# Patient Record
Sex: MALE | Race: WHITE | NOT HISPANIC OR LATINO | Employment: OTHER | URBAN - METROPOLITAN AREA
[De-identification: names, ages, dates, MRNs, and addresses within clinical notes are randomized per-mention and may not be internally consistent; named-entity substitution may affect disease eponyms.]

---

## 2017-01-12 ENCOUNTER — ANTICOAGULATION THERAPY VISIT (OUTPATIENT)
Dept: ANTICOAGULATION | Facility: CLINIC | Age: 82
End: 2017-01-12
Payer: COMMERCIAL

## 2017-01-12 DIAGNOSIS — Z79.01 LONG-TERM (CURRENT) USE OF ANTICOAGULANTS: Primary | ICD-10-CM

## 2017-01-12 DIAGNOSIS — I48.91 ATRIAL FIBRILLATION (H): ICD-10-CM

## 2017-01-12 LAB — INR POINT OF CARE: 4 (ref 0.86–1.14)

## 2017-01-12 PROCEDURE — 36416 COLLJ CAPILLARY BLOOD SPEC: CPT

## 2017-01-12 PROCEDURE — 85610 PROTHROMBIN TIME: CPT | Mod: QW

## 2017-01-12 PROCEDURE — 99207 ZZC NO CHARGE NURSE ONLY: CPT

## 2017-01-12 NOTE — PROGRESS NOTES
ANTICOAGULATION FOLLOW-UP CLINIC VISIT    Patient Name:  Tucker Slater  Date:  1/12/2017  Contact Type:  Face to Face    SUBJECTIVE:     Patient Findings     Positives Unexplained INR or factor level change           OBJECTIVE    INR PROTIME   Date Value Ref Range Status   01/12/2017 4.0* 0.86 - 1.14 Final       ASSESSMENT / PLAN  INR assessment SUPRA    Recheck INR In: 10 DAYS    INR Location Clinic      Anticoagulation Summary as of 1/12/2017     INR goal 2.0-3.0   Selected INR 4.0! (1/12/2017)   Maintenance plan 2.5 mg (2.5 mg x 1) every day   Full instructions 1/12: Hold; 1/13: 1.25 mg; Otherwise 2.5 mg every day   Weekly total 17.5 mg   Plan last modified Gial Valdovinos RN (4/7/2016)   Next INR check 1/23/2017   Priority INR   Target end date     Indications   Long-term (current) use of anticoagulants [Z79.01] [Z79.01]  Atrial fibrillation (H) [I48.91]         Anticoagulation Episode Summary     INR check location     Preferred lab     Send INR reminders to [facility]    Comments       Anticoagulation Care Providers     Provider Role Specialty Phone number    Kwadwo Winslow MD Responsible Internal Medicine [phone]            See the Encounter Report to view Anticoagulation Flowsheet and Dosing Calendar (Go to Encounters tab in chart review, and find the Anticoagulation Therapy Visit)    Dosage adjustment made based on physician directed care plan.    Ivory Machado RN

## 2017-01-17 DIAGNOSIS — I48.91 ATRIAL FIBRILLATION (H): Primary | ICD-10-CM

## 2017-01-17 NOTE — TELEPHONE ENCOUNTER
Jantoven 2.5mg    Last Written Prescription Date: 10/21/16  Last Fill Qty: 90, # refills: 0  Last Office Visit with G, P or [facility] prescribing provider: 11/30/16       Date and Result of Last PT/INR:   INR      4.0   1/12/2017  INR      2.3   12/13/2016  INR     2.98   8/30/2015  INR     3.54   8/27/2015         Thank you -  Ela Baxter, Pharmacy Technician  [facility]  On Behalf Of [facility]

## 2017-01-18 RX ORDER — WARFARIN SODIUM 2.5 MG/1
TABLET ORAL
Qty: 90 TABLET | Refills: 0 | Status: SHIPPED | OUTPATIENT
Start: 2017-01-18 | End: 2017-04-03

## 2017-01-18 NOTE — TELEPHONE ENCOUNTER
Prescription approved per [facility] Refill Protocol.  Has INR appt scheduled for 1/23/17.     Adeola Valdovinos, Pharm.D.  on behalf of [facility] Pharmacist   [email or web address]

## 2017-01-23 ENCOUNTER — ANTICOAGULATION THERAPY VISIT (OUTPATIENT)
Dept: ANTICOAGULATION | Facility: CLINIC | Age: 82
End: 2017-01-23
Payer: COMMERCIAL

## 2017-01-23 DIAGNOSIS — Z79.01 LONG-TERM (CURRENT) USE OF ANTICOAGULANTS: Primary | ICD-10-CM

## 2017-01-23 LAB — INR POINT OF CARE: 2.9 (ref 0.86–1.14)

## 2017-01-23 PROCEDURE — 36416 COLLJ CAPILLARY BLOOD SPEC: CPT

## 2017-01-23 PROCEDURE — 85610 PROTHROMBIN TIME: CPT | Mod: QW

## 2017-01-23 PROCEDURE — 99207 ZZC NO CHARGE NURSE ONLY: CPT

## 2017-01-23 NOTE — MR AVS SNAPSHOT
Tucker Slater   1/23/2017 2:45 PM   Anticoagulation Therapy Visit    Description:  85 year old male   Provider[facility]  [facility]   Department:  [facility]           INR as of 1/23/2017     Selected INR 2.9 (1/23/2017)      Anticoagulation Summary as of 1/23/2017     INR goal 2.0-3.0   Selected INR 2.9 (1/23/2017)   Full instructions 2.5 mg every day   Next INR check 2/6/2017    Indications   Long-term (current) use of anticoagulants [Z79.01] [Z79.01]  Atrial fibrillation (H) [I48.91]         Your next Anticoagulation Clinic appointment(s)     Feb 06, 2017  1:45 PM   [facility]agulation Visit with [facility]   [facility]    [address] [facility] [address]  [facility] [address] [phone]              Contact Numbers     [facility] Phone Numbers:  Anticoagulation Clinic Appointments : [phone]  Anticoagulation Nurse: [phone]         January 2017 Details    Sun Mon Tue Wed Thu Fri Sat     1               2               3               4               5               6               7                 8               9               10               11               12               13               14                 15               16               17               18               19               20               21                 22               23      2.5 mg   See details      24      2.5 mg         25      2.5 mg         26      2.5 mg         27      2.5 mg         28      2.5 mg           29      2.5 mg         30      2.5 mg         31      2.5 mg              Date Details   01/23 This INR check               How to take your warfarin dose     To take:  2.5 mg Take 1 of the 2.5 mg tablets.           February 2017 Details    Sun Mon Tue Wed Thu Fri Sat        1      2.5 mg         2      2.5 mg         3      2.5 mg         4      2.5 mg           5      2.5 mg         6            7               8                9               10               11                 12               13               14               15               16               17               18                 19               20               21               22               23               24               25                 26               27               28                    Date Details   No additional details    Date of next INR:  2/6/2017         How to take your warfarin dose     To take:  2.5 mg Take 1 of the 2.5 mg tablets.

## 2017-01-23 NOTE — PROGRESS NOTES
ANTICOAGULATION FOLLOW-UP CLINIC VISIT    Patient Name:  Tucker Slater  Date:  1/23/2017  Contact Type:  Face to Face    SUBJECTIVE:     Patient Findings     Positives No Problem Findings           OBJECTIVE    INR PROTIME   Date Value Ref Range Status   01/23/2017 2.9* 0.86 - 1.14 Final       ASSESSMENT / PLAN  INR assessment THER    Recheck INR In: 2 WEEKS    INR Location Clinic      Anticoagulation Summary as of 1/23/2017     INR goal 2.0-3.0   Selected INR 2.9 (1/23/2017)   Maintenance plan 2.5 mg (2.5 mg x 1) every day   Full instructions 2.5 mg every day   Weekly total 17.5 mg   No change documented Gail Valdovinos RN   Plan last modified Gail Valdovinos RN (4/7/2016)   Next INR check 2/6/2017   Priority INR   Target end date     Indications   Long-term (current) use of anticoagulants [Z79.01] [Z79.01]  Atrial fibrillation (H) [I48.91]         Anticoagulation Episode Summary     INR check location     Preferred lab     Send INR reminders to [facility]    Comments       Anticoagulation Care Providers     Provider Role Specialty Phone number    Kwadwo Winslow MD [facility] Internal Medicine [phone]            See the Encounter Report to view Anticoagulation Flowsheet and Dosing Calendar (Go to Encounters tab in chart review, and find the Anticoagulation Therapy Visit)    Dosage adjustment made based on physician directed care plan.    Gail Valdovinos RN

## 2017-01-30 ENCOUNTER — DOCUMENTATION ONLY (OUTPATIENT)
Dept: CARDIOLOGY | Facility: CLINIC | Age: 82
End: 2017-01-30

## 2017-01-30 DIAGNOSIS — Z95.0 CARDIAC PACEMAKER IN SITU: Primary | ICD-10-CM

## 2017-01-30 NOTE — PROGRESS NOTES
[employer] PPM alert received.  Pt has been lost to follow up since 3-2016.  Device histogram data shows persistent AF since July 2016.  He remains on Warfarin. Call to him re: symptoms and to inform him that he should call to schedule a follow up with Dr Perez and device in [place]. Orders placed. [email or web address]

## 2017-01-31 DIAGNOSIS — I10 ESSENTIAL HYPERTENSION, BENIGN: Primary | ICD-10-CM

## 2017-01-31 NOTE — TELEPHONE ENCOUNTER
Maxzide 75-50mg      Last Written Prescription Date: 11/11/16  Last Fill Quantity: 45, # refills: 0  Last Office Visit with G, P or [facility] prescribing provider: 11/30/16       POTASSIUM   Date Value Ref Range Status   11/07/2016 4.3 3.4 - 5.3 mmol/L Final     CREATININE   Date Value Ref Range Status   11/07/2016 1.57* 0.66 - 1.25 mg/dL Final     BP Readings from Last 3 Encounters:   11/30/16 138/76   11/08/16 120/86   11/07/16 130/78     Jaren Peck CPhT  [facility]    On behalf of [facility]

## 2017-02-01 RX ORDER — TRIAMTERENE AND HYDROCHLOROTHIAZIDE 75; 50 MG/1; MG/1
0.5 TABLET ORAL DAILY
Qty: 45 TABLET | Refills: 3 | Status: SHIPPED | OUTPATIENT
Start: 2017-02-01 | End: 2017-04-07

## 2017-02-01 NOTE — TELEPHONE ENCOUNTER
Prescription approved per [facility] Refill Protocol.    Laurie Hobson, PharmD  [facility]  On behalf of [facility]

## 2017-02-02 ENCOUNTER — ALLIED HEALTH/NURSE VISIT (OUTPATIENT)
Dept: CARDIOLOGY | Facility: CLINIC | Age: 82
End: 2017-02-02
Attending: INTERNAL MEDICINE
Payer: COMMERCIAL

## 2017-02-02 VITALS
HEART RATE: 72 BPM | SYSTOLIC BLOOD PRESSURE: 136 MMHG | DIASTOLIC BLOOD PRESSURE: 82 MMHG | BODY MASS INDEX: 27.06 KG/M2 | HEIGHT: 70 IN | WEIGHT: 189 LBS

## 2017-02-02 DIAGNOSIS — I10 ESSENTIAL HYPERTENSION, BENIGN: ICD-10-CM

## 2017-02-02 DIAGNOSIS — Z95.0 CARDIAC PACEMAKER IN SITU: ICD-10-CM

## 2017-02-02 DIAGNOSIS — I48.19 PERSISTENT ATRIAL FIBRILLATION (H): Primary | ICD-10-CM

## 2017-02-02 PROCEDURE — 93280 PM DEVICE PROGR EVAL DUAL: CPT | Performed by: INTERNAL MEDICINE

## 2017-02-02 PROCEDURE — 99213 OFFICE O/P EST LOW 20 MIN: CPT | Performed by: INTERNAL MEDICINE

## 2017-02-02 NOTE — PROGRESS NOTES
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 Perez today at 3:15 PM. 3 month remote scheduled. Naveen

## 2017-02-02 NOTE — Clinical Note
2017      Kwadwo Winslow MD    [facility]    [address], [address]    [place]  [address]       RE: Tucker Slater   MRN: [id number]   : 1931      Dear Dr. Winslow:      It was my pleasure seeing your patient, Mr. Tucker Slater, in follow-up of persistent atrial fibrillation and bradycardia.  He is a delightful 85-year-old male who underwent pacemaker implantation in .  He has had atrial fibrillation and/or atypical atrial flutter for years.  Recently, interrogation of his pacemaker tells us that he has been persistently in atrial arrhythmia since the summer of .  He is appropriately anticoagulated with warfarin.  There have been no recent attempts to restore sinus rhythm.      The patient was last seen in our clinic in 2016 by Janeth Mcdaniels NP.  His blood pressure was a little high that day, and subsequently his dose of carvedilol was increased to 25 mg b.i.d.  Mr. Slater keeps an eye on his blood pressure and tells me that his BP is typically 140 or less and only rarely over 140 mmHg.      He has some issues with his balance and neuropathy but overall has done reasonably well over the past year without any hospitalization.  Another health concern for Mr. Slater is the development of chronic kidney disease, stage III.  Most recent creatinine was around 1.6.      PHYSICAL EXAMINATION:   VITAL SIGNS:  Blood pressure 142/84.  It was rechecked and was 136/82, heart rate 72 and regular, weight 85.7 kg, height 178 cm.   NECK:  Supple.   LUNGS:  Clear.   CARDIOVASCULAR:  Regular rhythm, no apparent gallop or murmur.  His pacemaker incision has healed nicely.   EXTREMITIES:  No edema.      DIAGNOSTIC STUDIES:  Pacemaker interrogation earlier today showed 100% atrial arrhythmia since 2016, estimated battery longevity is 9-10 years.      IMPRESSION:   1.  Persistent atrial fibrillation.  Continue anticoagulation with warfarin.  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    2.  Permanent pacemaker.  Functioning well.  Continue routine Device Clinic followup.   3.  Hypertension.  It was high normal today.  No changes are necessary.  He should keep an eye on it.      RECOMMENDATIONS:  I have requested a follow-up with Janeth Mcdaniels in our [facility] in 05/2018.      Sincerely,           HARVINDER MONROY MD, FACC

## 2017-02-03 NOTE — PROGRESS NOTES
2017      Kwadwo Winslow MD    [facility]    [address], [address]    [place]  [address]       RE: Tucker Slater   MRN: [id number]   : 1931      Dear Dr. Winslow:      It was my pleasure seeing your patient, Mr. Tucker Slater, in follow-up of persistent atrial fibrillation and bradycardia.  He is a delightful 85-year-old male who underwent pacemaker implantation in .  He has had atrial fibrillation and/or atypical atrial flutter for years.  Recently, interrogation of his pacemaker tells us that he has been persistently in atrial arrhythmia since the summer of .  He is appropriately anticoagulated with warfarin.  There have been no recent attempts to restore sinus rhythm.      The patient was last seen in our clinic in 2016 by Janeth Mcdaniels NP.  His blood pressure was a little high that day, and subsequently his dose of carvedilol was increased to 25 mg b.i.d.  Mr. Slater keeps an eye on his blood pressure and tells me that his BP is typically 140 or less and only rarely over 140 mmHg.      He has some issues with his balance and neuropathy but overall has done reasonably well over the past year without any hospitalization.  Another health concern for Mr. Slater is the development of chronic kidney disease, stage III.  Most recent creatinine was around 1.6.      PHYSICAL EXAMINATION:   VITAL SIGNS:  Blood pressure 142/84.  It was rechecked and was 136/82, heart rate 72 and regular, weight 85.7 kg, height 178 cm.   NECK:  Supple.   LUNGS:  Clear.   CARDIOVASCULAR:  Regular rhythm, no apparent gallop or murmur.  His pacemaker incision has healed nicely.   EXTREMITIES:  No edema.      DIAGNOSTIC STUDIES:  Pacemaker interrogation earlier today showed 100% atrial arrhythmia since 2016, estimated battery longevity is 9-10 years.      IMPRESSION:   1.  Persistent atrial fibrillation.  Continue anticoagulation with warfarin.  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    2.  Permanent pacemaker.  Functioning well.  Continue routine Device Clinic followup.   3.  Hypertension.  It was high normal today.  No changes are necessary.  He should keep an eye on it.      RECOMMENDATIONS:  I have requested a follow-up with Janeth Mcdaniels in our [facility] in 2018.      Sincerely,           HARVINDER MONROY MD, [facility]             D: 2017 15:43   T: 2017 05:22   MT: COLBY      Name:     ROSS LORENZANA   MRN:      [id number]        Account:      [id number]   :      1931           Service Date: 2017      Document: [id number]

## 2017-02-06 ENCOUNTER — ANTICOAGULATION THERAPY VISIT (OUTPATIENT)
Dept: ANTICOAGULATION | Facility: CLINIC | Age: 82
End: 2017-02-06
Payer: COMMERCIAL

## 2017-02-06 DIAGNOSIS — Z79.01 LONG-TERM (CURRENT) USE OF ANTICOAGULANTS: Primary | ICD-10-CM

## 2017-02-06 LAB — INR POINT OF CARE: 2.9 (ref 0.86–1.14)

## 2017-02-06 PROCEDURE — 85610 PROTHROMBIN TIME: CPT | Mod: QW

## 2017-02-06 PROCEDURE — 36416 COLLJ CAPILLARY BLOOD SPEC: CPT

## 2017-02-06 PROCEDURE — 99207 ZZC NO CHARGE NURSE ONLY: CPT

## 2017-02-06 NOTE — MR AVS SNAPSHOT
Tucker ZHAO Slater   2/6/2017 1:45 PM   Anticoagulation Therapy Visit    Description:  85 year old male   Provider:  [facility]   Department:  [facility] Anti Coagulation           INR as of 2/6/2017     Selected INR 2.9 (2/6/2017)      Anticoagulation Summary as of 2/6/2017     INR goal 2.0-3.0   Selected INR 2.9 (2/6/2017)   Full instructions 2.5 mg every day   Next INR check 3/6/2017    Indications   Long-term (current) use of anticoagulants [Z79.01] [Z79.01]  Atrial fibrillation (H) [I48.91]         Your next Anticoagulation Clinic appointment(s)     Mar 06, 2017  1:45 PM   [facility]ticoagulation [facility] with [facility]   [facility]    [address] [facility] [address]  [facility] [address] [phone]              Contact Numbers     [facility] Phone Numbers:  Anticoagulation Clinic Appointments : [phone]  Anticoagulation Nurse: [phone]         February 2017 Details    Sun Mon Tue Wed Thu Fri Sat        1               2               3               4                 5               6      2.5 mg   See details      7      2.5 mg         8      2.5 mg         9      2.5 mg         10      2.5 mg         11      2.5 mg           12      2.5 mg         13      2.5 mg         14      2.5 mg         15      2.5 mg         16      2.5 mg         17      2.5 mg         18      2.5 mg           19      2.5 mg         20      2.5 mg         21      2.5 mg         22      2.5 mg         23      2.5 mg         24      2.5 mg         25      2.5 mg           26      2.5 mg         27      2.5 mg         28      2.5 mg              Date Details   02/06 This INR check               How to take your warfarin dose     To take:  2.5 mg Take 1 of the 2.5 mg tablets.           March 2017 Details    Sun Mon Tue Wed Thu Fri Sat        1      2.5 mg         2      2.5 mg         3      2.5 mg         4      2.5 mg           5      2.5 mg         6             7               8               9               10               11                 12               13               14               15               16               17               18                 19               20               21               22               23               24               25                 26               27               28               29               30               31                 Date Details   No additional details    Date of next INR:  3/6/2017         How to take your warfarin dose     To take:  2.5 mg Take 1 of the 2.5 mg tablets.

## 2017-02-06 NOTE — PROGRESS NOTES
ANTICOAGULATION FOLLOW-UP CLINIC VISIT    Patient Name:  Tucker Slater  Date:  2/6/2017  Contact Type:  Face to Face    SUBJECTIVE:     Patient Findings     Positives No Problem Findings           OBJECTIVE    INR PROTIME   Date Value Ref Range Status   02/06/2017 2.9* 0.86 - 1.14 Final       ASSESSMENT / PLAN  INR assessment THER    Recheck INR In: 4 WEEKS    INR Location Clinic      Anticoagulation Summary as of 2/6/2017     INR goal 2.0-3.0   Selected INR 2.9 (2/6/2017)   Maintenance plan 2.5 mg (2.5 mg x 1) every day   Full instructions 2.5 mg every day   Weekly total 17.5 mg   No change documented Gail Valdovinos RN   Plan last modified Gail Valdovinos RN (4/7/2016)   Next INR check 3/6/2017   Priority INR   Target end date     Indications   Long-term (current) use of anticoagulants [Z79.01] [Z79.01]  Atrial fibrillation (H) [I48.91]         Anticoagulation Episode Summary     INR check location     Preferred lab     Send INR reminders to [facility]    Comments       Anticoagulation Care Providers     Provider Role Specialty Phone number    Kwadwo Winslow MD [facility] Internal Medicine [phone]            See the Encounter Report to view Anticoagulation Flowsheet and Dosing Calendar (Go to Encounters tab in chart review, and find the Anticoagulation Therapy Visit)        Gail Valdovinos, RN

## 2017-02-09 DIAGNOSIS — N18.30 CKD (CHRONIC KIDNEY DISEASE) STAGE 3, GFR 30-59 ML/MIN (H): ICD-10-CM

## 2017-02-09 DIAGNOSIS — I10 ESSENTIAL HYPERTENSION, BENIGN: Primary | ICD-10-CM

## 2017-02-09 LAB
ANION GAP SERPL CALCULATED.3IONS-SCNC: 8 MMOL/L (ref 3–14)
BUN SERPL-MCNC: 41 MG/DL (ref 7–30)
CALCIUM SERPL-MCNC: 9.3 MG/DL (ref 8.5–10.1)
CHLORIDE SERPL-SCNC: 110 MMOL/L (ref 94–109)
CO2 SERPL-SCNC: 26 MMOL/L (ref 20–32)
CREAT SERPL-MCNC: 1.41 MG/DL (ref 0.66–1.25)
GFR SERPL CREATININE-BSD FRML MDRD: 48 ML/MIN/1.7M2
GLUCOSE SERPL-MCNC: 103 MG/DL (ref 70–99)
POTASSIUM SERPL-SCNC: 4.2 MMOL/L (ref 3.4–5.3)
SODIUM SERPL-SCNC: 144 MMOL/L (ref 133–144)

## 2017-02-09 PROCEDURE — 36415 COLL VENOUS BLD VENIPUNCTURE: CPT | Performed by: INTERNAL MEDICINE

## 2017-02-09 PROCEDURE — 80048 BASIC METABOLIC PNL TOTAL CA: CPT | Performed by: INTERNAL MEDICINE

## 2017-02-09 NOTE — TELEPHONE ENCOUNTER
Losartan 100mg      Last Written Prescription Date: 11/11/16  Last Fill Quantity: 90, # refills: 0  Last Office Visit with [facility], [facility] or [facility] prescribing provider: 11/30/16       POTASSIUM   Date Value Ref Range Status   11/07/2016 4.3 3.4 - 5.3 mmol/L Final     CREATININE   Date Value Ref Range Status   11/07/2016 1.57* 0.66 - 1.25 mg/dL Final     BP Readings from Last 3 Encounters:   02/02/17 136/82   11/30/16 138/76   11/08/16 120/86

## 2017-02-10 DIAGNOSIS — N18.30 CKD (CHRONIC KIDNEY DISEASE) STAGE 3, GFR 30-59 ML/MIN (H): Primary | ICD-10-CM

## 2017-02-10 RX ORDER — LOSARTAN POTASSIUM 100 MG/1
100 TABLET ORAL DAILY
Qty: 90 TABLET | Refills: 0 | Status: ON HOLD | OUTPATIENT
Start: 2017-02-10 | End: 2017-04-17

## 2017-02-10 NOTE — TELEPHONE ENCOUNTER
Prescription approved per [facility] Refill Protocol.    Laurie Hobson, PharmD  [facility]  On behalf of [facility]

## 2017-02-13 ENCOUNTER — ANTICOAGULATION THERAPY VISIT (OUTPATIENT)
Dept: ANTICOAGULATION | Facility: CLINIC | Age: 82
End: 2017-02-13
Payer: COMMERCIAL

## 2017-02-13 DIAGNOSIS — Z79.01 LONG-TERM (CURRENT) USE OF ANTICOAGULANTS: ICD-10-CM

## 2017-02-13 LAB — INR POINT OF CARE: 3.5 (ref 0.86–1.14)

## 2017-02-13 PROCEDURE — 99207 ZZC NO CHARGE NURSE ONLY: CPT

## 2017-02-13 PROCEDURE — 36416 COLLJ CAPILLARY BLOOD SPEC: CPT

## 2017-02-13 PROCEDURE — 85610 PROTHROMBIN TIME: CPT | Mod: QW

## 2017-02-13 NOTE — MR AVS SNAPSHOT
Tucker Slater   2/13/2017 2:45 PM   Anticoagulation Therapy Visit    Description:  85 year old male   Provider[facility]  [facility]   Department:  [facility]           INR as of 2/13/2017     Today's INR 3.5!      Anticoagulation Summary as of 2/13/2017     INR goal 2.0-3.0   Today's INR 3.5!   Full instructions 2/13: 1.25 mg; Otherwise 2.5 mg every day   Next INR check 3/6/2017    Indications   Long-term (current) use of anticoagulants [Z79.01] [Z79.01]  Atrial fibrillation (H) [I48.91]         Your next Anticoagulation Clinic appointment(s)     Mar 06, 2017  1:45 PM CST   [facility]ticoagulation Visit with [facility]   [facility]    [address] [facility] [address]  [facility] [address] [phone]              Contact Numbers     [facility] Phone Numbers:  Anticoagulation Clinic Appointments : [phone]  Anticoagulation Nurse: [phone]         February 2017 Details    Sun Mon Tue Wed Thu Fri Sat        1               2               3               4                 5               6               7               8               9               10               11                 12               13      1.25 mg   See details      14      2.5 mg         15      2.5 mg         16      2.5 mg         17      2.5 mg         18      2.5 mg           19      2.5 mg         20      2.5 mg         21      2.5 mg         22      2.5 mg         23      2.5 mg         24      2.5 mg         25      2.5 mg           26      2.5 mg         27      2.5 mg         28      2.5 mg              Date Details   02/13 This INR check               How to take your warfarin dose     To take:  1.25 mg Take 0.5 of a 2.5 mg tablet.    To take:  2.5 mg Take 1 of the 2.5 mg tablets.           March 2017 Details    Sun Mon Tue Wed Thu Fri Sat        1      2.5 mg         2      2.5 mg         3      2.5 mg         4      2.5 mg           5      2.5  mg         6            7               8               9               10               11                 12               13               14               15               16               17               18                 19               20               21               22               23               24               25                 26               27               28               29               30               31                 Date Details   No additional details    Date of next INR:  3/6/2017         How to take your warfarin dose     To take:  2.5 mg Take 1 of the 2.5 mg tablets.

## 2017-02-16 ENCOUNTER — TRANSFERRED RECORDS (OUTPATIENT)
Dept: HEALTH INFORMATION MANAGEMENT | Facility: CLINIC | Age: 82
End: 2017-02-16

## 2017-02-21 ENCOUNTER — ANTICOAGULATION THERAPY VISIT (OUTPATIENT)
Dept: ANTICOAGULATION | Facility: CLINIC | Age: 82
End: 2017-02-21
Payer: COMMERCIAL

## 2017-02-21 DIAGNOSIS — Z79.01 LONG-TERM (CURRENT) USE OF ANTICOAGULANTS: ICD-10-CM

## 2017-02-21 LAB — INR POINT OF CARE: 2.6 (ref 0.86–1.14)

## 2017-02-21 PROCEDURE — 36416 COLLJ CAPILLARY BLOOD SPEC: CPT

## 2017-02-21 PROCEDURE — 99207 ZZC NO CHARGE NURSE ONLY: CPT

## 2017-02-21 PROCEDURE — 85610 PROTHROMBIN TIME: CPT | Mod: QW

## 2017-02-21 NOTE — MR AVS SNAPSHOT
Tucker L Bryon   2/21/2017 10:45 AM   Anticoagulation Therapy Visit    Description:  85 year old male   Provider[facility]  [facility]   Department:  [facility] Anti Coagulation           INR as of 2/21/2017     Today's INR       Anticoagulation Summary as of 2/21/2017     INR goal 2.0-3.0   Today's INR    Full instructions 2/27: 1.25 mg; Otherwise 2.5 mg every day   Next INR check 3/6/2017    Indications   Long-term (current) use of anticoagulants [Z79.01] [Z79.01]  Atrial fibrillation (H) [I48.91]         Your next Anticoagulation Clinic appointment(s)     Mar 06, 2017  1:45 PM CST   Anticoagulation [facility] with [facility]    [address]  [facility] [address] [phone]              Contact Numbers     [facility] Phone Numbers:  Anticoagulation Clinic Appointments : [phone]  Anticoagulation Nurse: [phone]         February 2017 Details    Sun Mon Tue Wed Thu Fri Sat        1               2               3               4                 5               6               7               8               9               10               11                 12               13               14               15               16               17               18                 19               20               21      2.5 mg   See details      22      2.5 mg         23      2.5 mg         24      2.5 mg         25      2.5 mg           26      2.5 mg         27      1.25 mg         28      2.5 mg              Date Details   02/21 This INR check               How to take your warfarin dose     To take:  1.25 mg Take 0.5 of a 2.5 mg tablet.    To take:  2.5 mg Take 1 of the 2.5 mg tablets.           March 2017 Details    Sun Mon Tue Wed Thu Fri Sat        1      2.5 mg         2      2.5 mg         3      2.5 mg         4      2.5 mg           5      2.5 mg         6            7               8                9               10               11                 12               13               14               15               16               17               18                 19               20               21               22               23               24               25                 26               27               28               29               30               31                 Date Details   No additional details    Date of next INR:  3/6/2017         How to take your warfarin dose     To take:  2.5 mg Take 1 of the 2.5 mg tablets.

## 2017-02-21 NOTE — PROGRESS NOTES
ANTICOAGULATION FOLLOW-UP CLINIC VISIT    Patient Name:  Tucker Slater  Date:  2/21/2017  Contact Type:  Face to Face    SUBJECTIVE:     Patient Findings     Positives No Problem Findings    Comments Pt had a tooth extraction, had some extended bleeding, so wanted to have INR checked.           OBJECTIVE    INR Protime   Date Value Ref Range Status   02/21/2017 2.6 (A) 0.86 - 1.14 Final       ASSESSMENT / PLAN  INR assessment THER    Recheck INR In: 2 WEEKS    INR Location Clinic      Anticoagulation Summary as of 2/21/2017     INR goal 2.0-3.0   Today's INR 2.6   Maintenance plan 2.5 mg (2.5 mg x 1) every day   Full instructions 2/27: 1.25 mg; Otherwise 2.5 mg every day   Weekly total 17.5 mg   Plan last modified Gail Valdovinos RN (4/7/2016)   Next INR check 3/6/2017   Priority INR   Target end date     Indications   Long-term (current) use of anticoagulants [Z79.01] [Z79.01]  Atrial fibrillation (H) [I48.91]         Anticoagulation Episode Summary     INR check location     Preferred lab     Send INR reminders to [facility]    Comments       Anticoagulation Care Providers     Provider Role Specialty Phone number    Kawdwo Winslow MD Responsible Internal Medicine [phone]            See the Encounter Report to view Anticoagulation Flowsheet and Dosing Calendar (Go to Encounters tab in chart review, and find the Anticoagulation Therapy Visit)    Dosage adjustment made based on physician directed care plan.    Gail Valdovinos, RN

## 2017-02-27 ENCOUNTER — HOSPITAL ENCOUNTER (OUTPATIENT)
Dept: LAB | Facility: CLINIC | Age: 82
End: 2017-02-27
Attending: ORTHOPAEDIC SURGERY
Payer: MEDICARE

## 2017-02-27 ENCOUNTER — HOSPITAL LABORATORY (OUTPATIENT)
Dept: OTHER | Facility: CLINIC | Age: 82
End: 2017-02-27

## 2017-02-27 LAB — HGB BLD-MCNC: 14.6 G/DL (ref 13.3–17.7)

## 2017-02-28 ENCOUNTER — TRANSFERRED RECORDS (OUTPATIENT)
Dept: HEALTH INFORMATION MANAGEMENT | Facility: CLINIC | Age: 82
End: 2017-02-28

## 2017-03-02 ENCOUNTER — TELEPHONE (OUTPATIENT)
Dept: INTERNAL MEDICINE | Facility: CLINIC | Age: 82
End: 2017-03-02

## 2017-03-02 DIAGNOSIS — M25.559 ARTHRALGIA OF HIP, UNSPECIFIED LATERALITY: Primary | ICD-10-CM

## 2017-03-02 RX ORDER — HYDROCODONE BITARTRATE AND ACETAMINOPHEN 5; 325 MG/1; MG/1
1 TABLET ORAL EVERY 8 HOURS PRN
Qty: 60 TABLET | Refills: 0 | Status: SHIPPED | OUTPATIENT
Start: 2017-03-02 | End: 2017-03-08

## 2017-03-02 NOTE — TELEPHONE ENCOUNTER
Pt's wife calls to check on status of below. Consent to communicate in epic for wife. Relay rx done and will be brought downstairs shortly.    Rx to [facility].

## 2017-03-02 NOTE — TELEPHONE ENCOUNTER
Pt states he is having hip replacement surgery on 3/27.  He states he is having a lot of pain. He thought he could wait until his surgery, but the pain is getting too bad. He states he is bone on bone.  He is taking tylenol but not helping anymore.   He is on Warfarin so cannot take NSAIDs.     He is asking for stronger pain medication.   He reports taking Vicodin many years ago with knee replacement and tolerating this well. Otherwise, doesn't recall any other pain med management.   He is agreeable to trying any pain med that is stronger than tylenol.     Please advise. Has pre op scheduled on 3/20 with Dr Winslow

## 2017-03-06 ENCOUNTER — ANTICOAGULATION THERAPY VISIT (OUTPATIENT)
Dept: ANTICOAGULATION | Facility: CLINIC | Age: 82
End: 2017-03-06
Payer: COMMERCIAL

## 2017-03-06 DIAGNOSIS — Z79.01 LONG-TERM (CURRENT) USE OF ANTICOAGULANTS: ICD-10-CM

## 2017-03-06 LAB — INR POINT OF CARE: 4.1 (ref 0.86–1.14)

## 2017-03-06 PROCEDURE — 85610 PROTHROMBIN TIME: CPT | Mod: QW

## 2017-03-06 PROCEDURE — 36416 COLLJ CAPILLARY BLOOD SPEC: CPT

## 2017-03-06 PROCEDURE — 99207 ZZC NO CHARGE NURSE ONLY: CPT

## 2017-03-06 RX ORDER — OXYCODONE HYDROCHLORIDE 5 MG/1
1 TABLET ORAL EVERY 4 HOURS PRN
COMMUNITY
End: 2017-03-20

## 2017-03-06 NOTE — PROGRESS NOTES
ANTICOAGULATION FOLLOW-UP CLINIC VISIT    Patient Name:  Tucker Slater  Date:  3/6/2017  Contact Type:  Face to Face    SUBJECTIVE:     Patient Findings     Positives Change in medications (pt reports Norco changed to Oxycodone by surgeon, Norco was not helping the pain), Inflammation (Pt reports hip pain increased rating pain back down between 7-8 on a 0-10 pain scale. )    Comments Pt will have hip replacement 3/27/17           OBJECTIVE    INR Protime   Date Value Ref Range Status   03/06/2017 4.1 (A) 0.86 - 1.14 Final       ASSESSMENT / PLAN  INR assessment SUPRA    Recheck INR In: 8 DAYS    INR Location Clinic      Anticoagulation Summary as of 3/6/2017     INR goal 2.0-3.0   Today's INR 4.1!   Maintenance plan 2.5 mg (2.5 mg x 1) every day   Full instructions 3/6: Hold; 3/7: 1.25 mg; Otherwise 2.5 mg every day   Weekly total 17.5 mg   Plan last modified Gail Valdovinos RN (4/7/2016)   Next INR check 3/14/2017   Priority INR   Target end date     Indications   Long-term (current) use of anticoagulants [Z79.01] [Z79.01]  Atrial fibrillation (H) [I48.91]         Anticoagulation Episode Summary     INR check location     Preferred lab     Send INR reminders to [facility]    Comments       Anticoagulation Care Providers     Provider Role Specialty Phone number    Kwadwo Winslow MD [facility] Internal Medicine [phone]            See the Encounter Report to view Anticoagulation Flowsheet and Dosing Calendar (Go to Encounters tab in chart review, and find the Anticoagulation Therapy Visit)    Dosage adjustment made based on physician directed care plan.    Gail Valdovinos RN

## 2017-03-06 NOTE — MR AVS SNAPSHOT
Tucker Slater   3/6/2017 1:45 PM   Anticoagulation Therapy Visit    Description:  85 year old male   Provider[facility]  [facility]   Department:  [facility]           INR as of 3/6/2017     Today's INR 4.1!      Anticoagulation Summary as of 3/6/2017     INR goal 2.0-3.0   Today's INR 4.1!   Full instructions 3/6: Hold; 3/7: 1.25 mg; Otherwise 2.5 mg every day   Next INR check 3/14/2017    Indications   Long-term (current) use of anticoagulants [Z79.01] [Z79.01]  Atrial fibrillation (H) [I48.91]         Your next Anticoagulation Clinic appointment(s)     Mar 14, 2017  1:45 PM CDT   [facility]tico[facility] Visit with [facility]   [facility] ([facility])    [address]  [facility] [id number] [phone]              Contact Numbers     [facility] Phone Numbers:  Anticoagulation Clinic Appointments : [phone]  Anticoagulation Nurse: [phone]         March 2017 Details    Sun Mon Tue Wed Thu Fri Sat        1               2               3               4                 5               6      Hold   See details      7      1.25 mg         8      2.5 mg         9      2.5 mg         10      2.5 mg         11      2.5 mg           12      2.5 mg         13      2.5 mg         14            15               16               17               18                 19               20               21               22               23               24               25                 26               27               28               29               30               31                 Date Details   03/06 This INR check       Date of next INR:  3/14/2017         How to take your warfarin dose     To take:  1.25 mg Take 0.5 of a 2.5 mg tablet.    To take:  2.5 mg Take 1 of the 2.5 mg tablets.    Hold Do not take your warfarin dose. See the Details table to the right for additional instructions.

## 2017-03-09 ENCOUNTER — HOSPITAL ENCOUNTER (OUTPATIENT)
Dept: LAB | Facility: CLINIC | Age: 82
Discharge: HOME | End: 2017-03-09
Attending: ORTHOPAEDIC SURGERY | Admitting: ORTHOPAEDIC SURGERY
Payer: MEDICARE

## 2017-03-09 ENCOUNTER — TELEPHONE (OUTPATIENT)
Dept: INTERNAL MEDICINE | Facility: CLINIC | Age: 82
End: 2017-03-09

## 2017-03-09 DIAGNOSIS — Z01.812 PRE-OPERATIVE LABORATORY EXAMINATION: ICD-10-CM

## 2017-03-09 LAB
MRSA DNA SPEC QL NAA+PROBE: NORMAL
SPECIMEN SOURCE: NORMAL

## 2017-03-09 PROCEDURE — 40000829 ZZHCL STATISTIC STAPH AUREUS SUSCEPT SCREEN PCR: Performed by: ORTHOPAEDIC SURGERY

## 2017-03-09 PROCEDURE — 87641 MR-STAPH DNA AMP PROBE: CPT | Performed by: ORTHOPAEDIC SURGERY

## 2017-03-09 PROCEDURE — 87640 STAPH A DNA AMP PROBE: CPT | Performed by: ORTHOPAEDIC SURGERY

## 2017-03-09 PROCEDURE — 40000830 ZZHCL STATISTIC STAPH AUREUS METH RESIST SCREEN PCR: Performed by: ORTHOPAEDIC SURGERY

## 2017-03-09 NOTE — TELEPHONE ENCOUNTER
Pt calls to say that he has had to take Oxycodone 5 mg about every 4 hours for hip pain that has caused constipation.  Dr Wynn will be doing surgery for him in a couple of weeks.    He says he's tried stool softeners and laxative and he has these at home.  He was advised to start taking 2 stool softeners in the morning and evening.  He was also advised to take one of the laxatives this morning.  He advised to take another one tomorrow morning if he hasn't had a BM yet.  If he doesn't have a BM by noon tomorrow, he was advised to call nurse back.  This time was set because tomorrow is Friday, and office won't be open on Sat.      If pt hasn't had a BM by tomorrow will have him get a fleets enema and use with lubricant.    He was advised to continue stool softeners until he's weaned off the Oxycodone and he has BM's without them.

## 2017-03-14 ENCOUNTER — ANTICOAGULATION THERAPY VISIT (OUTPATIENT)
Dept: ANTICOAGULATION | Facility: CLINIC | Age: 82
End: 2017-03-14
Payer: COMMERCIAL

## 2017-03-14 DIAGNOSIS — Z79.01 LONG-TERM (CURRENT) USE OF ANTICOAGULANTS: ICD-10-CM

## 2017-03-14 LAB — INR POINT OF CARE: 3.2 (ref 0.86–1.14)

## 2017-03-14 PROCEDURE — 36416 COLLJ CAPILLARY BLOOD SPEC: CPT

## 2017-03-14 PROCEDURE — 99207 ZZC NO CHARGE NURSE ONLY: CPT

## 2017-03-14 PROCEDURE — 85610 PROTHROMBIN TIME: CPT | Mod: QW

## 2017-03-14 NOTE — MR AVS SNAPSHOT
Tucker L Bryon   3/14/2017 1:45 PM   Anticoagulation Therapy Visit    Description:  86 year old male   Provider[facility]   Department:  [facility]           INR as of 3/14/2017     Today's INR 3.2!      Anticoagulation Summary as of 3/14/2017     INR goal 2.0-3.0   Today's INR 3.2!   Full instructions 3/14: 1.25 mg; Otherwise 2.5 mg every day   Next INR check 3/20/2017    Indications   Long-term (current) use of anticoagulants [Z79.01] [Z79.01]  Atrial fibrillation (H) [I48.91]         Your next Anticoagulation Clinic appointment(s)     Mar 20, 2017  4:15 PM CDT   [facility] with [facility]   [facility]    [address] [facility] [address]  [facility] [address] [phone]              Contact Numbers     [facility] Phone Numbers:  Anticoagulation Clinic Appointments : [phone]  Anticoagulation Nurse: [phone]         March 2017 Details    Sun Mon Tue Wed Thu Fri Sat        1               2               3               4                 5               6               7               8               9               10               11                 12               13               14      1.25 mg   See details      15      2.5 mg         16      2.5 mg         17      2.5 mg         18      2.5 mg           19      2.5 mg         20            21               22               23               24               25                 26               27               28               29               30               31                 Date Details   03/14 This INR check       Date of next INR:  3/20/2017         How to take your warfarin dose     To take:  1.25 mg Take 0.5 of a 2.5 mg tablet.    To take:  2.5 mg Take 1 of the 2.5 mg tablets.

## 2017-03-14 NOTE — PROGRESS NOTES
ANTICOAGULATION FOLLOW-UP CLINIC VISIT    Patient Name:  Tucker Slater  Date:  3/14/2017  Contact Type:  Face to Face    SUBJECTIVE:     Patient Findings     Positives Inflammation (Pt reports increased hip pain, has surgery 3/27/17)           OBJECTIVE    INR Protime   Date Value Ref Range Status   03/14/2017 3.2 (A) 0.86 - 1.14 Final       ASSESSMENT / PLAN  INR assessment SUPRA    Recheck INR In: 6 DAYS    INR Location Clinic      Anticoagulation Summary as of 3/14/2017     INR goal 2.0-3.0   Today's INR 3.2!   Maintenance plan 2.5 mg (2.5 mg x 1) every day   Full instructions 3/14: 1.25 mg; Otherwise 2.5 mg every day   Weekly total 17.5 mg   Plan last modified Gail Valdovinos RN (4/7/2016)   Next INR check 3/20/2017   Priority INR   Target end date     Indications   Long-term (current) use of anticoagulants [Z79.01] [Z79.01]  Atrial fibrillation (H) [I48.91]         Anticoagulation Episode Summary     INR check location     Preferred lab     Send INR reminders to [facility]    Comments       Anticoagulation Care Providers     Provider Role Specialty Phone number    Kwadwo Winslow MD Responsible Internal Medicine [phone]            See the Encounter Report to view Anticoagulation Flowsheet and Dosing Calendar (Go to Encounters tab in chart review, and find the Anticoagulation Therapy Visit)    Dosage adjustment made based on physician directed care plan.    Gail Valdovinos, RN

## 2017-03-16 NOTE — PROGRESS NOTES
BEATRICE    D: Pt was identified through per-surgery process for SW contact to address discharge needs, pt is scheduled for R FRANK on 3/27.     I: Contact was made with pt, SW spoke with both pt and wife who express that they wish for consideration for pt's transfer to rehab facility on hospital discharge. Pt notes that he has had both R and L TKA, and upon hospital discharge at that time he transferred to [place] for further therapy before his return home again. Pt notes that with his pre-surgery contacts with his ortho PA that there had been discussion about consideration of pt's return home rather than rehab facility, however this is depending on how he does in therapy. SW discussed with them that pt will have PT/OT during his hospitalization and based on his progress there will be recommendations made regarding home versus rehab facility which will be noted by MD as the discharge plan is developed at which time SW would be available to help in that planning. Pt and wife both note of pt's increased difficulty and more limited with ambulation due to the hip pain, and also with some  ADLs... SW encouraged them to be mention to therapists during the therapy sessions how things have been going at home, and what their anticipated needs may be if there was a consideration of pt's return home.      Pt has no current home care services, while wife is at home to assist with IADLs, she notes that she has had shoulder surgery so she would be limited in regard to the amount of physical support she would be able to offer to pt.       Pt was provided with SW contact number should additional questions arise prior to surgery.     A/P: SW available during hospitalization for discharge planning as needed and determined by MD.

## 2017-03-18 NOTE — PHARMACY-ADMISSION MEDICATION HISTORY
Admission medication history interview status for this patient is complete. See [facility] admission navigator for allergy information, prior to admission medications and immunization status.     PTA med list completed by pre-admitting nurse,   Tierra Parson RN Wed Mar 8, 2017 11:09 AM       Prior to Admission medications    Medication Sig Last Dose Taking? Auth Provider   oxyCODONE (ROXICODONE) 5 MG IR tablet Take 1 tablet by mouth every 4 hours as needed  Yes Reported, Patient   losartan (COZAAR) 100 MG tablet Take 1 tablet (100 mg) by mouth daily  Yes Kwadwo Winslow MD   triamterene-hydrochlorothiazide (MAXZIDE) 75-50 MG per tablet Take 0.5 tablets by mouth daily  Yes Kwadwo Winslow MD   warfarin (COUMADIN) 2.5 MG tablet Take 1 tab (2.5 mg) daily or as instructed based on INR results.  Yes Kwadwo Winslow MD   carvedilol (COREG) 25 MG tablet Take 1 tablet (25 mg) by mouth 2 times daily (with meals)  Yes Kwadwo Winslow MD   Multiple Vitamins-Minerals (OCUVITE PO) Take 1 tablet by mouth daily   Yes Reported, Patient   Ascorbic Acid (VITAMIN C PO) Take 500 mg by mouth daily   Yes Reported, Patient   calcium carbonate (OS-KARLA 500 MG [place]. [place]) 500 MG tablet Take 500 mg by mouth daily   Yes Reported, Patient   VITAMIN D, CHOLECALCIFEROL, PO Take 2,000 Units by mouth daily.  Yes Unknown, Entered By History

## 2017-03-20 ENCOUNTER — ANTICOAGULATION THERAPY VISIT (OUTPATIENT)
Dept: ANTICOAGULATION | Facility: CLINIC | Age: 82
End: 2017-03-20
Payer: COMMERCIAL

## 2017-03-20 ENCOUNTER — OFFICE VISIT (OUTPATIENT)
Dept: INTERNAL MEDICINE | Facility: CLINIC | Age: 82
End: 2017-03-20
Payer: COMMERCIAL

## 2017-03-20 VITALS
TEMPERATURE: 97.9 F | SYSTOLIC BLOOD PRESSURE: 122 MMHG | BODY MASS INDEX: 26.84 KG/M2 | DIASTOLIC BLOOD PRESSURE: 66 MMHG | HEART RATE: 70 BPM | HEIGHT: 70 IN | OXYGEN SATURATION: 95 % | WEIGHT: 187.5 LBS

## 2017-03-20 DIAGNOSIS — M16.11 PRIMARY OSTEOARTHRITIS OF RIGHT HIP: ICD-10-CM

## 2017-03-20 DIAGNOSIS — I48.20 CHRONIC ATRIAL FIBRILLATION (H): ICD-10-CM

## 2017-03-20 DIAGNOSIS — Z01.818 PREOP GENERAL PHYSICAL EXAM: Primary | ICD-10-CM

## 2017-03-20 DIAGNOSIS — I10 ESSENTIAL HYPERTENSION, BENIGN: ICD-10-CM

## 2017-03-20 DIAGNOSIS — N18.30 CKD (CHRONIC KIDNEY DISEASE) STAGE 3, GFR 30-59 ML/MIN (H): ICD-10-CM

## 2017-03-20 DIAGNOSIS — Z79.01 LONG-TERM (CURRENT) USE OF ANTICOAGULANTS: ICD-10-CM

## 2017-03-20 LAB
ALBUMIN SERPL-MCNC: 3.1 G/DL (ref 3.4–5)
ALP SERPL-CCNC: 78 U/L (ref 40–150)
ALT SERPL W P-5'-P-CCNC: 20 U/L (ref 0–70)
ANION GAP SERPL CALCULATED.3IONS-SCNC: 13 MMOL/L (ref 3–14)
AST SERPL W P-5'-P-CCNC: 17 U/L (ref 0–45)
BILIRUB SERPL-MCNC: 0.9 MG/DL (ref 0.2–1.3)
BUN SERPL-MCNC: 31 MG/DL (ref 7–30)
CALCIUM SERPL-MCNC: 9.3 MG/DL (ref 8.5–10.1)
CHLORIDE SERPL-SCNC: 102 MMOL/L (ref 94–109)
CO2 SERPL-SCNC: 23 MMOL/L (ref 20–32)
CREAT SERPL-MCNC: 1.44 MG/DL (ref 0.66–1.25)
GFR SERPL CREATININE-BSD FRML MDRD: 47 ML/MIN/1.7M2
GLUCOSE SERPL-MCNC: 85 MG/DL (ref 70–99)
INR POINT OF CARE: 2.3 (ref 0.86–1.14)
POTASSIUM SERPL-SCNC: 4.2 MMOL/L (ref 3.4–5.3)
PROT SERPL-MCNC: 7.3 G/DL (ref 6.8–8.8)
SODIUM SERPL-SCNC: 138 MMOL/L (ref 133–144)

## 2017-03-20 PROCEDURE — 85610 PROTHROMBIN TIME: CPT | Mod: QW

## 2017-03-20 PROCEDURE — 36416 COLLJ CAPILLARY BLOOD SPEC: CPT

## 2017-03-20 PROCEDURE — 80053 COMPREHEN METABOLIC PANEL: CPT | Performed by: INTERNAL MEDICINE

## 2017-03-20 PROCEDURE — 93000 ELECTROCARDIOGRAM COMPLETE: CPT | Performed by: INTERNAL MEDICINE

## 2017-03-20 PROCEDURE — 99207 ZZC NO CHARGE NURSE ONLY: CPT

## 2017-03-20 PROCEDURE — 99215 OFFICE O/P EST HI 40 MIN: CPT | Performed by: INTERNAL MEDICINE

## 2017-03-20 RX ORDER — OXYCODONE HYDROCHLORIDE 5 MG/1
5 TABLET ORAL EVERY 4 HOURS PRN
Qty: 30 TABLET | Refills: 0 | Status: ON HOLD | OUTPATIENT
Start: 2017-03-20 | End: 2017-03-29

## 2017-03-20 NOTE — MR AVS SNAPSHOT
Tucker Slater   3/20/2017 4:15 PM   Anticoagulation Therapy Visit    Description:  86 year old male   Provider[facility]   Department:  [facility]           INR as of 3/20/2017     Today's INR 2.3      Anticoagulation Summary as of 3/20/2017     INR goal 2.0-3.0   Today's INR 2.3   Full instructions 3/22: Hold; 3/23: Hold; 3/24: Hold; 3/25: Hold; 3/26: Hold; Otherwise 2.5 mg every day   Next INR check 4/6/2017    Indications   Long-term (current) use of anticoagulants [Z79.01] [Z79.01]  Atrial fibrillation (H) [I48.91]         Your next Anticoagulation Clinic appointment(s)     Mar 20, 2017  4:15 PM CDT   [facility] with [facility]   [facility]    [address] [facility] [address]  [facility] [id number] [phone]            Apr 06, 2017  1:15 PM CDT   [facility]agulation Visit with [facility]   [facility]    [address] [address]  [facility] [address] [phone]              Contact Numbers     [facility] Phone Numbers:  Anticoagulation Clinic Appointments : [phone]  Anticoagulation Nurse: [phone]         March 2017 Details    Sun Mon Tue Wed Thu Fri Sat        1               2               3               4                 5               6               7               8               9               10               11                 12               13               14               15               16               17               18                 19               20      2.5 mg   See details      21      2.5 mg         22      Hold         23      Hold         24      Hold         25      Hold           26      Hold         27      2.5 mg         28      2.5 mg         29      2.5 mg         30      2.5 mg         31      2.5 mg           Date Details   03/20 This INR check               How to take your  warfarin dose     To take:  2.5 mg Take 1 of the 2.5 mg tablets.    Hold Do not take your warfarin dose. See the Details table to the right for additional instructions.                April 2017 Details    Sun Mon Tue Wed Thu Fri Sat           1      2.5 mg           2      2.5 mg         3      2.5 mg         4      2.5 mg         5      2.5 mg         6            7               8                 9               10               11               12               13               14               15                 16               17               18               19               20               21               22                 23               24               25               26               27               28               29                 30                      Date Details   No additional details    Date of next INR:  4/6/2017         How to take your warfarin dose     To take:  2.5 mg Take 1 of the 2.5 mg tablets.

## 2017-03-20 NOTE — NURSING NOTE
"Chief Complaint   Patient presents with     Pre-Op Exam       Initial /66 (BP Location: Left arm, Patient Position: Chair, Cuff Size: Adult Regular)  Pulse 70  Temp 97.9  F (36.6  C) (Oral)  Ht 5' 10\" (1.778 m)  Wt 187 lb 8 oz (85 kg)  SpO2 95%  BMI 26.9 kg/m2 Estimated body mass index is 26.9 kg/(m^2) as calculated from the following:    Height as of this encounter: 5' 10\" (1.778 m).    Weight as of this encounter: 187 lb 8 oz (85 kg).  Medication Reconciliation: complete   Edna Olmos MA      "

## 2017-03-20 NOTE — LETTER
[facility]  [address], [address]  [place], [place]  [address]                                            TEL:[phone]  FAX:[phone]        Tucker Slater  [address] [facility] [address]      March 22, 2017    Dear Tucker,    Enclosed is your recent labwork  Labwork shows Slightly decreased kidney function,no change. Recheck in 6 months.        Sincerely,      Kwadwo Winslow M.D.

## 2017-03-20 NOTE — MR AVS SNAPSHOT
After Visit Summary   3/20/2017    Tucker Slater    MRN: [id number]           Patient Information     Date Of Birth          3/13/1931        Visit Information        Provider Department      3/20/2017 1:20 PM Kwadwo Winslow MD [facility]        Today's Diagnoses     Preop general physical exam    -  1    Essential hypertension, benign        CKD (chronic kidney disease) stage 3, GFR 30-59 ml/min        Chronic atrial fibrillation (H)        Primary osteoarthritis of right hip          Care Instructions      Before Your Surgery      Call your surgeon if there is any change in your health. This includes signs of a cold or flu (such as a sore throat, runny nose, cough, rash or fever).    Do not smoke, drink alcohol or take over the counter medicine (unless your surgeon or primary care doctor tells you to) for the 24 hours before and after surgery.    If you take prescribed drugs: Follow your doctor s orders about which medicines to take and which to stop until after surgery.    Eating and drinking prior to surgery: follow the instructions from your surgeon    Take a shower or bath the night before surgery. Use the soap your surgeon gave you to gently clean your skin. If you do not have soap from your surgeon, use your regular soap. Do not shave or scrub the surgery site.  Wear clean pajamas and have clean sheets on your bed.     Hold Coumadin 5 days prior to surgery  Maxzide don't take on the day of surgery.         Follow-ups after your visit        Your next 10 appointments already scheduled     Mar 20, 2017  4:15 PM CDT   Anticoagulation Visit with [facility]   [facility]    [address]  [facility] [address]   [phone]            Mar 27, 2017   Procedure with Jigar Wynn MD   [facility] (--)    [address]  [facility] [address]   [phone]             May 08, 2017  8:45 AM CDT   Remote PPM Check with KIDD TECH1   [facility] [place] ([facility])    [address]  [place] [id number] [phone]           This appointment is for a remote check of your pacemaker.  This is not an appointment at the office.            May 10, 2017 11:00 AM CDT   LAB with RI LAB   [facility] ([facility])    [address]  [facility] [address] [phone]           Patient must bring picture ID.  Patient should be prepared to give a urine specimen  Please do not eat 10-12 hours before your appointment if you are coming in fasting for labs on lipids, cholesterol, or glucose (sugar).  Pregnant women should follow their Care Team instructions. Water with medications is okay. Do not drink coffee or other fluids.   If you have concerns about taking  your medications, please ask at office or if scheduling via [employer], send a message by clicking on Secure Messaging, Message Your Care Team.              Who to contact     If you have questions or need follow up information about today's clinic visit or your schedule please contact [facility] directly at [phone].  Normal or non-critical lab and imaging results will be communicated to you by sellpointshart, letter or phone within 4 business days after the clinic has received the results. If you do not hear from us within 7 days, please contact the clinic through sellpointshart or phone. If you have a critical or abnormal lab result, we will notify you by phone as soon as possible.  Submit refill requests through [employer] or call your pharmacy and they will forward the refill request to us. Please allow 3 business days for your refill to be completed.          Additional Information About Your Visit        [employer] Information     [employer] lets you send messages to your doctor, view your test results, renew your prescriptions,  "schedule appointments and more. To sign up, go to [email or web address] . Click on \"Log in\" on the left side of the screen, which will take you to the Welcome page. Then click on \"Sign up Now\" on the right side of the page.     You will be asked to enter the access code listed below, as well as some personal information. Please follow the directions to create your username and password.     Your access code is: [email or web address]  Expires: 2017  2:11 PM     Your access code will  in 90 days. If you need help or a new code, please call your [place] clinic or [phone].        Care EveryWhere ID     This is your Care EveryWhere ID. This could be used by other organizations to access your [place] medical records  [id number]        Your Vitals Were     Pulse Temperature Height Pulse Oximetry BMI (Body Mass Index)       70 97.9  F (36.6  C) (Oral) 5' 10\" (1.778 m) 95% 26.9 kg/m2        Blood Pressure from Last 3 Encounters:   17 122/66   17 136/82   16 138/76    Weight from Last 3 Encounters:   17 187 lb 8 oz (85 kg)   17 189 lb (85.7 kg)   16 192 lb (87.1 kg)              We Performed the Following     Comprehensive metabolic panel     EKG 12-lead complete w/read - Clinics          Where to get your medicines      Some of these will need a paper prescription and others can be bought over the counter.  Ask your nurse if you have questions.     Bring a paper prescription for each of these medications     oxyCODONE 5 MG IR tablet          Primary Care Provider Office Phone # Fax #    Kwadwo Winslow [phone]       [facility] [address] [facility] [facility] [address]        Thank you!     Thank you for choosing [facility]  for your care. Our goal is always to provide you with excellent care. Hearing back from our patients is one way we can continue to improve our services. Please take a few minutes to complete the written " survey that you may receive in the mail after your visit with us. Thank you!             Your Updated Medication List - Protect others around you: Learn how to safely use, store and throw away your medicines at www.disposemymeds.org.          This list is accurate as of: 3/20/17  2:11 PM.  Always use your most recent med list.                   Brand Name Dispense Instructions for use    calcium carbonate 500 MG tablet    OS-KARLA 500 mg [place]. [place]     Take 500 mg by mouth daily       carvedilol 25 MG tablet    COREG    180 tablet    Take 1 tablet (25 mg) by mouth 2 times daily (with meals)       losartan 100 MG tablet    COZAAR    90 tablet    Take 1 tablet (100 mg) by mouth daily       OCUVITE PO      Take 1 tablet by mouth daily       oxyCODONE 5 MG IR tablet    ROXICODONE    30 tablet    Take 1 tablet (5 mg) by mouth every 4 hours as needed       triamterene-hydrochlorothiazide 75-50 MG per tablet    MAXZIDE    45 tablet    Take 0.5 tablets by mouth daily       VITAMIN C PO      Take 500 mg by mouth daily       VITAMIN D (CHOLECALCIFEROL) PO      Take 2,000 Units by mouth daily.       warfarin 2.5 MG tablet    COUMADIN    90 tablet    Take 1 tab (2.5 mg) daily or as instructed based on INR results.

## 2017-03-20 NOTE — PROGRESS NOTES
ANTICOAGULATION FOLLOW-UP CLINIC VISIT    Patient Name:  Tucker Slater  Date:  3/20/2017  Contact Type:  Face to Face    SUBJECTIVE:     Patient Findings     Positives Intentional hold of therapy (Pt will begin holding warfarin 3/22/17 for hip surgery on 3/27/17)           OBJECTIVE    INR Protime   Date Value Ref Range Status   03/20/2017 2.3 (A) 0.86 - 1.14 Final       ASSESSMENT / PLAN  INR assessment THER    Recheck INR In: 2 WEEKS    INR Location Clinic      Anticoagulation Summary as of 3/20/2017     INR goal 2.0-3.0   Today's INR 2.3   Maintenance plan 2.5 mg (2.5 mg x 1) every day   Full instructions 3/22: Hold; 3/23: Hold; 3/24: Hold; 3/25: Hold; 3/26: Hold; Otherwise 2.5 mg every day   Weekly total 17.5 mg   Plan last modified Gail Valdovinos RN (4/7/2016)   Next INR check 4/6/2017   Priority INR   Target end date     Indications   Long-term (current) use of anticoagulants [Z79.01] [Z79.01]  Atrial fibrillation (H) [I48.91]         Anticoagulation Episode Summary     INR check location     Preferred lab     Send INR reminders to [facility]    Comments       Anticoagulation Care Providers     Provider Role Specialty Phone number    Kwadwo Winslow MD [facility] Internal [facility] [phone]            See the Encounter Report to view Anticoagulation Flowsheet and Dosing Calendar (Go to Encounters tab in chart review, and find the Anticoagulation Therapy Visit)    Dosage adjustment made based on physician directed care plan.    Gail Valdovinos, RN

## 2017-03-20 NOTE — PATIENT INSTRUCTIONS
Before Your Surgery      Call your surgeon if there is any change in your health. This includes signs of a cold or flu (such as a sore throat, runny nose, cough, rash or fever).    Do not smoke, drink alcohol or take over the counter medicine (unless your surgeon or primary care doctor tells you to) for the 24 hours before and after surgery.    If you take prescribed drugs: Follow your doctor s orders about which medicines to take and which to stop until after surgery.    Eating and drinking prior to surgery: follow the instructions from your surgeon    Take a shower or bath the night before surgery. Use the soap your surgeon gave you to gently clean your skin. If you do not have soap from your surgeon, use your regular soap. Do not shave or scrub the surgery site.  Wear clean pajamas and have clean sheets on your bed.     Hold Coumadin 5 days prior to surgery  Maxzide don't take on the day of surgery.

## 2017-03-20 NOTE — PROGRESS NOTES
[place] [address]  [facility] [address]  [phone]  Dept: [phone]    PRE-OP EVALUATION:  Today's date: 3/20/2017    Tucker Slater (: 3/13/1931) presents for pre-operative evaluation assessment as requested by Dr. Wynn.  He requires evaluation and anesthesia risk assessment prior to undergoing surgery/procedure for treatment of right hip OA .  Proposed procedure: Right hip full replacement.    Date of Surgery/ Procedure: 3/27/17  Time of Surgery/ Procedure: 12:00 [facility] Facility: [facility]  [phone] Fax   Primary Physician: Kwadwo Winslow  Type of Anesthesia Anticipated: to be determined    Patient has a Health Care Directive or Living Will:  NO    1. NO - Do you have a history of heart attack, stroke, stent, bypass or surgery on an artery in the head, neck, heart or legs?  2. NO - Do you ever have any pain or discomfort in your chest?  3. NO - Do you have a history of  Heart Failure?  4. NO - Are you troubled by shortness of breath when: walking on the level, up a slight hill or at night?  5. NO - Do you currently have a cold, bronchitis or other respiratory infection?  6. NO - Do you have a cough, shortness of breath or wheezing?  7. NO - Do you sometimes get pains in the calves of your legs when you walk?  9. NO - Do you or does anyone in your family have a serious bleeding problem such as prolonged bleeding following surgeries or cuts?  10. NO - Have you ever had problems with anemia or been told to take iron pills?  11. NO - Have you had any abnormal blood loss such as black, tarry or bloody stools, or abnormal vaginal bleeding?  12. NO - Have you ever had a blood transfusion?  15. NO - Do you have any prosthetic heart valves?  17. NO - Is there any chance that you may be pregnant?  8. YES- Do you or anyone in your family have previous history of blood clots?  13.YES - Have you or any of your relatives ever had problems with  anesthesia?  14. YES - Do you have sleep apnea, excessive snoring or daytime drowsiness?  16. YES- Do you have prosthetic joints?      HPI:                                                      Brief HPI related to upcoming procedure: scheduled for right FRANK for hip OA.   Has pain with standing, walking, longer immobility.   No acute complaints, no medication change or new medical conditions.  Has history of atrial fibrillation. On anticoagulation with Coumadin and rate control medications. Asymptonatic - no chest pains , palpitations,  no side effects from medications.  Has h/o HTN. on medical treatment. BP has been controlled. No side effects from medications. No CP, HA, dizziness. good compliance with medications and low salt diet.  Has h/o CRF and anemia of chronic disease, mild. Monitoring BP, BG, medications, avoiding OTC NSAIDs. Needs periodic recheck of kidney function.        See problem list for active medical problems.  Problems all longstanding and stable, except as noted/documented.  See ROS for pertinent symptoms related to these conditions.                                                                                                  .    MEDICAL HISTORY:                                                      Patient Active Problem List    Diagnosis Date Noted     Long-term (current) use of anticoagulants [Z79.01] 04/07/2016     Priority: Medium     Cardiac pacemaker in situ 07/22/2015     Placed 3/16/15 with cardioversion       CKD (chronic kidney disease) stage 3, GFR 30-59 ml/min 05/11/2015     Bradycardia      Atrial fibrillation (H) 02/03/2015     Anemia due to blood loss, acute 03/23/2012     GI bleed 03/18/2012     Anemia 03/18/2012     Near syncope 03/18/2012     Peripheral neuropathy (H) 01/04/2012     Advanced directives, counseling/discussion 01/03/2012     Parent voices understanding and acceptance of this advice and will call back if any further questions or concerns.       CARDIOVASCULAR  SCREENING; LDL GOAL LESS THAN 130 10/31/2010     Essential hypertension, benign      B/P goal <140/90. B/P 12/5/12 - 164/75       Carcinoma in situ of bladder      bladder cancer ;; follow w Urology w periodic cysto        Hypertrophy of prostate with urinary obstruction      elevated PSA;; nl BX's in past   Problem list name updated by automated process. Provider to review       Generalized osteoarthrosis, unspecified site      hugh R knee       Hereditary and idiopathic peripheral neuropathy      toes both feet   Problem list name updated by automated process. Provider to review       Pain in joint, shoulder region      L shoulder rotater tear; no surgery         Past Medical History   Diagnosis Date     Arrhythmia      A Fib     Balance problem      related to neuropathy in feet     Bradycardia      Carcinoma in situ of bladder 2000     bladder cancer ;; follow w Urology w periodic cysto      Essential hypertension, benign      Generalized osteoarthrosis, unspecified site knees     s/p R total knee 8/09 ; will do L 6/10      Numbness and tingling      toes and fingers     Pacemaker      [employer]      Pain in joint, shoulder region      L shoulder rotater tear; no surgery      Persistent atrial fibrillation (H) 1/30/15     Prostate CA (H) 2008-9     radiation     Prostate cancer (H) 11/08     completed RT 3/09     Shoulder impingement      R shoulder impingement etc see MRI 4/09      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      toes both feet      Past Surgical History   Procedure Laterality Date     C nonspecific procedure       bilat inguinal hernia repairs ( 3total procedures)      C nonspecific procedure       pilonidal cyst     C nonspecific procedure       T + A     C nonspecific procedure  8/09      R+L total knee     Colonoscopy       Implant pacemaker  3/26/15     Cardioversion  3/26/15     Current Outpatient Prescriptions   Medication Sig Dispense Refill     oxyCODONE  (ROXICODONE) 5 MG IR tablet Take 1 tablet by mouth every 4 hours as needed       losartan (COZAAR) 100 MG tablet Take 1 tablet (100 mg) by mouth daily 90 tablet 0     triamterene-hydrochlorothiazide (MAXZIDE) 75-50 MG per tablet Take 0.5 tablets by mouth daily 45 tablet 3     warfarin (COUMADIN) 2.5 MG tablet Take 1 tab (2.5 mg) daily or as instructed based on INR results. 90 tablet 0     carvedilol (COREG) 25 MG tablet Take 1 tablet (25 mg) by mouth 2 times daily (with meals) 180 tablet 2     Multiple Vitamins-Minerals (OCUVITE PO) Take 1 tablet by mouth daily        Ascorbic Acid (VITAMIN C PO) Take 500 mg by mouth daily        calcium carbonate (OS-KARLA 500 MG [place]. [place]) 500 MG tablet Take 500 mg by mouth daily        VITAMIN D, CHOLECALCIFEROL, PO Take 2,000 Units by mouth daily.       OTC products: None, except as noted above    Allergies   Allergen Reactions     Morphine Nausea and Vomiting     Amlodipine      Edema      Latex Allergy: NO    Social History   Substance Use Topics     Smoking status: Former Smoker     Quit date: 9/12/1977     Smokeless tobacco: Never Used     Alcohol use 0.0 oz/week     0 Standard drinks or equivalent per week      Comment: almost daily     History   Drug Use No       REVIEW OF SYSTEMS:                                                    C: NEGATIVE for fever, chills, change in weight  I: NEGATIVE for worrisome rashes, moles or lesions  E: NEGATIVE for vision changes or irritation  E/M: NEGATIVE for ear, mouth and throat problems  R: NEGATIVE for significant cough or SOB  B: NEGATIVE for masses, tenderness or discharge  CV: NEGATIVE for chest pain, palpitations or peripheral edema  GI: NEGATIVE for nausea, abdominal pain, heartburn, or change in bowel habits  : NEGATIVE for frequency, dysuria, or hematuria  M: NEGATIVE for significant arthralgias or myalgia  N: NEGATIVE for weakness, dizziness or paresthesias  E: NEGATIVE for temperature intolerance, skin/hair changes  H:  NEGATIVE for bleeding problems  P: NEGATIVE for changes in mood or affect    EXAM:                                                    There were no vitals taken for this visit.    GENERAL APPEARANCE: healthy, alert and no distress     EYES: EOMI,  PERRL     HENT: ear canals and TM's normal and nose and mouth without ulcers or lesions     NECK: no adenopathy, no asymmetry, masses, or scars and thyroid normal to palpation     RESP: lungs clear to auscultation - no rales, rhonchi or wheezes     CV: regular rates and rhythm, normal S1 S2, no S3 or S4 and no murmur, click or rub     ABDOMEN:  soft, nontender, no HSM or masses and bowel sounds normal     MS: extremities normal- no gross deformities noted, no evidence of inflammation in joints, FROM in all extremities.     SKIN: no suspicious lesions or rashes     NEURO: Normal strength and tone, sensory exam grossly normal, mentation intact and speech normal     PSYCH: mentation appears normal. and affect normal/bright     LYMPHATICS: No axillary, cervical, or supraclavicular nodes    DIAGNOSTICS:                                                      EKG: atrial sensed , ventricular paced PM rhythm   Labs Drawn and in Process:   Unresulted Labs Ordered in the Past 30 Days of this Admission     Date and Time Order Name Status Description    3/20/2017 1409 COMPREHENSIVE METABOLIC PANEL In process           Recent Labs   Lab Test 03/14/17 03/06/17 02/27/17   1203   02/09/17   1058   11/07/16   1557   05/17/16   1738   11/23/15   1423   HGB   --    --   14.6   --    --    --    --    --   15.5   --   15.5   PLT   --    --    --    --    --    --    --    --   177   --   194   INR  3.2*  4.1*   --    < >   --    < >   --    < >   --    < >   --    NA   --    --    --    --   144   --   141   --   142   --   141   POTASSIUM   --    --    --    --   4.2   --   4.3   --   4.2   --   4.4   CR   --    --    --    --   1.41*   --   1.57*   --   1.30*   --   1.42*    < > = values in  this interval not displayed.        IMPRESSION:                                                    Reason for surgery/procedure: right hip OA, FRANK   Diagnosis/reason for consult: preoperative evaluation/ clearance      The proposed surgical procedure is considered INTERMEDIATE risk.    REVISED CARDIAC RISK INDEX  The patient has the following serious cardiovascular risks for perioperative complications such as (MI, PE, VFib and 3  AV Block):  No serious cardiac risks  INTERPRETATION: 0 risks: Class I (very low risk - 0.4% complication rate)    The patient has the following additional risks for perioperative complications:  A fib on coumadin treatment     No diagnosis found.    RECOMMENDATIONS:                                                          --Patient is to take all scheduled medications on the day of surgery EXCEPT for modifications listed below.  Hold Coumadin for 5 days prior to surgery   Hold diuretic on the day of surgery     APPROVAL GIVEN to proceed with proposed procedure, without further diagnostic evaluation       Signed Electronically by: Kwadwo Winslow MD    Copy of this evaluation report is provided to requesting physician.    [facility] Preop Guidelines

## 2017-03-22 NOTE — H&P (VIEW-ONLY)
[place] [address]  [facility] [address]  [phone]  Dept: [phone]    PRE-OP EVALUATION:  Today's date: 3/20/2017    Tucker Slater (: 3/13/1931) presents for pre-operative evaluation assessment as requested by Dr. Wynn.  He requires evaluation and anesthesia risk assessment prior to undergoing surgery/procedure for treatment of right hip OA .  Proposed procedure: Right hip full replacement.    Date of Surgery/ Procedure: 3/27/17  Time of Surgery/ Procedure: 12:00 [facility]urgical Facility: [facility]  [phone] Fax   Primary Physician: Kwadwo Winslow  Type of Anesthesia Anticipated: to be determined    Patient has a Health Care Directive or Living Will:  NO    1. NO - Do you have a history of heart attack, stroke, stent, bypass or surgery on an artery in the head, neck, heart or legs?  2. NO - Do you ever have any pain or discomfort in your chest?  3. NO - Do you have a history of  Heart Failure?  4. NO - Are you troubled by shortness of breath when: walking on the level, up a slight hill or at night?  5. NO - Do you currently have a cold, bronchitis or other respiratory infection?  6. NO - Do you have a cough, shortness of breath or wheezing?  7. NO - Do you sometimes get pains in the calves of your legs when you walk?  9. NO - Do you or does anyone in your family have a serious bleeding problem such as prolonged bleeding following surgeries or cuts?  10. NO - Have you ever had problems with anemia or been told to take iron pills?  11. NO - Have you had any abnormal blood loss such as black, tarry or bloody stools, or abnormal vaginal bleeding?  12. NO - Have you ever had a blood transfusion?  15. NO - Do you have any prosthetic heart valves?  17. NO - Is there any chance that you may be pregnant?  8. YES- Do you or anyone in your family have previous history of blood clots?  13.YES - Have you or any of your relatives ever had problems with  anesthesia?  14. YES - Do you have sleep apnea, excessive snoring or daytime drowsiness?  16. YES- Do you have prosthetic joints?      HPI:                                                      Brief HPI related to upcoming procedure: scheduled for right FRANK for hip OA.   Has pain with standing, walking, longer immobility.   No acute complaints, no medication change or new medical conditions.  Has history of atrial fibrillation. On anticoagulation with Coumadin and rate control medications. Asymptonatic - no chest pains , palpitations,  no side effects from medications.  Has h/o HTN. on medical treatment. BP has been controlled. No side effects from medications. No CP, HA, dizziness. good compliance with medications and low salt diet.  Has h/o CRF and anemia of chronic disease, mild. Monitoring BP, BG, medications, avoiding OTC NSAIDs. Needs periodic recheck of kidney function.        See problem list for active medical problems.  Problems all longstanding and stable, except as noted/documented.  See ROS for pertinent symptoms related to these conditions.                                                                                                  .    MEDICAL HISTORY:                                                      Patient Active Problem List    Diagnosis Date Noted     Long-term (current) use of anticoagulants [Z79.01] 04/07/2016     Priority: Medium     Cardiac pacemaker in situ 07/22/2015     Placed 3/16/15 with cardioversion       CKD (chronic kidney disease) stage 3, GFR 30-59 ml/min 05/11/2015     Bradycardia      Atrial fibrillation (H) 02/03/2015     Anemia due to blood loss, acute 03/23/2012     GI bleed 03/18/2012     Anemia 03/18/2012     Near syncope 03/18/2012     Peripheral neuropathy (H) 01/04/2012     Advanced directives, counseling/discussion 01/03/2012     Parent voices understanding and acceptance of this advice and will call back if any further questions or concerns.       CARDIOVASCULAR  SCREENING; LDL GOAL LESS THAN 130 10/31/2010     Essential hypertension, benign      B/P goal <140/90. B/P 12/5/12 - 164/75       Carcinoma in situ of bladder      bladder cancer ;; follow w Urology w periodic cysto        Hypertrophy of prostate with urinary obstruction      elevated PSA;; nl BX's in past   Problem list name updated by automated process. Provider to review       Generalized osteoarthrosis, unspecified site      hugh R knee       Hereditary and idiopathic peripheral neuropathy      toes both feet   Problem list name updated by automated process. Provider to review       Pain in joint, shoulder region      L shoulder rotater tear; no surgery         Past Medical History   Diagnosis Date     Arrhythmia      A Fib     Balance problem      related to neuropathy in feet     Bradycardia      Carcinoma in situ of bladder 2000     bladder cancer ;; follow w Urology w periodic cysto      Essential hypertension, benign      Generalized osteoarthrosis, unspecified site knees     s/p R total knee 8/09 ; will do L 6/10      Numbness and tingling      toes and fingers     Pacemaker      [employer]      Pain in joint, shoulder region      L shoulder rotater tear; no surgery      Persistent atrial fibrillation (H) 1/30/15     Prostate CA (H) 2008-9     radiation     Prostate cancer (H) 11/08     completed RT 3/09     Shoulder impingement      R shoulder impingement etc see MRI 4/09      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      toes both feet      Past Surgical History   Procedure Laterality Date     C nonspecific procedure       bilat inguinal hernia repairs ( 3total procedures)      C nonspecific procedure       pilonidal cyst     C nonspecific procedure       T + A     C nonspecific procedure  8/09      R+L total knee     Colonoscopy       Implant pacemaker  3/26/15     Cardioversion  3/26/15     Current Outpatient Prescriptions   Medication Sig Dispense Refill     oxyCODONE  (ROXICODONE) 5 MG IR tablet Take 1 tablet by mouth every 4 hours as needed       losartan (COZAAR) 100 MG tablet Take 1 tablet (100 mg) by mouth daily 90 tablet 0     triamterene-hydrochlorothiazide (MAXZIDE) 75-50 MG per tablet Take 0.5 tablets by mouth daily 45 tablet 3     warfarin (COUMADIN) 2.5 MG tablet Take 1 tab (2.5 mg) daily or as instructed based on INR results. 90 tablet 0     carvedilol (COREG) 25 MG tablet Take 1 tablet (25 mg) by mouth 2 times daily (with meals) 180 tablet 2     Multiple Vitamins-Minerals (OCUVITE PO) Take 1 tablet by mouth daily        Ascorbic Acid (VITAMIN C PO) Take 500 mg by mouth daily        calcium carbonate (OS-KARLA 500 MG [place]. [place]) 500 MG tablet Take 500 mg by mouth daily        VITAMIN D, CHOLECALCIFEROL, PO Take 2,000 Units by mouth daily.       OTC products: None, except as noted above    Allergies   Allergen Reactions     Morphine Nausea and Vomiting     Amlodipine      Edema      Latex Allergy: NO    Social History   Substance Use Topics     Smoking status: Former Smoker     Quit date: 9/12/1977     Smokeless tobacco: Never Used     Alcohol use 0.0 oz/week     0 Standard drinks or equivalent per week      Comment: almost daily     History   Drug Use No       REVIEW OF SYSTEMS:                                                    C: NEGATIVE for fever, chills, change in weight  I: NEGATIVE for worrisome rashes, moles or lesions  E: NEGATIVE for vision changes or irritation  E/M: NEGATIVE for ear, mouth and throat problems  R: NEGATIVE for significant cough or SOB  B: NEGATIVE for masses, tenderness or discharge  CV: NEGATIVE for chest pain, palpitations or peripheral edema  GI: NEGATIVE for nausea, abdominal pain, heartburn, or change in bowel habits  : NEGATIVE for frequency, dysuria, or hematuria  M: NEGATIVE for significant arthralgias or myalgia  N: NEGATIVE for weakness, dizziness or paresthesias  E: NEGATIVE for temperature intolerance, skin/hair changes  H:  NEGATIVE for bleeding problems  P: NEGATIVE for changes in mood or affect    EXAM:                                                    There were no vitals taken for this visit.    GENERAL APPEARANCE: healthy, alert and no distress     EYES: EOMI,  PERRL     HENT: ear canals and TM's normal and nose and mouth without ulcers or lesions     NECK: no adenopathy, no asymmetry, masses, or scars and thyroid normal to palpation     RESP: lungs clear to auscultation - no rales, rhonchi or wheezes     CV: regular rates and rhythm, normal S1 S2, no S3 or S4 and no murmur, click or rub     ABDOMEN:  soft, nontender, no HSM or masses and bowel sounds normal     MS: extremities normal- no gross deformities noted, no evidence of inflammation in joints, FROM in all extremities.     SKIN: no suspicious lesions or rashes     NEURO: Normal strength and tone, sensory exam grossly normal, mentation intact and speech normal     PSYCH: mentation appears normal. and affect normal/bright     LYMPHATICS: No axillary, cervical, or supraclavicular nodes    DIAGNOSTICS:                                                      EKG: atrial sensed , ventricular paced PM rhythm   Labs Drawn and in Process:   Unresulted Labs Ordered in the Past 30 Days of this Admission     Date and Time Order Name Status Description    3/20/2017 1409 COMPREHENSIVE METABOLIC PANEL In process           Recent Labs   Lab Test 03/14/17 03/06/17 02/27/17   1203   02/09/17   1058   11/07/16   1557   05/17/16   1738   11/23/15   1423   HGB   --    --   14.6   --    --    --    --    --   15.5   --   15.5   PLT   --    --    --    --    --    --    --    --   177   --   194   INR  3.2*  4.1*   --    < >   --    < >   --    < >   --    < >   --    NA   --    --    --    --   144   --   141   --   142   --   141   POTASSIUM   --    --    --    --   4.2   --   4.3   --   4.2   --   4.4   CR   --    --    --    --   1.41*   --   1.57*   --   1.30*   --   1.42*    < > = values in  this interval not displayed.        IMPRESSION:                                                    Reason for surgery/procedure: right hip OA, FRANK   Diagnosis/reason for consult: preoperative evaluation/ clearance      The proposed surgical procedure is considered INTERMEDIATE risk.    REVISED CARDIAC RISK INDEX  The patient has the following serious cardiovascular risks for perioperative complications such as (MI, PE, VFib and 3  AV Block):  No serious cardiac risks  INTERPRETATION: 0 risks: Class I (very low risk - 0.4% complication rate)    The patient has the following additional risks for perioperative complications:  A fib on coumadin treatment     No diagnosis found.    RECOMMENDATIONS:                                                          --Patient is to take all scheduled medications on the day of surgery EXCEPT for modifications listed below.  Hold Coumadin for 5 days prior to surgery   Hold diuretic on the day of surgery     APPROVAL GIVEN to proceed with proposed procedure, without further diagnostic evaluation       Signed Electronically by: Kwadwo Winslow MD    Copy of this evaluation report is provided to requesting physician.    [facility] Preop Guidelines

## 2017-03-27 ENCOUNTER — HOSPITAL ENCOUNTER (INPATIENT)
Facility: CLINIC | Age: 82
LOS: 4 days | Discharge: NURSING, REHAB OR LONG-TERM CARE | DRG: 470 | End: 2017-03-31
Attending: ORTHOPAEDIC SURGERY | Admitting: ORTHOPAEDIC SURGERY
Payer: MEDICARE

## 2017-03-27 ENCOUNTER — ANESTHESIA EVENT (OUTPATIENT)
Dept: SURGERY | Facility: CLINIC | Age: 82
DRG: 470 | End: 2017-03-27
Payer: MEDICARE

## 2017-03-27 ENCOUNTER — ANESTHESIA (OUTPATIENT)
Dept: SURGERY | Facility: CLINIC | Age: 82
DRG: 470 | End: 2017-03-27
Payer: MEDICARE

## 2017-03-27 ENCOUNTER — APPOINTMENT (OUTPATIENT)
Dept: GENERAL RADIOLOGY | Facility: CLINIC | Age: 82
DRG: 470 | End: 2017-03-27
Attending: ORTHOPAEDIC SURGERY
Payer: MEDICARE

## 2017-03-27 DIAGNOSIS — N40.1 HYPERTROPHY OF PROSTATE WITH URINARY OBSTRUCTION: ICD-10-CM

## 2017-03-27 DIAGNOSIS — N13.8 HYPERTROPHY OF PROSTATE WITH URINARY OBSTRUCTION: ICD-10-CM

## 2017-03-27 DIAGNOSIS — Z96.649 S/P HIP REPLACEMENT, UNSPECIFIED LATERALITY: Primary | ICD-10-CM

## 2017-03-27 PROBLEM — M16.9 DEGENERATIVE ARTHRITIS OF HIP: Status: ACTIVE | Noted: 2017-03-27

## 2017-03-27 LAB
ABO + RH BLD: NORMAL
ABO + RH BLD: NORMAL
BLD GP AB SCN SERPL QL: NORMAL
BLOOD BANK CMNT PATIENT-IMP: NORMAL
HGB BLD-MCNC: 13.4 G/DL (ref 13.3–17.7)
INR PPP: 1.47 (ref 0.86–1.14)
SPECIMEN EXP DATE BLD: NORMAL

## 2017-03-27 PROCEDURE — 71000012 ZZH RECOVERY PHASE 1 LEVEL 1 FIRST HR: Performed by: ORTHOPAEDIC SURGERY

## 2017-03-27 PROCEDURE — 86900 BLOOD TYPING SEROLOGIC ABO: CPT | Performed by: ORTHOPAEDIC SURGERY

## 2017-03-27 PROCEDURE — 25000125 ZZHC RX 250: Performed by: NURSE ANESTHETIST, CERTIFIED REGISTERED

## 2017-03-27 PROCEDURE — 37000009 ZZH ANESTHESIA TECHNICAL FEE, EACH ADDTL 15 MIN: Performed by: ORTHOPAEDIC SURGERY

## 2017-03-27 PROCEDURE — 85018 HEMOGLOBIN: CPT | Performed by: ORTHOPAEDIC SURGERY

## 2017-03-27 PROCEDURE — 25000128 H RX IP 250 OP 636: Performed by: NURSE ANESTHETIST, CERTIFIED REGISTERED

## 2017-03-27 PROCEDURE — 86850 RBC ANTIBODY SCREEN: CPT | Performed by: ORTHOPAEDIC SURGERY

## 2017-03-27 PROCEDURE — 25000125 ZZHC RX 250: Performed by: ANESTHESIOLOGY

## 2017-03-27 PROCEDURE — 25000132 ZZH RX MED GY IP 250 OP 250 PS 637: Mod: GY | Performed by: ORTHOPAEDIC SURGERY

## 2017-03-27 PROCEDURE — 36415 COLL VENOUS BLD VENIPUNCTURE: CPT | Performed by: ANESTHESIOLOGY

## 2017-03-27 PROCEDURE — 25000128 H RX IP 250 OP 636: Performed by: ORTHOPAEDIC SURGERY

## 2017-03-27 PROCEDURE — 25000125 ZZHC RX 250: Performed by: ORTHOPAEDIC SURGERY

## 2017-03-27 PROCEDURE — 27210794 ZZH OR GENERAL SUPPLY STERILE: Performed by: ORTHOPAEDIC SURGERY

## 2017-03-27 PROCEDURE — 36000093 ZZH SURGERY LEVEL 4 1ST 30 MIN: Performed by: ORTHOPAEDIC SURGERY

## 2017-03-27 PROCEDURE — 85610 PROTHROMBIN TIME: CPT | Performed by: ANESTHESIOLOGY

## 2017-03-27 PROCEDURE — 40000940 XR PELVIS AD HIP PORTABLE RIGHT 1 VIEW

## 2017-03-27 PROCEDURE — 36415 COLL VENOUS BLD VENIPUNCTURE: CPT | Performed by: ORTHOPAEDIC SURGERY

## 2017-03-27 PROCEDURE — 25000566 ZZH SEVOFLURANE, EA 15 MIN: Performed by: ORTHOPAEDIC SURGERY

## 2017-03-27 PROCEDURE — C1776 JOINT DEVICE (IMPLANTABLE): HCPCS | Performed by: ORTHOPAEDIC SURGERY

## 2017-03-27 PROCEDURE — 40000306 ZZH STATISTIC PRE PROC ASSESS II: Performed by: ORTHOPAEDIC SURGERY

## 2017-03-27 PROCEDURE — 25800025 ZZH RX 258: Performed by: ANESTHESIOLOGY

## 2017-03-27 PROCEDURE — 0SR902A REPLACEMENT OF RIGHT HIP JOINT WITH METAL ON POLYETHYLENE SYNTHETIC SUBSTITUTE, UNCEMENTED, OPEN APPROACH: ICD-10-PCS | Performed by: ORTHOPAEDIC SURGERY

## 2017-03-27 PROCEDURE — 71000013 ZZH RECOVERY PHASE 1 LEVEL 1 EA ADDTL HR: Performed by: ORTHOPAEDIC SURGERY

## 2017-03-27 PROCEDURE — 37000008 ZZH ANESTHESIA TECHNICAL FEE, 1ST 30 MIN: Performed by: ORTHOPAEDIC SURGERY

## 2017-03-27 PROCEDURE — A9270 NON-COVERED ITEM OR SERVICE: HCPCS | Mod: GY | Performed by: ORTHOPAEDIC SURGERY

## 2017-03-27 PROCEDURE — 12000007 ZZH R&B INTERMEDIATE

## 2017-03-27 PROCEDURE — 36000063 ZZH SURGERY LEVEL 4 EA 15 ADDTL MIN: Performed by: ORTHOPAEDIC SURGERY

## 2017-03-27 PROCEDURE — 25800025 ZZH RX 258: Performed by: ORTHOPAEDIC SURGERY

## 2017-03-27 PROCEDURE — 86901 BLOOD TYPING SEROLOGIC RH(D): CPT | Performed by: ORTHOPAEDIC SURGERY

## 2017-03-27 PROCEDURE — 25000132 ZZH RX MED GY IP 250 OP 250 PS 637: Mod: GY | Performed by: INTERNAL MEDICINE

## 2017-03-27 PROCEDURE — 25000128 H RX IP 250 OP 636: Performed by: ANESTHESIOLOGY

## 2017-03-27 PROCEDURE — A9270 NON-COVERED ITEM OR SERVICE: HCPCS | Mod: GY | Performed by: INTERNAL MEDICINE

## 2017-03-27 DEVICE — IMP SCR BONE STRK TORX 6.5X30MM CAN 2030-6530-1: Type: IMPLANTABLE DEVICE | Site: HIP | Status: FUNCTIONAL

## 2017-03-27 DEVICE — IMP LINER STRK TRIDENT X3 POLY 36MM 0DEG SZ G 623-00-36G: Type: IMPLANTABLE DEVICE | Site: HIP | Status: FUNCTIONAL

## 2017-03-27 DEVICE — IMPLANTABLE DEVICE: Type: IMPLANTABLE DEVICE | Site: HIP | Status: FUNCTIONAL

## 2017-03-27 DEVICE — IMP SCR BONE STRK TORX 6.5X20MM CAN 2030-6520-1: Type: IMPLANTABLE DEVICE | Site: HIP | Status: FUNCTIONAL

## 2017-03-27 DEVICE — IMP SCR BONE STRK TORX 6.5X25MM CAN 2030-6525-1: Type: IMPLANTABLE DEVICE | Site: HIP | Status: FUNCTIONAL

## 2017-03-27 DEVICE — IMP HEAD FEMORAL STRK BIOLOX DELTA CERAMIC V40 36MM: Type: IMPLANTABLE DEVICE | Site: HIP | Status: FUNCTIONAL

## 2017-03-27 DEVICE — IMP STEM FEMORAL HIP STRK ACCOLADE II 132DEG SZ 6 6720-0635: Type: IMPLANTABLE DEVICE | Site: HIP | Status: FUNCTIONAL

## 2017-03-27 RX ORDER — ONDANSETRON 2 MG/ML
INJECTION INTRAMUSCULAR; INTRAVENOUS PRN
Status: DISCONTINUED | OUTPATIENT
Start: 2017-03-27 | End: 2017-03-27

## 2017-03-27 RX ORDER — PROCHLORPERAZINE MALEATE 5 MG
5 TABLET ORAL EVERY 6 HOURS PRN
Status: DISCONTINUED | OUTPATIENT
Start: 2017-03-27 | End: 2017-03-31 | Stop reason: HOSPADM

## 2017-03-27 RX ORDER — ACETAMINOPHEN 10 MG/ML
1000 INJECTION, SOLUTION INTRAVENOUS ONCE
Status: COMPLETED | OUTPATIENT
Start: 2017-03-27 | End: 2017-03-27

## 2017-03-27 RX ORDER — WARFARIN SODIUM 2.5 MG/1
2.5 TABLET ORAL
Status: COMPLETED | OUTPATIENT
Start: 2017-03-27 | End: 2017-03-27

## 2017-03-27 RX ORDER — CEFAZOLIN SODIUM 1 G/3ML
1 INJECTION, POWDER, FOR SOLUTION INTRAMUSCULAR; INTRAVENOUS SEE ADMIN INSTRUCTIONS
Status: DISCONTINUED | OUTPATIENT
Start: 2017-03-27 | End: 2017-03-27 | Stop reason: HOSPADM

## 2017-03-27 RX ORDER — FENTANYL CITRATE 50 UG/ML
25-50 INJECTION, SOLUTION INTRAMUSCULAR; INTRAVENOUS
Status: DISCONTINUED | OUTPATIENT
Start: 2017-03-27 | End: 2017-03-27 | Stop reason: HOSPADM

## 2017-03-27 RX ORDER — BUPIVACAINE HYDROCHLORIDE AND EPINEPHRINE 5; 5 MG/ML; UG/ML
INJECTION, SOLUTION PERINEURAL PRN
Status: DISCONTINUED | OUTPATIENT
Start: 2017-03-27 | End: 2017-03-27 | Stop reason: HOSPADM

## 2017-03-27 RX ORDER — DIMENHYDRINATE 50 MG/ML
25 INJECTION, SOLUTION INTRAMUSCULAR; INTRAVENOUS
Status: DISCONTINUED | OUTPATIENT
Start: 2017-03-27 | End: 2017-03-27 | Stop reason: HOSPADM

## 2017-03-27 RX ORDER — DEXTROSE MONOHYDRATE, SODIUM CHLORIDE, AND POTASSIUM CHLORIDE 50; 1.49; 4.5 G/1000ML; G/1000ML; G/1000ML
INJECTION, SOLUTION INTRAVENOUS CONTINUOUS
Status: DISCONTINUED | OUTPATIENT
Start: 2017-03-27 | End: 2017-03-28

## 2017-03-27 RX ORDER — CARVEDILOL 25 MG/1
25 TABLET ORAL 2 TIMES DAILY WITH MEALS
Status: DISCONTINUED | OUTPATIENT
Start: 2017-03-27 | End: 2017-03-28

## 2017-03-27 RX ORDER — HYDROMORPHONE HCL/0.9% NACL/PF 0.2MG/0.2
0.2 SYRINGE (ML) INTRAVENOUS
Status: DISCONTINUED | OUTPATIENT
Start: 2017-03-27 | End: 2017-03-31 | Stop reason: HOSPADM

## 2017-03-27 RX ORDER — HYDROXYZINE HYDROCHLORIDE 10 MG/1
10 TABLET, FILM COATED ORAL EVERY 6 HOURS PRN
Status: DISCONTINUED | OUTPATIENT
Start: 2017-03-27 | End: 2017-03-28

## 2017-03-27 RX ORDER — OXYCODONE HYDROCHLORIDE 5 MG/1
5 TABLET ORAL
Status: DISCONTINUED | OUTPATIENT
Start: 2017-03-27 | End: 2017-03-31 | Stop reason: HOSPADM

## 2017-03-27 RX ORDER — LIDOCAINE 40 MG/G
CREAM TOPICAL
Status: DISCONTINUED | OUTPATIENT
Start: 2017-03-27 | End: 2017-03-31 | Stop reason: HOSPADM

## 2017-03-27 RX ORDER — CEFAZOLIN SODIUM 2 G/100ML
2 INJECTION, SOLUTION INTRAVENOUS EVERY 8 HOURS
Status: COMPLETED | OUTPATIENT
Start: 2017-03-27 | End: 2017-03-28

## 2017-03-27 RX ORDER — DEXAMETHASONE SODIUM PHOSPHATE 4 MG/ML
INJECTION, SOLUTION INTRA-ARTICULAR; INTRALESIONAL; INTRAMUSCULAR; INTRAVENOUS; SOFT TISSUE PRN
Status: DISCONTINUED | OUTPATIENT
Start: 2017-03-27 | End: 2017-03-27

## 2017-03-27 RX ORDER — CEFAZOLIN SODIUM 2 G/100ML
2 INJECTION, SOLUTION INTRAVENOUS
Status: COMPLETED | OUTPATIENT
Start: 2017-03-27 | End: 2017-03-27

## 2017-03-27 RX ORDER — ONDANSETRON 4 MG/1
4 TABLET, ORALLY DISINTEGRATING ORAL EVERY 6 HOURS PRN
Status: DISCONTINUED | OUTPATIENT
Start: 2017-03-27 | End: 2017-03-31 | Stop reason: HOSPADM

## 2017-03-27 RX ORDER — SODIUM CHLORIDE, SODIUM LACTATE, POTASSIUM CHLORIDE, CALCIUM CHLORIDE 600; 310; 30; 20 MG/100ML; MG/100ML; MG/100ML; MG/100ML
INJECTION, SOLUTION INTRAVENOUS CONTINUOUS
Status: DISCONTINUED | OUTPATIENT
Start: 2017-03-27 | End: 2017-03-27 | Stop reason: HOSPADM

## 2017-03-27 RX ORDER — LIDOCAINE 40 MG/G
CREAM TOPICAL
Status: DISCONTINUED | OUTPATIENT
Start: 2017-03-27 | End: 2017-03-27 | Stop reason: HOSPADM

## 2017-03-27 RX ORDER — ONDANSETRON 2 MG/ML
4 INJECTION INTRAMUSCULAR; INTRAVENOUS EVERY 30 MIN PRN
Status: DISCONTINUED | OUTPATIENT
Start: 2017-03-27 | End: 2017-03-27 | Stop reason: HOSPADM

## 2017-03-27 RX ORDER — ONDANSETRON 4 MG/1
4 TABLET, ORALLY DISINTEGRATING ORAL EVERY 30 MIN PRN
Status: DISCONTINUED | OUTPATIENT
Start: 2017-03-27 | End: 2017-03-27 | Stop reason: HOSPADM

## 2017-03-27 RX ORDER — ACETAMINOPHEN 325 MG/1
650 TABLET ORAL EVERY 4 HOURS PRN
Status: DISCONTINUED | OUTPATIENT
Start: 2017-03-30 | End: 2017-03-28

## 2017-03-27 RX ORDER — NEOSTIGMINE METHYLSULFATE 1 MG/ML
VIAL (ML) INJECTION PRN
Status: DISCONTINUED | OUTPATIENT
Start: 2017-03-27 | End: 2017-03-27

## 2017-03-27 RX ORDER — NALOXONE HYDROCHLORIDE 0.4 MG/ML
.1-.4 INJECTION, SOLUTION INTRAMUSCULAR; INTRAVENOUS; SUBCUTANEOUS
Status: DISCONTINUED | OUTPATIENT
Start: 2017-03-27 | End: 2017-03-31 | Stop reason: HOSPADM

## 2017-03-27 RX ORDER — DEXAMETHASONE SODIUM PHOSPHATE 10 MG/ML
INJECTION INTRAMUSCULAR; INTRAVENOUS PRN
Status: DISCONTINUED | OUTPATIENT
Start: 2017-03-27 | End: 2017-03-27 | Stop reason: HOSPADM

## 2017-03-27 RX ORDER — HYDRALAZINE HYDROCHLORIDE 20 MG/ML
2.5-5 INJECTION INTRAMUSCULAR; INTRAVENOUS EVERY 10 MIN PRN
Status: DISCONTINUED | OUTPATIENT
Start: 2017-03-27 | End: 2017-03-27 | Stop reason: HOSPADM

## 2017-03-27 RX ORDER — ACETAMINOPHEN 325 MG/1
975 TABLET ORAL EVERY 8 HOURS
Status: COMPLETED | OUTPATIENT
Start: 2017-03-27 | End: 2017-03-30

## 2017-03-27 RX ORDER — GLYCOPYRROLATE 0.2 MG/ML
INJECTION, SOLUTION INTRAMUSCULAR; INTRAVENOUS PRN
Status: DISCONTINUED | OUTPATIENT
Start: 2017-03-27 | End: 2017-03-27

## 2017-03-27 RX ORDER — HYDROMORPHONE HYDROCHLORIDE 1 MG/ML
.3-.5 INJECTION, SOLUTION INTRAMUSCULAR; INTRAVENOUS; SUBCUTANEOUS EVERY 5 MIN PRN
Status: DISCONTINUED | OUTPATIENT
Start: 2017-03-27 | End: 2017-03-27 | Stop reason: HOSPADM

## 2017-03-27 RX ORDER — LABETALOL HYDROCHLORIDE 5 MG/ML
10 INJECTION, SOLUTION INTRAVENOUS
Status: DISCONTINUED | OUTPATIENT
Start: 2017-03-27 | End: 2017-03-27 | Stop reason: HOSPADM

## 2017-03-27 RX ORDER — LIDOCAINE HYDROCHLORIDE 10 MG/ML
INJECTION, SOLUTION INFILTRATION; PERINEURAL PRN
Status: DISCONTINUED | OUTPATIENT
Start: 2017-03-27 | End: 2017-03-27

## 2017-03-27 RX ORDER — ONDANSETRON 2 MG/ML
4 INJECTION INTRAMUSCULAR; INTRAVENOUS EVERY 6 HOURS PRN
Status: DISCONTINUED | OUTPATIENT
Start: 2017-03-27 | End: 2017-03-31 | Stop reason: HOSPADM

## 2017-03-27 RX ORDER — PROPOFOL 10 MG/ML
INJECTION, EMULSION INTRAVENOUS PRN
Status: DISCONTINUED | OUTPATIENT
Start: 2017-03-27 | End: 2017-03-27

## 2017-03-27 RX ORDER — FENTANYL CITRATE 50 UG/ML
INJECTION, SOLUTION INTRAMUSCULAR; INTRAVENOUS PRN
Status: DISCONTINUED | OUTPATIENT
Start: 2017-03-27 | End: 2017-03-27

## 2017-03-27 RX ADMIN — FENTANYL CITRATE 50 MCG: 50 INJECTION, SOLUTION INTRAMUSCULAR; INTRAVENOUS at 13:31

## 2017-03-27 RX ADMIN — ACETAMINOPHEN 1000 MG: 10 INJECTION, SOLUTION INTRAVENOUS at 14:47

## 2017-03-27 RX ADMIN — PHENYLEPHRINE HYDROCHLORIDE 100 MCG: 10 INJECTION, SOLUTION INTRAMUSCULAR; INTRAVENOUS; SUBCUTANEOUS at 12:59

## 2017-03-27 RX ADMIN — ROCURONIUM BROMIDE 25 MG: 10 INJECTION INTRAVENOUS at 12:03

## 2017-03-27 RX ADMIN — PROPOFOL 120 MG: 10 INJECTION, EMULSION INTRAVENOUS at 12:03

## 2017-03-27 RX ADMIN — DEXAMETHASONE SODIUM PHOSPHATE 4 MG: 4 INJECTION, SOLUTION INTRAMUSCULAR; INTRAVENOUS at 12:03

## 2017-03-27 RX ADMIN — ONDANSETRON 4 MG: 2 INJECTION INTRAMUSCULAR; INTRAVENOUS at 12:03

## 2017-03-27 RX ADMIN — FENTANYL CITRATE 50 MCG: 50 INJECTION, SOLUTION INTRAMUSCULAR; INTRAVENOUS at 12:36

## 2017-03-27 RX ADMIN — ACETAMINOPHEN 975 MG: 325 TABLET, FILM COATED ORAL at 21:36

## 2017-03-27 RX ADMIN — HYDROMORPHONE HYDROCHLORIDE 0.5 MG: 1 INJECTION, SOLUTION INTRAMUSCULAR; INTRAVENOUS; SUBCUTANEOUS at 14:03

## 2017-03-27 RX ADMIN — GLYCOPYRROLATE 0.3 MG: 0.2 INJECTION, SOLUTION INTRAMUSCULAR; INTRAVENOUS at 13:20

## 2017-03-27 RX ADMIN — GLYCOPYRROLATE 0.1 MG: 0.2 INJECTION, SOLUTION INTRAMUSCULAR; INTRAVENOUS at 12:03

## 2017-03-27 RX ADMIN — LIDOCAINE HYDROCHLORIDE 50 MG: 10 INJECTION, SOLUTION INFILTRATION; PERINEURAL at 12:03

## 2017-03-27 RX ADMIN — HYDROMORPHONE HYDROCHLORIDE 1 MG: 1 INJECTION, SOLUTION INTRAMUSCULAR; INTRAVENOUS; SUBCUTANEOUS at 12:14

## 2017-03-27 RX ADMIN — POTASSIUM CHLORIDE, DEXTROSE MONOHYDRATE AND SODIUM CHLORIDE: 150; 5; 450 INJECTION, SOLUTION INTRAVENOUS at 16:15

## 2017-03-27 RX ADMIN — WARFARIN SODIUM 2.5 MG: 2.5 TABLET ORAL at 18:35

## 2017-03-27 RX ADMIN — ROCURONIUM BROMIDE 15 MG: 10 INJECTION INTRAVENOUS at 12:22

## 2017-03-27 RX ADMIN — METOPROLOL TARTRATE 2.5 MG: 5 INJECTION INTRAVENOUS at 12:36

## 2017-03-27 RX ADMIN — FENTANYL CITRATE 100 MCG: 50 INJECTION, SOLUTION INTRAMUSCULAR; INTRAVENOUS at 12:01

## 2017-03-27 RX ADMIN — PHENYLEPHRINE HYDROCHLORIDE 100 MCG: 10 INJECTION, SOLUTION INTRAMUSCULAR; INTRAVENOUS; SUBCUTANEOUS at 12:20

## 2017-03-27 RX ADMIN — CEFAZOLIN SODIUM 2 G: 2 INJECTION, SOLUTION INTRAVENOUS at 18:35

## 2017-03-27 RX ADMIN — PHENYLEPHRINE HYDROCHLORIDE 100 MCG: 10 INJECTION, SOLUTION INTRAMUSCULAR; INTRAVENOUS; SUBCUTANEOUS at 12:47

## 2017-03-27 RX ADMIN — OXYCODONE HYDROCHLORIDE 5 MG: 5 TABLET ORAL at 22:26

## 2017-03-27 RX ADMIN — FENTANYL CITRATE 50 MCG: 50 INJECTION INTRAMUSCULAR; INTRAVENOUS at 13:57

## 2017-03-27 RX ADMIN — HYDROMORPHONE HYDROCHLORIDE 0.3 MG: 1 INJECTION, SOLUTION INTRAMUSCULAR; INTRAVENOUS; SUBCUTANEOUS at 13:41

## 2017-03-27 RX ADMIN — CEFAZOLIN SODIUM 2 G: 2 INJECTION, SOLUTION INTRAVENOUS at 11:58

## 2017-03-27 RX ADMIN — FENTANYL CITRATE 50 MCG: 50 INJECTION INTRAMUSCULAR; INTRAVENOUS at 14:40

## 2017-03-27 RX ADMIN — SODIUM CHLORIDE, POTASSIUM CHLORIDE, SODIUM LACTATE AND CALCIUM CHLORIDE: 600; 310; 30; 20 INJECTION, SOLUTION INTRAVENOUS at 12:18

## 2017-03-27 RX ADMIN — FENTANYL CITRATE 50 MCG: 50 INJECTION INTRAMUSCULAR; INTRAVENOUS at 14:15

## 2017-03-27 RX ADMIN — CARVEDILOL 25 MG: 25 TABLET, FILM COATED ORAL at 21:36

## 2017-03-27 RX ADMIN — METOPROLOL TARTRATE 2.5 MG: 5 INJECTION INTRAVENOUS at 12:26

## 2017-03-27 RX ADMIN — Medication 3 MG: at 13:20

## 2017-03-27 RX ADMIN — SODIUM CHLORIDE, POTASSIUM CHLORIDE, SODIUM LACTATE AND CALCIUM CHLORIDE: 600; 310; 30; 20 INJECTION, SOLUTION INTRAVENOUS at 11:22

## 2017-03-27 RX ADMIN — FENTANYL CITRATE 50 MCG: 50 INJECTION INTRAMUSCULAR; INTRAVENOUS at 13:46

## 2017-03-27 RX ADMIN — OXYCODONE HYDROCHLORIDE 5 MG: 5 TABLET ORAL at 17:18

## 2017-03-27 ASSESSMENT — PAIN DESCRIPTION - DESCRIPTORS
DESCRIPTORS: ACHING;CONSTANT
DESCRIPTORS: ACHING
DESCRIPTORS: CONSTANT;DISCOMFORT

## 2017-03-27 ASSESSMENT — ENCOUNTER SYMPTOMS
DYSRHYTHMIAS: 1
STRIDOR: 0
SEIZURES: 0

## 2017-03-27 ASSESSMENT — LIFESTYLE VARIABLES: TOBACCO_USE: 1

## 2017-03-27 ASSESSMENT — COPD QUESTIONNAIRES: COPD: 0

## 2017-03-27 NOTE — BRIEF OP NOTE
[facility] Brief Operative Note    Pre-operative diagnosis: DJD   Post-operative diagnosis * No post-op diagnosis entered *  same   Procedure: Procedure(s):  Right Total hip Arthroplasty  - Wound Class: I-Clean   Surgeon(s): Surgeon(s) and Role:     * Jigar Wynn MD - Primary     * Tino Chandler PA-C   Estimated blood loss: 100 mL    Specimens: * No specimens in log *   Findings: As above

## 2017-03-27 NOTE — PHARMACY-ANTICOAGULATION SERVICE
Clinical Pharmacy - Warfarin Dosing Consult     Pharmacy has been consulted to manage this patient s warfarin therapy.  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  Pharmacy will monitor Tucker Slater daily and order warfarin doses to achieve specified goal.      Please contact pharmacy as soon as possible if the warfarin needs to be held for a procedure or if the warfarin goals change.

## 2017-03-27 NOTE — IP AVS SNAPSHOT
` [facility]: [phone]            Medication Administration Report for Tucker Slater as of 03/31/17 1134   Legend:    Given Hold Not Given Due Canceled Entry Other Actions    Time Time (Time) Time  Time-Action       Inactive    Active    Linked        Medications 03/25/17 03/26/17 03/27/17 03/28/17 03/29/17 03/30/17 03/31/17    acetaminophen (TYLENOL) tablet 650 mg  Dose: 650 mg Freq: EVERY 4 HOURS PRN Route: PO  PRN Reason: other  PRN Comment: surgical pain  Start: 03/31/17 1118   Admin Instructions: May give first dose 4 hours after last scheduled dose of acetaminophen.  Maximum acetaminophen dose from all sources = 75 mg/kg/day not to exceed 4 grams/day.               benzocaine-menthol (CHLORASEPTIC) 6-10 MG lozenge 1-2 lozenge  Dose: 1-2 lozenge Freq: EVERY 1 HOUR PRN Route: BU  PRN Reason: sore throat  PRN Comment: sore throat without fever  Start: 03/27/17 1608              bisacodyl (DULCOLAX) Suppository 10 mg  Dose: 10 mg Freq: DAILY PRN Route: RE  PRN Reason: constipation  Start: 03/28/17 2145         0725 (10 mg)-Given            calcium carbonate (TUMS) chewable tablet 500-1,000 mg  Dose: 500-1,000 mg Freq: EVERY 2 HOURS PRN Route: PO  PRN Reason: heartburn  Start: 03/28/17 2109   Admin Instructions: Do not give if calcium level greater than 10 mg/dL.        2203 (1,000 mg)-Given [C]              carvedilol (COREG) tablet 25 mg  Dose: 25 mg Freq: 2 TIMES DAILY WITH MEALS Route: PO  Start: 03/28/17 0815   Admin Instructions: HOLD IF SBP < 100 OR PULSE < 60        (0815)-Not Given       1806 (25 mg)-Given        0908 (25 mg)-Given       1731 (25 mg)-Given        0835 (25 mg)-Given       (1823)-Not Given        0805 (25 mg)-Given       [ ] 1800           HYDROmorphone (DILAUDID) injection 0.2 mg  Dose: 0.2 mg Freq: EVERY 2 HOURS PRN Route: IV  PRN Reason: severe pain  PRN Comment: or if patient unable to take PO  Start: 03/27/17 9407   Admin Instructions: Hold while  "on PCA.        1806 (0.2 mg)-Given       2010 (0.2 mg)-Given              hydrOXYzine (ATARAX) tablet 25 mg  Dose: 25 mg Freq: EVERY 6 HOURS PRN Route: PO  PRN Reason: itching  Start: 03/28/17 2144   Admin Instructions: Caution to be used when administering multiple CNS depressing meds within a short time frame.         0208 (25 mg)-Given       2043 (25 mg)-Given             lidocaine (LMX4) kit  Freq: EVERY 1 HOUR PRN Route: Top  PRN Reason: pain  PRN Comment: with VAD insertion or accessing implanted port.  Start: 03/27/17 1608   Admin Instructions: Do NOT give if patient has a history of allergy to any local anesthetic or any \"davin\" product.   Apply 30 minutes prior to VAD insertion or port access.  MAX Dose:  2.5 g (  of 5 g tube)               lidocaine 1 % 1 mL  Dose: 1 mL Freq: EVERY 1 HOUR PRN Route: OTHER  PRN Comment: mild pain with VAD insertion or accessing implanted port  Start: 03/27/17 1608   Admin Instructions: Do NOT give if patient has a history of allergy to any local anesthetic or any \"davin\" product. MAX dose 1 mL subcutaneous OR intradermal in divided doses.               losartan (COZAAR) tablet 100 mg  Dose: 100 mg Freq: DAILY Route: PO  Start: 03/28/17 0815   Admin Instructions: Hold for SBP < 140        (0815)-Not Given        0908 (100 mg)-Given        (0828)-Not Given        0805 (100 mg)-Given           naloxone (NARCAN) injection 0.1-0.4 mg  Dose: 0.1-0.4 mg Freq: EVERY 2 MIN PRN Route: IV  PRN Reason: opioid reversal  Start: 03/27/17 1608   Admin Instructions: For respiratory rate LESS than or EQUAL to 8.  Partial reversal dose:  0.1 mg titrated q 2 minutes for Analgesia Side Effects Monitoring Sedation Level of 3 (frequently drowsy, arousable, drifts to sleep during conversation).Full reversal dose:  0.4 mg bolus for Analgesia Side Effects Monitoring Sedation Level of 4 (somnolent, minimal or no response to stimulation).               ondansetron (ZOFRAN-ODT) ODT tab 4 mg  Dose: 4 mg " Freq: EVERY 6 HOURS PRN Route: PO  PRN Reason: nausea  Start: 03/27/17 1608   Admin Instructions: This is Step 1 of nausea and vomiting management.  If nausea not resolved in 15 minutes, go to Step 2 prochlorperazine (COMPAZINE). Do not push through foil backing. Peel back foil and gently remove. Place on tongue immediately. Administration with liquid unnecessary              Or  ondansetron (ZOFRAN) injection 4 mg  Dose: 4 mg Freq: EVERY 6 HOURS PRN Route: IV  PRN Reasons: nausea,vomiting  Start: 03/27/17 1608   Admin Instructions: This is Step 1 of nausea and vomiting management.  If nausea not resolved in 15 minutes, go to Step 2 prochlorperazine (COMPAZINE).  Irritant.               oxyCODONE (ROXICODONE) IR tablet 5 mg  Dose: 5 mg Freq: EVERY 3 HOURS PRN Route: PO  PRN Reason: moderate to severe pain  Start: 03/27/17 1608   Admin Instructions: IF CrCl is UNKNOWN start at lowest end of dosing range.  Hold while on PCA or with regular IV opioid dosing.       1718 (5 mg)-Given       2226 (5 mg)-Given        0152 (5 mg)-Given       0450 (5 mg)-Given       0938 (5 mg)-Given       1350 (5 mg)-Given       1606 (5 mg)-Given       1855 (5 mg)-Given       2251 (5 mg)-Given        0207 (5 mg)-Given       0505 (5 mg)-Given       0908 (5 mg)-Given       1304 (5 mg)-Given       1654 (5 mg)-Given        0442 (5 mg)-Given       1043 (5 mg)-Given       1824 (5 mg)-Given            polyethylene glycol (MIRALAX/GLYCOLAX) Packet 17 g  Dose: 17 g Freq: 2 TIMES DAILY PRN Route: PO  PRN Comment: constipation  Start: 03/28/17 2145   Admin Instructions: 1 Packet = 17 grams. Mixed prescribed dose in 8 ounces of water. Follow with 8 oz. of water.        2251 (17 g)-Given              prochlorperazine (COMPAZINE) injection 5 mg  Dose: 5 mg Freq: EVERY 6 HOURS PRN Route: IV  PRN Reasons: nausea,vomiting  Start: 03/27/17 1608   Admin Instructions: This is Step 2 of nausea and vomiting management.   If nausea not resolved in 15 minutes, give  metoclopramide (REGLAN) if ordered (step 3 of nausea and vomiting management)              Or  prochlorperazine (COMPAZINE) tablet 5 mg  Dose: 5 mg Freq: EVERY 6 HOURS PRN Route: PO  PRN Reasons: nausea,vomiting  Start: 03/27/17 1608   Admin Instructions: This is Step 2 of nausea and vomiting management.   If nausea not resolved in 15 minutes, give metoclopramide (REGLAN) if ordered (step 3 of nausea and vomiting management)               sodium chloride (PF) 0.9% PF flush 3 mL  Dose: 3 mL Freq: EVERY 8 HOURS Route: IK  Start: 03/27/17 1615   Admin Instructions: And Q1H PRN, to lock peripheral IV dormant line.       (1618)-Not Given        (0153)-Not Given       0631 (3 mL)-Given       0816 (3 mL)-Given       1610 (3 mL)-Given        0029 (3 mL)-Given       0910 (3 mL)-Given       1653 (3 mL)-Given        0208 (3 mL)-Given       0835 (3 mL)-Given       1824 (3 mL)-Given        0004 (3 mL)-Given       0807 (3 mL)-Given       [ ] 1615           sodium chloride (PF) 0.9% PF flush 3 mL  Dose: 3 mL Freq: EVERY 1 HOUR PRN Route: IK  PRN Reason: line flush  PRN Comment: for peripheral IV flush post IV meds  Start: 03/27/17 1608        1459 (3 mL)-Given             tamsulosin (FLOMAX) capsule 0.4 mg  Dose: 0.4 mg Freq: DAILY Route: PO  Start: 03/30/17 1415   Admin Instructions: Administer 30 minutes after the same meal each day.  Capsules should be swallowed whole; do not crush chew or open.          1410 (0.4 mg)-Given        0805 (0.4 mg)-Given           Warfarin Therapy Reminder (Check START DATE - warfarin may be starting in the FUTURE)  Freq: CONTINUOUS PRN Route: XX  Start: 03/27/17 1608   Admin Instructions: Reorder warfarin (COUMADIN) daily  Patient is on Warfarin Therapy - check for daily order               zolpidem (AMBIEN) half-tab 2.5 mg  Dose: 2.5 mg Freq: AT BEDTIME PRN Route: PO  PRN Reason: sleep  Start: 03/28/17 2000   Admin Instructions: POD 1.  Do not give unless at least 6 hours of uninterrupted sleep  is expected.              Future Medications  Medications 03/25/17 03/26/17 03/27/17 03/28/17 03/29/17 03/30/17 03/31/17       warfarin (COUMADIN) tablet 2.5 mg  Dose: 2.5 mg Freq: ONCE AT 6PM Route: PO  Start: 03/31/17 1800          [ ] 1800          Completed Medications  Medications 03/25/17 03/26/17 03/27/17 03/28/17 03/29/17 03/30/17 03/31/17         Dose: 1,000 mL Freq: ONCE Route: IV  Last Dose: 1,000 mL (03/29/17 1244)  Start: 03/29/17 1245   End: 03/29/17 1444        1244 (1,000 mL)-New Bag               Dose: 975 mg Freq: EVERY 8 HOURS Route: PO  Start: 03/27/17 2200   End: 03/30/17 1410   Admin Instructions: Do not use if patient has an active opioid/acetaminophen analgesic order for pain  Maximum acetaminophen dose from all sources = 75 mg/kg/day not to exceed 4 grams/day.       2136 (975 mg)-Given        0450 (975 mg)-Given       1242 (975 mg)-Given       2251 (975 mg)-Given        0505 (975 mg)-Given       1304 (975 mg)-Given       2039 (975 mg)-Given        0442 (975 mg)-Given       1410 (975 mg)-Given              Dose: 2.5 mg Freq: ONCE AT 6PM Route: PO  Start: 03/30/17 1800   End: 03/30/17 1824         1824 (2.5 mg)-Given              Dose: 2.5 mg Freq: ONCE AT 6PM Route: PO  Start: 03/29/17 1800   End: 03/29/17 1731        1731 (2.5 mg)-Given               Dose: 2.5 mg Freq: ONCE AT 6PM Route: PO  Start: 03/28/17 1800   End: 03/28/17 1806       1806 (2.5 mg)-Given             Discontinued Medications  Medications 03/25/17 03/26/17 03/27/17 03/28/17 03/29/17 03/30/17 03/31/17         Dose: 650 mg Freq: EVERY 4 HOURS PRN Route: PO  PRN Reason: other  PRN Comment: surgical pain  Start: 03/30/17 0000   End: 03/28/17 2144   Admin Instructions: May give first dose 4 hours after last scheduled dose of acetaminophen.  Maximum acetaminophen dose from all sources = 75 mg/kg/day not to exceed 4 grams/day.        2144-Med Discontinued            Dose: 10 mg Freq: 3 TIMES DAILY Route: PO  Start: 03/29/17 2000    End: 03/30/17 1416   Admin Instructions: Take one hour before or two hours after meals.         2039 (10 mg)-Given        0835 (10 mg)-Given       1410 (10 mg)-Given       1416-Med Discontinued          Dose: 10 mg Freq: EVERY 6 HOURS PRN Route: PO  PRN Reason: itching  Start: 03/27/17 1608   End: 03/28/17 2144   Admin Instructions: Caution to be used when administering multiple CNS depressing meds within a short time frame.        0152 (10 mg)-Given       1855 (10 mg)-Given       2144-Med Discontinued

## 2017-03-27 NOTE — IP AVS SNAPSHOT
MRN:[id number]                      After Visit Summary   3/27/2017    Tucker Slater    MRN: [id number]           Thank you!     Thank you for choosing [facility] for your care. Our goal is always to provide you with excellent care. Hearing back from our patients is one way we can continue to improve our services. Please take a few minutes to complete the written survey that you may receive in the mail after you visit. If you would like to speak to someone directly about your visit please contact Patient Relations at [phone]. Thank you!          Patient Information     Date Of Birth          3/13/1931        About your hospital stay     You were admitted on:  March 27, 2017 You last received care in the:  [facility]    You were discharged on:  March 31, 2017        Reason for your hospital stay       raulito                  Who to Call     For medical emergencies, please call 911.  For non-urgent questions about your medical care, please call your primary care provider or clinic, [phone]  For questions related to your surgery, please call your surgery clinic        Attending Provider     Provider Specialty    Jigar Wynn MD Orthopaedic Surgery       Primary Care Provider Office Phone # Fax #    Kwadwo Winslow [phone]       [facility] [address] [place] [address]        After Care Instructions     Activity - Up ad rizwana           Advance Diet as Tolerated       Follow this diet upon discharge: Regular            General info for SNF       Length of Stay Estimate: Short Term Care: Estimated # of Days <30  Condition at Discharge: Improving  Level of care:skilled   Rehabilitation Potential: Excellent  Admission H&P remains valid and up-to-date: Yes  Recent Chemotherapy: N/A  Use Nursing Home Standing Orders: N/A            Mantoux instructions       Give two-step Mantoux (PPD) Per Facility Policy Yes             Weight bearing status       As tolerated.            Wound care       Leave ahmet  place.                  Follow-up Appointments     Follow Up and recommended labs and tests       Follow up with Dr. silver in 12-14 days.  No follow up labs or test are needed.                  Your next 10 appointments already scheduled     Apr 06, 2017  1:15 PM CDT   Anticoagulation Visit with [facility]AG[facility] CLINIC   [facility]    [address]  [facility] [address] [phone]            May 08, 2017  8:45 AM CDT   Remote PPM Check with KIDD TECH1   [facility] [place] ([facility])    [address]  [place] [id number] [phone]           This appointment is for a remote check of your pacemaker.  This is not an appointment at the office.            May 10, 2017 11:00 AM CDT   LAB with [facility] LAB   [facility] ([facility]    [address] [place]  [facility] [address] [phone]           Patient must bring picture ID.  Patient should be prepared to give a urine specimen  Please do not eat 10-12 hours before your appointment if you are coming in fasting for labs on lipids, cholesterol, or glucose (sugar).  Pregnant women should follow their Care Team instructions. Water with medications is okay. Do not drink coffee or other fluids.   If you have concerns about taking  your medications, please ask at office or if scheduling via AddSearcht, send a message by clicking on Secure Messaging, Message Your Care Team.              Additional Services     Occupational Therapy Adult Consult       Evaluate and treat as clinically indicated.    Reason:  S/p raulito            Physical Therapy Adult Consult       Evaluate and treat as clinically indicated.    Reason:  S/p raulito                  Pending Results     No orders found from 3/25/2017 to 3/28/2017.           "  Statement of Approval     Ordered          17 1233  I have reviewed and agree with all the recommendations and orders detailed in this document.  EFFECTIVE NOW     Approved and electronically signed by:  Tino Chandler PA-C           17 1235  I have reviewed and agree with all the recommendations and orders detailed in this document.  EFFECTIVE NOW     Approved and electronically signed by:  Tino Chandler PA-C           17 1719  I have reviewed and agree with all the recommendations and orders detailed in this document.  EFFECTIVE NOW     Approved and electronically signed by:  Tino Chandler PA-C             Admission Information     Date & Time Department Dept. Phone    3/27/2017 [facility] Ortho [facility]ine [phone]      Your Vitals Were     Blood Pressure Pulse Temperature Respirations Height Weight    139/59 (BP Location: Left arm) 72 98.7  F (37.1  C) (Oral) 16 1.778 m (5' 10\") 82.5 kg (181 lb 14.1 oz)    Pulse Oximetry BMI (Body Mass Index)                94% 26.1 kg/m2          [employer] Information     [employer] lets you send messages to your doctor, view your test results, renew your prescriptions, schedule appointments and more. To sign up, go to [email or web address] . Click on \"Log in\" on the left side of the screen, which will take you to the Welcome page. Then click on \"Sign up Now\" on the right side of the page.     You will be asked to enter the access code listed below, as well as some personal information. Please follow the directions to create your username and password.     Your access code is: [email or web address]  Expires: 2017  2:11 PM     Your access code will  in 90 days. If you need help or a new code, please call your [facility] or [phone].        Care EveryWhere ID     This is your Care EveryWhere ID. This could be used by other organizations to access your [facility] medical records  [id number]           Review of your medicines    "   START taking        Dose / Directions    COMPRESSION STOCKINGS   Used for:  S/P hip replacement, unspecified laterality        Dose:  1 each   1 each continuous   Quantity:  1 each   Refills:  0       hydrOXYzine 25 MG tablet   Commonly known as:  ATARAX   Used for:  S/P hip replacement, unspecified laterality        Dose:  25 mg   Take 1 tablet (25 mg) by mouth every 6 hours as needed for itching   Quantity:  60 tablet   Refills:  1       oxyCODONE 5 MG capsule   Commonly known as:  OXY-IR   Used for:  S/P hip replacement, unspecified laterality   Replaces:  oxyCODONE 5 MG IR tablet        2 tabs every 4 hours as needed for pain.   Quantity:  60 capsule   Refills:  0       tamsulosin 0.4 MG capsule   Commonly known as:  FLOMAX   Used for:  Hypertrophy of prostate with urinary obstruction        Dose:  0.4 mg   Take 1 capsule (0.4 mg) by mouth daily   Quantity:  60 capsule   Refills:  0         CONTINUE these medicines which have NOT CHANGED        Dose / Directions    calcium carbonate 500 MG tablet   Commonly known as:  OS-KARLA 500 mg [place]. Ca        Dose:  500 mg   Take 500 mg by mouth daily   Refills:  0       carvedilol 25 MG tablet   Commonly known as:  COREG   Used for:  Essential hypertension with goal blood pressure less than 140/90        Dose:  25 mg   Take 1 tablet (25 mg) by mouth 2 times daily (with meals)   Quantity:  180 tablet   Refills:  2       losartan 100 MG tablet   Commonly known as:  COZAAR   Used for:  Essential hypertension, benign        Dose:  100 mg   Take 1 tablet (100 mg) by mouth daily   Quantity:  90 tablet   Refills:  0       OCUVITE PO        Dose:  1 tablet   Take 1 tablet by mouth daily   Refills:  0       triamterene-hydrochlorothiazide 75-50 MG per tablet   Commonly known as:  MAXZIDE   Used for:  Essential hypertension, benign        Dose:  0.5 tablet   Take 0.5 tablets by mouth daily   Quantity:  45 tablet   Refills:  3       VITAMIN C PO        Dose:  500 mg   Take 500 mg  by mouth daily   Refills:  0       VITAMIN D (CHOLECALCIFEROL) PO        Dose:  2000 Units   Take 2,000 Units by mouth daily.   Refills:  0       warfarin 2.5 MG tablet   Commonly known as:  COUMADIN   Used for:  Atrial fibrillation (H)        Take 1 tab (2.5 mg) daily or as instructed based on INR results.   Quantity:  90 tablet   Refills:  0         STOP taking     oxyCODONE 5 MG IR tablet   Commonly known as:  ROXICODONE   Replaced by:  oxyCODONE 5 MG capsule                Where to get your medicines      These medications were sent to [facility] [place], [place] - [address] [address]  [address] [facility] [address]     Phone:  [phone]     hydrOXYzine 25 MG tablet         Some of these will need a paper prescription and others can be bought over the counter. Ask your nurse if you have questions.     Bring a paper prescription for each of these medications     oxyCODONE 5 MG capsule       You don't need a prescription for these medications     COMPRESSION STOCKINGS    tamsulosin 0.4 MG capsule                Protect others around you: Learn how to safely use, store and throw away your medicines at www.disposemymeds.org.             Medication List: This is a list of all your medications and when to take them. Check marks below indicate your daily home schedule. Keep this list as a reference.      Medications           Morning Afternoon Evening Bedtime As Needed    calcium carbonate 500 MG tablet   Commonly known as:  OS-KARLA 500 mg [place]. Ca   Take 500 mg by mouth daily                                carvedilol 25 MG tablet   Commonly known as:  COREG   Take 1 tablet (25 mg) by mouth 2 times daily (with meals)   Last time this was given:  25 mg on 3/31/2017  8:05 AM                                COMPRESSION STOCKINGS   1 each continuous                                hydrOXYzine 25 MG tablet   Commonly known as:  ATARAX   Take 1 tablet (25 mg) by mouth every 6 hours as needed  for itching   Last time this was given:  25 mg on 3/29/2017  8:43 PM                                losartan 100 MG tablet   Commonly known as:  COZAAR   Take 1 tablet (100 mg) by mouth daily   Last time this was given:  100 mg on 3/31/2017  8:05 AM                                OCUVITE PO   Take 1 tablet by mouth daily                                oxyCODONE 5 MG capsule   Commonly known as:  OXY-IR   2 tabs every 4 hours as needed for pain.                                tamsulosin 0.4 MG capsule   Commonly known as:  FLOMAX   Take 1 capsule (0.4 mg) by mouth daily   Last time this was given:  0.4 mg on 3/31/2017  8:05 AM                                triamterene-hydrochlorothiazide 75-50 MG per tablet   Commonly known as:  MAXZIDE   Take 0.5 tablets by mouth daily                                VITAMIN C PO   Take 500 mg by mouth daily                                VITAMIN D (CHOLECALCIFEROL) PO   Take 2,000 Units by mouth daily.                                warfarin 2.5 MG tablet   Commonly known as:  COUMADIN   Take 1 tab (2.5 mg) daily or as instructed based on INR results.   Last time this was given:  2.5 mg on 3/30/2017  6:24 PM

## 2017-03-27 NOTE — OP NOTE
[facility]  Daily Post-Op Note    Posterior Total Hip Arthroplasty - Male    Tucker Slater MRN# [id number]   YOB: 1931  Procedure Date:  3/27/2017  Age: 86 year old       PREOPERATIVE DIAGNOSIS:  Degenerative osteoarthritis, right hip.    POSTOPERATIVE DIAGNOSIS:  Degenerative osteoarthritis,right hip.    PROCEDURE PERFORMED:  right total hip arthroplasty.    SURGEON:  Jigar Wynn M.D.    FIRST ASSISTANT:  Dutch Chandler PA-C.    ANESTHESIA:  General    ANESTHESIOLOGIST:     INDICATIONS:  Mr. Tucker Slater  is a86 year old-year-old man with severe disabling degenerative osteoarthritis of his right hip.  He was brought to surgery for right total hip arthroplasty.    PROCEDURE:  After obtaining informed surgical consent, the patient was brought to the operating room.  2 grams of Ancef was administered intravenously one hour prior to surgery and will be discontinued within 24 hours.  Coumadin was administered preoperatively. He was given a general anesthetic.  He was placed in the lateral decubitus position.  The right hip was prepped and draped in the usual sterile fashion.  A small posterolateral incision was made.  Dissection was carried through the IT band and tensor fascia.  The external rotators and capsule were identified, tagged, divided, and preserved for later repair.  The hip was dislocated.  The femoral head and neck were resected.  The acetabulum was exposed.  A labrectomy was performed.  The pulvinar was excised.  The pulvinar was excised.  The acetabulum was sequentially reamed from 44 to 62 mm.  A 62 mm  Tritanium cup was implanted and anatomic anteversion reduced pelvic abduction.  .  A # 36 mm inner diameter, flat liner was snapped into position.  The femoral canal was then opened and sequentially broached to accept a size # 6 Accolade 2 stem.  The stem was implanted in an anatomic anteversion.  After multiple trial reductions, leg lengths were equal and the  hip was stable throughout a full range of motion using a -2.5 x 36 mm Biolox head.  This was implanted.  The hip was relocated.  The wound was irrigated with antibiotic irrigating solution.  The rotators and capsule were reattached to the trochanter through drill holes.  The wound was then irrigated thoroughly and closed in layers over drained with #1 Vicryl, 2-0 Vicryl, and staples.  A sterile dressing was applied.  There were no intraoperative complications.  Blood loss was 100 cc.  Sponge and needle counts were correct and the patient was returned to the recovery room in stable condition.      Jigar Wynn M.D.

## 2017-03-27 NOTE — IP AVS SNAPSHOT
` ` Patient Information     Patient Name Sex     Tucker Lorenzana ([id number]) Male 3/13/1931       Room Bed    0626 0626-01      Patient Demographics     Address Phone    [address] [facility] [address] [phone] (Home)  none (Work)  [phone] (Mobile)      Patient Ethnicity & Race     Ethnic Group Patient Race    American White      Emergency Contact(s)     Name Relation Home Work Mobile    DANNY LORENZANA Spouse [phone] none none    NO SECONDARY CONTACT Other NONE        Documents on File        Status Date Received Description       Documents for the Patient    Insurance Card  () 07     Face Sheet Received () 10/29/08     Insurance Card  () 09     External Medication Information Consent Accepted () 09     Face Sheet Received () 09     Patient ID Received 03/15/12 [place] EXP. 2014    Consent for Services - Hospital/Clinic Received () 10/27/10     External Medication Information Consent Accepted () 10/28/10     Consent for Services - Hospital/Clinic Received () 11     Privacy Notice - [place] Received 11     Insurance Card  ()      Insurance Card Received () 03/15/12     External Medication Information Consent Accepted () 12     Consent for Services - Hospital/Clinic Received () 12     Privacy Notice - [place] Received 12     Consent to Communicate Received 12 auth to discuss    Physical Therapy Certification Received 12     Consent for EHR Access  13 Copied from existing Consent for services - C/HOD collected on 2012    Business/Insurance/Care Coordination/Health Form - Patient.1  13 ADULT REHABILITATION ATTENDANCE POLICY    [facility] Specified Other       External Medication Information Consent Accepted 13     Consent for Services - Hospital/Clinic Received () 13     Physical Therapy Certification  Received 13-13    Insurance Card Received 13 [id number] FREEDOM BALANCE WITH RX    HIM KEN Authorization - File Only   [facility]  KEN 14    Consent for Services - Hospital/Clinic Received () 14     Physical Therapy Certification Received 01/15/15 1/12/15    Insurance Card Received 16     Patient ID Received 16     Business/Insurance/Care Coordination/Health Form - Patient  03/31/15 ADULT REHABILITATION ATTENDANCE POLICY    Consent for Services - Hospital/Clinic Received () 11/23/15     Consent for Services/Privacy Notice - Hospital/Clinic Received () 16     Insurance Card Received 17    Consent for Services/Privacy Notice - Hospital/Clinic Received 17     Privacy Notice - [place] Received (Deleted) 07     Consent to Communicate Received (Deleted) 01/12/15        Documents for the Encounter    H/P - History and Physical  17 H AND P - PT READINESS INSTIT - PRE-SURG. ASSESSMENT AND REC REPORT    CMS IM for Patient Signature Received 17       Admission Information     Attending Provider Admitting Provider Admission Type Admission Date/Time    Jigar Wynn MD Hartman, Robert, MD Elective 17  1026    Discharge Date Hospital Service Auth/Cert Status Service Area     Surgery Incomplete [facility] SERVICES    Unit Room/Bed Admission Status        ORTHO SPINE  Admission (Confirmed)       Admission     Complaint    DJD, Degenerative arthritis of hip      Hospital Account     Name Acct ID Class Status Primary Coverage    Tucker Slater [id number] Inpatient Open MEDICARE - MEDICARE FOR HB SUPPLEMENT            Guarantor Account (for Hospital Account #[id number])     Name Relation to Pt Service Area Active? Acct Type    Tucker Slater  FCS Yes Personal/Family    Address Phone          [address] [place] [address] [phone](H)  None(O)               Coverage Information (for Hospital Account #[id number])     1. MEDICARE/MEDICARE FOR HB SUPPLEMENT     F/O Payor/Plan Precert #    MEDICARE/MEDICARE FOR HB SUPPLEMENT     Subscriber Subscriber #    Tucker Slater [id number]    Address Phone    ATTN CLAIMS  [address]  [place] [address] [phone]          2. HEALTH PARTNERS/HEALTH[employer]ERS FREEDOM PLAN     F/O Payor/Plan Precert #    HEALTH PARTNERS/HEALTHPARTNERS FREEDOM PLAN     Subscriber Subscriber #    Tucker Slater [id number]    Address Phone    [address]  [place] [address] [phone]

## 2017-03-27 NOTE — ANESTHESIA PREPROCEDURE EVALUATION
Anesthesia Evaluation     . Pt has had prior anesthetic. Type: General    No history of anesthetic complications          ROS/MED HX    ENT/Pulmonary:  - neg pulmonary ROS   (+)DAWSON risk factors hypertension, tobacco use, Past use , . .   (-) asthma, COPD and recent URI   Neurologic:     (+)neuropathy    (-) seizures and CVA   Cardiovascular:     (+) Dyslipidemia, hypertension----. Taking blood thinners : Instructions Given to patient: coumadin d/c'd 3/22. . . pacemaker :. dysrhythmias a-fib, . Previous cardiac testing date:results:date: results:ECG reviewed date:3/17 results:100% paced date: results:         (-) CAD and valvular problems/murmurs   METS/Exercise Tolerance:     Hematologic: Comments: Lab Test        03/27/17 03/20/17 03/14/17 03/06/17 02/27/17      --          05/17/16      --          11/23/15      --          08/27/15                       1045                                                                1203           --           1738           --           1423           --           0619          WBC           --           --           --           --           --           --          6.7           --          7.0           --          5.2           HGB          13.4          --           --           --          14.6          --          15.5          --          15.5          --          15.4          MCV           --           --           --           --           --           --          95            --          97            --          95            PLT           --           --           --           --           --           --          177           --          194           --          173           INR           --          2.3*         3.2*         4.1*          --            < >         --            < >         --            < >        3.54*          < > = values in this interval not displayed.                  Lab Test        03/20/17 02/09/17      11/07/16                       1434          1058          1557          NA           138          144          141           POTASSIUM    4.2          4.2          4.3           CHLORIDE     102          110*         109           CO2          23           26           25            BUN          31*          41*          33*           CR           1.44*        1.41*        1.57*         ANIONGAP     13           8            7             KARLA          9.3          9.3          8.8           GLC          85           103*         86           - neg hematologic  ROS       Musculoskeletal:   (+) arthritis, , , -       GI/Hepatic:  - neg GI/hepatic ROS      (-) GERD, hepatitis and liver disease   Renal/Genitourinary:     (+) chronic renal disease, type: CRI, Pt does not require dialysis, Pt has no history of transplant,       Endo:  - neg endo ROS    (-) Type I DM, Type II DM, thyroid disease, chronic steroid usage and obesity   Psychiatric:  - neg psychiatric ROS       Infectious Disease:  - neg infectious disease ROS       Malignancy:   (+) Malignancy History of Other  Other CA bladder status post         Other:    - neg other ROS                 Physical Exam  Normal systems: cardiovascular, pulmonary and dental    Airway   Mallampati: II  TM distance: >3 FB  Neck ROM: full    Dental     Cardiovascular   Rhythm and rate: regular and normal  (-) no friction rub, no systolic click and no murmur    Pulmonary    breath sounds clear to auscultation(-) no rhonchi, no decreased breath sounds, no wheezes, no rales and no stridor                    Anesthesia Plan      History & Physical Review  History and physical reviewed and following examination; no interval change.    ASA Status:  3 .    NPO Status:  > 8 hours    Plan for General and ETT with Intravenous induction. Maintenance will be Balanced.    PONV prophylaxis:  Ondansetron (or other 5HT-3) and Dexamethasone or Solumedrol       Postoperative Care  Postoperative  pain management:  IV analgesics.      Consents  Anesthetic plan, risks, benefits and alternatives discussed with:  Patient or representative, Patient and Spouse..                          .

## 2017-03-27 NOTE — IP AVS SNAPSHOT
"    [facility]: [phone]                                              INTERAGENCY TRANSFER FORM - PHYSICIAN ORDERS   3/27/2017                    Hospital Admission Date: 3/27/2017  ROSS LORENZANA   : 3/13/1931  Sex: Male        Attending Provider: Jigar Wynn MD     Allergies:  Morphine, Amlodipine    Infection:  None   Service:  SURGERY    Ht:  1.778 m (5' 10\")   Wt:  82.5 kg (181 lb 14.1 oz)   Admission Wt:  82.5 kg (181 lb 14.1 oz)    BMI:  26.1 kg/m 2   BSA:  2.02 m 2            Patient PCP Information     Provider PCP Type    Kwadwo Winslow MD General      ED Clinical Impression     Diagnosis Description Comment Added By Time Added    S/P hip replacement, unspecified laterality [Z96.649] S/P hip replacement, unspecified laterality [Z96.649]  Tino Chandler, PA-C 3/29/2017  5:18 PM    Hypertrophy of prostate with urinary obstruction [N40.1, N13.8] Hypertrophy of prostate with urinary obstruction [N40.1, N13.8]  Win Stapleton MD 3/31/2017  7:56 AM      Hospital Problems as of 3/31/2017              Priority Class Noted POA    Degenerative arthritis of hip Medium  3/27/2017 Yes      Non-Hospital Problems as of 3/31/2017              Priority Class Noted    Essential hypertension, benign   Unknown    Carcinoma in situ of bladder   Unknown    Hypertrophy of prostate with urinary obstruction   Unknown    Generalized osteoarthrosis, unspecified site   Unknown    Hereditary and idiopathic peripheral neuropathy   Unknown    Pain in joint, shoulder region   Unknown    CARDIOVASCULAR SCREENING; LDL GOAL LESS THAN 130   10/31/2010    Advanced directives, counseling/discussion   1/3/2012    Peripheral neuropathy (H)   2012    GI bleed   3/18/2012    Anemia   3/18/2012    Near syncope   3/18/2012    Anemia due to blood loss, acute   3/23/2012    Atrial fibrillation (H)   2/3/2015    Bradycardia   Unknown    CKD (chronic kidney disease) stage 3, GFR 30-59 ml/min   " 5/11/2015    Cardiac pacemaker in situ   7/22/2015    Long-term (current) use of anticoagulants [Z79.01] Medium  4/7/2016      Code Status History     Date Active Date Inactive Code Status Order ID Comments User Context    This patient has a current code status but no historical code status.         Medication Review      START taking        Dose / Directions Comments    COMPRESSION STOCKINGS   Used for:  S/P hip replacement, unspecified laterality        Dose:  1 each   1 each continuous   Quantity:  1 each   Refills:  0        hydrOXYzine 25 MG tablet   Commonly known as:  ATARAX   Used for:  S/P hip replacement, unspecified laterality        Dose:  25 mg   Take 1 tablet (25 mg) by mouth every 6 hours as needed for itching   Quantity:  60 tablet   Refills:  1        oxyCODONE 5 MG capsule   Commonly known as:  OXY-IR   Used for:  S/P hip replacement, unspecified laterality   Replaces:  oxyCODONE 5 MG IR tablet        2 tabs every 4 hours as needed for pain.   Quantity:  60 capsule   Refills:  0        tamsulosin 0.4 MG capsule   Commonly known as:  FLOMAX   Used for:  Hypertrophy of prostate with urinary obstruction        Dose:  0.4 mg   Take 1 capsule (0.4 mg) by mouth daily   Quantity:  60 capsule   Refills:  0          CONTINUE these medications which have NOT CHANGED        Dose / Directions Comments    calcium carbonate 500 MG tablet   Commonly known as:  OS-KARLA 500 mg [place]. Ca        Dose:  500 mg   Take 500 mg by mouth daily   Refills:  0        carvedilol 25 MG tablet   Commonly known as:  COREG   Used for:  Essential hypertension with goal blood pressure less than 140/90        Dose:  25 mg   Take 1 tablet (25 mg) by mouth 2 times daily (with meals)   Quantity:  180 tablet   Refills:  2        losartan 100 MG tablet   Commonly known as:  COZAAR   Used for:  Essential hypertension, benign        Dose:  100 mg   Take 1 tablet (100 mg) by mouth daily   Quantity:  90 tablet   Refills:  0        OCUVITE PO         Dose:  1 tablet   Take 1 tablet by mouth daily   Refills:  0        triamterene-hydrochlorothiazide 75-50 MG per tablet   Commonly known as:  MAXZIDE   Used for:  Essential hypertension, benign        Dose:  0.5 tablet   Take 0.5 tablets by mouth daily   Quantity:  45 tablet   Refills:  3        VITAMIN C PO        Dose:  500 mg   Take 500 mg by mouth daily   Refills:  0        VITAMIN D (CHOLECALCIFEROL) PO        Dose:  2000 Units   Take 2,000 Units by mouth daily.   Refills:  0        warfarin 2.5 MG tablet   Commonly known as:  COUMADIN   Used for:  Atrial fibrillation (H)        Take 1 tab (2.5 mg) daily or as instructed based on INR results.   Quantity:  90 tablet   Refills:  0    Keep INR appointment on Jan 23.         STOP taking     oxyCODONE 5 MG IR tablet   Commonly known as:  ROXICODONE   Replaced by:  oxyCODONE 5 MG capsule                   Summary of Visit     Reason for your hospital stay       raulito             After Care     Activity - Up ad rizwana           Advance Diet as Tolerated       Follow this diet upon discharge: Regular       General info for SNF       Length of Stay Estimate: Short Term Care: Estimated # of Days <30  Condition at Discharge: Improving  Level of care:skilled   Rehabilitation Potential: Excellent  Admission H&P remains valid and up-to-date: Yes  Recent Chemotherapy: N/A  Use Nursing Home Standing Orders: N/A       Mantoux instructions       Give two-step Mantoux (PPD) Per Facility Policy Yes       Weight bearing status       As tolerated.       Wound care       Leave [facility] ni place.             Referrals     Occupational Therapy Adult Consult       Evaluate and treat as clinically indicated.    Reason:  S/p raulito       Physical Therapy Adult Consult       Evaluate and treat as clinically indicated.    Reason:  S/p raulito             Your next 10 appointments already scheduled     Apr 06, 2017  1:15 PM CDT   Anticoagulation Visit with [facility]   [place]  [facility] ([facility])    [address]  [facility] [id number] [phone]            May 08, 2017  8:45 AM CDT   Remote PPM Check with KIDD TECH1   [facility] [place] ([facility])    [address]  [place] [address] [phone]           This appointment is for a remote check of your pacemaker.  This is not an appointment at the office.            May 10, 2017 11:00 AM CDT   LAB with [facility]   [facility] ([facility])    [address]  [facility] [id number] [phone]           Patient must bring picture ID.  Patient should be prepared to give a urine specimen  Please do not eat 10-12 hours before your appointment if you are coming in fasting for labs on lipids, cholesterol, or glucose (sugar).  Pregnant women should follow their Care Team instructions. Water with medications is okay. Do not drink coffee or other fluids.   If you have concerns about taking  your medications, please ask at office or if scheduling via [employer], send a message by clicking on Secure Messaging, Message Your Care Team.              Follow-Up Appointment Instructions     Future Labs/Procedures    Follow Up and recommended labs and tests     Comments:    Follow up with Dr. silver in 12-14 days.  No follow up labs or test are needed.      Follow-Up Appointment Instructions     Follow Up and recommended labs and tests       Follow up with Dr. silver in 12-14 days.  No follow up labs or test are needed.             Statement of Approval     Ordered          03/30/17 1233  I have reviewed and agree with all the recommendations and orders detailed in this document.  EFFECTIVE NOW     Approved and electronically signed by:  Tino Chandler PA-C           03/30/17 1230  I have reviewed and agree with all the recommendations and orders detailed in this document.  EFFECTIVE NOW      Approved and electronically signed by:  Tino Chandler PA-C           03/29/17 1719  I have reviewed and agree with all the recommendations and orders detailed in this document.  EFFECTIVE NOW     Approved and electronically signed by:  Tino Chandler PA-C

## 2017-03-27 NOTE — ANESTHESIA CARE TRANSFER NOTE
Patient: Tucker Slater    Procedure(s):  Right Total hip Arthroplasty  - Wound Class: I-Clean    Diagnosis: DJD  Diagnosis Additional Information: No value filed.    Anesthesia Type:   General, ETT     Note:  Airway :Face Mask  Patient transferred to:PACU  Comments: vss      Vitals: (Last set prior to Anesthesia Care Transfer)    CRNA VITALS  3/27/2017 1256 - 3/27/2017 1333      3/27/2017             Pulse: 72    SpO2: 98 %    Resp Rate (observed): 11                Electronically Signed By: SELMA Alcantara CRNA  March 27, 2017  1:33 PM

## 2017-03-27 NOTE — IP AVS SNAPSHOT
Tucker Slater #[id number] (CSN: [id number])  (86 year old M)  (Adm: 17)     [id number]               [facility] ORTHO SPINE: [phone]            Patient Demographics     Patient Name Sex          Age SSN Address Phone    SlaterTucker nolan CECE Male 3/13/1931 (85 year old) [id number] [facility] [address] [phone] (Home)  none (Work)  [phone] (Mobile)      Emergency Contact(s)     Name Relation Home Work Mobile    DANNY SLATER Spouse [phone] none none    NO SECONDARY CONTACT Other NONE        Admission Information     Attending Provider Admitting Provider Admission Type Admission Date/Time     Jigar Wynn MD Elective 17  1026    Discharge Date Hospital Service Auth/Cert Status Service Area    17 Surgery Incomplete [facility]    Unit Room/Bed Admission Status        ORTHO SPINE  Discharged (Confirmed)       Admission     Complaint    DJD, Degenerative arthritis of hip      Hospital Account     Name Acct ID Class Status Primary Coverage    Tucker Slater [id number] Inpatient Open MEDICARE - MEDICARE FOR HB SUPPLEMENT            Guarantor Account (for Hospital Account #[id number])     Name Relation to Pt Service Area Active? Acct Type    Tucker Slater  FCS Yes Personal/Family    Address Phone          [address] [place] [address] [phone](H)  None(O)              Coverage Information (for Hospital Account #[id number])     1. MEDICARE/MEDICARE FOR HB SUPPLEMENT     F/O Payor/Plan Precert #    MEDICARE/MEDICARE FOR HB SUPPLEMENT     Subscriber Subscriber #    Tucker Slater [id number]    Address Phone    ATTN CLAIMS  [address]  [facility] [place] [address] [phone]          2. HEALTH PARTNERS/HEALTH[employer]NERS FREEDOM PLAN     F/O Payor/Plan Precert #    HEALTH PARTNERS/HEALTH[employer]NERS FREEDOM PLAN     Subscriber Subscriber #    Tucker Slater [id number]     "Address Phone    [address]  [place] [address] [phone]                                                      INTERAGENCY TRANSFER FORM - PHYSICIAN ORDERS   3/27/2017                       [facility] ORTHO SPINE: [phone]            Attending Provider: (none)    Allergies:  Morphine, Amlodipine    Infection:  None   Service:  SURGERY    Ht:  1.778 m (5' 10\")   Wt:  82.5 kg (181 lb 14.1 oz)   Admission Wt:  82.5 kg (181 lb 14.1 oz)    BMI:  26.1 kg/m 2   BSA:  2.02 m 2            ED Clinical Impression     Diagnosis Description Comment Added By Time Added    S/P hip replacement, unspecified laterality [Z96.649] S/P hip replacement, unspecified laterality [Z96.649]  Tino Chandler PA-C 3/29/2017  5:18 PM    Hypertrophy of prostate with urinary obstruction [N40.1, N13.8] Hypertrophy of prostate with urinary obstruction [N40.1, N13.8]  Win Stapleton MD 3/31/2017  7:56 AM      Hospital Problems as of 3/31/2017              Priority Class Noted POA    Degenerative arthritis of hip Medium  3/27/2017 Yes      Non-Hospital Problems as of 3/31/2017              Priority Class Noted    Essential hypertension, benign   Unknown    Carcinoma in situ of bladder   Unknown    Hypertrophy of prostate with urinary obstruction   Unknown    Generalized osteoarthrosis, unspecified site   Unknown    Hereditary and idiopathic peripheral neuropathy   Unknown    Pain in joint, shoulder region   Unknown    CARDIOVASCULAR SCREENING; LDL GOAL LESS THAN 130   10/31/2010    Advanced directives, counseling/discussion   1/3/2012    Peripheral neuropathy (H)   1/4/2012    GI bleed   3/18/2012    Anemia   3/18/2012    Near syncope   3/18/2012    Anemia due to blood loss, acute   3/23/2012    Atrial fibrillation (H)   2/3/2015    Bradycardia   Unknown    CKD (chronic kidney disease) stage 3, GFR 30-59 ml/min   5/11/2015    Cardiac pacemaker in situ   7/22/2015    Long-term (current) use of anticoagulants [Z79.01] " Medium  4/7/2016      Code Status History     Date Active Date Inactive Code Status Order ID Comments User Context    3/27/2017  4:08 PM 3/31/2017  2:58 PM Full Code [id number]  Jigar Wynn MD Inpatient      Current Code Status     Date Active Code Status Order ID Comments User Context       Prior      Summary of Visit     Reason for your hospital stay       raulito                Medication Review      START taking        Dose / Directions Comments    COMPRESSION STOCKINGS   Used for:  S/P hip replacement, unspecified laterality        Dose:  1 each   1 each continuous   Quantity:  1 each   Refills:  0        hydrOXYzine 25 MG tablet   Commonly known as:  ATARAX   Used for:  S/P hip replacement, unspecified laterality        Dose:  25 mg   Take 1 tablet (25 mg) by mouth every 6 hours as needed for itching   Quantity:  60 tablet   Refills:  1        oxyCODONE 5 MG capsule   Commonly known as:  OXY-IR   Used for:  S/P hip replacement, unspecified laterality   Replaces:  oxyCODONE 5 MG IR tablet        2 tabs every 4 hours as needed for pain.   Quantity:  60 capsule   Refills:  0        tamsulosin 0.4 MG capsule   Commonly known as:  FLOMAX   Used for:  Hypertrophy of prostate with urinary obstruction        Dose:  0.4 mg   Take 1 capsule (0.4 mg) by mouth daily   Quantity:  60 capsule   Refills:  0          CONTINUE these medications which have NOT CHANGED        Dose / Directions Comments    calcium carbonate 500 MG tablet   Commonly known as:  OS-KARLA 500 mg [place]. Ca        Dose:  500 mg   Take 500 mg by mouth daily   Refills:  0        carvedilol 25 MG tablet   Commonly known as:  COREG   Used for:  Essential hypertension with goal blood pressure less than 140/90        Dose:  25 mg   Take 1 tablet (25 mg) by mouth 2 times daily (with meals)   Quantity:  180 tablet   Refills:  2        losartan 100 MG tablet   Commonly known as:  COZAAR   Used for:  Essential hypertension, benign        Dose:  100 mg   Take 1  tablet (100 mg) by mouth daily   Quantity:  90 tablet   Refills:  0        OCUVITE PO        Dose:  1 tablet   Take 1 tablet by mouth daily   Refills:  0        triamterene-hydrochlorothiazide 75-50 MG per tablet   Commonly known as:  MAXZIDE   Used for:  Essential hypertension, benign        Dose:  0.5 tablet   Take 0.5 tablets by mouth daily   Quantity:  45 tablet   Refills:  3        VITAMIN C PO        Dose:  500 mg   Take 500 mg by mouth daily   Refills:  0        VITAMIN D (CHOLECALCIFEROL) PO        Dose:  2000 Units   Take 2,000 Units by mouth daily.   Refills:  0        warfarin 2.5 MG tablet   Commonly known as:  COUMADIN   Used for:  Atrial fibrillation (H)        Take 1 tab (2.5 mg) daily or as instructed based on INR results.   Quantity:  90 tablet   Refills:  0    Keep INR appointment on Jan 23.         STOP taking     oxyCODONE 5 MG IR tablet   Commonly known as:  ROXICODONE   Replaced by:  oxyCODONE 5 MG capsule                   After Care     Activity - Up ad rizwana           Advance Diet as Tolerated       Follow this diet upon discharge: Regular       General info for SNF       Length of Stay Estimate: Short Term Care: Estimated # of Days <30  Condition at Discharge: Improving  Level of care:skilled   Rehabilitation Potential: Excellent  Admission H&P remains valid and up-to-date: Yes  Recent Chemotherapy: N/A  Use Nursing Home Standing Orders: N/A       Mantoux instructions       Give two-step Mantoux (PPD) Per Facility Policy Yes       Weight bearing status       As tolerated.       Wound care       Leave aquacell ni place.             Referrals     Occupational Therapy Adult Consult       Evaluate and treat as clinically indicated.    Reason:  S/p raulito       Physical Therapy Adult Consult       Evaluate and treat as clinically indicated.    Reason:  S/p raulito             Follow-Up Appointment Instructions     Follow Up and recommended labs and tests       Follow up with Dr. silver in 12-14 days.   No follow up labs or test are needed.             Your next 10 appointments already scheduled     Apr 06, 2017  1:15 PM CDT   Anticoagulation Visit with [facility] CL[facility]   [facility]    [address]  [facility] [address] [phone]            May 08, 2017  8:45 AM CDT   Remote PPM Check with KIDD TECH1   [facility] [place] ([facility])    [address] [facility] [address]  [place] [address] [phone]           This appointment is for a remote check of your pacemaker.  This is not an appointment at the office.            May 10, 2017 11:00 AM CDT   LAB with [facility] LAB   [facility] ([facility]    [address] [place]  [facility] [address] [phone]           Patient must bring picture ID.  Patient should be prepared to give a urine specimen  Please do not eat 10-12 hours before your appointment if you are coming in fasting for labs on lipids, cholesterol, or glucose (sugar).  Pregnant women should follow their Care Team instructions. Water with medications is okay. Do not drink coffee or other fluids.   If you have concerns about taking  your medications, please ask at office or if scheduling via [facility], send a message by clicking on Secure Messaging, Message Your Care Team.              Statement of Approval     Ordered          03/30/17 1233  I have reviewed and agree with all the recommendations and orders detailed in this document.  EFFECTIVE NOW     Approved and electronically signed by:  Tino Chandler PA-C           03/30/17 1235  I have reviewed and agree with all the recommendations and orders detailed in this document.  EFFECTIVE NOW     Approved and electronically signed by:  Tino Chandler PA-C           03/29/17 2872  I have reviewed and agree with all the recommendations and orders detailed in this document.  EFFECTIVE NOW    "  Approved and electronically signed by:  Tino Chandler PA-C                                                 INTERAGENCY TRANSFER FORM - NURSING   3/27/2017                       [facility] SPINE: [phone]            Attending Provider: (none)    Allergies:  Morphine, Amlodipine    Infection:  None   Service:  SURGERY    Ht:  1.778 m (5' 10\")   Wt:  82.5 kg (181 lb 14.1 oz)   Admission Wt:  82.5 kg (181 lb 14.1 oz)    BMI:  26.1 kg/m 2   BSA:  2.02 m 2            Advance Directives        Does patient have a scanned Advance Directive/ACP document in EPIC?           No        Immunizations     Name Date      Influenza (High Dose) 3 valent vaccine 10/24/16     Influenza (High Dose) 3 valent vaccine 10/26/15     Influenza (High Dose) 3 valent vaccine 10/02/13     Influenza (High Dose) 3 valent vaccine 11/05/12     Influenza (IIV3) 11/01/14     Influenza (IIV3) 10/06/10     Pneumococcal (PCV 13) 01/30/15     Pneumococcal 23 valent 07/02/13     TD (ADULT, 7+) 10/27/10     TDAP Vaccine (Adacel) 04/24/13       ASSESSMENT     Discharge Profile Flowsheet     EXPECTED DISCHARGE     COMMUNICATION ASSESSMENT      Expected Discharge Date  03/30/17 (DRG LOS 3/M-H/TCU vs HOme ) 03/28/17 1027   Patient's communication style  spoken language (English or Bilingual) 03/08/17 1123    DISCHARGE NEEDS ASSESSMENT     FINAL RESOURCES      Patient/family verbalizes understanding of discharge plan recommendations?  Yes 03/28/17 1330   Resources List  Skilled Nursing Facility 03/28/17 1330    Medical Team notified of plan?  yes 03/28/17 1330   Skilled Nursing Facility  [facility] [phone], Fax: [phone] 03/28/17 1330    Readmission Within The Last 30 Days  no previous admission in last 30 days 03/28/17 1330   PAS Number  [id number] 03/30/17 0940    Equipment Currently Used at Home  cane, straight (thinks he has a walker from previous TKA's) 03/28/17 0947   Senior Linkage Line Referral Placed  03/30/17 " "03/30/17 0940    Transportation Available  van, wheelchair accessible ([facility], 3/31 @ 1300) 03/30/17 1357   SKIN      # of Referrals Placed by CTS  Post Acute Facilities 03/28/17 1330   Inspection  Procedural focused assessment (identify areas inspected) 03/31/17 1113    ASSESSMENT OF FUNCTIONAL STATUS     Procedural focused assessment (identify areas inspected)   Hip, right 03/31/17 1113    Prior to admission patient needed assistance with:  -- (none identified by pt) 03/28/17 1330   Skin WDL  ex 03/31/17 1113    Assesssment of Functional Status  Not at baseline with ADL Functioning;Not at baseline with mobility;Needs placement in a SNF/TCF for rehabilitation 03/28/17 1330   Skin Temperature  -- 03/30/17 1808    GASTROINTESTINAL (ADULT,PEDIATRIC,OB)     Skin Moisture  dry 03/30/17 1757    GI WDL  WDL 03/31/17 1113   Skin Integrity  incision(s) 03/31/17 1113    All Quadrants Bowel Sounds  audible and active in all quadrants 03/30/17 1808   SAFETY      Last Bowel Movement  03/31/17 03/31/17 1113   Safety WDL  WDL 03/31/17 1113    Passing flatus  yes 03/31/17 1113                      Assessment WDL (Within Defined Limits) Definitions           Safety WDL     Effective: 09/28/15    Row Information: <b>WDL Definition:</b> Bed in low position, wheels locked; call light in reach; upper side rails up x 2; ID band on<br> <font color=\"gray\"><i>Item=AS safety wdl>>List=AS safety wdl>>Version=F14</i></font>      Skin WDL     Effective: 09/28/15    Row Information: <b>WDL Definition:</b> Warm; dry; intact; elastic; without discoloration; pressure points without redness<br> <font color=\"gray\"><i>Item=AS skin wdl>>List=AS skin wdl>>Version=F14</i></font>      Vitals     Vital Signs Flowsheet     VITAL SIGNS     Pain Intervention(s)  Declines;Rest 03/30/17 2104    Temp  98.7  F (37.1  C) 03/31/17 0801   Response to Interventions  Absence of nonverbal indicators of pain 03/30/17 0800    Temp src  Oral 03/31/17 0801   ANALGESIA " "SIDE EFFECTS MONITORING      Resp  16 03/31/17 0801   Side Effects Monitoring: Respiratory Quality  R 03/30/17 2242    Pulse  72 03/28/17 0814   Side Effects Monitoring: Respiratory Depth  N 03/30/17 2242    Heart Rate  69 03/31/17 0801   Side Effects Monitoring: Sedation Level  1 03/30/17 2103    Pulse/Heart Rate Source  Monitor 03/30/17 1829   HEIGHT AND WEIGHT      BP  139/59 03/31/17 0801   Height  1.778 m (5' 10\") 03/27/17 1043    BP Location  Left arm 03/31/17 0801   Weight  82.5 kg (181 lb 14.1 oz) 03/27/17 1043    Patient Position  Sitting 03/27/17 1056   BSA (Calculated - sq m)  2.02 03/27/17 1043    OXYGEN THERAPY     BMI (Calculated)  26.15 03/27/17 1043    SpO2  94 % 03/31/17 0801   POSITIONING      O2 Device  None (Room air) 03/31/17 0801   Body Position  lower extremity elevated, right 03/31/17 1113    FiO2 (%)  100 % 03/27/17 1334   Head of Bed (HOB)  HOB at 20 degrees 03/31/17 1113    Oxygen Delivery  2 LPM 03/28/17 0814   Positioning/Transfer Devices  pillows 03/31/17 0003    PAIN/COMFORT     Chair  Upright in chair 03/31/17 1113    Patient Currently in Pain  denies 03/31/17 0003   ECG      Preferred Pain Scale  number (Numeric Rating Pain Scale) 03/31/17 0003   ECG Rhythm  Paced rhythm 03/27/17 1551    Patient's Stated Pain Goal  4 03/30/17 1646   Ectopy  None 03/27/17 1551    0-10 Pain Scale  1 03/30/17 2103   Lead Monitored  Lead II 03/27/17 1551    Pain Location  Leg 03/30/17 2103   DAILY CARE      Pain Orientation  Right 03/30/17 2103   Activity Type  activity encouraged;ambulated in riojas;ambulated in room;ambulated to bathroom;up in chair 03/31/17 1113    Pain Descriptors  Spasm (comes and goes) 03/30/17 2103   Activity Level of Assistance  assistance, stand-by 03/31/17 1113    Pain Management Interventions  pain plan reviewed with patient/caregiver;pillow support provided;aromatherapy utilized;breathing exercises;cold applied;relaxation techniques promoted 03/30/17 1812   Activity Assistive " Device  walker 03/31/17 1113            Patient Lines/Drains/Airways Status    Active LINES/DRAINS/AIRWAYS     Name: Placement date: Placement time: Site: Days: Last dressing change:    Airway - Adult/Peds 03/26/15   1041      736     Incision/Surgical Site 03/27/17 Right Hip 03/27/17   1306    4             Patient Lines/Drains/Airways Status    Active PICC/CVC     None            Intake/Output Detail Report     Date Intake     Output     Net    Shift P.O. I.V. IV Piggyback Total Urine Drains Blood Total       Day 03/30/17 0700 - 03/30/17 1459 770 -- -- 770 430 -- -- 430 340    Brittany 03/30/17 1500 - 03/30/17 2259 360 -- -- 360 575 -- -- 575 -215    Noc 03/30/17 2300 - 03/31/17 0659 -- -- -- -- 550 -- -- 550 -550    Day 03/31/17 0700 - 03/31/17 1459 -- -- -- -- -- -- -- -- 0    Brittany 03/31/17 1500 - 03/31/17 2259 -- -- -- -- -- -- -- -- 0      Last Void/BM       Most Recent Value    Urine Occurrence 1 at 03/31/2017 0443    Stool Occurrence 1 at 03/31/2017 0934      Case Management/Discharge Planning     Case Management/Discharge Planning Flowsheet     REFERRAL INFORMATION     COPING/STRESS      Did the Initial Social Work Assessment result in a Social Work Case?  Yes 03/28/17 1330   Major Change/Loss/Stressor  none 03/08/17 1123    Admission Type  inpatient 03/28/17 1330   Patient Personal Strengths  expressive of needs;strong support system;self-reliant 03/28/17 1330    Arrived From  home or self-care 03/28/17 1330   Sources Of Support  spouse 03/28/17 1330    Referral Source  physician 03/28/17 1330   Reaction To Health Status  adjusting;accepting 03/28/17 1330    # of Referrals Placed by CTS  Post Acute Facilities 03/28/17 1330   EXPECTED DISCHARGE      Reason For Consult  discharge planning 03/28/17 1330   Expected Discharge Date  03/30/17 (DRG LOS 3/M-H/TCU vs HOme ) 03/28/17 1027    Record Reviewed  clinical discipline documentation;plan of care;patient profile 03/28/17 9113   DISCHARGE PLANNING        Assigned to Case  Darline Valdovinos 03/28/17 1330   Patient/family verbalizes understanding of discharge plan recommendations?  Yes 03/28/17 1330    Primary Care Clinic Name  [facility] 03/28/17 1330   Medical Team notified of plan?  yes 03/28/17 1330    Primary Care MD Name  [facility] 03/28/17 1330   Readmission Within The Last 30 Days  no previous admission in last 30 days 03/28/17 1330    LIVING ENVIRONMENT     Transportation Available  van, wheelchair accessible ([facility], 3/31 @ 1300) 03/30/17 1357    Lives With  spouse 03/28/17 1439   PATIENT PLACEMENT INFORMATION      Living Arrangements  house (split entry home) 03/28/17 1450   Did the patient choose [facility]?  Yes 03/28/17 1330    Provides Primary Care For  no one 03/28/17 1330   Placement Choice Reason  location 03/28/17 1330    Quality Of Family Relationships  supportive;involved 03/28/17 1330   Did [facility] accept the patient?  Yes 03/28/17 1330    Able to Return to Prior Living Arrangements  other (see comments) (after rehab stay) 03/28/17 1330   Facility 1 Name  [facility] 03/28/17 1330    ASSESSMENT OF FAMILY/SOCIAL SUPPORT     FINAL RESOURCES      Marital Status   03/28/17 1330   Equipment Currently Used at Home  cane, straight (thinks he has a walker from previous TKA's) 03/28/17 0947    Who is your support system?  Wife 03/28/17 1330   Resources List  Skilled Nursing Facility 03/28/17 1330    Spouse's Name  Alba 03/28/17 1330   Skilled Nursing Facility  [facility] [phone], Fax: [phone] 03/28/17 1330    Description of Support System  Involved;Supportive 03/28/17 1330   PAS Number  [id number] 03/30/17 0940    Support Assessment  Adequate family and caregiver support 03/28/17 1330   Senior Linkage Line Referral Placed  03/30/17 03/30/17 0940    Quality of Family Relationships  supportive;involved 03/28/17 1330   ABUSE RISK SCREEN      ASSESSMENT OF FUNCTIONAL STATUS     QUESTION TO PATIENT:  Has a member of your family or a  partner(now or in the past) intimidated, hurt, manipulated, or controlled you in any way?  no 03/27/17 1035    Prior to admission patient needed assistance with:  -- (none identified by pt) 03/28/17 1330   QUESTION TO PATIENT: Do you feel safe going back to the place where you are living?  yes 03/27/17 1035    Assesssment of Functional Status  Not at baseline with ADL Functioning;Not at baseline with mobility;Needs placement in a SNF/TCF for rehabilitation 03/28/17 1330   OBSERVATION: Is there reason to believe there has been maltreatment of a vulnerable adult (ie. Physical/Sexual/Emotional abuse, self neglect, lack of adequate food, shelter, medical care, or financial exploitation)?  no 03/27/17 1035    EMPLOYMENT     (R) MENTAL HEALTH SUICIDE RISK      Do you work full or part-time?  no 03/28/17 1330   Are you depressed or being treated for depression?  No 03/27/17 1747                  [facility] SPINE: [phone]            Medication Administration Report for Tucker Slater as of 03/31/17 1639   Legend:    Given Hold Not Given Due Canceled Entry Other Actions    Time Time (Time) Time  Time-Action       Inactive    Active    Linked        Medications 03/25/17 03/26/17 03/27/17 03/28/17 03/29/17 03/30/17 03/31/17   Completed Medications      Dose: 1,000 mL Freq: ONCE Route: IV  Last Dose: 1,000 mL (03/29/17 1244)  Start: 03/29/17 1245   End: 03/29/17 1444        1244 (1,000 mL)-New Bag               Dose: 975 mg Freq: EVERY 8 HOURS Route: PO  Start: 03/27/17 2200   End: 03/30/17 1410   Admin Instructions: Do not use if patient has an active opioid/acetaminophen analgesic order for pain  Maximum acetaminophen dose from all sources = 75 mg/kg/day not to exceed 4 grams/day.       2136 (975 mg)-Given        0450 (975 mg)-Given       1242 (975 mg)-Given       2251 (975 mg)-Given        0505 (975 mg)-Given       1304 (975 mg)-Given       2039 (975 mg)-Given        0442 (975 mg)-Given       1410 (488  mg)-Given              Dose: 2.5 mg Freq: ONCE AT 6PM Route: PO  Start: 03/30/17 1800   End: 03/30/17 1824         1824 (2.5 mg)-Given              Dose: 2.5 mg Freq: ONCE AT 6PM Route: PO  Start: 03/29/17 1800   End: 03/29/17 1731        1731 (2.5 mg)-Given               Dose: 2.5 mg Freq: ONCE AT 6PM Route: PO  Start: 03/28/17 1800   End: 03/28/17 1806       1806 (2.5 mg)-Given             Discontinued Medications  Medications 03/25/17 03/26/17 03/27/17 03/28/17 03/29/17 03/30/17 03/31/17         Dose: 650 mg Freq: EVERY 4 HOURS PRN Route: PO  PRN Reason: other  PRN Comment: surgical pain  Start: 03/31/17 1118   End: 03/31/17 1457   Admin Instructions: May give first dose 4 hours after last scheduled dose of acetaminophen.  Maximum acetaminophen dose from all sources = 75 mg/kg/day not to exceed 4 grams/day.           1457-Med Discontinued         Dose: 650 mg Freq: EVERY 4 HOURS PRN Route: PO  PRN Reason: other  PRN Comment: surgical pain  Start: 03/30/17 0000   End: 03/28/17 2144   Admin Instructions: May give first dose 4 hours after last scheduled dose of acetaminophen.  Maximum acetaminophen dose from all sources = 75 mg/kg/day not to exceed 4 grams/day.        2144-Med Discontinued            Dose: 1-2 lozenge Freq: EVERY 1 HOUR PRN Route: BU  PRN Reason: sore throat  PRN Comment: sore throat without fever  Start: 03/27/17 1608   End: 03/31/17 1457          1457-Med Discontinued         Dose: 10 mg Freq: 3 TIMES DAILY Route: PO  Start: 03/29/17 2000   End: 03/30/17 1416   Admin Instructions: Take one hour before or two hours after meals.         2039 (10 mg)-Given        0835 (10 mg)-Given       1410 (10 mg)-Given       1416-Med Discontinued          Dose: 10 mg Freq: DAILY PRN Route: RE  PRN Reason: constipation  Start: 03/28/17 2145   End: 03/31/17 1457         0725 (10 mg)-Given        1457-Med Discontinued         Dose: 500-1,000 mg Freq: EVERY 2 HOURS PRN Route: PO  PRN Reason: heartburn  Start:  "03/28/17 2109   End: 03/31/17 1457   Admin Instructions: Do not give if calcium level greater than 10 mg/dL.        2203 (1,000 mg)-Given [C]          1457-Med Discontinued         Dose: 25 mg Freq: 2 TIMES DAILY WITH MEALS Route: PO  Start: 03/28/17 0815   End: 03/31/17 1457   Admin Instructions: HOLD IF SBP < 100 OR PULSE < 60        (0815)-Not Given       1806 (25 mg)-Given        0908 (25 mg)-Given       1731 (25 mg)-Given        0835 (25 mg)-Given       (1823)-Not Given        0805 (25 mg)-Given       1457-Med Discontinued         Dose: 0.2 mg Freq: EVERY 2 HOURS PRN Route: IV  PRN Reason: severe pain  PRN Comment: or if patient unable to take PO  Start: 03/27/17 1608   End: 03/31/17 1457   Admin Instructions: Hold while on PCA.        1806 (0.2 mg)-Given       2010 (0.2 mg)-Given          1457-Med Discontinued         Dose: 10 mg Freq: EVERY 6 HOURS PRN Route: PO  PRN Reason: itching  Start: 03/27/17 1608   End: 03/28/17 2144   Admin Instructions: Caution to be used when administering multiple CNS depressing meds within a short time frame.        0152 (10 mg)-Given       1855 (10 mg)-Given       2144-Med Discontinued            Dose: 25 mg Freq: EVERY 6 HOURS PRN Route: PO  PRN Reason: itching  Start: 03/28/17 2144   End: 03/31/17 1457   Admin Instructions: Caution to be used when administering multiple CNS depressing meds within a short time frame.         0208 (25 mg)-Given       2043 (25 mg)-Given         1457-Med Discontinued         Freq: EVERY 1 HOUR PRN Route: Top  PRN Reason: pain  PRN Comment: with VAD insertion or accessing implanted port.  Start: 03/27/17 1608   End: 03/31/17 1457   Admin Instructions: Do NOT give if patient has a history of allergy to any local anesthetic or any \"davin\" product.   Apply 30 minutes prior to VAD insertion or port access.  MAX Dose:  2.5 g (  of 5 g tube)           1457-Med Discontinued         Dose: 1 mL Freq: EVERY 1 HOUR PRN Route: OTHER  PRN Comment: mild pain " "with VAD insertion or accessing implanted port  Start: 03/27/17 1608   End: 03/31/17 1457   Admin Instructions: Do NOT give if patient has a history of allergy to any local anesthetic or any \"davin\" product. MAX dose 1 mL subcutaneous OR intradermal in divided doses.           1457-Med Discontinued         Dose: 100 mg Freq: DAILY Route: PO  Start: 03/28/17 0815   End: 03/31/17 1457   Admin Instructions: Hold for SBP < 140        (0815)-Not Given        0908 (100 mg)-Given        (0828)-Not Given        0805 (100 mg)-Given       1457-Med Discontinued         Dose: 0.1-0.4 mg Freq: EVERY 2 MIN PRN Route: IV  PRN Reason: opioid reversal  Start: 03/27/17 1608   End: 03/31/17 1457   Admin Instructions: For respiratory rate LESS than or EQUAL to 8.  Partial reversal dose:  0.1 mg titrated q 2 minutes for Analgesia Side Effects Monitoring Sedation Level of 3 (frequently drowsy, arousable, drifts to sleep during conversation).Full reversal dose:  0.4 mg bolus for Analgesia Side Effects Monitoring Sedation Level of 4 (somnolent, minimal or no response to stimulation).           1457-Med Discontinued         Dose: 4 mg Freq: EVERY 6 HOURS PRN Route: PO  PRN Reason: nausea  Start: 03/27/17 1608   End: 03/31/17 1457   Admin Instructions: This is Step 1 of nausea and vomiting management.  If nausea not resolved in 15 minutes, go to Step 2 prochlorperazine (COMPAZINE). Do not push through foil backing. Peel back foil and gently remove. Place on tongue immediately. Administration with liquid unnecessary           1457-Med Discontinued      Or    Dose: 4 mg Freq: EVERY 6 HOURS PRN Route: IV  PRN Reasons: nausea,vomiting  Start: 03/27/17 1608   End: 03/31/17 1457   Admin Instructions: This is Step 1 of nausea and vomiting management.  If nausea not resolved in 15 minutes, go to Step 2 prochlorperazine (COMPAZINE).  Irritant.           1457-Med Discontinued         Dose: 5 mg Freq: EVERY 3 HOURS PRN Route: PO  PRN Reason: moderate " to severe pain  Start: 03/27/17 1608   End: 03/31/17 1457   Admin Instructions: IF CrCl is UNKNOWN start at lowest end of dosing range.  Hold while on PCA or with regular IV opioid dosing.       1718 (5 mg)-Given       2226 (5 mg)-Given        0152 (5 mg)-Given       0450 (5 mg)-Given       0938 (5 mg)-Given       1350 (5 mg)-Given       1606 (5 mg)-Given       1855 (5 mg)-Given       2251 (5 mg)-Given        0207 (5 mg)-Given       0505 (5 mg)-Given       0908 (5 mg)-Given       1304 (5 mg)-Given       1654 (5 mg)-Given        0442 (5 mg)-Given       1043 (5 mg)-Given       1824 (5 mg)-Given        1457-Med Discontinued         Dose: 17 g Freq: 2 TIMES DAILY PRN Route: PO  PRN Comment: constipation  Start: 03/28/17 2145   End: 03/31/17 1457   Admin Instructions: 1 Packet = 17 grams. Mixed prescribed dose in 8 ounces of water. Follow with 8 oz. of water.        2251 (17 g)-Given          1457-Med Discontinued         Dose: 5 mg Freq: EVERY 6 HOURS PRN Route: IV  PRN Reasons: nausea,vomiting  Start: 03/27/17 1608   End: 03/31/17 1457   Admin Instructions: This is Step 2 of nausea and vomiting management.   If nausea not resolved in 15 minutes, give metoclopramide (REGLAN) if ordered (step 3 of nausea and vomiting management)           1457-Med Discontinued      Or    Dose: 5 mg Freq: EVERY 6 HOURS PRN Route: PO  PRN Reasons: nausea,vomiting  Start: 03/27/17 1608   End: 03/31/17 1457   Admin Instructions: This is Step 2 of nausea and vomiting management.   If nausea not resolved in 15 minutes, give metoclopramide (REGLAN) if ordered (step 3 of nausea and vomiting management)           1457-Med Discontinued         Dose: 3 mL Freq: EVERY 8 HOURS Route: IK  Start: 03/27/17 1615   End: 03/31/17 1457   Admin Instructions: And Q1H PRN, to lock peripheral IV dormant line.       (1618)-Not Given        (0153)-Not Given       0631 (3 mL)-Given       0816 (3 mL)-Given       1610 (3 mL)-Given        0029 (3 mL)-Given       0910  (3 mL)-Given       1653 (3 mL)-Given        0208 (3 mL)-Given       0835 (3 mL)-Given       1824 (3 mL)-Given        0004 (3 mL)-Given       0807 (3 mL)-Given       1457-Med Discontinued         Dose: 3 mL Freq: EVERY 1 HOUR PRN Route: IK  PRN Reason: line flush  PRN Comment: for peripheral IV flush post IV meds  Start: 03/27/17 1608   End: 03/31/17 1457        1459 (3 mL)-Given         1457-Med Discontinued         Dose: 0.4 mg Freq: DAILY Route: PO  Start: 03/30/17 1415   End: 03/31/17 1457   Admin Instructions: Administer 30 minutes after the same meal each day.  Capsules should be swallowed whole; do not crush chew or open.          1410 (0.4 mg)-Given        0805 (0.4 mg)-Given       1457-Med Discontinued         Dose: 2.5 mg Freq: ONCE AT 6PM Route: PO  Start: 03/31/17 1800   End: 03/31/17 1457          1457-Med Discontinued         Freq: CONTINUOUS PRN Route: XX  Start: 03/27/17 1608   End: 03/31/17 1457   Admin Instructions: Reorder warfarin (COUMADIN) daily  Patient is on Warfarin Therapy - check for daily order           1457-Med Discontinued         Dose: 2.5 mg Freq: AT BEDTIME PRN Route: PO  PRN Reason: sleep  Start: 03/28/17 2000   End: 03/31/17 1457   Admin Instructions: POD 1.  Do not give unless at least 6 hours of uninterrupted sleep is expected.           1457-Med Discontinued    Medications 03/25/17 03/26/17 03/27/17 03/28/17 03/29/17 03/30/17 03/31/17               INTERAGENCY TRANSFER FORM - NOTES (H&P, Discharge Summary, Consults, Procedures, Therapies)   3/27/2017                       [facility] SPINE: [phone]               History & Physicals      Interval H&P Note by Tita Hansen at 3/22/2017  4:48 PM     Author:  Tita Hansen Service:  (none) Author Type:  (none)    Filed:  3/22/2017  4:48 PM Date of Service:  3/22/2017  4:48 PM Note Created:  3/22/2017  4:48 PM    Related:  Original note: H&P (View-Only) by Kwadwo Winslow MD filed at 3/20/2017  3:21  PM Status:  Signed :  Tita Hansen         This note is for the purpose of making the H&P performed in clinic within the last 30 days available in the hospital surgical encounter.[VH1.1]      Revision History        User Key Date/Time User Provider Type Action    > VH1.1 3/22/2017  4:48 PM Tita Hansen (none) Sign            H&P (View-Only) by Kwadwo Winslow MD at 3/20/2017  1:22 PM     Author:  Kwadwo Winslow MD Service:  (none) Author Type:  Physician    Filed:  3/20/2017  3:21 PM Date of Service:  3/20/2017  1:22 PM Note Created:  3/22/2017  4:48 PM    Status:  Signed :  Kwadwo Winslow MD (Physician)           [facility] [address] [place] [address]  [phone]  Dept: [phone]    PRE-OP EVALUATION:  Today's date: 3/20/2017    Tucker Slater (: 3/13/1931) presents for pre-operative evaluation assessment as requested by [DW1.1] Kingsley[DW1.2].  He requires evaluation and anesthesia risk assessment prior to undergoing surgery/procedure for treatment of[DW1.1] right hip OA[NN1.1] .  Proposed procedure:[DW1.1] Right hip full replacement.[DW1.2]    Date of Surgery/ Procedure:[DW1.1] 3/27/17[DW1.2]  Time of Surgery/ Procedure:[DW1.1] 12:00 Noon[DW1.2]  Hospital/Surgical Facility:[DW1.1] [facility]  [phone] Fax[DW1.2]   Primary Physician: Kwadwo Winslow  Type of Anesthesia Anticipated:[DW1.1] to be determined[DW1.2]    Patient has a Health Care Directive or Living Will:[DW1.1]  NO[DW1.2]    1. NO - Do you have a history of heart attack, stroke, stent, bypass or surgery on an artery in the head, neck, heart or legs?  2. NO - Do you ever have any pain or discomfort in your chest?  3. NO - Do you have a history of  Heart Failure?  4. NO - Are you troubled by shortness of breath when: walking on the level, up a slight hill or at night?  5. NO - Do you currently have a cold, bronchitis or other respiratory infection?  6. NO - Do  you have a cough, shortness of breath or wheezing?  7. NO - Do you sometimes get pains in the calves of your legs when you walk?  9. NO - Do you or does anyone in your family have a serious bleeding problem such as prolonged bleeding following surgeries or cuts?  10. NO - Have you ever had problems with anemia or been told to take iron pills?  11. NO - Have you had any abnormal blood loss such as black, tarry or bloody stools, or abnormal vaginal bleeding?  12. NO - Have you ever had a blood transfusion?  15. NO - Do you have any prosthetic heart valves?  17. NO - Is there any chance that you may be pregnant?  8. YES- Do you or anyone in your family have previous history of blood clots?  13.YES - Have you or any of your relatives ever had problems with anesthesia?  14. YES - Do you have sleep apnea, excessive snoring or daytime drowsiness?  16. YES- Do you have prosthetic joints?      HPI:[NN1.1]                                                      Brief HPI related to upcoming procedure:[DW1.1] scheduled for right FRANK for hip OA.   Has pain with standing, walking, longer immobility.   No acute complaints, no medication change or new medical conditions.  Has history of atrial fibrillation. On anticoagulation with Coumadin and rate control medications. Asymptonatic - no chest pains , palpitations,  no side effects from medications.  Has h/o HTN. on medical treatment. BP has been controlled. No side effects from medications. No CP, HA, dizziness. good compliance with medications and low salt diet.  Has h/o CRF and anemia of chronic disease, mild. Monitoring BP, BG, medications, avoiding OTC NSAIDs. Needs periodic recheck of kidney function.        See problem list for active medical problems.  Problems all longstanding and stable, except as noted/documented.  See ROS for pertinent symptoms related to these conditions.                                                                                                   .[NN1.2]    MEDICAL HISTORY:                                                      Patient Active Problem List    Diagnosis Date Noted     Long-term (current) use of anticoagulants [Z79.01] 04/07/2016     Priority: Medium     Cardiac pacemaker in situ 07/22/2015     Placed 3/16/15 with cardioversion       CKD (chronic kidney disease) stage 3, GFR 30-59 ml/min 05/11/2015     Bradycardia      Atrial fibrillation (H) 02/03/2015     Anemia due to blood loss, acute 03/23/2012     GI bleed 03/18/2012     Anemia 03/18/2012     Near syncope 03/18/2012     Peripheral neuropathy (H) 01/04/2012     Advanced directives, counseling/discussion 01/03/2012     Parent voices understanding and acceptance of this advice and will call back if any further questions or concerns.       CARDIOVASCULAR SCREENING; LDL GOAL LESS THAN 130 10/31/2010     Essential hypertension, benign      B/P goal <140/90. B/P 12/5/12 - 164/75       Carcinoma in situ of bladder      bladder cancer ;; follow w Urology w periodic cysto        Hypertrophy of prostate with urinary obstruction      elevated PSA;; nl BX's in past   Problem list name updated by automated process. Provider to review       Generalized osteoarthrosis, unspecified site      hugh R knee       Hereditary and idiopathic peripheral neuropathy      toes both feet   Problem list name updated by automated process. Provider to review       Pain in joint, shoulder region      L shoulder rotater tear; no surgery         Past Medical History   Diagnosis Date     Arrhythmia      A Fib     Balance problem      related to neuropathy in feet     Bradycardia      Carcinoma in situ of bladder 2000     bladder cancer ;; follow w Urology w periodic cysto      Essential hypertension, benign      Generalized osteoarthrosis, unspecified site knees     s/p R total knee 8/09 ; will do L 6/10      Numbness and tingling      toes and fingers     Pacemaker      [employer]      Pain in joint, shoulder region      L  shoulder rotater tear; no surgery      Persistent atrial fibrillation (H) 1/30/15     Prostate CA (H) 2008-9     radiation     Prostate cancer (H) 11/08     completed RT 3/09     Shoulder impingement      R shoulder impingement etc see MRI 4/09      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      toes both feet      Past Surgical History   Procedure Laterality Date     C nonspecific procedure       bilat inguinal hernia repairs ( 3total procedures)      C nonspecific procedure       pilonidal cyst     C nonspecific procedure       T + A     C nonspecific procedure  8/09      R+L total knee     Colonoscopy       Implant pacemaker  3/26/15     Cardioversion  3/26/15     Current Outpatient Prescriptions   Medication Sig Dispense Refill     oxyCODONE (ROXICODONE) 5 MG IR tablet Take 1 tablet by mouth every 4 hours as needed       losartan (COZAAR) 100 MG tablet Take 1 tablet (100 mg) by mouth daily 90 tablet 0     triamterene-hydrochlorothiazide (MAXZIDE) 75-50 MG per tablet Take 0.5 tablets by mouth daily 45 tablet 3     warfarin (COUMADIN) 2.5 MG tablet Take 1 tab (2.5 mg) daily or as instructed based on INR results. 90 tablet 0     carvedilol (COREG) 25 MG tablet Take 1 tablet (25 mg) by mouth 2 times daily (with meals) 180 tablet 2     Multiple Vitamins-Minerals (OCUVITE PO) Take 1 tablet by mouth daily        Ascorbic Acid (VITAMIN C PO) Take 500 mg by mouth daily        calcium carbonate (OS-KARLA 500 MG [place]. CA) 500 MG tablet Take 500 mg by mouth daily        VITAMIN D, CHOLECALCIFEROL, PO Take 2,000 Units by mouth daily.       OTC products:[DW1.1] None, except as noted above[NN1.2]    Allergies   Allergen Reactions     Morphine Nausea and Vomiting     Amlodipine      Edema      Latex Allergy:[DW1.1] NO[NN1.2]    Social History   Substance Use Topics     Smoking status: Former Smoker     Quit date: 9/12/1977     Smokeless tobacco: Never Used     Alcohol use 0.0 oz/week     0 Standard  drinks or equivalent per week      Comment: almost daily     History   Drug Use No       REVIEW OF SYSTEMS:[DW1.1]                                                    C: NEGATIVE for fever, chills, change in weight  I: NEGATIVE for worrisome rashes, moles or lesions  E: NEGATIVE for vision changes or irritation  E/M: NEGATIVE for ear, mouth and throat problems  R: NEGATIVE for significant cough or SOB  B: NEGATIVE for masses, tenderness or discharge  CV: NEGATIVE for chest pain, palpitations or peripheral edema  GI: NEGATIVE for nausea, abdominal pain, heartburn, or change in bowel habits  : NEGATIVE for frequency, dysuria, or hematuria  M: NEGATIVE for significant arthralgias or myalgia  N: NEGATIVE for weakness, dizziness or paresthesias  E: NEGATIVE for temperature intolerance, skin/hair changes  H: NEGATIVE for bleeding problems  P: NEGATIVE for changes in mood or affect    EXAM:[NN1.2]                                                    There were no vitals taken for this visit.[DW1.1]    GENERAL APPEARANCE: healthy, alert and no distress     EYES: EOMI,  PERRL     HENT: ear canals and TM's normal and nose and mouth without ulcers or lesions     NECK: no adenopathy, no asymmetry, masses, or scars and thyroid normal to palpation     RESP: lungs clear to auscultation - no rales, rhonchi or wheezes     CV: regular rates and rhythm, normal S1 S2, no S3 or S4 and no murmur, click or rub     ABDOMEN:  soft, nontender, no HSM or masses and bowel sounds normal     MS: extremities normal- no gross deformities noted, no evidence of inflammation in joints, FROM in all extremities.     SKIN: no suspicious lesions or rashes     NEURO: Normal strength and tone, sensory exam grossly normal, mentation intact and speech normal     PSYCH: mentation appears normal. and affect normal/bright     LYMPHATICS: No axillary, cervical, or supraclavicular nodes    DIAGNOSTI[NN1.2]CS:[DW1.1]                                                       EKG: atrial sensed , ventricular paced PM rhythm   Labs Drawn and in Process:[NN1.2]   Unresulted Labs Ordered in the Past 30 Days of this Admission     Date and Time Order Name Status Description    3/20/2017 1409 COMPREHENSIVE METABOLIC PANEL In process[NN1.3]           Recent Labs   Lab Test 03/14/17 03/06/17 02/27/17   1203   02/09/17   1058   11/07/16   1557   05/17/16   1738   11/23/15   1423   HGB   --    --   14.6   --    --    --    --    --   15.5   --   15.5   PLT   --    --    --    --    --    --    --    --   177   --   194   INR  3.2*  4.1*   --    < >   --    < >   --    < >   --    < >   --    NA   --    --    --    --   144   --   141   --   142   --   141   POTASSIUM   --    --    --    --   4.2   --   4.3   --   4.2   --   4.4   CR   --    --    --    --   1.41*   --   1.57*   --   1.30*   --   1.42*    < > = values in this interval not displayed.        IMPRESSION:[DW1.1]                                                    Reason for surgery/procedure: right hip OA, FRANK   Diagnosis/reason for consult: preoperative evaluation/ clearance[NN1.2]      The proposed surgical procedure is considered[DW1.1] INTERMEDIATE[NN1.2] risk.    REVISED CARDIAC RISK INDEX  The patient has the following serious cardiovascular risks for perioperative complications such as (MI, PE, VFib and 3  AV Block):[DW1.1]  No serious cardiac risks[NN1.2]  INTERPRETATION:[DW1.1] 0 risks: Class I (very low risk - 0.4% complication rate)[NN1.2]    The patient has the following additional risks for perioperative complications:[DW1.1]  A fib on coumadin treatment[NN1.2]     No diagnosis found.    RECOMMENDATIONS:[DW1.1]                                                          --Patient is to take all scheduled medications on the day of surgery EXCEPT for modifications listed below.  Hold Coumadin for 5 days prior to surgery   Hold diuretic on the day of surgery     APPROVAL GIVEN to proceed with proposed procedure, without  further diagnostic evaluation[NN1.2]       Signed Electronically by: Kwadwo Winslow MD    Copy of this evaluation report is provided to requesting physician.    [facility] Preop Guidelines[DW1.1]     Revision History        User Key Date/Time User Provider Type Action    > NN1.3 3/22/2017  4:48 PM Kwadwo Winslow MD Physician Sign     NN1.2 3/20/2017  3:16 PM Kwadwo Winslow MD Physician      NN1.1 3/20/2017  2:57 PM Kwadwo Winslow MD Physician      DW1.2 3/20/2017  1:28 PM Edna Olmos MA Medical Assistant      DW1.1 3/20/2017  1:22 PM Edna Olmos MA Medical Assistant             H&P signed by Marcelino Oquendo at 3/3/2017 12:37 PM      Author:  Scan, Provider Service:  (none) Author Type:  Physician    Filed:  3/3/2017 12:37 PM Date of Service:  3/3/2017 11:01 AM Note Created:  3/3/2017 12:37 PM    Status:  Signed :  Azra, Provider (Physician)     Scan on 3/3/2017 12:37 PM by Scan, Provider : H AND P - PT READINESS INSTIT - PRE-SURG. ASSESSMENT AND REC REPORT          Revision History        User Key Date/Time User Provider Type Action    > [N/A] 3/3/2017 12:37 PM Scan, Provider Physician Sign                  Discharge Summaries     No notes of this type exist for this encounter.         Consult Notes      Consults by Ashleigh Eagle PA-C at 3/28/2017  8:13 AM     Author:  Ashleigh Eagle PA-C Service:  Hospitalist Author Type:  Physician Assistant - C    Filed:  3/28/2017 12:54 PM Date of Service:  3/28/2017  8:13 AM Note Created:  3/28/2017  8:13 AM    Status:  Attested :  Ashleigh Eagle PA-C (Physician Assistant - C)    Cosigner:  Win Stapleton MD at 3/28/2017  5:12 PM         Consult Orders:    1. Hospitalist IP Consult: Patient to be seen: Routine - within 24 hours; Hospitalist Consult; Consultant may enter orders: Yes [[id number]] ordered by Jigar Wynn MD at 03/27/17 1211           Attestation signed by Win Stapleton MD at 3/28/2017  5:12  PM        Physician Attestation   I, Win Stapleton, saw and evaluated Tucker Slater as part of a shared visit.  I have reviewed and discussed with the advanced practice provider their history, physical and plan.    I personally reviewed the medications.    My key history or physical exam findings: irreg HR on exam    Key management decisions made by me: changed parameters on when to hold Coreg    Win Stapleton  Date of Service (when I saw the patient): 03/28/17                               Hospitalist Consultation      Tucker Slater MRN# [id number]   YOB: 1931 Age: 86 year old   Date of Admission: 3/27/2017     Requesting Physician:  Jigar Wynn  Reason for consult: Medical management           Assessment and Plan:   This patient is a 86 year old male with a PMH significant for atrial fibrillation (on coumadin) who is POD 1 s/p right total hip arthroplasty.    1. S/p R FRANK:[EM1.1] D[EM1.2]oing well, cont PT/OT and pain control as per primary. DVT Prophylaxis: on warfarin and PCDs as per primary. D/C planning: [facility]EM1.[facility][EM1.2].  2. Persistent atrial fibrillation: No chest pain, palpitations, sob, URBINA. Interrogation of pacemaker recently shows persistent atrial arrhythmia since summer of 2016.  Appropriate to continue warfarin[EM1.1] 2.5 mg daily[EM1.2].  Continue carvedilol 25 mg BID[EM1.1] w/ parameters.[EM1.2]  3. 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  Per cardiology note last month, recent pacemaker interrogation demonstrates that device is functioning well and he should continue w/ routine Device Clinic follow-up.   3. HTN: Blood pressure controlled, SBP rarely over 140 when measured at home. No chest pain, headache, or dizziness. Good compliance with medications and low salt diet. Continue losartan 100 mg daily w/ parameters and carvedilol 25 mg BID w/  parameters.  Hold maxzide 75-50 mg[EM1.1] today, resume tomorrow.  4.[EM1.2] CKD stage III related mild anemia: Baseline Cr 1.3-1.5. Avoid nephrotoxins like NSAIDs. Continue to monitor BP and BG. Hgb today 10.8, no signs or symptoms of bleeding.  Check BMP and CBC tomorrow.  5. Elevated glucose: Glucose 199 this AM.  Patient had IVF w/ glucose running all night, will discontinue this.  No history of diabetes.   6. History of prostate cancer: Completed radiation therapy in 2009[EM1.1], no recurrence w/ stable PSAs in 2-range[EM1.2].  7. History of bladder cancer: Follow[EM1.1]ed[EM1.2] with urology for periodic cystoscopies[EM1.1], currently in remission[EM1.2].          History of Present Illness:   This patient is a 86 year old male who is POD 1 s/p right RFANK.  Intra-op report reviewed and showed no intra-op complications.   I/o's reviewed, Currently net +2034 with good UOP since OR. Hgb stable this am at 10.8.  Overnight did pretty well, no complaints, VSS.   Currently, today mariana diet, pain controlled, no CP, SOB, no n/v.   O/w other medical problems have been stable, with no recent c/o illness.               Past Medical History:     Past Medical History:   Diagnosis Date     Arrhythmia     A Fib     Balance problem     related to neuropathy in feet     Bradycardia      Carcinoma in situ of bladder 2000    bladder cancer ;; follow w Urology w periodic cysto      Essential hypertension, benign      Generalized osteoarthrosis, unspecified site knees    s/p R total knee 8/09 ; will do L 6/10      Numbness and tingling     toes and fingers     Pacemaker     [employer]      Pain in joint, shoulder region     L shoulder rotater tear; no surgery      Persistent atrial fibrillation (H) 1/30/15     Prostate CA (H) 2008-9    radiation     Prostate cancer (H) 11/08    completed RT 3/09     Shoulder impingement     R shoulder impingement etc see MRI 4/09      Unspecified hereditary and idiopathic peripheral neuropathy  neuropathy     toes both feet                Past Surgical History:     Past Surgical History:   Procedure Laterality Date     C NONSPECIFIC PROCEDURE      bilat inguinal hernia repairs ( 3total procedures)      C NONSPECIFIC PROCEDURE      pilonidal cyst     C NONSPECIFIC PROCEDURE      T + A     C NONSPECIFIC PROCEDURE  8/09     R+L total knee     CARDIOVERSION  3/26/15     COLONOSCOPY       IMPLANT PACEMAKER  3/26/15                 Social History:     Social History   Substance Use Topics     Smoking status: Former Smoker     Quit date: 9/12/1977     Smokeless tobacco: Never Used     Alcohol use 0.0 oz/week     0 Standard drinks or equivalent per week      Comment: almost daily                 Family History:     family history includes Coronary Artery Disease in his father and mother; Family History Negative in his brother; HEART DISEASE in his father and mother.  Family Hx fully reviewed and is non contributory to this admission.             Allergies:     Allergies   Allergen Reactions     Morphine Nausea and Vomiting     Amlodipine      Edema             Medications:     Prior to Admission medications    Medication Sig Last Dose Taking? Auth Provider   oxyCODONE (ROXICODONE) 5 MG IR tablet Take 1 tablet (5 mg) by mouth every 4 hours as needed 3/27/2017 at 0730 Yes Kwadwo Winslow MD   losartan (COZAAR) 100 MG tablet Take 1 tablet (100 mg) by mouth daily  at 0830 Yes Kwadwo Winslow MD   triamterene-hydrochlorothiazide (MAXZIDE) 75-50 MG per tablet Take 0.5 tablets by mouth daily 3/26/2017 at Unknown time Yes Kwadwo Winslow MD   warfarin (COUMADIN) 2.5 MG tablet Take 1 tab (2.5 mg) daily or as instructed based on INR results.  Yes Kwadwo Winslow MD   carvedilol (COREG) 25 MG tablet Take 1 tablet (25 mg) by mouth 2 times daily (with meals)  at 0830 Yes Kwadwo Winslow MD   Multiple Vitamins-Minerals (OCUVITE PO) Take 1 tablet by mouth daily  3/26/2017 at Unknown time Yes Reported, Patient  "  Ascorbic Acid (VITAMIN C PO) Take 500 mg by mouth daily  3/26/2017 at Unknown time Yes Reported, Patient   calcium carbonate (OS-KARLA 500 MG [place]. [place]) 500 MG tablet Take 500 mg by mouth daily  3/26/2017 at Unknown time Yes Reported, Patient   VITAMIN D, CHOLECALCIFEROL, PO Take 2,000 Units by mouth daily. 3/26/2017 at Unknown time Yes Unknown, Entered By History               Review of Systems:   A comprehensive greater than 10 system review of systems was carried out.  Pertinent positives and negatives are noted above.  Otherwise negative for contributory info.            Physical Exam:   Vitals were reviewed  Blood pressure 120/64, pulse 72, temperature 97.8  F (36.6  C), temperature source Oral, resp. rate 16, height 1.778 m (5' 10\"), weight 82.5 kg (181 lb 14.1 oz), SpO2 98 %.  Exam:    GENERAL:  Comfortable.  PSYCH: pleasant, oriented, No acute distress.  HEENT:  PERRLA. Normal conjunctiva, normal hearing, nasal mucosa and Oropharynx are normal.  NECK:  Supple, no neck vein distention, adenopathy or bruits, normal thyroid.  HEART:  Normal S1, S2 with no murmur, no pericardial rub, S3 or S4.  LUNGS:  Clear to auscultation, normal Respiratory effort.  ABDOMEN:  Soft, no hepatosplenomegaly, normal bowel sounds.  EXTREMITIES:  No pedal edema, +2 pulses bilateral and equal. Right hip dressing c/d/i  SKIN:  Dry to touch, No rash, wound or ulcerations.  NEUROLOGIC:  Nonfocal with normal cranial nerve and motor power and sensation.            Data:   Past 24 hours labs, studies, and imaging were reviewed.  Results for orders placed or performed during the hospital encounter of 03/27/17   XR Pelvis w Hip Port Right 1 View    Narrative    XR PELVIS AD HIP PORTABLE RIGHT 1 VIEW 3/27/2017 1:58 PM    COMPARISON: 3/18/2012    HISTORY: Arthroplasty      Impression    IMPRESSION: Postoperative changes of right total hip arthroplasty.  Hardware appears intact. No fractures are seen. Surgical drain in  place. 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    VASILIY CRUZ   Hemoglobin   Result Value Ref Range    Hemoglobin 13.4 13.3 - 17.7 g/dL   INR   Result Value Ref Range    INR 1.47 (H) 0.86 - 1.14   Hemoglobin   Result Value Ref Range    Hemoglobin 10.8 (L) 13.3 - 17.7 g/dL   INR   Result Value Ref Range    INR 1.62 (H) 0.86 - 1.14   Glucose   Result Value Ref Range    Glucose 199 (H) 70 - 99 mg/dL   ABO/Rh type and screen   Result Value Ref Range    ABO A     RH(D)  Pos     Antibody Screen Neg     Test Valid Only At [facility]     Specimen Expires 03/30/2017      Ashleigh Eagle PA-C    Pt discussed with [EM1.1] Pieter[EM1.2] who agrees with the care as discussed above.[EM1.1]        Revision History        User Key Date/Time User Provider Type Action    > [N/A] 3/28/2017 12:54 PM Ashleigh Eagle PA-C Physician Assistant - C Sign     EM1.2 3/28/2017 12:28 PM Ashleigh Eagle PA-C Physician Assistant - C Sign     EM1.1 3/28/2017  8:13 AM Ashleigh Eagle PA-C Physician Assistant - C             Consults by Darline Valdovinos LSW at 3/28/2017  1:38 PM     Author:  Darline Valdovinos LSW Service:  (none) Author Type:      Filed:  3/28/2017  1:38 PM Date of Service:  3/28/2017  1:38 PM Note Created:  3/28/2017  1:32 PM    Status:  Signed :  Darline Valdovinos LSW ()         SWS     D: Per MD request to assist with discharge planning..per discussion with ortho MD today anticipate pt's transfer to rehab facility on discharge Thursday. Chart reviewed noting pt's admit yesterday after surgery for R FRANK, noted PT assessment and progress. See also pre-surgery SW note for additional information.      I: Met with pt and wife who affirms planning for pt's transfer to [facility], pt has his name on list there. Referral was made, assessment completed, [facility] able to accept pt on Thursday, semi-private room. Follow-up with pt at which time he was notified of assessment results. Pt will want to have  continued planning be done with wife involved, SW will meet with them tomorrow for continued planning, discuss transportation and address questions they may have.      A/P: As noted above for transfer to rehab facility on discharge.[GJ1.1]      Revision History        User Key Date/Time User Provider Type Action    > GJ1.1 3/28/2017  1:38 PM Darline Valdovinos LSW  Sign                     Progress Notes - Physician (Notes for yesterday and today)      Progress Notes by Win Stapleton MD at 3/30/2017  2:09 PM     Author:  Win Stapleton MD Service:  Hospitalist Author Type:  Physician    Filed:  3/30/2017  2:15 PM Date of Service:  3/30/2017  2:09 PM Note Created:  3/30/2017  2:09 PM    Status:  Signed :  Win Stapleton MD (Physician)           [facility]  Hospitalist Progress Note  Win Stapleton MD 03/30/2017    Reason for Stay (Diagnosis):[SL1.1] s/p FRANK[SL1.2]         Assessment and Plan:      Summary of Stay: Tucker Slater is a 86 year old male with a PMH significant for atrial fibrillation (on coumadin) who is POD[SL1.1] 3[SL1.2] s/p right total hip arthroplasty.      1. S/p R FRANK: Doing well, cont PT/OT and pain control as per primary. DVT Prophylaxis: on warfarin and PCDs as per primary. D/C planning: To [facility] rehab facility[SL1.1]; anticipate tomorrow[SL1.2].  2. Persistent atrial fibrillation:  Interrogation of pacemaker recently shows persistent atrial arrhythmia since summer of 2016. On warfarin. Continue carvedilol 25 mg BID w/ parameters.  3. 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   3. HTN:  Continue losartan 100 mg daily w/ parameters and carvedilol 25 mg BID w/ parameters.   4. CKD stage III related mild anemia: Baseline Cr 1.3-1.5. Avoid nephrotoxins like NSAIDs. Continue to monitor BP and BG. Hgb stable near 10, no signs or symptoms of bleeding.  "  6. History of prostate cancer: Completed radiation therapy in 2009, no recurrence w/ stable PSAs in 2-range.  7. History of bladder cancer: Followed with urology for periodic cystoscopies, currently in remission.[SL1.1]   8.  Urine retention - suspect narcotic related.  Was started on bethanechol but given issues with low BP yesterday and again today will d/c this medication and instead use tamsulosin.  Anticipate that as he uses less oxycodone this will improve/resolve[SL1.2]     DVT ppx: Coumadin  Dispo: Home expected  D/c date: Per primary service; expect POD 3        Interval History (Subjective):[SL1.1]      Borderline low BP this AM[SL1.2]                  Physical Exam:      Last Vital Signs:  BP 98/45 (BP Location: Left arm)  Pulse 72  Temp 98  F (36.7  C) (Oral)  Resp 16  Ht 1.778 m (5' 10\")  Wt 82.5 kg (181 lb 14.1 oz)  SpO2 94%  BMI 26.1 kg/m2[SL1.1]      Intake/Output Summary (Last 24 hours) at 03/30/17 1414  Last data filed at 03/30/17 1300   Gross per 24 hour   Intake             1250 ml   Output             1915 ml   Net             -665 ml[SL1.3]       Constitutional: Awake, alert, cooperative, no apparent distress   Respiratory: Clear to auscultation bilaterally, no crackles or wheezing   Cardiovascular: Regular rate and rhythm, normal S1 and S2, and no murmur noted   Abdomen: Normal bowel sounds, soft, non-distended, non-tender   Skin: No rashes, no cyanosis, dry to touch   Neuro: Alert and oriented x3, no weakness, numbness, memory loss[SL1.1].  Normal strength of , biceps, dorsi and plantarflex, and normal finger to nose testing.  Sensation intact in BUE and BLE[SL1.2]   Extremities: No edema, normal range of motion   Other(s):        All other systems: Negative          Medications:      All current medications were reviewed with changes reflected in problem list.         Data:      All new lab and imaging data was reviewed.   Labs:[SL1.1]    Recent Labs  Lab 03/29/17  1245 " 03/29/17  0710 03/28/17  0618   HGB 9.9* 10.6* 10.8*[SL1.4]      Imaging:[SL1.1]   No results found for this or any previous visit (from the past 24 hour(s)).[SL1.4]      Revision History        User Key Date/Time User Provider Type Action    > SL1.4 3/30/2017  2:15 PM Win Stapleton MD Physician Sign     SL1.3 3/30/2017  2:14 PM Win Stapleton MD Physician      SL1.2 3/30/2017  2:11 PM Win Stapleton MD Physician      SL1.1 3/30/2017  2:09 PM Win Stapleton MD Physician                   Procedure Notes     No notes of this type exist for this encounter.         Progress Notes - Therapies (Notes from 03/28/17 through 03/31/17)      Progress Notes by Nitza Ugalde OT at 3/28/2017  3:48 PM     Author:  Nitza Ugalde OT Service:  Acute IP Rehab Author Type:  Occupational Therapist    Filed:  3/28/2017  3:48 PM Date of Service:  3/28/2017  3:48 PM Note Created:  3/28/2017  3:48 PM    Status:  Signed :  Nitza Ugalde OT (Occupational Therapist)          03/28/17 0959   Quick Adds   Type of Visit Initial Occupational Therapy Evaluation   Living Environment   Lives With spouse   Living Arrangements house  (split entry home)   Home Accessibility tub/shower is not walk in   Number of Stairs to Enter Home 1   Number of Stairs Within Home 13   Transportation Available family or friend will provide   Living Environment Comment pt lives with supportive wife who manages IADLs but is unable to physically assist pt   Self-Care   Usual Activity Tolerance moderate   Current Activity Tolerance fair   Regular Exercise no   Activity/Exercise/Self-Care Comment pt has a cane, was not consistently using prior to surgery   Functional Level Prior   Ambulation 0-->independent   Transferring 0-->independent   Toileting 0-->independent  (standard height toilet, wall L side, tub R side)   Bathing 0-->independent  (tub shower, curtains; wife assisted w/balance w/transfer )   Dressing  2-->assistive person  (wife was assisting)   Fall history within last six months no   Prior Functional Level Comment limited by pain prior to surgery, was receiving assist from wife at times   General Information   Onset of Illness/Injury or Date of Surgery - Date 03/27/17   Referring Physician Kingsley MOYER   Patient/Family Goals Statement pt hopeful to return home   Additional Occupational Profile Info/Pertinent History of Current Problem currently POD#1 R FRANK with hip precautions; PMHx A-Fib, HTN, prostate and bladder CA   Precautions/Limitations fall precautions;right hip precautions   Weight-Bearing Status - RLE weight-bearing as tolerated   Cognitive Status Examination   Orientation orientation to person, place and time   Level of Consciousness alert   Cognitive Comment appropriate at eval, asking questions to improve understanding   Visual Perception   Visual Perception Wears glasses  (reading glasses)   Sensory Examination   Sensory Comments neuropathy in hands and feet   Pain Assessment   Patient Currently in Pain Yes, see Vital Sign flowsheet   Range of Motion (ROM)   ROM Comment B UE AROM intact   Strength   Strength Comments B UE grossly, 4+/5   Mobility   Bed Mobility Comments Min A   Transfer Skills   Transfer Comments FWW, CGA   Transfer Skill: Sit to Stand   Level of [place]: Sit/Stand contact guard   Physical Assist/Nonphysical Assist: Sit/Stand verbal cues   Transfer Skill: Sit to Stand weight-bearing as tolerated   Assistive Device for Transfer: Sit/Stand rolling walker   Balance   Balance Comments impaired post-op   Upper Body Dressing   Level of [place]: Dress Upper Body independent   Lower Body Dressing   Level of [place]: Dress Lower Body maximum assist (25% patients effort)   Instrumental Activities of Daily Living (IADL)   Previous Responsibilities medication management   IADL Comments wife manages IADLs and they have a housecleaning service   Activities of Daily Living Analysis  "  Impairments Contributing to Impaired Activities of Daily Living balance impaired;pain;post surgical precautions   General Therapy Interventions   Planned Therapy Interventions ADL retraining;transfer training   Clinical Impression   Criteria for Skilled Therapeutic Interventions Met yes, treatment indicated   OT Diagnosis impaired ability to manage ADL/IADLs   Influenced by the following impairments post-op pain, fatigue, and impaired strength/balance   Assessment of Occupational Performance 1-3 Performance Deficits   Identified Performance Deficits impaired ability to manage dressing tasks, bathing tasks, toilet transfer/cares   Clinical Decision Making (Complexity) Low complexity   Therapy Frequency daily   Predicted Duration of Therapy Intervention (days/wks) 3 days   Anticipated Discharge Disposition Home with Assist;Transitional Care Facility   Risks and Benefits of Treatment have been explained. Yes   Patient, Family & other staff in agreement with plan of care Yes   Clinical Impression Comments pt demonstrates ability to benefit from skilled OT services to improve independence   [facility] TM \"6 Clicks\"   2016, Trustees of [facility], under license to [employer].  All rights reserved.   6 Clicks Short Forms Daily Activity Inpatient Short Form   [facility]  \"6 Clicks\" Daily Activity Inpatient Short Form   1. Putting on and taking off regular lower body clothing? 2 - A Lot   2. Bathing (including washing, rinsing, drying)? 2 - A Lot   3. Toileting, which includes using toilet, bedpan or urinal? 3 - A Little   4. Putting on and taking off regular upper body clothing? 4 - None   5. Taking care of personal grooming such as brushing teeth? 3 - A Little   6. Eating meals? 4 - None   Daily Activity Raw Score (Score out of 24.Lower scores equate to lower levels of function) 18   Total Evaluation Time   Total Evaluation Time (Minutes) 11[SC1.1]        Revision History        User " Key Date/Time User Provider Type Action    > SC1.1 3/28/2017  3:48 PM Nitza Ugalde OT Occupational Therapist Sign            Progress Notes by Sushma Zavala PT at 3/28/2017 10:26 AM     Author:  Sushma Zavala PT Service:  (none) Author Type:  Physical Therapist    Filed:  3/28/2017 10:26 AM Date of Service:  3/28/2017 10:26 AM Note Created:  3/28/2017 10:26 AM    Status:  Signed :  Sushma Zavala, PT (Physical Therapist)            03/28/17 0940   Quick Adds   Type of Visit Initial PT Evaluation   Living Environment   Lives With spouse   Living Arrangements house  (split entry)   Home Accessibility tub/shower is not walk in   Number of Stairs to Enter Home 1  (front door, 5 via garage)   Number of Stairs Within Home (7 up and 6 down inside)   Stair Railings at Home inside, present at both sides   Living Environment Comment wife will be home all the time, cannot physically help much but will try to do whatever she can to assist   Self-Care   Usual Activity Tolerance moderate  (limited walking, uses electric cart at grocery store)   Equipment Currently Used at Home cane, straight  (thinks he has a walker from previous TKA's)   Activity/Exercise/Self-Care Comment uses standard height toilets, has been limping around without the cane   Functional Level Prior   Ambulation 0-->independent   Transferring 0-->independent   Toileting 0-->independent   Dressing (needs help tying shoes, can get dressed with extra time)   Communication 0-->understands/communicates without difficulty   Swallowing 0-->swallows foods/liquids without difficulty   General Information   Onset of Illness/Injury or Date of Surgery - Date 03/27/17   Referring Physician Dr. Jigar Wynn   Patient/Family Goals Statement to return home if able, otherwise [facility] (went to TCU after previous TKA's)   Pertinent History of Current Problem (include personal factors and/or comorbidities that impact the POC) R hip DJD, s/p R FRANK posterior  approach, Hx of B TKA's in '09 and '10   Precautions/Limitations right hip precautions   Cognitive Status Examination   Orientation orientation to person, place and time   Level of Consciousness alert   Follows Commands and Answers Questions 100% of the time;able to follow single-step instructions   Personal Safety and Judgment intact   Memory intact   Cognitive Comment needs repeating of questions at times   Pain Assessment   Patient Currently in Pain (0/10 at rest, 2-3/10 with movement)   Range of Motion (ROM)   ROM Comment DF to neutral, R hip flexion to 90 due to precautions   Strength   Strength Comments good quad set, indep SAQ   Bed Mobility   Bed Mobility Comments mod I with rail supine to sit   Transfer Skills   Transfer Comments SBA sit<>stand with FWW, WBAT   Gait   Gait Comments CGA to SBA with FWW, 3 point gait progressed to swing through gait, used FWW WBAT, decreased raffi and increased UE WB   Balance   Balance Comments indep sitting balance EOB   Sensory Examination   Sensory Perception Comments baseline neuropathy, numbness in all toes, R hand thumb and first 2 fingers numb   General Therapy Interventions   Planned Therapy Interventions bed mobility training;gait training;ROM;strengthening;transfer training;progressive activity/exercise   Clinical Impression   Criteria for Skilled Therapeutic Intervention yes, treatment indicated   PT Diagnosis difficulty walking   Influenced by the following impairments pain   Functional limitations due to impairments impaired gait   Clinical Presentation Stable/Uncomplicated   Clinical Presentation Rationale LE involvement, musculoskeletal   Clinical Decision Making (Complexity) Low complexity   Therapy Frequency` 2 times/day   Predicted Duration of Therapy Intervention (days/wks) 3 days   Anticipated Discharge Disposition Home with Assist;Transitional Care Facility   Risk & Benefits of therapy have been explained Yes   Patient, Family & other staff in  "agreement with plan of care Yes   [facility] TM \"6 Clicks\"   2016, Trustees of [facility], under license to [employer].  All rights reserved.   6 Clicks Short Forms Basic Mobility Inpatient Short Form   [facility]  \"6 Clicks\" V.2 Basic Mobility Inpatient Short Form   1. Turning from your back to your side while in a flat bed without using bedrails? 3 - A Little   2. Moving from lying on your back to sitting on the side of a flat bed without using bedrails? 3 - A Little   3. Moving to and from a bed to a chair (including a wheelchair)? 3 - A Little   4. Standing up from a chair using your arms (e.g., wheelchair, or bedside chair)? 3 - A Little   5. To walk in hospital room? 3 - A Little   6. Climbing 3-5 steps with a railing? 3 - A Little   Basic Mobility Raw Score (Score out of 24.Lower scores equate to lower levels of function) 18   Total Evaluation Time   Total Evaluation Time (Minutes) 10[SC1.1]        Revision History        User Key Date/Time User Provider Type Action    > SC1.1 3/28/2017 10:26 AM Sushma Zavala, PT Physical Therapist Sign                                                      INTERAGENCY TRANSFER FORM - LAB / IMAGING / EKG / EMG RESULTS   3/27/2017                       [facility]: [phone]            Unresulted Labs     None         Lab Results - 3 Days      INR [[id number]] (Abnormal)  Resulted: 03/31/17 0717, Result status: Final result    Ordering provider: Jigar Wynn MD  03/31/17 0000 Resulting lab: [facility]    Specimen Information    Type Source Collected On   Blood  03/31/17 0548          Components       Value Reference Range Flag Lab   INR 2.21 0.86 - 1.14 H [facility]            INR [[id number]] (Abnormal)  Resulted: 03/30/17 0713, Result status: Final result    Ordering provider: Jigar Wynn MD  03/30/17 0000 Resulting lab: [facility]    Specimen Information    Type Source Collected On   Blood  " 03/30/17 0620          Components       Value Reference Range Flag Lab   INR 2.24 0.86 - 1.14 H [facility]            Hemoglobin [[id number]] (Abnormal)  Resulted: 03/29/17 1257, Result status: Final result    Ordering provider: Win Stapleton MD  03/29/17 1228 Resulting lab: [facility]    Specimen Information    Type Source Collected On   Blood  03/29/17 1245          Components       Value Reference Range Flag Lab   Hemoglobin 9.9 13.3 - 17.7 g/dL L [facility]            INR [[id number]] (Abnormal)  Resulted: 03/29/17 0755, Result status: Final result    Ordering provider: Jigar Wynn MD  03/29/17 0000 Resulting lab: [facility]    Specimen Information    Type Source Collected On   Blood  03/29/17 0710          Components       Value Reference Range Flag Lab   INR 2.04 0.86 - 1.14 H [facility]            Glucose [[id number]] (Abnormal)  Resulted: 03/29/17 0755, Result status: Final result    Ordering provider: Jigar Wynn MD  03/29/17 0000 Resulting lab: [facility]    Specimen Information    Type Source Collected On   Blood  03/29/17 0710          Components       Value Reference Range Flag Lab   Glucose 103 70 - 99 mg/dL H [facility]            Hemoglobin [[id number]] (Abnormal)  Resulted: 03/29/17 0739, Result status: Final result    Ordering provider: Jigar Wynn MD  03/29/17 0000 Resulting lab: [facility]    Specimen Information    Type Source Collected On   Blood  03/29/17 0710          Components       Value Reference Range Flag Lab   Hemoglobin 10.6 13.3 - 17.7 g/dL L [facility]            Glucose [[id number]] (Abnormal)  Resulted: 03/28/17 1430, Result status: Final result    Ordering provider: Ashleigh Eagle PA-C  03/28/17 0822 Resulting lab: [facility]    Specimen Information    Type Source Collected On   Blood  03/28/17 1346          Components       Value Reference Range Flag Lab   Glucose 131 70 - 99 mg/dL H FrRdHs             Hemoglobin A1c [[id number]]  Resulted: 03/28/17 0942, Result status: Final result    Ordering provider: Jigar Wynn MD  03/28/17 0618 Resulting lab: [facility]    Specimen Information    Type Source Collected On     03/28/17 0618          Components       Value Reference Range Flag Lab   Hemoglobin A1C 5.7 4.3 - 6.0 %  FrRd            Glucose [[id number]] (Abnormal)  Resulted: 03/28/17 0655, Result status: Final result    Ordering provider: Jigar Wynn MD  03/28/17 0001 Resulting lab: [facility]    Specimen Information    Type Source Collected On   Blood  03/28/17 0618          Components       Value Reference Range Flag Lab   Glucose 199 70 - 99 mg/dL H FrRd            INR [[id number]] (Abnormal)  Resulted: 03/28/17 0647, Result status: Final result    Ordering provider: Jigar Wynn MD  03/28/17 0001 Resulting lab: [facility]    Specimen Information    Type Source Collected On   Blood  03/28/17 0618          Components       Value Reference Range Flag Lab   INR 1.62 0.86 - 1.14 H FrRd            Hemoglobin [[id number]] (Abnormal)  Resulted: 03/28/17 0638, Result status: Final result    Ordering provider: Jigar Wynn MD  03/28/17 0001 Resulting lab: [facility]    Specimen Information    Type Source Collected On   Blood  03/28/17 0618          Components       Value Reference Range Flag Lab   Hemoglobin 10.8 13.3 - 17.7 g/dL L FrRd            Testing Performed By     Lab - Abbreviation Name Director Address Valid Date Range    12 - [facility] Unknown [address] [facility] [address] 05/08/15 1057 - Present            Encounter-Level Documents:     There are no encounter-level documents.      Order-Level Documents:     There are no order-level documents.

## 2017-03-28 ENCOUNTER — APPOINTMENT (OUTPATIENT)
Dept: OCCUPATIONAL THERAPY | Facility: CLINIC | Age: 82
DRG: 470 | End: 2017-03-28
Attending: ORTHOPAEDIC SURGERY
Payer: MEDICARE

## 2017-03-28 ENCOUNTER — APPOINTMENT (OUTPATIENT)
Dept: PHYSICAL THERAPY | Facility: CLINIC | Age: 82
DRG: 470 | End: 2017-03-28
Attending: ORTHOPAEDIC SURGERY
Payer: MEDICARE

## 2017-03-28 LAB
GLUCOSE SERPL-MCNC: 131 MG/DL (ref 70–99)
GLUCOSE SERPL-MCNC: 199 MG/DL (ref 70–99)
HBA1C MFR BLD: 5.7 % (ref 4.3–6)
HGB BLD-MCNC: 10.8 G/DL (ref 13.3–17.7)
INR PPP: 1.62 (ref 0.86–1.14)

## 2017-03-28 PROCEDURE — 85018 HEMOGLOBIN: CPT | Performed by: ORTHOPAEDIC SURGERY

## 2017-03-28 PROCEDURE — 83036 HEMOGLOBIN GLYCOSYLATED A1C: CPT | Performed by: ORTHOPAEDIC SURGERY

## 2017-03-28 PROCEDURE — A9270 NON-COVERED ITEM OR SERVICE: HCPCS | Mod: GY | Performed by: INTERNAL MEDICINE

## 2017-03-28 PROCEDURE — A9270 NON-COVERED ITEM OR SERVICE: HCPCS | Mod: GY | Performed by: ORTHOPAEDIC SURGERY

## 2017-03-28 PROCEDURE — 97165 OT EVAL LOW COMPLEX 30 MIN: CPT | Mod: GO | Performed by: STUDENT IN AN ORGANIZED HEALTH CARE EDUCATION/TRAINING PROGRAM

## 2017-03-28 PROCEDURE — 85610 PROTHROMBIN TIME: CPT | Performed by: ORTHOPAEDIC SURGERY

## 2017-03-28 PROCEDURE — 36415 COLL VENOUS BLD VENIPUNCTURE: CPT | Performed by: ORTHOPAEDIC SURGERY

## 2017-03-28 PROCEDURE — 36415 COLL VENOUS BLD VENIPUNCTURE: CPT | Performed by: PHYSICIAN ASSISTANT

## 2017-03-28 PROCEDURE — 82947 ASSAY GLUCOSE BLOOD QUANT: CPT | Performed by: PHYSICIAN ASSISTANT

## 2017-03-28 PROCEDURE — 25000128 H RX IP 250 OP 636: Performed by: ORTHOPAEDIC SURGERY

## 2017-03-28 PROCEDURE — 25000132 ZZH RX MED GY IP 250 OP 250 PS 637: Mod: GY | Performed by: INTERNAL MEDICINE

## 2017-03-28 PROCEDURE — 97110 THERAPEUTIC EXERCISES: CPT | Mod: GP | Performed by: PHYSICAL THERAPIST

## 2017-03-28 PROCEDURE — 97530 THERAPEUTIC ACTIVITIES: CPT | Mod: GO | Performed by: STUDENT IN AN ORGANIZED HEALTH CARE EDUCATION/TRAINING PROGRAM

## 2017-03-28 PROCEDURE — 40000193 ZZH STATISTIC PT WARD VISIT: Performed by: PHYSICAL THERAPIST

## 2017-03-28 PROCEDURE — 97530 THERAPEUTIC ACTIVITIES: CPT | Mod: GP | Performed by: PHYSICAL THERAPIST

## 2017-03-28 PROCEDURE — 82947 ASSAY GLUCOSE BLOOD QUANT: CPT | Performed by: ORTHOPAEDIC SURGERY

## 2017-03-28 PROCEDURE — 97535 SELF CARE MNGMENT TRAINING: CPT | Mod: GO | Performed by: STUDENT IN AN ORGANIZED HEALTH CARE EDUCATION/TRAINING PROGRAM

## 2017-03-28 PROCEDURE — 97161 PT EVAL LOW COMPLEX 20 MIN: CPT | Mod: GP | Performed by: PHYSICAL THERAPIST

## 2017-03-28 PROCEDURE — 25000132 ZZH RX MED GY IP 250 OP 250 PS 637: Mod: GY | Performed by: ORTHOPAEDIC SURGERY

## 2017-03-28 PROCEDURE — 97116 GAIT TRAINING THERAPY: CPT | Mod: GP | Performed by: PHYSICAL THERAPIST

## 2017-03-28 PROCEDURE — 12000000 ZZH R&B MED SURG/OB

## 2017-03-28 PROCEDURE — 40000133 ZZH STATISTIC OT WARD VISIT: Performed by: STUDENT IN AN ORGANIZED HEALTH CARE EDUCATION/TRAINING PROGRAM

## 2017-03-28 PROCEDURE — 99222 1ST HOSP IP/OBS MODERATE 55: CPT | Performed by: PHYSICIAN ASSISTANT

## 2017-03-28 RX ORDER — BISACODYL 10 MG
10 SUPPOSITORY, RECTAL RECTAL DAILY PRN
Status: DISCONTINUED | OUTPATIENT
Start: 2017-03-28 | End: 2017-03-31 | Stop reason: HOSPADM

## 2017-03-28 RX ORDER — CARVEDILOL 25 MG/1
25 TABLET ORAL 2 TIMES DAILY WITH MEALS
Status: DISCONTINUED | OUTPATIENT
Start: 2017-03-28 | End: 2017-03-31 | Stop reason: HOSPADM

## 2017-03-28 RX ORDER — POLYETHYLENE GLYCOL 3350 17 G/17G
17 POWDER, FOR SOLUTION ORAL 2 TIMES DAILY PRN
Status: DISCONTINUED | OUTPATIENT
Start: 2017-03-28 | End: 2017-03-31 | Stop reason: HOSPADM

## 2017-03-28 RX ORDER — CALCIUM CARBONATE 500 MG/1
500-1000 TABLET, CHEWABLE ORAL
Status: DISCONTINUED | OUTPATIENT
Start: 2017-03-28 | End: 2017-03-31 | Stop reason: HOSPADM

## 2017-03-28 RX ORDER — WARFARIN SODIUM 2.5 MG/1
2.5 TABLET ORAL
Status: COMPLETED | OUTPATIENT
Start: 2017-03-28 | End: 2017-03-28

## 2017-03-28 RX ORDER — HYDROXYZINE HYDROCHLORIDE 25 MG/1
25 TABLET, FILM COATED ORAL EVERY 6 HOURS PRN
Status: DISCONTINUED | OUTPATIENT
Start: 2017-03-28 | End: 2017-03-31 | Stop reason: HOSPADM

## 2017-03-28 RX ORDER — LOSARTAN POTASSIUM 100 MG/1
100 TABLET ORAL DAILY
Status: DISCONTINUED | OUTPATIENT
Start: 2017-03-28 | End: 2017-03-31 | Stop reason: HOSPADM

## 2017-03-28 RX ADMIN — OXYCODONE HYDROCHLORIDE 5 MG: 5 TABLET ORAL at 01:52

## 2017-03-28 RX ADMIN — ACETAMINOPHEN 975 MG: 325 TABLET, FILM COATED ORAL at 22:51

## 2017-03-28 RX ADMIN — ACETAMINOPHEN 975 MG: 325 TABLET, FILM COATED ORAL at 12:42

## 2017-03-28 RX ADMIN — OXYCODONE HYDROCHLORIDE 5 MG: 5 TABLET ORAL at 18:55

## 2017-03-28 RX ADMIN — OXYCODONE HYDROCHLORIDE 5 MG: 5 TABLET ORAL at 09:38

## 2017-03-28 RX ADMIN — OXYCODONE HYDROCHLORIDE 5 MG: 5 TABLET ORAL at 22:51

## 2017-03-28 RX ADMIN — POLYETHYLENE GLYCOL 3350 17 G: 17 POWDER, FOR SOLUTION ORAL at 22:51

## 2017-03-28 RX ADMIN — WARFARIN SODIUM 2.5 MG: 2.5 TABLET ORAL at 18:06

## 2017-03-28 RX ADMIN — ACETAMINOPHEN 975 MG: 325 TABLET, FILM COATED ORAL at 04:50

## 2017-03-28 RX ADMIN — CARVEDILOL 25 MG: 25 TABLET, FILM COATED ORAL at 18:06

## 2017-03-28 RX ADMIN — OXYCODONE HYDROCHLORIDE 5 MG: 5 TABLET ORAL at 13:50

## 2017-03-28 RX ADMIN — HYDROMORPHONE HYDROCHLORIDE 0.2 MG: 10 INJECTION, SOLUTION INTRAMUSCULAR; INTRAVENOUS; SUBCUTANEOUS at 18:06

## 2017-03-28 RX ADMIN — HYDROXYZINE HYDROCHLORIDE 10 MG: 10 TABLET ORAL at 01:52

## 2017-03-28 RX ADMIN — HYDROMORPHONE HYDROCHLORIDE 0.2 MG: 10 INJECTION, SOLUTION INTRAMUSCULAR; INTRAVENOUS; SUBCUTANEOUS at 20:10

## 2017-03-28 RX ADMIN — OXYCODONE HYDROCHLORIDE 5 MG: 5 TABLET ORAL at 16:06

## 2017-03-28 RX ADMIN — HYDROXYZINE HYDROCHLORIDE 10 MG: 10 TABLET ORAL at 18:55

## 2017-03-28 RX ADMIN — CALCIUM CARBONATE (ANTACID) CHEW TAB 500 MG 1000 MG: 500 CHEW TAB at 22:03

## 2017-03-28 RX ADMIN — OXYCODONE HYDROCHLORIDE 5 MG: 5 TABLET ORAL at 04:50

## 2017-03-28 RX ADMIN — CEFAZOLIN SODIUM 2 G: 2 INJECTION, SOLUTION INTRAVENOUS at 01:55

## 2017-03-28 ASSESSMENT — ACTIVITIES OF DAILY LIVING (ADL): PREVIOUS_RESPONSIBILITIES: MEDICATION MANAGEMENT

## 2017-03-28 NOTE — PLAN OF CARE
Problem: Goal Outcome Summary  Goal: Goal Outcome Summary  Outcome: Improving  Pain managed with oxycodone 5 mg PRN, given before therapy. VSS. CMS intact. Pt mariana diet well. A1 with walker and gait belt. Will continue to monitor.

## 2017-03-28 NOTE — CONSULTS
Hospitalist Consultation      Tucker Slater MRN# [id number]   YOB: 1931 Age: 86 year old   Date of Admission: 3/27/2017     Requesting Physician:  Jigar Wynn  Reason for consult: Medical management           Assessment and Plan:   This patient is a 86 year old male with a PMH significant for atrial fibrillation (on coumadin) who is POD 1 s/p right total hip arthroplasty.    1. S/p R FRANK: Doing well, cont PT/OT and pain control as per primary. DVT Prophylaxis: on warfarin and PCDs as per primary. [facility]C planning: [facility].  2. Persistent atrial fibrillation: No chest pain, palpitations, sob, URBINA. Interrogation of pacemaker recently shows persistent atrial arrhythmia since summer of 2016.  Appropriate to continue warfarin 2.5 mg daily.  Continue carvedilol 25 mg BID w/ parameters.  3. 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  Per cardiology note last month, recent pacemaker interrogation demonstrates that device is functioning well and he should continue w/ routine Device Clinic follow-up.   3. HTN: Blood pressure controlled, SBP rarely over 140 when measured at home. No chest pain, headache, or dizziness. Good compliance with medications and low salt diet. Continue losartan 100 mg daily w/ parameters and carvedilol 25 mg BID w/ parameters.  Hold maxzide 75-50 mg today, resume tomorrow.  4. CKD stage III related mild anemia: Baseline Cr 1.3-1.5. Avoid nephrotoxins like NSAIDs. Continue to monitor BP and BG. Hgb today 10.8, no signs or symptoms of bleeding.  Check BMP and CBC tomorrow.  5. Elevated glucose: Glucose 199 this AM.  Patient had IVF w/ glucose running all night, will discontinue this.  No history of diabetes.   6. History of prostate cancer: Completed radiation therapy in 2009, no recurrence w/ stable PSAs in 2-range.  7. History of bladder cancer: Followed with urology for periodic  cystoscopies, currently in remission.          History of Present Illness:   This patient is a 86 year old male who is POD 1 s/p right FRANK.  Intra-op report reviewed and showed no intra-op complications.   I/o's reviewed, Currently net +2034 with good UOP since OR. Hgb stable this am at 10.8.  Overnight did pretty well, no complaints, VSS.   Currently, today mariana diet, pain controlled, no CP, SOB, no n/v.   O/w other medical problems have been stable, with no recent c/o illness.               Past Medical History:     Past Medical History:   Diagnosis Date     Arrhythmia     A Fib     Balance problem     related to neuropathy in feet     Bradycardia      Carcinoma in situ of bladder 2000    bladder cancer ;; follow w Urology w periodic cysto      Essential hypertension, benign      Generalized osteoarthrosis, unspecified site knees    s/p R total knee 8/09 ; will do L 6/10      Numbness and tingling     toes and fingers     Pacemaker     [employer]      Pain in joint, shoulder region     L shoulder rotater tear; no surgery      Persistent atrial fibrillation (H) 1/30/15     Prostate CA (H) 2008-9    radiation     Prostate cancer (H) 11/08    completed RT 3/09     Shoulder impingement     R shoulder impingement etc see MRI 4/09      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     toes both feet                Past Surgical History:     Past Surgical History:   Procedure Laterality Date     C NONSPECIFIC PROCEDURE      bilat inguinal hernia repairs ( 3total procedures)      C NONSPECIFIC PROCEDURE      pilonidal cyst     C NONSPECIFIC PROCEDURE      T + A     C NONSPECIFIC PROCEDURE  8/09     R+L total knee     CARDIOVERSION  3/26/15     COLONOSCOPY       IMPLANT PACEMAKER  3/26/15                 Social History:     Social History   Substance Use Topics     Smoking status: Former Smoker     Quit date: 9/12/1977     Smokeless tobacco: Never Used     Alcohol use 0.0 oz/week     0 Standard drinks  or equivalent per week      Comment: almost daily                 Family History:     family history includes Coronary Artery Disease in his father and mother; Family History Negative in his brother; HEART DISEASE in his father and mother.  Family Hx fully reviewed and is non contributory to this admission.             Allergies:     Allergies   Allergen Reactions     Morphine Nausea and Vomiting     Amlodipine      Edema             Medications:     Prior to Admission medications    Medication Sig Last Dose Taking? Auth Provider   oxyCODONE (ROXICODONE) 5 MG IR tablet Take 1 tablet (5 mg) by mouth every 4 hours as needed 3/27/2017 at 0730 Yes Kwadwo Winslow MD   losartan (COZAAR) 100 MG tablet Take 1 tablet (100 mg) by mouth daily  at 0830 Yes Kwadwo Winslow MD   triamterene-hydrochlorothiazide (MAXZIDE) 75-50 MG per tablet Take 0.5 tablets by mouth daily 3/26/2017 at Unknown time Yes Kwadwo Winslow MD   warfarin (COUMADIN) 2.5 MG tablet Take 1 tab (2.5 mg) daily or as instructed based on INR results.  Yes Kwadwo Winslow MD   carvedilol (COREG) 25 MG tablet Take 1 tablet (25 mg) by mouth 2 times daily (with meals)  at 0830 Yes Kwadwo Winslow MD   Multiple Vitamins-Minerals (OCUVITE PO) Take 1 tablet by mouth daily  3/26/2017 at Unknown time Yes Reported, Patient   Ascorbic Acid (VITAMIN C PO) Take 500 mg by mouth daily  3/26/2017 at Unknown time Yes Reported, Patient   calcium carbonate (OS-KARLA 500 MG [place]. [place]) 500 MG tablet Take 500 mg by mouth daily  3/26/2017 at Unknown time Yes Reported, Patient   VITAMIN D, CHOLECALCIFEROL, PO Take 2,000 Units by mouth daily. 3/26/2017 at Unknown time Yes Unknown, Entered By History               Review of Systems:   A comprehensive greater than 10 system review of systems was carried out.  Pertinent positives and negatives are noted above.  Otherwise negative for contributory info.            Physical Exam:   Vitals were reviewed  Blood pressure  "120/64, pulse 72, temperature 97.8  F (36.6  C), temperature source Oral, resp. rate 16, height 1.778 m (5' 10\"), weight 82.5 kg (181 lb 14.1 oz), SpO2 98 %.  Exam:    GENERAL:  Comfortable.  PSYCH: pleasant, oriented, No acute distress.  HEENT:  PERRLA. Normal conjunctiva, normal hearing, nasal mucosa and Oropharynx are normal.  NECK:  Supple, no neck vein distention, adenopathy or bruits, normal thyroid.  HEART:  Normal S1, S2 with no murmur, no pericardial rub, S3 or S4.  LUNGS:  Clear to auscultation, normal Respiratory effort.  ABDOMEN:  Soft, no hepatosplenomegaly, normal bowel sounds.  EXTREMITIES:  No pedal edema, +2 pulses bilateral and equal. Right hip dressing c/d/i  SKIN:  Dry to touch, No rash, wound or ulcerations.  NEUROLOGIC:  Nonfocal with normal cranial nerve and motor power and sensation.            Data:   Past 24 hours labs, studies, and imaging were reviewed.  Results for orders placed or performed during the hospital encounter of 03/27/17   XR Pelvis w Hip Port Right 1 View    Narrative    XR PELVIS AD HIP PORTABLE RIGHT 1 VIEW 3/27/2017 1:58 PM    COMPARISON: 3/18/2012    HISTORY: Arthroplasty      Impression    IMPRESSION: Postoperative changes of right total hip arthroplasty.  Hardware appears intact. No fractures are seen. Surgical drain in  place. Mild to moderate left hip osteoarthritis.    VASILIY CRUZ   Hemoglobin   Result Value Ref Range    Hemoglobin 13.4 13.3 - 17.7 g/dL   INR   Result Value Ref Range    INR 1.47 (H) 0.86 - 1.14   Hemoglobin   Result Value Ref Range    Hemoglobin 10.8 (L) 13.3 - 17.7 g/dL   INR   Result Value Ref Range    INR 1.62 (H) 0.86 - 1.14   Glucose   Result Value Ref Range    Glucose 199 (H) 70 - 99 mg/dL   ABO/Rh type and screen   Result Value Ref Range    ABO A     RH(D)  Pos     Antibody Screen Neg     Test Valid Only At [facility]     Specimen Expires 03/30/2017      Ashleigh Eagle PA-C    Pt discussed with Dr. Stapleton who agrees with the " care as discussed above.

## 2017-03-28 NOTE — PLAN OF CARE
Problem: Goal Outcome Summary  Goal: Goal Outcome Summary  Surgeon Discharge Plan: not documented     Current Functional Status: mod I with railing supine to sit, stood with walker, cues for hip precautions with sit<>stand and for no pivoting when turning around, pt with questions about no flexion past 90 degrees so gave handout with examples, ambulated 100 ft CGA with walker, WBAT     Barriers to Plan/Home: split entry home which pt likely will be able to navigate with railings, per pt spouse with limited ability to physically assist, pt concerned about hip precautions and getting dressed etc (defer to OT)

## 2017-03-28 NOTE — PROGRESS NOTES
"[facility] Orthopedic Post-Op Note    Tukcer Slater MRN# [id number]   YOB: 1931 Age: 86 year old              Subjective:  No sig hip pain          Physical Exam:  Blood pressure 112/58, pulse 72, temperature 96.8  F (36  C), temperature source Oral, resp. rate 16, height 1.778 m (5' 10\"), weight 82.5 kg (181 lb 14.1 oz), SpO2 99 %.  Dressing dry and intact.  Right upper leg: Wound clean and dry with minimal or no drainage.  Surrounding skin with minimal erythema.    CMS is intact.    Calves non-tender bilaterally.          Data:  CBC:    Recent Labs  Lab 03/28/17  0618 03/27/17  1045   HGB 10.8* 13.4     INR:  Recent Labs  Lab 03/28/17  0618 03/27/17  1110   INR 1.62* 1.47*             Assessment and Plan:   Doing well  Discharge to [facility]        Jigar Wynn M.D.    "

## 2017-03-28 NOTE — PROGRESS NOTES
03/28/17 0940   Quick Adds   Type of Visit Initial PT Evaluation   Living Environment   Lives With spouse   Living Arrangements house  (split entry)   Home Accessibility tub/shower is not walk in   Number of Stairs to Enter Home 1  (front door, 5 via garage)   Number of Stairs Within Home (7 up and 6 down inside)   Stair Railings at Home inside, present at both sides   Living Environment Comment wife will be home all the time, cannot physically help much but will try to do whatever she can to assist   Self-Care   Usual Activity Tolerance moderate  (limited walking, uses electric cart at grocery store)   Equipment Currently Used at Home cane, straight  (thinks he has a walker from previous TKA's)   Activity/Exercise/Self-Care Comment uses standard height toilets, has been limping around without the cane   Functional Level Prior   Ambulation 0-->independent   Transferring 0-->independent   Toileting 0-->independent   Dressing (needs help tying shoes, can get dressed with extra time)   Communication 0-->understands/communicates without difficulty   Swallowing 0-->swallows foods/liquids without difficulty   General Information   Onset of Illness/Injury or Date of Surgery - Date 03/27/17   Referring Physician Dr. Jigar Wynn   Patient/Family Goals Statement to return home if able, otherwise [facility] (went to TCU after previous TKA's)   Pertinent History of Current Problem (include personal factors and/or comorbidities that impact the POC) R hip DJD, s/p R FRANK posterior approach, Hx of B TKA's in '09 and '10   Precautions/Limitations right hip precautions   Cognitive Status Examination   Orientation orientation to person, place and time   Level of Consciousness alert   Follows Commands and Answers Questions 100% of the time;able to follow single-step instructions   Personal Safety and Judgment intact   Memory intact   Cognitive Comment needs repeating of questions at times   Pain Assessment   Patient Currently in  "Pain (0/10 at rest, 2-3/10 with movement)   Range of Motion (ROM)   ROM Comment DF to neutral, R hip flexion to 90 due to precautions   Strength   Strength Comments good quad set, indep SAQ   Bed Mobility   Bed Mobility Comments mod I with rail supine to sit   Transfer Skills   Transfer Comments SBA sit<>stand with FWW, WBAT   Gait   Gait Comments CGA to SBA with FWW, 3 point gait progressed to swing through gait, used FWW WBAT, decreased raffi and increased UE WB   Balance   Balance Comments indep sitting balance EOB   Sensory Examination   Sensory Perception Comments baseline neuropathy, numbness in all toes, R hand thumb and first 2 fingers numb   General Therapy Interventions   Planned Therapy Interventions bed mobility training;gait training;ROM;strengthening;transfer training;progressive activity/exercise   Clinical Impression   Criteria for Skilled Therapeutic Intervention yes, treatment indicated   PT Diagnosis difficulty walking   Influenced by the following impairments pain   Functional limitations due to impairments impaired gait   Clinical Presentation Stable/Uncomplicated   Clinical Presentation Rationale LE involvement, musculoskeletal   Clinical Decision Making (Complexity) Low complexity   Therapy Frequency` 2 times/day   Predicted Duration of Therapy Intervention (days/wks) 3 days   Anticipated Discharge Disposition Home with Assist;Transitional Care Facility   Risk & Benefits of therapy have been explained Yes   Patient, Family & other staff in agreement with plan of care Yes   [facility] [employer] TM \"6 Clicks\"   2016, Trustees of [facility], under license to [employer].  All rights reserved.   6 Clicks Short Forms Basic Mobility Inpatient Short Form   [facility] [employer]  \"6 Clicks\" V.2 Basic Mobility Inpatient Short Form   1. Turning from your back to your side while in a flat bed without using bedrails? 3 - A Little   2. Moving from lying on your back to sitting on the side " of a flat bed without using bedrails? 3 - A Little   3. Moving to and from a bed to a chair (including a wheelchair)? 3 - A Little   4. Standing up from a chair using your arms (e.g., wheelchair, or bedside chair)? 3 - A Little   5. To walk in hospital room? 3 - A Little   6. Climbing 3-5 steps with a railing? 3 - A Little   Basic Mobility Raw Score (Score out of 24.Lower scores equate to lower levels of function) 18   Total Evaluation Time   Total Evaluation Time (Minutes) 10

## 2017-03-28 NOTE — ANESTHESIA POSTPROCEDURE EVALUATION
Patient: Tucker Slater    Procedure(s):  Right Total hip Arthroplasty  - Wound Class: I-Clean    Diagnosis:DJD  Diagnosis Additional Information: No value filed.    Anesthesia Type:  General, ETT    Note:  Anesthesia Post Evaluation    Patient location during evaluation: PACU  Patient participation: Able to fully participate in evaluation  Level of consciousness: awake  Pain management: adequate  Airway patency: patent  Cardiovascular status: acceptable  Respiratory status: acceptable  Hydration status: euvolemic  PONV: controlled     Anesthetic complications: None          Last vitals:  Vitals:    03/27/17 2310 03/28/17 0014 03/28/17 0423   BP:  117/60 120/64   Pulse: 74 71 72   Resp: 20 16 16   Temp:  96.4  F (35.8  C) 97.8  F (36.6  C)   SpO2: 96% 97% 98%         Electronically Signed By: Isreal Herring MD  March 28, 2017  6:47 AM

## 2017-03-28 NOTE — PROGRESS NOTES
03/28/17 1439   Quick Adds   Type of Visit Initial Occupational Therapy Evaluation   Living Environment   Lives With spouse   Living Arrangements house  (split entry home)   Home Accessibility tub/shower is not walk in   Number of Stairs to Enter Home 1   Number of Stairs Within Home 13   Transportation Available family or friend will provide   Living Environment Comment pt lives with supportive wife who manages IADLs but is unable to physically assist pt   Self-Care   Usual Activity Tolerance moderate   Current Activity Tolerance fair   Regular Exercise no   Activity/Exercise/Self-Care Comment pt has a cane, was not consistently using prior to surgery   Functional Level Prior   Ambulation 0-->independent   Transferring 0-->independent   Toileting 0-->independent  (standard height toilet, wall L side, tub R side)   Bathing 0-->independent  (tub shower, curtains; wife assisted w/balance w/transfer )   Dressing 2-->assistive person  (wife was assisting)   Fall history within last six months no   Prior Functional Level Comment limited by pain prior to surgery, was receiving assist from wife at times   General Information   Onset of Illness/Injury or Date of Surgery - Date 03/27/17   Referring Physician Kingsley MOYER   Patient/Family Goals Statement pt hopeful to return home   Additional Occupational Profile Info/Pertinent History of Current Problem currently POD#1 R FRANK with hip precautions; PMHx A-Fib, HTN, prostate and bladder CA   Precautions/Limitations fall precautions;right hip precautions   Weight-Bearing Status - RLE weight-bearing as tolerated   Cognitive Status Examination   Orientation orientation to person, place and time   Level of Consciousness alert   Cognitive Comment appropriate at eval, asking questions to improve understanding   Visual Perception   Visual Perception Wears glasses  (reading glasses)   Sensory Examination   Sensory Comments neuropathy in hands and feet   Pain Assessment   Patient  Currently in Pain Yes, see Vital Sign flowsheet   Range of Motion (ROM)   ROM Comment B UE AROM intact   Strength   Strength Comments B UE grossly, 4+/5   Mobility   Bed Mobility Comments Min A   Transfer Skills   Transfer Comments FWW, CGA   Transfer Skill: Sit to Stand   Level of Thermal: Sit/Stand contact guard   Physical Assist/Nonphysical Assist: Sit/Stand verbal cues   Transfer Skill: Sit to Stand weight-bearing as tolerated   Assistive Device for Transfer: Sit/Stand rolling walker   Balance   Balance Comments impaired post-op   Upper Body Dressing   Level of Thermal: Dress Upper Body independent   Lower Body Dressing   Level of Thermal: Dress Lower Body maximum assist (25% patients effort)   Instrumental Activities of Daily Living (IADL)   Previous Responsibilities medication management   IADL Comments wife manages IADLs and they have a housecleaning service   Activities of Daily Living Analysis   Impairments Contributing to Impaired Activities of Daily Living balance impaired;pain;post surgical precautions   General Therapy Interventions   Planned Therapy Interventions ADL retraining;transfer training   Clinical Impression   Criteria for Skilled Therapeutic Interventions Met yes, treatment indicated   OT Diagnosis impaired ability to manage ADL/IADLs   Influenced by the following impairments post-op pain, fatigue, and impaired strength/balance   Assessment of Occupational Performance 1-3 Performance Deficits   Identified Performance Deficits impaired ability to manage dressing tasks, bathing tasks, toilet transfer/cares   Clinical Decision Making (Complexity) Low complexity   Therapy Frequency daily   Predicted Duration of Therapy Intervention (days/wks) 3 days   Anticipated Discharge Disposition Home with Assist;Transitional Care Facility   Risks and Benefits of Treatment have been explained. Yes   Patient, Family & other staff in agreement with plan of care Yes   Clinical Impression Comments  "pt demonstrates ability to benefit from skilled OT services to improve independence   [facility] [employer] TM \"6 Clicks\"   2016, Trustees of [facility], under license to [employer].  All rights reserved.   6 Clicks Short Forms Daily Activity Inpatient Short Form   [facility] [employer]  \"6 Clicks\" Daily Activity Inpatient Short Form   1. Putting on and taking off regular lower body clothing? 2 - A Lot   2. Bathing (including washing, rinsing, drying)? 2 - A Lot   3. Toileting, which includes using toilet, bedpan or urinal? 3 - A Little   4. Putting on and taking off regular upper body clothing? 4 - None   5. Taking care of personal grooming such as brushing teeth? 3 - A Little   6. Eating meals? 4 - None   Daily Activity Raw Score (Score out of 24.Lower scores equate to lower levels of function) 18   Total Evaluation Time   Total Evaluation Time (Minutes) 11     "

## 2017-03-28 NOTE — PLAN OF CARE
"Problem: Goal Outcome Summary  Goal: Goal Outcome Summary  Outcome: Improving  From PACU at 1610, A/O.  Pain managed with PRN oxycodone 5mg + cold.  No nausea.  LS clear bilaterally, RA, encouraged hourly CDB/IS x10.  Baseline neuropathy toes & fingers.  Drsg CDI.  Up with A2 belt + walker, ambulated in room.  Hemovac.  Bladder scanned 730ml, voided 100ml only bladder distended \"uncomfortable\", straight cath for 550ml.        "

## 2017-03-28 NOTE — PLAN OF CARE
Problem: Goal Outcome Summary  Goal: Goal Outcome Summary  Outcome: Improving  Night RN  Vss, CMS+ dressing is CDI. Small amount from drain. Oxycodone and vistaril for pain. Up with A1 and walker to the bathroom moving well. voiding well.  Slept most of night

## 2017-03-28 NOTE — PLAN OF CARE
Problem: Goal Outcome Summary  Goal: Goal Outcome Summary  OT: Evaluation completed POD#1 R FRANK with hip precautions     Surgeon Discharge Plan: TCU     Current Functional Status: Pt receptive to education, able to demonstrate understanding of some precautions however required further education to maintain and understand all precautions; pt provided with demonstration of use of AE for LE dressing tasks, able to use to complete tasks with SBA, verbal instructions for technique; pt utilized toilet in standing position with FWW, CGA for stability and safety     Barriers to Plan/Home: none anticipate

## 2017-03-28 NOTE — PLAN OF CARE
Problem: Goal Outcome Summary  Goal: Goal Outcome Summary  Surgeon Discharge Plan: TCU     Current Functional Status: more pain this PM 6/10, ambulated 80 ft with walker with more difficulty than this AM, limping, WB through arms more, reminders for hip precautions with transfers     Barriers to Plan/Home: none to TCU

## 2017-03-28 NOTE — CONSULTS
SWS     D: Per MD request to assist with discharge planning..per discussion with ortho MD today anticipate pt's transfer to rehab facility on discharge Thursday. Chart reviewed noting pt's admit yesterday after surgery for R FRANK, noted PT assessment and progress. See also pre-surgery SW note for additional information.      I: Met with pt and wife who affirms planning for pt's transfer to [facility], pt has his name on list there. Referral was made, assessment completed, [facility] able to accept pt on Thursday, semi-private room. Follow-up with pt at which time he was notified of assessment results. Pt will want to have continued planning be done with wife involved, SW will meet with them tomorrow for continued planning, discuss transportation and address questions they may have.      A/P: As noted above for transfer to rehab facility on discharge.

## 2017-03-29 ENCOUNTER — APPOINTMENT (OUTPATIENT)
Dept: PHYSICAL THERAPY | Facility: CLINIC | Age: 82
DRG: 470 | End: 2017-03-29
Attending: ORTHOPAEDIC SURGERY
Payer: MEDICARE

## 2017-03-29 LAB
GLUCOSE SERPL-MCNC: 103 MG/DL (ref 70–99)
HGB BLD-MCNC: 10.6 G/DL (ref 13.3–17.7)
HGB BLD-MCNC: 9.9 G/DL (ref 13.3–17.7)
INR PPP: 2.04 (ref 0.86–1.14)

## 2017-03-29 PROCEDURE — 25000132 ZZH RX MED GY IP 250 OP 250 PS 637: Mod: GY | Performed by: ORTHOPAEDIC SURGERY

## 2017-03-29 PROCEDURE — 97530 THERAPEUTIC ACTIVITIES: CPT | Mod: GP | Performed by: PHYSICAL THERAPIST

## 2017-03-29 PROCEDURE — 85018 HEMOGLOBIN: CPT | Performed by: ORTHOPAEDIC SURGERY

## 2017-03-29 PROCEDURE — 25000132 ZZH RX MED GY IP 250 OP 250 PS 637: Mod: GY | Performed by: PHYSICIAN ASSISTANT

## 2017-03-29 PROCEDURE — 12000000 ZZH R&B MED SURG/OB

## 2017-03-29 PROCEDURE — 36415 COLL VENOUS BLD VENIPUNCTURE: CPT | Performed by: ORTHOPAEDIC SURGERY

## 2017-03-29 PROCEDURE — 97116 GAIT TRAINING THERAPY: CPT | Mod: GP | Performed by: PHYSICAL THERAPY ASSISTANT

## 2017-03-29 PROCEDURE — 97116 GAIT TRAINING THERAPY: CPT | Mod: GP | Performed by: PHYSICAL THERAPIST

## 2017-03-29 PROCEDURE — 25000128 H RX IP 250 OP 636: Performed by: INTERNAL MEDICINE

## 2017-03-29 PROCEDURE — A9270 NON-COVERED ITEM OR SERVICE: HCPCS | Mod: GY | Performed by: INTERNAL MEDICINE

## 2017-03-29 PROCEDURE — 36415 COLL VENOUS BLD VENIPUNCTURE: CPT | Performed by: INTERNAL MEDICINE

## 2017-03-29 PROCEDURE — 97110 THERAPEUTIC EXERCISES: CPT | Mod: GP | Performed by: PHYSICAL THERAPIST

## 2017-03-29 PROCEDURE — 25000132 ZZH RX MED GY IP 250 OP 250 PS 637: Mod: GY | Performed by: INTERNAL MEDICINE

## 2017-03-29 PROCEDURE — 85610 PROTHROMBIN TIME: CPT | Performed by: ORTHOPAEDIC SURGERY

## 2017-03-29 PROCEDURE — 85018 HEMOGLOBIN: CPT | Performed by: INTERNAL MEDICINE

## 2017-03-29 PROCEDURE — 40000193 ZZH STATISTIC PT WARD VISIT: Performed by: PHYSICAL THERAPIST

## 2017-03-29 PROCEDURE — 40000193 ZZH STATISTIC PT WARD VISIT: Performed by: PHYSICAL THERAPY ASSISTANT

## 2017-03-29 PROCEDURE — A9270 NON-COVERED ITEM OR SERVICE: HCPCS | Mod: GY | Performed by: PHYSICIAN ASSISTANT

## 2017-03-29 PROCEDURE — 97110 THERAPEUTIC EXERCISES: CPT | Mod: GP | Performed by: PHYSICAL THERAPY ASSISTANT

## 2017-03-29 PROCEDURE — 82947 ASSAY GLUCOSE BLOOD QUANT: CPT | Performed by: ORTHOPAEDIC SURGERY

## 2017-03-29 PROCEDURE — 99233 SBSQ HOSP IP/OBS HIGH 50: CPT | Performed by: INTERNAL MEDICINE

## 2017-03-29 PROCEDURE — A9270 NON-COVERED ITEM OR SERVICE: HCPCS | Mod: GY | Performed by: ORTHOPAEDIC SURGERY

## 2017-03-29 RX ORDER — BETHANECHOL CHLORIDE 10 MG/1
10 TABLET ORAL 3 TIMES DAILY
Status: DISCONTINUED | OUTPATIENT
Start: 2017-03-29 | End: 2017-03-30

## 2017-03-29 RX ORDER — OXYCODONE HYDROCHLORIDE 5 MG/1
CAPSULE ORAL
Qty: 60 CAPSULE | Refills: 0 | Status: SHIPPED | OUTPATIENT
Start: 2017-03-29 | End: 2017-04-04

## 2017-03-29 RX ORDER — WARFARIN SODIUM 2.5 MG/1
2.5 TABLET ORAL
Status: COMPLETED | OUTPATIENT
Start: 2017-03-29 | End: 2017-03-29

## 2017-03-29 RX ORDER — HYDROXYZINE HYDROCHLORIDE 25 MG/1
25 TABLET, FILM COATED ORAL EVERY 6 HOURS PRN
Qty: 60 TABLET | Refills: 1 | Status: SHIPPED | OUTPATIENT
Start: 2017-03-29 | End: 2017-04-11

## 2017-03-29 RX ADMIN — ACETAMINOPHEN 975 MG: 325 TABLET, FILM COATED ORAL at 05:05

## 2017-03-29 RX ADMIN — OXYCODONE HYDROCHLORIDE 5 MG: 5 TABLET ORAL at 02:07

## 2017-03-29 RX ADMIN — ACETAMINOPHEN 975 MG: 325 TABLET, FILM COATED ORAL at 13:04

## 2017-03-29 RX ADMIN — WARFARIN SODIUM 2.5 MG: 2.5 TABLET ORAL at 17:31

## 2017-03-29 RX ADMIN — OXYCODONE HYDROCHLORIDE 5 MG: 5 TABLET ORAL at 16:54

## 2017-03-29 RX ADMIN — BETHANECHOL CHLORIDE 10 MG: 10 TABLET ORAL at 20:39

## 2017-03-29 RX ADMIN — OXYCODONE HYDROCHLORIDE 5 MG: 5 TABLET ORAL at 13:04

## 2017-03-29 RX ADMIN — LOSARTAN POTASSIUM 100 MG: 100 TABLET, FILM COATED ORAL at 09:08

## 2017-03-29 RX ADMIN — HYDROXYZINE HYDROCHLORIDE 25 MG: 25 TABLET ORAL at 20:43

## 2017-03-29 RX ADMIN — SODIUM CHLORIDE 1000 ML: 9 INJECTION, SOLUTION INTRAVENOUS at 12:44

## 2017-03-29 RX ADMIN — HYDROXYZINE HYDROCHLORIDE 25 MG: 25 TABLET ORAL at 02:08

## 2017-03-29 RX ADMIN — ACETAMINOPHEN 975 MG: 325 TABLET, FILM COATED ORAL at 20:39

## 2017-03-29 RX ADMIN — CARVEDILOL 25 MG: 25 TABLET, FILM COATED ORAL at 09:08

## 2017-03-29 RX ADMIN — CARVEDILOL 25 MG: 25 TABLET, FILM COATED ORAL at 17:31

## 2017-03-29 RX ADMIN — OXYCODONE HYDROCHLORIDE 5 MG: 5 TABLET ORAL at 05:05

## 2017-03-29 RX ADMIN — OXYCODONE HYDROCHLORIDE 5 MG: 5 TABLET ORAL at 09:08

## 2017-03-29 NOTE — PLAN OF CARE
Problem: Goal Outcome Summary  Goal: Goal Outcome Summary  Outcome: Improving  More pain tonight, managed with both PRN IV/PO pain meds + cold, Dr. Zhang increased PRN atarax from 10mg to 25mg for better pain controlled.  No nausea, PRN tums for heartburns.  LS clear bilaterally, RA, encouraged hourly CDB/IS x10.  Baseline neuropathy bilateral toes & fingers R hand.  Drsg scant shadowing drainage unchanged from yesterday.  Up with A1 belt + walker, ambulated in room, declined hallway due to more pain tonight.  Hemovac.  Voiding via urinal.  Plan is TCU upon DC.

## 2017-03-29 NOTE — PLAN OF CARE
Problem: Goal Outcome Summary  Goal: Goal Outcome Summary  Outcome: Improving  Pt having urinary retention, bladder scanned and straight cath 500 out. Pt became hypotensive during shift 500 ml/hr bolus given for total of 1000ml. Pt blood pressure increased. CMS intact except for baseline numbness to bottom of feet. A1 to BR. Pt mariana ambulation well. Will continue to monitor.

## 2017-03-29 NOTE — PLAN OF CARE
Problem: Goal Outcome Summary  Goal: Goal Outcome Summary  Outcome: No Change  A&O. Dressing is intact. +BS. Voiding frequently, bladder scanned for over 900 ml. Straight catheterized for 800 ml, tolerated well.. Up with assist of 1 and a walker. Hemovac in place, patent, drained 60ml. Pain is controlled with oral meds.

## 2017-03-29 NOTE — PLAN OF CARE
Problem: Goal Outcome Summary  Goal: Goal Outcome Summary  OT: Per chart review pt plan for TCU, at this time pt dizzy with low BP, assisted pt with nursing from chair>bed, FWW; will check in tomorrow, if continued plan for TCU will defer further treatment to TCU setting

## 2017-03-29 NOTE — PLAN OF CARE
Problem: Goal Outcome Summary  Goal: Goal Outcome Summary  Surgeon Discharge Plan: TCU     Current Functional Status: ambulated 150 ft SBA with the walker, SBA to get out of bed, denies feeling light headed, BP sitting /61. Pt recalled 1/4 hip precautions.     Barriers to Plan/Home: none for TCU

## 2017-03-29 NOTE — PROGRESS NOTES
"  [facility]  Hospitalist Progress Note  Win Stapleton MD 03/29/2017    Reason for Stay (Diagnosis): s/p R FRANK         Assessment and Plan:      Summary of Stay: Tucker Slater is a 86 year old male with a PMH significant for atrial fibrillation (on coumadin) who is POD 2 s/p right total hip arthroplasty.     1. S/p R FRANK: Doing well, cont PT/OT and pain control as per primary. DVT Prophylaxis: on warfarin and PCDs as per primary. D/C planning: To [facility] rehab facility.  2. Persistent atrial fibrillation:  Interrogation of pacemaker recently shows persistent atrial arrhythmia since summer of 2016.  On warfarin. Continue carvedilol 25 mg BID w/ parameters.  3. 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    3. HTN:  Continue losartan 100 mg daily w/ parameters and carvedilol 25 mg BID w/ parameters.   -- was hypotensive this aftn - stat hgb stable and responded well to IVF bolus.  Occurred while in chair after therapy - ? vasovagal  4. CKD stage III related mild anemia: Baseline Cr 1.3-1.5. Avoid nephrotoxins like NSAIDs. Continue to monitor BP and BG. Hgb stable near 10, no signs or symptoms of bleeding.   6. History of prostate cancer: Completed radiation therapy in 2009, no recurrence w/ stable PSAs in 2-range.  7. History of bladder cancer: Followed with urology for periodic cystoscopies, currently in remission.     DVT ppx:  Coumadin  Dispo:  Home expected  D/c date:   Per primary service; expect POD 3        Interval History (Subjective):      Hypotensive earlier today while sitting in chair after rehab therapy.  Dizzy but o/w no sx.  No CP/SOB.  Stable output from drain in knee; no sign of excessive blood loss                  Physical Exam:      Last Vital Signs:  /62 (BP Location: Left arm)  Pulse 72  Temp 97.3  F (36.3  C) (Oral)  Resp 16  Ht 1.778 m (5' 10\")  Wt 82.5 kg (181 lb 14.1 oz) "  SpO2 98%  BMI 26.1 kg/m2      Intake/Output Summary (Last 24 hours) at 03/29/17 1541  Last data filed at 03/29/17 1518   Gross per 24 hour   Intake             1120 ml   Output             3280 ml   Net            -2160 ml       Constitutional: Awake, alert, cooperative, no apparent distress   Respiratory: Clear to auscultation bilaterally, no crackles or wheezing   Cardiovascular: Regular rate and rhythm, normal S1 and S2, and no murmur noted   Abdomen: Normal bowel sounds, soft, non-distended, non-tender   Skin: No rashes, no cyanosis, dry to touch   Neuro: Alert and oriented x3, no weakness, numbness, memory loss   Extremities: No edema, normal range of motion   Other(s): Small amt of bloody fluid in drain from knee       All other systems: Negative          Medications:      All current medications were reviewed with changes reflected in problem list.         Data:      All new lab and imaging data was reviewed.   Labs:    Recent Labs  Lab 03/29/17  1245 03/29/17  0710 03/28/17  0618   HGB 9.9* 10.6* 10.8*      Imaging:   No results found for this or any previous visit (from the past 24 hour(s)).

## 2017-03-29 NOTE — PLAN OF CARE
Problem: Goal Outcome Summary  Goal: Goal Outcome Summary  Surgeon Discharge Plan: TCU     Current Functional Status: Pt rated pain at 7/10 prior to treatment. Pt was CGA-Deepika for sit<>stand transfers with cues for safe hand placement and to maintain hip precautions. Min A for R LE in and out of bed. Pt amb 125' using fww and CGA, towards end of amb pt had slight buckling of R knee due to fatigue, cued for equal step lengths.      Barriers to Plan/Home: None to TCU     POD #2 Recommendation: TCU

## 2017-03-29 NOTE — PROGRESS NOTES
SWS    D: Discharge planning continuing.. [facility] has assessed and will be able to accept pt tomorrow, semi-private room available.     I: Met with pt and spoke by phone with wife, above noted. They have asked that planning continue for pt's transfer to [facility], wife is requesting that medical transport be arranged. Discussed with her that Medicare does not cover w/c transport, discussed costs ( $57/base, $4.50/mi) which she has accepted. Additional questions of wife were addressed, she had questions regarding DME at home, discussed availability of SW at rehab facility to look at DME needs for when pt is discharging back to home again.    A/P: Anticipate no problem with arrangements made for transfer tomorrow, SW available until discharge should adjustments be needed in plan as noted.

## 2017-03-29 NOTE — PROGRESS NOTES
"[facility] Orthopedic Post-Op Note    Tucker Slater MRN# [id number]   YOB: 1931 Age: 86 year old              Subjective:  Feeling well with the hip concerned he cant pee.           Physical Exam:  Blood pressure 121/54, pulse 72, temperature 97.7  F (36.5  C), temperature source Axillary, resp. rate 16, height 1.778 m (5' 10\"), weight 82.5 kg (181 lb 14.1 oz), SpO2 97 %.  Wound clean and dry with minimal or no drainage.  Surrounding skin with minimal erythema.  Dressing dry and intact.  Limb lengths equal.  CMS is intact.    Calves non-tender bilaterally.          Data:  CBC:    Recent Labs  Lab 03/29/17  1245 03/29/17  0710   HGB 9.9* 10.6*     INR:  Recent Labs  Lab 03/29/17  0710 03/28/17  0618   INR 2.04* 1.62*             Assessment and Plan:   Stable, s/p raulito  Difficulty voiding, will add urecholine.       Tino Chandler PA-C    "

## 2017-03-30 ENCOUNTER — APPOINTMENT (OUTPATIENT)
Dept: PHYSICAL THERAPY | Facility: CLINIC | Age: 82
DRG: 470 | End: 2017-03-30
Attending: ORTHOPAEDIC SURGERY
Payer: MEDICARE

## 2017-03-30 LAB — INR PPP: 2.24 (ref 0.86–1.14)

## 2017-03-30 PROCEDURE — 25000132 ZZH RX MED GY IP 250 OP 250 PS 637: Mod: GY | Performed by: PHYSICIAN ASSISTANT

## 2017-03-30 PROCEDURE — 25000132 ZZH RX MED GY IP 250 OP 250 PS 637: Mod: GY | Performed by: INTERNAL MEDICINE

## 2017-03-30 PROCEDURE — 12000000 ZZH R&B MED SURG/OB

## 2017-03-30 PROCEDURE — 36415 COLL VENOUS BLD VENIPUNCTURE: CPT | Performed by: ORTHOPAEDIC SURGERY

## 2017-03-30 PROCEDURE — A9270 NON-COVERED ITEM OR SERVICE: HCPCS | Mod: GY | Performed by: INTERNAL MEDICINE

## 2017-03-30 PROCEDURE — A9270 NON-COVERED ITEM OR SERVICE: HCPCS | Mod: GY | Performed by: ORTHOPAEDIC SURGERY

## 2017-03-30 PROCEDURE — 25000132 ZZH RX MED GY IP 250 OP 250 PS 637: Mod: GY | Performed by: ORTHOPAEDIC SURGERY

## 2017-03-30 PROCEDURE — A9270 NON-COVERED ITEM OR SERVICE: HCPCS | Mod: GY | Performed by: PHYSICIAN ASSISTANT

## 2017-03-30 PROCEDURE — 99233 SBSQ HOSP IP/OBS HIGH 50: CPT | Performed by: INTERNAL MEDICINE

## 2017-03-30 PROCEDURE — 40000193 ZZH STATISTIC PT WARD VISIT: Performed by: PHYSICAL THERAPY ASSISTANT

## 2017-03-30 PROCEDURE — 97110 THERAPEUTIC EXERCISES: CPT | Mod: GP | Performed by: PHYSICAL THERAPY ASSISTANT

## 2017-03-30 PROCEDURE — 85610 PROTHROMBIN TIME: CPT | Performed by: ORTHOPAEDIC SURGERY

## 2017-03-30 RX ORDER — WARFARIN SODIUM 2.5 MG/1
2.5 TABLET ORAL
Status: COMPLETED | OUTPATIENT
Start: 2017-03-30 | End: 2017-03-30

## 2017-03-30 RX ORDER — TAMSULOSIN HYDROCHLORIDE 0.4 MG/1
0.4 CAPSULE ORAL DAILY
Status: DISCONTINUED | OUTPATIENT
Start: 2017-03-30 | End: 2017-03-31 | Stop reason: HOSPADM

## 2017-03-30 RX ADMIN — OXYCODONE HYDROCHLORIDE 5 MG: 5 TABLET ORAL at 10:43

## 2017-03-30 RX ADMIN — OXYCODONE HYDROCHLORIDE 5 MG: 5 TABLET ORAL at 18:24

## 2017-03-30 RX ADMIN — ACETAMINOPHEN 975 MG: 325 TABLET, FILM COATED ORAL at 04:42

## 2017-03-30 RX ADMIN — BETHANECHOL CHLORIDE 10 MG: 10 TABLET ORAL at 14:10

## 2017-03-30 RX ADMIN — OXYCODONE HYDROCHLORIDE 5 MG: 5 TABLET ORAL at 04:42

## 2017-03-30 RX ADMIN — BETHANECHOL CHLORIDE 10 MG: 10 TABLET ORAL at 08:35

## 2017-03-30 RX ADMIN — ACETAMINOPHEN 975 MG: 325 TABLET, FILM COATED ORAL at 14:10

## 2017-03-30 RX ADMIN — BISACODYL 10 MG: 10 SUPPOSITORY RECTAL at 07:25

## 2017-03-30 RX ADMIN — WARFARIN SODIUM 2.5 MG: 2.5 TABLET ORAL at 18:24

## 2017-03-30 RX ADMIN — TAMSULOSIN HYDROCHLORIDE 0.4 MG: 0.4 CAPSULE ORAL at 14:10

## 2017-03-30 RX ADMIN — CARVEDILOL 25 MG: 25 TABLET, FILM COATED ORAL at 08:35

## 2017-03-30 NOTE — PLAN OF CARE
Problem: Goal Outcome Summary  Goal: Goal Outcome Summary  Surgeon Discharge Plan: TCU     Current Functional Status: Pt wasn't feeling well this morning, nsg request BP be monitored during PT, during supine exs pt's BP was 92/43. Decided to avoid OOB therapy this AM.      Barriers to Plan/Home: None to TCU

## 2017-03-30 NOTE — PROGRESS NOTES
.PAS-RR    D: Per DHS regulation, TOÑO completed and submitted PAS-RR to [place] [employer] via the [employer].  PAS-RR confirmation # is : [id number]

## 2017-03-30 NOTE — PLAN OF CARE
Problem: Goal Outcome Summary  Goal: Goal Outcome Summary  Outcome: Improving  Pt had medium BM after suppository. Voided 200 in hat, will bladder scan before d/c to see if crawford is needed. Pain managed with oxycodone 5mg prn. CMS intact besides baseline numbness. Pt was slightly hypotensive during shift, MD notified. No new orders. Pt A1 c walker/gait belt. Will continue to monitor.     Pt plan to d/c to [facility] tcu tomorrow.

## 2017-03-30 NOTE — PROGRESS NOTES
"  [facility]  Hospitalist Progress Note  Win Stapleton MD 03/30/2017    Reason for Stay (Diagnosis): s/p FRANK         Assessment and Plan:      Summary of Stay: Tucker Slater is a 86 year old male with a PMH significant for atrial fibrillation (on coumadin) who is POD 3 s/p right total hip arthroplasty.      1. S/p R FRANK: Doing well, cont PT/OT and pain control as per primary. DVT Prophylaxis: on warfarin and PCDs as per primary. D/C planning: To [facility]; anticipate tomorrow.  2. Persistent atrial fibrillation:  Interrogation of pacemaker recently shows persistent atrial arrhythmia since summer of 2016. On warfarin. Continue carvedilol 25 mg BID w/ parameters.  3. 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   3. HTN:  Continue losartan 100 mg daily w/ parameters and carvedilol 25 mg BID w/ parameters.   4. CKD stage III related mild anemia: Baseline Cr 1.3-1.5. Avoid nephrotoxins like NSAIDs. Continue to monitor BP and BG. Hgb stable near 10, no signs or symptoms of bleeding.   6. History of prostate cancer: Completed radiation therapy in 2009, no recurrence w/ stable PSAs in 2-range.  7. History of bladder cancer: Followed with urology for periodic cystoscopies, currently in remission.   8.  Urine retention - suspect narcotic related.  Was started on bethanechol but given issues with low BP yesterday and again today will d/c this medication and instead use tamsulosin.  Anticipate that as he uses less oxycodone this will improve/resolve     DVT ppx: Coumadin  Dispo: Home expected  D/c date: Per primary service; expect POD 3        Interval History (Subjective):      Borderline low BP this AM                  Physical Exam:      Last Vital Signs:  BP 98/45 (BP Location: Left arm)  Pulse 72  Temp 98  F (36.7  C) (Oral)  Resp 16  Ht 1.778 m (5' 10\")  Wt 82.5 kg (181 lb 14.1 oz)  SpO2 94%  " BMI 26.1 kg/m2      Intake/Output Summary (Last 24 hours) at 03/30/17 1414  Last data filed at 03/30/17 1300   Gross per 24 hour   Intake             1250 ml   Output             1915 ml   Net             -665 ml       Constitutional: Awake, alert, cooperative, no apparent distress   Respiratory: Clear to auscultation bilaterally, no crackles or wheezing   Cardiovascular: Regular rate and rhythm, normal S1 and S2, and no murmur noted   Abdomen: Normal bowel sounds, soft, non-distended, non-tender   Skin: No rashes, no cyanosis, dry to touch   Neuro: Alert and oriented x3, no weakness, numbness, memory loss.  Normal strength of , biceps, dorsi and plantarflex, and normal finger to nose testing.  Sensation intact in BUE and BLE   Extremities: No edema, normal range of motion   Other(s):        All other systems: Negative          Medications:      All current medications were reviewed with changes reflected in problem list.         Data:      All new lab and imaging data was reviewed.   Labs:    Recent Labs  Lab 03/29/17  1245 03/29/17  0710 03/28/17  0618   HGB 9.9* 10.6* 10.8*      Imaging:   No results found for this or any previous visit (from the past 24 hour(s)).

## 2017-03-30 NOTE — PLAN OF CARE
Problem: Goal Outcome Summary  Goal: Goal Outcome Summary  Outcome: No Change  Simón HRs 45-64, pacemaker, asymptomatic.  Pain managed with PRN oral pain meds + cold.  No nausea.  LS clear bilaterally, RA, encouraged hourly CDB/IS x10.  Baseline neuropathy bilateral toes & 3 fingers R hand.  Drsg scant dried drainage.  Hemovac DC. Up with SBA belt + walker, sat up in chair, ambulated with PT start shift.      Unable to void since start shift, bladder scanned twice max 350ml pt reported feeling uncomfortable pressure in bladder, straight cath end of night.

## 2017-03-30 NOTE — PROGRESS NOTES
SWS    D: Discharge planning continuing.. Per ortho PA pt will not be ready for discharge today, anticipate tomorrow. [facility] has been notified, they will be able to accept pt tomorrow. [facility] @ 1300.     I: Met with pt and discussed plan as noted above, pt in agreement with arrangements.    A/P: Anticipate no problem with arrangements made for transfer tomorrow, SW available until discharge should adjustments be needed.

## 2017-03-30 NOTE — PLAN OF CARE
Problem: Goal Outcome Summary  Goal: Goal Outcome Summary  Occupational Therapy Discharge Summary     Reason for therapy discharge:    Discharged to transitional care facility.     Progress towards therapy goal(s). See goals on Care Plan in [facility] electronic health record for goal details.  Goals partially met.  Barriers to achieving goals:   discharge from facility.     Therapy recommendation(s):    Continued therapy is recommended.  Rationale/Recommendations:  Pt would benefit from continued OT to maximize safety and independence in ADLs.

## 2017-03-30 NOTE — PLAN OF CARE
Problem: Goal Outcome Summary  Goal: Goal Outcome Summary  Outcome: No Change  A&O. VSS. Dressing is intact. SBA/Assist of 1 with a walker. Voiding small amounts. Straight cathed x1. Pain is controlled with po meds. +BS, C/O feeling constipated and has requested a suppository this morning. Will continue to monitor.

## 2017-03-31 VITALS
TEMPERATURE: 98.7 F | RESPIRATION RATE: 16 BRPM | DIASTOLIC BLOOD PRESSURE: 59 MMHG | HEART RATE: 72 BPM | SYSTOLIC BLOOD PRESSURE: 139 MMHG | BODY MASS INDEX: 26.04 KG/M2 | HEIGHT: 70 IN | OXYGEN SATURATION: 94 % | WEIGHT: 181.88 LBS

## 2017-03-31 LAB — INR PPP: 2.21 (ref 0.86–1.14)

## 2017-03-31 PROCEDURE — 36415 COLL VENOUS BLD VENIPUNCTURE: CPT | Performed by: ORTHOPAEDIC SURGERY

## 2017-03-31 PROCEDURE — 25000132 ZZH RX MED GY IP 250 OP 250 PS 637: Mod: GY | Performed by: INTERNAL MEDICINE

## 2017-03-31 PROCEDURE — A9270 NON-COVERED ITEM OR SERVICE: HCPCS | Mod: GY | Performed by: PHYSICIAN ASSISTANT

## 2017-03-31 PROCEDURE — 25000132 ZZH RX MED GY IP 250 OP 250 PS 637: Mod: GY | Performed by: PHYSICIAN ASSISTANT

## 2017-03-31 PROCEDURE — A9270 NON-COVERED ITEM OR SERVICE: HCPCS | Mod: GY | Performed by: INTERNAL MEDICINE

## 2017-03-31 PROCEDURE — 85610 PROTHROMBIN TIME: CPT | Performed by: ORTHOPAEDIC SURGERY

## 2017-03-31 RX ORDER — TAMSULOSIN HYDROCHLORIDE 0.4 MG/1
0.4 CAPSULE ORAL DAILY
Qty: 60 CAPSULE | Status: ON HOLD | DISCHARGE
Start: 2017-03-31 | End: 2017-04-19

## 2017-03-31 RX ORDER — WARFARIN SODIUM 2.5 MG/1
2.5 TABLET ORAL
Status: DISCONTINUED | OUTPATIENT
Start: 2017-03-31 | End: 2017-03-31 | Stop reason: HOSPADM

## 2017-03-31 RX ORDER — ACETAMINOPHEN 325 MG/1
650 TABLET ORAL EVERY 4 HOURS PRN
Status: DISCONTINUED | OUTPATIENT
Start: 2017-03-31 | End: 2017-03-31 | Stop reason: HOSPADM

## 2017-03-31 RX ADMIN — CARVEDILOL 25 MG: 25 TABLET, FILM COATED ORAL at 08:05

## 2017-03-31 RX ADMIN — TAMSULOSIN HYDROCHLORIDE 0.4 MG: 0.4 CAPSULE ORAL at 08:05

## 2017-03-31 RX ADMIN — LOSARTAN POTASSIUM 100 MG: 100 TABLET, FILM COATED ORAL at 08:05

## 2017-03-31 NOTE — PLAN OF CARE
Problem: Goal Outcome Summary  Goal: Goal Outcome Summary  Physical Therapy Discharge Summary     Reason for therapy discharge:    Discharged to transitional care facility.     Progress towards therapy goal(s). See goals on Care Plan in [facility] electronic health record for goal details.  Goals partially met.  Barriers to achieving goals:   limited tolerance for therapy. Did not attempt stairs.     Therapy recommendation(s):    Continued therapy is recommended.  Rationale/Recommendations:  .. Pt is below baseline for functional mobility and strength. Pt would benefit from continued skilled therapy to address these deficits.

## 2017-03-31 NOTE — PLAN OF CARE
Problem: Goal Outcome Summary  Goal: Goal Outcome Summary  Outcome: No Change  Voiding 100-150ml each but still retentions scanned 315ml, declined straight cath, monitor.  Mild pain managed with PRN oxycodone + cold.  No nausea.  LS clear bilaterally, RA, encouraged hourly CDB/IS x10.  Baseline neuropathy bilateral toes & 3 fingers R hand.  Drsg scant dried drainage.  Up with SBA belt + walker, sat up in chair, ambulated hallway.

## 2017-04-03 ENCOUNTER — NURSING HOME VISIT (OUTPATIENT)
Dept: GERIATRICS | Facility: CLINIC | Age: 82
End: 2017-04-03
Payer: COMMERCIAL

## 2017-04-03 VITALS
TEMPERATURE: 99 F | SYSTOLIC BLOOD PRESSURE: 132 MMHG | HEART RATE: 73 BPM | RESPIRATION RATE: 16 BRPM | WEIGHT: 184.9 LBS | BODY MASS INDEX: 26.53 KG/M2 | DIASTOLIC BLOOD PRESSURE: 74 MMHG | OXYGEN SATURATION: 97 %

## 2017-04-03 DIAGNOSIS — G60.3 IDIOPATHIC PROGRESSIVE NEUROPATHY: ICD-10-CM

## 2017-04-03 DIAGNOSIS — N13.8 HYPERTROPHY OF PROSTATE WITH URINARY OBSTRUCTION: ICD-10-CM

## 2017-04-03 DIAGNOSIS — I48.20 CHRONIC ATRIAL FIBRILLATION (H): ICD-10-CM

## 2017-04-03 DIAGNOSIS — Z47.1 AFTERCARE FOLLOWING RIGHT HIP JOINT REPLACEMENT SURGERY: ICD-10-CM

## 2017-04-03 DIAGNOSIS — N18.30 CKD (CHRONIC KIDNEY DISEASE) STAGE 3, GFR 30-59 ML/MIN (H): ICD-10-CM

## 2017-04-03 DIAGNOSIS — M16.11 PRIMARY OSTEOARTHRITIS OF RIGHT HIP: Primary | ICD-10-CM

## 2017-04-03 DIAGNOSIS — Z95.0 CARDIAC PACEMAKER IN SITU: ICD-10-CM

## 2017-04-03 DIAGNOSIS — R53.81 PHYSICAL DECONDITIONING: ICD-10-CM

## 2017-04-03 DIAGNOSIS — D62 ANEMIA DUE TO BLOOD LOSS, ACUTE: ICD-10-CM

## 2017-04-03 DIAGNOSIS — Z96.641 AFTERCARE FOLLOWING RIGHT HIP JOINT REPLACEMENT SURGERY: ICD-10-CM

## 2017-04-03 DIAGNOSIS — I10 ESSENTIAL HYPERTENSION, BENIGN: ICD-10-CM

## 2017-04-03 DIAGNOSIS — N40.1 HYPERTROPHY OF PROSTATE WITH URINARY OBSTRUCTION: ICD-10-CM

## 2017-04-03 PROCEDURE — 99310 SBSQ NF CARE HIGH MDM 45: CPT | Performed by: NURSE PRACTITIONER

## 2017-04-03 PROCEDURE — 99207 ZZC CDG-CORRECTLY CODED, REVIEWED AND AGREE: CPT | Performed by: NURSE PRACTITIONER

## 2017-04-03 NOTE — PROGRESS NOTES
[place] [facility]  PRIMARY CARE PROVIDER AND CLINIC:  Kwadwo Winslow [facility][note 3] [facility] [address] [facility]   Chief Complaint   Patient presents with     Hospital F/U       HPI:    Tucker Slater is a 86 year old  (3/13/1931),admitted to the [facility] from [facility].  Hospital stay 03/27/2017 through 03/31/2017.  Admitted to this facility for  rehab, medical management and nursing care. Current issues are:      Primary osteoarthritis of right hip  Aftercare following right hip joint replacement surgery  Mr. Slater has had multiple joint surgeries.  He is having very little pain in his hip post op.  The pain is primarily incisional.  Incision looks great.  He had quite a bit of serous drainage when he was first admitted, but that has stopped.  He is doing well with his walker and soon will get total independence with the walker.  He did have some difficulty with constipation right after surgery, but states he is doing fine now.    Anemia due to blood loss, acute  Hgb dropped to 9.9 after surgery. He is on vitamin C  He doesn't feel weak.    CKD (chronic kidney disease) stage 3, GFR 30-59 ml/min  He avoids NSAIDS and is mindful of keeping his bladder emptied    Chronic atrial fibrillation (H)  Cardiac pacemaker in situ  Chronic anticoagulation with Warfarin.  Rate controlled and has a pacemaker      Essential hypertension, benign  Controlled with Coreg, Maxzide, Cozaar.    Hypertrophy of prostate with urinary obstruction  History of prostate cancer which has resolved.  However he does have urinary retention and is very familiar with [facility].     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    Physical deconditioning  Here for rehab      CODE  STATUS/ADVANCE DIRECTIVES DISCUSSION:   CPR/Full code   Patient's living condition: lives with spouse    ALLERGIES:Morphine and Amlodipine  PAST MEDICAL HISTORY:  has a past medical history of Arrhythmia; Balance problem; Bradycardia; Carcinoma in situ of bladder (2000); Essential hypertension, benign; Generalized osteoarthrosis, unspecified site (knees); Numbness and tingling; Pacemaker; Pain in joint, shoulder region; Persistent atrial fibrillation (H) (1/30/15); Prostate CA (H) (2008-9); Prostate cancer (H) (11/08); Shoulder impingement; and Unspecified hereditary and idiopathic peripheral neuropathy (neuropathy ). He also has no past medical history of Chronic infection; History of blood transfusion; Malignant hyperthermia; or PONV (postoperative nausea and vomiting).  PAST SURGICAL HISTORY:  has a past surgical history that includes NONSPECIFIC PROCEDURE; NONSPECIFIC PROCEDURE; NONSPECIFIC PROCEDURE; NONSPECIFIC PROCEDURE (8/09 ); colonoscopy; Implant pacemaker (3/26/15); Cardioversion (3/26/15); and Arthroplasty hip (Right, 3/27/2017).  FAMILY HISTORY: family history includes Coronary Artery Disease in his father and mother; Family History Negative in his brother; HEART DISEASE in his father and mother.  SOCIAL HISTORY:  reports that he quit smoking about 39 years ago. He has never used smokeless tobacco. He reports that he drinks alcohol. He reports that he does not use illicit drugs.    Post Discharge Medication Reconciliation Status: discharge medications reconciled, continue medications without change.  Current Outpatient Prescriptions   Medication Sig Dispense Refill     Warfarin Sodium (COUMADIN PO) Take 2.5 mg by mouth daily Until 4/3/17       tamsulosin (FLOMAX) 0.4 MG capsule Take 1 capsule (0.4 mg) by mouth daily 60 capsule      COMPRESSION STOCKINGS 1 each continuous 1 each 0     hydrOXYzine (ATARAX) 25 MG tablet Take 1 tablet (25 mg) by mouth every 6 hours as needed for itching 60 tablet 1      losartan (COZAAR) 100 MG tablet Take 1 tablet (100 mg) by mouth daily 90 tablet 0     triamterene-hydrochlorothiazide (MAXZIDE) 75-50 MG per tablet Take 0.5 tablets by mouth daily 45 tablet 3     carvedilol (COREG) 25 MG tablet Take 1 tablet (25 mg) by mouth 2 times daily (with meals) 180 tablet 2     Multiple Vitamins-Minerals (OCUVITE PO) Take 1 tablet by mouth daily        Ascorbic Acid (VITAMIN C PO) Take 500 mg by mouth daily        calcium carbonate (OS-KARLA 500 MG [place]. CA) 500 MG tablet Take 500 mg by mouth daily        VITAMIN D, CHOLECALCIFEROL, PO Take 2,000 Units by mouth daily.       senna-docusate (SENOKOT-S;PERICOLACE) 8.6-50 MG per tablet Take 2 tablets by mouth daily And Give 2 tablets by mouth as needed for constipation.       oxyCODONE (OXY-IR) 5 MG capsule 1 tab (5 mg) po for pain rated at 1-5 and 2 tabs (10 mg) for pain rated at 6-10 every 4 hours as needed 20 capsule 0       ROS:  10 point ROS of systems including Constitutional, Eyes, Respiratory, Cardiovascular, Gastroenterology, Genitourinary, Integumentary, Muscularskeletal, Psychiatric were all negative except for pertinent positives noted in my HPI.    Exam:  /74  Pulse 73  Temp 99  F (37.2  C)  Resp 16  Wt 184 lb 14.4 oz (83.9 kg)  SpO2 97%  BMI 26.53 kg/m2  GENERAL APPEARANCE:  Alert, in no distress, appears healthy  ENT:  Mouth and posterior oropharynx normal, moist mucous membranes, normal hearing acuity  EYES:  EOM, conjunctivae, lids, pupils and irises normal  NECK:  No adenopathy,masses or thyromegaly  RESP:  respiratory effort and palpation of chest normal, lungs clear to auscultation , no respiratory distress  CV:  Palpation and auscultation of heart done , regular rate and rhythm, no murmur, rub, or gallop  ABDOMEN:  normal bowel sounds, soft, nontender, no hepatosplenomegaly or other masses, no guarding or rebound  M/S:   Gait and station abnormal limited ROM right hip, using walker for stability  Digits and nails  normal  SKIN:  Inspection of skin and subcutaneous tissue baseline, Palpation of skin and subcutaneous tissue baseline, incision CDI with staples  NEURO:   Cranial nerves 2-12 are normal tested and grossly at patient's baseline  PSYCH:  oriented X 3, normal insight, judgement and memory, affect and mood normal    Lab/Diagnostic data:  CBC RESULTS:   Recent Labs   Lab Test  03/29/17   1245  03/29/17   0710   05/17/16   1738  11/23/15   1423   WBC   --    --    --   6.7  7.0   RBC   --    --    --   4.80  4.79   HGB  9.9*  10.6*   < >  15.5  15.5   HCT   --    --    --   45.7  46.2   MCV   --    --    --   95  97   MCH   --    --    --   32.3  32.4   MCHC   --    --    --   33.9  33.5   RDW   --    --    --   13.3  13.5   PLT   --    --    --   177  194    < > = values in this interval not displayed.       Last Basic Metabolic Panel:  Recent Labs   Lab Test  03/29/17   0710  03/28/17   1346   03/20/17   1434  02/09/17   1058   NA   --    --    --   138  144   POTASSIUM   --    --    --   4.2  4.2   CHLORIDE   --    --    --   102  110*   KARLA   --    --    --   9.3  9.3   CO2   --    --    --   23  26   BUN   --    --    --   31*  41*   CR   --    --    --   1.44*  1.41*   GLC  103*  131*   < >  85  103*    < > = values in this interval not displayed.       Liver Function Studies -   Recent Labs   Lab Test  03/20/17   1434  11/07/16   1557   PROTTOTAL  7.3  7.2   ALBUMIN  3.1*  3.5   BILITOTAL  0.9  0.7   ALKPHOS  78  63   AST  17  19   ALT  20  23       TSH   Date Value Ref Range Status   05/17/2016 2.12 0.40 - 4.00 mU/L Final   11/23/2015 1.91 0.40 - 4.00 mU/L Final     Lab Results   Component Value Date    A1C 5.7 03/28/2017     ASSESSMENT/PLAN:  (M16.11) Primary osteoarthritis of right hip  (primary encounter diagnosis)  (Z47.1,  Z96.641) Aftercare following right hip joint replacement surgery  Comment: improving  Plan: mobilize, pain management with ICE and Tylenol, he prefers to avoid narcotics, use walker at all  times.    (D62) Anemia due to blood loss, acute  Comment: stable  Plan: HGB this week    (N18.3) CKD (chronic kidney disease) stage 3, GFR 30-59 ml/min  Comment: stable  Plan: follow BMP, avoid nephrotoxic medications    (I48.2) Chronic atrial fibrillation (H)  (Z95.0) Cardiac pacemaker in situ  Comment: chronic  Plan: continue above medications, INR managed through [facility] nurse, Goal INR 2-3    (I10) Essential hypertension, benign  Comment: chronic, stable  Plan: continue above medications. VS per facility protocol    (N40.1,  N13.8) Hypertrophy of prostate with urinary obstruction  Comment: chronic  Plan: [facility], monitor residuals, bladder scan PRN. F/U with urologist PRN    (G60.3) Idiopathic progressive neuropathy  Comment: chronic  Plan: might consider Gabapentin if the pain continues to interfere with his sleep.  F/u with PCP    (R53.81) Physical deconditioning  Comment: improving  Plan: PT/OT, fall precautions.   Care conference with patient and family for the progress of rehab and disposition issues will be discussed as planned.   Rehab evaluation and other evaluations including CPT are at rehab logs, to be reviewed separately.   Information reviewed:  Medications, vital signs, orders, nursing notes, problem list, hospital information. Total time spent with patient visit was 35 min including patient visit and review of past records. Greater than 50% of total time spent with counseling and coordinating care.    Electronically signed by:  SELMA Tidwell CNP

## 2017-04-04 ENCOUNTER — TRANSFERRED RECORDS (OUTPATIENT)
Dept: HEALTH INFORMATION MANAGEMENT | Facility: CLINIC | Age: 82
End: 2017-04-04

## 2017-04-04 ENCOUNTER — NURSING HOME VISIT (OUTPATIENT)
Dept: GERIATRICS | Facility: CLINIC | Age: 82
End: 2017-04-04
Payer: COMMERCIAL

## 2017-04-04 VITALS
RESPIRATION RATE: 16 BRPM | WEIGHT: 184.9 LBS | BODY MASS INDEX: 26.53 KG/M2 | HEART RATE: 73 BPM | OXYGEN SATURATION: 97 % | DIASTOLIC BLOOD PRESSURE: 74 MMHG | SYSTOLIC BLOOD PRESSURE: 132 MMHG | TEMPERATURE: 99 F

## 2017-04-04 DIAGNOSIS — Z96.649 S/P HIP REPLACEMENT, UNSPECIFIED LATERALITY: ICD-10-CM

## 2017-04-04 DIAGNOSIS — Z96.641 HISTORY OF TOTAL RIGHT HIP ARTHROPLASTY: Primary | ICD-10-CM

## 2017-04-04 DIAGNOSIS — D64.9 POSTOPERATIVE ANEMIA: ICD-10-CM

## 2017-04-04 DIAGNOSIS — I10 ESSENTIAL HYPERTENSION: ICD-10-CM

## 2017-04-04 DIAGNOSIS — N18.30 CKD (CHRONIC KIDNEY DISEASE) STAGE 3, GFR 30-59 ML/MIN (H): ICD-10-CM

## 2017-04-04 LAB
BUN SERPL-MCNC: 29 MG/DL (ref 9–26)
CALCIUM SERPL-MCNC: 8.1 MG/DL (ref 8.4–10.2)
CHLORIDE SERPLBLD-SCNC: 107 MMOL/L (ref 98–109)
CO2 SERPL-SCNC: 24 MMOL/L (ref 22–31)
CREAT SERPL-MCNC: 1.5 MG/DL (ref 0.73–1.18)
GFR SERPL CREATININE-BSD FRML MDRD: 42 ML/MIN/1.73M2
GLUCOSE SERPL-MCNC: 95 MG/DL (ref 70–100)
HEMOGLOBIN: 9.8 G/DL (ref 13.4–17.5)
POTASSIUM SERPL-SCNC: 4 MMOL/L (ref 3.5–5.2)
SODIUM SERPL-SCNC: 138 MMOL/L (ref 136–145)

## 2017-04-04 PROCEDURE — 99309 SBSQ NF CARE MODERATE MDM 30: CPT | Performed by: NURSE PRACTITIONER

## 2017-04-04 PROCEDURE — 99207 ZZC CDG-CORRECTLY CODED, REVIEWED AND AGREE: CPT | Performed by: NURSE PRACTITIONER

## 2017-04-04 RX ORDER — OXYCODONE HYDROCHLORIDE 5 MG/1
CAPSULE ORAL
Qty: 20 CAPSULE | Refills: 0 | COMMUNITY
Start: 2017-04-04 | End: 2017-04-11 | Stop reason: SINTOL

## 2017-04-04 RX ORDER — AMOXICILLIN 250 MG
2 CAPSULE ORAL DAILY
COMMUNITY
End: 2017-10-16

## 2017-04-04 NOTE — PROGRESS NOTES
[place] [facility]    Chief Complaint   Patient presents with     RECHECK       HPI:    Tucker Slater is a 86 year old  (3/13/1931), who is being seen today for an episodic care visit at [facility]. Today's concern is:  History of total right hip arthroplasty  S/P hip replacement   Post operative course continue in TCU. Today during visit he denies pain and is hoping to participate in PT and OT with minimal pain medication use.    CKD (chronic kidney disease) stage 3, GFR 30-59 ml/min  Baseline creatinine reported at 1.3-1.5. Creatinine on 4/4 is 1.50.     Essential hypertension  BP Range: 113-132/56-73 mmHg. Wt Range since admission to current: 185.4lbs - 184.9lbs. Today denies chest pain, or dizziness. Medications include Coreg 25 mg BID, Cozaar 100 mg daily and Maxzide 0.5 mg daily.    Postoperative anemia  Hgb on 3/29/17 was 9.9. Today it is 9.8. Denies feeling lightheaded or short of breath.     ALLERGIES: Morphine and Amlodipine  Past Medical, Surgical, Family and Social History reviewed and updated in [facility].    Current Outpatient Prescriptions   Medication Sig Dispense Refill     senna-docusate (SENOKOT-S;PERICOLACE) 8.6-50 MG per tablet Take 2 tablets by mouth daily And Give 2 tablets by mouth as needed for constipation.       oxyCODONE (OXY-IR) 5 MG capsule 1 tab (5 mg) po for pain rated at 1-5 and 2 tabs (10 mg) for pain rated at 6-10 every 4 hours as needed 20 capsule 0     Warfarin Sodium (COUMADIN PO) Take 2.5 mg by mouth daily Until 4/3/17       tamsulosin (FLOMAX) 0.4 MG capsule Take 1 capsule (0.4 mg) by mouth daily 60 capsule      COMPRESSION STOCKINGS 1 each continuous 1 each 0     hydrOXYzine (ATARAX) 25 MG tablet Take 1 tablet (25 mg) by mouth every 6 hours as needed for itching 60 tablet 1     losartan (COZAAR) 100 MG tablet Take 1 tablet (100 mg) by mouth daily 90 tablet 0     triamterene-hydrochlorothiazide (MAXZIDE) 75-50 MG per tablet Take 0.5 tablets by mouth daily 45  tablet 3     carvedilol (COREG) 25 MG tablet Take 1 tablet (25 mg) by mouth 2 times daily (with meals) 180 tablet 2     Multiple Vitamins-Minerals (OCUVITE PO) Take 1 tablet by mouth daily        Ascorbic Acid (VITAMIN C PO) Take 500 mg by mouth daily        calcium carbonate (OS-KARLA 500 MG [place]. CA) 500 MG tablet Take 500 mg by mouth daily        VITAMIN D, CHOLECALCIFEROL, PO Take 2,000 Units by mouth daily.       Medications reviewed:  Medications reconciled to facility chart and changes were made to reflect current medications as identified as above med list. Below are the changes that were made:   Medications stopped since last EPIC medication reconciliation:   There are no discontinued medications.    Medications started since last [facility] medication reconciliation:  Orders Placed This Encounter   Medications     senna-docusate (SENOKOT-S;PERICOLACE) 8.6-50 MG per tablet     Sig: Take 2 tablets by mouth daily And Give 2 tablets by mouth as needed for constipation.     REVIEW OF SYSTEMS:  4 point ROS including Respiratory, CV, GI and , other than that noted in the HPI,  is negative    Physical Exam:  /74  Pulse 73  Temp 99  F (37.2  C)  Resp 16  Wt 184 lb 14.4 oz (83.9 kg)  SpO2 97%  BMI 26.53 kg/m2  GENERAL APPEARANCE:  Alert, in no distress  ENT:  Mouth and posterior oropharynx normal, moist mucous membranes, normal hearing acuity  EYES:  EOM, conjunctivae, lids, pupils and irises normal, EOM normal, conjunctiva and lids normal  RESP:  respiratory effort and palpation of chest normal, lungs clear to auscultation , no respiratory distress  CV:  Palpation and auscultation of heart done, no murmur, rub, or gallop, irregular rhythm paced at baseline  M/S:   Gait and station normal s/p right FRANK   NEURO:   Cranial nerves 2-12 are normal tested and grossly at patient's baseline  PSYCH:  affect and mood normal    Recent Labs:    CBC RESULTS:   Recent Labs   Lab Test  03/29/17   1245  03/29/17   0710    05/17/16   1738  11/23/15   1423   WBC   --    --    --   6.7  7.0   RBC   --    --    --   4.80  4.79   HGB  9.9*  10.6*   < >  15.5  15.5   HCT   --    --    --   45.7  46.2   MCV   --    --    --   95  97   MCH   --    --    --   32.3  32.4   MCHC   --    --    --   33.9  33.5   RDW   --    --    --   13.3  13.5   PLT   --    --    --   177  194    < > = values in this interval not displayed.       Last Basic Metabolic Panel:  Recent Labs   Lab Test  03/29/17   0710  03/28/17   1346   03/20/17   1434  02/09/17   1058   NA   --    --    --   138  144   POTASSIUM   --    --    --   4.2  4.2   CHLORIDE   --    --    --   102  110*   KARLA   --    --    --   9.3  9.3   CO2   --    --    --   23  26   BUN   --    --    --   31*  41*   CR   --    --    --   1.44*  1.41*   GLC  103*  131*   < >  85  103*    < > = values in this interval not displayed.       Liver Function Studies -   Recent Labs   Lab Test  03/20/17   1434  11/07/16   1557   PROTTOTAL  7.3  7.2   ALBUMIN  3.1*  3.5   BILITOTAL  0.9  0.7   ALKPHOS  78  63   AST  17  19   ALT  20  23     Assessment/Plan:  (Z96.641) History of total right hip arthroplasty  (primary encounter diagnosis)  (Z96.649) S/P hip replacement   Comment: Improving  Plan: Adjust narcotic orders per patient request to include 1 tab oxycodone q4H prn. Continue with post operative plan of care. Continue PT and OT. DVT prophylaxis include warfarin and javid hose.    (N18.3) CKD (chronic kidney disease) stage 3, GFR 30-59 ml/min  Comment: Ongoing  Plan: BMP on 4/10/17. Continue to avoid nephrotoxin drugs.     (I10) Essential hypertension  Comment: Ongoing  Plan: Continue with current medications and monitor BP.    (D64.9) Postoperative anemia  Comment: Acute  Plan: hgb recheck on 4/10/17      Electronically signed by  SELMA Torrez CNP

## 2017-04-07 ENCOUNTER — NURSING HOME VISIT (OUTPATIENT)
Dept: GERIATRICS | Facility: CLINIC | Age: 82
End: 2017-04-07
Payer: COMMERCIAL

## 2017-04-07 ENCOUNTER — TELEPHONE (OUTPATIENT)
Dept: INTERNAL MEDICINE | Facility: CLINIC | Age: 82
End: 2017-04-07

## 2017-04-07 VITALS
WEIGHT: 185 LBS | RESPIRATION RATE: 17 BRPM | SYSTOLIC BLOOD PRESSURE: 109 MMHG | HEART RATE: 72 BPM | OXYGEN SATURATION: 97 % | BODY MASS INDEX: 26.54 KG/M2 | TEMPERATURE: 98.7 F | DIASTOLIC BLOOD PRESSURE: 51 MMHG

## 2017-04-07 DIAGNOSIS — Z96.641 HISTORY OF TOTAL RIGHT HIP ARTHROPLASTY: Primary | ICD-10-CM

## 2017-04-07 DIAGNOSIS — I10 ESSENTIAL HYPERTENSION, BENIGN: ICD-10-CM

## 2017-04-07 DIAGNOSIS — K30 GASTROINTESTINAL DISTRESS: ICD-10-CM

## 2017-04-07 DIAGNOSIS — N18.30 CKD (CHRONIC KIDNEY DISEASE) STAGE 3, GFR 30-59 ML/MIN (H): ICD-10-CM

## 2017-04-07 DIAGNOSIS — I10 ESSENTIAL HYPERTENSION: ICD-10-CM

## 2017-04-07 DIAGNOSIS — D64.9 POSTOPERATIVE ANEMIA: ICD-10-CM

## 2017-04-07 DIAGNOSIS — Z96.649 S/P HIP REPLACEMENT, UNSPECIFIED LATERALITY: ICD-10-CM

## 2017-04-07 PROCEDURE — 99207 ZZC CDG-DOWN CODE MED NECESSITY: CPT | Performed by: NURSE PRACTITIONER

## 2017-04-07 PROCEDURE — 99309 SBSQ NF CARE MODERATE MDM 30: CPT | Performed by: NURSE PRACTITIONER

## 2017-04-07 RX ORDER — TRIAMTERENE AND HYDROCHLOROTHIAZIDE 75; 50 MG/1; MG/1
0.5 TABLET ORAL DAILY
Qty: 45 TABLET | Refills: 3 | Status: SHIPPED | OUTPATIENT
Start: 2017-04-07 | End: 2018-05-03

## 2017-04-07 NOTE — TELEPHONE ENCOUNTER
Spouse states patient is being released from [facility] on Monday at 230pm. She wants to see if Dr. Winslow can work him in for an appointment on Tuesday. She also wants to address an issue with patient's hands at the appointment. She said that his hands look like hands in a coffin. There's not enough blood flow in his hands for a finger prick (when nurse tries to draw blood for INR check). He has to run his hands through hot water before they can prick his finger. She doesn't think this is normal and feels that this is urgent. She says that he doesn't have any feelings in his hands either. Please call to discuss. Try home first at [phone] and then cell at [phone].    Luma OMALLEY  Central Scheduler

## 2017-04-07 NOTE — PROGRESS NOTES
[place] [facility]    Chief Complaint   Patient presents with     RECHECK       HPI:    Tucker Slater is a 86 year old  (3/13/1931), who is being seen today for an episodic care visit at [facility]. Today's concern is:  History of total right hip arthroplasty  S/P hip replacement   Post operative course continue in TCU. Today during visit he denies pain and plans to participate in PT and OT with minimal pain medication use.    CKD (chronic kidney disease) stage 3, GFR 30-59 ml/min  Baseline creatinine reported at 1.3-1.5. Creatinine on 4/4 is 1.50.     Essential hypertension  BP Range: 106-114/51-84 mmHg. Wt Range since admission is stable. Today denies chest pain, or dizziness. Medications include Coreg 25 mg BID, Cozaar 100 mg daily and Maxzide 0.5 mg daily.    Postoperative anemia  Hgb: 9.9--> 9.8. Denies feeling lightheaded and dizziness.      Gastrointestinal distress  Nsg reports that patient had one large emesis yesterday and that he also reported 6 loose stools and had a slight temp of 99.2  Today he denies any loose stools or episodes of vomiting. He denies abdominal pain, fever, chills or shortness of breath.    ALLERGIES: Morphine and Amlodipine  Past Medical, Surgical, Family and Social History reviewed and [facility].    Current Outpatient Prescriptions   Medication Sig Dispense Refill     ACETAMINOPHEN PO Take 650 mg by mouth every 4 hours as needed for pain       senna-docusate (SENOKOT-S;PERICOLACE) 8.6-50 MG per tablet Take 2 tablets by mouth daily And Give 2 tablets by mouth as needed for constipation.       oxyCODONE (OXY-IR) 5 MG capsule 1 tab (5 mg) po for pain rated at 1-5 and 2 tabs (10 mg) for pain rated at 6-10 every 4 hours as needed 20 capsule 0     Warfarin Sodium (COUMADIN PO) Take 2.5 mg by mouth daily Until 4/3/17       tamsulosin (FLOMAX) 0.4 MG capsule Take 1 capsule (0.4 mg) by mouth daily 60 capsule      COMPRESSION STOCKINGS 1 each continuous 1 each 0      hydrOXYzine (ATARAX) 25 MG tablet Take 1 tablet (25 mg) by mouth every 6 hours as needed for itching 60 tablet 1     losartan (COZAAR) 100 MG tablet Take 1 tablet (100 mg) by mouth daily 90 tablet 0     triamterene-hydrochlorothiazide (MAXZIDE) 75-50 MG per tablet Take 0.5 tablets by mouth daily 45 tablet 3     carvedilol (COREG) 25 MG tablet Take 1 tablet (25 mg) by mouth 2 times daily (with meals) 180 tablet 2     Multiple Vitamins-Minerals (OCUVITE PO) Take 1 tablet by mouth daily        Ascorbic Acid (VITAMIN C PO) Take 500 mg by mouth daily        calcium carbonate (OS-KARLA 500 MG [place]. CA) 500 MG tablet Take 500 mg by mouth daily        VITAMIN D, CHOLECALCIFEROL, PO Take 2,000 Units by mouth daily.       Medications reviewed:  Medications reconciled to facility chart and changes were made to reflect current medications as identified as above med list. Below are the changes that were made:   Medications stopped since last EPIC medication reconciliation:   There are no discontinued medications.    Medications started since last [facility] medication reconciliation:  No orders of the defined types were placed in this encounter.    REVIEW OF SYSTEMS:  4 point ROS including Respiratory, CV, GI and , other than that noted in the HPI,  is negative    Physical Exam:  /51  Pulse 72  Temp 98.7  F (37.1  C)  Resp 17  Wt 185 lb (83.9 kg)  SpO2 97%  BMI 26.54 kg/m2  GENERAL APPEARANCE:  Alert, in no distress, sitting up in wheelchair post PT  ENT:  Mouth and posterior oropharynx normal, moist mucous membranes, normal hearing acuity  EYES:  EOM, conjunctivae, lids, pupils and irises normal, EOM normal, conjunctiva and lids normal  RESP:  respiratory effort and palpation of chest normal, lungs clear to auscultation , no respiratory distress  CV:  Palpation and auscultation of heart done , irregular rate and rhythm, paced is baseline  ABDOMEN:  normal bowel sounds, soft, nontender, no hepatosplenomegaly or other  masses  M/S:   Gait and station normal with walker post ortho surgery  SKIN:  Inspection of skin and subcutaneous tissue baseline, Palpation of skin and subcutaneous tissue baseline  NEURO:   Cranial nerves 2-12 are normal tested and grossly at patient's baseline    Recent Labs:    CBC RESULTS:   Recent Labs   Lab Test 04/04/17 03/29/17   1245   05/17/16   1738  11/23/15   1423   WBC   --    --    --   6.7  7.0   RBC   --    --    --   4.80  4.79   HGB  9.8*  9.9*   < >  15.5  15.5   HCT   --    --    --   45.7  46.2   MCV   --    --    --   95  97   MCH   --    --    --   32.3  32.4   MCHC   --    --    --   33.9  33.5   RDW   --    --    --   13.3  13.5   PLT   --    --    --   177  194    < > = values in this interval not displayed.       Last Basic Metabolic Panel:  Recent Labs   Lab Test 04/04/17 03/29/17   0710   03/20/17   1434   NA  138   --    --   138   POTASSIUM  4.0   --    --   4.2   CHLORIDE  107   --    --   102   KARLA  8.1*   --    --   9.3   CO2  24   --    --   23   BUN  29*   --    --   31*   CR  1.50*   --    --   1.44*   GLC  95  103*   < >  85    < > = values in this interval not displayed.       Liver Function Studies -   Recent Labs   Lab Test  03/20/17   1434  11/07/16   1557   PROTTOTAL  7.3  7.2   ALBUMIN  3.1*  3.5   BILITOTAL  0.9  0.7   ALKPHOS  78  63   AST  17  19   ALT  20  23       TSH   Date Value Ref Range Status   05/17/2016 2.12 0.40 - 4.00 mU/L Final   11/23/2015 1.91 0.40 - 4.00 mU/L Final     Lab Results   Component Value Date    A1C 5.7 03/28/2017     Assessment/Plan:  (Z96.641) History of total right hip arthroplasty  (primary encounter diagnosis)  (Z96.649) S/P hip replacement, unspecified laterality  Comment: Improving   Plan: Continue PT, OT. Oxycodone as needed prn. DVT prophylaxis include warfarin and javid hose.    (N18.3) CKD (chronic kidney disease) stage 3, GFR 30-59 ml/min  Comment: Chronic  Plan: BMP next week on 4/10/17. Continue to avoid nephrotoxin drugs.  Encourage fluid intake between meals.    (I10) Essential hypertension  Comment: Controlled- running on the lower side of therapeutic   Plan: Add hold parameter for Maxzide, SBP < 100.  Continue to monitor and update NP with BP values next week.    (D64.9) Postoperative anemia  Comment: Acute  Plan: Hgb recheck on 4/10/17.    (K30) Gastrointestinal distress  Comment: Acute and resolving  Plan: Continue to monitor for loose stools, intake. Monitor vital signs. Monitor for abdominal pain or emesis.    Orders:  1. Hold Maxzide for SBP <100     Electronically signed by  SELMA Torrez CNP

## 2017-04-10 ENCOUNTER — TRANSFERRED RECORDS (OUTPATIENT)
Dept: HEALTH INFORMATION MANAGEMENT | Facility: CLINIC | Age: 82
End: 2017-04-10

## 2017-04-10 ENCOUNTER — DISCHARGE SUMMARY NURSING HOME (OUTPATIENT)
Dept: GERIATRICS | Facility: CLINIC | Age: 82
End: 2017-04-10
Payer: COMMERCIAL

## 2017-04-10 VITALS
TEMPERATURE: 97.8 F | WEIGHT: 185 LBS | RESPIRATION RATE: 16 BRPM | BODY MASS INDEX: 26.54 KG/M2 | HEART RATE: 70 BPM | DIASTOLIC BLOOD PRESSURE: 60 MMHG | OXYGEN SATURATION: 97 % | SYSTOLIC BLOOD PRESSURE: 106 MMHG

## 2017-04-10 DIAGNOSIS — M16.11 PRIMARY OSTEOARTHRITIS OF RIGHT HIP: Primary | ICD-10-CM

## 2017-04-10 DIAGNOSIS — I10 ESSENTIAL HYPERTENSION, BENIGN: ICD-10-CM

## 2017-04-10 DIAGNOSIS — Z47.1 AFTERCARE FOLLOWING RIGHT HIP JOINT REPLACEMENT SURGERY: ICD-10-CM

## 2017-04-10 DIAGNOSIS — I48.20 CHRONIC ATRIAL FIBRILLATION (H): ICD-10-CM

## 2017-04-10 DIAGNOSIS — Z96.641 AFTERCARE FOLLOWING RIGHT HIP JOINT REPLACEMENT SURGERY: ICD-10-CM

## 2017-04-10 DIAGNOSIS — N18.30 CKD (CHRONIC KIDNEY DISEASE) STAGE 3, GFR 30-59 ML/MIN (H): ICD-10-CM

## 2017-04-10 DIAGNOSIS — D62 ANEMIA DUE TO BLOOD LOSS, ACUTE: ICD-10-CM

## 2017-04-10 LAB
BUN SERPL-MCNC: 46 MG/DL (ref 9–26)
CALCIUM SERPL-MCNC: 8.3 MG/DL (ref 8.4–10.2)
CHLORIDE SERPLBLD-SCNC: 110 MMOL/L (ref 98–109)
CO2 SERPL-SCNC: 17 MMOL/L (ref 22–31)
CREAT SERPL-MCNC: 2.11 MG/DL (ref 0.73–1.18)
GFR SERPL CREATININE-BSD FRML MDRD: 28 ML/MIN/1.73M2
GLUCOSE SERPL-MCNC: 76 MG/DL (ref 70–100)
POTASSIUM SERPL-SCNC: 3.7 MMOL/L (ref 3.5–5.2)
SODIUM SERPL-SCNC: 137 MMOL/L (ref 136–145)

## 2017-04-10 PROCEDURE — 99207 ZZC CDG-CORRECTLY CODED, REVIEWED AND AGREE: CPT | Performed by: NURSE PRACTITIONER

## 2017-04-10 PROCEDURE — 99316 NF DSCHRG MGMT 30 MIN+: CPT | Performed by: NURSE PRACTITIONER

## 2017-04-10 NOTE — PROGRESS NOTES
[facility] DISCHARGE SUMMARY    PATIENT'S NAME: Tucker Slater  YOB: 1931  MEDICAL RECORD NUMBER:  [id number]    PRIMARY CARE PROVIDER AND CLINIC RESPONSIBLE AFTER TRANSFER: Kawdwo Winslow [facility] [address] / [place]     CODE STATUS/ADVANCE DIRECTIVES DISCUSSION:   CPR/Full code        Allergies   Allergen Reactions     Morphine Nausea and Vomiting     Amlodipine      Edema       TRANSFERRING PROVIDERS: Tonya Lynn Haase, APRN CNP, Seb Black MD  DATE OF SNF ADMISSION:  March / 31 / 2017  DATE OF SNF (anticipated) DISCHARGE: April / 10 / 2017  DISCHARGE DISPOSITION: FMG Provider   Nursing Facility: [facility] stay 03/27/2017 to 03/31/2017.     Condition on Discharge:  Stable.  Function:  Walking indep using a RW up to 300 feet, indep with ADL's  Cognitive Scores: BIMS 13/15    Equipment: walker    DISCHARGE DIAGNOSIS:   1. Primary osteoarthritis of right hip    2. Aftercare following right hip joint replacement surgery    3. Anemia due to blood loss, acute    4. Essential hypertension, benign    5. Chronic atrial fibrillation (H)    6. CKD (chronic kidney disease) stage 3, GFR 30-59 ml/min          PAST MEDICAL HISTORY:  has a past medical history of Arrhythmia; Balance problem; Bradycardia; Carcinoma in situ of bladder (2000); Essential hypertension, benign; Generalized osteoarthrosis, unspecified site (knees); Numbness and tingling; Pacemaker; Pain in joint, shoulder region; Persistent atrial fibrillation (H) (1/30/15); Prostate CA (H) (2008-9); Prostate cancer (H) (11/08); Shoulder impingement; and Unspecified hereditary and idiopathic peripheral neuropathy (neuropathy ). He also has no past medical history of Chronic infection; History of blood transfusion; Malignant hyperthermia; or PONV (postoperative nausea and vomiting).    DISCHARGE MEDICATIONS:  Current Outpatient Prescriptions   Medication  Sig Dispense Refill     ACETAMINOPHEN PO Take 650 mg by mouth every 4 hours as needed for pain       triamterene-hydrochlorothiazide (MAXZIDE) 75-50 MG per tablet Take 0.5 tablets by mouth daily Hold for SBP <100. 45 tablet 3     senna-docusate (SENOKOT-S;PERICOLACE) 8.6-50 MG per tablet Take 2 tablets by mouth daily And Give 2 tablets by mouth as needed for constipation.       oxyCODONE (OXY-IR) 5 MG capsule 1 tab (5 mg) po for pain rated at 1-5 and 2 tabs (10 mg) for pain rated at 6-10 every 4 hours as needed 20 capsule 0     tamsulosin (FLOMAX) 0.4 MG capsule Take 1 capsule (0.4 mg) by mouth daily 60 capsule      COMPRESSION STOCKINGS 1 each continuous 1 each 0     hydrOXYzine (ATARAX) 25 MG tablet Take 1 tablet (25 mg) by mouth every 6 hours as needed for itching 60 tablet 1     losartan (COZAAR) 100 MG tablet Take 1 tablet (100 mg) by mouth daily 90 tablet 0     carvedilol (COREG) 25 MG tablet Take 1 tablet (25 mg) by mouth 2 times daily (with meals) 180 tablet 2     Multiple Vitamins-Minerals (OCUVITE PO) Take 1 tablet by mouth daily        Ascorbic Acid (VITAMIN C PO) Take 500 mg by mouth daily        calcium carbonate (OS-KARLA 500 MG [place]. CA) 500 MG tablet Take 500 mg by mouth daily        VITAMIN D, CHOLECALCIFEROL, PO Take 2,000 Units by mouth daily.       Warfarin Sodium (COUMADIN PO) Take 2.5 mg by mouth daily Until 4/3/17         MEDICATION CHANGES/RATIONALE:   DC oxycodone patient states he is not taking it, tylenol is effective for pain control  Controlled medications sent with patient: not applicable/none     ROS:    10 point ROS of systems including Constitutional, Eyes, Respiratory, Cardiovascular, Gastroenterology, Genitourinary, Integumentary, Muscularskeletal, Psychiatric were all negative except for pertinent positives noted in my HPI.    Physical Exam:   Vitals: /60  Pulse 70  Temp 97.8  F (36.6  C)  Resp 16  Wt 185 lb (83.9 kg)  SpO2 97%  BMI 26.54 kg/m2  BMI= Body mass index is  26.54 kg/(m^2).    GENERAL APPEARANCE:  Alert, in no distress  ENT:  Mouth and posterior oropharynx normal, moist mucous membranes, [place]  EYES:  EOM, conjunctivae, lids, pupils and irises normal, PERRL  RESP:  respiratory effort and palpation of chest normal, lungs clear to auscultation , no respiratory distress  CV:  Palpation and auscultation of heart done , regular rate and rhythm, no murmur, rub, or gallop, peripheral edema trace+ in LE bilaterally  ABDOMEN:  normal bowel sounds, soft, nontender, no hepatosplenomegaly or other masses  M/S:   Examination of:   right upper extremity, left upper extremity and left lower extremity  Inspection, ROM, stability and muscle strength normal and decreased ROM and strength RLE  SKIN:  Inspection of skin and subcutaneous tissue baseline  NEURO:   Cranial nerves 2-12 are normal tested and grossly at patient's baseline, speech WNL    HPI Nursing Facility Course:  Patient progressed in therapy to walking indep using a RW up to 300 feet, indep with ADL's will DC to home to live with wife with OP PT.     Primary osteoarthritis of right hip  Aftercare following right hip joint replacement surgery  Acute: s/p Right FRANK Dr. Wynn, OP PT, continue tylenol 650mg q 4 hours prn for pain, coumadin as directed INR goal of 2-3    Anemia due to blood loss, acute  Hgb stable 9.8 during TCU stay, f/u with PCP    Essential hypertension, benign  Chronic: continue coreg 25mg BID, losartan 100mg QD    Chronic atrial fibrillation (H)  Continue coreg 25mg BID and coumadin as directed INR goal of 2-3    CKD (chronic kidney disease) stage 3, GFR 30-59 ml/min  Chronic: creat 1.5 during TCU stay, f/u with PCP      Last 3 BP's: 106/60, 101/53, 106/64  Admission wt: 185.4lbs  Current Wt. 185lbs      DISCHARGE PLAN:  Physical Therapy  Patient instructed to follow-up with:  PCP in 7 days      Current [place] scheduled appointments:  Future Appointments  Date Time Provider Department Center   4/10/2017  11:30 AM Haase, Tonya Lynn, APRN CNP [facility]   4/11/2017 3:20 PM Kwadwo Winslow MD [facility]   5/8/2017 8:45 AM KIDD TECH1 [facility]   5/10/2017 11:00 AM RI LAB [facility]       MTM referral needed and placed by this provider: No    Pending labs: none  SNF labs  CBC RESULTS:   Recent Labs   Lab Test 04/04/17 03/29/17   1245   05/17/16   1738  11/23/15   1423   WBC   --    --    --   6.7  7.0   RBC   --    --    --   4.80  4.79   HGB  9.8*  9.9*   < >  15.5  15.5   HCT   --    --    --   45.7  46.2   MCV   --    --    --   95  97   MCH   --    --    --   32.3  32.4   MCHC   --    --    --   33.9  33.5   RDW   --    --    --   13.3  13.5   PLT   --    --    --   177  194    < > = values in this interval not displayed.       Last Basic Metabolic Panel:  Recent Labs   Lab Test 04/04/17 03/29/17   0710   03/20/17   1434   NA  138   --    --   138   POTASSIUM  4.0   --    --   4.2   CHLORIDE  107   --    --   102   KARLA  8.1*   --    --   9.3   CO2  24   --    --   23   BUN  29*   --    --   31*   CR  1.50*   --    --   1.44*   GLC  95  103*   < >  85    < > = values in this interval not displayed.       Liver Function Studies -   Recent Labs   Lab Test  03/20/17   1434  11/07/16   1557   PROTTOTAL  7.3  7.2   ALBUMIN  3.1*  3.5   BILITOTAL  0.9  0.7   ALKPHOS  78  63   AST  17  19   ALT  20  23       TSH   Date Value Ref Range Status   05/17/2016 2.12 0.40 - 4.00 mU/L Final   11/23/2015 1.91 0.40 - 4.00 mU/L Final     Lab Results   Component Value Date    A1C 5.7 03/28/2017     Discharge Treatments:none    TOTAL DISCHARGE TIME:   Greater than 30 minutes  Electronically signed by:  Tonya Lynn Haase, APRN CNP

## 2017-04-10 NOTE — TELEPHONE ENCOUNTER
Called wife Alba, (Consent to Communicate in [facility]), scheduled for TCU discharge f/u 4/11/17 at 3:20pm with Goldy.

## 2017-04-11 ENCOUNTER — OFFICE VISIT (OUTPATIENT)
Dept: INTERNAL MEDICINE | Facility: CLINIC | Age: 82
End: 2017-04-11
Payer: COMMERCIAL

## 2017-04-11 ENCOUNTER — TELEPHONE (OUTPATIENT)
Dept: INTERNAL MEDICINE | Facility: CLINIC | Age: 82
End: 2017-04-11

## 2017-04-11 VITALS
WEIGHT: 179 LBS | HEIGHT: 70 IN | HEART RATE: 75 BPM | DIASTOLIC BLOOD PRESSURE: 58 MMHG | OXYGEN SATURATION: 98 % | BODY MASS INDEX: 25.62 KG/M2 | TEMPERATURE: 98.3 F | SYSTOLIC BLOOD PRESSURE: 118 MMHG

## 2017-04-11 DIAGNOSIS — Z96.641 HISTORY OF TOTAL HIP ARTHROPLASTY, RIGHT: ICD-10-CM

## 2017-04-11 DIAGNOSIS — N17.9 ACUTE RENAL FAILURE, UNSPECIFIED ACUTE RENAL FAILURE TYPE (H): ICD-10-CM

## 2017-04-11 DIAGNOSIS — D64.9 ANEMIA, UNSPECIFIED TYPE: ICD-10-CM

## 2017-04-11 DIAGNOSIS — G56.03 BILATERAL CARPAL TUNNEL SYNDROME: Primary | ICD-10-CM

## 2017-04-11 DIAGNOSIS — N18.30 CKD (CHRONIC KIDNEY DISEASE) STAGE 3, GFR 30-59 ML/MIN (H): ICD-10-CM

## 2017-04-11 DIAGNOSIS — I48.20 CHRONIC ATRIAL FIBRILLATION (H): ICD-10-CM

## 2017-04-11 DIAGNOSIS — Z09 HOSPITAL DISCHARGE FOLLOW-UP: ICD-10-CM

## 2017-04-11 DIAGNOSIS — I10 ESSENTIAL HYPERTENSION, BENIGN: ICD-10-CM

## 2017-04-11 PROCEDURE — 99496 TRANSJ CARE MGMT HIGH F2F 7D: CPT | Performed by: INTERNAL MEDICINE

## 2017-04-11 NOTE — PROGRESS NOTES
SUBJECTIVE:                                                    Tucker Slater is a 86 year old male who presents to clinic today for the following health issues:          Hospital Follow-up Visit:    Hospital/Nursing Home/ Rehab Facility: [facility]  Date of Admission: 03/27/17 and 03/31/17  Date of Discharge: 03/31/17 and 04/10/17  Reason(s) for Admission: hip osteoarthritis, FRANK             Problems taking medications regularly:  None       Medication changes since discharge: None       Problems adhering to non-medication therapy:  None    Summary of hospitalization:  [facility] discharge summary reviewed  Diagnostic Tests/Treatments reviewed.  Follow up needed: lab  Other Healthcare Providers Involved in Patient s Care:         None  Update since discharge: improved.     Post Discharge Medication Reconciliation: discharge medications reconciled, continue medications without change.  Plan of care communicated with patient     Coding guidelines for this visit:  Type of Medical   Decision Making Face-to-Face Visit       within 7 Days of discharge Face-to-Face Visit        within 14 days of discharge   Moderate Complexity [id number]   High Complexity [id number]          Patient is seen for a follow up visit.  Recently hospitalized for right knee OA, had FRANK surgery. Recovering well. Had post surgical anemia. Did not need transfusion.   Has symptoms of weakness, feeling light headed. Had urine retention , required [place]. Now improved urination.   Had narcotic related constipation. Gradually improved. No abdominal pain. Hip pain is controlled.   Has h/o CKD, worsened renal function in the hospital and in his stay in the TCU post hospitalization. Poor appetite noted.   Has h/o HTN. on medical treatment. BP has been controlled. No side effects from medications. No CP, HA, dizziness. good compliance with medications and low salt diet.  Has history of atrial fibrillation.  On anticoagulation with Coumadin and rate control medications. Asymptonatic - no chest pains , palpitations,  no side effects from medications.  Coumadin was held during surgery, resumed and needs follow up .     PROBLEMS TO ADD ON...  Has h/o peripheral neuropathy, numbness and paresthesias in the feet. Seen neurology. Unclear etiology.   Has developed right hand first 3 fingers numbness and weakness of the hand , also on and off numbness in the same distribution on the left hand.     Problem list and histories reviewed & adjusted, as indicated.  Additional history: as documented    Patient Active Problem List   Diagnosis     Essential hypertension, benign     Carcinoma in situ of bladder     Hypertrophy of prostate with urinary obstruction     Generalized osteoarthrosis, unspecified site     Hereditary and idiopathic peripheral neuropathy     Pain in joint, shoulder region     CARDIOVASCULAR SCREENING; LDL GOAL LESS THAN 130     Advanced directives, counseling/discussion     Peripheral neuropathy (H)     GI bleed     Anemia     Near syncope     Anemia due to blood loss, acute     Atrial fibrillation (H)     Bradycardia     CKD (chronic kidney disease) stage 3, GFR 30-59 ml/min     Cardiac pacemaker in situ     Long-term (current) use of anticoagulants [Z79.01]     Degenerative arthritis of hip     Past Surgical History:   Procedure Laterality Date     ARTHROPLASTY HIP Right 3/27/2017    Procedure: ARTHROPLASTY HIP;  Surgeon: Jigar Wynn MD;  Location: [facility] OR     C NONSPECIFIC PROCEDURE      bilat inguinal hernia repairs ( 3total procedures)      C NONSPECIFIC PROCEDURE      pilonidal cyst     C NONSPECIFIC PROCEDURE      T + A     C NONSPECIFIC PROCEDURE  8/09     R+L total knee     CARDIOVERSION  3/26/15     COLONOSCOPY       IMPLANT PACEMAKER  3/26/15       Social History   Substance Use Topics     Smoking status: Former Smoker     Quit date: 9/12/1977     Smokeless tobacco: Never Used     Alcohol use No  "    Family History   Problem Relation Age of Onset     HEART DISEASE Father       89yo     Coronary Artery Disease Father      HEART DISEASE Mother       76yo     Coronary Artery Disease Mother      Family History Negative Brother          Current Outpatient Prescriptions   Medication Sig Dispense Refill     ACETAMINOPHEN PO Take 650 mg by mouth every 4 hours as needed for pain       triamterene-hydrochlorothiazide (MAXZIDE) 75-50 MG per tablet Take 0.5 tablets by mouth daily Hold for SBP <100. 45 tablet 3     Warfarin Sodium (COUMADIN PO) Take 2.5 mg by mouth daily Until 4/3/17       losartan (COZAAR) 100 MG tablet Take 1 tablet (100 mg) by mouth daily 90 tablet 0     carvedilol (COREG) 25 MG tablet Take 1 tablet (25 mg) by mouth 2 times daily (with meals) 180 tablet 2     Multiple Vitamins-Minerals (OCUVITE PO) Take 1 tablet by mouth daily        Ascorbic Acid (VITAMIN C PO) Take 500 mg by mouth daily        calcium carbonate (OS-KARLA 500 MG [place]. CA) 500 MG tablet Take 500 mg by mouth daily        VITAMIN D, CHOLECALCIFEROL, PO Take 2,000 Units by mouth daily.       senna-docusate (SENOKOT-S;PERICOLACE) 8.6-50 MG per tablet Take 2 tablets by mouth daily Reported on 2017       tamsulosin (FLOMAX) 0.4 MG capsule Take 1 capsule (0.4 mg) by mouth daily (Patient not taking: Reported on 2017) 60 capsule      COMPRESSION STOCKINGS 1 each continuous 1 each 0       Reviewed and updated as needed this visit by clinical staff       Reviewed and updated as needed this visit by Provider         ROS:  Constitutional, HEENT, cardiovascular, pulmonary, GI, , musculoskeletal, neuro, skin, endocrine and psych systems are negative, except as otherwise noted.    OBJECTIVE:                                                    /58  Pulse 75  Temp 98.3  F (36.8  C) (Oral)  Ht 5' 10\" (1.778 m)  Wt 179 lb (81.2 kg)  SpO2 98%  BMI 25.68 kg/m2  Body mass index is 25.68 kg/(m^2).  GENERAL: weak, alert " and no distress  EYES: Eyes grossly normal to inspection, PERRL and conjunctivae and sclerae normal  NECK: no adenopathy, no asymmetry, masses, or scars and thyroid normal to palpation  RESP: lungs clear to auscultation - no rales, rhonchi or wheezes  CV: irregular rate and rhythm, normal S1 S2, no S3 or S4, no murmur, click or rub, no peripheral edema and peripheral pulses strong  ABDOMEN: soft, nontender, no hepatosplenomegaly, no masses and bowel sounds normal  MS: no gross musculoskeletal defects noted, no edema, healing right lateral hip incision post FRANK , mild skin edema   SKIN: no suspicious lesions or rashes  NEURO: Normal strength and tone, mentation intact and speech normal,   Numbness of the right hand 1,2,3d fingers, weakness of the      Diagnostic Test Results:  No results found for this or any previous visit (from the past 24 hour(s)).     ASSESSMENT/PLAN:                                                      Problem List Items Addressed This Visit     Essential hypertension, benign    Anemia    Relevant Orders    CBC with platelets    Atrial fibrillation (H)    CKD (chronic kidney disease) stage 3, GFR 30-59 ml/min    Relevant Orders    Comprehensive metabolic panel      Other Visit Diagnoses     Bilateral carpal tunnel syndrome    -  Primary    Relevant Orders    ORTHO  REFERRAL (Completed)    Hospital discharge follow-up        Acute renal failure, unspecified acute renal failure type (H)        History of total hip arthroplasty, right               Had lab work yesterday, stable Hgb, will recheck in a week, along with INR  Cont PT  Hold Cozaar, recheck BMP in one week  Improving slowly  Increase PO fluids     Follow-Up:in 1 week     Kwadwo Winslow MD  [facility]

## 2017-04-11 NOTE — MR AVS SNAPSHOT
After Visit Summary   4/11/2017    Tucker Slater    MRN: [id number]           Patient Information     Date Of Birth          3/13/1931        Visit Information        Provider Department      4/11/2017 3:20 PM Kwadwo Winslow MD [facility]        Today's Diagnoses     Bilateral carpal tunnel syndrome    -  1    Anemia, unspecified type        CKD (chronic kidney disease) stage 3, GFR 30-59 ml/min          Care Instructions      Stop Losartan for now until labs next week.   Check lab in one week, also need INR.         Follow-ups after your visit        Additional Services     ORTHO  REFERRAL       [facility] is referring you to the Orthopedic  Services at [facility].       The  Representative will assist you in the coordination of your Orthopedic and Musculoskeletal Care as prescribed by your physician.    The  Representative will call you within 1 business day to help schedule your appointment, or you may contact the  Representative at:    All areas ~ [phone]     Type of Referral : hand surgeon        Timeframe requested: 3 - 5 days    Coverage of these services is subject to the terms and limitations of your health insurance plan.  Please call member services at your health plan with any benefit or coverage questions.      If X-rays, CT or MRI's have been performed, please contact the facility where they were done to arrange for , prior to your scheduled appointment.  Please bring this referral request to your appointment and present it to your specialist.                  Follow-up notes from your care team     Return in about 7 days (around 4/18/2017) for Lab Work.      Your next 10 appointments already scheduled     May 08, 2017  8:45 AM CDT   Remote PPM Check with KIDD TECH1   [facility] PHYSICIANS [facility] [place] ([facility])    [address] [place] [address]  [address]  [place] [address]   [phone]           This appointment is for a remote check of your pacemaker.  This is not an appointment at the office.            May 10, 2017 11:00 AM CDT   LAB with [facility]   [facility]    [address]  [facility] [address]   [phone]           Patient must bring picture ID.  Patient should be prepared to give a urine specimen  Please do not eat 10-12 hours before your appointment if you are coming in fasting for labs on lipids, cholesterol, or glucose (sugar).  Pregnant women should follow their Care Team instructions. Water with medications is okay. Do not drink coffee or other fluids.   If you have concerns about taking  your medications, please ask at office or if scheduling via [employer], send a message by clicking on Secure Messaging, Message Your Care Team.              Future tests that were ordered for you today     Open Future Orders        Priority Expected Expires Ordered    CBC with platelets Routine  5/11/2017 4/11/2017    Comprehensive metabolic panel Routine  5/11/2017 4/11/2017            Who to contact     If you have questions or need follow up information about today's clinic visit or your schedule please contact [facility] directly at [phone].  Normal or non-critical lab and imaging results will be communicated to you by [employer], letter or phone within 4 business days after the clinic has received the results. If you do not hear from us within 7 days, please contact the clinic through [employer] or phone. If you have a critical or abnormal lab result, we will notify you by phone as soon as possible.  Submit refill requests through [employer] or call your pharmacy and they will forward the refill request to us. Please allow 3 business days for your refill to be completed.          Additional Information About Your Visit        Audyssey Information     [employer]sey lets you send  "messages to your doctor, view your test results, renew your prescriptions, schedule appointments and more. To sign up, go to [email or web address] . Click on \"Log in\" on the left side of the screen, which will take you to the Welcome page. Then click on \"Sign up Now\" on the right side of the page.     You will be asked to enter the access code listed below, as well as some personal information. Please follow the directions to create your username and password.     Your access code is: [email or web address]  Expires: 2017  2:11 PM     Your access code will  in 90 days. If you need help or a new code, please call your [facility] clinic or [phone].        Care EveryWhere ID     This is your [facility] EveryWhere ID. This could be used by other organizations to access your [place] medical records  [id number]        Your Vitals Were     Pulse Temperature Height Pulse Oximetry BMI (Body Mass Index)       75 98.3  F (36.8  C) (Oral) 5' 10\" (1.778 m) 98% 25.68 kg/m2        Blood Pressure from Last 3 Encounters:   17 118/58   04/10/17 106/60   17 109/51    Weight from Last 3 Encounters:   17 179 lb (81.2 kg)   04/10/17 185 lb (83.9 kg)   17 185 lb (83.9 kg)              We Performed the Following     ORTHO  REFERRAL          Today's Medication Changes          These changes are accurate as of: 17  4:07 PM.  If you have any questions, ask your nurse or doctor.               Stop taking these medicines if you haven't already. Please contact your care team if you have questions.     oxyCODONE 5 MG capsule   Commonly known as:  OXY-IR   Stopped by:  Kwadwo Winslow MD                    Primary Care Provider Office Phone # Fax #    Kwadwo Winslow [phone]       [facility] [address] [facility] [address]        Thank you!     Thank you for choosing [facility]  for your care. Our goal is always to provide you with excellent " care. Hearing back from our patients is one way we can continue to improve our services. Please take a few minutes to complete the written survey that you may receive in the mail after your visit with us. Thank you!             Your Updated Medication List - Protect others around you: Learn how to safely use, store and throw away your medicines at www.disposemymeds.org.          This list is accurate as of: 4/11/17  4:07 PM.  Always use your most recent med list.                   Brand Name Dispense Instructions for use    ACETAMINOPHEN PO      Take 650 mg by mouth every 4 hours as needed for pain       calcium carbonate 500 MG tablet    OS-KARLA 500 mg [place]. [place]     Take 500 mg by mouth daily       carvedilol 25 MG tablet    COREG    180 tablet    Take 1 tablet (25 mg) by mouth 2 times daily (with meals)       COMPRESSION STOCKINGS     1 each    1 each continuous       COUMADIN PO      Take 2.5 mg by mouth daily Until 4/3/17       losartan 100 MG tablet    COZAAR    90 tablet    Take 1 tablet (100 mg) by mouth daily       OCUVITE PO      Take 1 tablet by mouth daily       senna-docusate 8.6-50 MG per tablet    SENOKOT-S;PERICOLACE     Take 2 tablets by mouth daily Reported on 4/11/2017       tamsulosin 0.4 MG capsule    FLOMAX    60 capsule    Take 1 capsule (0.4 mg) by mouth daily       triamterene-hydrochlorothiazide 75-50 MG per tablet    MAXZIDE    45 tablet    Take 0.5 tablets by mouth daily Hold for SBP <100.       VITAMIN C PO      Take 500 mg by mouth daily       VITAMIN D (CHOLECALCIFEROL) PO      Take 2,000 Units by mouth daily.

## 2017-04-11 NOTE — TELEPHONE ENCOUNTER
IP F/U    Date: 04/10/17  Diagnosis: Osteoarthritis of right hip  Is patient active in care coordination? No  Was patient in TCU? Yes - Route to Care Coordination (P [id number])    Next 5 appointments (look out 90 days)     Apr 11, 2017  3:20 PM CDT   SHORT with Kwadwo Wisnlow MD   [facility] ([facility]    [address] [facility] [id number] [phone]

## 2017-04-11 NOTE — NURSING NOTE
"Chief Complaint   Patient presents with     Hospital F/U     Hospital , TCU discharge       Initial /58  Pulse 75  Temp 98.3  F (36.8  C) (Oral)  Ht 5' 10\" (1.778 m)  Wt 179 lb (81.2 kg)  SpO2 98%  BMI 25.68 kg/m2 Estimated body mass index is 25.68 kg/(m^2) as calculated from the following:    Height as of this encounter: 5' 10\" (1.778 m).    Weight as of this encounter: 179 lb (81.2 kg).  Medication Reconciliation: complete   Sabine Barrientos MA      "

## 2017-04-12 ENCOUNTER — CARE COORDINATION (OUTPATIENT)
Dept: CARE COORDINATION | Facility: CLINIC | Age: 82
End: 2017-04-12

## 2017-04-12 NOTE — PROGRESS NOTES
Clinic Care Coordination Contact  [facility]/Voicemail    Referral Source: IP/TCU Report  Clinical Data: Care Coordinator Outreach  Outreach attempted x 1.  Left message on voicemail with call back information and requested return call.  Plan:. Care Coordinator will try to reach patient again in 1-2 business days.    Pt was seen by PCP 4/11.  Pt is also scheduled for outpt PT per chart review.  Will follow up as scheduled.    Bruce Gamboa RN/CC  Care Coordinator [facility]  [phone]

## 2017-04-13 ENCOUNTER — TELEPHONE (OUTPATIENT)
Dept: ORTHOPEDICS | Facility: CLINIC | Age: 82
End: 2017-04-13

## 2017-04-13 ENCOUNTER — OFFICE VISIT (OUTPATIENT)
Dept: ORTHOPEDICS | Facility: CLINIC | Age: 82
End: 2017-04-13
Payer: COMMERCIAL

## 2017-04-13 VITALS
WEIGHT: 179 LBS | BODY MASS INDEX: 25.62 KG/M2 | HEIGHT: 70 IN | DIASTOLIC BLOOD PRESSURE: 55 MMHG | SYSTOLIC BLOOD PRESSURE: 115 MMHG

## 2017-04-13 DIAGNOSIS — G56.03 BILATERAL CARPAL TUNNEL SYNDROME: Primary | ICD-10-CM

## 2017-04-13 PROCEDURE — 99203 OFFICE O/P NEW LOW 30 MIN: CPT | Performed by: ORTHOPAEDIC SURGERY

## 2017-04-13 NOTE — PROGRESS NOTES
HISTORY OF PRESENT ILLNESS:    Tucker Slater is a 86 year old male who is seen in consultation at the request of Dr. Winslow for bilateral hand/wrist pain that started about 1 year ago.    Present symptoms: Mostly right numbness  And pain in the Thumb, index and middle fingers  Treatments tried to this point: None  Orthopedic PMH: FRANK of the right hip     Past Medical History:   Diagnosis Date     Arrhythmia     A Fib     Balance problem     related to neuropathy in feet     Bradycardia      Carcinoma in situ of bladder     bladder cancer ;; follow w Urology w periodic cysto      Essential hypertension, benign      Generalized osteoarthrosis, unspecified site knees    s/p R total knee  ; will do L 6/10      Numbness and tingling     toes and fingers     Pacemaker     [employer]      Pain in joint, shoulder region     L shoulder rotater tear; no surgery      Persistent atrial fibrillation (H) 1/30/15     Prostate CA (H) 2008    radiation     Prostate cancer (H)     completed RT 3/09     Shoulder impingement     R shoulder impingement etc see MRI       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     toes both feet        Past Surgical History:   Procedure Laterality Date     ARTHROPLASTY HIP Right 3/27/2017    Procedure: ARTHROPLASTY HIP;  Surgeon: Jigar Wynn MD;  Location: [facility] OR     C NONSPECIFIC PROCEDURE      bilat inguinal hernia repairs ( 3total procedures)      C NONSPECIFIC PROCEDURE      pilonidal cyst     C NONSPECIFIC PROCEDURE      T + A     C NONSPECIFIC PROCEDURE       R+L total knee     CARDIOVERSION  3/26/15     COLONOSCOPY       IMPLANT PACEMAKER  3/26/15       Family History   Problem Relation Age of Onset     HEART DISEASE Father       87yo     Coronary Artery Disease Father      HEART DISEASE Mother       76yo     Coronary Artery Disease Mother      Family History Negative Brother        Social History     Social History      Marital status:      Spouse name: Alba     Number of children: 3     Years of education: N/A     Occupational History      Retired      w [employer]     Social History Main Topics     Smoking status: Former Smoker     Quit date: 9/12/1977     Smokeless tobacco: Never Used     Alcohol use No     Drug use: No     Sexual activity: No     Other Topics Concern     Caffeine Concern No     4-5 cups per week      Sleep Concern No     Stress Concern No     Weight Concern No     Special Diet No     Exercise No     yard work and moves around a lot     Social History Narrative       Current Outpatient Prescriptions   Medication Sig Dispense Refill     ACETAMINOPHEN PO Take 650 mg by mouth every 4 hours as needed for pain       triamterene-hydrochlorothiazide (MAXZIDE) 75-50 MG per tablet Take 0.5 tablets by mouth daily Hold for SBP <100. 45 tablet 3     senna-docusate (SENOKOT-S;PERICOLACE) 8.6-50 MG per tablet Take 2 tablets by mouth daily Reported on 4/11/2017       Warfarin Sodium (COUMADIN PO) Take 2.5 mg by mouth daily Until 4/3/17       COMPRESSION STOCKINGS 1 each continuous 1 each 0     losartan (COZAAR) 100 MG tablet Take 1 tablet (100 mg) by mouth daily 90 tablet 0     carvedilol (COREG) 25 MG tablet Take 1 tablet (25 mg) by mouth 2 times daily (with meals) 180 tablet 2     Multiple Vitamins-Minerals (OCUVITE PO) Take 1 tablet by mouth daily        Ascorbic Acid (VITAMIN C PO) Take 500 mg by mouth daily        calcium carbonate (OS-KARLA 500 MG [place]. [place]) 500 MG tablet Take 500 mg by mouth daily        VITAMIN D, CHOLECALCIFEROL, PO Take 2,000 Units by mouth daily.       tamsulosin (FLOMAX) 0.4 MG capsule Take 1 capsule (0.4 mg) by mouth daily (Patient not taking: Reported on 4/11/2017) 60 capsule        Allergies   Allergen Reactions     Morphine Nausea and Vomiting     Amlodipine      Edema       REVIEW OF SYSTEMS:  CONSTITUTIONAL:  NEGATIVE for fever, chills, change in weight  INTEGUMENTARY/SKIN:  NEGATIVE  "for worrisome rashes, moles or lesions  EYES:  NEGATIVE for vision changes or irritation  ENT/MOUTH:  NEGATIVE for ear, mouth and throat problems  RESP:  NEGATIVE for significant cough or SOB  BREAST:  NEGATIVE for masses, tenderness or discharge  CV:  NEGATIVE for chest pain, palpitations or peripheral edema  GI:  NEGATIVE for nausea, abdominal pain, heartburn, or change in bowel habits  :  Negative   MUSCULOSKELETAL:  See HPI above  NEURO:  NEGATIVE for weakness, dizziness or paresthesias  ENDOCRINE:  NEGATIVE for temperature intolerance, skin/hair changes  HEME/ALLERGY/IMMUNE:  NEGATIVE for bleeding problems  PSYCHIATRIC:  NEGATIVE for changes in mood or affect      PHYSICAL EXAM:  /55  Ht 5' 10\" (1.778 m)  Wt 179 lb (81.2 kg)  BMI 25.68 kg/m2  Body mass index is 25.68 kg/(m^2).   GENERAL APPEARANCE: healthy, alert and no distress   SKIN: no suspicious lesions or rashes  NEURO: Normal strength and tone, mentation intact and speech normal  VASCULAR: Good pulses, and capillary refill   LYMPH: no lymphadenopathy   PSYCH:  mentation appears normal and affect normal/bright    MSK:  Examination of his hands reveals no asymmetry or atrophy to the muscle masses including the thenar eminences. There is no erythema, ecchymosis, nor edema. He has full range of motion of the fingers wrist and elbow. CMS is intact. Carpal compression testing reproduces her symptoms.     ASSESSMENT / PLAN: Bilateral carpal tunnel syndrome. I offered him transverse carpal ligament releases, describing the potential risks as well as benefits of this. He'll take this into advisement.      Imaging Interpretation:   None today      Arden Barboza MD  Department of Orthopedic Surgery        "

## 2017-04-13 NOTE — MR AVS SNAPSHOT
After Visit Summary   4/13/2017    Tucker Slater    MRN: [id number]           Patient Information     Date Of Birth          3/13/1931        Visit Information        Provider Department      4/13/2017 3:20 PM Alonso Barboza MD [facility] ORTHOPEDIC SURGERY        Today's Diagnoses     Bilateral carpal tunnel syndrome    -  1       Follow-ups after your visit        Your next 10 appointments already scheduled     Apr 27, 2017  2:30 PM CDT   Anticoagulation [facility] with [facility]    [address]  [facility] [address] [phone]            Apr 28, 2017 10:40 AM CDT   Return Visit with Geovanny Anaya PA-C   [facility] ORTHOPEDIC SURGERY ([facility])    [address]  [address]  [facility] [address]   [phone]            May 01, 2017 11:20 AM CDT   SHORT with [facility]low MD   [facility]    [address]  [facility] [address] [phone]            May 08, 2017  8:45 AM CDT   Remote PPM Check with KIDD TECH1   [facility])    [address]  [facility] [id number] [phone]           This appointment is for a remote check of your pacemaker.  This is not an appointment at the office.            May 10, 2017 11:00 AM CDT   [facility] with [facility]   [facility]    [address]  [facility] [id number] [phone]           Patient must bring picture ID.  Patient should be prepared to give a urine specimen  Please do not eat 10-12 hours before your appointment if you are coming in fasting for labs on lipids, cholesterol, or glucose (sugar).  Pregnant women should follow their Care Team instructions. Water with medications is okay. Do not drink coffee or  "other fluids.   If you have concerns about taking  your medications, please ask at office or if scheduling via [email or web address], send a message by clicking on Secure Messaging, Message Your Care Team.              Who to contact     If you have questions or need follow up information about today's clinic visit or your schedule please contact [facility] ORTHOPEDIC SURGERY directly at [phone].  Normal or non-critical lab and imaging results will be communicated to you by Tinteohart, letter or phone within 4 business days after the clinic has received the results. If you do not hear from us within 7 days, please contact the clinic through Tinteohart or phone. If you have a critical or abnormal lab result, we will notify you by phone as soon as possible.  Submit refill requests through [email or web address] or call your pharmacy and they will forward the refill request to us. Please allow 3 business days for your refill to be completed.          Additional Information About Your Visit        [email or web address] Information     [email or web address] lets you send messages to your doctor, view your test results, renew your prescriptions, schedule appointments and more. To sign up, go to [email or web address] . Click on \"Log in\" on the left side of the screen, which will take you to the Welcome page. Then click on \"Sign up Now\" on the right side of the page.     You will be asked to enter the access code listed below, as well as some personal information. Please follow the directions to create your username and password.     Your access code is: [email or web address]  Expires: 2017  2:11 PM     Your access code will  in 90 days. If you need help or a new code, please call your [place] clinic or [phone].        Care EveryWhere ID     This is your Care EveryWhere ID. This could be used by other organizations to access your [place] medical records  [id number]        Your Vitals Were     Height BMI (Body Mass Index)                5' 10\" (1.778 m) 25.68 " kg/m2           Blood Pressure from Last 3 Encounters:   04/19/17 139/68   04/17/17 152/79   04/13/17 115/55    Weight from Last 3 Encounters:   04/19/17 178 lb (80.7 kg)   04/17/17 179 lb (81.2 kg)   04/13/17 179 lb (81.2 kg)              Today, you had the following     No orders found for display       Primary Care Provider Office Phone # Fax #    Kwadwo Winslow [phone]       [facility] [address] [facility] [address]        Thank you!     Thank you for choosing [facility] ORTHOPEDIC SURGERY  for your care. Our goal is always to provide you with excellent care. Hearing back from our patients is one way we can continue to improve our services. Please take a few minutes to complete the written survey that you may receive in the mail after your visit with us. Thank you!             Your Updated Medication List - Protect others around you: Learn how to safely use, store and throw away your medicines at www.disposemymeds.org.          This list is accurate as of: 4/13/17 11:59 PM.  Always use your most recent med list.                   Brand Name Dispense Instructions for use    ACETAMINOPHEN PO      Take 650 mg by mouth every 4 hours as needed for pain       calcium carbonate 500 MG tablet    OS-KARLA 500 mg [place]. [place]     Take 500 mg by mouth daily       carvedilol 25 MG tablet    COREG    180 tablet    Take 1 tablet (25 mg) by mouth 2 times daily (with meals)       COMPRESSION STOCKINGS     1 each    1 each continuous       COUMADIN PO      Take 2.5 mg by mouth daily Until 4/3/17       OCUVITE PO      Take 1 tablet by mouth daily       senna-docusate 8.6-50 MG per tablet    SENOKOT-S;PERICOLACE     Take 2 tablets by mouth daily Reported on 4/11/2017       triamterene-hydrochlorothiazide 75-50 MG per tablet    MAXZIDE    45 tablet    Take 0.5 tablets by mouth daily Hold for SBP <100.       VITAMIN C PO      Take 500 mg by mouth daily       VITAMIN D (CHOLECALCIFEROL)  PO      Take 2,000 Units by mouth daily.

## 2017-04-13 NOTE — LETTER
4/13/2017       RE: Tucker Slater  [address] [facility] [address]           Dear Colleague,    Thank you for referring your patient, Tucker Slater, to the [facility] ORTHOPEDIC SURGERY. Please see a copy of my visit note below.    HISTORY OF PRESENT ILLNESS:    Tucker Slater is a 86 year old male who is seen in consultation at the request of Dr. Winslow for bilateral hand/wrist pain that started about 1 year ago.    Present symptoms: Mostly right numbness  And pain in the Thumb, index and middle fingers  Treatments tried to this point: None  Orthopedic PMH: FRANK of the right hip     Past Medical History:   Diagnosis Date     Arrhythmia     A Fib     Balance problem     related to neuropathy in feet     Bradycardia      Carcinoma in situ of bladder 2000    bladder cancer ;; follow w Urology w periodic cysto      Essential hypertension, benign      Generalized osteoarthrosis, unspecified site knees    s/p R total knee 8/09 ; will do L 6/10      Numbness and tingling     toes and fingers     Pacemaker     [employer]      Pain in joint, shoulder region     L shoulder rotater tear; no surgery      Persistent atrial fibrillation (H) 1/30/15     Prostate CA (H) 2008-9    radiation     Prostate cancer (H) 11/08    completed RT 3/09     Shoulder impingement     R shoulder impingement etc see MRI 4/09      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     toes both feet        Past Surgical History:   Procedure Laterality Date     ARTHROPLASTY HIP Right 3/27/2017    Procedure: ARTHROPLASTY HIP;  Surgeon: Jgiar Wynn MD;  Location: [facility] OR     C NONSPECIFIC PROCEDURE      bilat inguinal hernia repairs ( 3total procedures)      C NONSPECIFIC PROCEDURE      pilonidal cyst     C NONSPECIFIC PROCEDURE      T + A     C NONSPECIFIC PROCEDURE  8/09     R+L total knee     CARDIOVERSION  3/26/15     COLONOSCOPY       IMPLANT PACEMAKER  3/26/15       Family History   Problem  Relation Age of Onset     HEART DISEASE Father       89yo     Coronary Artery Disease Father      HEART DISEASE Mother       74yo     Coronary Artery Disease Mother      Family History Negative Brother        Social History     Social History     Marital status:      Spouse name: Alba     Number of children: 3     Years of education: N/A     Occupational History      Retired      w [employer]     Social History Main Topics     Smoking status: Former Smoker     Quit date: 1977     Smokeless tobacco: Never Used     Alcohol use No     Drug use: No     Sexual activity: No     Other Topics Concern     Caffeine Concern No     4-5 cups per week      Sleep Concern No     Stress Concern No     Weight Concern No     Special Diet No     Exercise No     yard work and moves around a lot     Social History Narrative       Current Outpatient Prescriptions   Medication Sig Dispense Refill     ACETAMINOPHEN PO Take 650 mg by mouth every 4 hours as needed for pain       triamterene-hydrochlorothiazide (MAXZIDE) 75-50 MG per tablet Take 0.5 tablets by mouth daily Hold for SBP <100. 45 tablet 3     senna-docusate (SENOKOT-S;PERICOLACE) 8.6-50 MG per tablet Take 2 tablets by mouth daily Reported on 2017       Warfarin Sodium (COUMADIN PO) Take 2.5 mg by mouth daily Until 4/3/17       COMPRESSION STOCKINGS 1 each continuous 1 each 0     losartan (COZAAR) 100 MG tablet Take 1 tablet (100 mg) by mouth daily 90 tablet 0     carvedilol (COREG) 25 MG tablet Take 1 tablet (25 mg) by mouth 2 times daily (with meals) 180 tablet 2     Multiple Vitamins-Minerals (OCUVITE PO) Take 1 tablet by mouth daily        Ascorbic Acid (VITAMIN C PO) Take 500 mg by mouth daily        calcium carbonate (OS-KARLA 500 MG [place]. [place]) 500 MG tablet Take 500 mg by mouth daily        VITAMIN D, CHOLECALCIFEROL, PO Take 2,000 Units by mouth daily.       tamsulosin (FLOMAX) 0.4 MG capsule Take 1 capsule (0.4 mg) by mouth daily (Patient  "not taking: Reported on 4/11/2017) 60 capsule        Allergies   Allergen Reactions     Morphine Nausea and Vomiting     Amlodipine      Edema       REVIEW OF SYSTEMS:  CONSTITUTIONAL:  NEGATIVE for fever, chills, change in weight  INTEGUMENTARY/SKIN:  NEGATIVE for worrisome rashes, moles or lesions  EYES:  NEGATIVE for vision changes or irritation  ENT/MOUTH:  NEGATIVE for ear, mouth and throat problems  RESP:  NEGATIVE for significant cough or SOB  BREAST:  NEGATIVE for masses, tenderness or discharge  CV:  NEGATIVE for chest pain, palpitations or peripheral edema  GI:  NEGATIVE for nausea, abdominal pain, heartburn, or change in bowel habits  :  Negative   MUSCULOSKELETAL:  See HPI above  NEURO:  NEGATIVE for weakness, dizziness or paresthesias  ENDOCRINE:  NEGATIVE for temperature intolerance, skin/hair changes  HEME/ALLERGY/IMMUNE:  NEGATIVE for bleeding problems  PSYCHIATRIC:  NEGATIVE for changes in mood or affect      PHYSICAL EXAM:  /55  Ht 5' 10\" (1.778 m)  Wt 179 lb (81.2 kg)  BMI 25.68 kg/m2  Body mass index is 25.68 kg/(m^2).   GENERAL APPEARANCE: healthy, alert and no distress   SKIN: no suspicious lesions or rashes  NEURO: Normal strength and tone, mentation intact and speech normal  VASCULAR: Good pulses, and capillary refill   LYMPH: no lymphadenopathy   PSYCH:  mentation appears normal and affect normal/bright    MSK:  Examination of his hands reveals no asymmetry or atrophy to the muscle masses including the thenar eminences. There is no erythema, ecchymosis, nor edema. He has full range of motion of the fingers wrist and elbow. CMS is intact. Carpal compression testing reproduces her symptoms.     ASSESSMENT / PLAN: Bilateral carpal tunnel syndrome. I offered him transverse carpal ligament releases, describing the potential risks as well as benefits of this. He'll take this into advisement.      Imaging Interpretation:   None today      Arden Barboza MD  Department of Orthopedic " Surgery          Again, thank you for allowing me to participate in the care of your patient.        Sincerely,              Alonso Barboza MD

## 2017-04-13 NOTE — TELEPHONE ENCOUNTER
Scheduled a right carpal tunnel release 4/17/17 with Dr Barboza at [facility].

## 2017-04-14 ENCOUNTER — CARE COORDINATION (OUTPATIENT)
Dept: CARE COORDINATION | Facility: CLINIC | Age: 82
End: 2017-04-14

## 2017-04-14 ENCOUNTER — TRANSFERRED RECORDS (OUTPATIENT)
Dept: HEALTH INFORMATION MANAGEMENT | Facility: CLINIC | Age: 82
End: 2017-04-14

## 2017-04-14 NOTE — PROGRESS NOTES
Clinic Care Coordination Contact  Care Team Conversations    Received a call back from pt stating he was not interested in care coordination at this time.  He will undergo surgery on 4/17 for carpal tunnel.  Will remain available if needs present.  Pt is not active in care coordination at this time.    Bruce Gamboa RN/CC  Care Coordinator [facility]  [phone]

## 2017-04-14 NOTE — H&P (VIEW-ONLY)
[place] [address]  [facility] [address]  [phone]  Dept: [phone]    PRE-OP EVALUATION:  Today's date: 3/20/2017    Tucker Slater (: 3/13/1931) presents for pre-operative evaluation assessment as requested by Dr. Wynn.  He requires evaluation and anesthesia risk assessment prior to undergoing surgery/procedure for treatment of right hip OA .  Proposed procedure: Right hip full replacement.    Date of Surgery/ Procedure: 3/27/17  Time of Surgery/ Procedure: 12:00 [facility]/Surgical Facility: [facility] [place]  [phone] Fax   Primary Physician: Kwadwo Winslow  Type of Anesthesia Anticipated: to be determined    Patient has a Health Care Directive or Living Will:  NO    1. NO - Do you have a history of heart attack, stroke, stent, bypass or surgery on an artery in the head, neck, heart or legs?  2. NO - Do you ever have any pain or discomfort in your chest?  3. NO - Do you have a history of  Heart Failure?  4. NO - Are you troubled by shortness of breath when: walking on the level, up a slight hill or at night?  5. NO - Do you currently have a cold, bronchitis or other respiratory infection?  6. NO - Do you have a cough, shortness of breath or wheezing?  7. NO - Do you sometimes get pains in the calves of your legs when you walk?  9. NO - Do you or does anyone in your family have a serious bleeding problem such as prolonged bleeding following surgeries or cuts?  10. NO - Have you ever had problems with anemia or been told to take iron pills?  11. NO - Have you had any abnormal blood loss such as black, tarry or bloody stools, or abnormal vaginal bleeding?  12. NO - Have you ever had a blood transfusion?  15. NO - Do you have any prosthetic heart valves?  17. NO - Is there any chance that you may be pregnant?  8. YES- Do you or anyone in your family have previous history of blood clots?  13.YES - Have you or any of your relatives ever had problems with  anesthesia?  14. YES - Do you have sleep apnea, excessive snoring or daytime drowsiness?  16. YES- Do you have prosthetic joints?      HPI:                                                      Brief HPI related to upcoming procedure: scheduled for right FRANK for hip OA.   Has pain with standing, walking, longer immobility.   No acute complaints, no medication change or new medical conditions.  Has history of atrial fibrillation. On anticoagulation with Coumadin and rate control medications. Asymptonatic - no chest pains , palpitations,  no side effects from medications.  Has h/o HTN. on medical treatment. BP has been controlled. No side effects from medications. No CP, HA, dizziness. good compliance with medications and low salt diet.  Has h/o CRF and anemia of chronic disease, mild. Monitoring BP, BG, medications, avoiding OTC NSAIDs. Needs periodic recheck of kidney function.        See problem list for active medical problems.  Problems all longstanding and stable, except as noted/documented.  See ROS for pertinent symptoms related to these conditions.                                                                                                  .    MEDICAL HISTORY:                                                      Patient Active Problem List    Diagnosis Date Noted     Long-term (current) use of anticoagulants [Z79.01] 04/07/2016     Priority: Medium     Cardiac pacemaker in situ 07/22/2015     Placed 3/16/15 with cardioversion       CKD (chronic kidney disease) stage 3, GFR 30-59 ml/min 05/11/2015     Bradycardia      Atrial fibrillation (H) 02/03/2015     Anemia due to blood loss, acute 03/23/2012     GI bleed 03/18/2012     Anemia 03/18/2012     Near syncope 03/18/2012     Peripheral neuropathy (H) 01/04/2012     Advanced directives, counseling/discussion 01/03/2012     Parent voices understanding and acceptance of this advice and will call back if any further questions or concerns.       CARDIOVASCULAR  SCREENING; LDL GOAL LESS THAN 130 10/31/2010     Essential hypertension, benign      B/P goal <140/90. B/P 12/5/12 - 164/75       Carcinoma in situ of bladder      bladder cancer ;; follow w Urology w periodic cysto        Hypertrophy of prostate with urinary obstruction      elevated PSA;; nl BX's in past   Problem list name updated by automated process. Provider to review       Generalized osteoarthrosis, unspecified site      hugh R knee       Hereditary and idiopathic peripheral neuropathy      toes both feet   Problem list name updated by automated process. Provider to review       Pain in joint, shoulder region      L shoulder rotater tear; no surgery         Past Medical History   Diagnosis Date     Arrhythmia      A Fib     Balance problem      related to neuropathy in feet     Bradycardia      Carcinoma in situ of bladder 2000     bladder cancer ;; follow w Urology w periodic cysto      Essential hypertension, benign      Generalized osteoarthrosis, unspecified site knees     s/p R total knee 8/09 ; will do L 6/10      Numbness and tingling      toes and fingers     Pacemaker      [employer]      Pain in joint, shoulder region      L shoulder rotater tear; no surgery      Persistent atrial fibrillation (H) 1/30/15     Prostate CA (H) 2008-9     radiation     Prostate cancer (H) 11/08     completed RT 3/09     Shoulder impingement      R shoulder impingement etc see MRI 4/09      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      toes both feet      Past Surgical History   Procedure Laterality Date     C nonspecific procedure       bilat inguinal hernia repairs ( 3total procedures)      C nonspecific procedure       pilonidal cyst     C nonspecific procedure       T + A     C nonspecific procedure  8/09      R+L total knee     Colonoscopy       Implant pacemaker  3/26/15     Cardioversion  3/26/15     Current Outpatient Prescriptions   Medication Sig Dispense Refill     oxyCODONE  (ROXICODONE) 5 MG IR tablet Take 1 tablet by mouth every 4 hours as needed       losartan (COZAAR) 100 MG tablet Take 1 tablet (100 mg) by mouth daily 90 tablet 0     triamterene-hydrochlorothiazide (MAXZIDE) 75-50 MG per tablet Take 0.5 tablets by mouth daily 45 tablet 3     warfarin (COUMADIN) 2.5 MG tablet Take 1 tab (2.5 mg) daily or as instructed based on INR results. 90 tablet 0     carvedilol (COREG) 25 MG tablet Take 1 tablet (25 mg) by mouth 2 times daily (with meals) 180 tablet 2     Multiple Vitamins-Minerals (OCUVITE PO) Take 1 tablet by mouth daily        Ascorbic Acid (VITAMIN C PO) Take 500 mg by mouth daily        calcium carbonate (OS-KARLA 500 MG [place]. [place]) 500 MG tablet Take 500 mg by mouth daily        VITAMIN D, CHOLECALCIFEROL, PO Take 2,000 Units by mouth daily.       OTC products: None, except as noted above    Allergies   Allergen Reactions     Morphine Nausea and Vomiting     Amlodipine      Edema      Latex Allergy: NO    Social History   Substance Use Topics     Smoking status: Former Smoker     Quit date: 9/12/1977     Smokeless tobacco: Never Used     Alcohol use 0.0 oz/week     0 Standard drinks or equivalent per week      Comment: almost daily     History   Drug Use No       REVIEW OF SYSTEMS:                                                    C: NEGATIVE for fever, chills, change in weight  I: NEGATIVE for worrisome rashes, moles or lesions  E: NEGATIVE for vision changes or irritation  E/M: NEGATIVE for ear, mouth and throat problems  R: NEGATIVE for significant cough or SOB  B: NEGATIVE for masses, tenderness or discharge  CV: NEGATIVE for chest pain, palpitations or peripheral edema  GI: NEGATIVE for nausea, abdominal pain, heartburn, or change in bowel habits  : NEGATIVE for frequency, dysuria, or hematuria  M: NEGATIVE for significant arthralgias or myalgia  N: NEGATIVE for weakness, dizziness or paresthesias  E: NEGATIVE for temperature intolerance, skin/hair changes  H:  NEGATIVE for bleeding problems  P: NEGATIVE for changes in mood or affect    EXAM:                                                    There were no vitals taken for this visit.    GENERAL APPEARANCE: healthy, alert and no distress     EYES: EOMI,  PERRL     HENT: ear canals and TM's normal and nose and mouth without ulcers or lesions     NECK: no adenopathy, no asymmetry, masses, or scars and thyroid normal to palpation     RESP: lungs clear to auscultation - no rales, rhonchi or wheezes     CV: regular rates and rhythm, normal S1 S2, no S3 or S4 and no murmur, click or rub     ABDOMEN:  soft, nontender, no HSM or masses and bowel sounds normal     MS: extremities normal- no gross deformities noted, no evidence of inflammation in joints, FROM in all extremities.     SKIN: no suspicious lesions or rashes     NEURO: Normal strength and tone, sensory exam grossly normal, mentation intact and speech normal     PSYCH: mentation appears normal. and affect normal/bright     LYMPHATICS: No axillary, cervical, or supraclavicular nodes    DIAGNOSTICS:                                                      EKG: atrial sensed , ventricular paced PM rhythm   Labs Drawn and in Process:   Unresulted Labs Ordered in the Past 30 Days of this Admission     Date and Time Order Name Status Description    3/20/2017 1409 COMPREHENSIVE METABOLIC PANEL In process           Recent Labs   Lab Test 03/14/17 03/06/17 02/27/17   1203   02/09/17   1058   11/07/16   1557   05/17/16   1738   11/23/15   1423   HGB   --    --   14.6   --    --    --    --    --   15.5   --   15.5   PLT   --    --    --    --    --    --    --    --   177   --   194   INR  3.2*  4.1*   --    < >   --    < >   --    < >   --    < >   --    NA   --    --    --    --   144   --   141   --   142   --   141   POTASSIUM   --    --    --    --   4.2   --   4.3   --   4.2   --   4.4   CR   --    --    --    --   1.41*   --   1.57*   --   1.30*   --   1.42*    < > = values in  this interval not displayed.        IMPRESSION:                                                    Reason for surgery/procedure: right hip OA, FRANK   Diagnosis/reason for consult: preoperative evaluation/ clearance      The proposed surgical procedure is considered INTERMEDIATE risk.    REVISED CARDIAC RISK INDEX  The patient has the following serious cardiovascular risks for perioperative complications such as (MI, PE, VFib and 3  AV Block):  No serious cardiac risks  INTERPRETATION: 0 risks: Class I (very low risk - 0.4% complication rate)    The patient has the following additional risks for perioperative complications:  A fib on coumadin treatment     No diagnosis found.    RECOMMENDATIONS:                                                          --Patient is to take all scheduled medications on the day of surgery EXCEPT for modifications listed below.  Hold Coumadin for 5 days prior to surgery   Hold diuretic on the day of surgery     APPROVAL GIVEN to proceed with proposed procedure, without further diagnostic evaluation       Signed Electronically by: Kwadwo Winslow MD    Copy of this evaluation report is provided to requesting physician.    [facility] Preop Guidelines

## 2017-04-17 ENCOUNTER — SURGERY (OUTPATIENT)
Age: 82
End: 2017-04-17

## 2017-04-17 ENCOUNTER — HOSPITAL ENCOUNTER (OUTPATIENT)
Facility: CLINIC | Age: 82
Discharge: HOME | End: 2017-04-17
Attending: ORTHOPAEDIC SURGERY | Admitting: ORTHOPAEDIC SURGERY
Payer: MEDICARE

## 2017-04-17 VITALS
SYSTOLIC BLOOD PRESSURE: 152 MMHG | HEART RATE: 73 BPM | OXYGEN SATURATION: 98 % | DIASTOLIC BLOOD PRESSURE: 79 MMHG | WEIGHT: 179 LBS | BODY MASS INDEX: 25.62 KG/M2 | HEIGHT: 70 IN | TEMPERATURE: 97.7 F

## 2017-04-17 LAB — INR PPP: 3.91 (ref 0.86–1.14)

## 2017-04-17 PROCEDURE — 40000883 ZZH CANCELLED SURGERY UP TO 61-90 MINS: Performed by: ORTHOPAEDIC SURGERY

## 2017-04-17 PROCEDURE — 85610 PROTHROMBIN TIME: CPT | Performed by: ANESTHESIOLOGY

## 2017-04-17 RX ORDER — LABETALOL HYDROCHLORIDE 5 MG/ML
10 INJECTION, SOLUTION INTRAVENOUS
Status: CANCELLED | OUTPATIENT
Start: 2017-04-17

## 2017-04-17 RX ORDER — DIMENHYDRINATE 50 MG/ML
25 INJECTION, SOLUTION INTRAMUSCULAR; INTRAVENOUS
Status: CANCELLED | OUTPATIENT
Start: 2017-04-17

## 2017-04-17 RX ORDER — FENTANYL CITRATE 50 UG/ML
25-50 INJECTION, SOLUTION INTRAMUSCULAR; INTRAVENOUS
Status: CANCELLED | OUTPATIENT
Start: 2017-04-17

## 2017-04-17 RX ORDER — ALBUTEROL SULFATE 0.83 MG/ML
2.5 SOLUTION RESPIRATORY (INHALATION) EVERY 4 HOURS PRN
Status: CANCELLED | OUTPATIENT
Start: 2017-04-17

## 2017-04-17 RX ORDER — HYDROMORPHONE HYDROCHLORIDE 1 MG/ML
.3-.5 INJECTION, SOLUTION INTRAMUSCULAR; INTRAVENOUS; SUBCUTANEOUS EVERY 10 MIN PRN
Status: CANCELLED | OUTPATIENT
Start: 2017-04-17

## 2017-04-17 RX ORDER — ONDANSETRON 2 MG/ML
4 INJECTION INTRAMUSCULAR; INTRAVENOUS EVERY 30 MIN PRN
Status: CANCELLED | OUTPATIENT
Start: 2017-04-17

## 2017-04-17 RX ORDER — DROPERIDOL 2.5 MG/ML
0.62 INJECTION, SOLUTION INTRAMUSCULAR; INTRAVENOUS
Status: CANCELLED | OUTPATIENT
Start: 2017-04-17

## 2017-04-17 RX ORDER — NALOXONE HYDROCHLORIDE 0.4 MG/ML
.1-.4 INJECTION, SOLUTION INTRAMUSCULAR; INTRAVENOUS; SUBCUTANEOUS
Status: CANCELLED | OUTPATIENT
Start: 2017-04-17 | End: 2017-04-18

## 2017-04-17 RX ORDER — MEPERIDINE HYDROCHLORIDE 25 MG/ML
12.5 INJECTION INTRAMUSCULAR; INTRAVENOUS; SUBCUTANEOUS
Status: CANCELLED | OUTPATIENT
Start: 2017-04-17

## 2017-04-17 RX ORDER — ACETAMINOPHEN 10 MG/ML
1000 INJECTION, SOLUTION INTRAVENOUS ONCE
Status: CANCELLED | OUTPATIENT
Start: 2017-04-17 | End: 2017-04-17

## 2017-04-17 RX ORDER — SODIUM CHLORIDE, SODIUM LACTATE, POTASSIUM CHLORIDE, CALCIUM CHLORIDE 600; 310; 30; 20 MG/100ML; MG/100ML; MG/100ML; MG/100ML
INJECTION, SOLUTION INTRAVENOUS CONTINUOUS
Status: DISCONTINUED | OUTPATIENT
Start: 2017-04-17 | End: 2017-04-17 | Stop reason: HOSPADM

## 2017-04-17 RX ORDER — SODIUM CHLORIDE, SODIUM LACTATE, POTASSIUM CHLORIDE, CALCIUM CHLORIDE 600; 310; 30; 20 MG/100ML; MG/100ML; MG/100ML; MG/100ML
INJECTION, SOLUTION INTRAVENOUS CONTINUOUS
Status: CANCELLED | OUTPATIENT
Start: 2017-04-17

## 2017-04-17 RX ORDER — ONDANSETRON 4 MG/1
4 TABLET, ORALLY DISINTEGRATING ORAL EVERY 30 MIN PRN
Status: CANCELLED | OUTPATIENT
Start: 2017-04-17

## 2017-04-17 NOTE — OR NURSING
Procedure cxd per dr Barboza fOr elevated INR will be re sched for This coming wed. Pt instructed to stay off coumadin.

## 2017-04-17 NOTE — OR NURSING
Received phone call from lab reporting his preliminary INR result of 3.91.  Dr. Barboza notified and will talk to patient.

## 2017-04-17 NOTE — TELEPHONE ENCOUNTER
RESCHEDULED surgery for right carpal tunnel release on 4/19/2017 with Dr. Barboza @ [facility] @ 11:40.  Surgery education packet provided to patient.

## 2017-04-18 DIAGNOSIS — N18.30 CKD (CHRONIC KIDNEY DISEASE) STAGE 3, GFR 30-59 ML/MIN (H): ICD-10-CM

## 2017-04-18 DIAGNOSIS — D64.9 ANEMIA, UNSPECIFIED TYPE: ICD-10-CM

## 2017-04-18 LAB
ALBUMIN SERPL-MCNC: 2.9 G/DL (ref 3.4–5)
ALP SERPL-CCNC: 102 U/L (ref 40–150)
ALT SERPL W P-5'-P-CCNC: 23 U/L (ref 0–70)
ANION GAP SERPL CALCULATED.3IONS-SCNC: 10 MMOL/L (ref 3–14)
AST SERPL W P-5'-P-CCNC: 16 U/L (ref 0–45)
BILIRUB SERPL-MCNC: 0.6 MG/DL (ref 0.2–1.3)
BUN SERPL-MCNC: 30 MG/DL (ref 7–30)
CALCIUM SERPL-MCNC: 9 MG/DL (ref 8.5–10.1)
CHLORIDE SERPL-SCNC: 111 MMOL/L (ref 94–109)
CO2 SERPL-SCNC: 23 MMOL/L (ref 20–32)
CREAT SERPL-MCNC: 1.55 MG/DL (ref 0.66–1.25)
ERYTHROCYTE [DISTWIDTH] IN BLOOD BY AUTOMATED COUNT: 12.9 % (ref 10–15)
GFR SERPL CREATININE-BSD FRML MDRD: 43 ML/MIN/1.7M2
GLUCOSE SERPL-MCNC: 94 MG/DL (ref 70–99)
HCT VFR BLD AUTO: 33 % (ref 40–53)
HGB BLD-MCNC: 10.5 G/DL (ref 13.3–17.7)
MCH RBC QN AUTO: 30.6 PG (ref 26.5–33)
MCHC RBC AUTO-ENTMCNC: 31.8 G/DL (ref 31.5–36.5)
MCV RBC AUTO: 96 FL (ref 78–100)
PLATELET # BLD AUTO: 263 10E9/L (ref 150–450)
POTASSIUM SERPL-SCNC: 4.5 MMOL/L (ref 3.4–5.3)
PROT SERPL-MCNC: 6.8 G/DL (ref 6.8–8.8)
RBC # BLD AUTO: 3.43 10E12/L (ref 4.4–5.9)
SODIUM SERPL-SCNC: 144 MMOL/L (ref 133–144)
WBC # BLD AUTO: 6 10E9/L (ref 4–11)

## 2017-04-18 PROCEDURE — 85027 COMPLETE CBC AUTOMATED: CPT | Performed by: INTERNAL MEDICINE

## 2017-04-18 PROCEDURE — 80053 COMPREHEN METABOLIC PANEL: CPT | Performed by: INTERNAL MEDICINE

## 2017-04-18 PROCEDURE — 36415 COLL VENOUS BLD VENIPUNCTURE: CPT | Performed by: INTERNAL MEDICINE

## 2017-04-18 NOTE — H&P (VIEW-ONLY)
SUBJECTIVE:                                                    Tucker Slater is a 86 year old male who presents to clinic today for the following health issues:          Hospital Follow-up Visit:    Hospital/Nursing Home/ Rehab Facility: [facility]  Date of Admission: 03/27/17 and 03/31/17  Date of Discharge: 03/31/17 and 04/10/17  Reason(s) for Admission: hip osteoarthritis, FRANK             Problems taking medications regularly:  None       Medication changes since discharge: None       Problems adhering to non-medication therapy:  None    Summary of hospitalization:  [facility] discharge summary reviewed  Diagnostic Tests/Treatments reviewed.  Follow up needed: lab  Other Healthcare Providers Involved in Patient s Care:         None  Update since discharge: improved.     Post Discharge Medication Reconciliation: discharge medications reconciled, continue medications without change.  Plan of care communicated with patient     Coding guidelines for this visit:  Type of Medical   Decision Making Face-to-Face Visit       within 7 Days of discharge Face-to-Face Visit        within 14 days of discharge   Moderate Complexity [id number]   High Complexity [id number]          Patient is seen for a follow up visit.  Recently hospitalized for right knee OA, had FRANK surgery. Recovering well. Had post surgical anemia. Did not need transfusion.   Has symptoms of weakness, feeling light headed. Had urine retention , required [place]. Now improved urination.   Had narcotic related constipation. Gradually improved. No abdominal pain. Hip pain is controlled.   Has h/o CKD, worsened renal function in the hospital and in his stay in the TCU post hospitalization. Poor appetite noted.   Has h/o HTN. on medical treatment. BP has been controlled. No side effects from medications. No CP, HA, dizziness. good compliance with medications and low salt diet.  Has history of atrial fibrillation.  On anticoagulation with Coumadin and rate control medications. Asymptonatic - no chest pains , palpitations,  no side effects from medications.  Coumadin was held during surgery, resumed and needs follow up .     PROBLEMS TO ADD ON...  Has h/o peripheral neuropathy, numbness and paresthesias in the feet. Seen neurology. Unclear etiology.   Has developed right hand first 3 fingers numbness and weakness of the hand , also on and off numbness in the same distribution on the left hand.     Problem list and histories reviewed & adjusted, as indicated.  Additional history: as documented    Patient Active Problem List   Diagnosis     Essential hypertension, benign     Carcinoma in situ of bladder     Hypertrophy of prostate with urinary obstruction     Generalized osteoarthrosis, unspecified site     Hereditary and idiopathic peripheral neuropathy     Pain in joint, shoulder region     CARDIOVASCULAR SCREENING; LDL GOAL LESS THAN 130     Advanced directives, counseling/discussion     Peripheral neuropathy (H)     GI bleed     Anemia     Near syncope     Anemia due to blood loss, acute     Atrial fibrillation (H)     Bradycardia     CKD (chronic kidney disease) stage 3, GFR 30-59 ml/min     Cardiac pacemaker in situ     Long-term (current) use of anticoagulants [Z79.01]     Degenerative arthritis of hip     Past Surgical History:   Procedure Laterality Date     ARTHROPLASTY HIP Right 3/27/2017    Procedure: ARTHROPLASTY HIP;  Surgeon: Jigar Wynn MD;  Location: [facility] OR     C NONSPECIFIC PROCEDURE      bilat inguinal hernia repairs ( 3total procedures)      C NONSPECIFIC PROCEDURE      pilonidal cyst     C NONSPECIFIC PROCEDURE      T + A     C NONSPECIFIC PROCEDURE  8/09     R+L total knee     CARDIOVERSION  3/26/15     COLONOSCOPY       IMPLANT PACEMAKER  3/26/15       Social History   Substance Use Topics     Smoking status: Former Smoker     Quit date: 9/12/1977     Smokeless tobacco: Never Used     Alcohol use No  "    Family History   Problem Relation Age of Onset     HEART DISEASE Father       89yo     Coronary Artery Disease Father      HEART DISEASE Mother       76yo     Coronary Artery Disease Mother      Family History Negative Brother          Current Outpatient Prescriptions   Medication Sig Dispense Refill     ACETAMINOPHEN PO Take 650 mg by mouth every 4 hours as needed for pain       triamterene-hydrochlorothiazide (MAXZIDE) 75-50 MG per tablet Take 0.5 tablets by mouth daily Hold for SBP <100. 45 tablet 3     Warfarin Sodium (COUMADIN PO) Take 2.5 mg by mouth daily Until 4/3/17       losartan (COZAAR) 100 MG tablet Take 1 tablet (100 mg) by mouth daily 90 tablet 0     carvedilol (COREG) 25 MG tablet Take 1 tablet (25 mg) by mouth 2 times daily (with meals) 180 tablet 2     Multiple Vitamins-Minerals (OCUVITE PO) Take 1 tablet by mouth daily        Ascorbic Acid (VITAMIN C PO) Take 500 mg by mouth daily        calcium carbonate (OS-KARLA 500 MG [place]. [place]) 500 MG tablet Take 500 mg by mouth daily        VITAMIN D, CHOLECALCIFEROL, PO Take 2,000 Units by mouth daily.       senna-docusate (SENOKOT-S;PERICOLACE) 8.6-50 MG per tablet Take 2 tablets by mouth daily Reported on 2017       tamsulosin (FLOMAX) 0.4 MG capsule Take 1 capsule (0.4 mg) by mouth daily (Patient not taking: Reported on 2017) 60 capsule      COMPRESSION STOCKINGS 1 each continuous 1 each 0       Reviewed and updated as needed this visit by clinical staff       Reviewed and updated as needed this visit by Provider         ROS:  Constitutional, HEENT, cardiovascular, pulmonary, GI, , musculoskeletal, neuro, skin, endocrine and psych systems are negative, except as otherwise noted.    OBJECTIVE:                                                    /58  Pulse 75  Temp 98.3  F (36.8  C) (Oral)  Ht 5' 10\" (1.778 m)  Wt 179 lb (81.2 kg)  SpO2 98%  BMI 25.68 kg/m2  Body mass index is 25.68 kg/(m^2).  GENERAL: weak, alert " and no distress  EYES: Eyes grossly normal to inspection, PERRL and conjunctivae and sclerae normal  NECK: no adenopathy, no asymmetry, masses, or scars and thyroid normal to palpation  RESP: lungs clear to auscultation - no rales, rhonchi or wheezes  CV: irregular rate and rhythm, normal S1 S2, no S3 or S4, no murmur, click or rub, no peripheral edema and peripheral pulses strong  ABDOMEN: soft, nontender, no hepatosplenomegaly, no masses and bowel sounds normal  MS: no gross musculoskeletal defects noted, no edema, healing right lateral hip incision post FRANK , mild skin edema   SKIN: no suspicious lesions or rashes  NEURO: Normal strength and tone, mentation intact and speech normal,   Numbness of the right hand 1,2,3d fingers, weakness of the      Diagnostic Test Results:  No results found for this or any previous visit (from the past 24 hour(s)).     ASSESSMENT/PLAN:                                                      Problem List Items Addressed This Visit     Essential hypertension, benign    Anemia    Relevant Orders    CBC with platelets    Atrial fibrillation (H)    CKD (chronic kidney disease) stage 3, GFR 30-59 ml/min    Relevant Orders    Comprehensive metabolic panel      Other Visit Diagnoses     Bilateral carpal tunnel syndrome    -  Primary    Relevant Orders    ORTHO  REFERRAL (Completed)    Hospital discharge follow-up        Acute renal failure, unspecified acute renal failure type (H)        History of total hip arthroplasty, right               Had lab work yesterday, stable Hgb, will recheck in a week, along with INR  Cont PT  Hold Cozaar, recheck BMP in one week  Improving slowly  Increase PO fluids     Follow-Up:in 1 week     Kwadwo Winslow MD  [facility]

## 2017-04-19 ENCOUNTER — ANESTHESIA (OUTPATIENT)
Dept: SURGERY | Facility: CLINIC | Age: 82
End: 2017-04-19
Payer: MEDICARE

## 2017-04-19 ENCOUNTER — ANESTHESIA EVENT (OUTPATIENT)
Dept: SURGERY | Facility: CLINIC | Age: 82
End: 2017-04-19
Payer: MEDICARE

## 2017-04-19 ENCOUNTER — HOSPITAL ENCOUNTER (OUTPATIENT)
Facility: CLINIC | Age: 82
Discharge: HOME | End: 2017-04-19
Attending: ORTHOPAEDIC SURGERY | Admitting: ORTHOPAEDIC SURGERY
Payer: MEDICARE

## 2017-04-19 VITALS
BODY MASS INDEX: 25.48 KG/M2 | WEIGHT: 178 LBS | OXYGEN SATURATION: 97 % | RESPIRATION RATE: 16 BRPM | HEIGHT: 70 IN | SYSTOLIC BLOOD PRESSURE: 139 MMHG | HEART RATE: 71 BPM | DIASTOLIC BLOOD PRESSURE: 68 MMHG | TEMPERATURE: 98.7 F

## 2017-04-19 DIAGNOSIS — G56.01 CARPAL TUNNEL SYNDROME OF RIGHT WRIST: Primary | ICD-10-CM

## 2017-04-19 LAB — INR PPP: 2.62 (ref 0.86–1.14)

## 2017-04-19 PROCEDURE — 36000050 ZZH SURGERY LEVEL 2 1ST 30 MIN: Performed by: ORTHOPAEDIC SURGERY

## 2017-04-19 PROCEDURE — 37000009 ZZH ANESTHESIA TECHNICAL FEE, EACH ADDTL 15 MIN: Performed by: ORTHOPAEDIC SURGERY

## 2017-04-19 PROCEDURE — 27210995 ZZH RX 272: Performed by: NURSE ANESTHETIST, CERTIFIED REGISTERED

## 2017-04-19 PROCEDURE — 25000125 ZZHC RX 250: Performed by: NURSE ANESTHETIST, CERTIFIED REGISTERED

## 2017-04-19 PROCEDURE — 25000128 H RX IP 250 OP 636: Performed by: NURSE ANESTHETIST, CERTIFIED REGISTERED

## 2017-04-19 PROCEDURE — 64721 CARPAL TUNNEL SURGERY: CPT | Mod: RT | Performed by: ORTHOPAEDIC SURGERY

## 2017-04-19 PROCEDURE — 85610 PROTHROMBIN TIME: CPT | Performed by: ANESTHESIOLOGY

## 2017-04-19 PROCEDURE — 71000027 ZZH RECOVERY PHASE 2 EACH 15 MINS: Performed by: ORTHOPAEDIC SURGERY

## 2017-04-19 PROCEDURE — 25800025 ZZH RX 258: Performed by: ANESTHESIOLOGY

## 2017-04-19 PROCEDURE — 27210794 ZZH OR GENERAL SUPPLY STERILE: Performed by: ORTHOPAEDIC SURGERY

## 2017-04-19 PROCEDURE — 36415 COLL VENOUS BLD VENIPUNCTURE: CPT | Performed by: ANESTHESIOLOGY

## 2017-04-19 PROCEDURE — 25000128 H RX IP 250 OP 636: Performed by: ORTHOPAEDIC SURGERY

## 2017-04-19 PROCEDURE — 40000306 ZZH STATISTIC PRE PROC ASSESS II: Performed by: ORTHOPAEDIC SURGERY

## 2017-04-19 PROCEDURE — 37000008 ZZH ANESTHESIA TECHNICAL FEE, 1ST 30 MIN: Performed by: ORTHOPAEDIC SURGERY

## 2017-04-19 RX ORDER — ACETAMINOPHEN 10 MG/ML
1000 INJECTION, SOLUTION INTRAVENOUS ONCE
Status: DISCONTINUED | OUTPATIENT
Start: 2017-04-19 | End: 2017-04-19 | Stop reason: HOSPADM

## 2017-04-19 RX ORDER — LIDOCAINE 40 MG/G
CREAM TOPICAL
Status: DISCONTINUED | OUTPATIENT
Start: 2017-04-19 | End: 2017-04-19 | Stop reason: HOSPADM

## 2017-04-19 RX ORDER — ONDANSETRON 2 MG/ML
4 INJECTION INTRAMUSCULAR; INTRAVENOUS EVERY 30 MIN PRN
Status: DISCONTINUED | OUTPATIENT
Start: 2017-04-19 | End: 2017-04-19 | Stop reason: HOSPADM

## 2017-04-19 RX ORDER — FENTANYL CITRATE 50 UG/ML
INJECTION, SOLUTION INTRAMUSCULAR; INTRAVENOUS PRN
Status: DISCONTINUED | OUTPATIENT
Start: 2017-04-19 | End: 2017-04-19

## 2017-04-19 RX ORDER — DIMENHYDRINATE 50 MG/ML
25 INJECTION, SOLUTION INTRAMUSCULAR; INTRAVENOUS
Status: DISCONTINUED | OUTPATIENT
Start: 2017-04-19 | End: 2017-04-19 | Stop reason: HOSPADM

## 2017-04-19 RX ORDER — FENTANYL CITRATE 50 UG/ML
25-50 INJECTION, SOLUTION INTRAMUSCULAR; INTRAVENOUS
Status: DISCONTINUED | OUTPATIENT
Start: 2017-04-19 | End: 2017-04-19 | Stop reason: HOSPADM

## 2017-04-19 RX ORDER — LABETALOL HYDROCHLORIDE 5 MG/ML
10 INJECTION, SOLUTION INTRAVENOUS
Status: DISCONTINUED | OUTPATIENT
Start: 2017-04-19 | End: 2017-04-19 | Stop reason: HOSPADM

## 2017-04-19 RX ORDER — HYDRALAZINE HYDROCHLORIDE 20 MG/ML
2.5-5 INJECTION INTRAMUSCULAR; INTRAVENOUS EVERY 10 MIN PRN
Status: DISCONTINUED | OUTPATIENT
Start: 2017-04-19 | End: 2017-04-19 | Stop reason: HOSPADM

## 2017-04-19 RX ORDER — LIDOCAINE HYDROCHLORIDE 10 MG/ML
INJECTION, SOLUTION INFILTRATION; PERINEURAL PRN
Status: DISCONTINUED | OUTPATIENT
Start: 2017-04-19 | End: 2017-04-19

## 2017-04-19 RX ORDER — TRAMADOL HYDROCHLORIDE 50 MG/1
50-100 TABLET ORAL EVERY 6 HOURS PRN
Qty: 20 TABLET | Refills: 0 | Status: SHIPPED | OUTPATIENT
Start: 2017-04-19 | End: 2017-09-12

## 2017-04-19 RX ORDER — SODIUM CHLORIDE, SODIUM LACTATE, POTASSIUM CHLORIDE, CALCIUM CHLORIDE 600; 310; 30; 20 MG/100ML; MG/100ML; MG/100ML; MG/100ML
INJECTION, SOLUTION INTRAVENOUS CONTINUOUS
Status: DISCONTINUED | OUTPATIENT
Start: 2017-04-19 | End: 2017-04-19 | Stop reason: HOSPADM

## 2017-04-19 RX ORDER — MEPERIDINE HYDROCHLORIDE 25 MG/ML
12.5 INJECTION INTRAMUSCULAR; INTRAVENOUS; SUBCUTANEOUS
Status: DISCONTINUED | OUTPATIENT
Start: 2017-04-19 | End: 2017-04-19 | Stop reason: HOSPADM

## 2017-04-19 RX ORDER — LIDOCAINE HYDROCHLORIDE 5 MG/ML
INJECTION, SOLUTION INFILTRATION; PERINEURAL PRN
Status: DISCONTINUED | OUTPATIENT
Start: 2017-04-19 | End: 2017-04-19

## 2017-04-19 RX ORDER — ALBUTEROL SULFATE 0.83 MG/ML
2.5 SOLUTION RESPIRATORY (INHALATION) EVERY 4 HOURS PRN
Status: DISCONTINUED | OUTPATIENT
Start: 2017-04-19 | End: 2017-04-19 | Stop reason: HOSPADM

## 2017-04-19 RX ORDER — DROPERIDOL 2.5 MG/ML
0.62 INJECTION, SOLUTION INTRAMUSCULAR; INTRAVENOUS
Status: DISCONTINUED | OUTPATIENT
Start: 2017-04-19 | End: 2017-04-19 | Stop reason: RX

## 2017-04-19 RX ORDER — ONDANSETRON 4 MG/1
4 TABLET, ORALLY DISINTEGRATING ORAL EVERY 30 MIN PRN
Status: DISCONTINUED | OUTPATIENT
Start: 2017-04-19 | End: 2017-04-19 | Stop reason: HOSPADM

## 2017-04-19 RX ORDER — PROPOFOL 10 MG/ML
INJECTION, EMULSION INTRAVENOUS PRN
Status: DISCONTINUED | OUTPATIENT
Start: 2017-04-19 | End: 2017-04-19

## 2017-04-19 RX ORDER — NALOXONE HYDROCHLORIDE 0.4 MG/ML
.1-.4 INJECTION, SOLUTION INTRAMUSCULAR; INTRAVENOUS; SUBCUTANEOUS
Status: DISCONTINUED | OUTPATIENT
Start: 2017-04-19 | End: 2017-04-19 | Stop reason: HOSPADM

## 2017-04-19 RX ORDER — ONDANSETRON 2 MG/ML
INJECTION INTRAMUSCULAR; INTRAVENOUS PRN
Status: DISCONTINUED | OUTPATIENT
Start: 2017-04-19 | End: 2017-04-19

## 2017-04-19 RX ORDER — CEFAZOLIN SODIUM 2 G/100ML
2 INJECTION, SOLUTION INTRAVENOUS
Status: COMPLETED | OUTPATIENT
Start: 2017-04-19 | End: 2017-04-19

## 2017-04-19 RX ORDER — CEFAZOLIN SODIUM 1 G/3ML
1 INJECTION, POWDER, FOR SOLUTION INTRAMUSCULAR; INTRAVENOUS SEE ADMIN INSTRUCTIONS
Status: DISCONTINUED | OUTPATIENT
Start: 2017-04-19 | End: 2017-04-19 | Stop reason: HOSPADM

## 2017-04-19 RX ADMIN — FENTANYL CITRATE 25 MCG: 50 INJECTION, SOLUTION INTRAMUSCULAR; INTRAVENOUS at 11:45

## 2017-04-19 RX ADMIN — ONDANSETRON 4 MG: 2 INJECTION INTRAMUSCULAR; INTRAVENOUS at 11:43

## 2017-04-19 RX ADMIN — LIDOCAINE HYDROCHLORIDE 50 ML: 5 INJECTION, SOLUTION INFILTRATION; PERINEURAL at 11:37

## 2017-04-19 RX ADMIN — PROPOFOL 20 MG: 10 INJECTION, EMULSION INTRAVENOUS at 11:35

## 2017-04-19 RX ADMIN — FENTANYL CITRATE 75 MCG: 50 INJECTION, SOLUTION INTRAMUSCULAR; INTRAVENOUS at 11:27

## 2017-04-19 RX ADMIN — SODIUM CHLORIDE, POTASSIUM CHLORIDE, SODIUM LACTATE AND CALCIUM CHLORIDE: 600; 310; 30; 20 INJECTION, SOLUTION INTRAVENOUS at 11:24

## 2017-04-19 RX ADMIN — CEFAZOLIN SODIUM 2 G: 2 INJECTION, SOLUTION INTRAVENOUS at 11:26

## 2017-04-19 RX ADMIN — LIDOCAINE HYDROCHLORIDE 20 MG: 10 INJECTION, SOLUTION INFILTRATION; PERINEURAL at 11:34

## 2017-04-19 ASSESSMENT — COPD QUESTIONNAIRES
COPD: 1
CAT_SEVERITY: MILD

## 2017-04-19 ASSESSMENT — LIFESTYLE VARIABLES: TOBACCO_USE: 1

## 2017-04-19 ASSESSMENT — ENCOUNTER SYMPTOMS
STRIDOR: 0
DYSRHYTHMIAS: 1
SEIZURES: 0

## 2017-04-19 NOTE — BRIEF OP NOTE
[facility]  Orthopedics Brief Operative Note    Pre-operative diagnosis: Carpal Tunnel Right  Wrist   Post-operative diagnosis Same   Procedure:Right Procedure(s):  RELEASE CARPAL TUNNEL   - Wound Class: I-Clean   Surgeon: Alonso Barboza MD   Assistants(s): Surgeon(s) and Role:     * Alonso Barboza MD - Primary   Anesthesia: Monitor Anesthesia Care    Estimated blood loss: * No values recorded between 4/19/2017 11:39 AM and 4/19/2017 11:43 AM *    Total IV fluids: See anesthesia record   Blood transfusion: None       Drains: None   Specimens: * No specimens in log *   Implants: None   Findings: Dictated   Complications: None   Condition: Stable   Comments: See Dictated Operative report for full details

## 2017-04-19 NOTE — ANESTHESIA POSTPROCEDURE EVALUATION
Patient: Tucker Slater    Procedure(s):  RELEASE CARPAL TUNNEL   - Wound Class: I-Clean    Diagnosis:Carpal Tunnel   Diagnosis Additional Information: Carpal Tunnel Right  Wrist    Anesthesia Type:  IV Regional Anesthesia    Note:  Anesthesia Post Evaluation    Patient location during evaluation: PACU  Patient participation: Able to fully participate in evaluation  Level of consciousness: awake  Pain management: adequate  Airway patency: patent  Cardiovascular status: acceptable  Respiratory status: acceptable  Hydration status: acceptable  PONV: controlled     Anesthetic complications: None          Last vitals:  Vitals:    04/19/17 1022 04/19/17 1157 04/19/17 1203   BP: 165/84 128/79 126/76   Pulse: 71     Resp: 18 14 16   Temp: 98.1  F (36.7  C) 98.4  F (36.9  C)    SpO2: 96% 98%          Electronically Signed By: Jose Alberto Martines MD  April 19, 2017  12:11 PM

## 2017-04-19 NOTE — ANESTHESIA PREPROCEDURE EVALUATION
Anesthesia Evaluation     . Pt has had prior anesthetic. Type: General    No history of anesthetic complications          ROS/MED HX    ENT/Pulmonary:     (+)DAWSON risk factors hypertension, tobacco use, Past use mild COPD, , . .   (-) asthma and recent URI   Neurologic:     (+)neuropathy    (-) seizures and CVA   Cardiovascular:     (+) Dyslipidemia, hypertension----. : . . . pacemaker :- Patient is dependent on pacemaker . dysrhythmias a-fib, . Previous cardiac testing date:results:date: results:ECG reviewed date:3/17 results:100% paced date: results:         (-) CAD and valvular problems/murmurs   METS/Exercise Tolerance:     Hematologic: Comments: Lab Test        04/18/17 04/17/17 04/04/17 03/31/17 03/30/17 03/29/17      --          05/17/16      --          11/23/15                       1117          1055                            0548          0620          1245           --           1738           --           1423          WBC          6.0           --           --           --           --           --           --          6.7           --          7.0           HGB          10.5*         --          9.8*          --           --          9.9*           < >        15.5          --          15.5          MCV          96            --           --           --           --           --           --          95            --          97            PLT          263           --           --           --           --           --           --          177           --          194           INR           --          3.91*         --          2.21*        2.24*         --            < >         --            < >         --            < > = values in this interval not displayed.                  Lab Test        04/18/17     04/10/17     04/04/17      --          03/20/17 02/09/17                       1117                                               --           6799          105           NA           144          137          138           --          138          144           POTASSIUM    4.5          3.7          4.0           --          4.2          4.2           CHLORIDE     111*         110*         107           --          102          110*          CO2          23           17*          24            --          23           26            BUN          30           46*          29*           --          31*          41*           CR           1.55*        2.11*        1.50*         --          1.44*        1.41*         ANIONGAP     10            --           --           --          13           8             KARLA          9.0          8.3*         8.1*          --          9.3          9.3           GLC          94           76           95             < >        85           103*           < > = values in this interval not displayed.                   (+) Anemia, -      Musculoskeletal:   (+) arthritis, , , -       GI/Hepatic:  - neg GI/hepatic ROS      (-) GERD, hepatitis and liver disease   Renal/Genitourinary:     (+) chronic renal disease, type: CRI, Pt does not require dialysis, Pt has no history of transplant,       Endo:  - neg endo ROS    (-) Type I DM, Type II DM, thyroid disease, chronic steroid usage and obesity   Psychiatric:  - neg psychiatric ROS       Infectious Disease:  - neg infectious disease ROS       Malignancy:      - no malignancy   Other:    - neg other ROS                 Physical Exam  Normal systems: cardiovascular, pulmonary and dental    Airway   Mallampati: II  TM distance: >3 FB  Neck ROM: full    Dental     Cardiovascular   Rhythm and rate: regular and normal  (-) no friction rub, no systolic click and no murmur    Pulmonary    breath sounds clear to auscultation(-) no rhonchi, no decreased breath sounds, no wheezes, no rales and no stridor                    Anesthesia Plan      History & Physical Review  History and physical reviewed and following  examination; no interval change.    ASA Status:  3 .    NPO Status:  > 8 hours    Plan for IV Regional Anesthesia with Intravenous induction.          Postoperative Care  Postoperative pain management:  IV analgesics.      Consents  Anesthetic plan, risks, benefits and alternatives discussed with:  Patient and Spouse..                          .

## 2017-04-19 NOTE — IP AVS SNAPSHOT
MRN:[id number]                      After Visit Summary   4/19/2017    Tucker Slater    MRN: [id number]           Thank you!     Thank you for choosing [facility] for your care. Our goal is always to provide you with excellent care. Hearing back from our patients is one way we can continue to improve our services. Please take a few minutes to complete the written survey that you may receive in the mail after you visit. If you would like to speak to someone directly about your visit please contact Patient Relations at [phone]. Thank you!          Patient Information     Date Of Birth          3/13/1931        About your hospital stay     You were admitted on:  April 19, 2017 You last received care in the:  [facility] PreOP/PostOP    You were discharged on:  April 19, 2017        Reason for your hospital stay       Right carpal tunnel release, uneventful                  Who to Call     For medical emergencies, please call 911.  For non-urgent questions about your medical care, please call your primary care provider or clinic, [phone]  For questions related to your surgery, please call your surgery clinic        Attending Provider     Provider Specialty    Alonso Barboza MD Orthopedics       Primary Care Provider Office Phone # Fax #    Kwadwo Winslow [phone]       [facility] [address] [place] [address]        After Care Instructions     Diet       Follow this diet upon discharge: regular            Wound care and dressings       Instructions to care for your wound at home: daily dressing changes.                  Follow-up Appointments     Follow-up and recommended labs and tests        10-14 days                  Your next 10 appointments already scheduled     Apr 27, 2017 11:00 AM CDT   Return Visit with Geovanny Anaya PA-C   [facility] ([facility])     [address]  [address]  [facility] [address]   [phone]            May 08, 2017  8:45 AM CDT   Remote PPM Check with KIDD TECH1   [facility] [place] ([facility]    [address]  [place] [address] [phone]           This appointment is for a remote check of your pacemaker.  This is not an appointment at the office.            May 10, 2017 11:00 AM CDT   [facility] with [facility]   [facility]    [address]  [facility] [id number] [phone]           Patient must bring picture ID.  Patient should be prepared to give a urine specimen  Please do not eat 10-12 hours before your appointment if you are coming in fasting for labs on lipids, cholesterol, or glucose (sugar).  Pregnant women should follow their Care Team instructions. Water with medications is okay. Do not drink coffee or other fluids.   If you have concerns about taking  your medications, please ask at office or if scheduling via [employer], send a message by clicking on Secure Messaging, Message Your Care Team.              Further instructions from your care team       HAND SURGERY DISCHARGE INSTRUCTIONS  KRISHNA JULES AND KAUSHAL WILLARD  [phone]    DRESSING  You may remove the dressing in 5 days for a shower (not a bath).  Apply an appropriate size band-aid(s) to the incision site.  Otherwise, it is okay to maintain the original post surgical dressing until your follow-up visit.    USE OF THE HAND  You may use the hand for light activities with lifting limitation of one pound for a week.  After that, as tolerated.  Finger movement of full bending and straightening should be done frequently (as an example, 10 times every 30 minutes).  If bandage is on the finger itself, just move the other fingers.  Elevation of the hand above the heart level is recommended as much as possible (especially for the first 3  "days).    MEDICATION  Ibuprofen/Naproxen (Advil/Aleve) or Acetaminophen (Tylenol) will be sufficient to control your pain in most cases.  If pain is not controlled with non-narcotic over-the-counter medication(s), use the prescribed pain medication along with Ibuprofen/Naproxen/Acetaminophen.  But, the prescription pain medication should be weaned off as soon as possible.    DRIVING  No particular restrictions are necessary if you are on non-narcotic over-the-counter medication(s).  If you need to take prescription pain medication, do not drive.    FOLLOW-UP  Your post-operative visit should have been set up already.  If you are not sure about the date, please call my office at [phone].  It is usually 7 to 14 days from the day of surgery.       Pending Results     No orders found from 2017 to 2017.            Admission Information     Date & Time Provider Department Dept. Phone    2017 Alonso Barboza MD [facility] PreOP/PostOP [phone]      Your Vitals Were     Blood Pressure Pulse Temperature Respirations Height Weight    126/76 71 98.4  F (36.9  C) (Temporal) 16 1.778 m (5' 10\") 80.7 kg (178 lb)    Pulse Oximetry BMI (Body Mass Index)                98% 25.54 kg/m2          MyChart Information     [employer] lets you send messages to your doctor, view your test results, renew your prescriptions, schedule appointments and more. To sign up, go to [email or web address] . Click on \"Log in\" on the left side of the screen, which will take you to the Welcome page. Then click on \"Sign up Now\" on the right side of the page.     You will be asked to enter the access code listed below, as well as some personal information. Please follow the directions to create your username and password.     Your access code is: [email or web address]  Expires: 2017  2:11 PM     Your access code will  in 90 days. If you need help or a new code, please call your [facility] or [phone].      "   Care EveryWhere ID     This is your Care EveryWhere ID. This could be used by other organizations to access your [place] medical records  [id number]           Review of your medicines      START taking        Dose / Directions    traMADol 50 MG tablet   Commonly known as:  ULTRAM   Used for:  Carpal tunnel syndrome of right wrist        Dose:   mg   Take 1-2 tablets ( mg) by mouth every 6 hours as needed for pain maximum 8 tablet(s) per day   Quantity:  20 tablet   Refills:  0         CONTINUE these medicines which have NOT CHANGED        Dose / Directions    ACETAMINOPHEN PO        Dose:  650 mg   Take 650 mg by mouth every 4 hours as needed for pain   Refills:  0       calcium carbonate 500 MG tablet   Commonly known as:  OS-KARLA 500 mg [place]. Ca        Dose:  500 mg   Take 500 mg by mouth daily   Refills:  0       carvedilol 25 MG tablet   Commonly known as:  COREG   Used for:  Essential hypertension with goal blood pressure less than 140/90        Dose:  25 mg   Take 1 tablet (25 mg) by mouth 2 times daily (with meals)   Quantity:  180 tablet   Refills:  2       COMPRESSION STOCKINGS   Used for:  S/P hip replacement, unspecified laterality        Dose:  1 each   1 each continuous   Quantity:  1 each   Refills:  0       COUMADIN PO        Dose:  2.5 mg   Take 2.5 mg by mouth daily Until 4/3/17   Refills:  0       OCUVITE PO        Dose:  1 tablet   Take 1 tablet by mouth daily   Refills:  0       senna-docusate 8.6-50 MG per tablet   Commonly known as:  SENOKOT-S;PERICOLACE        Dose:  2 tablet   Take 2 tablets by mouth daily Reported on 4/11/2017   Refills:  0       triamterene-hydrochlorothiazide 75-50 MG per tablet   Commonly known as:  MAXZIDE   Used for:  Essential hypertension, benign        Dose:  0.5 tablet   Take 0.5 tablets by mouth daily Hold for SBP <100.   Quantity:  45 tablet   Refills:  3       VITAMIN C PO        Dose:  500 mg   Take 500 mg by mouth daily   Refills:  0        VITAMIN D (CHOLECALCIFEROL) PO        Dose:  2000 Units   Take 2,000 Units by mouth daily.   Refills:  0            Where to get your medicines      Some of these will need a paper prescription and others can be bought over the counter. Ask your nurse if you have questions.     Bring a paper prescription for each of these medications     traMADol 50 MG tablet                Protect others around you: Learn how to safely use, store and throw away your medicines at www.disposemymeds.org.             Medication List: This is a list of all your medications and when to take them. Check marks below indicate your daily home schedule. Keep this list as a reference.      Medications           Morning Afternoon Evening Bedtime As Needed    ACETAMINOPHEN PO   Take 650 mg by mouth every 4 hours as needed for pain                                calcium carbonate 500 MG tablet   Commonly known as:  OS-KARLA 500 mg [place]. Ca   Take 500 mg by mouth daily                                carvedilol 25 MG tablet   Commonly known as:  COREG   Take 1 tablet (25 mg) by mouth 2 times daily (with meals)                                COMPRESSION STOCKINGS   1 each continuous                                COUMADIN PO   Take 2.5 mg by mouth daily Until 4/3/17                                OCUVITE PO   Take 1 tablet by mouth daily                                senna-docusate 8.6-50 MG per tablet   Commonly known as:  SENOKOT-S;PERICOLACE   Take 2 tablets by mouth daily Reported on 4/11/2017                                traMADol 50 MG tablet   Commonly known as:  ULTRAM   Take 1-2 tablets ( mg) by mouth every 6 hours as needed for pain maximum 8 tablet(s) per day                                triamterene-hydrochlorothiazide 75-50 MG per tablet   Commonly known as:  MAXZIDE   Take 0.5 tablets by mouth daily Hold for SBP <100.                                VITAMIN C PO   Take 500 mg by mouth daily                                VITAMIN  D (CHOLECALCIFEROL) PO   Take 2,000 Units by mouth daily.

## 2017-04-19 NOTE — IP AVS SNAPSHOT
[facility] PreOP/PostOP    [address]    [facility] [address]    Phone:  [phone]    Fax:  [phone]                                       After Visit Summary   4/19/2017    Tucker Slater    MRN: [id number]           After Visit Summary Signature Page     I have received my discharge instructions, and my questions have been answered. I have discussed any challenges I see with this plan with the nurse or doctor.    ..........................................................................................................................................  Patient/Patient Representative Signature      ..........................................................................................................................................  Patient Representative Print Name and Relationship to Patient    ..................................................               ................................................  Date                                            Time    ..........................................................................................................................................  Reviewed by Signature/Title    ...................................................              ..............................................  Date                                                            Time

## 2017-04-19 NOTE — ANESTHESIA CARE TRANSFER NOTE
Patient: Tucker Slater    Procedure(s):  RELEASE CARPAL TUNNEL   - Wound Class: I-Clean    Diagnosis: Carpal Tunnel   Diagnosis Additional Information: No value filed.    Anesthesia Type:   IV Regional Anesthesia     Note:  Airway :Room Air  Patient transferred to:Phase II  Comments: vss      Vitals: (Last set prior to Anesthesia Care Transfer)    CRNA VITALS  4/19/2017 1123 - 4/19/2017 1157      4/19/2017             Pulse: 72    SpO2: 100 %                Electronically Signed By: SELMA Alcantara CRNA  April 19, 2017  11:57 AM

## 2017-04-19 NOTE — DISCHARGE INSTRUCTIONS
HAND SURGERY DISCHARGE INSTRUCTIONS  KR[facility] JULES AND KAUSHAL WILLARD  [phone]    DRESSING  You may remove the dressing in 5 days for a shower (not a bath).  Apply an appropriate size band-aid(s) to the incision site.  Otherwise, it is okay to maintain the original post surgical dressing until your follow-up visit.    USE OF THE HAND  You may use the hand for light activities with lifting limitation of one pound for a week.  After that, as tolerated.  Finger movement of full bending and straightening should be done frequently (as an example, 10 times every 30 minutes).  If bandage is on the finger itself, just move the other fingers.  Elevation of the hand above the heart level is recommended as much as possible (especially for the first 3 days).    MEDICATION  Ibuprofen/Naproxen (Advil/Aleve) or Acetaminophen (Tylenol) will be sufficient to control your pain in most cases.  If pain is not controlled with non-narcotic over-the-counter medication(s), use the prescribed pain medication along with Ibuprofen/Naproxen/Acetaminophen.  But, the prescription pain medication should be weaned off as soon as possible.    DRIVING  No particular restrictions are necessary if you are on non-narcotic over-the-counter medication(s).  If you need to take prescription pain medication, do not drive.    FOLLOW-UP  Your post-operative visit should have been set up already.  If you are not sure about the date, please call my office at [phone].  It is usually 7 to 14 days from the day of surgery.     [facility]

## 2017-04-20 ENCOUNTER — TELEPHONE (OUTPATIENT)
Dept: ORTHOPEDICS | Facility: CLINIC | Age: 82
End: 2017-04-20

## 2017-04-20 NOTE — TELEPHONE ENCOUNTER
Pt wife left a message on my personal cell phone.    Dressing questions:  CTR 4/419/2017, srinivasan.    1.  Discharge instructions said daily dressing changes.  2. We usually say 48 hours then change to band aids as needed, however Pt has high INR, so if draining recommend they re wrap with ace bandage to apply pressure.    Geovanny Anaya PA-C  [facility] - Surgery

## 2017-04-20 NOTE — TELEPHONE ENCOUNTER
Phone call to wife, Alba. She states they waited at the hospital yesterday for post op orders but were finally told to go home. They recommended patient call Jovany for instructions.   She states she did get Jovany' message today. Clarified for her that patient should leave dressing on for 48 hrs from time of leaving hospital. Informed patient may remove tomorrow after 2 pm, then change daily. However, if there is oozing or bleeding, recommended they replace with the ACE bandage due to patient's history of elevated INR. Otherwise offered that they could leave on until follow up visit. She states patient feels it is bulky and would like to change it in the 48 hrs. She denies any drainage on ACE bandage at this time.   Informed that if dressing feels too tight, patient may loosen the ACE bandage if needed. Clinic phone number given with instructions of phone tree triage extension for any further questions or concerns.   She would like to change follow up appointment, and appointment was rescheduled to 4/28/17.   She verbalized understanding.     SHAINA Greenfield RN

## 2017-04-20 NOTE — OP NOTE
DATE OF PROCEDURE:  2017      SURGEON:  Alonso Willard MD      ASSISTANT:  Jovany Anaya PA-C      ANESTHESIA:  Regional by Tena block.      PREOPERATIVE DIAGNOSIS:  Right carpal tunnel syndrome.      POSTOPERATIVE DIAGNOSIS:  Right carpal tunnel syndrome.      PROCEDURE:  Right transverse carpal ligament release.      INDICATIONS FOR PROCEDURE:  Ross Lorenzana is an 86-year-old gentleman with symptomatic bilateral carpal tunnel syndrome who elected to undergo the above-stated procedure today, fully understanding the potential risks as well as benefits of this procedure.      OPERATIVE NOTE:  Patient was brought to the operating room, placed in a supine position on the operating table, where a regional Prentice block was administered to his right upper extremity.  His right hand and forearm were scrubbed and prepped in the usual sterile fashion and draped sterilely.      Using a chevron shaped volar wrist incision based at the proximal palmar crease, the skin was incised sharply.  Subcutaneous tissue was dissected bluntly down to the transverse carpal ligament, which was released with a curved Lowe scissors without difficulty.  The wound was irrigated and was closed with nylon suture and dressed sterilely.  Patient tolerated the procedure well and left the operating room in satisfactory and stable condition.      ESTIMATED BLOOD LOSS:  None.      SPONGE AND NEEDLE COUNT:  Correct x2.         ALONSO WILLARD MD             D: 2017 13:07   T: 2017 22:06   MT: WILL#[id number]      Name:     ROSS LORENZANA   MRN:      [id number]        Account:        [id number]   :      1931           Procedure Date: 2017      Document: [id number]

## 2017-04-24 ENCOUNTER — TELEPHONE (OUTPATIENT)
Dept: ORTHOPEDICS | Facility: CLINIC | Age: 82
End: 2017-04-24

## 2017-04-24 NOTE — TELEPHONE ENCOUNTER
Wife, Alba, calls for patient. Consent to communicate on file. She states on 4/22/17 they noticed a very small amount of clear drainage on bandage.   Patient had R CTR on 4/19 by Dr. Barboza.   Patient also had recent FRANK on 3/27/17 by another provider. He is using a walker.   Today they changed the bandage and there was a larger amount of clear drainage but still less than dime sized. With recent surgery, patient has to use his hands to push off from a chair to get up. They feel he is irritating the incision when he does this.   They deny redness but state that the upper half of the incision is swollen. They are able to see only parts of the sutures on one side of the incision on the upper portion, but not the other side due to swelling.   Recommended that they change dressing daily.   Patient has physical therapy appointment this pm and they will discuss with physical therapy of how to avoid putting pressure on his wrist if able.   Will discuss swelling with Jovany Anaya PA-C and get back with them this pm. Try home number first before 2:30 pm, otherwise can try cell. Ok to leave message.     Please advise if patient should be seen sooner, or any other recommendations to help avoid pressure on wrist.     SHAINA Greenfield RN

## 2017-04-24 NOTE — TELEPHONE ENCOUNTER
Swelling and light drainage are common.  If not infected, I would just pad the area, such as wrap with ace and gauze with minimal tension.    Geovanny Anaya PA-C  [facility] - Surgery

## 2017-04-27 ENCOUNTER — TELEPHONE (OUTPATIENT)
Dept: ANTICOAGULATION | Facility: CLINIC | Age: 82
End: 2017-04-27

## 2017-04-27 ENCOUNTER — ANTICOAGULATION THERAPY VISIT (OUTPATIENT)
Dept: ANTICOAGULATION | Facility: CLINIC | Age: 82
End: 2017-04-27
Payer: COMMERCIAL

## 2017-04-27 DIAGNOSIS — I48.20 CHRONIC ATRIAL FIBRILLATION (H): Primary | ICD-10-CM

## 2017-04-27 DIAGNOSIS — Z79.01 LONG-TERM (CURRENT) USE OF ANTICOAGULANTS: ICD-10-CM

## 2017-04-27 LAB — INR POINT OF CARE: 3.1 (ref 0.86–1.14)

## 2017-04-27 PROCEDURE — 85610 PROTHROMBIN TIME: CPT | Mod: QW

## 2017-04-27 PROCEDURE — 36416 COLLJ CAPILLARY BLOOD SPEC: CPT

## 2017-04-27 NOTE — PROGRESS NOTES
ANTICOAGULATION FOLLOW-UP CLINIC VISIT    Patient Name:  Tucker Slater  Date:  4/27/2017  Contact Type:  Face to Face    SUBJECTIVE:     Patient Findings     Comments 4 weeks post-op hip replacement, 8 days post op right carpal tunnel           OBJECTIVE    INR Protime   Date Value Ref Range Status   04/27/2017 3.1 (A) 0.86 - 1.14 Final       ASSESSMENT / PLAN  INR assessment THER    Recheck INR In: 2 WEEKS    INR Location Clinic      Anticoagulation Summary as of 4/27/2017     INR goal 2.0-3.0   Today's INR 3.1!   Maintenance plan 2.5 mg (2.5 mg x 1) every day   Full instructions 2.5 mg every day   Weekly total 17.5 mg   Plan last modified Gail Valdovinos RN (4/7/2016)   Next INR check 5/11/2017   Priority INR   Target end date     Indications   Long-term (current) use of anticoagulants [Z79.01] [Z79.01]  Atrial fibrillation (H) [I48.91]         Anticoagulation Episode Summary     INR check location     Preferred lab     Send INR reminders to [facility]    Comments       Anticoagulation Care Providers     Provider Role Specialty Phone number    Kwadwo Winslow MD [facility] Internal Medicine [phone]            See the Encounter Report to view Anticoagulation Flowsheet and Dosing Calendar (Go to Encounters tab in chart review, and find the Anticoagulation Therapy Visit)    Dosage adjustment made based on physician directed care plan.    Gail Valdovinos, RN

## 2017-04-27 NOTE — MR AVS SNAPSHOT
Tucker CECE Slater   4/27/2017 2:30 PM   Anticoagulation Therapy Visit    Description:  86 year old male   Provider:  [facility]   Department:  [facility]           INR as of 4/27/2017     Today's INR 3.1!      Anticoagulation Summary as of 4/27/2017     INR goal 2.0-3.0   Today's INR 3.1!   Full instructions 2.5 mg every day   Next INR check 5/11/2017    Indications   Long-term (current) use of anticoagulants [Z79.01] [Z79.01]  Atrial fibrillation (H) [I48.91]         Your next Anticoagulation Clinic appointment(s)     May 11, 2017  2:30 PM CDT   Anticoagulation Visit with [facility]   [facility] ([facility])    [address] [facility] [address]  [facility] [address] [phone]              Contact Numbers     [facility] Phone Numbers:  Anticoagulation Clinic Appointments : [phone]  Anticoagulation Nurse: [phone]         April 2017 Details    Sun Mon Tue Wed Thu Fri Sat           1                 2               3               4               5               6               7               8                 9               10               11               12               13               14               15                 16               17               18               19               20               21               22                 23               24               25               26               27      2.5 mg   See details      28      2.5 mg         29      2.5 mg           30      2.5 mg                Date Details   04/27 This INR check               How to take your warfarin dose     To take:  2.5 mg Take 1 of the 2.5 mg tablets.           May 2017 Details    Sun Mon Tue Wed Thu Fri Sat      1      2.5 mg         2      2.5 mg         3      2.5 mg         4      2.5 mg         5      2.5 mg         6      2.5 mg           7      2.5 mg         8      2.5 mg         9      2.5 mg         10      2.5 mg          11            12               13                 14               15               16               17               18               19               20                 21               22               23               24               25               26               27                 28               29               30               31                   Date Details   No additional details    Date of next INR:  5/11/2017         How to take your warfarin dose     To take:  2.5 mg Take 1 of the 2.5 mg tablets.

## 2017-04-28 ENCOUNTER — OFFICE VISIT (OUTPATIENT)
Dept: ORTHOPEDICS | Facility: CLINIC | Age: 82
End: 2017-04-28
Payer: COMMERCIAL

## 2017-04-28 DIAGNOSIS — Z47.89 ORTHOPEDIC AFTERCARE: Primary | ICD-10-CM

## 2017-04-28 PROCEDURE — 99024 POSTOP FOLLOW-UP VISIT: CPT | Performed by: PHYSICIAN ASSISTANT

## 2017-04-28 NOTE — PROGRESS NOTES
HISTORY OF PRESENT ILLNESS:    Tucker Slater is a 86 year old male who is seen in follow up for R CTR, DOS 4/19/2017, Dr. Barboza.  Present symptoms: No change in sensation but feels he is dropping things less.  Keeping covered with bandaids and using for light duty.  Main use is pressure with walker. No complaints,.   Denies Chest pain, Calve pain, Fever, Chills.    Current Treatment: post op.    PHYSICAL EXAM:  There were no vitals taken for this visit.  There is no height or weight on file to calculate BMI.   GENERAL APPEARANCE: healthy, alert and no distress   PSYCH:  mentation appears normal and affect normal/bright    MSK:  Right: Volar wrist   Incision clean and dry, one area mid icision with suture abscess and mild dehiscence, sutures present, healing.  Appropriate incisional erythema.   No Ecchymosis.  Edema min at wrist.  CMS: mila incisional numbness, otherwise grossly intact.  AROM WNL.       ASSESSMENT:  Tucker Slater is a 86 year old male S/P R CTR, DOS 4/19/2017, Dr. Barboza.  Discussed sensation may improve for months.    PLAN:  - Surgery discussed, images reviewed if applicable, and all questions were answered at this time.  - sutures removed with sterile technique, steri-strips applied in usual fashion, care instructions given and verbally acknowledged.  - Medications: as current.   - Physical Therapy: As directed at discharge.  - AAT    Return to clinic AMRYBEL Anaya PA-C    Dept. Orthopedic Surgery  [facility]   4/28/2017

## 2017-04-28 NOTE — PATIENT INSTRUCTIONS
Incision care instructions:  Keep dry 24 hours.  Showering is ok after that time, however no soaking or scrubbing of incision for 1 weeks.  Steri-strips will most likely fall off on their own, however they may be removed after 1 weeks with rubbing alcohol if they have not.    Redness should improve, you may have a small pus pocket that pops.  That is ok.    Sensation/feeling may improve for up to a year after surgery.    You may pad the incision for comfort.  (ie ace wrap)    You may also use the hand as tolerated.        Follow up in clinic as needed.

## 2017-04-28 NOTE — MR AVS SNAPSHOT
After Visit Summary   4/28/2017    Tucker Slater    MRN: [id number]           Patient Information     Date Of Birth          3/13/1931        Visit Information        Provider Department      4/28/2017 10:40 AM Geovanny Anaya PA-C [facility] ORTHOPEDIC SURG[facility]        Today's Diagnoses     Orthopedic aftercare    -  1      Care Instructions    Incision care instructions:  Keep dry 24 hours.  Showering is ok after that time, however no soaking or scrubbing of incision for 1 weeks.  Steri-strips will most likely fall off on their own, however they may be removed after 1 weeks with rubbing alcohol if they have not.    Redness should improve, you may have a small pus pocket that pops.  That is ok.    Sensation/feeling may improve for up to a year after surgery.    You may pad the incision for comfort.  (ie ace wrap)    You may also use the hand as tolerated.        Follow up in clinic as needed.        Follow-ups after your visit        Your next 10 appointments already scheduled     May 01, 2017 11:20 AM CDT   SHORT with Kwadwo Winslow MD   [facility] ([facility])    [address] [facility] [address]   [phone]            May 08, 2017  8:45 AM CDT   Remote PPM Check with KIDD TECH1   [facility] [place] ([facility])    [address] [place] [id number] [phone]           This appointment is for a remote check of your pacemaker.  This is not an appointment at the office.            May 10, 2017 11:00 AM CDT   LAB with [facility] LAB   [facility] ([facility])    [address] [facility] [address]   [phone]           Patient must bring picture ID.  Patient should be prepared to give a urine specimen  Please do not eat 10-12 hours before your appointment if you are coming in fasting for labs on lipids, cholesterol, or glucose  "(sugar).  Pregnant women should follow their Care Team instructions. Water with medications is okay. Do not drink coffee or other fluids.   If you have concerns about taking  your medications, please ask at office or if scheduling via Triptease, send a message by clicking on Secure Messaging, Message Your Care Team.            May 11, 2017  2:30 PM CDT   Anticoagulation Visit with [facility]   [facility]    [address]  [facility] [address] [phone]              Who to contact     If you have questions or need follow up information about today's clinic visit or your schedule please contact [facility]THOPED[facility] SURG[facility] directly at [phone].  Normal or non-critical lab and imaging results will be communicated to you by Modafirmahart, letter or phone within 4 business days after the clinic has received the results. If you do not hear from us within 7 days, please contact the clinic through Modafirmahart or phone. If you have a critical or abnormal lab result, we will notify you by phone as soon as possible.  Submit refill requests through Triptease or call your pharmacy and they will forward the refill request to us. Please allow 3 business days for your refill to be completed.          Additional Information About Your Visit        Triptease Information     Triptease lets you send messages to your doctor, view your test results, renew your prescriptions, schedule appointments and more. To sign up, go to [email or web address] . Click on \"Log in\" on the left side of the screen, which will take you to the Welcome page. Then click on \"Sign up Now\" on the right side of the page.     You will be asked to enter the access code listed below, as well as some personal information. Please follow the directions to create your username and password.     Your access code is: [email or web address]  Expires: 2017  2:11 PM     Your access code will  in " 90 days. If you need help or a new code, please call your [facility] or [phone].        Care EveryWhere ID     This is your Care EveryWhere ID. This could be used by other organizations to access your [facility] medical records  [id number]         Blood Pressure from Last 3 Encounters:   04/19/17 139/68   04/17/17 152/79   04/13/17 115/55    Weight from Last 3 Encounters:   04/19/17 178 lb (80.7 kg)   04/17/17 179 lb (81.2 kg)   04/13/17 179 lb (81.2 kg)              Today, you had the following     No orders found for display       Primary Care Provider Office Phone # Fax #    Kwadwo Winslow [phone]       [facility] [address] [facility] [address]        Thank you!     Thank you for choosing [facility] ORTHOPEDIC SURGERY  for your care. Our goal is always to provide you with excellent care. Hearing back from our patients is one way we can continue to improve our services. Please take a few minutes to complete the written survey that you may receive in the mail after your visit with us. Thank you!             Your Updated Medication List - Protect others around you: Learn how to safely use, store and throw away your medicines at www.disposemymeds.org.          This list is accurate as of: 4/28/17 11:16 AM.  Always use your most recent med list.                   Brand Name Dispense Instructions for use    ACETAMINOPHEN PO      Take 650 mg by mouth every 4 hours as needed for pain       calcium carbonate 500 MG tablet    OS-KARLA 500 mg [place]. Ca     Take 500 mg by mouth daily       carvedilol 25 MG tablet    COREG    180 tablet    Take 1 tablet (25 mg) by mouth 2 times daily (with meals)       COMPRESSION STOCKINGS     1 each    1 each continuous       COUMADIN PO      Take 2.5 mg by mouth daily Until 4/3/17       OCUVITE PO      Take 1 tablet by mouth daily       senna-docusate 8.6-50 MG per tablet    SENOKOT-S;PERICOLACE     Take 2 tablets by mouth daily  Reported on 4/11/2017       traMADol 50 MG tablet    ULTRAM    20 tablet    Take 1-2 tablets ( mg) by mouth every 6 hours as needed for pain maximum 8 tablet(s) per day       triamterene-hydrochlorothiazide 75-50 MG per tablet    MAXZIDE    45 tablet    Take 0.5 tablets by mouth daily Hold for SBP <100.       VITAMIN C PO      Take 500 mg by mouth daily       VITAMIN D (CHOLECALCIFEROL) PO      Take 2,000 Units by mouth daily.

## 2017-05-01 ENCOUNTER — OFFICE VISIT (OUTPATIENT)
Dept: INTERNAL MEDICINE | Facility: CLINIC | Age: 82
End: 2017-05-01
Payer: COMMERCIAL

## 2017-05-01 VITALS
DIASTOLIC BLOOD PRESSURE: 72 MMHG | TEMPERATURE: 97.9 F | HEART RATE: 84 BPM | OXYGEN SATURATION: 97 % | WEIGHT: 183 LBS | HEIGHT: 70 IN | SYSTOLIC BLOOD PRESSURE: 124 MMHG | BODY MASS INDEX: 26.2 KG/M2

## 2017-05-01 DIAGNOSIS — I10 ESSENTIAL HYPERTENSION, BENIGN: Primary | ICD-10-CM

## 2017-05-01 DIAGNOSIS — M16.11 PRIMARY OSTEOARTHRITIS OF RIGHT HIP: ICD-10-CM

## 2017-05-01 DIAGNOSIS — I48.20 CHRONIC ATRIAL FIBRILLATION (H): ICD-10-CM

## 2017-05-01 DIAGNOSIS — D62 ANEMIA DUE TO BLOOD LOSS, ACUTE: ICD-10-CM

## 2017-05-01 DIAGNOSIS — D64.9 ANEMIA, UNSPECIFIED TYPE: ICD-10-CM

## 2017-05-01 DIAGNOSIS — N18.30 CKD (CHRONIC KIDNEY DISEASE) STAGE 3, GFR 30-59 ML/MIN (H): ICD-10-CM

## 2017-05-01 LAB
ANION GAP SERPL CALCULATED.3IONS-SCNC: 10 MMOL/L (ref 3–14)
BUN SERPL-MCNC: 29 MG/DL (ref 7–30)
CALCIUM SERPL-MCNC: 9.1 MG/DL (ref 8.5–10.1)
CHLORIDE SERPL-SCNC: 109 MMOL/L (ref 94–109)
CO2 SERPL-SCNC: 24 MMOL/L (ref 20–32)
CREAT SERPL-MCNC: 1.46 MG/DL (ref 0.66–1.25)
ERYTHROCYTE [DISTWIDTH] IN BLOOD BY AUTOMATED COUNT: 13.8 % (ref 10–15)
GFR SERPL CREATININE-BSD FRML MDRD: 46 ML/MIN/1.7M2
GLUCOSE SERPL-MCNC: 93 MG/DL (ref 70–99)
HCT VFR BLD AUTO: 33.2 % (ref 40–53)
HGB BLD-MCNC: 10.5 G/DL (ref 13.3–17.7)
MCH RBC QN AUTO: 30.8 PG (ref 26.5–33)
MCHC RBC AUTO-ENTMCNC: 31.6 G/DL (ref 31.5–36.5)
MCV RBC AUTO: 97 FL (ref 78–100)
PLATELET # BLD AUTO: 194 10E9/L (ref 150–450)
POTASSIUM SERPL-SCNC: 4.3 MMOL/L (ref 3.4–5.3)
RBC # BLD AUTO: 3.41 10E12/L (ref 4.4–5.9)
SODIUM SERPL-SCNC: 143 MMOL/L (ref 133–144)
TSH SERPL DL<=0.005 MIU/L-ACNC: 1.37 MU/L (ref 0.4–4)
WBC # BLD AUTO: 6.4 10E9/L (ref 4–11)

## 2017-05-01 PROCEDURE — 99214 OFFICE O/P EST MOD 30 MIN: CPT | Performed by: INTERNAL MEDICINE

## 2017-05-01 PROCEDURE — 84443 ASSAY THYROID STIM HORMONE: CPT | Performed by: INTERNAL MEDICINE

## 2017-05-01 PROCEDURE — 80048 BASIC METABOLIC PNL TOTAL CA: CPT | Performed by: INTERNAL MEDICINE

## 2017-05-01 PROCEDURE — 85027 COMPLETE CBC AUTOMATED: CPT | Performed by: INTERNAL MEDICINE

## 2017-05-01 PROCEDURE — 36415 COLL VENOUS BLD VENIPUNCTURE: CPT | Performed by: INTERNAL MEDICINE

## 2017-05-01 RX ORDER — LOSARTAN POTASSIUM 100 MG/1
100 TABLET ORAL DAILY
COMMUNITY
End: 2017-09-12

## 2017-05-01 NOTE — NURSING NOTE
"Chief Complaint   Patient presents with     Fatigue     Pt C/O exrtreme tiredness/fatigue after surgeries       Initial /72  Pulse 84  Temp 97.9  F (36.6  C) (Oral)  Ht 5' 10\" (1.778 m)  Wt 183 lb (83 kg)  SpO2 97%  BMI 26.26 kg/m2 Estimated body mass index is 26.26 kg/(m^2) as calculated from the following:    Height as of this encounter: 5' 10\" (1.778 m).    Weight as of this encounter: 183 lb (83 kg).  Medication Reconciliation: complete   Sabine Barrientos MA      "

## 2017-05-01 NOTE — MR AVS SNAPSHOT
After Visit Summary   5/1/2017    Tucker Slater    MRN: [id number]           Patient Information     Date Of Birth          3/13/1931        Visit Information        Provider Department      5/1/2017 11:20 AM Kwadwo Winslow MD [facility]        Today's Diagnoses     Essential hypertension, benign    -  1    CKD (chronic kidney disease) stage 3, GFR 30-59 ml/min        Chronic atrial fibrillation (H)        Anemia due to blood loss, acute        Anemia, unspecified type        Primary osteoarthritis of right hip           Follow-ups after your visit        Your next 10 appointments already scheduled     May 10, 2017 11:00 AM CDT   LAB with RI LAB   [facility] ([facility])    [address]  [facility] [id number] [phone]           Patient must bring picture ID.  Patient should be prepared to give a urine specimen  Please do not eat 10-12 hours before your appointment if you are coming in fasting for labs on lipids, cholesterol, or glucose (sugar).  Pregnant women should follow their Care Team instructions. Water with medications is okay. Do not drink coffee or other fluids.   If you have concerns about taking  your medications, please ask at office or if scheduling via PROTEGO, send a message by clicking on Secure Messaging, Message Your Care Team.            May 11, 2017  2:30 PM CDT   Anticoagulation Visit with [facility]   [facility] ([facility])    [address]  [facility] [id number] [phone]            May 19, 2017  3:00 PM CDT   Ech Complete with [id number] ([facility])    [address] [facility] [address]  [facility] [address] [phone]           1.  Please bring or wear a comfortable two-piece outfit. 2.  You may eat, drink and take your normal medicines. 3.  For any questions that  "cannot be answered, please contact the ordering physician ***Please check-in at the [facility] located in [address] in the [facility] building. When you are finished registering, please go to [address] and have a seat. The technician will call your name for the test.              Who to contact     If you have questions or need follow up information about today's clinic visit or your schedule please contact [facility] directly at [phone].  Normal or non-critical lab and imaging results will be communicated to you by NeuStringhart, letter or phone within 4 business days after the clinic has received the results. If you do not hear from us within 7 days, please contact the clinic through Appstartert or phone. If you have a critical or abnormal lab result, we will notify you by phone as soon as possible.  Submit refill requests through ConteXtream or call your pharmacy and they will forward the refill request to us. Please allow 3 business days for your refill to be completed.          Additional Information About Your Visit        ConteXtream Information     ConteXtream lets you send messages to your doctor, view your test results, renew your prescriptions, schedule appointments and more. To sign up, go to [email or web address] . Click on \"Log in\" on the left side of the screen, which will take you to the Welcome page. Then click on \"Sign up Now\" on the right side of the page.     You will be asked to enter the access code listed below, as well as some personal information. Please follow the directions to create your username and password.     Your access code is: [email or web address]  Expires: 2017  2:11 PM     Your access code will  in 90 days. If you need help or a new code, please call your [facility] clinic or [phone].        Care EveryWhere ID     This is your Care EveryWhere ID. This could be used by other organizations to access your [facility] medical " "records  [id number]        Your Vitals Were     Pulse Temperature Height Pulse Oximetry BMI (Body Mass Index)       84 97.9  F (36.6  C) (Oral) 5' 10\" (1.778 m) 97% 26.26 kg/m2        Blood Pressure from Last 3 Encounters:   05/01/17 124/72   04/19/17 139/68   04/17/17 152/79    Weight from Last 3 Encounters:   05/01/17 183 lb (83 kg)   04/19/17 178 lb (80.7 kg)   04/17/17 179 lb (81.2 kg)              We Performed the Following     Basic metabolic panel  (Ca, Cl, CO2, Creat, Gluc, K, Na, BUN)     CBC with platelets     TSH with free T4 reflex          Today's Medication Changes          These changes are accurate as of: 5/1/17 11:59 PM.  If you have any questions, ask your nurse or doctor.               Start taking these medicines.        Dose/Directions    ferrous sulfate 142 (45 FE) MG Tbcr   Commonly known as:  SLO-FE   Used for:  CKD (chronic kidney disease) stage 3, GFR 30-59 ml/min, Anemia, unspecified type   Started by:  Kwadwo Winslow MD        Dose:  142 mg   Take 1 tablet (142 mg) by mouth daily   Quantity:  30 tablet   Refills:  3            Where to get your medicines      These medications were sent to [facility] - [place], [place] - [address] [place] [address]     Phone:  [phone]     ferrous sulfate 142 (45 FE) MG Tbcr                Primary Care Provider Office Phone # Fax #    Kwadwo Winslow [phone]       [facility] [address] [place] [address]        Thank you!     Thank you for choosing [facility]  for your care. Our goal is always to provide you with excellent care. Hearing back from our patients is one way we can continue to improve our services. Please take a few minutes to complete the written survey that you may receive in the mail after your visit with us. Thank you!             Your Updated Medication List - Protect others around you: Learn how to safely use, " store and throw away your medicines at www.disposemymeds.org.          This list is accurate as of: 5/1/17 11:59 PM.  Always use your most recent med list.                   Brand Name Dispense Instructions for use    ACETAMINOPHEN PO      Take 650 mg by mouth every 4 hours as needed for pain       calcium carbonate 500 MG tablet    OS-KARLA 500 mg [place]. Ca     Take 500 mg by mouth daily       carvedilol 25 MG tablet    COREG    180 tablet    Take 1 tablet (25 mg) by mouth 2 times daily (with meals)       COUMADIN PO      Take 2.5 mg by mouth daily Until 4/3/17       ferrous sulfate 142 (45 FE) MG Tbcr    SLO-FE    30 tablet    Take 1 tablet (142 mg) by mouth daily       losartan 100 MG tablet    COZAAR     Take 100 mg by mouth daily       OCUVITE PO      Take 1 tablet by mouth daily       senna-docusate 8.6-50 MG per tablet    SENOKOT-S;PERICOLACE     Take 2 tablets by mouth daily Reported on 4/11/2017       traMADol 50 MG tablet    ULTRAM    20 tablet    Take 1-2 tablets ( mg) by mouth every 6 hours as needed for pain maximum 8 tablet(s) per day       triamterene-hydrochlorothiazide 75-50 MG per tablet    MAXZIDE    45 tablet    Take 0.5 tablets by mouth daily Hold for SBP <100.       vitamin B complex with vitamin C Tabs tablet      Take 1 tablet by mouth daily       VITAMIN C PO      Take 500 mg by mouth daily       VITAMIN D (CHOLECALCIFEROL) PO      Take 2,000 Units by mouth daily.

## 2017-05-01 NOTE — PROGRESS NOTES
SUBJECTIVE:                                                    Tucker Slater is a 86 year old male who presents to clinic today for the following health issues:      Fatigue:  Patient is seen for a follow up visit.  Here with his wife.     Concern for feeling tired, no energy. For several months after hip replacement surgery and CTS surgery. Had anemia post surgery . No h/o bleeding.   Gradually improved ambulation. Able to walk. Uses a cane. No falls.   Has h/o CRF. Symptoms include chronic fatigue. Monitoring BP, BG, medications, avoiding OTC NSAIDs. Needs periodic recheck of kidney function.  Has history of atrial fibrillation. On anticoagulation with Coumadin and rate control medications. Asymptonatic - no chest pains , palpitations,  no side effects from medications.  Has h/o HTN. on medical treatment. BP has been controlled. No side effects from medications. No CP, HA, dizziness. good compliance with medications and low salt diet.    Reviewed preventive measures.       Problem list and histories reviewed & adjusted, as indicated.  Additional history: as documented    Patient Active Problem List   Diagnosis     Essential hypertension, benign     Carcinoma in situ of bladder     Hypertrophy of prostate with urinary obstruction     Generalized osteoarthrosis, unspecified site     Hereditary and idiopathic peripheral neuropathy     Pain in joint, shoulder region     CARDIOVASCULAR SCREENING; LDL GOAL LESS THAN 130     Advanced directives, counseling/discussion     Peripheral neuropathy (H)     GI bleed     Anemia     Near syncope     Anemia due to blood loss, acute     Atrial fibrillation (H)     Bradycardia     CKD (chronic kidney disease) stage 3, GFR 30-59 ml/min     Cardiac pacemaker in situ     Long-term (current) use of anticoagulants [Z79.01]     Degenerative arthritis of hip     Past Surgical History:   Procedure Laterality Date     ARTHROPLASTY HIP Right 3/27/2017    Procedure: ARTHROPLASTY HIP;   Surgeon: Jigar Wynn MD;  Location: [facility] OR     C NONSPECIFIC PROCEDURE      bilat inguinal hernia repairs ( 3total procedures)      C NONSPECIFIC PROCEDURE      pilonidal cyst     C NONSPECIFIC PROCEDURE      T + A     C NONSPECIFIC PROCEDURE       R+L total knee     CARDIOVERSION  3/26/15     COLONOSCOPY       IMPLANT PACEMAKER  3/26/15     RELEASE CARPAL TUNNEL Right 2017    Procedure: RELEASE CARPAL TUNNEL;  Right carpal tunnel release;  Surgeon: Alonso Barboza MD;  Location: [facility] OR       Social History   Substance Use Topics     Smoking status: Former Smoker     Quit date: 1977     Smokeless tobacco: Never Used     Alcohol use No     Family History   Problem Relation Age of Onset     HEART DISEASE Father       89yo     Coronary Artery Disease Father      HEART DISEASE Mother       76yo     Coronary Artery Disease Mother      Family History Negative Brother          Current Outpatient Prescriptions   Medication Sig Dispense Refill     ferrous sulfate (SLO-FE) 142 (45 FE) MG TBCR Take 1 tablet (142 mg) by mouth daily 30 tablet 3     vitamin B complex with vitamin C (VITAMIN  B COMPLEX) TABS tablet Take 1 tablet by mouth daily       losartan (COZAAR) 100 MG tablet Take 100 mg by mouth daily       traMADol (ULTRAM) 50 MG tablet Take 1-2 tablets ( mg) by mouth every 6 hours as needed for pain maximum 8 tablet(s) per day 20 tablet 0     ACETAMINOPHEN PO Take 650 mg by mouth every 4 hours as needed for pain       triamterene-hydrochlorothiazide (MAXZIDE) 75-50 MG per tablet Take 0.5 tablets by mouth daily Hold for SBP <100. 45 tablet 3     senna-docusate (SENOKOT-S;PERICOLACE) 8.6-50 MG per tablet Take 2 tablets by mouth daily Reported on 2017       Warfarin Sodium (COUMADIN PO) Take 2.5 mg by mouth daily Until 4/3/17       carvedilol (COREG) 25 MG tablet Take 1 tablet (25 mg) by mouth 2 times daily (with meals) 180 tablet 2     Multiple Vitamins-Minerals (OCUVITE  "PO) Take 1 tablet by mouth daily        Ascorbic Acid (VITAMIN C PO) Take 500 mg by mouth daily        calcium carbonate (OS-KARLA 500 MG [place]. [place]) 500 MG tablet Take 500 mg by mouth daily        VITAMIN D, CHOLECALCIFEROL, PO Take 2,000 Units by mouth daily.         Reviewed and updated as needed this visit by clinical staff       Reviewed and updated as needed this visit by Provider         ROS:  Constitutional, HEENT, cardiovascular, pulmonary, GI, , musculoskeletal, neuro, skin, endocrine and psych systems are negative, except as otherwise noted.    OBJECTIVE:                                                    /72  Pulse 84  Temp 97.9  F (36.6  C) (Oral)  Ht 5' 10\" (1.778 m)  Wt 183 lb (83 kg)  SpO2 97%  BMI 26.26 kg/m2  Body mass index is 26.26 kg/(m^2).  GENERAL: healthy, alert and no distress  EYES: Eyes grossly normal to inspection, PERRL and conjunctivae and sclerae normal  HENT: ear canals and TM's normal, nose and mouth without ulcers or lesions  NECK: no adenopathy, no asymmetry, masses, or scars and thyroid normal to palpation  RESP: lungs clear to auscultation - no rales, rhonchi or wheezes  CV: regular rate and rhythm, normal S1 S2, no S3 or S4, no murmur, click or rub, no peripheral edema and peripheral pulses strong  ABDOMEN: soft, nontender, no hepatosplenomegaly, no masses and bowel sounds normal  MS: no gross musculoskeletal defects noted, no edema  SKIN: no suspicious lesions or rashes  NEURO: Normal strength and tone, mentation intact and speech normal    Diagnostic Test Results:  Results for orders placed or performed in visit on 05/01/17   TSH with free T4 reflex   Result Value Ref Range    TSH 1.37 0.40 - 4.00 mU/L   Basic metabolic panel  (Ca, Cl, CO2, Creat, Gluc, K, Na, BUN)   Result Value Ref Range    Sodium 143 133 - 144 mmol/L    Potassium 4.3 3.4 - 5.3 mmol/L    Chloride 109 94 - 109 mmol/L    Carbon Dioxide 24 20 - 32 mmol/L    Anion Gap 10 3 - 14 mmol/L    Glucose 93 70 " - 99 mg/dL    Urea Nitrogen 29 7 - 30 mg/dL    Creatinine 1.46 (H) 0.66 - 1.25 mg/dL    GFR Estimate 46 (L) >60 mL/min/1.7m2    GFR Estimate If Black 55 (L) >60 mL/min/1.7m2    Calcium 9.1 8.5 - 10.1 mg/dL   CBC with platelets   Result Value Ref Range    WBC 6.4 4.0 - 11.0 10e9/L    RBC Count 3.41 (L) 4.4 - 5.9 10e12/L    Hemoglobin 10.5 (L) 13.3 - 17.7 g/dL    Hematocrit 33.2 (L) 40.0 - 53.0 %    MCV 97 78 - 100 fl    MCH 30.8 26.5 - 33.0 pg    MCHC 31.6 31.5 - 36.5 g/dL    RDW 13.8 10.0 - 15.0 %    Platelet Count 194 150 - 450 10e9/L        ASSESSMENT/PLAN:                                                      Problem List Items Addressed This Visit     Essential hypertension, benign - Primary    Relevant Medications    losartan (COZAAR) 100 MG tablet    Other Relevant Orders    TSH with free T4 reflex (Completed)    Anemia    Relevant Medications    ferrous sulfate (SLO-FE) 142 (45 FE) MG TBCR    Anemia due to blood loss, acute    Relevant Medications    ferrous sulfate (SLO-FE) 142 (45 FE) MG TBCR    Atrial fibrillation (H)    Relevant Orders    TSH with free T4 reflex (Completed)    Echocardiogram Complete    CKD (chronic kidney disease) stage 3, GFR 30-59 ml/min    Relevant Medications    ferrous sulfate (SLO-FE) 142 (45 FE) MG TBCR    Degenerative arthritis of hip           Assess lab work for anemia, renal function, thyroid.   Cont treatment   PO iron   Follow up lab in 1-2 months     Follow-Up:in 3 months     Kwadwo Winslow MD  [facility]

## 2017-05-08 ENCOUNTER — ALLIED HEALTH/NURSE VISIT (OUTPATIENT)
Dept: CARDIOLOGY | Facility: CLINIC | Age: 82
End: 2017-05-08
Payer: COMMERCIAL

## 2017-05-08 DIAGNOSIS — Z95.0 CARDIAC PACEMAKER IN SITU: Primary | ICD-10-CM

## 2017-05-08 PROCEDURE — 93296 REM INTERROG EVL PM/IDS: CPT | Performed by: INTERNAL MEDICINE

## 2017-05-08 PROCEDURE — 93294 REM INTERROG EVL PM/LDLS PM: CPT | Performed by: INTERNAL MEDICINE

## 2017-05-08 NOTE — PROGRESS NOTES
[employer] 2240 (D) Remote PPM Device Check  AP: <1% : 83%  Mode: DDDR        Presenting Rhythm: Afib with  and VS  Heart Rate: adequate heart rates per histogram  Sensing: RA: not performed RV: stable    Pacing Threshold: RA: not performed RV: stable    Impedance: stable  Battery Status: 8.4 years remaining  Atrial Arrhythmia: 3 mode switch episodes comprising >99% of the time. Taking Warfarin  Ventricular Arrhythmia: none     Care Plan: F/U Merlin q 3 months.  Gave results and next transmission date over the phone to ruben OROZCO

## 2017-05-08 NOTE — MR AVS SNAPSHOT
After Visit Summary   5/8/2017    Tucker Slater    MRN: [id number]           Patient Information     Date Of Birth          3/13/1931        Visit Information        Provider Department      5/8/2017 8:45 AM KIDD TECH1 [facility] [place]        Today's Diagnoses     Cardiac pacemaker in situ    -  1       Follow-ups after your visit        Your next 10 appointments already scheduled     May 10, 2017 11:00 AM CDT   LAB with [facility]   [facility]    [address]  [facility] [id number] [phone]           Patient must bring picture ID.  Patient should be prepared to give a urine specimen  Please do not eat 10-12 hours before your appointment if you are coming in fasting for labs on lipids, cholesterol, or glucose (sugar).  Pregnant women should follow their Care Team instructions. Water with medications is okay. Do not drink coffee or other fluids.   If you have concerns about taking  your medications, please ask at office or if scheduling via Personal [employer], send a message by clicking on Secure Messaging, Message Your Care Team.            May 11, 2017  2:30 PM CDT   Anticoagulation Visit with [facility]   [facility]    [address]  [facility] [id number] [phone]            May 19, 2017  3:00 PM CDT   Ech Complete with [facility] ([facility])    [address]  [facility] [id number] [phone]           1.  Please bring or wear a comfortable two-piece outfit. 2.  You may eat, drink and take your normal medicines. 3.  For any questions that cannot be answered, please contact the ordering physician ***Please check-in at the [place] Registration Office located in [address] in the [facility] building. When you are finished registering,  "please go to [address] and have a seat. The technician will call your name for the test.              Who to contact     If you have questions or need follow up information about today's clinic visit or your schedule please contact [facility] AT [place] directly at [phone].  Normal or non-critical lab and imaging results will be communicated to you by MyChart, letter or phone within 4 business days after the clinic has received the results. If you do not hear from us within 7 days, please contact the clinic through ADstruchart or phone. If you have a critical or abnormal lab result, we will notify you by phone as soon as possible.  Submit refill requests through [employer] or call your pharmacy and they will forward the refill request to us. Please allow 3 business days for your refill to be completed.          Additional Information About Your Visit        MyCTownsend Information     [employer] lets you send messages to your doctor, view your test results, renew your prescriptions, schedule appointments and more. To sign up, go to [email or web address] . Click on \"Log in\" on the left side of the screen, which will take you to the Welcome page. Then click on \"Sign up Now\" on the right side of the page.     You will be asked to enter the access code listed below, as well as some personal information. Please follow the directions to create your username and password.     Your access code is: [email or web address]  Expires: 2017  2:11 PM     Your access code will  in 90 days. If you need help or a new code, please call your [facility] clinic or [phone].        Care EveryWhere ID     This is your Care EveryWhere ID. This could be used by other organizations to access your [facility] medical records  [id number]         Blood Pressure from Last 3 Encounters:   17 124/72   17 139/68   17 152/79    Weight from Last 3 Encounters:   17 83 kg (183 lb)   17 80.7 kg (178 " lb)   04/17/17 81.2 kg (179 lb)              We Performed the Following     INTERROGATION DEVICE EVAL REMOTE, PACER/ICD (48066)     PM DEVICE INTERROGATE REMOTE (69781)        Primary Care Provider Office Phone # Fax #    Kwadwo Winslow [phone]       [facility] [address]  [facility] [address]        Thank you!     Thank you for choosing [facility] [place]  for your care. Our goal is always to provide you with excellent care. Hearing back from our patients is one way we can continue to improve our services. Please take a few minutes to complete the written survey that you may receive in the mail after your visit with us. Thank you!             Your Updated Medication List - Protect others around you: Learn how to safely use, store and throw away your medicines at www.disposemymeds.org.          This list is accurate as of: 5/8/17  3:49 PM.  Always use your most recent med list.                   Brand Name Dispense Instructions for use    ACETAMINOPHEN PO      Take 650 mg by mouth every 4 hours as needed for pain       calcium carbonate 500 MG tablet    OS-KARLA 500 mg [place]. [place]     Take 500 mg by mouth daily       carvedilol 25 MG tablet    COREG    180 tablet    Take 1 tablet (25 mg) by mouth 2 times daily (with meals)       COUMADIN PO      Take 2.5 mg by mouth daily Until 4/3/17       ferrous sulfate 142 (45 FE) MG Tbcr    SLO-FE    30 tablet    Take 1 tablet (142 mg) by mouth daily       losartan 100 MG tablet    COZAAR     Take 100 mg by mouth daily       OCUVITE PO      Take 1 tablet by mouth daily       senna-docusate 8.6-50 MG per tablet    SENOKOT-S;PERICOLACE     Take 2 tablets by mouth daily Reported on 4/11/2017       traMADol 50 MG tablet    ULTRAM    20 tablet    Take 1-2 tablets ( mg) by mouth every 6 hours as needed for pain maximum 8 tablet(s) per day       triamterene-hydrochlorothiazide 75-50 MG per tablet     MAXZIDE    45 tablet    Take 0.5 tablets by mouth daily Hold for SBP <100.       vitamin B complex with vitamin C Tabs tablet      Take 1 tablet by mouth daily       VITAMIN C PO      Take 500 mg by mouth daily       VITAMIN D (CHOLECALCIFEROL) PO      Take 2,000 Units by mouth daily.

## 2017-05-09 ENCOUNTER — TRANSFERRED RECORDS (OUTPATIENT)
Dept: HEALTH INFORMATION MANAGEMENT | Facility: CLINIC | Age: 82
End: 2017-05-09

## 2017-05-10 DIAGNOSIS — N18.30 CKD (CHRONIC KIDNEY DISEASE) STAGE 3, GFR 30-59 ML/MIN (H): ICD-10-CM

## 2017-05-10 PROCEDURE — 80048 BASIC METABOLIC PNL TOTAL CA: CPT | Performed by: INTERNAL MEDICINE

## 2017-05-10 PROCEDURE — 36415 COLL VENOUS BLD VENIPUNCTURE: CPT | Performed by: INTERNAL MEDICINE

## 2017-05-11 ENCOUNTER — ANTICOAGULATION THERAPY VISIT (OUTPATIENT)
Dept: ANTICOAGULATION | Facility: CLINIC | Age: 82
End: 2017-05-11
Payer: COMMERCIAL

## 2017-05-11 DIAGNOSIS — Z79.01 LONG-TERM (CURRENT) USE OF ANTICOAGULANTS: ICD-10-CM

## 2017-05-11 LAB
ANION GAP SERPL CALCULATED.3IONS-SCNC: 10 MMOL/L (ref 3–14)
BUN SERPL-MCNC: 32 MG/DL (ref 7–30)
CALCIUM SERPL-MCNC: 8.9 MG/DL (ref 8.5–10.1)
CHLORIDE SERPL-SCNC: 108 MMOL/L (ref 94–109)
CO2 SERPL-SCNC: 24 MMOL/L (ref 20–32)
CREAT SERPL-MCNC: 1.42 MG/DL (ref 0.66–1.25)
GFR SERPL CREATININE-BSD FRML MDRD: 47 ML/MIN/1.7M2
GLUCOSE SERPL-MCNC: 78 MG/DL (ref 70–99)
INR POINT OF CARE: 2.8 (ref 0.86–1.14)
POTASSIUM SERPL-SCNC: 4.2 MMOL/L (ref 3.4–5.3)
SODIUM SERPL-SCNC: 142 MMOL/L (ref 133–144)

## 2017-05-11 PROCEDURE — 99207 ZZC NO CHARGE NURSE ONLY: CPT

## 2017-05-11 PROCEDURE — 36416 COLLJ CAPILLARY BLOOD SPEC: CPT

## 2017-05-11 PROCEDURE — 85610 PROTHROMBIN TIME: CPT | Mod: QW

## 2017-05-11 NOTE — MR AVS SNAPSHOT
Tucker ZHAO Slater   5/11/2017 2:30 PM   Anticoagulation Therapy Visit    Description:  86 year old male   Provider:  [facility]   Department:  [facility] Co[facility]           INR as of 5/11/2017     Today's INR 2.8      Anticoagulation Summary as of 5/11/2017     INR goal 2.0-3.0   Today's INR 2.8   Full instructions 2.5 mg every day   Next INR check 6/8/2017    Indications   Long-term (current) use of anticoagulants [Z79.01] [Z79.01]  Atrial fibrillation (H) [I48.91]         Your next Anticoagulation Clinic appointment(s)     Jun 08, 2017  2:30 PM CDT   Anticoagulation Visit with [facility]   [facility]    [address]  [facility] [address] [phone]              Contact Numbers     [facility] Phone Numbers:  Anticoagulation Clinic Appointments : [phone]  Anticoagulation Nurse: [phone]         May 2017 Details    Sun Mon Tue Wed Thu Fri Sat      1               2               3               4               5               6                 7               8               9               10               11      2.5 mg   See details      12      2.5 mg         13      2.5 mg           14      2.5 mg         15      2.5 mg         16      2.5 mg         17      2.5 mg         18      2.5 mg         19      2.5 mg         20      2.5 mg           21      2.5 mg         22      2.5 mg         23      2.5 mg         24      2.5 mg         25      2.5 mg         26      2.5 mg         27      2.5 mg           28      2.5 mg         29      2.5 mg         30      2.5 mg         31      2.5 mg             Date Details   05/11 This INR check               How to take your warfarin dose     To take:  2.5 mg Take 1 of the 2.5 mg tablets.           June 2017 Details    Sun Mon Tue Wed Thu Fri Sat         1      2.5 mg         2      2.5 mg         3      2.5 mg           4      2.5 mg         5      2.5 mg          6      2.5 mg         7      2.5 mg         8            9               10                 11               12               13               14               15               16               17                 18               19               20               21               22               23               24                 25               26               27               28               29               30                 Date Details   No additional details    Date of next INR:  6/8/2017         How to take your warfarin dose     To take:  2.5 mg Take 1 of the 2.5 mg tablets.

## 2017-05-11 NOTE — PROGRESS NOTES
ANTICOAGULATION FOLLOW-UP CLINIC VISIT    Patient Name:  Tucker Slater  Date:  5/11/2017  Contact Type:  Face to Face    SUBJECTIVE:     Patient Findings     Positives No Problem Findings           OBJECTIVE    INR Protime   Date Value Ref Range Status   05/11/2017 2.8 (A) 0.86 - 1.14 Final       ASSESSMENT / PLAN  INR assessment THER    Recheck INR In: 4 WEEKS    INR Location Clinic      Anticoagulation Summary as of 5/11/2017     INR goal 2.0-3.0   Today's INR 2.8   Maintenance plan 2.5 mg (2.5 mg x 1) every day   Full instructions 2.5 mg every day   Weekly total 17.5 mg   No change documented Gail Valdovinos, RN   Plan last modified Gail Valdovinos RN (4/7/2016)   Next INR check 6/8/2017   Priority INR   Target end date     Indications   Long-term (current) use of anticoagulants [Z79.01] [Z79.01]  Atrial fibrillation (H) [I48.91]         Anticoagulation Episode Summary     INR check location     Preferred lab     Send INR reminders to [facility]    Comments       Anticoagulation Care Providers     Provider Role Specialty Phone number    Kwadwo Winslow MD [facility] Internal Medicine [phone]            See the Encounter Report to view Anticoagulation Flowsheet and Dosing Calendar (Go to Encounters tab in chart review, and find the Anticoagulation Therapy Visit)        Gail Valdovinos RN

## 2017-05-14 DIAGNOSIS — I10 ESSENTIAL HYPERTENSION, BENIGN: ICD-10-CM

## 2017-05-14 DIAGNOSIS — I48.91 ATRIAL FIBRILLATION (H): ICD-10-CM

## 2017-05-14 DIAGNOSIS — I48.20 CHRONIC ATRIAL FIBRILLATION (H): Primary | ICD-10-CM

## 2017-05-15 RX ORDER — LOSARTAN POTASSIUM 100 MG/1
TABLET ORAL
Qty: 90 TABLET | Refills: 0 | Status: SHIPPED | OUTPATIENT
Start: 2017-05-15 | End: 2017-08-19

## 2017-05-15 RX ORDER — WARFARIN SODIUM 2.5 MG/1
TABLET ORAL
Qty: 90 TABLET | Refills: 0 | Status: SHIPPED | OUTPATIENT
Start: 2017-05-15 | End: 2017-08-23

## 2017-05-15 NOTE — TELEPHONE ENCOUNTER
Losartan  Patient Reported      Last Written Prescription Date:   Last Fill Quantity: , # refills:   Last Office Visit with [facility], [facility] or [facility] prescribing provider: 05/01/17       Potassium   Date Value Ref Range Status   05/10/2017 4.2 3.4 - 5.3 mmol/L Final     Creatinine   Date Value Ref Range Status   05/10/2017 1.42 (H) 0.66 - 1.25 mg/dL Final     BP Readings from Last 3 Encounters:   05/01/17 124/72   04/19/17 139/68   04/17/17 152/79       [place]  Interface Request    Last Written Prescription Date:   Last Fill Qty: , # refills:   Last Office Visit with [facility], [facility] or [facility] prescribing provider: INR 05/11/17 and OV 05/01/17       Date and Result of Last PT/INR:   Lab Results   Component Value Date    INR 2.8 05/11/2017    INR 3.1 04/27/2017    INR 2.62 04/19/2017    INR 3.91 04/17/2017              Lab Results   Component Value Date    INR 2.8 (A) 05/11/2017    INR 3.1 (A) 04/27/2017    INR 2.62 (H) 04/19/2017    INR 3.91 (H) 04/17/2017    INR 2.21 (H) 03/31/2017

## 2017-05-15 NOTE — TELEPHONE ENCOUNTER
Will forward to provider due to abn labs      Per 5/1 dict-Follow up lab in 1-2 months      Follow-Up:in 3 months

## 2017-05-15 NOTE — DISCHARGE SUMMARY
Tucker is a 86 year old male admitted to the hospital with severe, disabling hip pain.  Patient underwent uncomplicated hip replacement.  Patient progressed steadily and satisfactorily through physical therapy and was ultimately discharged to home. There were no intraoperative or postoperative complications during hospitalization. The patient will follow up with Dr. Wynn in ten days.     For further remaining details of the hospitalization, please see the medical record.

## 2017-05-19 ENCOUNTER — HOSPITAL ENCOUNTER (OUTPATIENT)
Dept: CARDIOLOGY | Facility: CLINIC | Age: 82
End: 2017-05-19
Attending: INTERNAL MEDICINE
Payer: COMMERCIAL

## 2017-05-19 DIAGNOSIS — I48.20 CHRONIC ATRIAL FIBRILLATION (H): ICD-10-CM

## 2017-05-19 PROCEDURE — 93306 TTE W/DOPPLER COMPLETE: CPT | Mod: 26 | Performed by: INTERNAL MEDICINE

## 2017-06-06 ENCOUNTER — TRANSFERRED RECORDS (OUTPATIENT)
Dept: HEALTH INFORMATION MANAGEMENT | Facility: CLINIC | Age: 82
End: 2017-06-06

## 2017-06-09 ENCOUNTER — ANTICOAGULATION THERAPY VISIT (OUTPATIENT)
Dept: ANTICOAGULATION | Facility: CLINIC | Age: 82
End: 2017-06-09
Payer: COMMERCIAL

## 2017-06-09 DIAGNOSIS — Z79.01 LONG-TERM (CURRENT) USE OF ANTICOAGULANTS: ICD-10-CM

## 2017-06-09 LAB — INR POINT OF CARE: 3.4 (ref 0.86–1.14)

## 2017-06-09 PROCEDURE — 36416 COLLJ CAPILLARY BLOOD SPEC: CPT

## 2017-06-09 PROCEDURE — 85610 PROTHROMBIN TIME: CPT | Mod: QW

## 2017-06-09 PROCEDURE — 99207 ZZC NO CHARGE NURSE ONLY: CPT

## 2017-06-09 NOTE — MR AVS SNAPSHOT
Tucker L Slater   6/9/2017 2:45 PM   Anticoagulation Therapy Visit    Description:  86 year old male   Provider:  [facility]   Department:  [facility]           INR as of 6/9/2017     Today's INR 3.4!      Anticoagulation Summary as of 6/9/2017     INR goal 2.0-3.0   Today's INR 3.4!   Full instructions 6/9: 1.25 mg; Otherwise 2.5 mg every day   Next INR check 6/23/2017    Indications   Long-term (current) use of anticoagulants [Z79.01] [Z79.01]  Atrial fibrillation (H) [I48.91]         Your next Anticoagulation Clinic appointment(s)     Jun 09, 2017  2:45 PM CDT   [facility]    [address] [facility] [address]  [facility] [address] [phone]            Jun 23, 2017  1:45 PM CDT   Anticoagulation Visit with [facility]   [facility]    [address] [facility] [address]  [facility] [address] [phone]              Contact Numbers     [facility] Phone Numbers:  Anticoagulation Clinic Appointments : [phone]  Anticoagulation Nurse: [phone]         June 2017 Details    Sun Mon Tue Wed Thu Fri Sat         1               2               3                 4               5               6               7               8               9      1.25 mg   See details      10      2.5 mg           11      2.5 mg         12      2.5 mg         13      2.5 mg         14      2.5 mg         15      2.5 mg         16      2.5 mg         17      2.5 mg           18      2.5 mg         19      2.5 mg         20      2.5 mg         21      2.5 mg         22      2.5 mg         23            24                 25               26               27               28               29               30                 Date Details   06/09 This INR check       Date of next INR:  6/23/2017         How to take your warfarin dose     To  take:  1.25 mg Take 0.5 of a 2.5 mg tablet.    To take:  2.5 mg Take 1 of the 2.5 mg tablets.

## 2017-06-09 NOTE — PROGRESS NOTES
ANTICOAGULATION FOLLOW-UP CLINIC VISIT    Patient Name:  Tucker Slater  Date:  6/9/2017  Contact Type:  Face to Face    SUBJECTIVE:     Patient Findings     Positives Unexplained INR or factor level change           OBJECTIVE    INR Protime   Date Value Ref Range Status   06/09/2017 3.4 (A) 0.86 - 1.14 Final       ASSESSMENT / PLAN  INR assessment SUPRA    Recheck INR In: 2 WEEKS    INR Location Clinic      Anticoagulation Summary as of 6/9/2017     INR goal 2.0-3.0   Today's INR 3.4!   Maintenance plan 2.5 mg (2.5 mg x 1) every day   Full instructions 6/9: 1.25 mg; Otherwise 2.5 mg every day   Weekly total 17.5 mg   Plan last modified Gail Valdovinos RN (4/7/2016)   Next INR check 6/23/2017   Priority INR   Target end date     Indications   Long-term (current) use of anticoagulants [Z79.01] [Z79.01]  Atrial fibrillation (H) [I48.91]         Anticoagulation Episode Summary     INR check location     Preferred lab     Send INR reminders to [facility]    Comments       Anticoagulation Care Providers     Provider Role Specialty Phone number    Kwadwo Winslow MD [facility] Internal Medicine [phone]            See the Encounter Report to view Anticoagulation Flowsheet and Dosing Calendar (Go to Encounters tab in chart review, and find the Anticoagulation Therapy Visit)    Dosage adjustment made based on physician directed care plan.    Yasmeen Mix RN

## 2017-06-22 ENCOUNTER — TRANSFERRED RECORDS (OUTPATIENT)
Dept: HEALTH INFORMATION MANAGEMENT | Facility: CLINIC | Age: 82
End: 2017-06-22

## 2017-06-23 ENCOUNTER — ANTICOAGULATION THERAPY VISIT (OUTPATIENT)
Dept: ANTICOAGULATION | Facility: CLINIC | Age: 82
End: 2017-06-23
Payer: COMMERCIAL

## 2017-06-23 DIAGNOSIS — Z79.01 LONG-TERM (CURRENT) USE OF ANTICOAGULANTS: ICD-10-CM

## 2017-06-23 DIAGNOSIS — I48.91 ATRIAL FIBRILLATION (H): ICD-10-CM

## 2017-06-23 LAB — INR POINT OF CARE: 3.4 (ref 0.86–1.14)

## 2017-06-23 PROCEDURE — 99207 ZZC NO CHARGE NURSE ONLY: CPT

## 2017-06-23 PROCEDURE — 36416 COLLJ CAPILLARY BLOOD SPEC: CPT

## 2017-06-23 PROCEDURE — 85610 PROTHROMBIN TIME: CPT | Mod: QW

## 2017-06-23 NOTE — MR AVS SNAPSHOT
Tucker L Slater   6/23/2017 1:45 PM   Anticoagulation Therapy Visit    Description:  86 year old male   Provider:  [facility]   Department:  [facility]           INR as of 6/23/2017     Today's INR 3.4!      Anticoagulation Summary as of 6/23/2017     INR goal 2.0-3.0   Today's INR 3.4!   Full instructions 6/23: 1.25 mg; 6/30: 1.25 mg; Otherwise 2.5 mg every day   Next INR check 7/7/2017    Indications   Long-term (current) use of anticoagulants [Z79.01] [Z79.01]  Atrial fibrillation (H) [I48.91]         Your next Anticoagulation Clinic appointment(s)     Jul 07, 2017  1:45 PM CDT   [facility]    [address] [facility] [address]  [facility] [id number] [phone]              Contact Numbers     [facility] Phone Numbers:  Anticoagulation Clinic Appointments : [phone]  Anticoagulation Nurse: [phone]         June 2017 Details    Sun Mon Tue Wed Thu Fri Sat         1               2               3                 4               5               6               7               8               9               10                 11               12               13               14               15               16               17                 18               19               20               21               22               23      1.25 mg   See details      24      2.5 mg           25      2.5 mg         26      2.5 mg         27      2.5 mg         28      2.5 mg         29      2.5 mg         30      1.25 mg           Date Details   06/23 This INR check               How to take your warfarin dose     To take:  1.25 mg Take 0.5 of a 2.5 mg tablet.    To take:  2.5 mg Take 1 of the 2.5 mg tablets.           July 2017 Details    Sun Mon Tue Wed Thu Fri Sat           1      2.5 mg           2      2.5 mg         3      2.5 mg         4      2.5 mg         5      2.5 mg          6      2.5 mg         7            8                 9               10               11               12               13               14               15                 16               17               18               19               20               21               22                 23               24               25               26               27               28               29                 30               31                     Date Details   No additional details    Date of next INR:  7/7/2017         How to take your warfarin dose     To take:  2.5 mg Take 1 of the 2.5 mg tablets.

## 2017-06-23 NOTE — PROGRESS NOTES
ANTICOAGULATION FOLLOW-UP CLINIC VISIT    Patient Name:  Tucker Slater  Date:  6/23/2017  Contact Type:  Face to Face    SUBJECTIVE:     Patient Findings     Positives Unexplained INR or factor level change           OBJECTIVE    INR Protime   Date Value Ref Range Status   06/23/2017 3.4 (A) 0.86 - 1.14 Final       ASSESSMENT / PLAN  INR assessment SUPRA    Recheck INR In: 2 WEEKS    INR Location Clinic      Anticoagulation Summary as of 6/23/2017     INR goal 2.0-3.0   Today's INR 3.4!   Maintenance plan 2.5 mg (2.5 mg x 1) every day   Full instructions 6/23: 1.25 mg; 6/30: 1.25 mg; Otherwise 2.5 mg every day   Weekly total 17.5 mg   Plan last modified Gail Valdovinos RN (4/7/2016)   Next INR check 7/7/2017   Priority INR   Target end date     Indications   Long-term (current) use of anticoagulants [Z79.01] [Z79.01]  Atrial fibrillation (H) [I48.91]         Anticoagulation Episode Summary     INR check location     Preferred lab     Send INR reminders to [facility]    Comments       Anticoagulation Care Providers     Provider Role Specialty Phone number    Kwadwo Winslow MD Responsible Internal Medicine [phone]            See the Encounter Report to view Anticoagulation Flowsheet and Dosing Calendar (Go to Encounters tab in chart review, and find the Anticoagulation Therapy Visit)    Dosage adjustment made based on physician directed care plan.    Ivory Machado RN

## 2017-07-07 ENCOUNTER — ANTICOAGULATION THERAPY VISIT (OUTPATIENT)
Dept: ANTICOAGULATION | Facility: CLINIC | Age: 82
End: 2017-07-07
Payer: COMMERCIAL

## 2017-07-07 DIAGNOSIS — I48.91 ATRIAL FIBRILLATION (H): ICD-10-CM

## 2017-07-07 DIAGNOSIS — Z79.01 LONG-TERM (CURRENT) USE OF ANTICOAGULANTS: ICD-10-CM

## 2017-07-07 LAB — INR POINT OF CARE: 2.5 (ref 0.86–1.14)

## 2017-07-07 PROCEDURE — 36416 COLLJ CAPILLARY BLOOD SPEC: CPT

## 2017-07-07 PROCEDURE — 99207 ZZC NO CHARGE NURSE ONLY: CPT

## 2017-07-07 PROCEDURE — 85610 PROTHROMBIN TIME: CPT | Mod: QW

## 2017-07-07 NOTE — PROGRESS NOTES
ANTICOAGULATION FOLLOW-UP CLINIC VISIT    Patient Name:  Tucker Slater  Date:  7/7/2017  Contact Type:  Face to Face    SUBJECTIVE:     Patient Findings     Positives No Problem Findings           OBJECTIVE    INR Protime   Date Value Ref Range Status   07/07/2017 2.5 (A) 0.86 - 1.14 Final       ASSESSMENT / PLAN  INR assessment THER    Recheck INR In: 3 WEEKS    INR Location Clinic      Anticoagulation Summary as of 7/7/2017     INR goal 2.0-3.0   Today's INR 2.5   Maintenance plan 1.25 mg (2.5 mg x 0.5) on Fri; 2.5 mg (2.5 mg x 1) all other days   Full instructions 1.25 mg on Fri; 2.5 mg all other days   Weekly total 16.25 mg   Plan last modified Ivory Machado RN (7/7/2017)   Next INR check 7/28/2017   Priority INR   Target end date     Indications   Long-term (current) use of anticoagulants [Z79.01] [Z79.01]  Atrial fibrillation (H) [I48.91]         Anticoagulation Episode Summary     INR check location     Preferred lab     Send INR reminders to [facility]    Comments       Anticoagulation Care Providers     Provider Role Specialty Phone number    Kwadwo Winslow MD Responsible Internal Medicine [phone]            See the Encounter Report to view Anticoagulation Flowsheet and Dosing Calendar (Go to Encounters tab in chart review, and find the Anticoagulation Therapy Visit)    Dosage adjustment made based on physician directed care plan.    Ivory Machado RN

## 2017-07-15 ENCOUNTER — HEALTH MAINTENANCE LETTER (OUTPATIENT)
Age: 82
End: 2017-07-15

## 2017-07-17 ENCOUNTER — TRANSFERRED RECORDS (OUTPATIENT)
Dept: HEALTH INFORMATION MANAGEMENT | Facility: CLINIC | Age: 82
End: 2017-07-17

## 2017-07-28 ENCOUNTER — ANTICOAGULATION THERAPY VISIT (OUTPATIENT)
Dept: ANTICOAGULATION | Facility: CLINIC | Age: 82
End: 2017-07-28
Payer: COMMERCIAL

## 2017-07-28 DIAGNOSIS — I48.91 ATRIAL FIBRILLATION (H): ICD-10-CM

## 2017-07-28 DIAGNOSIS — Z79.01 LONG-TERM (CURRENT) USE OF ANTICOAGULANTS: ICD-10-CM

## 2017-07-28 LAB — INR POINT OF CARE: 2.1 (ref 0.86–1.14)

## 2017-07-28 PROCEDURE — 36416 COLLJ CAPILLARY BLOOD SPEC: CPT

## 2017-07-28 PROCEDURE — 99207 ZZC NO CHARGE NURSE ONLY: CPT

## 2017-07-28 PROCEDURE — 85610 PROTHROMBIN TIME: CPT | Mod: QW

## 2017-07-28 NOTE — PROGRESS NOTES
ANTICOAGULATION FOLLOW-UP CLINIC VISIT    Patient Name:  Tucker Slater  Date:  7/28/2017  Contact Type:  Face to Face    SUBJECTIVE:     Patient Findings     Positives No Problem Findings           OBJECTIVE    INR Protime   Date Value Ref Range Status   07/28/2017 2.1 (A) 0.86 - 1.14 Final       ASSESSMENT / PLAN  No question data found.  Anticoagulation Summary as of 7/28/2017     INR goal 2.0-3.0   Today's INR 2.1   Maintenance plan 1.25 mg (2.5 mg x 0.5) on Fri; 2.5 mg (2.5 mg x 1) all other days   Full instructions 1.25 mg on Fri; 2.5 mg all other days   Weekly total 16.25 mg   No change documented Ivory Machado RN   Plan last modified Ivory Machado RN (7/7/2017)   Next INR check 8/25/2017   Priority INR   Target end date     Indications   Long-term (current) use of anticoagulants [Z79.01] [Z79.01]  Atrial fibrillation (H) [I48.91]         Anticoagulation Episode Summary     INR check location     Preferred lab     Send INR reminders to [facility]    Comments       Anticoagulation Care Providers     Provider Role Specialty Phone number    Kwadwo Winslow MD Responsible Internal Medicine [phone]            See the Encounter Report to view Anticoagulation Flowsheet and Dosing Calendar (Go to Encounters tab in chart review, and find the Anticoagulation Therapy Visit)    Dosage adjustment made based on physician directed care plan.    Ivory Machado RN

## 2017-07-28 NOTE — MR AVS SNAPSHOT
Tucker L Bryon   7/28/2017 1:00 PM   Anticoagulation Therapy Visit    Description:  86 year old male   Provider[facility]  [facility]   Department:  [facility]           INR as of 7/28/2017     Today's INR 2.1      Anticoagulation Summary as of 7/28/2017     INR goal 2.0-3.0   Today's INR 2.1   Full instructions 1.25 mg on Fri; 2.5 mg all other days   Next INR check 8/25/2017    Indications   Long-term (current) use of anticoagulants [Z79.01] [Z79.01]  Atrial fibrillation (H) [I48.91]         Your next Anticoagulation Clinic appointment(s)     Aug 25, 2017  1:00 PM CDT   Anticoagulation Visit with [facility]   [facility]    [address] [facility] [address]  [facility] [address] [phone]              Contact Numbers     [facility] Phone Numbers:  Anticoagulation Clinic Appointments : [phone]  Anticoagulation Nurse: [phone]         July 2017 Details    Sun Mon Tue Wed Thu Fri Sat           1                 2               3               4               5               6               7               8                 9               10               11               12               13               14               15                 16               17               18               19               20               21               22                 23               24               25               26               27               28      1.25 mg   See details      29      2.5 mg           30      2.5 mg         31      2.5 mg               Date Details   07/28 This INR check               How to take your warfarin dose     To take:  1.25 mg Take 0.5 of a 2.5 mg tablet.    To take:  2.5 mg Take 1 of the 2.5 mg tablets.           August 2017 Details    Sun Mon Tue Wed Thu Fri Sat       1      2.5 mg         2      2.5 mg         3      2.5 mg         4      1.25 mg         5      2.5 mg           6      2.5  mg         7      2.5 mg         8      2.5 mg         9      2.5 mg         10      2.5 mg         11      1.25 mg         12      2.5 mg           13      2.5 mg         14      2.5 mg         15      2.5 mg         16      2.5 mg         17      2.5 mg         18      1.25 mg         19      2.5 mg           20      2.5 mg         21      2.5 mg         22      2.5 mg         23      2.5 mg         24      2.5 mg         25            26                 27               28               29               30               31                  Date Details   No additional details    Date of next INR:  8/25/2017         How to take your warfarin dose     To take:  1.25 mg Take 0.5 of a 2.5 mg tablet.    To take:  2.5 mg Take 1 of the 2.5 mg tablets.

## 2017-08-10 ENCOUNTER — TRANSFERRED RECORDS (OUTPATIENT)
Dept: HEALTH INFORMATION MANAGEMENT | Facility: CLINIC | Age: 82
End: 2017-08-10

## 2017-08-14 ENCOUNTER — ALLIED HEALTH/NURSE VISIT (OUTPATIENT)
Dept: CARDIOLOGY | Facility: CLINIC | Age: 82
End: 2017-08-14
Payer: COMMERCIAL

## 2017-08-14 DIAGNOSIS — Z95.0 CARDIAC PACEMAKER IN SITU: Primary | ICD-10-CM

## 2017-08-14 PROCEDURE — 93294 REM INTERROG EVL PM/LDLS PM: CPT | Performed by: INTERNAL MEDICINE

## 2017-08-14 PROCEDURE — 93296 REM INTERROG EVL PM/IDS: CPT | Performed by: INTERNAL MEDICINE

## 2017-08-14 NOTE — MR AVS SNAPSHOT
After Visit Summary   8/14/2017    Tucker Slater    MRN: [id number]           Patient Information     Date Of Birth          3/13/1931        Visit Information        Provider Department      8/14/2017 3:30 PM KIDD TECH1 [facility]        Today's Diagnoses     Cardiac pacemaker in situ    -  1       Follow-ups after your visit        Your next 10 appointments already scheduled     Aug 14, 2017  3:30 PM CDT   Remote PPM Check with KIDD TECH1   [facility] ([facility])    [address]  [facility] [address] [phone]           This appointment is for a remote check of your pacemaker.  This is not an appointment at the office.            Aug 25, 2017  1:00 PM CDT   Anticoagulation Visit with [facility]   [facility] ([facility]    [address]  [facility] [address] [phone]              Who to contact     If you have questions or need follow up information about today's clinic visit or your schedule please contact [facility] directly at [phone].  Normal or non-critical lab and imaging results will be communicated to you by Adayanahart, letter or phone within 4 business days after the clinic has received the results. If you do not hear from us within 7 days, please contact the clinic through Adayanahart or phone. If you have a critical or abnormal lab result, we will notify you by phone as soon as possible.  Submit refill requests through eMindful or call your pharmacy and they will forward the refill request to us. Please allow 3 business days for your refill to be completed.          Additional Information About Your Visit        MyChart Information     eMindful lets you send messages to your doctor, view your test results, renew your prescriptions, schedule appointments and more.  "To sign up, go to [email or web address] . Click on \"Log in\" on the left side of the screen, which will take you to the Welcome page. Then click on \"Sign up Now\" on the right side of the page.     You will be asked to enter the access code listed below, as well as some personal information. Please follow the directions to create your username and password.     Your access code is: [email or web address]  Expires: 2017 10:00 AM     Your access code will  in 90 days. If you need help or a new code, please call your [facility] clinic or [phone].        Care EveryWhere ID     This is your Care EveryWhere ID. This could be used by other organizations to access your [facility] medical records  [id number]         Blood Pressure from Last 3 Encounters:   17 124/72   17 139/68   17 152/79    Weight from Last 3 Encounters:   17 83 kg (183 lb)   17 80.7 kg (178 lb)   17 81.2 kg (179 lb)              We Performed the Following     INTERROGATION DEVICE EVAL REMOTE, PACER/ICD (51526)     PM DEVICE INTERROGATE REMOTE (51024)        Primary Care Provider Office Phone # Fax #    Kwadwo Winslow [phone]       [address] [facility] [address]        Equal Access to Services     [facility] : Hadii aad ku hadasho Soriddhi, waaxda luqadaha, qaybta kaalmada sonal, gisela eddy. So [facility] [phone].    ATENCIÓN: Si habla español, tiene a kraft disposición servicios gratuitos de asistencia lingüística. Llame al [phone].    We comply with applicable federal civil rights laws and [place] laws. We do not discriminate on the basis of race, color, national origin, age, disability sex, sexual orientation or gender identity.            Thank you!     Thank you for choosing [facility]  for your care. Our goal is always to provide you with excellent care. Hearing back from our patients is one way " we can continue to improve our services. Please take a few minutes to complete the written survey that you may receive in the mail after your visit with us. Thank you!             Your Updated Medication List - Protect others around you: Learn how to safely use, store and throw away your medicines at www.disposemymeds.org.          This list is accurate as of: 8/14/17 10:00 AM.  Always use your most recent med list.                   Brand Name Dispense Instructions for use Diagnosis    ACETAMINOPHEN PO      Take 650 mg by mouth every 4 hours as needed for pain        calcium carbonate 1250 MG tablet    OS-KARLA 500 mg [place]. Ca     Take 500 mg by mouth daily        carvedilol 25 MG tablet    COREG    180 tablet    Take 1 tablet (25 mg) by mouth 2 times daily (with meals)    Essential hypertension with goal blood pressure less than 140/90       * COUMADIN PO      Take 2.5 mg by mouth daily Until 4/3/17        * JANTOVEN 2.5 MG tablet   Generic drug:  warfarin     90 tablet    TAKE ONE TABLET BY MOUTH EVERY DAY OR AS INSTRUCTED BASED ON INR RESULTS (INR APPT. ON JAN 23)    Chronic atrial fibrillation (H)       ferrous sulfate 142 (45 FE) MG Tbcr    SLO-FE    30 tablet    Take 1 tablet (142 mg) by mouth daily    CKD (chronic kidney disease) stage 3, GFR 30-59 ml/min, Anemia, unspecified type       * losartan 100 MG tablet    COZAAR     Take 100 mg by mouth daily        * losartan 100 MG tablet    COZAAR    90 tablet    TAKE ONE TABLET BY MOUTH ONCE DAILY    Essential hypertension, benign       OCUVITE PO      Take 1 tablet by mouth daily        senna-docusate 8.6-50 MG per tablet    SENOKOT-S;PERICOLACE     Take 2 tablets by mouth daily Reported on 4/11/2017        traMADol 50 MG tablet    ULTRAM    20 tablet    Take 1-2 tablets ( mg) by mouth every 6 hours as needed for pain maximum 8 tablet(s) per day    Carpal tunnel syndrome of right wrist       triamterene-hydrochlorothiazide 75-50 MG per tablet    MAXZIDE     45 tablet    Take 0.5 tablets by mouth daily Hold for SBP <100.    Essential hypertension, benign       vitamin B complex with vitamin C Tabs tablet      Take 1 tablet by mouth daily        VITAMIN C PO      Take 500 mg by mouth daily        VITAMIN D (CHOLECALCIFEROL) PO      Take 2,000 Units by mouth daily.        * Notice:  This list has 4 medication(s) that are the same as other medications prescribed for you. Read the directions carefully, and ask your doctor or other care provider to review them with you.

## 2017-08-14 NOTE — PROGRESS NOTES
[employer] (D) Remote PPM Device Check  AP: <1 % : 89 %  Mode: DDDR        Presenting Rhythm: AFIB with  events  Heart Rate: Adequate rates per histogram  Sensing: Stable    Pacing Threshold: Stable    Impedance: Stable  Battery Status: 9.1-10.1 years  Atrial Arrhythmia: 2 mode switch episodes comprising 100% of the time. Taking Warfarin.    Ventricular Arrhythmia: None     Care Plan: F/u PPM Merlin q 3 months. Gave patient results over the phone. MigelCVT

## 2017-08-15 ENCOUNTER — TELEPHONE (OUTPATIENT)
Dept: INTERNAL MEDICINE | Facility: CLINIC | Age: 82
End: 2017-08-15

## 2017-08-15 DIAGNOSIS — D64.9 ANEMIA: ICD-10-CM

## 2017-08-15 DIAGNOSIS — I10 ESSENTIAL HYPERTENSION, BENIGN: Primary | ICD-10-CM

## 2017-08-15 NOTE — TELEPHONE ENCOUNTER
Pt calls stating he is due for lab work.   BMP, CBC due. He stopped the Iron pills.   Labs ordered, appt made.         Notes Recorded by Kwadwo Winslow MD on 5/12/2017 at 3:30 PM  Slightly decreased kidney function. Stable. Recheck in 3 months.    Notes Recorded by Kwadwo Winslow MD on 5/2/2017 at 10:51 AM  Mild anemia, slightly decreased kidney function.   Will call in to start PO iron. Keep good hydration  Recheck CBC, BMP in one month.

## 2017-08-19 DIAGNOSIS — I10 ESSENTIAL HYPERTENSION, BENIGN: ICD-10-CM

## 2017-08-19 RX ORDER — LOSARTAN POTASSIUM 100 MG/1
100 TABLET ORAL DAILY
Qty: 90 TABLET | Refills: 0 | Status: SHIPPED | OUTPATIENT
Start: 2017-08-19 | End: 2017-09-12

## 2017-08-19 NOTE — TELEPHONE ENCOUNTER
Losartan       Last Written Prescription Date: 5/15/17  Last Fill Quantity: 90, # refills: 0  Last Office Visit with [facility] or [facility] prescribing provider: 5/1/17     Pt sched for labs on 8/25, pt also due for appt      Potassium   Date Value Ref Range Status   05/10/2017 4.2 3.4 - 5.3 mmol/L Final     Creatinine   Date Value Ref Range Status   05/10/2017 1.42 (H) 0.66 - 1.25 mg/dL Final     BP Readings from Last 3 Encounters:   05/01/17 124/72   04/19/17 139/68   04/17/17 152/79

## 2017-08-23 DIAGNOSIS — I48.20 CHRONIC ATRIAL FIBRILLATION (H): ICD-10-CM

## 2017-08-23 RX ORDER — WARFARIN SODIUM 2.5 MG/1
TABLET ORAL
Qty: 90 TABLET | Refills: 1 | Status: SHIPPED | OUTPATIENT
Start: 2017-08-23 | End: 2018-03-09

## 2017-08-23 NOTE — TELEPHONE ENCOUNTER
Warfarin     Last Written Prescription Date: 5/15/17  Last Fill Qty: 90, # refills: 0    Last Office Visit with [facility], [facility] or [facility] prescribing provider: 5/1/17  Prescription approved per [facility] Refill Protocol.       Lab Results   Component Value Date    INR 2.1 (A) 07/28/2017    INR 2.5 (A) 07/07/2017    INR 3.4 (A) 06/23/2017    INR 3.4 (A) 06/09/2017    INR 2.8 (A) 05/11/2017

## 2017-08-25 ENCOUNTER — ANTICOAGULATION THERAPY VISIT (OUTPATIENT)
Dept: ANTICOAGULATION | Facility: CLINIC | Age: 82
End: 2017-08-25
Payer: COMMERCIAL

## 2017-08-25 DIAGNOSIS — I48.91 ATRIAL FIBRILLATION (H): ICD-10-CM

## 2017-08-25 DIAGNOSIS — D64.9 ANEMIA: ICD-10-CM

## 2017-08-25 DIAGNOSIS — Z79.01 LONG-TERM (CURRENT) USE OF ANTICOAGULANTS: ICD-10-CM

## 2017-08-25 DIAGNOSIS — I10 ESSENTIAL HYPERTENSION, BENIGN: ICD-10-CM

## 2017-08-25 LAB
ANION GAP SERPL CALCULATED.3IONS-SCNC: 8 MMOL/L (ref 3–14)
BUN SERPL-MCNC: 31 MG/DL (ref 7–30)
CALCIUM SERPL-MCNC: 9 MG/DL (ref 8.5–10.1)
CHLORIDE SERPL-SCNC: 108 MMOL/L (ref 94–109)
CO2 SERPL-SCNC: 24 MMOL/L (ref 20–32)
CREAT SERPL-MCNC: 1.51 MG/DL (ref 0.66–1.25)
ERYTHROCYTE [DISTWIDTH] IN BLOOD BY AUTOMATED COUNT: 14.3 % (ref 10–15)
GFR SERPL CREATININE-BSD FRML MDRD: 44 ML/MIN/1.7M2
GLUCOSE SERPL-MCNC: 89 MG/DL (ref 70–99)
HCT VFR BLD AUTO: 43.9 % (ref 40–53)
HGB BLD-MCNC: 14.3 G/DL (ref 13.3–17.7)
INR POINT OF CARE: 2.7 (ref 0.86–1.14)
MCH RBC QN AUTO: 30.7 PG (ref 26.5–33)
MCHC RBC AUTO-ENTMCNC: 32.6 G/DL (ref 31.5–36.5)
MCV RBC AUTO: 94 FL (ref 78–100)
PLATELET # BLD AUTO: 186 10E9/L (ref 150–450)
POTASSIUM SERPL-SCNC: 4 MMOL/L (ref 3.4–5.3)
RBC # BLD AUTO: 4.66 10E12/L (ref 4.4–5.9)
SODIUM SERPL-SCNC: 140 MMOL/L (ref 133–144)
WBC # BLD AUTO: 6.5 10E9/L (ref 4–11)

## 2017-08-25 PROCEDURE — 99207 ZZC NO CHARGE NURSE ONLY: CPT

## 2017-08-25 PROCEDURE — 36416 COLLJ CAPILLARY BLOOD SPEC: CPT

## 2017-08-25 PROCEDURE — 85027 COMPLETE CBC AUTOMATED: CPT | Performed by: INTERNAL MEDICINE

## 2017-08-25 PROCEDURE — 80048 BASIC METABOLIC PNL TOTAL CA: CPT | Performed by: INTERNAL MEDICINE

## 2017-08-25 PROCEDURE — 85610 PROTHROMBIN TIME: CPT | Mod: QW

## 2017-08-25 NOTE — MR AVS SNAPSHOT
Tucker L Bryon   8/25/2017 1:00 PM   Anticoagulation Therapy Visit    Description:  86 year old male   Provider[facility]  [facility]   Department:  [facility]           INR as of 8/25/2017     Today's INR 2.7      Anticoagulation Summary as of 8/25/2017     INR goal 2.0-3.0   Today's INR 2.7   Full instructions 1.25 mg on Fri; 2.5 mg all other days   Next INR check 9/22/2017    Indications   Long-term (current) use of anticoagulants [Z79.01] [Z79.01]  Atrial fibrillation (H) [I48.91]         Your next Anticoagulation Clinic appointment(s)     Sep 22, 2017  1:00 PM CDT   Anticoagulation Visit with [facility]   [facility] ([facility])    [address] [facility] [address]  [facility] [address] [phone]              Contact Numbers     [facility] Phone Numbers:  Anticoagulation Clinic Appointments : [phone]  Anticoagulation Nurse: [phone]         August 2017 Details    Sun Mon Tue Wed Thu Fri Sat       1               2               3               4               5                 6               7               8               9               10               11               12                 13               14               15               16               17               18               19                 20               21               22               23               24               25      1.25 mg   See details      26      2.5 mg           27      2.5 mg         28      2.5 mg         29      2.5 mg         30      2.5 mg         31      2.5 mg            Date Details   08/25 This INR check               How to take your warfarin dose     To take:  1.25 mg Take 0.5 of a 2.5 mg tablet.    To take:  2.5 mg Take 1 of the 2.5 mg tablets.           September 2017 Details    Sun Mon Tue Wed Thu Fri Sat          1      1.25 mg         2      2.5 mg           3      2.5 mg         4      2.5 mg         5      2.5 mg          6      2.5 mg         7      2.5 mg         8      1.25 mg         9      2.5 mg           10      2.5 mg         11      2.5 mg         12      2.5 mg         13      2.5 mg         14      2.5 mg         15      1.25 mg         16      2.5 mg           17      2.5 mg         18      2.5 mg         19      2.5 mg         20      2.5 mg         21      2.5 mg         22            23                 24               25               26               27               28               29               30                Date Details   No additional details    Date of next INR:  9/22/2017         How to take your warfarin dose     To take:  1.25 mg Take 0.5 of a 2.5 mg tablet.    To take:  2.5 mg Take 1 of the 2.5 mg tablets.

## 2017-08-25 NOTE — PROGRESS NOTES
ANTICOAGULATION FOLLOW-UP CLINIC VISIT    Patient Name:  Tucker Slater  Date:  8/25/2017  Contact Type:  Face to Face    SUBJECTIVE:     Patient Findings     Positives No Problem Findings           OBJECTIVE    INR Protime   Date Value Ref Range Status   08/25/2017 2.7 (A) 0.86 - 1.14 Final       ASSESSMENT / PLAN  INR assessment THER    Recheck INR In: 4 WEEKS    INR Location Clinic      Anticoagulation Summary as of 8/25/2017     INR goal 2.0-3.0   Today's INR 2.7   Maintenance plan 1.25 mg (2.5 mg x 0.5) on Fri; 2.5 mg (2.5 mg x 1) all other days   Full instructions 1.25 mg on Fri; 2.5 mg all other days   Weekly total 16.25 mg   Plan last modified Ivory Machado RN (7/7/2017)   Next INR check 9/22/2017   Priority INR   Target end date     Indications   Long-term (current) use of anticoagulants [Z79.01] [Z79.01]  Atrial fibrillation (H) [I48.91]         Anticoagulation Episode Summary     INR check location     Preferred lab     Send INR reminders to [facility]    Comments       Anticoagulation Care Providers     Provider Role Specialty Phone number    Kwadwo Winslow MD Responsible Internal Medicine [phone]            See the Encounter Report to view Anticoagulation Flowsheet and Dosing Calendar (Go to Encounters tab in chart review, and find the Anticoagulation Therapy Visit)    Dosage adjustment made based on physician directed care plan.    Ivory Machado RN

## 2017-09-12 ENCOUNTER — OFFICE VISIT (OUTPATIENT)
Dept: INTERNAL MEDICINE | Facility: CLINIC | Age: 82
End: 2017-09-12
Payer: COMMERCIAL

## 2017-09-12 DIAGNOSIS — N18.30 CKD (CHRONIC KIDNEY DISEASE) STAGE 3, GFR 30-59 ML/MIN (H): Primary | ICD-10-CM

## 2017-09-12 DIAGNOSIS — I48.20 CHRONIC ATRIAL FIBRILLATION (H): ICD-10-CM

## 2017-09-12 DIAGNOSIS — I10 ESSENTIAL HYPERTENSION, BENIGN: ICD-10-CM

## 2017-09-12 DIAGNOSIS — M25.511 PAIN IN JOINT OF RIGHT SHOULDER: ICD-10-CM

## 2017-09-12 DIAGNOSIS — Z23 NEEDS FLU SHOT: ICD-10-CM

## 2017-09-12 DIAGNOSIS — G60.3 IDIOPATHIC PROGRESSIVE NEUROPATHY: ICD-10-CM

## 2017-09-12 LAB
ALBUMIN UR-MCNC: ABNORMAL MG/DL
APPEARANCE UR: CLEAR
BILIRUB UR QL STRIP: NEGATIVE
COLOR UR AUTO: YELLOW
GLUCOSE UR STRIP-MCNC: NEGATIVE MG/DL
HGB UR QL STRIP: NEGATIVE
KETONES UR STRIP-MCNC: NEGATIVE MG/DL
LEUKOCYTE ESTERASE UR QL STRIP: NEGATIVE
MUCOUS THREADS #/AREA URNS LPF: PRESENT /LPF
NITRATE UR QL: NEGATIVE
PH UR STRIP: 7 PH (ref 5–7)
RBC #/AREA URNS AUTO: ABNORMAL /HPF
SOURCE: ABNORMAL
SP GR UR STRIP: 1.01 (ref 1–1.03)
UROBILINOGEN UR STRIP-ACNC: 0.2 EU/DL (ref 0.2–1)
WBC #/AREA URNS AUTO: ABNORMAL /HPF

## 2017-09-12 PROCEDURE — 81001 URINALYSIS AUTO W/SCOPE: CPT | Performed by: INTERNAL MEDICINE

## 2017-09-12 PROCEDURE — 90662 IIV NO PRSV INCREASED AG IM: CPT | Performed by: INTERNAL MEDICINE

## 2017-09-12 PROCEDURE — 82043 UR ALBUMIN QUANTITATIVE: CPT | Performed by: INTERNAL MEDICINE

## 2017-09-12 PROCEDURE — 99214 OFFICE O/P EST MOD 30 MIN: CPT | Mod: 25 | Performed by: INTERNAL MEDICINE

## 2017-09-12 PROCEDURE — G0008 ADMIN INFLUENZA VIRUS VAC: HCPCS | Performed by: INTERNAL MEDICINE

## 2017-09-12 RX ORDER — LOSARTAN POTASSIUM 100 MG/1
50 TABLET ORAL DAILY
Qty: 90 TABLET | Refills: 0 | COMMUNITY
Start: 2017-09-12 | End: 2018-05-07

## 2017-09-12 NOTE — NURSING NOTE

## 2017-09-12 NOTE — PROGRESS NOTES
SUBJECTIVE:   Tucker Slater is a 86 year old male who presents to clinic today for the following health issues:      No acute complaints, no medication change or new medical conditions.    Has h/o CRF. Symptoms include fatigue and mild edema of LE. Monitoring BP, BG, medications, avoiding OTC NSAIDs. Needs periodic recheck of kidney function.    Concern for feeling tired. Trying to exercise regularly.     Has history of atrial fibrillation. On anticoagulation with Coumadin , has PM for bradycardia. Asymptonatic - no chest pains , palpitations,  no side effects from medications.    Has h/o peripheral neuropathy, affecting balance. Has fallen 2 months ago, injured his right shoulder. No fracture, but has pain , goes to PT    Hypertension Follow-up    Has h/o HTN. on medical treatment. BP has been controlled. No side effects from medications. No CP, HA, dizziness. good compliance with medications and low salt diet.        Outpatient blood pressures are being checked at store occasionally.  Results are normal.    Low Salt Diet: not monitoring salt        Amount of exercise or physical activity: walk few times a week    Problems taking medications regularly: No    Medication side effects: none  Diet: regular (no restrictions)          Problem list and histories reviewed & adjusted, as indicated.  Additional history: as documented    Patient Active Problem List   Diagnosis     Essential hypertension, benign     Carcinoma in situ of bladder     Hypertrophy of prostate with urinary obstruction     Generalized osteoarthrosis, unspecified site     Hereditary and idiopathic peripheral neuropathy     Pain in joint, shoulder region     CARDIOVASCULAR SCREENING; LDL GOAL LESS THAN 130     Advanced directives, counseling/discussion     Peripheral neuropathy (H)     GI bleed     Anemia     Near syncope     Anemia due to blood loss, acute     Atrial fibrillation (H)     Bradycardia     CKD (chronic kidney disease) stage 3,  GFR 30-59 ml/min     Cardiac pacemaker in situ     Long-term (current) use of anticoagulants [Z79.01]     Degenerative arthritis of hip     Past Surgical History:   Procedure Laterality Date     ARTHROPLASTY HIP Right 3/27/2017    Procedure: ARTHROPLASTY HIP;  Surgeon: Jigar Wynn MD;  Location: [facility] OR     C NONSPECIFIC PROCEDURE      bilat inguinal hernia repairs ( 3total procedures)      C NONSPECIFIC PROCEDURE      pilonidal cyst     C NONSPECIFIC PROCEDURE      T + A     C NONSPECIFIC PROCEDURE       R+L total knee     CARDIOVERSION  3/26/15     COLONOSCOPY       IMPLANT PACEMAKER  3/26/15     RELEASE CARPAL TUNNEL Right 2017    Procedure: RELEASE CARPAL TUNNEL;  Right carpal tunnel release;  Surgeon: Alonso Barboza MD;  Location: [facility] OR       Social History   Substance Use Topics     Smoking status: Former Smoker     Quit date: 1977     Smokeless tobacco: Never Used     Alcohol use No     Family History   Problem Relation Age of Onset     HEART DISEASE Father       89yo     Coronary Artery Disease Father      HEART DISEASE Mother       76yo     Coronary Artery Disease Mother      Family History Negative Brother          Current Outpatient Prescriptions   Medication Sig Dispense Refill     losartan (COZAAR) 100 MG tablet Take 0.5 tablets (50 mg) by mouth daily 90 tablet 0     warfarin (JANTOVEN) 2.5 MG tablet TAKE ONE TABLET (2.5 mg) BY MOUTH EVERY DAY, Except Take 1/2 Tablet (1.25 mg) on , OR AS INSTRUCTED BASED ON INR RESULTS 90 tablet 1     ferrous sulfate (SLO-FE) 142 (45 FE) MG TBCR Take 1 tablet (142 mg) by mouth daily 30 tablet 3     vitamin B complex with vitamin C (VITAMIN  B COMPLEX) TABS tablet Take 1 tablet by mouth daily       ACETAMINOPHEN PO Take 650 mg by mouth every 4 hours as needed for pain       triamterene-hydrochlorothiazide (MAXZIDE) 75-50 MG per tablet Take 0.5 tablets by mouth daily Hold for SBP <100. 45 tablet 3     senna-docusate  (SENOKOT-S;PERICOLACE) 8.6-50 MG per tablet Take 2 tablets by mouth daily Reported on 4/11/2017       carvedilol (COREG) 25 MG tablet Take 1 tablet (25 mg) by mouth 2 times daily (with meals) 180 tablet 2     Multiple Vitamins-Minerals (OCUVITE PO) Take 1 tablet by mouth daily        Ascorbic Acid (VITAMIN C PO) Take 500 mg by mouth daily        calcium carbonate (OS-KARLA 500 MG [place]. [place]) 500 MG tablet Take 500 mg by mouth daily        VITAMIN D, CHOLECALCIFEROL, PO Take 2,000 Units by mouth daily.       [DISCONTINUED] losartan (COZAAR) 100 MG tablet Take 1 tablet (100 mg) by mouth daily 90 tablet 0     [DISCONTINUED] losartan (COZAAR) 100 MG tablet Take 100 mg by mouth daily       [DISCONTINUED] Warfarin Sodium (COUMADIN PO) Take 2.5 mg by mouth daily Until 4/3/17           Reviewed and updated as needed this visit by clinical staff     Reviewed and updated as needed this visit by Provider         ROS:  Constitutional, HEENT, cardiovascular, pulmonary, gi and gu systems are negative, except as otherwise noted.      OBJECTIVE:   There were no vitals taken for this visit.  There is no height or weight on file to calculate BMI.   GENERAL: healthy, alert and no distress  NECK: no adenopathy, no asymmetry, masses, or scars and thyroid normal to palpation  RESP: lungs clear to auscultation - no rales, rhonchi or wheezes  CV: regular rate and rhythm, normal S1 S2, no S3 or S4, no murmur, click or rub,  peripheral pulses strong  ABDOMEN: soft, nontender, no hepatosplenomegaly, no masses and bowel sounds normal  MS: no gross musculoskeletal defects noted, 1+ LE edema    Diagnostic Test Results:  none     ASSESSMENT/PLAN:     Problem List Items Addressed This Visit     Essential hypertension, benign    Relevant Medications    losartan (COZAAR) 100 MG tablet    Pain in joint, shoulder region    Peripheral neuropathy (H)    Atrial fibrillation (H)    CKD (chronic kidney disease) stage 3, GFR 30-59 ml/min - Primary    Relevant  Orders    *UA reflex to Microscopic and Culture ([facility] (except [place] and [place]) (Completed)    Albumin Random Urine Quantitative with Creat Ratio (Completed)    US Renal Complete      Other Visit Diagnoses     Needs flu shot        Relevant Orders    FLU VACCINE, INCREASED ANTIGEN, PRESV FREE (Completed)           Assess lab work and renal US  Cont treatment, decrease Losartan to 50 mg, BP low normal   Monitor lab   Immunized   Follow up with ortho     Follow-Up:in 3 months     Kwadwo Winslow MD  [facility]

## 2017-09-12 NOTE — MR AVS SNAPSHOT
After Visit Summary   9/12/2017    Tucker Slater    MRN: [id number]           Patient Information     Date Of Birth          3/13/1931        Visit Information        Provider Department      9/12/2017 1:40 PM Kwadwo Winslow MD [facility]        Today's Diagnoses     CKD (chronic kidney disease) stage 3, GFR 30-59 ml/min    -  1    Essential hypertension, benign           Follow-ups after your visit        Your next 10 appointments already scheduled     Sep 22, 2017  1:00 PM CDT   Anticoagulation Visit with [facility]   [facility] ([facility]    [address]  [facility] [id number] [phone]            Nov 20, 2017  2:30 PM CST   Remote PPM Check with KIDD TECH1   [facility] AT [place] ([facility])    [address] [place] [address] [address]  [facility] [address] [phone]           This appointment is for a remote check of your pacemaker.  This is not an appointment at the office.              Future tests that were ordered for you today     Open Future Orders        Priority Expected Expires Ordered    US Renal Complete Routine  9/12/2018 9/12/2017            Who to contact     If you have questions or need follow up information about today's clinic visit or your schedule please contact [facility] directly at [phone].  Normal or non-critical lab and imaging results will be communicated to you by MyChart, letter or phone within 4 business days after the clinic has received the results. If you do not hear from us within 7 days, please contact the clinic through MyChart or phone. If you have a critical or abnormal lab result, we will notify you by phone as soon as possible.  Submit refill requests through [email or web address] or call your pharmacy and they will forward the refill request to us. Please allow 3 business days for your refill to be completed.           "Additional Information About Your Visit        MyChart Information     Butter lets you send messages to your doctor, view your test results, renew your prescriptions, schedule appointments and more. To sign up, go to [email or web address] . Click on \"Log in\" on the left side of the screen, which will take you to the Welcome page. Then click on \"Sign up Now\" on the right side of the page.     You will be asked to enter the access code listed below, as well as some personal information. Please follow the directions to create your username and password.     Your access code is: [email or web address]  Expires: 2017 10:00 AM     Your access code will  in 90 days. If you need help or a new code, please call your [place] clinic or [phone].        Care EveryWhere ID     This is your [facility] EveryWhere ID. This could be used by other organizations to access your [place] medical records  [id number]         Blood Pressure from Last 3 Encounters:   17 124/72   17 139/68   17 152/79    Weight from Last 3 Encounters:   17 183 lb (83 kg)   17 178 lb (80.7 kg)   17 179 lb (81.2 kg)              We Performed the Following     *UA reflex to Microscopic and Culture ([place] [facility] [place] [facility] (except [place] and [place])     Albumin Random Urine Quantitative with Creat Ratio          Today's Medication Changes          These changes are accurate as of: 17  2:19 PM.  If you have any questions, ask your nurse or doctor.               These medicines have changed or have updated prescriptions.        Dose/Directions    losartan 100 MG tablet   Commonly known as:  COZAAR   This may have changed:  how much to take   Used for:  Essential hypertension, benign   Changed by:  Kwadwo Winslow MD        Dose:  50 mg   Take 0.5 tablets (50 mg) by mouth daily   Quantity:  90 tablet   Refills:  0                Primary Care Provider Office Phone # Fax #    Kwadwo Winslow MD " [phone]       [address] [facility] [address]        Equal Access to Services     MEREDITH JIM : Hadbetsy mat hickey ba Whitaker, waaxda luqadaha, qaybta kaalmada sonal, gisela cottrell laDeborahanna eddy. So [facility] [phone].    ATENCIÓN: Si habla español, tiene a kraft disposición servicios gratuitos de asistencia lingüística. David al [phone].    We comply with applicable federal civil rights laws and [place] laws. We do not discriminate on the basis of race, color, national origin, age, disability sex, sexual orientation or gender identity.            Thank you!     Thank you for choosing [facility]  for your care. Our goal is always to provide you with excellent care. Hearing back from our patients is one way we can continue to improve our services. Please take a few minutes to complete the written survey that you may receive in the mail after your visit with us. Thank you!             Your Updated Medication List - Protect others around you: Learn how to safely use, store and throw away your medicines at www.disposemymeds.org.          This list is accurate as of: 9/12/17  2:19 PM.  Always use your most recent med list.                   Brand Name Dispense Instructions for use Diagnosis    ACETAMINOPHEN PO      Take 650 mg by mouth every 4 hours as needed for pain        calcium carbonate 1250 MG tablet    OS-KARLA 500 mg [place]. [place]     Take 500 mg by mouth daily        carvedilol 25 MG tablet    COREG    180 tablet    Take 1 tablet (25 mg) by mouth 2 times daily (with meals)    Essential hypertension with goal blood pressure less than 140/90       ferrous sulfate 142 (45 FE) MG Tbcr    SLO-FE    30 tablet    Take 1 tablet (142 mg) by mouth daily    CKD (chronic kidney disease) stage 3, GFR 30-59 ml/min, Anemia, unspecified type       losartan 100 MG tablet    COZAAR    90 tablet    Take 0.5 tablets (50 mg) by mouth daily    Essential hypertension, benign        OCUVITE PO      Take 1 tablet by mouth daily        senna-docusate 8.6-50 MG per tablet    SENOKOT-S;PERICOLACE     Take 2 tablets by mouth daily Reported on 4/11/2017        triamterene-hydrochlorothiazide 75-50 MG per tablet    MAXZIDE    45 tablet    Take 0.5 tablets by mouth daily Hold for SBP <100.    Essential hypertension, benign       vitamin B complex with vitamin C Tabs tablet      Take 1 tablet by mouth daily        VITAMIN C PO      Take 500 mg by mouth daily        VITAMIN D (CHOLECALCIFEROL) PO      Take 2,000 Units by mouth daily.        warfarin 2.5 MG tablet    JANTOVEN    90 tablet    TAKE ONE TABLET (2.5 mg) BY MOUTH EVERY DAY, Except Take 1/2 Tablet (1.25 mg) on Fridays, OR AS INSTRUCTED BASED ON INR RESULTS    Chronic atrial fibrillation (H)

## 2017-09-12 NOTE — NURSING NOTE
"Chief Complaint   Patient presents with     Recheck Medication     F/U on BP       Initial There were no vitals taken for this visit. Estimated body mass index is 26.26 kg/(m^2) as calculated from the following:    Height as of 5/1/17: 5' 10\" (1.778 m).    Weight as of 5/1/17: 183 lb (83 kg).  Medication Reconciliation: complete   Sabine Barrientos MA      "

## 2017-09-13 LAB
CREAT UR-MCNC: 78 MG/DL
MICROALBUMIN UR-MCNC: 124 MG/L
MICROALBUMIN/CREAT UR: 158.97 MG/G CR (ref 0–17)

## 2017-09-20 ENCOUNTER — TELEPHONE (OUTPATIENT)
Dept: INTERNAL MEDICINE | Facility: CLINIC | Age: 82
End: 2017-09-20

## 2017-09-20 NOTE — TELEPHONE ENCOUNTER
ROSA  Pt calls stating Radiology has not called him to schedule US yet. Gave him the # for this.     He also states his Lower extremity edema is worse. Both legs about half way up to his knees.     Care advise given to elevate his Lower legs periodically throughout the day, and to watch his Sodium intake. He agrees. He states he likes salt, so will start to monitor this.

## 2017-09-22 ENCOUNTER — ANTICOAGULATION THERAPY VISIT (OUTPATIENT)
Dept: ANTICOAGULATION | Facility: CLINIC | Age: 82
End: 2017-09-22
Payer: COMMERCIAL

## 2017-09-22 ENCOUNTER — HOSPITAL ENCOUNTER (OUTPATIENT)
Dept: ULTRASOUND IMAGING | Facility: CLINIC | Age: 82
Discharge: HOME | End: 2017-09-22
Attending: INTERNAL MEDICINE | Admitting: INTERNAL MEDICINE
Payer: MEDICARE

## 2017-09-22 DIAGNOSIS — Z79.01 LONG-TERM (CURRENT) USE OF ANTICOAGULANTS: ICD-10-CM

## 2017-09-22 DIAGNOSIS — N18.30 CKD (CHRONIC KIDNEY DISEASE) STAGE 3, GFR 30-59 ML/MIN (H): ICD-10-CM

## 2017-09-22 LAB — INR POINT OF CARE: 2.6 (ref 0.86–1.14)

## 2017-09-22 PROCEDURE — 85610 PROTHROMBIN TIME: CPT | Mod: QW

## 2017-09-22 PROCEDURE — 36416 COLLJ CAPILLARY BLOOD SPEC: CPT

## 2017-09-22 PROCEDURE — 99207 ZZC NO CHARGE NURSE ONLY: CPT

## 2017-09-22 PROCEDURE — 76770 US EXAM ABDO BACK WALL COMP: CPT

## 2017-09-22 NOTE — MR AVS SNAPSHOT
Tucker ZHAO Slater   9/22/2017 1:00 PM   Anticoagulation Therapy Visit    Description:  86 year old male   Provider:  [facility]   Department:  [facility]agulation           INR as of 9/22/2017     Today's INR 2.6      Anticoagulation Summary as of 9/22/2017     INR goal 2.0-3.0   Today's INR 2.6   Full instructions 1.25 mg on Fri; 2.5 mg all other days   Next INR check 10/16/2017    Indications   Long-term (current) use of anticoagulants [Z79.01] [Z79.01]  Atrial fibrillation (H) [I48.91]         Your next Anticoagulation Clinic appointment(s)     Oct 16, 2017  1:00 PM CDT   [facility]    [address] [facility] [address]  [facility] [address] [phone]              Contact Numbers     [facility] Phone Numbers:  Anticoagulation Clinic Appointments : [phone]  Anticoagulation Nurse: [phone]         September 2017 Details    Sun Mon Tue Wed Thu Fri Sat          1               2                 3               4               5               6               7               8               9                 10               11               12               13               14               15               16                 17               18               19               20               21               22      1.25 mg   See details      23      2.5 mg           24      2.5 mg         25      2.5 mg         26      2.5 mg         27      2.5 mg         28      2.5 mg         29      1.25 mg         30      2.5 mg          Date Details   09/22 This INR check               How to take your warfarin dose     To take:  1.25 mg Take 0.5 of a 2.5 mg tablet.    To take:  2.5 mg Take 1 of the 2.5 mg tablets.           October 2017 Details    Sun Mon Tue Wed Thu Fri Sat     1      2.5 mg         2      2.5 mg         3      2.5 mg         4      2.5 mg         5      2.5 mg         6       1.25 mg         7      2.5 mg           8      2.5 mg         9      2.5 mg         10      2.5 mg         11      2.5 mg         12      2.5 mg         13      1.25 mg         14      2.5 mg           15      2.5 mg         16            17               18               19               20               21                 22               23               24               25               26               27               28                 29               30               31                    Date Details   No additional details    Date of next INR:  10/16/2017         How to take your warfarin dose     To take:  1.25 mg Take 0.5 of a 2.5 mg tablet.    To take:  2.5 mg Take 1 of the 2.5 mg tablets.

## 2017-09-22 NOTE — PROGRESS NOTES
ANTICOAGULATION FOLLOW-UP CLINIC VISIT    Patient Name:  Tucker Slater  Date:  9/22/2017  Contact Type:  Face to Face    SUBJECTIVE:     Patient Findings     Positives No Problem Findings           OBJECTIVE    INR Protime   Date Value Ref Range Status   09/22/2017 2.6 (A) 0.86 - 1.14 Final       ASSESSMENT / PLAN  INR assessment THER    Recheck INR In: 4 WEEKS    INR Location Clinic      Anticoagulation Summary as of 9/22/2017     INR goal 2.0-3.0   Today's INR 2.6   Maintenance plan 1.25 mg (2.5 mg x 0.5) on Fri; 2.5 mg (2.5 mg x 1) all other days   Full instructions 1.25 mg on Fri; 2.5 mg all other days   Weekly total 16.25 mg   No change documented Yasmeen Mix, RN   Plan last modified Ivory Machado RN (7/7/2017)   Next INR check 10/16/2017   Priority INR   Target end date     Indications   Long-term (current) use of anticoagulants [Z79.01] [Z79.01]  Atrial fibrillation (H) [I48.91]         Anticoagulation Episode Summary     INR check location     Preferred lab     Send INR reminders to [facility]    Comments       Anticoagulation Care Providers     Provider Role Specialty Phone number    Kwadwo Winslow MD Responsible Internal Medicine [phone]            See the Encounter Report to view Anticoagulation Flowsheet and Dosing Calendar (Go to Encounters tab in chart review, and find the Anticoagulation Therapy Visit)    Dosage adjustment made based on physician directed care plan.    Yasmeen Mix RN

## 2017-10-02 DIAGNOSIS — C61 PROSTATE CA (H): Primary | ICD-10-CM

## 2017-10-02 PROCEDURE — 84153 ASSAY OF PSA TOTAL: CPT | Performed by: UROLOGY

## 2017-10-02 PROCEDURE — 36415 COLL VENOUS BLD VENIPUNCTURE: CPT | Performed by: UROLOGY

## 2017-10-03 LAB — PSA SERPL-MCNC: 2 UG/L (ref 0–4)

## 2017-10-05 DIAGNOSIS — I10 ESSENTIAL HYPERTENSION WITH GOAL BLOOD PRESSURE LESS THAN 140/90: ICD-10-CM

## 2017-10-05 RX ORDER — CARVEDILOL 25 MG/1
TABLET ORAL
Qty: 180 TABLET | Refills: 0 | Status: SHIPPED | OUTPATIENT
Start: 2017-10-05 | End: 2018-01-11

## 2017-10-05 NOTE — TELEPHONE ENCOUNTER
carvedilol (COREG) 25 MG tablet      Last Written Prescription Date: 12/13/16  Last Fill Quantity: 180, # refills: 2    Last Office Visit with [facility], [facility] or [facility] prescribing provider:  09/12/17   Future Office Visit:        BP Readings from Last 3 Encounters:   05/01/17 124/72   04/19/17 139/68   04/17/17 152/79

## 2017-10-12 ENCOUNTER — TRANSFERRED RECORDS (OUTPATIENT)
Dept: HEALTH INFORMATION MANAGEMENT | Facility: CLINIC | Age: 82
End: 2017-10-12

## 2017-10-16 ENCOUNTER — ANTICOAGULATION THERAPY VISIT (OUTPATIENT)
Dept: ANTICOAGULATION | Facility: CLINIC | Age: 82
End: 2017-10-16
Payer: COMMERCIAL

## 2017-10-16 DIAGNOSIS — Z79.01 LONG-TERM (CURRENT) USE OF ANTICOAGULANTS: ICD-10-CM

## 2017-10-16 LAB — INR POINT OF CARE: 2.1 (ref 0.86–1.14)

## 2017-10-16 PROCEDURE — 99207 ZZC NO CHARGE NURSE ONLY: CPT

## 2017-10-16 PROCEDURE — 36416 COLLJ CAPILLARY BLOOD SPEC: CPT

## 2017-10-16 PROCEDURE — 85610 PROTHROMBIN TIME: CPT | Mod: QW

## 2017-10-16 NOTE — PROGRESS NOTES
ANTICOAGULATION FOLLOW-UP CLINIC VISIT    Patient Name:  Tucker Slater  Date:  10/16/2017  Contact Type:  Face to Face    SUBJECTIVE:     Patient Findings     Positives No Problem Findings           OBJECTIVE    INR Protime   Date Value Ref Range Status   10/16/2017 2.1 (A) 0.86 - 1.14 Final       ASSESSMENT / PLAN  INR assessment THER    Recheck INR In: 4 WEEKS    INR Location Clinic      Anticoagulation Summary as of 10/16/2017     INR goal 2.0-3.0   Today's INR 2.1   Maintenance plan 1.25 mg (2.5 mg x 0.5) on Fri; 2.5 mg (2.5 mg x 1) all other days   Full instructions 1.25 mg on Fri; 2.5 mg all other days   Weekly total 16.25 mg   No change documented Gail Valdovinos RN   Plan last modified Ivory Machado RN (7/7/2017)   Next INR check 11/13/2017   Priority INR   Target end date     Indications   Long-term (current) use of anticoagulants [Z79.01] [Z79.01]  Atrial fibrillation (H) [I48.91]         Anticoagulation Episode Summary     INR check location     Preferred lab     Send INR reminders to [facility]    Comments       Anticoagulation Care Providers     Provider Role Specialty Phone number    Kwadwo Winslow MD Responsible Internal Medicine [phone]            See the Encounter Report to view Anticoagulation Flowsheet and Dosing Calendar (Go to Encounters tab in chart review, and find the Anticoagulation Therapy Visit)    Dosage adjustment made based on physician directed care plan.    Gail Valdovinos, RN

## 2017-10-16 NOTE — MR AVS SNAPSHOT
Tucker ZHAO Slater   10/16/2017 1:00 PM   Anticoagulation Therapy Visit    Description:  86 year old male   Provider:  [facility]   Department:  [facility]           INR as of 10/16/2017     Today's INR 2.1      Anticoagulation Summary as of 10/16/2017     INR goal 2.0-3.0   Today's INR 2.1   Full instructions 1.25 mg on Fri; 2.5 mg all other days   Next INR check 11/13/2017    Indications   Long-term (current) use of anticoagulants [Z79.01] [Z79.01]  Atrial fibrillation (H) [I48.91]         Your next Anticoagulation Clinic appointment(s)     Nov 13, 2017 11:15 AM CST   Anticoagulation Visit with [facility]   [facility] ([facility])    [address] [facility] [address]  [facility] [address] [phone]              Contact Numbers     [facility] Phone Numbers:  Anticoagulation Clinic Appointments : [phone]  Anticoagulation Nurse: [phone]         October 2017 Details    Sun Mon Tue Wed Thu Fri Sat     1               2               3               4               5               6               7                 8               9               10               11               12               13               14                 15               16      2.5 mg   See details      17      2.5 mg         18      2.5 mg         19      2.5 mg         20      1.25 mg         21      2.5 mg           22      2.5 mg         23      2.5 mg         24      2.5 mg         25      2.5 mg         26      2.5 mg         27      1.25 mg         28      2.5 mg           29      2.5 mg         30      2.5 mg         31      2.5 mg              Date Details   10/16 This INR check               How to take your warfarin dose     To take:  1.25 mg Take 0.5 of a 2.5 mg tablet.    To take:  2.5 mg Take 1 of the 2.5 mg tablets.           November 2017 Details    Sun Mon Tue Wed Thu Fri Sat        1      2.5 mg         2      2.5 mg         3       1.25 mg         4      2.5 mg           5      2.5 mg         6      2.5 mg         7      2.5 mg         8      2.5 mg         9      2.5 mg         10      1.25 mg         11      2.5 mg           12      2.5 mg         13            14               15               16               17               18                 19               20               21               22               23               24               25                 26               27               28               29               30                  Date Details   No additional details    Date of next INR:  11/13/2017         How to take your warfarin dose     To take:  1.25 mg Take 0.5 of a 2.5 mg tablet.    To take:  2.5 mg Take 1 of the 2.5 mg tablets.

## 2017-11-02 ENCOUNTER — TRANSFERRED RECORDS (OUTPATIENT)
Dept: HEALTH INFORMATION MANAGEMENT | Facility: CLINIC | Age: 82
End: 2017-11-02

## 2017-11-13 ENCOUNTER — ANTICOAGULATION THERAPY VISIT (OUTPATIENT)
Dept: ANTICOAGULATION | Facility: CLINIC | Age: 82
End: 2017-11-13
Payer: COMMERCIAL

## 2017-11-13 DIAGNOSIS — Z79.01 LONG-TERM (CURRENT) USE OF ANTICOAGULANTS: ICD-10-CM

## 2017-11-13 LAB — INR POINT OF CARE: 2.3 (ref 0.86–1.14)

## 2017-11-13 PROCEDURE — 85610 PROTHROMBIN TIME: CPT | Mod: QW

## 2017-11-13 PROCEDURE — 99207 ZZC NO CHARGE NURSE ONLY: CPT

## 2017-11-13 PROCEDURE — 36416 COLLJ CAPILLARY BLOOD SPEC: CPT

## 2017-11-13 NOTE — MR AVS SNAPSHOT
Tucker ZHAO Slater   11/13/2017 11:15 AM   Anticoagulation Therapy Visit    Description:  86 year old male   Provider:  [facility]   Department:  [facility]           INR as of 11/13/2017     Today's INR 2.3      Anticoagulation Summary as of 11/13/2017     INR goal 2.0-3.0   Today's INR 2.3   Full instructions 1.25 mg on Fri; 2.5 mg all other days   Next INR check 12/11/2017    Indications   Long-term (current) use of anticoagulants [Z79.01] [Z79.01]  Atrial fibrillation (H) [I48.91]         Your next Anticoagulation Clinic appointment(s)     Dec 11, 2017  1:15 PM CST   Anticoagulation Visit with [facility]   [facility]    [address] [facility] [address]  [facility] [address] [phone]              Contact Numbers     [facility] Phone Numbers:  Anticoagulation Clinic Appointments : [phone]  Anticoagulation Nurse: [phone]         November 2017 Details    Sun Mon Tue Wed Thu Fri Sat        1               2               3               4                 5               6               7               8               9               10               11                 12               13      2.5 mg   See details      14      2.5 mg         15      2.5 mg         16      2.5 mg         17      1.25 mg         18      2.5 mg           19      2.5 mg         20      2.5 mg         21      2.5 mg         22      2.5 mg         23      2.5 mg         24      1.25 mg         25      2.5 mg           26      2.5 mg         27      2.5 mg         28      2.5 mg         29      2.5 mg         30      2.5 mg            Date Details   11/13 This INR check               How to take your warfarin dose     To take:  1.25 mg Take 0.5 of a 2.5 mg tablet.    To take:  2.5 mg Take 1 of the 2.5 mg tablets.           December 2017 Details    Sun Mon Tue Wed Thu Fri Sat          1      1.25 mg         2      2.5 mg           3       2.5 mg         4      2.5 mg         5      2.5 mg         6      2.5 mg         7      2.5 mg         8      1.25 mg         9      2.5 mg           10      2.5 mg         11            12               13               14               15               16                 17               18               19               20               21               22               23                 24               25               26               27               28               29               30                 31                      Date Details   No additional details    Date of next INR:  12/11/2017         How to take your warfarin dose     To take:  1.25 mg Take 0.5 of a 2.5 mg tablet.    To take:  2.5 mg Take 1 of the 2.5 mg tablets.

## 2017-11-13 NOTE — PROGRESS NOTES
ANTICOAGULATION FOLLOW-UP CLINIC VISIT    Patient Name:  Tucker Slater  Date:  11/13/2017  Contact Type:  Face to Face    SUBJECTIVE:     Patient Findings     Positives No Problem Findings           OBJECTIVE    INR Protime   Date Value Ref Range Status   11/13/2017 2.3 (A) 0.86 - 1.14 Final       ASSESSMENT / PLAN  INR assessment THER    Recheck INR In: 4 WEEKS    INR Location Clinic      Anticoagulation Summary as of 11/13/2017     INR goal 2.0-3.0   Today's INR 2.3   Maintenance plan 1.25 mg (2.5 mg x 0.5) on Fri; 2.5 mg (2.5 mg x 1) all other days   Full instructions 1.25 mg on Fri; 2.5 mg all other days   Weekly total 16.25 mg   No change documented Gail Valdovinos RN   Plan last modified Ivory Machado RN (7/7/2017)   Next INR check 12/11/2017   Priority INR   Target end date     Indications   Long-term (current) use of anticoagulants [Z79.01] [Z79.01]  Atrial fibrillation (H) [I48.91]         Anticoagulation Episode Summary     INR check location     Preferred lab     Send INR reminders to [facility]    Comments       Anticoagulation Care Providers     Provider Role Specialty Phone number    Kwadwo Winslow MD Responsible Internal Medicine [phone]            See the Encounter Report to view Anticoagulation Flowsheet and Dosing Calendar (Go to Encounters tab in chart review, and find the Anticoagulation Therapy Visit)    Dosage adjustment made based on physician directed care plan.    Gail Valdovinos, RN

## 2017-11-20 ENCOUNTER — ALLIED HEALTH/NURSE VISIT (OUTPATIENT)
Dept: CARDIOLOGY | Facility: CLINIC | Age: 82
End: 2017-11-20
Payer: COMMERCIAL

## 2017-11-20 DIAGNOSIS — Z95.0 CARDIAC PACEMAKER IN SITU: Primary | ICD-10-CM

## 2017-11-20 PROCEDURE — 93296 REM INTERROG EVL PM/IDS: CPT | Performed by: INTERNAL MEDICINE

## 2017-11-20 PROCEDURE — 93294 REM INTERROG EVL PM/LDLS PM: CPT | Performed by: INTERNAL MEDICINE

## 2017-11-20 NOTE — MR AVS SNAPSHOT
After Visit Summary   11/20/2017    Tucker Slater    MRN: [id number]           Patient Information     Date Of Birth          3/13/1931        Visit Information        Provider Department      11/20/2017 2:30 PM BERNABE GIL [facility]        Today's Diagnoses     Cardiac pacemaker in situ    -  1       Follow-ups after your visit        Additional Services     Follow-Up with Device Clinic                 Your next 10 appointments already scheduled     Nov 20, 2017  2:30 PM CST   Remote PPM Check with KIDD TECH1   [facility]   [facility] ([facility])    [address]  [facility] [id number] [phone]           This appointment is for a remote check of your pacemaker.  This is not an appointment at the office.            Dec 11, 2017  1:15 PM CST   Anticoagulation Visit with [facility]   [facility]    [address]  [facility] [address] [phone]              Future tests that were ordered for you today     Open Future Orders        Priority Expected Expires Ordered    Follow-Up with Device Clinic Routine 2/20/2018 12/25/2018 11/20/2017            Who to contact     If you have questions or need follow up information about today's clinic visit or your schedule please contact [facility] directly at [phone].  Normal or non-critical lab and imaging results will be communicated to you by MyChart, letter or phone within 4 business days after the clinic has received the results. If you do not hear from us within 7 days, please contact the clinic through MyChart or phone. If you have a critical or abnormal lab result, we will notify you by phone as soon as possible.  Submit refill requests through [employer] or call your pharmacy and they will forward the refill request to us. Please allow 3  "business days for your refill to be completed.          Additional Information About Your Visit        MyChart Information     [employer] lets you send messages to your doctor, view your test results, renew your prescriptions, schedule appointments and more. To sign up, go to [email or web address] . Click on \"Log in\" on the left side of the screen, which will take you to the Welcome page. Then click on \"Sign up Now\" on the right side of the page.     You will be asked to enter the access code listed below, as well as some personal information. Please follow the directions to create your username and password.     Your access code is: [email or web address]  Expires: 2018 10:03 AM     Your access code will  in 90 days. If you need help or a new code, please call your [place] clinic or [phone].        Care EveryWhere ID     This is your Care EveryWhere ID. This could be used by other organizations to access your [place] medical records  [id number]         Blood Pressure from Last 3 Encounters:   17 124/72   17 139/68   17 152/79    Weight from Last 3 Encounters:   17 83 kg (183 lb)   17 80.7 kg (178 lb)   17 81.2 kg (179 lb)              We Performed the Following     INTERROGATION DEVICE EVAL REMOTE, PACER/ICD (23937)     PM DEVICE INTERROGATE REMOTE (83951)        Primary Care Provider Office Phone # Fax #    Kwadwo Winslow [phone]       [address] [facility] [address]        Equal Access to Services     [facility] HospitalMA[facility]ANK : Hadii mat hickey hadashbryan Soriddhi, waaxda luqadaha, qaybta kaalmagisela angel. So [facility] [phone].    ATENCIÓN: Si habla español, tiene a kraft disposición servicios gratuitos de asistencia lingüística. Llame al [phone].    We comply with applicable federal civil rights laws and [place] laws. We do not discriminate on the basis of race, color, national origin, age, disability, " sex, sexual orientation, or gender identity.            Thank you!     Thank you for choosing [facility]  for your care. Our goal is always to provide you with excellent care. Hearing back from our patients is one way we can continue to improve our services. Please take a few minutes to complete the written survey that you may receive in the mail after your visit with us. Thank you!             Your Updated Medication List - Protect others around you: Learn how to safely use, store and throw away your medicines at www.disposemymeds.org.          This list is accurate as of: 11/20/17 10:03 AM.  Always use your most recent med list.                   Brand Name Dispense Instructions for use Diagnosis    ACETAMINOPHEN PO      Take 650 mg by mouth every 4 hours as needed for pain        calcium carbonate 1250 MG tablet    OS-KARLA 500 mg [place]. Ca     Take 500 mg by mouth daily        carvedilol 25 MG tablet    COREG    180 tablet    TAKE ONE TABLET BY MOUTH TWICE A DAY WITH MEALS    Essential hypertension with goal blood pressure less than 140/90       ferrous sulfate 142 (45 FE) MG Tbcr    SLO-FE    30 tablet    Take 1 tablet (142 mg) by mouth daily    CKD (chronic kidney disease) stage 3, GFR 30-59 ml/min, Anemia, unspecified type       losartan 100 MG tablet    COZAAR    90 tablet    Take 0.5 tablets (50 mg) by mouth daily    Essential hypertension, benign       OCUVITE PO      Take 1 tablet by mouth daily        triamterene-hydrochlorothiazide 75-50 MG per tablet    MAXZIDE    45 tablet    Take 0.5 tablets by mouth daily Hold for SBP <100.    Essential hypertension, benign       vitamin B complex with vitamin C Tabs tablet      Take 1 tablet by mouth daily        VITAMIN C PO      Take 500 mg by mouth daily        VITAMIN D (CHOLECALCIFEROL) PO      Take 2,000 Units by mouth daily.        warfarin 2.5 MG tablet    JANTOVEN    90 tablet    TAKE ONE TABLET (2.5 mg) BY MOUTH EVERY  DAY, Except Take 1/2 Tablet (1.25 mg) on Fridays, OR AS INSTRUCTED BASED ON INR RESULTS    Chronic atrial fibrillation (H)

## 2017-11-20 NOTE — PROGRESS NOTES
[address] (D) Remote PPM Device Check  AP: <1% : 92%  Mode: DDDR        Presenting Rhythm: Aflutter with   Heart Rate: adequate heart rates per histogram  Sensing: RA: not performed RV: stable    Pacing Threshold: RA: not performed RV: stable    Impedance: stable  Battery Status: 9.2 - 10.6 years remaining  Atrial Arrhythmia: pt remains in mode switch 100% of the time. CHB. Taking Warfarin.    Ventricular Arrhythmia: none     Care Plan: Order placed for annual threshold to be scheduled in February. No answer, left message with results. Luz Maria OROZCO

## 2017-12-11 ENCOUNTER — ANTICOAGULATION THERAPY VISIT (OUTPATIENT)
Dept: ANTICOAGULATION | Facility: CLINIC | Age: 82
End: 2017-12-11
Payer: COMMERCIAL

## 2017-12-11 DIAGNOSIS — I48.91 ATRIAL FIBRILLATION (H): ICD-10-CM

## 2017-12-11 DIAGNOSIS — Z79.01 LONG-TERM (CURRENT) USE OF ANTICOAGULANTS: ICD-10-CM

## 2017-12-11 LAB — INR POINT OF CARE: 2.6 (ref 0.86–1.14)

## 2017-12-11 PROCEDURE — 99207 ZZC NO CHARGE NURSE ONLY: CPT

## 2017-12-11 PROCEDURE — 36416 COLLJ CAPILLARY BLOOD SPEC: CPT

## 2017-12-11 PROCEDURE — 85610 PROTHROMBIN TIME: CPT | Mod: QW

## 2017-12-11 NOTE — MR AVS SNAPSHOT
Tucker ZHAO Slater   12/11/2017 1:15 PM   Anticoagulation Therapy Visit    Description:  86 year old male   Provider:  [facility]   Department:  [facility] Co[facility]           INR as of 12/11/2017     Today's INR 2.6      Anticoagulation Summary as of 12/11/2017     INR goal 2.0-3.0   Today's INR 2.6   Full instructions 1.25 mg on Fri; 2.5 mg all other days   Next INR check 1/8/2018    Indications   Long-term (current) use of anticoagulants [Z79.01] [Z79.01]  Atrial fibrillation (H) [I48.91]         Your next Anticoagulation Clinic appointment(s)     Jan 08, 2018  1:30 PM CST   Anticoagulation Visit with [facility]    [address] [facility] [address]  [facility] [address] [phone]              Contact Numbers     [facility] Phone Numbers:  Anticoagulation Clinic Appointments : [phone]  Anticoagulation Nurse: [phone]         December 2017 Details    Sun Mon Tue Wed Thu Fri Sat          1               2                 3               4               5               6               7               8               9                 10               11      2.5 mg   See details      12      2.5 mg         13      2.5 mg         14      2.5 mg         15      1.25 mg         16      2.5 mg           17      2.5 mg         18      2.5 mg         19      2.5 mg         20      2.5 mg         21      2.5 mg         22      1.25 mg         23      2.5 mg           24      2.5 mg         25      2.5 mg         26      2.5 mg         27      2.5 mg         28      2.5 mg         29      1.25 mg         30      2.5 mg           31      2.5 mg                Date Details   12/11 This INR check               How to take your warfarin dose     To take:  1.25 mg Take 0.5 of a 2.5 mg tablet.    To take:  2.5 mg Take 1 of the 2.5 mg tablets.           January 2018 Details    Sun Mon Tue Wed Thu Fri Sat      1      2.5 mg          2      2.5 mg         3      2.5 mg         4      2.5 mg         5      1.25 mg         6      2.5 mg           7      2.5 mg         8            9               10               11               12               13                 14               15               16               17               18               19               20                 21               22               23               24               25               26               27                 28               29               30               31                   Date Details   No additional details    Date of next INR:  1/8/2018         How to take your warfarin dose     To take:  1.25 mg Take 0.5 of a 2.5 mg tablet.    To take:  2.5 mg Take 1 of the 2.5 mg tablets.

## 2017-12-11 NOTE — PROGRESS NOTES
ANTICOAGULATION FOLLOW-UP CLINIC VISIT    Patient Name:  Tucker Slater  Date:  12/11/2017  Contact Type:  Face to Face    SUBJECTIVE:     Patient Findings     Positives No Problem Findings           OBJECTIVE    INR Protime   Date Value Ref Range Status   12/11/2017 2.6 (A) 0.86 - 1.14 Final       ASSESSMENT / PLAN  INR assessment THER    Recheck INR In: 4 WEEKS    INR Location Clinic      Anticoagulation Summary as of 12/11/2017     INR goal 2.0-3.0   Today's INR 2.6   Maintenance plan 1.25 mg (2.5 mg x 0.5) on Fri; 2.5 mg (2.5 mg x 1) all other days   Full instructions 1.25 mg on Fri; 2.5 mg all other days   Weekly total 16.25 mg   No change documented Ivory Machado RN   Plan last modified Ivory Machado RN (7/7/2017)   Next INR check 1/8/2018   Priority INR   Target end date     Indications   Long-term (current) use of anticoagulants [Z79.01] [Z79.01]  Atrial fibrillation (H) [I48.91]         Anticoagulation Episode Summary     INR check location     Preferred lab     Send INR reminders to [facility]    Comments       Anticoagulation Care Providers     Provider Role Specialty Phone number    Kwadwo Winslow MD Responsible Internal Medicine [phone]            See the Encounter Report to view Anticoagulation Flowsheet and Dosing Calendar (Go to Encounters tab in chart review, and find the Anticoagulation Therapy Visit)    Dosage adjustment made based on physician directed care plan.    Ivory Machado RN

## 2017-12-29 ENCOUNTER — RADIANT APPOINTMENT (OUTPATIENT)
Dept: GENERAL RADIOLOGY | Facility: CLINIC | Age: 82
End: 2017-12-29
Attending: INTERNAL MEDICINE
Payer: COMMERCIAL

## 2017-12-29 ENCOUNTER — OFFICE VISIT (OUTPATIENT)
Dept: INTERNAL MEDICINE | Facility: CLINIC | Age: 82
End: 2017-12-29
Payer: COMMERCIAL

## 2017-12-29 VITALS
OXYGEN SATURATION: 96 % | HEART RATE: 77 BPM | HEIGHT: 70 IN | DIASTOLIC BLOOD PRESSURE: 64 MMHG | TEMPERATURE: 97.5 F | BODY MASS INDEX: 27.06 KG/M2 | WEIGHT: 189 LBS | SYSTOLIC BLOOD PRESSURE: 124 MMHG

## 2017-12-29 DIAGNOSIS — R05.9 COUGH: ICD-10-CM

## 2017-12-29 DIAGNOSIS — I10 ESSENTIAL HYPERTENSION, BENIGN: ICD-10-CM

## 2017-12-29 DIAGNOSIS — N18.30 CKD (CHRONIC KIDNEY DISEASE) STAGE 3, GFR 30-59 ML/MIN (H): ICD-10-CM

## 2017-12-29 DIAGNOSIS — Z71.89 ADVANCED DIRECTIVES, COUNSELING/DISCUSSION: ICD-10-CM

## 2017-12-29 DIAGNOSIS — I48.20 CHRONIC ATRIAL FIBRILLATION (H): ICD-10-CM

## 2017-12-29 DIAGNOSIS — Z00.00 ROUTINE GENERAL MEDICAL EXAMINATION AT A HEALTH CARE FACILITY: Primary | ICD-10-CM

## 2017-12-29 LAB
ERYTHROCYTE [DISTWIDTH] IN BLOOD BY AUTOMATED COUNT: 14.5 % (ref 10–15)
HCT VFR BLD AUTO: 44.5 % (ref 40–53)
HGB BLD-MCNC: 14.5 G/DL (ref 13.3–17.7)
MCH RBC QN AUTO: 31.5 PG (ref 26.5–33)
MCHC RBC AUTO-ENTMCNC: 32.6 G/DL (ref 31.5–36.5)
MCV RBC AUTO: 97 FL (ref 78–100)
PLATELET # BLD AUTO: 173 10E9/L (ref 150–450)
RBC # BLD AUTO: 4.61 10E12/L (ref 4.4–5.9)
WBC # BLD AUTO: 6.2 10E9/L (ref 4–11)

## 2017-12-29 PROCEDURE — 85027 COMPLETE CBC AUTOMATED: CPT | Performed by: INTERNAL MEDICINE

## 2017-12-29 PROCEDURE — 80053 COMPREHEN METABOLIC PANEL: CPT | Performed by: INTERNAL MEDICINE

## 2017-12-29 PROCEDURE — 80061 LIPID PANEL: CPT | Performed by: INTERNAL MEDICINE

## 2017-12-29 PROCEDURE — 71020 XR CHEST 2 VW: CPT

## 2017-12-29 PROCEDURE — 84443 ASSAY THYROID STIM HORMONE: CPT | Performed by: INTERNAL MEDICINE

## 2017-12-29 PROCEDURE — 99213 OFFICE O/P EST LOW 20 MIN: CPT | Mod: 25 | Performed by: INTERNAL MEDICINE

## 2017-12-29 PROCEDURE — 36415 COLL VENOUS BLD VENIPUNCTURE: CPT | Performed by: INTERNAL MEDICINE

## 2017-12-29 PROCEDURE — 99397 PER PM REEVAL EST PAT 65+ YR: CPT | Performed by: INTERNAL MEDICINE

## 2017-12-29 ASSESSMENT — ACTIVITIES OF DAILY LIVING (ADL)
I_NEED_ASSISTANCE_FOR_THE_FOLLOWING_DAILY_ACTIVITIES:: NO ASSISTANCE IS NEEDED
CURRENT_FUNCTION: NO ASSISTANCE NEEDED

## 2017-12-29 NOTE — MR AVS SNAPSHOT
After Visit Summary   12/29/2017    Tucker Slater    MRN: [id number]           Patient Information     Date Of Birth          3/13/1931        Visit Information        Provider Department      12/29/2017 1:40 PM Kwadwo Winslow MD [facility]        Today's Diagnoses     Routine general medical examination at a health care facility    -  1    Advanced directives, counseling/discussion        Cough        Essential hypertension, benign        CKD (chronic kidney disease) stage 3, GFR 30-59 ml/min        Chronic atrial fibrillation (H)           Follow-ups after your visit        Your next 10 appointments already scheduled     Jan 08, 2018  1:30 PM CST   Anticoagulation Visit with [facility]   [facility] ([facility]    [address]  [facility] [address] [phone]            Feb 07, 2018  2:20 PM CST   Pacemaker Check with SUMANTH BAKER   [facility] ([facility])    [address]  [facility] [address] [phone]              Who to contact     If you have questions or need follow up information about today's clinic visit or your schedule please contact [facility] directly at [phone].  Normal or non-critical lab and imaging results will be communicated to you by MyChart, letter or phone within 4 business days after the clinic has received the results. If you do not hear from us within 7 days, please contact the clinic through MyChart or phone. If you have a critical or abnormal lab result, we will notify you by phone as soon as possible.  Submit refill requests through [employer] or call your pharmacy and they will forward the refill request to us. Please allow 3 business days for your refill to be completed.          Additional Information About Your Visit        RealtyAPXhart Information     [employer] lets you send messages to  "your doctor, view your test results, renew your prescriptions, schedule appointments and more. To sign up, go to [email or web address] . Click on \"Log in\" on the left side of the screen, which will take you to the Welcome page. Then click on \"Sign up Now\" on the right side of the page.     You will be asked to enter the access code listed below, as well as some personal information. Please follow the directions to create your username and password.     Your access code is: [email or web address]  Expires: 2018 10:03 AM     Your access code will  in 90 days. If you need help or a new code, please call your [place] clinic or [phone].        Care EveryWhere ID     This is your Care EveryWhere ID. This could be used by other organizations to access your [place] medical records  [id number]        Your Vitals Were     Pulse Temperature Height Pulse Oximetry BMI (Body Mass Index)       77 97.5  F (36.4  C) (Oral) 5' 10\" (1.778 m) 96% 27.12 kg/m2        Blood Pressure from Last 3 Encounters:   17 124/64   17 124/72   17 139/68    Weight from Last 3 Encounters:   17 189 lb (85.7 kg)   17 183 lb (83 kg)   17 178 lb (80.7 kg)              We Performed the Following     CBC with platelets     Comprehensive metabolic panel     Lipid panel reflex to direct LDL Non-fasting     OFFICE/OUTPT VISIT,EST,LEVL III     TSH with free T4 reflex        Primary Care Provider Office Phone # Fax #    Kwadwo Winslow [phone]       [address] [facility] [address]        Equal Access to Services     DOMINGUEZ JIM : Enrique Whitaker, sandra gold, sydnie pruitt, gisela eddy. [facility] [phone].    ATENCIÓN: Si habla español, tiene a kraft disposición servicios gratuitos de asistencia lingüística. Llame al [phone].    We comply with applicable federal civil rights laws and [place] laws. We do not " discriminate on the basis of race, color, national origin, age, disability, sex, sexual orientation, or gender identity.            Thank you!     Thank you for choosing [facility]  for your care. Our goal is always to provide you with excellent care. Hearing back from our patients is one way we can continue to improve our services. Please take a few minutes to complete the written survey that you may receive in the mail after your visit with us. Thank you!             Your Updated Medication List - Protect others around you: Learn how to safely use, store and throw away your medicines at www.disposemymeds.org.          This list is accurate as of: 12/29/17  4:49 PM.  Always use your most recent med list.                   Brand Name Dispense Instructions for use Diagnosis    ACETAMINOPHEN PO      Take 650 mg by mouth every 4 hours as needed for pain        calcium carbonate 1250 MG tablet    OS-KARLA 500 mg [place]. Ca     Take 500 mg by mouth daily        carvedilol 25 MG tablet    COREG    180 tablet    TAKE ONE TABLET BY MOUTH TWICE A DAY WITH MEALS    Essential hypertension with goal blood pressure less than 140/90       ferrous sulfate 142 (45 FE) MG Tbcr    SLO-FE    30 tablet    Take 1 tablet (142 mg) by mouth daily    CKD (chronic kidney disease) stage 3, GFR 30-59 ml/min, Anemia, unspecified type       losartan 100 MG tablet    COZAAR    90 tablet    Take 0.5 tablets (50 mg) by mouth daily    Essential hypertension, benign       OCUVITE PO      Take 1 tablet by mouth daily        triamterene-hydrochlorothiazide 75-50 MG per tablet    MAXZIDE    45 tablet    Take 0.5 tablets by mouth daily Hold for SBP <100.    Essential hypertension, benign       vitamin B complex with vitamin C Tabs tablet      Take 1 tablet by mouth daily        VITAMIN C PO      Take 500 mg by mouth daily        VITAMIN D (CHOLECALCIFEROL) PO      Take 2,000 Units by mouth daily.        warfarin 2.5 MG tablet    JANTOVEN     90 tablet    TAKE ONE TABLET (2.5 mg) BY MOUTH EVERY DAY, Except Take 1/2 Tablet (1.25 mg) on Fridays, OR AS INSTRUCTED BASED ON INR RESULTS    Chronic atrial fibrillation (H)

## 2017-12-29 NOTE — PROGRESS NOTES
SUBJECTIVE:   Tucker Slater is a 86 year old male who presents for Preventive Visit.      Are you in the first 12 months of your Medicare coverage?  No    Physical   Annual:     Getting at least 3 servings of Calcium per day::  Yes    Bi-annual eye exam::  Yes    Dental care twice a year::  NO    Sleep apnea or symptoms of sleep apnea::  None    Taking medications regularly::  Yes    Medication side effects::  None    Additional concerns today::  YES    Ability to successfully perform activities of daily living: no assistance needed  Home Safety:  Throw rugs in the hallway and lack of grab bars in the bathroom  Hearing Impairment: need to ask people to speak up or repeat themselves      Ability to successfully perform activities of daily living: Yes, no assistance needed  Home safety:  none identified   Hearing impairment: Yes, Difficulty following a conversation in a noisy restaurant or crowded room.      Fall risk:   none     COGNITIVE SCREEN  1) Repeat 3 items (Banana, Sunrise, Chair)    2) Clock draw: NORMAL  3) 3 item recall: Recalls 3 objects  Results: 3 items recalled: COGNITIVE IMPAIRMENT LESS LIKELY    Mini-CogTM Copyright FATOU Johnson. Licensed by the author for use in [facility]; reprinted with permission (eduardo@[facility]). All rights reserved.          Reviewed and updated as needed this visit by clinical staffTobacco         Reviewed and updated as needed this visit by Provider        Social History   Substance Use Topics     Smoking status: Former Smoker     Quit date: 9/12/1977     Smokeless tobacco: Never Used     Alcohol use No       Alcohol Use 12/29/2017   If you drink alcohol, do you typically have greater than 3 drinks per day OR greater than 7 drinks per week?   No           PROBLEMS TO ADD ON...  Has history of atrial fibrillation. On anticoagulation with Coumadin and rate control medications. Asymptonatic - no chest pains , palpitations,  no side effects from medications.  Has h/o  HTN. on medical treatment. BP has been controlled. No side effects from medications. No CP, HA, dizziness. good compliance with medications and low salt diet.  Has h/o CRF. Symptoms include none. Monitoring BP, BG, medications, avoiding OTC NSAIDs. Needs periodic recheck of kidney function.    Concern for cough for 5 days, funny nose, feeling tired. No fever, no sputum production. No n/v, fever. Improved past one day.       Today's PHQ-2 Score: 0  PHQ-2 ( 1999 [employer]) 12/29/2017   Q1: Little interest or pleasure in doing things 0   Q2: Feeling down, depressed or hopeless 0   PHQ-2 Score 0   Q1: Little interest or pleasure in doing things Not at all   Q2: Feeling down, depressed or hopeless Not at all   PHQ-2 Score 0       Do you feel safe in your environment - Yes    Do you have a Health Care Directive?: No: Advance care planning reviewed with patient; information given to patient to review.      Current providers sharing in care for this patient include: Patient Care Team:  Kwadwo Winslwo MD as PCP - General (Internal Medicine)    The following health maintenance items are reviewed in Epic and correct as of today:  Health Maintenance   Topic Date Due     ADVANCE DIRECTIVE PLANNING Q5 YRS  01/03/2017     FALL RISK ASSESSMENT  03/20/2018     OP ANNUAL INR REFERRAL  04/28/2018     TETANUS IMMUNIZATION (SYSTEM ASSIGNED)  04/24/2023     INFLUENZA VACCINE (SYSTEM ASSIGNED)  Completed     PNEUMOCOCCAL  Completed     Labs reviewed in EPIC  BP Readings from Last 3 Encounters:   12/29/17 124/64   05/01/17 124/72   04/19/17 139/68    Wt Readings from Last 3 Encounters:   12/29/17 189 lb (85.7 kg)   05/01/17 183 lb (83 kg)   04/19/17 178 lb (80.7 kg)                  Patient Active Problem List   Diagnosis     Essential hypertension, benign     Carcinoma in situ of bladder     Hypertrophy of prostate with urinary obstruction     Generalized osteoarthrosis, unspecified site     Hereditary and idiopathic peripheral  neuropathy     Pain in joint, shoulder region     CARDIOVASCULAR SCREENING; LDL GOAL LESS THAN 130     Advanced directives, counseling/discussion     Peripheral neuropathy     GI bleed     Anemia     Near syncope     Anemia due to blood loss, acute     Atrial fibrillation (H)     Bradycardia     CKD (chronic kidney disease) stage 3, GFR 30-59 ml/min     Cardiac pacemaker in situ     Long-term (current) use of anticoagulants [Z79.01]     Degenerative arthritis of hip     Past Surgical History:   Procedure Laterality Date     ARTHROPLASTY HIP Right 3/27/2017    Procedure: ARTHROPLASTY HIP;  Surgeon: Jigar Wynn MD;  Location: [facility] OR     C NONSPECIFIC PROCEDURE      bilat inguinal hernia repairs ( 3total procedures)      C NONSPECIFIC PROCEDURE      pilonidal cyst     C NONSPECIFIC PROCEDURE      T + A     C NONSPECIFIC PROCEDURE       R+L total knee     CARDIOVERSION  3/26/15     COLONOSCOPY       IMPLANT PACEMAKER  3/26/15     RELEASE CARPAL TUNNEL Right 2017    Procedure: RELEASE CARPAL TUNNEL;  Right carpal tunnel release;  Surgeon: Alonso Barboza MD;  Location: [facility] OR       Social History   Substance Use Topics     Smoking status: Former Smoker     Quit date: 1977     Smokeless tobacco: Never Used     Alcohol use No     Family History   Problem Relation Age of Onset     HEART DISEASE Father       87yo     Coronary Artery Disease Father      HEART DISEASE Mother       74yo     Coronary Artery Disease Mother      Family History Negative Brother          Current Outpatient Prescriptions   Medication Sig Dispense Refill     carvedilol (COREG) 25 MG tablet TAKE ONE TABLET BY MOUTH TWICE A DAY WITH MEALS 180 tablet 0     losartan (COZAAR) 100 MG tablet Take 0.5 tablets (50 mg) by mouth daily 90 tablet 0     warfarin (JANTOVEN) 2.5 MG tablet TAKE ONE TABLET (2.5 mg) BY MOUTH EVERY DAY, Except Take 1/2 Tablet (1.25 mg) on , OR AS INSTRUCTED BASED ON INR RESULTS 90 tablet 1  "    ferrous sulfate (SLO-FE) 142 (45 FE) MG TBCR Take 1 tablet (142 mg) by mouth daily 30 tablet 3     vitamin B complex with vitamin C (VITAMIN  B COMPLEX) TABS tablet Take 1 tablet by mouth daily       ACETAMINOPHEN PO Take 650 mg by mouth every 4 hours as needed for pain       triamterene-hydrochlorothiazide (MAXZIDE) 75-50 MG per tablet Take 0.5 tablets by mouth daily Hold for SBP <100. 45 tablet 3     Multiple Vitamins-Minerals (OCUVITE PO) Take 1 tablet by mouth daily        Ascorbic Acid (VITAMIN C PO) Take 500 mg by mouth daily        calcium carbonate (OS-KARLA 500 MG [place]. CA) 500 MG tablet Take 500 mg by mouth daily        VITAMIN D, CHOLECALCIFEROL, PO Take 2,000 Units by mouth daily.             Review of Systems  C: NEGATIVE for fever, chills, change in weight  I: NEGATIVE for worrisome rashes, moles or lesions  E: NEGATIVE for vision changes or irritation  E/M: NEGATIVE for ear, mouth and throat problems  R: NEGATIVE for significant  SOB  B: NEGATIVE for masses, tenderness or discharge  CV: NEGATIVE for chest pain, palpitations or peripheral edema  GI: NEGATIVE for nausea, abdominal pain, heartburn, or change in bowel habits  : NEGATIVE for frequency, dysuria, or hematuria  M: NEGATIVE for significant arthralgias or myalgia  N: NEGATIVE for weakness, dizziness or paresthesias  E: NEGATIVE for temperature intolerance, skin/hair changes  H: NEGATIVE for bleeding problems  P: NEGATIVE for changes in mood or affect    OBJECTIVE:   Ht 5' 10\" (1.778 m) Estimated body mass index is 26.26 kg/(m^2) as calculated from the following:    Height as of 5/1/17: 5' 10\" (1.778 m).    Weight as of 5/1/17: 183 lb (83 kg).  Physical Exam  GENERAL: healthy, alert and no distress  EYES: Eyes grossly normal to inspection, PERRL and conjunctivae and sclerae normal  HENT: ear canals and TM's normal, nose and mouth without ulcers or lesions  NECK: no adenopathy, no asymmetry, masses, or scars and thyroid normal to " palpation  RESP: lungs clear to auscultation - no rales, rhonchi or wheezes  CV: regular rate and rhythm, normal S1 S2, no S3 or S4, no murmur, click or rub, no peripheral edema and peripheral pulses strong  ABDOMEN: soft, nontender, no hepatosplenomegaly, no masses and bowel sounds normal  MS: no gross musculoskeletal defects noted, no edema  SKIN: no suspicious lesions or rashes  NEURO: Normal strength and tone, mentation intact and speech normal  PSYCH: mentation appears normal, affect normal/bright    ASSESSMENT / PLAN:       ICD-10-CM    1. Routine general medical examination at a health care facility Z00.00 CBC with platelets     Comprehensive metabolic panel     TSH with free T4 reflex     Lipid panel reflex to direct LDL Non-fasting     CANCELED: Lipid panel reflex to direct LDL Fasting   2. Advanced directives, counseling/discussion Z71.89    3. Cough R05 XR Chest 2 Views     OFFICE/OUTPT VISIT,EST,LEVL III   4. Essential hypertension, benign I10 CBC with platelets     Comprehensive metabolic panel     TSH with free T4 reflex     Lipid panel reflex to direct LDL Non-fasting     OFFICE/OUTPT VISIT,EST,LEVL III     CANCELED: Lipid panel reflex to direct LDL Fasting   5. CKD (chronic kidney disease) stage 3, GFR 30-59 ml/min N18.3 OFFICE/OUTPT VISIT,EST,LEVL III   6. Chronic atrial fibrillation (H) I48.2 OFFICE/OUTPT VISIT,EST,LEVL III     Assess CXR- no pneumonia, improving URI, symptomatic treatment, Tylenol, Robitussin, throat lozenges   Controlled HTN   Stable CKD    End of Life Planning:  Patient currently has an advanced directive: Yes.  Practitioner is supportive of decision.    COUNSELING:  Reviewed preventive health counseling, as reflected in patient instructions       Regular exercise       Healthy diet/nutrition       Vision screening       Hearing screening       Dental care       Colon cancer screening       Prostate cancer screening        Estimated body mass index is 26.26 kg/(m^2) as  "calculated from the following:    Height as of 5/1/17: 5' 10\" (1.778 m).    Weight as of 5/1/17: 183 lb (83 kg).     reports that he quit smoking about 40 years ago. He has never used smokeless tobacco.        Appropriate preventive services were discussed with this patient, including applicable screening as appropriate for cardiovascular disease, diabetes, osteopenia/osteoporosis, and glaucoma.  As appropriate for age/gender, discussed screening for colorectal cancer, prostate cancer, breast cancer, and cervical cancer. Checklist reviewing preventive services available has been given to the patient.    Reviewed patients plan of care and provided an AVS. The Intermediate Care Plan ( asthma action plan, low back pain action plan, and migraine action plan) for Tucker meets the Care Plan requirement. This Care Plan has been established and reviewed with the Patient.    Counseling Resources:  ATP IV Guidelines  Pooled Cohorts Equation Calculator  Breast Cancer Risk Calculator  FRAX Risk Assessment  ICSI Preventive Guidelines  Dietary Guidelines for Americans, 2010  USDA's MyPlate  ASA Prophylaxis  Lung CA Screening    Kwadwo Winslow MD  [facility] for HPI/ROS submitted by the patient on 12/29/2017   PHQ-2 Score: 0    "

## 2017-12-29 NOTE — NURSING NOTE
"Chief Complaint   Patient presents with     Wellness Visit     Px     Cough     Cough and running nose       Initial /64  Pulse 77  Temp 97.5  F (36.4  C) (Oral)  Ht 5' 10\" (1.778 m)  Wt 189 lb (85.7 kg)  SpO2 96%  BMI 27.12 kg/m2 Estimated body mass index is 27.12 kg/(m^2) as calculated from the following:    Height as of this encounter: 5' 10\" (1.778 m).    Weight as of this encounter: 189 lb (85.7 kg).  Medication Reconciliation: complete   Sabine Barrientos MA      "

## 2017-12-30 LAB
ALBUMIN SERPL-MCNC: 3.3 G/DL (ref 3.4–5)
ALP SERPL-CCNC: 73 U/L (ref 40–150)
ALT SERPL W P-5'-P-CCNC: 21 U/L (ref 0–70)
ANION GAP SERPL CALCULATED.3IONS-SCNC: 6 MMOL/L (ref 3–14)
AST SERPL W P-5'-P-CCNC: 18 U/L (ref 0–45)
BILIRUB SERPL-MCNC: 1 MG/DL (ref 0.2–1.3)
BUN SERPL-MCNC: 32 MG/DL (ref 7–30)
CALCIUM SERPL-MCNC: 8.6 MG/DL (ref 8.5–10.1)
CHLORIDE SERPL-SCNC: 105 MMOL/L (ref 94–109)
CHOLEST SERPL-MCNC: 158 MG/DL
CO2 SERPL-SCNC: 26 MMOL/L (ref 20–32)
CREAT SERPL-MCNC: 1.51 MG/DL (ref 0.66–1.25)
GFR SERPL CREATININE-BSD FRML MDRD: 44 ML/MIN/1.7M2
GLUCOSE SERPL-MCNC: 79 MG/DL (ref 70–99)
HDLC SERPL-MCNC: 46 MG/DL
LDLC SERPL CALC-MCNC: 92 MG/DL
NONHDLC SERPL-MCNC: 112 MG/DL
POTASSIUM SERPL-SCNC: 4.2 MMOL/L (ref 3.4–5.3)
PROT SERPL-MCNC: 6.9 G/DL (ref 6.8–8.8)
SODIUM SERPL-SCNC: 137 MMOL/L (ref 133–144)
TRIGL SERPL-MCNC: 100 MG/DL
TSH SERPL DL<=0.005 MIU/L-ACNC: 1.25 MU/L (ref 0.4–4)

## 2018-01-08 ENCOUNTER — ANTICOAGULATION THERAPY VISIT (OUTPATIENT)
Dept: ANTICOAGULATION | Facility: CLINIC | Age: 83
End: 2018-01-08
Payer: COMMERCIAL

## 2018-01-08 DIAGNOSIS — I48.20 CHRONIC ATRIAL FIBRILLATION (H): ICD-10-CM

## 2018-01-08 DIAGNOSIS — Z79.01 LONG-TERM (CURRENT) USE OF ANTICOAGULANTS: ICD-10-CM

## 2018-01-08 LAB — INR POINT OF CARE: 2.8 (ref 0.86–1.14)

## 2018-01-08 PROCEDURE — 85610 PROTHROMBIN TIME: CPT | Mod: QW

## 2018-01-08 PROCEDURE — 99207 ZZC NO CHARGE NURSE ONLY: CPT

## 2018-01-08 PROCEDURE — 36416 COLLJ CAPILLARY BLOOD SPEC: CPT

## 2018-01-08 NOTE — MR AVS SNAPSHOT
Tucker Slater   1/8/2018 1:30 PM   Anticoagulation Therapy Visit    Description:  86 year old male   Provider:  [facility]   Department:  [facility]           INR as of 1/8/2018     Today's INR 2.8      Anticoagulation Summary as of 1/8/2018     INR goal 2.0-3.0   Today's INR 2.8   Full instructions 1.25 mg on Fri; 2.5 mg all other days   Next INR check 2/5/2018    Indications   Long-term (current) use of anticoagulants [Z79.01] [Z79.01]  Atrial fibrillation (H) [I48.91]         Your next Anticoagulation Clinic appointment(s)     Jan 08, 2018  1:30 PM CST   [facility] with [facility]   [facility]    [address] [facility] [address]  [facility] [address] [phone]            Feb 05, 2018  1:30 PM CST   [facility]ticoagulation Visit with [facility]   [facility]    [address] [facility] [address]  [facility] [address] [phone]              Contact Numbers     [facility] Phone Numbers:  Anticoagulation Clinic Appointments : [phone]  Anticoagulation Nurse: [phone]         January 2018 Details    Sun Mon Tue Wed Thu Fri Sat      1               2               3               4               5               6                 7               8      2.5 mg   See details      9      2.5 mg         10      2.5 mg         11      2.5 mg         12      1.25 mg         13      2.5 mg           14      2.5 mg         15      2.5 mg         16      2.5 mg         17      2.5 mg         18      2.5 mg         19      1.25 mg         20      2.5 mg           21      2.5 mg         22      2.5 mg         23      2.5 mg         24      2.5 mg         25      2.5 mg         26      1.25 mg         27      2.5 mg           28      2.5 mg         29      2.5 mg         30      2.5 mg         31      2.5 mg             Date Details   01/08 This INR check                How to take your warfarin dose     To take:  1.25 mg Take 0.5 of a 2.5 mg tablet.    To take:  2.5 mg Take 1 of the 2.5 mg tablets.           February 2018 Details    Sun Mon Tue Wed Thu Fri Sat         1      2.5 mg         2      1.25 mg         3      2.5 mg           4      2.5 mg         5            6               7               8               9               10                 11               12               13               14               15               16               17                 18               19               20               21               22               23               24                 25               26               27               28                   Date Details   No additional details    Date of next INR:  2/5/2018         How to take your warfarin dose     To take:  1.25 mg Take 0.5 of a 2.5 mg tablet.    To take:  2.5 mg Take 1 of the 2.5 mg tablets.

## 2018-01-08 NOTE — PROGRESS NOTES
ANTICOAGULATION FOLLOW-UP CLINIC VISIT    Patient Name:  Tucker Slater  Date:  1/8/2018  Contact Type:  Face to Face    SUBJECTIVE:     Patient Findings     Positives No Problem Findings           OBJECTIVE    INR Protime   Date Value Ref Range Status   01/08/2018 2.8 (A) 0.86 - 1.14 Final       ASSESSMENT / PLAN  INR assessment THER    Recheck INR In: 4 WEEKS    INR Location Clinic      Anticoagulation Summary as of 1/8/2018     INR goal 2.0-3.0   Today's INR 2.8   Maintenance plan 1.25 mg (2.5 mg x 0.5) on Fri; 2.5 mg (2.5 mg x 1) all other days   Full instructions 1.25 mg on Fri; 2.5 mg all other days   Weekly total 16.25 mg   No change documented Ivory Machado RN   Plan last modified Ivory Machado RN (7/7/2017)   Next INR check 2/5/2018   Priority INR   Target end date     Indications   Long-term (current) use of anticoagulants [Z79.01] [Z79.01]  Atrial fibrillation (H) [I48.91]         Anticoagulation Episode Summary     INR check location     Preferred lab     Send INR reminders to [facility]    Comments       Anticoagulation Care Providers     Provider Role Specialty Phone number    Kwadwo Winslow MD Responsible Internal Medicine [phone]            See the Encounter Report to view Anticoagulation Flowsheet and Dosing Calendar (Go to Encounters tab in chart review, and find the Anticoagulation Therapy Visit)    Dosage adjustment made based on physician directed care plan.    Ivory Machado RN

## 2018-01-09 ENCOUNTER — TELEPHONE (OUTPATIENT)
Dept: INTERNAL MEDICINE | Facility: CLINIC | Age: 83
End: 2018-01-09

## 2018-01-09 NOTE — TELEPHONE ENCOUNTER
Tucker calls asking for an interpretation of his lab results.  This nurse went over all the results with him so that he was able to understand what all the numbers mean in relation to his health.

## 2018-01-11 DIAGNOSIS — I10 ESSENTIAL HYPERTENSION WITH GOAL BLOOD PRESSURE LESS THAN 140/90: ICD-10-CM

## 2018-01-16 RX ORDER — CARVEDILOL 25 MG/1
25 TABLET ORAL 2 TIMES DAILY WITH MEALS
Qty: 180 TABLET | Refills: 3 | Status: SHIPPED | OUTPATIENT
Start: 2018-01-16 | End: 2018-06-05

## 2018-02-05 ENCOUNTER — ANTICOAGULATION THERAPY VISIT (OUTPATIENT)
Dept: ANTICOAGULATION | Facility: CLINIC | Age: 83
End: 2018-02-05
Payer: COMMERCIAL

## 2018-02-05 DIAGNOSIS — Z79.01 LONG-TERM (CURRENT) USE OF ANTICOAGULANTS: ICD-10-CM

## 2018-02-05 LAB — INR POINT OF CARE: 3.2 (ref 0.86–1.14)

## 2018-02-05 PROCEDURE — 36416 COLLJ CAPILLARY BLOOD SPEC: CPT

## 2018-02-05 PROCEDURE — 99207 ZZC NO CHARGE NURSE ONLY: CPT

## 2018-02-05 PROCEDURE — 85610 PROTHROMBIN TIME: CPT | Mod: QW

## 2018-02-05 NOTE — PROGRESS NOTES
ANTICOAGULATION FOLLOW-UP CLINIC VISIT    Patient Name:  Tucker Slater  Date:  2/5/2018  Contact Type:  Face to Face    SUBJECTIVE:     Patient Findings     Positives Unexplained INR or factor level change    Comments Pt reports alcohol yesterday during the [place].            OBJECTIVE    INR Protime   Date Value Ref Range Status   02/05/2018 3.2 (A) 0.86 - 1.14 Final       ASSESSMENT / PLAN  INR assessment SUPRA    Recheck INR In: 2 WEEKS    INR Location Clinic      Anticoagulation Summary as of 2/5/2018     INR goal 2.0-3.0   Today's INR 3.2!   Maintenance plan 1.25 mg (2.5 mg x 0.5) on Fri; 2.5 mg (2.5 mg x 1) all other days   Full instructions 2/5: 1.25 mg; Otherwise 1.25 mg on Fri; 2.5 mg all other days   Weekly total 16.25 mg   Plan last modified Ivory Machado RN (7/7/2017)   Next INR check 2/19/2018   Priority INR   Target end date     Indications   Long-term (current) use of anticoagulants [Z79.01] [Z79.01]  Atrial fibrillation (H) [I48.91]         Anticoagulation Episode Summary     INR check location     Preferred lab     Send INR reminders to [facility]    Comments       Anticoagulation Care Providers     Provider Role Specialty Phone number    Kwadwo Winslow MD [facility] Internal Medicine [phone]            See the Encounter Report to view Anticoagulation Flowsheet and Dosing Calendar (Go to Encounters tab in chart review, and find the Anticoagulation Therapy Visit)    Dosage adjustment made based on physician directed care plan.    Gail Valdovinos RN

## 2018-02-05 NOTE — MR AVS SNAPSHOT
Tucker Slater   2/5/2018 1:30 PM   Anticoagulation Therapy Visit    Description:  86 year old male   Provider[facility]  [facility]   Department:  [facility]           INR as of 2/5/2018     Today's INR 3.2!      Anticoagulation Summary as of 2/5/2018     INR goal 2.0-3.0   Today's INR 3.2!   Full instructions 2/5: 1.25 mg; Otherwise 1.25 mg on Fri; 2.5 mg all other days   Next INR check 2/19/2018    Indications   Long-term (current) use of anticoagulants [Z79.01] [Z79.01]  Atrial fibrillation (H) [I48.91]         Your next Anticoagulation Clinic appointment(s)     Feb 19, 2018  1:30 PM CST   [facility]    [address] [facility] [address]  [facility] [id number] [phone]              Contact Numbers     [facility] Phone Numbers:  Anticoagulation Clinic Appointments : [phone]  Anticoagulation Nurse: [phone]         February 2018 Details    Sun Mon Tue Wed Thu Fri Sat         1               2               3                 4               5      1.25 mg   See details      6      2.5 mg         7      2.5 mg         8      2.5 mg         9      1.25 mg         10      2.5 mg           11      2.5 mg         12      2.5 mg         13      2.5 mg         14      2.5 mg         15      2.5 mg         16      1.25 mg         17      2.5 mg           18      2.5 mg         19            20               21               22               23               24                 25               26               27               28                   Date Details   02/05 This INR check       Date of next INR:  2/19/2018         How to take your warfarin dose     To take:  1.25 mg Take 0.5 of a 2.5 mg tablet.    To take:  2.5 mg Take 1 of the 2.5 mg tablets.

## 2018-02-07 ENCOUNTER — ALLIED HEALTH/NURSE VISIT (OUTPATIENT)
Dept: CARDIOLOGY | Facility: CLINIC | Age: 83
End: 2018-02-07
Attending: INTERNAL MEDICINE
Payer: COMMERCIAL

## 2018-02-07 DIAGNOSIS — I10 ESSENTIAL HYPERTENSION, BENIGN: ICD-10-CM

## 2018-02-07 DIAGNOSIS — Z95.0 CARDIAC PACEMAKER IN SITU: ICD-10-CM

## 2018-02-07 PROCEDURE — 93280 PM DEVICE PROGR EVAL DUAL: CPT | Performed by: INTERNAL MEDICINE

## 2018-02-07 NOTE — MR AVS SNAPSHOT
After Visit Summary   2/7/2018    Tucker Slater    MRN: [id number]           Patient Information     Date Of Birth          3/13/1931        Visit Information        Provider Department      2/7/2018 2:20 PM RU DCRN [facility]        Today's Diagnoses     Cardiac pacemaker in situ           Follow-ups after your visit        Your next 10 appointments already scheduled     Feb 19, 2018  1:30 PM CST   [facility]   [facility]    [address]  [facility] [address] [phone]            May 17, 2018  2:45 PM CDT   Remote PPM Check with KIDD TECH1   [facility] ([facility])    [address]  [facility] [address] [phone]           This appointment is for a remote check of your pacemaker.  This is not an appointment at the office.              Who to contact     If you have questions or need follow up information about today's clinic visit or your schedule please contact [facility] directly at [phone].  Normal or non-critical lab and imaging results will be communicated to you by [employer], letter or phone within 4 business days after the clinic has received the results. If you do not hear from us within 7 days, please contact the clinic through [employer] or phone. If you have a critical or abnormal lab result, we will notify you by phone as soon as possible.  Submit refill requests through [employer] or call your pharmacy and they will forward the refill request to us. Please allow 3 business days for your refill to be completed.          Additional Information About Your Visit        MyChart Information     [employer] lets you send messages to your doctor, view your test results, renew your prescriptions, schedule appointments and more. To sign  "up, go to [email or web address] . Click on \"Log in\" on the left side of the screen, which will take you to the Welcome page. Then click on \"Sign up Now\" on the right side of the page.     You will be asked to enter the access code listed below, as well as some personal information. Please follow the directions to create your username and password.     Your access code is: [email or web address]  Expires: 2018 10:03 AM     Your access code will  in 90 days. If you need help or a new code, please call your [place] clinic or [phone].        Care EveryWhere ID     This is your Care EveryWhere ID. This could be used by other organizations to access your [place] medical records  [id number]         Blood Pressure from Last 3 Encounters:   17 124/64   17 124/72   17 139/68    Weight from Last 3 Encounters:   17 85.7 kg (189 lb)   17 83 kg (183 lb)   17 80.7 kg (178 lb)              We Performed the Following     Follow-Up with Device Clinic     PM DEVICE PROGRAMMING EVAL, DUAL LEAD PACER ([id number])        Primary Care Provider Office Phone # Fax #    Kwadwo Winslow [phone]       [address] [facility] [address]        Equal Access to Services     [facility] : Hadii aad ku hadasho Soomaali, waaxda luqadaha, qaybta kaalmada adevinicioyada, gisela parekh . So [facility] [phone].    ATENCIÓN: Si habla español, tiene a kraft disposición servicios gratuitos de asistencia lingüística. David espinoza [phone].    We comply with applicable federal civil rights laws and [place] laws. We do not discriminate on the basis of race, color, national origin, age, disability, sex, sexual orientation, or gender identity.            Thank you!     Thank you for choosing [facility]  for your care. Our goal is always to provide you with excellent care. Hearing back from our patients is one way we can " continue to improve our services. Please take a few minutes to complete the written survey that you may receive in the mail after your visit with us. Thank you!             Your Updated Medication List - Protect others around you: Learn how to safely use, store and throw away your medicines at www.disposemymeds.org.          This list is accurate as of 2/7/18  2:58 PM.  Always use your most recent med list.                   Brand Name Dispense Instructions for use Diagnosis    ACETAMINOPHEN PO      Take 650 mg by mouth every 4 hours as needed for pain        calcium carbonate 1250 MG tablet    OS-KARLA 500 mg [place]. Ca     Take 500 mg by mouth daily        carvedilol 25 MG tablet    COREG    180 tablet    Take 1 tablet (25 mg) by mouth 2 times daily (with meals)    Essential hypertension with goal blood pressure less than 140/90       ferrous sulfate 142 (45 FE) MG Tbcr    SLO-FE    30 tablet    Take 1 tablet (142 mg) by mouth daily    CKD (chronic kidney disease) stage 3, GFR 30-59 ml/min, Anemia, unspecified type       losartan 100 MG tablet    COZAAR    90 tablet    Take 0.5 tablets (50 mg) by mouth daily    Essential hypertension, benign       OCUVITE PO      Take 1 tablet by mouth daily        triamterene-hydrochlorothiazide 75-50 MG per tablet    MAXZIDE    45 tablet    Take 0.5 tablets by mouth daily Hold for SBP <100.    Essential hypertension, benign       vitamin B complex with vitamin C Tabs tablet      Take 1 tablet by mouth daily        VITAMIN C PO      Take 500 mg by mouth daily        VITAMIN D (CHOLECALCIFEROL) PO      Take 2,000 Units by mouth daily.        warfarin 2.5 MG tablet    JANTOVEN    90 tablet    TAKE ONE TABLET (2.5 mg) BY MOUTH EVERY DAY, Except Take 1/2 Tablet (1.25 mg) on Fridays, OR AS INSTRUCTED BASED ON INR RESULTS    Chronic atrial fibrillation (H)

## 2018-02-07 NOTE — PROGRESS NOTES
[facility] [facility] [address] Pacemaker Device Check  AP: <! % : 95 %  Mode: VVIR Lower rate 70 bpm   (was DDDR 70-120bpm)     Underlying Rhythm: AF with CHB, no junctional escape at VVI 30  Heart Rate: Stable with good variability.  Sensing: WNL    Pacing Threshold: WNL   Impedance: WNL  Battery Status: Approximately 10.7-11.4 years longevity  Device Site: Intact  Atrial Arrhythmia: AF burden 100% of the time, EGM showed AF with controlled VR. Patient is on Jantoven  Ventricular Arrhythmia: 0  Setting Change: Mode changed from DDDR to VVIR due to AF over 1 year.    Care Plan: Follow up with Q 3 month remote checks. Orders are in place to follow up with an LEONID[facility] in [place] this May. MARLYN Soliz RN

## 2018-02-12 RX ORDER — LOSARTAN POTASSIUM 100 MG/1
TABLET ORAL
Qty: 90 TABLET | Refills: 0 | Status: SHIPPED | OUTPATIENT
Start: 2018-02-12 | End: 2018-05-06

## 2018-02-12 RX ORDER — TRIAMTERENE AND HYDROCHLOROTHIAZIDE 75; 50 MG/1; MG/1
TABLET ORAL
Qty: 45 TABLET | Refills: 3 | Status: SHIPPED | OUTPATIENT
Start: 2018-02-12 | End: 2018-06-05

## 2018-02-19 ENCOUNTER — ANTICOAGULATION THERAPY VISIT (OUTPATIENT)
Dept: ANTICOAGULATION | Facility: CLINIC | Age: 83
End: 2018-02-19
Payer: COMMERCIAL

## 2018-02-19 DIAGNOSIS — Z79.01 LONG-TERM (CURRENT) USE OF ANTICOAGULANTS: ICD-10-CM

## 2018-02-19 LAB — INR POINT OF CARE: 2.4 (ref 0.86–1.14)

## 2018-02-19 PROCEDURE — 99207 ZZC NO CHARGE NURSE ONLY: CPT

## 2018-02-19 PROCEDURE — 36416 COLLJ CAPILLARY BLOOD SPEC: CPT

## 2018-02-19 PROCEDURE — 85610 PROTHROMBIN TIME: CPT | Mod: QW

## 2018-02-19 NOTE — MR AVS SNAPSHOT
Tucker ZHAO Slater   2/19/2018 1:30 PM   Anticoagulation Therapy Visit    Description:  86 year old male   Provider:  [facility]   Department:  [facility] Anti Coagulation           INR as of 2/19/2018     Today's INR 2.4      Anticoagulation Summary as of 2/19/2018     INR goal 2.0-3.0   Today's INR 2.4   Full instructions 1.25 mg on Fri; 2.5 mg all other days   Next INR check 3/19/2018    Indications   Long-term (current) use of anticoagulants [Z79.01] [Z79.01]  Atrial fibrillation (H) [I48.91]         Your next Anticoagulation Clinic appointment(s)     Mar 19, 2018  1:30 PM CDT   Anticoagulation Visit with [facility]   [facility]    [address] [facility] [address]  [facility] [address] [phone]              Contact Numbers     [facility] Phone Numbers:  Anticoagulation Clinic Appointments : [phone]  Anticoagulation Nurse: [phone]         February 2018 Details    Sun Mon Tue Wed Thu Fri Sat         1               2               3                 4               5               6               7               8               9               10                 11               12               13               14               15               16               17                 18               19      2.5 mg   See details      20      2.5 mg         21      2.5 mg         22      2.5 mg         23      1.25 mg         24      2.5 mg           25      2.5 mg         26      2.5 mg         27      2.5 mg         28      2.5 mg             Date Details   02/19 This INR check               How to take your warfarin dose     To take:  1.25 mg Take 0.5 of a 2.5 mg tablet.    To take:  2.5 mg Take 1 of the 2.5 mg tablets.           March 2018 Details    Sun Mon Tue Wed Thu Fri Sat         1      2.5 mg         2      1.25 mg         3      2.5 mg           4      2.5 mg         5      2.5 mg         6      2.5 mg         7       2.5 mg         8      2.5 mg         9      1.25 mg         10      2.5 mg           11      2.5 mg         12      2.5 mg         13      2.5 mg         14      2.5 mg         15      2.5 mg         16      1.25 mg         17      2.5 mg           18      2.5 mg         19            20               21               22               23               24                 25               26               27               28               29               30               31                Date Details   No additional details    Date of next INR:  3/19/2018         How to take your warfarin dose     To take:  1.25 mg Take 0.5 of a 2.5 mg tablet.    To take:  2.5 mg Take 1 of the 2.5 mg tablets.

## 2018-02-19 NOTE — PROGRESS NOTES
ANTICOAGULATION FOLLOW-UP CLINIC VISIT    Patient Name:  Tucker Slater  Date:  2/19/2018  Contact Type:  Face to Face    SUBJECTIVE:     Patient Findings     Positives No Problem Findings           OBJECTIVE    INR Protime   Date Value Ref Range Status   02/19/2018 2.4 (A) 0.86 - 1.14 Final       ASSESSMENT / PLAN  INR assessment THER    Recheck INR In: 4 WEEKS    INR Location Clinic      Anticoagulation Summary as of 2/19/2018     INR goal 2.0-3.0   Today's INR 2.4   Maintenance plan 1.25 mg (2.5 mg x 0.5) on Fri; 2.5 mg (2.5 mg x 1) all other days   Full instructions 1.25 mg on Fri; 2.5 mg all other days   Weekly total 16.25 mg   No change documented Gail Valdovinos RN   Plan last modified Ivory Machado RN (7/7/2017)   Next INR check 3/19/2018   Priority INR   Target end date     Indications   Long-term (current) use of anticoagulants [Z79.01] [Z79.01]  Atrial fibrillation (H) [I48.91]         Anticoagulation Episode Summary     INR check location     Preferred lab     Send INR reminders to [facility]    Comments       Anticoagulation Care Providers     Provider Role Specialty Phone number    Kwadwo Winslow MD Responsible Internal Medicine [phone]            See the Encounter Report to view Anticoagulation Flowsheet and Dosing Calendar (Go to Encounters tab in chart review, and find the Anticoagulation Therapy Visit)    Dosage adjustment made based on physician directed care plan.    Gail Valdovinos, RN

## 2018-03-09 DIAGNOSIS — I48.20 CHRONIC ATRIAL FIBRILLATION (H): ICD-10-CM

## 2018-03-09 RX ORDER — WARFARIN SODIUM 2.5 MG/1
TABLET ORAL
Qty: 90 TABLET | Refills: 1 | Status: SHIPPED | OUTPATIENT
Start: 2018-03-09 | End: 2018-08-17

## 2018-03-09 NOTE — TELEPHONE ENCOUNTER
"Requested Prescriptions   Pending Prescriptions Disp Refills     JANTOVEN 2.5 MG tablet [Pharmacy Med Name: JANTOVEN 2.5MG TABS] 90 tablet 1     Sig: TAKE ONE TABLET BY MOUTH EVERY DAY, EXCEPT TAKE ONE-HALF TABLET BY MOUTH ON FRIDAYS, OR AS INSTRUCTED BASED ON INR RESULTS    Vitamin K Antagonists Failed    3/9/2018  9:36 AM       Failed - INR is within goal in the past 6 weeks    Confirm INR is within goal in the past 6 weeks.     Recent Labs   Lab Test 02/19/18   INR  2.4*                      Passed - Recent (12 mo) or future (30 days) visit within the authorizing provider's specialty    Patient had office visit in the last 12 months or has a visit in the next 30 days with authorizing provider or within the authorizing provider's specialty.  See \"Patient Info\" tab in inbasket, or \"Choose Columns\" in Meds & Orders section of the refill encounter.           Passed - Patient is 18 years of age or older        Last office visit 12/29/17.  Warfarin dosing is managed by INR Clinic.  Prescription approved per [facility] Refill Protocol.  Ivory Machado RN    "

## 2018-03-19 ENCOUNTER — ANTICOAGULATION THERAPY VISIT (OUTPATIENT)
Dept: ANTICOAGULATION | Facility: CLINIC | Age: 83
End: 2018-03-19
Payer: COMMERCIAL

## 2018-03-19 DIAGNOSIS — I48.91 ATRIAL FIBRILLATION (H): ICD-10-CM

## 2018-03-19 DIAGNOSIS — Z79.01 LONG-TERM (CURRENT) USE OF ANTICOAGULANTS: ICD-10-CM

## 2018-03-19 LAB — INR POINT OF CARE: 3 (ref 0.86–1.14)

## 2018-03-19 PROCEDURE — 99207 ZZC NO CHARGE NURSE ONLY: CPT

## 2018-03-19 PROCEDURE — 36416 COLLJ CAPILLARY BLOOD SPEC: CPT

## 2018-03-19 PROCEDURE — 85610 PROTHROMBIN TIME: CPT | Mod: QW

## 2018-03-19 NOTE — MR AVS SNAPSHOT
Tucker Slater   3/19/2018 1:30 PM   Anticoagulation Therapy Visit    Description:  87 year old male   Provider:  [facility]   Department:  [facility] Anti Coagulation           INR as of 3/19/2018     Today's INR 3.0      Anticoagulation Summary as of 3/19/2018     INR goal 2.0-3.0   Today's INR 3.0   Full instructions 1.25 mg on Fri; 2.5 mg all other days   Next INR check 4/16/2018    Indications   Long-term (current) use of anticoagulants [Z79.01] [Z79.01]  Atrial fibrillation (H) [I48.91]         Your next Anticoagulation Clinic appointment(s)     Apr 16, 2018  1:30 PM CDT   Anticoagulation Visit with [facility]   [facility] ([facility])    [address]  [facility] [address] [phone]              Contact Numbers     [facility] Phone Numbers:  Anticoagulation Clinic Appointments : [phone]  Anticoagulation Nurse: [phone]         March 2018 Details    Sun Mon Tue Wed Thu Fri Sat         1               2               3                 4               5               6               7               8               9               10                 11               12               13               14               15               16               17                 18               19      2.5 mg   See details      20      2.5 mg         21      2.5 mg         22      2.5 mg         23      1.25 mg         24      2.5 mg           25      2.5 mg         26      2.5 mg         27      2.5 mg         28      2.5 mg         29      2.5 mg         30      1.25 mg         31      2.5 mg          Date Details   03/19 This INR check               How to take your warfarin dose     To take:  1.25 mg Take 0.5 of a 2.5 mg tablet.    To take:  2.5 mg Take 1 of the 2.5 mg tablets.           April 2018 Details    Sun Mon Tue Wed Thu Fri Sat     1      2.5 mg         2      2.5 mg         3      2.5 mg         4      2.5 mg          5      2.5 mg         6      1.25 mg         7      2.5 mg           8      2.5 mg         9      2.5 mg         10      2.5 mg         11      2.5 mg         12      2.5 mg         13      1.25 mg         14      2.5 mg           15      2.5 mg         16            17               18               19               20               21                 22               23               24               25               26               27               28                 29               30                     Date Details   No additional details    Date of next INR:  4/16/2018         How to take your warfarin dose     To take:  1.25 mg Take 0.5 of a 2.5 mg tablet.    To take:  2.5 mg Take 1 of the 2.5 mg tablets.

## 2018-03-19 NOTE — PROGRESS NOTES
ANTICOAGULATION FOLLOW-UP CLINIC VISIT    Patient Name:  Tucker Slater  Date:  3/19/2018  Contact Type:  Face to Face    SUBJECTIVE:     Patient Findings     Positives No Problem Findings           OBJECTIVE    INR Protime   Date Value Ref Range Status   03/19/2018 3.0 (A) 0.86 - 1.14 Final       ASSESSMENT / PLAN  INR assessment THER    Recheck INR In: 4 WEEKS    INR Location Clinic      Anticoagulation Summary as of 3/19/2018     INR goal 2.0-3.0   Today's INR 3.0   Maintenance plan 1.25 mg (2.5 mg x 0.5) on Fri; 2.5 mg (2.5 mg x 1) all other days   Full instructions 1.25 mg on Fri; 2.5 mg all other days   Weekly total 16.25 mg   Plan last modified Ivory Machado RN (7/7/2017)   Next INR check 4/16/2018   Priority INR   Target end date     Indications   Long-term (current) use of anticoagulants [Z79.01] [Z79.01]  Atrial fibrillation (H) [I48.91]         Anticoagulation Episode Summary     INR check location     Preferred lab     Send INR reminders to [facility]    Comments       Anticoagulation Care Providers     Provider Role Specialty Phone number    Kwadwo Winslow MD Responsible Internal Medicine [phone]            See the Encounter Report to view Anticoagulation Flowsheet and Dosing Calendar (Go to Encounters tab in chart review, and find the Anticoagulation Therapy Visit)    Dosage adjustment made based on physician directed care plan.    Ivory Machaod RN

## 2018-04-16 ENCOUNTER — ANTICOAGULATION THERAPY VISIT (OUTPATIENT)
Dept: ANTICOAGULATION | Facility: CLINIC | Age: 83
End: 2018-04-16
Payer: COMMERCIAL

## 2018-04-16 DIAGNOSIS — Z79.01 LONG-TERM (CURRENT) USE OF ANTICOAGULANTS: ICD-10-CM

## 2018-04-16 LAB — INR POINT OF CARE: 3.2 (ref 0.86–1.14)

## 2018-04-16 PROCEDURE — 85610 PROTHROMBIN TIME: CPT | Mod: QW

## 2018-04-16 PROCEDURE — 36416 COLLJ CAPILLARY BLOOD SPEC: CPT

## 2018-04-16 PROCEDURE — 99207 ZZC NO CHARGE NURSE ONLY: CPT

## 2018-04-16 NOTE — MR AVS SNAPSHOT
Tucker Slater   4/16/2018 1:30 PM   Anticoagulation Therapy Visit    Description:  87 year old male   Provider:  [facility]   Department:  [facility] Anti Coagulation           INR as of 4/16/2018     Today's INR 3.2!      Anticoagulation Summary as of 4/16/2018     INR goal 2.0-3.0   Today's INR 3.2!   Full instructions 4/16: 1.25 mg; Otherwise 1.25 mg on Fri; 2.5 mg all other days   Next INR check 4/30/2018    Indications   Long-term (current) use of anticoagulants [Z79.01] [Z79.01]  Atrial fibrillation (H) [I48.91]         Your next Anticoagulation Clinic appointment(s)     Apr 30, 2018  1:45 PM CDT   Anticoagulation Visit with [facility]   [facility]    [address]  [facility] [address] [phone]              Contact Numbers     [facility] Phone Numbers:  Anticoagulation Clinic Appointments : [phone]  Anticoagulation Nurse: [phone]         April 2018 Details    Sun Mon Tue Wed Thu Fri Sat     1               2               3               4               5               6               7                 8               9               10               11               12               13               14                 15               16      1.25 mg   See details      17      2.5 mg         18      2.5 mg         19      2.5 mg         20      1.25 mg         21      2.5 mg           22      2.5 mg         23      2.5 mg         24      2.5 mg         25      2.5 mg         26      2.5 mg         27      1.25 mg         28      2.5 mg           29      2.5 mg         30                  Date Details   04/16 This INR check       Date of next INR:  4/30/2018         How to take your warfarin dose     To take:  1.25 mg Take 0.5 of a 2.5 mg tablet.    To take:  2.5 mg Take 1 of the 2.5 mg tablets.

## 2018-04-16 NOTE — PROGRESS NOTES
ANTICOAGULATION FOLLOW-UP CLINIC VISIT    Patient Name:  Tucker Slater  Date:  4/16/2018  Contact Type:  Face to Face    SUBJECTIVE:     Patient Findings     Positives Unexplained INR or factor level change           OBJECTIVE    INR Protime   Date Value Ref Range Status   04/16/2018 3.2 (A) 0.86 - 1.14 Final       ASSESSMENT / PLAN  INR assessment SUPRA    Recheck INR In: 2 WEEKS    INR Location Clinic      Anticoagulation Summary as of 4/16/2018     INR goal 2.0-3.0   Today's INR 3.2!   Maintenance plan 1.25 mg (2.5 mg x 0.5) on Fri; 2.5 mg (2.5 mg x 1) all other days   Full instructions 4/16: 1.25 mg; Otherwise 1.25 mg on Fri; 2.5 mg all other days   Weekly total 16.25 mg   Plan last modified Ivory Machado RN (7/7/2017)   Next INR check 4/30/2018   Priority INR   Target end date     Indications   Long-term (current) use of anticoagulants [Z79.01] [Z79.01]  Atrial fibrillation (H) [I48.91]         Anticoagulation Episode Summary     INR check location     Preferred lab     Send INR reminders to [facility]    Comments       Anticoagulation Care Providers     Provider Role Specialty Phone number    Kwadwo Winslow MD Responsible Internal Medicine [phone]            See the Encounter Report to view Anticoagulation Flowsheet and Dosing Calendar (Go to Encounters tab in chart review, and find the Anticoagulation Therapy Visit)    Dosage adjustment made based on physician directed care plan.    Gail Valdovinos RN

## 2018-04-24 ENCOUNTER — TELEPHONE (OUTPATIENT)
Dept: INTERNAL MEDICINE | Facility: CLINIC | Age: 83
End: 2018-04-24

## 2018-04-24 NOTE — TELEPHONE ENCOUNTER
Pt calls stating he was at the dentist and his BP was elevated.     He hasn't been checking at home lately, so he checked with his machine when he got home.   BP was still elevated.     160s/90. He thinks was higher at dentist.     Please advise. He is coming in clinic next Tuesday with his wife.     Takes Losartan, Coreg and Maxide.         BP Readings from Last 3 Encounters:   12/29/17 124/64   05/01/17 124/72   04/19/17 139/68

## 2018-04-25 NOTE — TELEPHONE ENCOUNTER
Contacted patient, patient states last night was checking BP at home and in the Evening reports 160/90 as an average. Pt informed of message below from provider.  Confirmed dosing with patient with read-back. Patient will call if BP is still high in 3-4 days or sooner if worsening.

## 2018-04-30 ENCOUNTER — ANTICOAGULATION THERAPY VISIT (OUTPATIENT)
Dept: ANTICOAGULATION | Facility: CLINIC | Age: 83
End: 2018-04-30
Payer: COMMERCIAL

## 2018-04-30 DIAGNOSIS — Z79.01 LONG-TERM (CURRENT) USE OF ANTICOAGULANTS: ICD-10-CM

## 2018-04-30 LAB — INR POINT OF CARE: 3.2 (ref 0.86–1.14)

## 2018-04-30 PROCEDURE — 85610 PROTHROMBIN TIME: CPT | Mod: QW

## 2018-04-30 PROCEDURE — 36416 COLLJ CAPILLARY BLOOD SPEC: CPT

## 2018-04-30 PROCEDURE — 99207 ZZC NO CHARGE NURSE ONLY: CPT

## 2018-04-30 NOTE — PROGRESS NOTES
ANTICOAGULATION FOLLOW-UP CLINIC VISIT    Patient Name:  Tucker Slater  Date:  4/30/2018  Contact Type:  Face to Face    SUBJECTIVE:     Patient Findings     Comments Pt reports alcohol use last evening.            OBJECTIVE    INR Protime   Date Value Ref Range Status   04/30/2018 3.2 (A) 0.86 - 1.14 Final       ASSESSMENT / PLAN  INR assessment SUPRA    Recheck INR In: 3 WEEKS    INR Location Clinic      Anticoagulation Summary as of 4/30/2018     INR goal 2.0-3.0   Today's INR 3.2!   Maintenance plan 1.25 mg (2.5 mg x 0.5) on Fri; 2.5 mg (2.5 mg x 1) all other days   Full instructions 1.25 mg on Fri; 2.5 mg all other days   Weekly total 16.25 mg   Plan last modified Ivory Machado RN (7/7/2017)   Next INR check 5/21/2018   Priority INR   Target end date     Indications   Long-term (current) use of anticoagulants [Z79.01] [Z79.01]  Atrial fibrillation (H) [I48.91]         Anticoagulation Episode Summary     INR check location     Preferred lab     Send INR reminders to [facility]    Comments       Anticoagulation Care Providers     Provider Role Specialty Phone number    Kwadwo Winslow MD Responsible Internal Medicine [phone]            See the Encounter Report to view Anticoagulation Flowsheet and Dosing Calendar (Go to Encounters tab in chart review, and find the Anticoagulation Therapy Visit)    Dosage adjustment made based on physician directed care plan.    Gail Valdovinos RN

## 2018-04-30 NOTE — MR AVS SNAPSHOT
Tucker L Bryon   4/30/2018 1:45 PM   Anticoagulation Therapy Visit    Description:  87 year old male   Provider:  [facility]   Department:  [facility]           INR as of 4/30/2018     Today's INR 3.2!      Anticoagulation Summary as of 4/30/2018     INR goal 2.0-3.0   Today's INR 3.2!   Full instructions 1.25 mg on Fri; 2.5 mg all other days   Next INR check 5/21/2018    Indications   Long-term (current) use of anticoagulants [Z79.01] [Z79.01]  Atrial fibrillation (H) [I48.91]         Your next Anticoagulation Clinic appointment(s)     May 21, 2018  1:45 PM CDT   [facility]agulation Visit with [facility]   [facility]    [address] [facility] [address]  [facility] [address] [phone]              Contact Numbers     [facility] Phone Numbers:  Anticoagulation Clinic Appointments : [phone]  Anticoagulation Nurse: [phone]         April 2018 Details    Sun Mon Tue Wed Thu Fri Sat     1               2               3               4               5               6               7                 8               9               10               11               12               13               14                 15               16               17               18               19               20               21                 22               23               24               25               26               27               28                 29               30      2.5 mg   See details            Date Details   04/30 This INR check               How to take your warfarin dose     To take:  2.5 mg Take 1 of the 2.5 mg tablets.           May 2018 Details    Sun Mon Tue Wed Thu Fri Sat       1      2.5 mg         2      2.5 mg         3      2.5 mg         4      1.25 mg         5      2.5 mg           6      2.5 mg         7      2.5 mg         8      2.5 mg         9      2.5 mg         10      2.5 mg          11      1.25 mg         12      2.5 mg           13      2.5 mg         14      2.5 mg         15      2.5 mg         16      2.5 mg         17      2.5 mg         18      1.25 mg         19      2.5 mg           20      2.5 mg         21            22               23               24               25               26                 27               28               29               30               31                  Date Details   No additional details    Date of next INR:  5/21/2018         How to take your warfarin dose     To take:  1.25 mg Take 0.5 of a 2.5 mg tablet.    To take:  2.5 mg Take 1 of the 2.5 mg tablets.

## 2018-05-03 DIAGNOSIS — I10 ESSENTIAL HYPERTENSION, BENIGN: ICD-10-CM

## 2018-05-06 DIAGNOSIS — I10 ESSENTIAL HYPERTENSION, BENIGN: ICD-10-CM

## 2018-05-07 RX ORDER — TRIAMTERENE AND HYDROCHLOROTHIAZIDE 75; 50 MG/1; MG/1
1 TABLET ORAL DAILY
Qty: 90 TABLET | Refills: 0 | Status: SHIPPED | OUTPATIENT
Start: 2018-05-07 | End: 2018-06-05

## 2018-05-07 RX ORDER — LOSARTAN POTASSIUM 100 MG/1
100 TABLET ORAL DAILY
Qty: 90 TABLET | Refills: 0 | Status: SHIPPED | OUTPATIENT
Start: 2018-05-07 | End: 2018-06-13

## 2018-05-07 NOTE — TELEPHONE ENCOUNTER
"  Per 4/24 phone note-Let's increase the Maxide to 1 tablet daily. Continue to check daily BP.     Requested Prescriptions   Pending Prescriptions Disp Refills     triamterene-hydrochlorothiazide (MAXZIDE) 75-50 MG per tablet 45 tablet 3     Sig: Take 0.5 tablets by mouth daily Hold for SBP <100.    Diuretics (Including Combos) Protocol Failed    5/3/2018  4:19 PM       Failed - Normal serum creatinine on file in past 12 months    Recent Labs   Lab Test  12/29/17   1422   CR  1.51*             Passed - Blood pressure under 140/90 in past 12 months    BP Readings from Last 3 Encounters:   12/29/17 124/64   05/01/17 124/72   04/19/17 139/68                Passed - Recent (12 mo) or future (30 days) visit within the authorizing provider's specialty    Patient had office visit in the last 12 months or has a visit in the next 30 days with authorizing provider or within the authorizing provider's specialty.  See \"Patient Info\" tab in inbasket, or \"Choose Columns\" in Meds & Orders section of the refill encounter.           Passed - Patient is age 18 or older       Passed - Normal serum potassium on file in past 12 months    Recent Labs   Lab Test  12/29/17   1422   POTASSIUM  4.2                   Passed - Normal serum sodium on file in past 12 months    Recent Labs   Lab Test  12/29/17   1422   NA  137                Per note attached to 12/29 lab results-Notes Recorded by Kwadwo Winslow MD on 1/2/2018 at 1:05 PM  Slightly decreased kidney function is stable.   Rest of the results are normal.  "

## 2018-05-07 NOTE — TELEPHONE ENCOUNTER
"      Requested Prescriptions   Pending Prescriptions Disp Refills     losartan (COZAAR) 100 MG tablet [Pharmacy Med Name: LOSARTAN POTASSIUM 100MG TABS] 90 tablet 0     Sig: TAKE ONE TABLET BY MOUTH EVERY DAY.DUE FOR AN APPOINTMENT PLEASE CALL THE DOCTORS OFFICE.    Angiotensin-II Receptors Failed    5/6/2018 11:49 AM       Failed - Normal serum creatinine on file in past 12 months    Recent Labs   Lab Test  12/29/17   1422   CR  1.51*            Passed - Blood pressure under 140/90 in past 12 months    BP Readings from Last 3 Encounters:   12/29/17 124/64   05/01/17 124/72   04/19/17 139/68                Passed - Recent (12 mo) or future (30 days) visit within the authorizing provider's specialty    Patient had office visit in the last 12 months or has a visit in the next 30 days with authorizing provider or within the authorizing provider's specialty.  See \"Patient Info\" tab in inbasket, or \"Choose Columns\" in Meds & Orders section of the refill encounter.           Passed - Patient is age 18 or older       Passed - Normal serum potassium on file in past 12 months    Recent Labs   Lab Test  12/29/17   1422   POTASSIUM  4.2                    Per note attached to 12/29 lab results-Notes Recorded by Kwadwo Winslow MD on 1/2/2018 at 1:05 PM  Slightly decreased kidney function is stable.   Rest of the results are normal.  "

## 2018-05-17 ENCOUNTER — ALLIED HEALTH/NURSE VISIT (OUTPATIENT)
Dept: CARDIOLOGY | Facility: CLINIC | Age: 83
End: 2018-05-17
Payer: COMMERCIAL

## 2018-05-17 DIAGNOSIS — Z95.0 CARDIAC PACEMAKER IN SITU: Primary | ICD-10-CM

## 2018-05-17 PROCEDURE — 93294 REM INTERROG EVL PM/LDLS PM: CPT | Performed by: INTERNAL MEDICINE

## 2018-05-17 PROCEDURE — 93296 REM INTERROG EVL PM/IDS: CPT | Performed by: INTERNAL MEDICINE

## 2018-05-17 NOTE — MR AVS SNAPSHOT
After Visit Summary   5/17/2018    Tucker Slater    MRN: [id number]           Patient Information     Date Of Birth          3/13/1931        Visit Information        Provider Department      5/17/2018 2:45 PM KIDD TECH1 [facility]        Today's Diagnoses     Cardiac pacemaker in situ    -  1       Follow-ups after your visit        Your next 10 appointments already scheduled     May 17, 2018  2:45 PM CDT   Remote PPM Check with KIDD TECH1   [facility]    [id number]  [facility] [address] [phone] OPT 2           This appointment is for a remote check of your pacemaker.  This is not an appointment at the office.            May 21, 2018  1:45 PM CDT   Anticoagulation Visit with [facility]   [facility]    [address]  [facility] [id number] [phone]            Aug 23, 2018  3:15 PM CDT   Remote PPM Check with KIDD TECH1   [facility]    [id number]  [facility] [address] [phone] OPT 2           This appointment is for a remote check of your pacemaker.  This is not an appointment at the office.              Who to contact     If you have questions or need follow up information about today's clinic visit or your schedule please contact [facility] directly at [phone].  Normal or non-critical lab and imaging results will be communicated to you by MyChart, letter or phone within 4 business days after the clinic has received the results. If you do not hear from us within 7 days, please contact the clinic through MyChart or phone. If you have a critical or abnormal lab result, we will notify you by phone as soon as possible.  Submit refill requests through [employer] or call your  "pharmacy and they will forward the refill request to us. Please allow 3 business days for your refill to be completed.          Additional Information About Your Visit        MyChart Information     [employer] lets you send messages to your doctor, view your test results, renew your prescriptions, schedule appointments and more. To sign up, go to [email or web address] . Click on \"Log in\" on the left side of the screen, which will take you to the Welcome page. Then click on \"Sign up Now\" on the right side of the page.     You will be asked to enter the access code listed below, as well as some personal information. Please follow the directions to create your username and password.     Your access code is: [email or web address]  Expires: 8/15/2018  8:36 AM     Your access code will  in 90 days. If you need help or a new code, please call your [facility] clinic or [phone].        Care EveryWhere ID     This is your Care EveryWhere ID. This could be used by other organizations to access your [facility] medical records  [id number]         Blood Pressure from Last 3 Encounters:   17 124/64   17 124/72   17 139/68    Weight from Last 3 Encounters:   17 85.7 kg (189 lb)   17 83 kg (183 lb)   17 80.7 kg (178 lb)              We Performed the Following     INTERROGATION DEVICE EVAL REMOTE, PACER/ICD (04952)     PM DEVICE INTERROGATE REMOTE (11368)        Primary Care Provider Office Phone # Fax #    Kwadwo Winslow [phone]       [address] [facility] [address]        Equal Access to Services     [facility] : Hadii aad ruperto hadasho Sojose lali, waaxda luqadaha, qaybta kaalmada sonal, gisela eddy. So [facility] [phone].    ATENCIÓN: Si habla español, tiene a kraft disposición servicios gratuitos de asistencia lingüística. Llame al [phone].    We comply with applicable federal civil rights laws and [place] laws. We do not " discriminate on the basis of race, color, national origin, age, disability, sex, sexual orientation, or gender identity.            Thank you!     Thank you for choosing [facility]  for your care. Our goal is always to provide you with excellent care. Hearing back from our patients is one way we can continue to improve our services. Please take a few minutes to complete the written survey that you may receive in the mail after your visit with us. Thank you!             Your Updated Medication List - Protect others around you: Learn how to safely use, store and throw away your medicines at www.disposemymeds.org.          This list is accurate as of 5/17/18  8:36 AM.  Always use your most recent med list.                   Brand Name Dispense Instructions for use Diagnosis    ACETAMINOPHEN PO      Take 650 mg by mouth every 4 hours as needed for pain        calcium carbonate 500 MG tablet    OS-KARLA 500 mg [place]. [place]     Take 500 mg by mouth daily        carvedilol 25 MG tablet    COREG    180 tablet    Take 1 tablet (25 mg) by mouth 2 times daily (with meals)    Essential hypertension with goal blood pressure less than 140/90       ferrous sulfate 142 (45 Fe) MG Tbcr    SLO-FE    30 tablet    Take 1 tablet (142 mg) by mouth daily    CKD (chronic kidney disease) stage 3, GFR 30-59 ml/min, Anemia, unspecified type       JANTOVEN 2.5 MG tablet   Generic drug:  warfarin     90 tablet    TAKE ONE TABLET BY MOUTH EVERY DAY, EXCEPT TAKE ONE-HALF TABLET BY MOUTH ON FRIDAYS, OR AS INSTRUCTED BASED ON INR RESULTS    Chronic atrial fibrillation (H)       losartan 100 MG tablet    COZAAR    90 tablet    Take 1 tablet (100 mg) by mouth daily    Essential hypertension, benign       OCUVITE PO      Take 1 tablet by mouth daily        * triamterene-hydrochlorothiazide 75-50 MG per tablet    MAXZIDE    45 tablet    TAKE ONE-HALF TABLET BY MOUTH EVERY DAY    Essential hypertension, benign       *  triamterene-hydrochlorothiazide 75-50 MG per tablet    MAXZIDE    90 tablet    Take 1 tablet by mouth daily Hold for SBP <100.    Essential hypertension, benign       vitamin B complex with vitamin C Tabs tablet      Take 1 tablet by mouth daily        VITAMIN C PO      Take 500 mg by mouth daily        VITAMIN D (CHOLECALCIFEROL) PO      Take 2,000 Units by mouth daily.        * Notice:  This list has 2 medication(s) that are the same as other medications prescribed for you. Read the directions carefully, and ask your doctor or other care provider to review them with you.

## 2018-05-17 NOTE — PROGRESS NOTES
[employer] (S) Remote PPM Device Check  : 88 %, Chronic AFib, taking Jantoven  Mode: VVIR        Presenting Rhythm: AFib with  events, occ. PVCs  Heart Rate: Adequate rates per histogram  Sensing: Stable    Pacing Threshold: Stable    Impedance: Stable  Battery Status: 10.6-11.3 years  Atrial Arrhythmia: N/A  Ventricular Arrhythmia: None     Care Plan: F/u PPM Merlin q 3 months. Gave patient results over the phone. Migel,CVT

## 2018-05-21 ENCOUNTER — ANTICOAGULATION THERAPY VISIT (OUTPATIENT)
Dept: ANTICOAGULATION | Facility: CLINIC | Age: 83
End: 2018-05-21
Payer: COMMERCIAL

## 2018-05-21 ENCOUNTER — TELEPHONE (OUTPATIENT)
Dept: ANTICOAGULATION | Facility: CLINIC | Age: 83
End: 2018-05-21

## 2018-05-21 DIAGNOSIS — I48.91 ATRIAL FIBRILLATION (H): ICD-10-CM

## 2018-05-21 DIAGNOSIS — I48.20 CHRONIC ATRIAL FIBRILLATION (H): Primary | ICD-10-CM

## 2018-05-21 DIAGNOSIS — Z79.01 LONG-TERM (CURRENT) USE OF ANTICOAGULANTS: ICD-10-CM

## 2018-05-21 LAB — INR POINT OF CARE: 2.8 (ref 0.86–1.14)

## 2018-05-21 PROCEDURE — 99207 ZZC NO CHARGE NURSE ONLY: CPT

## 2018-05-21 PROCEDURE — 85610 PROTHROMBIN TIME: CPT | Mod: QW

## 2018-05-21 PROCEDURE — 36416 COLLJ CAPILLARY BLOOD SPEC: CPT

## 2018-05-21 NOTE — TELEPHONE ENCOUNTER
For insurance purposes, an INR referral is needed.  Please sign order and route message back to [facility].  Ivory Machado RN

## 2018-05-21 NOTE — MR AVS SNAPSHOT
Tucker L Slater   5/21/2018 1:45 PM   Anticoagulation Therapy Visit    Description:  87 year old male   Provider[facility]  [facility]   Department:  [facility]           INR as of 5/21/2018     Today's INR 2.8      Anticoagulation Summary as of 5/21/2018     INR goal 2.0-3.0   Today's INR 2.8   Full instructions 1.25 mg on Fri; 2.5 mg all other days   Next INR check 6/20/2018    Indications   Long-term (current) use of anticoagulants [Z79.01] [Z79.01]  Atrial fibrillation (H) [I48.91]         Your next Anticoagulation Clinic appointment(s)     Jun 20, 2018  1:45 PM CDT   Anticoagulation Visit with [facility]   [facility]    [address] [facility] [address]  [facility] [address] [phone]              Contact Numbers     [facility] Phone Numbers:  Anticoagulation Clinic Appointments : [phone]  Anticoagulation Nurse: [phone]         May 2018 Details    Sun Mon Tue Wed Thu Fri Sat       1               2               3               4               5                 6               7               8               9               10               11               12                 13               14               15               16               17               18               19                 20               21      2.5 mg   See details      22      2.5 mg         23      2.5 mg         24      2.5 mg         25      1.25 mg         26      2.5 mg           27      2.5 mg         28      2.5 mg         29      2.5 mg         30      2.5 mg         31      2.5 mg            Date Details   05/21 This INR check               How to take your warfarin dose     To take:  1.25 mg Take 0.5 of a 2.5 mg tablet.    To take:  2.5 mg Take 1 of the 2.5 mg tablets.           June 2018 Details    Sun Mon Tue Wed Thu Fri Sat          1      1.25 mg         2      2.5 mg           3      2.5 mg         4      2.5 mg         5       2.5 mg         6      2.5 mg         7      2.5 mg         8      1.25 mg         9      2.5 mg           10      2.5 mg         11      2.5 mg         12      2.5 mg         13      2.5 mg         14      2.5 mg         15      1.25 mg         16      2.5 mg           17      2.5 mg         18      2.5 mg         19      2.5 mg         20            21               22               23                 24               25               26               27               28               29               30                Date Details   No additional details    Date of next INR:  6/20/2018         How to take your warfarin dose     To take:  1.25 mg Take 0.5 of a 2.5 mg tablet.    To take:  2.5 mg Take 1 of the 2.5 mg tablets.

## 2018-05-30 ENCOUNTER — TELEPHONE (OUTPATIENT)
Dept: INTERNAL MEDICINE | Facility: CLINIC | Age: 83
End: 2018-05-30

## 2018-05-30 NOTE — TELEPHONE ENCOUNTER
Patient's wife calls, consent to communicate on file. She states patient is having very bad allergy symptoms - Sneezing, runny nose and tired.     He is not taking any medication for symptoms, she was concerned about it interacting with other medications and didn't know if it was safe to take with his health conditions. Reviewed patient's chart, recommended OTC Claritin or Zyrtec for allergies. Patient's wife has Claritin-D at home and asks if he can take this, advised against him taking this as he has a history of high blood pressure and pseudoephedrine can raise BP. Advised if Claritin or Zyrtec do not help, he could try a nasal spray. She states he has Flonase at home, informed her he could try this for allergies as well. She verbalizes understanding and will call back if symptoms do not improve.

## 2018-06-05 ENCOUNTER — TELEPHONE (OUTPATIENT)
Dept: CARDIOLOGY | Facility: CLINIC | Age: 83
End: 2018-06-05

## 2018-06-05 ENCOUNTER — OFFICE VISIT (OUTPATIENT)
Dept: CARDIOLOGY | Facility: CLINIC | Age: 83
End: 2018-06-05
Attending: INTERNAL MEDICINE
Payer: COMMERCIAL

## 2018-06-05 ENCOUNTER — DOCUMENTATION ONLY (OUTPATIENT)
Dept: CARDIOLOGY | Facility: CLINIC | Age: 83
End: 2018-06-05

## 2018-06-05 VITALS
WEIGHT: 189.3 LBS | HEIGHT: 70 IN | SYSTOLIC BLOOD PRESSURE: 156 MMHG | DIASTOLIC BLOOD PRESSURE: 84 MMHG | BODY MASS INDEX: 27.1 KG/M2 | HEART RATE: 72 BPM

## 2018-06-05 DIAGNOSIS — I10 ESSENTIAL HYPERTENSION WITH GOAL BLOOD PRESSURE LESS THAN 140/90: ICD-10-CM

## 2018-06-05 DIAGNOSIS — R53.83 OTHER FATIGUE: Primary | ICD-10-CM

## 2018-06-05 DIAGNOSIS — I10 ESSENTIAL HYPERTENSION, BENIGN: ICD-10-CM

## 2018-06-05 DIAGNOSIS — I10 BENIGN ESSENTIAL HYPERTENSION: Primary | ICD-10-CM

## 2018-06-05 LAB
ANION GAP SERPL CALCULATED.3IONS-SCNC: 7 MMOL/L (ref 3–14)
BUN SERPL-MCNC: 38 MG/DL (ref 7–30)
CALCIUM SERPL-MCNC: 9.2 MG/DL (ref 8.5–10.1)
CHLORIDE SERPL-SCNC: 109 MMOL/L (ref 94–109)
CO2 SERPL-SCNC: 22 MMOL/L (ref 20–32)
CREAT SERPL-MCNC: 1.75 MG/DL (ref 0.66–1.25)
GFR SERPL CREATININE-BSD FRML MDRD: 37 ML/MIN/1.7M2
GLUCOSE SERPL-MCNC: 84 MG/DL (ref 70–99)
POTASSIUM SERPL-SCNC: 4.1 MMOL/L (ref 3.4–5.3)
SODIUM SERPL-SCNC: 138 MMOL/L (ref 133–144)

## 2018-06-05 PROCEDURE — 36415 COLL VENOUS BLD VENIPUNCTURE: CPT | Performed by: NURSE PRACTITIONER

## 2018-06-05 PROCEDURE — 80048 BASIC METABOLIC PNL TOTAL CA: CPT | Performed by: NURSE PRACTITIONER

## 2018-06-05 PROCEDURE — 99214 OFFICE O/P EST MOD 30 MIN: CPT | Performed by: NURSE PRACTITIONER

## 2018-06-05 RX ORDER — CARVEDILOL 25 MG/1
37.5 TABLET ORAL 2 TIMES DAILY WITH MEALS
Qty: 210 TABLET | Refills: 3 | Status: SHIPPED | OUTPATIENT
Start: 2018-06-05 | End: 2018-06-15

## 2018-06-05 RX ORDER — TRIAMTERENE AND HYDROCHLOROTHIAZIDE 75; 50 MG/1; MG/1
0.5 TABLET ORAL DAILY
Qty: 90 TABLET | Refills: 0 | Status: SHIPPED | OUTPATIENT
Start: 2018-06-05 | End: 2018-06-13

## 2018-06-05 NOTE — TELEPHONE ENCOUNTER
Pt has known CRI, but Cr. Up to 1.75 which is above his baseline.  Likely due to increased dose of Maxzide.  Pls call and have him reduce his Maxzide to 1/2 pill per day instead.  He will need to return next week for BMP  Thanks, JAIRO  --------------------------------------------------------------------------------------------------------------------  Addendum 06-05-18  (16:57)    Called patient. Left patient a message to return my call. ~Chantal GORDILLO

## 2018-06-05 NOTE — LETTER
6/5/2018    Kwadwo Winslow MD  [address] [facility] [address]    RE: Tucker Slater       Dear Colleague,    I had the pleasure of seeing Tucker Slater in the [facility].    HPI and Plan: #[id number]  See dictation    Orders Placed This Encounter   Procedures     Basic metabolic panel     Follow-Up with Cardiac Advanced Practice Provider     Overnight oximetry study       Orders Placed This Encounter   Medications     carvedilol (COREG) 25 MG tablet     Sig: Take 1.5 tablets (37.5 mg) by mouth 2 times daily (with meals)     Dispense:  210 tablet     Refill:  3     Take 1 1/2 tab in am and pm       Medications Discontinued During This Encounter   Medication Reason     triamterene-hydrochlorothiazide (MAXZIDE) 75-50 MG per tablet Medication Reconciliation Clean Up     ferrous sulfate (SLO-FE) 142 (45 FE) MG TBCR Stopped by Patient     carvedilol (COREG) 25 MG tablet Reorder         Encounter Diagnoses   Name Primary?     Essential hypertension with goal blood pressure less than 140/90      Other fatigue Yes       CURRENT MEDICATIONS:  Current Outpatient Prescriptions   Medication Sig Dispense Refill     ACETAMINOPHEN PO Take 650 mg by mouth every 4 hours as needed for pain       Ascorbic Acid (VITAMIN C PO) Take 500 mg by mouth daily        calcium carbonate (OS-KARLA 500 MG [place]. [place]) 500 MG tablet Take 500 mg by mouth daily        carvedilol (COREG) 25 MG tablet Take 1.5 tablets (37.5 mg) by mouth 2 times daily (with meals) 210 tablet 3     JANTOVEN 2.5 MG tablet TAKE ONE TABLET BY MOUTH EVERY DAY, EXCEPT TAKE ONE-HALF TABLET BY MOUTH ON FRIDAYS, OR AS INSTRUCTED BASED ON INR RESULTS 90 tablet 1     losartan (COZAAR) 100 MG tablet Take 1 tablet (100 mg) by mouth daily 90 tablet 0     Multiple Vitamins-Minerals (OCUVITE PO) Take 1 tablet by mouth daily        triamterene-hydrochlorothiazide (MAXZIDE) 75-50 MG per tablet Take 1 tablet by mouth daily Hold for SBP <100. 90  tablet 0     vitamin B complex with vitamin C (VITAMIN  B COMPLEX) TABS tablet Take 1 tablet by mouth daily       VITAMIN D, CHOLECALCIFEROL, PO Take 2,000 Units by mouth daily.       [DISCONTINUED] carvedilol (COREG) 25 MG tablet Take 1 tablet (25 mg) by mouth 2 times daily (with meals) 180 tablet 3     [DISCONTINUED] triamterene-hydrochlorothiazide (MAXZIDE) 75-50 MG per tablet TAKE ONE-HALF TABLET BY MOUTH EVERY DAY 45 tablet 3       ALLERGIES     Allergies   Allergen Reactions     Morphine Nausea and Vomiting     Amlodipine      Edema       PAST MEDICAL HISTORY:  Past Medical History:   Diagnosis Date     Arrhythmia     A Fib     Balance problem     related to neuropathy in feet     Bradycardia      Carcinoma in situ of bladder 2000    bladder cancer ;; follow w Urology w periodic cysto      Essential hypertension, benign      Generalized osteoarthrosis, unspecified site knees    s/p R total knee 8/09 ; will do L 6/10      Numbness and tingling     toes and fingers     Pacemaker     [employer]      Pain in joint, shoulder region     L shoulder rotater tear; no surgery      Persistent atrial fibrillation (H) 1/30/15     Prostate CA (H) 2008-9    radiation     Prostate cancer (H) 11/08    completed RT 3/09     Renal disease     elevated creatinine     Shoulder impingement     R shoulder impingement etc see MRI 4/09      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     toes both feet        PAST SURGICAL HISTORY:  Past Surgical History:   Procedure Laterality Date     ARTHROPLASTY HIP Right 3/27/2017    Procedure: ARTHROPLASTY HIP;  Surgeon: Jigar Wynn MD;  Location: [facility] OR     C NONSPECIFIC PROCEDURE      bilat inguinal hernia repairs ( 3total procedures)      C NONSPECIFIC PROCEDURE      pilonidal cyst     C NONSPECIFIC PROCEDURE      T + A     C NONSPECIFIC PROCEDURE  8/09     R+L total knee     CARDIOVERSION  3/26/15     COLONOSCOPY       IMPLANT PACEMAKER  3/26/15     RELEASE CARPAL  "TUNNEL Right 2017    Procedure: RELEASE CARPAL TUNNEL;  Right carpal tunnel release;  Surgeon: Alonso Barboza MD;  Location: [facility] OR       FAMILY HISTORY:  Family History   Problem Relation Age of Onset     HEART DISEASE Father       87yo     Coronary Artery Disease Father      HEART DISEASE Mother       76yo     Coronary Artery Disease Mother      Family History Negative Brother        SOCIAL HISTORY:  Social History     Social History     Marital status:      Spouse name: Alba     Number of children: 3     Years of education: N/A     Occupational History      Retired      w [employer]     Social History Main Topics     Smoking status: Former Smoker     Quit date: 1977     Smokeless tobacco: Never Used     Alcohol use No     Drug use: No     Sexual activity: No     Other Topics Concern     Caffeine Concern No     4-5 cups per week      Sleep Concern No     Stress Concern No     Weight Concern No     Special Diet No     Exercise No     yard work and moves around a lot     Social History Narrative       Review of Systems:  Skin:  Positive for   hx skin cancer,  has a form of skin cancer - per pt    Eyes:  Positive for   reading glasses, cataract extraction of both eyes   ENT:  Positive for postnasal drainage;sinus trouble    Respiratory:  Positive for cough PND cough    Cardiovascular:    lightheadedness;Positive for;edema;fatigue occas lightheadedness - not bad - per pt ,  some edema - per pt   Gastroenterology: Negative      Genitourinary:  Negative      Musculoskeletal:  Positive for arthritis;joint pain;joint stiffness 2 new knees ,  new right hip  Neurologic:         Psychiatric:         Heme/Lymph/Imm:         Endocrine:           Physical Exam:  Vitals: /84 (BP Location: Right arm, Patient Position: Sitting, Cuff Size: Adult Large)  Pulse 72  Ht 1.778 m (5' 10\")  Wt 85.9 kg (189 lb 4.8 oz)  BMI 27.16 kg/m2    Constitutional:  cooperative;in no acute " distress        Skin:  warm and dry to the touch          Head:  normocephalic        Eyes:  pupils equal and round        Lymph:      ENT:  no pallor or cyanosis        Neck:  JVP normal        Respiratory:  clear to auscultation;normal symmetry;normal respiratory excursion         Cardiac: regular rhythm;normal S1 and S2     no presence of murmur          pulses full and equal                                        GI:  abdomen soft;BS normoactive        Extremities and Muscular Skeletal:  no edema;no spinal abnormalities noted              Neurological:  affect appropriate        Psych:  Alert and Oriented x 3          CC  Aleena Perez MD  [address]  [place] [id number]                    Thank you for allowing me to participate in the care of your patient.      Sincerely,     SELMA Santos [facility]    cc:   Aleena Perez MD  [address]  [place] [id number]

## 2018-06-05 NOTE — PATIENT INSTRUCTIONS
Obtain overnight oximeter (to check your oxygen level when you sleep)  Increase your Carvedilol to 1 1/2 tabs twice a day (37.5mg twice a day)  Get labs today to check kidney function  Increase your water intake  Return for follow up in 1 month

## 2018-06-05 NOTE — PROGRESS NOTES
HPI and Plan: #[id number]  See dictation    Orders Placed This Encounter   Procedures     Basic metabolic panel     Follow-Up with Cardiac Advanced Practice Provider     Overnight oximetry study       Orders Placed This Encounter   Medications     carvedilol (COREG) 25 MG tablet     Sig: Take 1.5 tablets (37.5 mg) by mouth 2 times daily (with meals)     Dispense:  210 tablet     Refill:  3     Take 1 1/2 tab in am and pm       Medications Discontinued During This Encounter   Medication Reason     triamterene-hydrochlorothiazide (MAXZIDE) 75-50 MG per tablet Medication Reconciliation Clean Up     ferrous sulfate (SLO-FE) 142 (45 FE) MG TBCR Stopped by Patient     carvedilol (COREG) 25 MG tablet Reorder         Encounter Diagnoses   Name Primary?     Essential hypertension with goal blood pressure less than 140/90      Other fatigue Yes       CURRENT MEDICATIONS:  Current Outpatient Prescriptions   Medication Sig Dispense Refill     ACETAMINOPHEN PO Take 650 mg by mouth every 4 hours as needed for pain       Ascorbic Acid (VITAMIN C PO) Take 500 mg by mouth daily        calcium carbonate (OS-KARLA 500 MG [place]. CA) 500 MG tablet Take 500 mg by mouth daily        carvedilol (COREG) 25 MG tablet Take 1.5 tablets (37.5 mg) by mouth 2 times daily (with meals) 210 tablet 3     JANTOVEN 2.5 MG tablet TAKE ONE TABLET BY MOUTH EVERY DAY, EXCEPT TAKE ONE-HALF TABLET BY MOUTH ON FRIDAYS, OR AS INSTRUCTED BASED ON INR RESULTS 90 tablet 1     losartan (COZAAR) 100 MG tablet Take 1 tablet (100 mg) by mouth daily 90 tablet 0     Multiple Vitamins-Minerals (OCUVITE PO) Take 1 tablet by mouth daily        triamterene-hydrochlorothiazide (MAXZIDE) 75-50 MG per tablet Take 1 tablet by mouth daily Hold for SBP <100. 90 tablet 0     vitamin B complex with vitamin C (VITAMIN  B COMPLEX) TABS tablet Take 1 tablet by mouth daily       VITAMIN D, CHOLECALCIFEROL, PO Take 2,000 Units by mouth daily.       [DISCONTINUED] carvedilol (COREG) 25 MG  tablet Take 1 tablet (25 mg) by mouth 2 times daily (with meals) 180 tablet 3     [DISCONTINUED] triamterene-hydrochlorothiazide (MAXZIDE) 75-50 MG per tablet TAKE ONE-HALF TABLET BY MOUTH EVERY DAY 45 tablet 3       ALLERGIES     Allergies   Allergen Reactions     Morphine Nausea and Vomiting     Amlodipine      Edema       PAST MEDICAL HISTORY:  Past Medical History:   Diagnosis Date     Arrhythmia     A Fib     Balance problem     related to neuropathy in feet     Bradycardia      Carcinoma in situ of bladder 2000    bladder cancer ;; follow w Urology w periodic cysto      Essential hypertension, benign      Generalized osteoarthrosis, unspecified site knees    s/p R total knee 8/09 ; will do L 6/10      Numbness and tingling     toes and fingers     Pacemaker     [employer]      Pain in joint, shoulder region     L shoulder rotater tear; no surgery      Persistent atrial fibrillation (H) 1/30/15     Prostate CA (H) 2008-9    radiation     Prostate cancer (H) 11/08    completed RT 3/09     Renal disease     elevated creatinine     Shoulder impingement     R shoulder impingement etc see MRI 4/09      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     toes both feet        PAST SURGICAL HISTORY:  Past Surgical History:   Procedure Laterality Date     ARTHROPLASTY HIP Right 3/27/2017    Procedure: ARTHROPLASTY HIP;  Surgeon: Jigar Wynn MD;  Location: [facility] OR     C NONSPECIFIC PROCEDURE      bilat inguinal hernia repairs ( 3total procedures)      C NONSPECIFIC PROCEDURE      pilonidal cyst     C NONSPECIFIC PROCEDURE      T + A     C NONSPECIFIC PROCEDURE  8/09     R+L total knee     CARDIOVERSION  3/26/15     COLONOSCOPY       IMPLANT PACEMAKER  3/26/15     RELEASE CARPAL TUNNEL Right 4/19/2017    Procedure: RELEASE CARPAL TUNNEL;  Right carpal tunnel release;  Surgeon: Alonso Barboza MD;  Location: [facility] OR       FAMILY HISTORY:  Family History   Problem Relation Age of Onset      "HEART DISEASE Father       89yo     Coronary Artery Disease Father      HEART DISEASE Mother       76yo     Coronary Artery Disease Mother      Family History Negative Brother        SOCIAL HISTORY:  Social History     Social History     Marital status:      Spouse name: Alba     Number of children: 3     Years of education: N/A     Occupational History      Retired      w [employer]     Social History Main Topics     Smoking status: Former Smoker     Quit date: 1977     Smokeless tobacco: Never Used     Alcohol use No     Drug use: No     Sexual activity: No     Other Topics Concern     Caffeine Concern No     4-5 cups per week      Sleep Concern No     Stress Concern No     Weight Concern No     Special Diet No     Exercise No     yard work and moves around a lot     Social History Narrative       Review of Systems:  Skin:  Positive for   hx skin cancer,  has a form of skin cancer - per pt    Eyes:  Positive for   reading glasses, cataract extraction of both eyes   ENT:  Positive for postnasal drainage;sinus trouble    Respiratory:  Positive for cough PND cough    Cardiovascular:    lightheadedness;Positive for;edema;fatigue occas lightheadedness - not bad - per pt ,  some edema - per pt   Gastroenterology: Negative      Genitourinary:  Negative      Musculoskeletal:  Positive for arthritis;joint pain;joint stiffness 2 new knees ,  new right hip  Neurologic:         Psychiatric:         Heme/Lymph/Imm:         Endocrine:           Physical Exam:  Vitals: /84 (BP Location: Right arm, Patient Position: Sitting, Cuff Size: Adult Large)  Pulse 72  Ht 1.778 m (5' 10\")  Wt 85.9 kg (189 lb 4.8 oz)  BMI 27.16 kg/m2    Constitutional:  cooperative;in no acute distress        Skin:  warm and dry to the touch          Head:  normocephalic        Eyes:  pupils equal and round        Lymph:      ENT:  no pallor or cyanosis        Neck:  JVP normal        Respiratory:  clear to " auscultation;normal symmetry;normal respiratory excursion         Cardiac: regular rhythm;normal S1 and S2     no presence of murmur          pulses full and equal                                        GI:  abdomen soft;BS normoactive        Extremities and Muscular Skeletal:  no edema;no spinal abnormalities noted              Neurological:  affect appropriate        Psych:  Alert and Oriented x 3          CC  Aleena Perez MD  [address]  [place] [address]

## 2018-06-05 NOTE — MR AVS SNAPSHOT
After Visit Summary   6/5/2018    Tucker Slater    MRN: [id number]           Patient Information     Date Of Birth          3/13/1931        Visit Information        Provider Department      6/5/2018 3:10 PM Janeth Mcdaniels APRN CNP [facility]        Today's Diagnoses     Other fatigue    -  1    Essential hypertension with goal blood pressure less than 140/90          Care Instructions    Obtain overnight oximeter (to check your oxygen level when you sleep)  Increase your Carvedilol to 1 1/2 tabs twice a day (37.5mg twice a day)  Get labs today to check kidney function  Increase your water intake  Return for follow up in 1 month          Follow-ups after your visit        Additional Services     Follow-Up with Cardiac Advanced Practice Provider                 Your next 10 appointments already scheduled     Jun 20, 2018  1:45 PM CDT   [facility]    [address]  [facility] [address]   [phone]            Jul 10, 2018  3:10 PM CDT   Return Visit with SELMA Landis CNP   [facility] ([facility])    [address]  [facility] [address]   [phone]            Aug 23, 2018  3:15 PM CDT   Remote PPM Check with KIDD TECH1   [facility] ([facility])    [address]  [facility] [id number] [phone] OPT 2           This appointment is for a remote check of your pacemaker.  This is not an appointment at the office.              Future tests that were ordered for you today     Open Future Orders        Priority Expected Expires Ordered    Basic metabolic panel Routine 6/5/2018 6/5/2019 6/5/2018    Follow-Up with Cardiac Advanced Practice Provider Routine 7/5/2018 6/5/2019 6/5/2018    Overnight oximetry  "study Routine 2018            Who to contact     If you have questions or need follow up information about today's clinic visit or your schedule please contact [facility] directly at [phone].  Normal or non-critical lab and imaging results will be communicated to you by MyChart, letter or phone within 4 business days after the clinic has received the results. If you do not hear from us within 7 days, please contact the clinic through MyChart or phone. If you have a critical or abnormal lab result, we will notify you by phone as soon as possible.  Submit refill requests through [employer] or call your pharmacy and they will forward the refill request to us. Please allow 3 business days for your refill to be completed.          Additional Information About Your Visit        MyChart Information     [employer] lets you send messages to your doctor, view your test results, renew your prescriptions, schedule appointments and more. To sign up, go to [email or web address] . Click on \"Log in\" on the left side of the screen, which will take you to the Welcome page. Then click on \"Sign up Now\" on the right side of the page.     You will be asked to enter the access code listed below, as well as some personal information. Please follow the directions to create your username and password.     Your access code is: [email or web address]  Expires: 8/15/2018  8:36 AM     Your access code will  in 90 days. If you need help or a new code, please call your [place] clinic or [phone].        Care EveryWhere ID     This is your Care EveryWhere ID. This could be used by other organizations to access your [place] medical records  [id number]        Your Vitals Were     Pulse Height BMI (Body Mass Index)             72 1.778 m (5' 10\") 27.16 kg/m2          Blood Pressure from Last 3 Encounters:   18 156/84   17 124/64   17 124/72    Weight from Last 3 " Encounters:   06/05/18 85.9 kg (189 lb 4.8 oz)   12/29/17 85.7 kg (189 lb)   05/01/17 83 kg (183 lb)              We Performed the Following     Follow-Up with Cardiac Advanced Practice Provider          Today's Medication Changes          These changes are accurate as of 6/5/18  3:49 PM.  If you have any questions, ask your nurse or doctor.               These medicines have changed or have updated prescriptions.        Dose/Directions    carvedilol 25 MG tablet   Commonly known as:  COREG   This may have changed:  how much to take   Used for:  Essential hypertension with goal blood pressure less than 140/90   Changed by:  Janeth Mcdaniels APRN CNP        Dose:  37.5 mg   Take 1.5 tablets (37.5 mg) by mouth 2 times daily (with meals)   Quantity:  210 tablet   Refills:  3            Where to get your medicines      These medications were sent to [facility] [place], [place] - [address]  [facility] [address] [facility] [address]     Phone:  [phone]     carvedilol 25 MG tablet                Primary Care Provider Office Phone # Fax #    Kwadwo Winslow [phone]       [address]  [facility] [address]        Equal Access to Services     MEREDITH JIM AH: Hadii mat ku hadasho Soomaali, waaxda luqadaha, qaybta kaalmada adeegyada, gisela eddy. So [facility] [phone].    ATENCIÓN: Si habla español, tiene a kraft disposición servicios gratuitos de asistencia lingüística. Llame al [phone].    We comply with applicable federal civil rights laws and [place] laws. We do not discriminate on the basis of race, color, national origin, age, disability, sex, sexual orientation, or gender identity.            Thank you!     Thank you for choosing [facility]  for your care. Our goal is always to provide you with excellent care. Hearing back from our patients is one way we can continue to improve our  services. Please take a few minutes to complete the written survey that you may receive in the mail after your visit with us. Thank you!             Your Updated Medication List - Protect others around you: Learn how to safely use, store and throw away your medicines at www.disposemymeds.org.          This list is accurate as of 6/5/18  3:49 PM.  Always use your most recent med list.                   Brand Name Dispense Instructions for use Diagnosis    ACETAMINOPHEN PO      Take 650 mg by mouth every 4 hours as needed for pain        calcium carbonate 500 MG tablet    OS-KARLA 500 mg [place]. Ca     Take 500 mg by mouth daily        carvedilol 25 MG tablet    COREG    210 tablet    Take 1.5 tablets (37.5 mg) by mouth 2 times daily (with meals)    Essential hypertension with goal blood pressure less than 140/90       JANTOVEN 2.5 MG tablet   Generic drug:  warfarin     90 tablet    TAKE ONE TABLET BY MOUTH EVERY DAY, EXCEPT TAKE ONE-HALF TABLET BY MOUTH ON FRIDAYS, OR AS INSTRUCTED BASED ON INR RESULTS    Chronic atrial fibrillation (H)       losartan 100 MG tablet    COZAAR    90 tablet    Take 1 tablet (100 mg) by mouth daily    Essential hypertension, benign       OCUVITE PO      Take 1 tablet by mouth daily        triamterene-hydrochlorothiazide 75-50 MG per tablet    MAXZIDE    90 tablet    Take 1 tablet by mouth daily Hold for SBP <100.    Essential hypertension, benign       vitamin B complex with vitamin C Tabs tablet      Take 1 tablet by mouth daily        VITAMIN C PO      Take 500 mg by mouth daily        VITAMIN D (CHOLECALCIFEROL) PO      Take 2,000 Units by mouth daily.

## 2018-06-05 NOTE — LETTER
6/5/2018      Kwadwo Winslow MD  [address] [facility] [address]      RE: Tucker Slater       Dear Colleague,    I had the pleasure of seeing Tucker Slater in the [facility].    Service Date: 06/05/2018      HISTORY OF PRESENT ILLNESS:  This is an 87-year-old male who presents to the [facility] today for a followup visit.  He is a patient of Dr. Perez seen in our clinic for atrial fibrillation, bradycardia status post permanent pacemaker, hypertension, chronic kidney disease.      Tucker has a history of persistent atrial fibrillation and significant bradycardia.  In 2015, he underwent placement of a permanent pacemaker.  He has since been found to have permanent atrial fibrillation. Interrogation of his device recently showed that he is 88% ventricular paced with an underlying rhythm of atrial fibrillation.  He has remained on chronic warfarin, being managed through his primary medical doctor.  Over the years, Tucker has required multiple agents to control his blood pressure.  Recently, Dr. Winslow has increased his Maxzide to 75 mg/50 mg a day.  He does have underlying stage III chronic kidney disease that has been fairly stable over the past 3 years.  He returns today for an annual office visit.        Tucker in general tells me he is doing fairly well.  Over the past year he has noted a decline in his energy.  He does feel like it is harder to go up and down the stairs, and he gets more weakness in his legs.  He denies any palpitations.  He has not had any lightheadedness, dizziness, near-syncope.  He denies shortness of breath or significant chest pain.  His significant other does state that he snores and coughs intermittently during the nighttime.  He is unsure if he has any sleep apnea.      PHYSICAL EXAMINATION:  His blood pressure today is 156/84, heart rate is 72 beats per minute and is regular.  His lungs are  clear.  There is no peripheral edema.  Weight is stable at 189 pounds.      IMPRESSION AND PLAN:   1.  Permanent atrial fibrillation.  Rate controlled since 2016.  He is on chronic warfarin.  Due to significant bradycardia, he underwent pacemaker in .  We will follow closely through the Device Clinic.   2.  Hypertension.  Blood pressure elevated despite the increased dose of Maxzide.  At this time, I have asked him to increase his carvedilol up to 37.5 mg twice daily and continue with Cozaar 100 mg and Maxzide 75/50.  I would like for him to undergo a BMP in our office, and I will review with him over the phone as he does have stage III chronic kidney disease.   3.  Fatigue.  He has agreed to undergo overnight oximetry.  We discussed possibly proceeding with stress testing due to the leg weakness with ambulation.  However, he has no angina symptoms and prefers working on medical management at this time.  We will have him return in 1 month for reassessment.      Thank you for allowing me to participate in this patient's care.         SELMA JONES, CNP             D: 2018   T: 2018   MT: COLBY      Name:     ROSS LORENZANA   MRN:      [id number]        Account:      [id number]   :      1931           Service Date: 2018      Document: [id number]           Outpatient Encounter Prescriptions as of 2018   Medication Sig Dispense Refill     ACETAMINOPHEN PO Take 650 mg by mouth every 4 hours as needed for pain       Ascorbic Acid (VITAMIN C PO) Take 500 mg by mouth daily        calcium carbonate (OS-KARLA 500 MG [place]. CA) 500 MG tablet Take 500 mg by mouth daily        carvedilol (COREG) 25 MG tablet Take 1.5 tablets (37.5 mg) by mouth 2 times daily (with meals) 210 tablet 3     JANTOVEN 2.5 MG tablet TAKE ONE TABLET BY MOUTH EVERY DAY, EXCEPT TAKE ONE-HALF TABLET BY MOUTH ON , OR AS INSTRUCTED BASED ON INR RESULTS 90 tablet 1     losartan (COZAAR) 100 MG tablet Take 1  tablet (100 mg) by mouth daily 90 tablet 0     Multiple Vitamins-Minerals (OCUVITE PO) Take 1 tablet by mouth daily        vitamin B complex with vitamin C (VITAMIN  B COMPLEX) TABS tablet Take 1 tablet by mouth daily       VITAMIN D, CHOLECALCIFEROL, PO Take 2,000 Units by mouth daily.       [DISCONTINUED] triamterene-hydrochlorothiazide (MAXZIDE) 75-50 MG per tablet Take 1 tablet by mouth daily Hold for SBP <100. 90 tablet 0     [DISCONTINUED] carvedilol (COREG) 25 MG tablet Take 1 tablet (25 mg) by mouth 2 times daily (with meals) 180 tablet 3     [DISCONTINUED] ferrous sulfate (SLO-FE) 142 (45 FE) MG TBCR Take 1 tablet (142 mg) by mouth daily 30 tablet 3     [DISCONTINUED] triamterene-hydrochlorothiazide (MAXZIDE) 75-50 MG per tablet TAKE ONE-HALF TABLET BY MOUTH EVERY DAY 45 tablet 3     No facility-administered encounter medications on file as of 6/5/2018.        Again, thank you for allowing me to participate in the care of your patient.      Sincerely,    SELMA Santos [facility] [place]

## 2018-06-05 NOTE — PROGRESS NOTES
Overnight Oximetry Monitor    Ordered by: Janeth Mcdaniels CNP   Date given: 6/5/18  Study to be completed: Overnight Oximeter  Patient was given instructions on how to use and wear the monitor. Patient took the monitor home. Patient was told to expect a phone call to coordinate a time for the monitor/signed form to be picked up.    Patient had no questions.     Nikki GORDILLO   [facility]

## 2018-06-06 NOTE — PROGRESS NOTES
Service Date: 06/05/2018      HISTORY OF PRESENT ILLNESS:  This is an 87-year-old male who presents to the [facility] today for a followup visit.  He is a patient of Dr. Perez seen in our clinic for atrial fibrillation, bradycardia status post permanent pacemaker, hypertension, chronic kidney disease.      Tucker has a history of persistent atrial fibrillation and significant bradycardia.  In 2015, he underwent placement of a permanent pacemaker.  He has since been found to have permanent atrial fibrillation. Interrogation of his device recently showed that he is 88% ventricular paced with an underlying rhythm of atrial fibrillation.  He has remained on chronic warfarin, being managed through his primary medical doctor.  Over the years, Tucker has required multiple agents to control his blood pressure.  Recently, Dr. Winslow has increased his Maxzide to 75 mg/50 mg a day.  He does have underlying stage III chronic kidney disease that has been fairly stable over the past 3 years.  He returns today for an annual office visit.        Tucker in general tells me he is doing fairly well.  Over the past year he has noted a decline in his energy.  He does feel like it is harder to go up and down the stairs, and he gets more weakness in his legs.  He denies any palpitations.  He has not had any lightheadedness, dizziness, near-syncope.  He denies shortness of breath or significant chest pain.  His significant other does state that he snores and coughs intermittently during the nighttime.  He is unsure if he has any sleep apnea.      PHYSICAL EXAMINATION:  His blood pressure today is 156/84, heart rate is 72 beats per minute and is regular.  His lungs are clear.  There is no peripheral edema.  Weight is stable at 189 pounds.      IMPRESSION AND PLAN:   1.  Permanent atrial fibrillation.  Rate controlled since 2016.  He is on chronic warfarin.  Due to significant bradycardia, he  underwent pacemaker in .  We will follow closely through the Device Clinic.   2.  Hypertension.  Blood pressure elevated despite the increased dose of Maxzide.  At this time, I have asked him to increase his carvedilol up to 37.5 mg twice daily and continue with Cozaar 100 mg and Maxzide 75/50.  I would like for him to undergo a BMP in our office, and I will review with him over the phone as he does have stage III chronic kidney disease.   3.  Fatigue.  He has agreed to undergo overnight oximetry.  We discussed possibly proceeding with stress testing due to the leg weakness with ambulation.  However, he has no angina symptoms and prefers working on medical management at this time.  We will have him return in 1 month for reassessment.      Thank you for allowing me to participate in this patient's care.         SELMA JONES, CNP             D: 2018   T: 2018   MT: COLBY      Name:     ROSS LORENZANA   MRN:      [id number]        Account:      [id number]   :      1931           Service Date: 2018      Document: [id number]

## 2018-06-06 NOTE — TELEPHONE ENCOUNTER
Pt called back, left vm.     Called pt, advised of Janeth Mcdaniels's recommendation to decrease Maxzide to 1/2 tablet daily. Pt will do this starting tomorrow since he already took his meds this morning. Scheduled for [facility] 6/13/18. Advised would have Janeth review lab and will get back to him.   Pt agreed w/ plan.   Nikki GORDILLO

## 2018-06-07 ENCOUNTER — TRANSFERRED RECORDS (OUTPATIENT)
Dept: HEALTH INFORMATION MANAGEMENT | Facility: CLINIC | Age: 83
End: 2018-06-07

## 2018-06-08 ENCOUNTER — TELEPHONE (OUTPATIENT)
Dept: CARDIOLOGY | Facility: CLINIC | Age: 83
End: 2018-06-08

## 2018-06-08 DIAGNOSIS — R53.83 OTHER FATIGUE: Primary | ICD-10-CM

## 2018-06-13 ENCOUNTER — TELEPHONE (OUTPATIENT)
Dept: CARDIOLOGY | Facility: CLINIC | Age: 83
End: 2018-06-13

## 2018-06-13 DIAGNOSIS — I10 BENIGN ESSENTIAL HYPERTENSION: ICD-10-CM

## 2018-06-13 DIAGNOSIS — I10 ESSENTIAL HYPERTENSION, BENIGN: ICD-10-CM

## 2018-06-13 LAB
ANION GAP SERPL CALCULATED.3IONS-SCNC: 7 MMOL/L (ref 3–14)
BUN SERPL-MCNC: 35 MG/DL (ref 7–30)
CALCIUM SERPL-MCNC: 8.9 MG/DL (ref 8.5–10.1)
CHLORIDE SERPL-SCNC: 109 MMOL/L (ref 94–109)
CO2 SERPL-SCNC: 25 MMOL/L (ref 20–32)
CREAT SERPL-MCNC: 1.72 MG/DL (ref 0.66–1.25)
GFR SERPL CREATININE-BSD FRML MDRD: 38 ML/MIN/1.7M2
GLUCOSE SERPL-MCNC: 88 MG/DL (ref 70–99)
POTASSIUM SERPL-SCNC: 3.9 MMOL/L (ref 3.4–5.3)
SODIUM SERPL-SCNC: 141 MMOL/L (ref 133–144)

## 2018-06-13 PROCEDURE — 36415 COLL VENOUS BLD VENIPUNCTURE: CPT | Performed by: NURSE PRACTITIONER

## 2018-06-13 PROCEDURE — 80048 BASIC METABOLIC PNL TOTAL CA: CPT | Performed by: NURSE PRACTITIONER

## 2018-06-13 RX ORDER — LOSARTAN POTASSIUM 100 MG/1
50 TABLET ORAL DAILY
Qty: 90 TABLET | Refills: 0 | Status: SHIPPED | OUTPATIENT
Start: 2018-06-13 | End: 2018-06-20

## 2018-06-13 NOTE — TELEPHONE ENCOUNTER
Patient has known CRI. Creatinine was 1.75 which was above patient's baseline. Maxide was reduced to 1/2 tablet a day.    Component      Latest Ref Rng & Units 6/5/2018 6/13/2018   Potassium      3.4 - 5.3 mmol/L 4.1 3.9   Chloride      94 - 109 mmol/L 109 109   Carbon Dioxide      20 - 32 mmol/L 22 25   Anion Gap      3 - 14 mmol/L 7 7   Glucose      70 - 99 mg/dL 84 88   Urea Nitrogen      7 - 30 mg/dL 38 (H) 35 (H)   Creatinine      0.66 - 1.25 mg/dL 1.75 (H) 1.72 (H)   GFR Estimate      >60 mL/min/1.7m2 37 (L) 38 (L)   GFR Estimate If Black      >60 mL/min/1.7m2 45 (L) 46 (L)   Calcium      8.5 - 10.1 mg/dL 9.2 8.9     Creatinine is still elevated. Slight improvement. Messaged Janeth Mcdaniels NP to review. ~TGarbers RN

## 2018-06-13 NOTE — TELEPHONE ENCOUNTER
Recommendations were reviewed with patient. Patient was advised to decrease his Cozaar from 100 mg/d to 50 mg/d and to stop his Maxzide. Encouraged water intake. Patient is scheduled for a BP check and BMP on 06-20-18. Patient had no questions. ~Chantal GORDILLO

## 2018-06-13 NOTE — TELEPHONE ENCOUNTER
Still elevated creatinine levels.  Recommend reducing Cozaar to 50mg and stopping Maxzide  Continue to encourage water intake  Return next week for labs and BP check  With RN.  May need to add Hydralazine instead  Please call pt to review Thanks, JS

## 2018-06-15 DIAGNOSIS — I10 ESSENTIAL HYPERTENSION WITH GOAL BLOOD PRESSURE LESS THAN 140/90: ICD-10-CM

## 2018-06-15 RX ORDER — CARVEDILOL 25 MG/1
37.5 TABLET ORAL 2 TIMES DAILY WITH MEALS
Qty: 270 TABLET | Refills: 3 | Status: SHIPPED | OUTPATIENT
Start: 2018-06-15 | End: 2019-07-15

## 2018-06-20 ENCOUNTER — CARE COORDINATION (OUTPATIENT)
Dept: CARDIOLOGY | Facility: CLINIC | Age: 83
End: 2018-06-20

## 2018-06-20 ENCOUNTER — ALLIED HEALTH/NURSE VISIT (OUTPATIENT)
Dept: CARDIOLOGY | Facility: CLINIC | Age: 83
End: 2018-06-20
Payer: COMMERCIAL

## 2018-06-20 ENCOUNTER — TELEPHONE (OUTPATIENT)
Dept: CARDIOLOGY | Facility: CLINIC | Age: 83
End: 2018-06-20

## 2018-06-20 VITALS — HEART RATE: 70 BPM | DIASTOLIC BLOOD PRESSURE: 78 MMHG | SYSTOLIC BLOOD PRESSURE: 158 MMHG

## 2018-06-20 DIAGNOSIS — I10 ESSENTIAL HYPERTENSION, BENIGN: ICD-10-CM

## 2018-06-20 LAB
ANION GAP SERPL CALCULATED.3IONS-SCNC: 6 MMOL/L (ref 3–14)
BUN SERPL-MCNC: 24 MG/DL (ref 7–30)
CALCIUM SERPL-MCNC: 8.4 MG/DL (ref 8.5–10.1)
CHLORIDE SERPL-SCNC: 110 MMOL/L (ref 94–109)
CO2 SERPL-SCNC: 25 MMOL/L (ref 20–32)
CREAT SERPL-MCNC: 1.4 MG/DL (ref 0.66–1.25)
GFR SERPL CREATININE-BSD FRML MDRD: 48 ML/MIN/1.7M2
GLUCOSE SERPL-MCNC: 121 MG/DL (ref 70–99)
POTASSIUM SERPL-SCNC: 3.5 MMOL/L (ref 3.4–5.3)
SODIUM SERPL-SCNC: 141 MMOL/L (ref 133–144)

## 2018-06-20 PROCEDURE — 80048 BASIC METABOLIC PNL TOTAL CA: CPT | Performed by: NURSE PRACTITIONER

## 2018-06-20 PROCEDURE — 99207 ZZC NO CHARGE NURSE ONLY: CPT | Performed by: NURSE PRACTITIONER

## 2018-06-20 PROCEDURE — 36415 COLL VENOUS BLD VENIPUNCTURE: CPT | Performed by: NURSE PRACTITIONER

## 2018-06-20 RX ORDER — LOSARTAN POTASSIUM 100 MG/1
100 TABLET ORAL DAILY
Qty: 90 TABLET | Refills: 1 | Status: SHIPPED | OUTPATIENT
Start: 2018-06-20 | End: 2018-08-27

## 2018-06-20 NOTE — PROGRESS NOTES
[facility] NURSE VISIT     Patient is here today for a BP check and BMP.    Recent medication changes.....     Creatinine on 06-05-18 was 1.75, Maxide was reduced to 1/2 tablet a day.     Creatinine on 06-13-18 was 1.72, Maxide was discontinued and Losartan was decreased from 100 mg/d to 50 mg/d.     Today's blood pressure: 158/78 mm Hg ( BP on 06-05-18: 156/84)  Today's heart rate: 70 bpm     BMP pending.     Per Janeth Mcdaniels NP, may add Hydralazine.       ~TGarbers RN

## 2018-06-20 NOTE — MR AVS SNAPSHOT
After Visit Summary   6/20/2018    Tucker Slater    MRN: [id number]           Patient Information     Date Of Birth          3/13/1931        Visit Information        Provider Department      6/20/2018 2:30 PM RU [facility] HRT NURSE [facility]        Today's Diagnoses     Essential hypertension, benign           Follow-ups after your visit        Your next 10 appointments already scheduled     Jun 27, 2018  4:00 PM CDT   Anticoagulation Visit with [facility]   [facility] ([facility])    [address]  [facility] [id number] [phone]            Jun 29, 2018  2:30 PM CDT   New Sleep Patient with Hari Ritter MD   [facility] ([facility])    [address]  [facility] [id number] [phone]            Jul 10, 2018  3:10 PM CDT   Return Visit with SELMA Landis CNP   [facility] ([facility])    [address]  [facility] [id number] [phone]            Aug 23, 2018  3:15 PM CDT   Remote PPM Check with KIDD TECH1   [facility] ([facility])    [address]  [facility] [id number] [phone] OPT 2           This appointment is for a remote check of your pacemaker.  This is not an appointment at the office.              Who to contact     If you have questions or need follow up information about today's clinic visit or your schedule please contact [facility] directly at [phone].  Normal or non-critical lab and imaging results will be communicated to you by MyChart, letter or phone within 4 business days after the clinic has received the results. If you do not hear from us within 7 days, please contact the clinic through MyChart or phone. If  "you have a critical or abnormal lab result, we will notify you by phone as soon as possible.  Submit refill requests through [employer] or call your pharmacy and they will forward the refill request to us. Please allow 3 business days for your refill to be completed.          Additional Information About Your Visit        Atavisthart Information     Stretchr lets you send messages to your doctor, view your test results, renew your prescriptions, schedule appointments and more. To sign up, go to [email or web address] . Click on \"Log in\" on the left side of the screen, which will take you to the Welcome page. Then click on \"Sign up Now\" on the right side of the page.     You will be asked to enter the access code listed below, as well as some personal information. Please follow the directions to create your username and password.     Your access code is: [email or web address]  Expires: 8/15/2018  8:36 AM     Your access code will  in 90 days. If you need help or a new code, please call your [facility] clinic or [phone].        Care EveryWhere ID     This is your Care EveryWhere ID. This could be used by other organizations to access your [facility] medical records  [id number]        Your Vitals Were     Pulse                   70            Blood Pressure from Last 3 Encounters:   18 158/78   18 156/84   17 124/64    Weight from Last 3 Encounters:   18 85.9 kg (189 lb 4.8 oz)   17 85.7 kg (189 lb)   17 83 kg (183 lb)              We Performed the Following     Follow-Up with Nurse        Primary Care Provider Office Phone # Fax #    Kwadwo Winslow [phone]       [address] [facility] [address]        Equal Access to Services     DOMINGUEZ JIM : Hadbetsy Whitaker, sandra gold, qaladanta kaalidalia pruitt, gisela eddy. [facility] [phone].    ATENCIÓN: Si habla español, tiene a kraft disposición servicios gratuitos de " asistencia lingüística. David al [phone].    We comply with applicable federal civil rights laws and [place] laws. We do not discriminate on the basis of race, color, national origin, age, disability, sex, sexual orientation, or gender identity.            Thank you!     Thank you for choosing [facility]  for your care. Our goal is always to provide you with excellent care. Hearing back from our patients is one way we can continue to improve our services. Please take a few minutes to complete the written survey that you may receive in the mail after your visit with us. Thank you!             Your Updated Medication List - Protect others around you: Learn how to safely use, store and throw away your medicines at www.disposemymeds.org.          This list is accurate as of 6/20/18  3:25 PM.  Always use your most recent med list.                   Brand Name Dispense Instructions for use Diagnosis    ACETAMINOPHEN PO      Take 650 mg by mouth every 4 hours as needed for pain        calcium carbonate 500 MG tablet    OS-KARLA 500 mg [place]. Ca     Take 500 mg by mouth daily        carvedilol 25 MG tablet    COREG    270 tablet    Take 1.5 tablets (37.5 mg) by mouth 2 times daily (with meals)    Essential hypertension with goal blood pressure less than 140/90       JANTOVEN 2.5 MG tablet   Generic drug:  warfarin     90 tablet    TAKE ONE TABLET BY MOUTH EVERY DAY, EXCEPT TAKE ONE-HALF TABLET BY MOUTH ON FRIDAYS, OR AS INSTRUCTED BASED ON INR RESULTS    Chronic atrial fibrillation (H)       losartan 100 MG tablet    COZAAR    90 tablet    Take 0.5 tablets (50 mg) by mouth daily    Essential hypertension, benign       OCUVITE PO      Take 1 tablet by mouth daily        vitamin B complex with vitamin C Tabs tablet      Take 1 tablet by mouth daily        VITAMIN C PO      Take 500 mg by mouth daily        VITAMIN D (CHOLECALCIFEROL) PO      Take 2,000 Units by mouth daily.

## 2018-06-20 NOTE — TELEPHONE ENCOUNTER
Pt's BP elevated and Creatinine back to baseline with med changes.  Recommending to continue to hold HCTZ.  Will increase Losartan back to 100mg/day  Return next month as previously planned.  Will add on a BMP in 1 month  Pls call pt to review  ThanksJAIRO

## 2018-06-20 NOTE — NURSING NOTE
ALLIED HEALTH NURSE VISIT    Time of visit: 2:30 pm    Today's blood pressure: 158/78 mm Hg  Today's heart rate: 70 bpm     See care coordination notes for visit details.     Visit ordered by: Janeth Mcdaniels NP   Provider in clinic today: Dr. Perez   Patient's cardiologist: Dr. Cesar Cornejo RN  [facility]

## 2018-06-21 NOTE — TELEPHONE ENCOUNTER
Reviewed recommendations with patient, see care coordination encounter from 06-20-18. ~Chantal GORDILLO

## 2018-06-21 NOTE — PROGRESS NOTES
Component      Latest Ref Rng & Units 2018   Sodium      133 - 144 mmol/L 141   Potassium      3.4 - 5.3 mmol/L 3.5   Chloride      94 - 109 mmol/L 110 (H)   Carbon Dioxide      20 - 32 mmol/L 25   Anion Gap      3 - 14 mmol/L 6   Glucose      70 - 99 mg/dL 121 (H)   Urea Nitrogen      7 - 30 mg/dL 24   Creatinine      0.66 - 1.25 mg/dL 1.40 (H)   GFR Estimate      >60 mL/min/1.7m2 48 (L)   GFR Estimate If Black      >60 mL/min/1.7m2 58 (L)   Calcium      8.5 - 10.1 mg/dL 8.4 (L)     Pt's BP elevated and Creatinine back to baseline with med changes.  Recommending to continue to hold HCTZ.  Will increase Losartan back to 100mg/day  Return next month as previously planned.  Will add on a BMP in 1 month  Pls call pt to review  Thanks, JS    Reviewed recommendations with patient. Patient was just leaving to go to a . Patient stated he will call me back this afternoon to go over recommendations again and to set up his lab appt. ~Chantal GORDILLO

## 2018-06-22 NOTE — PROGRESS NOTES
Reviewed results and recommendations with patient. Patient was advised to stay off Maxide. Patient was instructed to increase Losartan back to 100 mg/d.  Patient will continue to monitor his BP's at home and bring his BP readings to his appt. Patient will also bring his home BP monitor so we can check for accuracy. Patient had no questions. Scheduled BMP.  ~Chantal GORDILLO

## 2018-06-27 ENCOUNTER — ANTICOAGULATION THERAPY VISIT (OUTPATIENT)
Dept: ANTICOAGULATION | Facility: CLINIC | Age: 83
End: 2018-06-27
Payer: COMMERCIAL

## 2018-06-27 DIAGNOSIS — Z79.01 LONG-TERM (CURRENT) USE OF ANTICOAGULANTS: ICD-10-CM

## 2018-06-27 DIAGNOSIS — I48.20 CHRONIC ATRIAL FIBRILLATION (H): ICD-10-CM

## 2018-06-27 LAB — INR POINT OF CARE: 3.4 (ref 0.86–1.14)

## 2018-06-27 PROCEDURE — 99207 ZZC NO CHARGE NURSE ONLY: CPT

## 2018-06-27 PROCEDURE — 36416 COLLJ CAPILLARY BLOOD SPEC: CPT

## 2018-06-27 PROCEDURE — 85610 PROTHROMBIN TIME: CPT | Mod: QW

## 2018-06-27 NOTE — MR AVS SNAPSHOT
Tucker L Bryon   6/27/2018 4:00 PM   Anticoagulation Therapy Visit    Description:  87 year old male   Provider:  [facility]   Department:  [facility] Anti Coagulation           INR as of 6/27/2018     Today's INR 3.4!      Anticoagulation Summary as of 6/27/2018     INR goal 2.0-3.0   Today's INR 3.4!   Full warfarin instructions 6/27: 1.25 mg; 7/4: 1.25 mg; 7/11: 1.25 mg; Otherwise 1.25 mg on Fri; 2.5 mg all other days   Next INR check 7/10/2018    Indications   Long-term (current) use of anticoagulants [Z79.01] [Z79.01]  Atrial fibrillation (H) [I48.91]         Your next Anticoagulation Clinic appointment(s)     Jul 10, 2018  4:15 PM CDT   [facility]agulation [facility] with [facility]   [facility]    [address] [facility] [address]  [facility] [id number] [phone]              Contact Numbers     [facility] Phone Numbers:  Anticoagulation Clinic Appointments : [phone]  Anticoagulation Nurse: [phone]         June 2018 Details    Sun Mon Tue Wed Thu Fri Sat          1               2                 3               4               5               6               7               8               9                 10               11               12               13               14               15               16                 17               18               19               20               21               22               23                 24               25               26               27      1.25 mg   See details      28      2.5 mg         29      1.25 mg         30      2.5 mg          Date Details   06/27 This INR check               How to take your warfarin dose     To take:  1.25 mg Take 0.5 of a 2.5 mg tablet.    To take:  2.5 mg Take 1 of the 2.5 mg tablets.           July 2018 Details    Sun Mon Tue Wed Thu Fri Sat     1      2.5 mg         2      2.5 mg         3      2.5 mg         4      1.25 mg          5      2.5 mg         6      1.25 mg         7      2.5 mg           8      2.5 mg         9      2.5 mg         10            11               12               13               14                 15               16               17               18               19               20               21                 22               23               24               25               26               27               28                 29               30               31                    Date Details   No additional details    Date of next INR:  7/10/2018         How to take your warfarin dose     To take:  1.25 mg Take 0.5 of a 2.5 mg tablet.    To take:  2.5 mg Take 1 of the 2.5 mg tablets.

## 2018-06-27 NOTE — PROGRESS NOTES
ANTICOAGULATION FOLLOW-UP CLINIC VISIT    Patient Name:  Tucker Slater  Date:  6/27/2018  Contact Type:  Face to Face    SUBJECTIVE:     Patient Findings     Positives Unexplained INR or factor level change (denies any changes since last visit.  INR has been supratherapeutic several times recently so maintenance dose was adjusted. )           OBJECTIVE    INR Protime   Date Value Ref Range Status   06/27/2018 3.4 (A) 0.86 - 1.14 Final       ASSESSMENT / PLAN  INR assessment SUPRA    Recheck INR In: 2 WEEKS    INR Location Clinic      Anticoagulation Summary as of 6/27/2018     INR goal 2.0-3.0   Today's INR 3.4!   Warfarin maintenance plan 1.25 mg (2.5 mg x 0.5) on Fri; 2.5 mg (2.5 mg x 1) all other days   Full warfarin instructions 6/27: 1.25 mg; 7/4: 1.25 mg; 7/11: 1.25 mg; Otherwise 1.25 mg on Fri; 2.5 mg all other days   Weekly warfarin total 16.25 mg   Plan last modified Ivory Machado RN (7/7/2017)   Next INR check 7/10/2018   Priority INR   Target end date     Indications   Long-term (current) use of anticoagulants [Z79.01] [Z79.01]  Atrial fibrillation (H) [I48.91]         Anticoagulation Episode Summary     INR check location     Preferred lab     Send IN[facility] reminders to [facility]    Comments       Anticoagulation Care Providers     Provider Role Specialty Phone number    Kwadwo Winslow MD Responsible Internal Medicine [phone]            See the Encounter Report to view Anticoagulation Flowsheet and Dosing Calendar (Go to Encounters tab in chart review, and find the Anticoagulation Therapy Visit)    Dosage adjustment made based on physician directed care plan.    Ivory Machado RN

## 2018-06-29 ENCOUNTER — OFFICE VISIT (OUTPATIENT)
Dept: SLEEP MEDICINE | Facility: CLINIC | Age: 83
End: 2018-06-29
Payer: COMMERCIAL

## 2018-06-29 VITALS
DIASTOLIC BLOOD PRESSURE: 81 MMHG | SYSTOLIC BLOOD PRESSURE: 156 MMHG | HEART RATE: 71 BPM | OXYGEN SATURATION: 95 % | WEIGHT: 189 LBS | HEIGHT: 70 IN | BODY MASS INDEX: 27.06 KG/M2 | RESPIRATION RATE: 18 BRPM

## 2018-06-29 DIAGNOSIS — G47.37 CENTRAL SLEEP APNEA DUE TO MEDICAL CONDITION: Primary | ICD-10-CM

## 2018-06-29 DIAGNOSIS — R53.83 OTHER FATIGUE: ICD-10-CM

## 2018-06-29 DIAGNOSIS — G47.34 HYPOXIA, SLEEP RELATED: Primary | ICD-10-CM

## 2018-06-29 DIAGNOSIS — G47.37 CENTRAL SLEEP APNEA DUE TO MEDICAL CONDITION: ICD-10-CM

## 2018-06-29 PROCEDURE — 99205 OFFICE O/P NEW HI 60 MIN: CPT | Performed by: INTERNAL MEDICINE

## 2018-06-29 NOTE — PATIENT INSTRUCTIONS
Sleep in a recliner at 30-40 degrees elevation when your next oxygen test is done.       Your BMI is Body mass index is 27.12 kg/(m^2).  Weight management is a personal decision.  If you are interested in exploring weight loss strategies, the following discussion covers the approaches that may be successful. Body mass index (BMI) is one way to tell whether you are at a healthy weight, overweight, or obese. It measures your weight in relation to your height.  A BMI of 18.5 to 24.9 is in the healthy range. A person with a BMI of 25 to 29.9 is considered overweight, and someone with a BMI of 30 or greater is considered obese. More than two-thirds of American adults are considered overweight or obese.  Being overweight or obese increases the risk for further weight gain. Excess weight may lead to heart disease and diabetes.  Creating and following plans for healthy eating and physical activity may help you improve your health.  Weight control is part of healthy lifestyle and includes exercise, emotional health, and healthy eating habits. Careful eating habits lifelong are the mainstay of weight control. Though there are significant health benefits from weight loss, long-term weight loss with diet alone may be very difficult to achieve- studies show long-term success with dietary management in less than 10% of people. Attaining a healthy weight may be especially difficult to achieve in those with severe obesity. In some cases, medications, devices and surgical management might be considered.  What can you do?  If you are overweight or obese and are interested in methods for weight loss, you should discuss this with your provider.     Consider reducing daily calorie intake by 500 calories.     Keep a food journal.     Avoiding skipping meals, consider cutting portions instead.    Diet combined with exercise helps maintain muscle while optimizing fat loss. Strength training is particularly important for building and  maintaining muscle mass. Exercise helps reduce stress, increase energy, and improves fitness. Increasing exercise without diet control, however, may not burn enough calories to loose weight.       Start walking three days a week 10-20 minutes at a time    Work towards walking thirty minutes five days a week     Eventually, increase the speed of your walking for 1-2 minutes at time    In addition, we recommend that you review healthy lifestyles and methods for weight loss available through the National Institutes of Health patient information sites:  http://win.niddk.nih.gov/publications/index.htm    And look into health and wellness programs that may be available through your health insurance provider, employer, local community center, or [employer].    Weight management plan: Patient was referred to their PCP to discuss a diet and exercise plan.     Your blood pressure was checked while you were in clinic today.  Please read the guidelines below about what these numbers mean and what you should do about them.  Your systolic blood pressure is the top number.  This is the pressure when the heart is pumping.  Your diastolic blood pressure is the bottom number.  This is the pressure in between beats.  If your systolic blood pressure is less than 120 and your diastolic blood pressure is less than 80, then your blood pressure is normal. There is nothing more that you need to do about it  If your systolic blood pressure is 120-139 or your diastolic blood pressure is 80-89, your blood pressure may be higher than it should be.  You should have your blood pressure re-checked within a year by a primary care provider.  If your systolic blood pressure is 140 or greater or your diastolic blood pressure is 90 or greater, you may have high blood pressure.  High blood pressure is treatable, but if left untreated over time it can put you at risk for heart attack, stroke, or kidney failure.  You should have your blood pressure  re-checked by a primary care provider within the next four weeks.

## 2018-06-29 NOTE — PROGRESS NOTES
Patient picked up oximeter and was instructed on use. They showed understanding by demonstrating it back.  Tucker to use oximeter while sleeping in his recliner at a 45 degree angle.  He will return on 7/2/18 about noon.  Dr Ritter to get results and call patient

## 2018-06-29 NOTE — MR AVS SNAPSHOT
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       Your BMI is Body mass index is 27.12 kg/(m^2).  Weight management is a personal decision.  If you are interested in exploring weight loss strategies, the following discussion covers the approaches that may be successful. Body mass index (BMI) is one way to tell whether you are at a healthy weight, overweight, or obese. It measures your weight in relation to your height.  A BMI of 18.5 to 24.9 is in the healthy range. A person with a BMI of 25 to 29.9 is considered overweight, and someone with a BMI of 30 or greater is considered obese. More than two-thirds of American adults are considered overweight or obese.  Being overweight or obese increases the risk for further weight gain. Excess weight may lead to heart disease and diabetes.  Creating and following plans for healthy eating and physical activity may help you improve your health.  Weight control is part of healthy lifestyle and includes exercise, emotional health, and healthy eating habits. Careful eating habits lifelong are the mainstay of weight control. Though there are significant health benefits from weight loss, long-term weight loss with diet alone may be very difficult to achieve- studies show long-term success with dietary management in less than 10% of people. Attaining a healthy weight may be especially difficult to achieve in those with severe obesity. In some cases, medications, devices and surgical management might be considered.  What can you do?  If you  are overweight or obese and are interested in methods for weight loss, you should discuss this with your provider.     Consider reducing daily calorie intake by 500 calories.     Keep a food journal.     Avoiding skipping meals, consider cutting portions instead.    Diet combined with exercise helps maintain muscle while optimizing fat loss. Strength training is particularly important for building and maintaining muscle mass. Exercise helps reduce stress, increase energy, and improves fitness. Increasing exercise without diet control, however, may not burn enough calories to loose weight.       Start walking three days a week 10-20 minutes at a time    Work towards walking thirty minutes five days a week     Eventually, increase the speed of your walking for 1-2 minutes at time    In addition, we recommend that you review healthy lifestyles and methods for weight loss available through the National Institutes of Health patient information sites:  http://win.niddk.nih.gov/publications/index.htm    And look into health and wellness programs that may be available through your health insurance provider, employer, local community center, or [employer].    Weight management plan: Patient was referred to their PCP to discuss a diet and exercise plan.     Your blood pressure was checked while you were in clinic today.  Please read the guidelines below about what these numbers mean and what you should do about them.  Your systolic blood pressure is the top number.  This is the pressure when the heart is pumping.  Your diastolic blood pressure is the bottom number.  This is the pressure in between beats.  If your systolic blood pressure is less than 120 and your diastolic blood pressure is less than 80, then your blood pressure is normal. There is nothing more that you need to do about it  If your systolic blood pressure is 120-139 or your diastolic blood pressure is 80-89, your blood pressure may be higher than it should be.   You should have your blood pressure re-checked within a year by a primary care provider.  If your systolic blood pressure is 140 or greater or your diastolic blood pressure is 90 or greater, you may have high blood pressure.  High blood pressure is treatable, but if left untreated over time it can put you at risk for heart attack, stroke, or kidney failure.  You should have your blood pressure re-checked by a primary care provider within the next four weeks.              Follow-ups after your visit        Follow-up notes from your care team     Return in about 2 weeks (around 7/13/2018).      Your next 10 appointments already scheduled     Jul 10, 2018  2:15 PM CDT   LAB with RU LAB   [facility] [place] ([facility]    [address]  [facility] [id number] [phone]           Please do not eat 10-12 hours before your appointment if you are coming in fasting for labs on lipids, cholesterol, or glucose (sugar). This does not apply to pregnant women. Water, hot tea and black coffee (with nothing added) are okay. Do not drink other fluids, diet soda or chew gum.            Jul 10, 2018  3:10 PM CDT   Return Visit with SEMLA Landis CNP   [facility] ([facility])    [address]  [facility] [place] [facility] [address] [phone]            Jul 10, 2018  4:15 PM CDT   Anticoagulation Visit with [facility]AGULATION CLINIC   [facility]    [address]  [facility] [address] [phone]            Aug 23, 2018  3:15 PM CDT   Remote PPM Check with KIDD TECH1   [facility] ([facility])    [address]  [place] [address] [phone] OPT 2           This appointment is for a remote check of your pacemaker.  This is not an appointment at the office.              Who to  "contact     If you have questions or need follow up information about today's clinic visit or your schedule please contact [facility] directly at [phone].  Normal or non-critical lab and imaging results will be communicated to you by MyChart, letter or phone within 4 business days after the clinic has received the results. If you do not hear from us within 7 days, please contact the clinic through MyChart or phone. If you have a critical or abnormal lab result, we will notify you by phone as soon as possible.  Submit refill requests through [employer] or call your pharmacy and they will forward the refill request to us. Please allow 3 business days for your refill to be completed.          Additional Information About Your Visit        Care EveryWhere ID     This is your Care EveryWhere ID. This could be used by other organizations to access your [place] medical records  [id number]        Your Vitals Were     Pulse Respirations Height Pulse Oximetry BMI (Body Mass Index)       71 18 1.778 m (5' 10\") 95% 27.12 kg/m2        Blood Pressure from Last 3 Encounters:   06/29/18 156/81   06/20/18 158/78   06/05/18 156/84    Weight from Last 3 Encounters:   06/29/18 85.7 kg (189 lb)   06/05/18 85.9 kg (189 lb 4.8 oz)   12/29/17 85.7 kg (189 lb)              We Performed the Following     SLEEP EVALUATION & MANAGEMENT REFERRAL - ADULT -[facility]  [phone] (Age 18 and up)        Primary Care Provider Office Phone # Fax #    Kwadwo Winslow [phone]       [address] [facility] [address]        Equal Access to Services     MEREDITH JIM : Hadii mat Whitaker, waaxda luqadaha, qaybta kaalmagisela angel. So [facility] [phone].    ATENCIÓN: Si habla español, tiene a kraft disposición servicios gratuitos de asistencia lingüística. Llame al [phone].    We comply with applicable federal civil " rights laws and [place] laws. We do not discriminate on the basis of race, color, national origin, age, disability, sex, sexual orientation, or gender identity.            Thank you!     Thank you for choosing [facility]  for your care. Our goal is always to provide you with excellent care. Hearing back from our patients is one way we can continue to improve our services. Please take a few minutes to complete the written survey that you may receive in the mail after your visit with us. Thank you!             Your Updated Medication List - Protect others around you: Learn how to safely use, store and throw away your medicines at www.disposemymeds.org.          This list is accurate as of 6/29/18 11:59 PM.  Always use your most recent med list.                   Brand Name Dispense Instructions for use Diagnosis    ACETAMINOPHEN PO      Take 650 mg by mouth every 4 hours as needed for pain        calcium carbonate 500 MG tablet    OS-KARLA 500 mg [place]. [place]     Take 500 mg by mouth daily        carvedilol 25 MG tablet    COREG    270 tablet    Take 1.5 tablets (37.5 mg) by mouth 2 times daily (with meals)    Essential hypertension with goal blood pressure less than 140/90       JANTOVEN 2.5 MG tablet   Generic drug:  warfarin     90 tablet    TAKE ONE TABLET BY MOUTH EVERY DAY, EXCEPT TAKE ONE-HALF TABLET BY MOUTH ON FRIDAYS, OR AS INSTRUCTED BASED ON INR RESULTS    Chronic atrial fibrillation (H)       losartan 100 MG tablet    COZAAR    90 tablet    Take 1 tablet (100 mg) by mouth daily    Essential hypertension, benign       OCUVITE PO      Take 1 tablet by mouth daily        vitamin B complex with vitamin C Tabs tablet      Take 1 tablet by mouth daily        VITAMIN C PO      Take 500 mg by mouth daily        VITAMIN D (CHOLECALCIFEROL) PO      Take 2,000 Units by mouth daily.

## 2018-06-29 NOTE — MR AVS SNAPSHOT
After Visit Summary   6/29/2018    Tucker Slater    MRN: [id number]           Patient Information     Date Of Birth          3/13/1931        Visit Information        Provider Department      6/29/2018 3:00 PM [facility]        Today's Diagnoses     Hypoxia, sleep related    -  1       Follow-ups after your visit        Your next 10 appointments already scheduled     Jul 02, 2018 12:00 PM CDT   Oximetry Drop Off with [facility] D[facility]   [facility]    [address]  [facility] [id number] [phone]            Jul 10, 2018  2:15 PM CDT   LAB with RU LAB   [facility] [place] ([facility])    [address]  [facility] [place] [phone] [phone]           Please do not eat 10-12 hours before your appointment if you are coming in fasting for labs on lipids, cholesterol, or glucose (sugar). This does not apply to pregnant women. Water, hot tea and black coffee (with nothing added) are okay. Do not drink other fluids, diet soda or chew gum.            Jul 10, 2018  3:10 PM CDT   Return Visit with SELMA Landis CNP   [facility] ([facility])    [address]  [place] [address] [phone]            Jul 10, 2018  4:15 PM CDT   Anticoagulation Visit with RI ANTICOAGULATION CLINIC   [facility] ([facility])    [address]  [facility] [id number] [phone]            Aug 23, 2018  3:15 PM CDT   Remote PPM Check with KIDD TECH1   [facility] ([facility])    [address]  [place] [address] [phone] OPT 2           This appointment is for a remote check of your pacemaker.  This is not an appointment at the office.              Future  tests that were ordered for you today     Open Future Orders        Priority Expected Expires Ordered    Overnight oximetry study Routine  12/26/2018 6/29/2018            Who to contact     If you have questions or need follow up information about today's clinic visit or your schedule please contact [facility] directly at [phone].  Normal or non-critical lab and imaging results will be communicated to you by MyChart, letter or phone within 4 business days after the clinic has received the results. If you do not hear from us within 7 days, please contact the clinic through MyChart or phone. If you have a critical or abnormal lab result, we will notify you by phone as soon as possible.  Submit refill requests through [employer] or call your pharmacy and they will forward the refill request to us. Please allow 3 business days for your refill to be completed.          Additional Information About Your Visit        Care EveryWhere ID     This is your Care EveryWhere ID. This could be used by other organizations to access your [place] medical records  [id number]         Blood Pressure from Last 3 Encounters:   06/29/18 156/81   06/20/18 158/78   06/05/18 156/84    Weight from Last 3 Encounters:   06/29/18 85.7 kg (189 lb)   06/05/18 85.9 kg (189 lb 4.8 oz)   12/29/17 85.7 kg (189 lb)              Today, you had the following     No orders found for display       Primary Care Provider Office Phone # Fax #    Kwadwo Winlsow [phone]       [address] [facility] [address]        Equal Access to Services     [facility] Hospital[facility] AH: Hadii aad ku hadasho Soomaali, waaxda luqadaha, qaybta kaalmada adeegyada, waxay carissa eddy. So [facility] [phone].    ATENCIÓN: Si habla español, tiene a kraft disposición servicios gratuitos de asistencia lingüística. Llame al [phone].    We comply with applicable federal civil rights laws and [place] laws. We do not  discriminate on the basis of race, color, national origin, age, disability, sex, sexual orientation, or gender identity.            Thank you!     Thank you for choosing [facility]  for your [place]. Our goal is always to provide you with excellent care. Hearing back from our patients is one way we can continue to improve our services. Please take a few minutes to complete the written survey that you may receive in the mail after your visit with us. Thank you!             Your Updated Medication List - Protect others around you: Learn how to safely use, store and throw away your medicines at www.disposemymeds.org.          This list is accurate as of 6/29/18  5:10 PM.  Always use your most recent med list.                   Brand Name Dispense Instructions for use Diagnosis    ACETAMINOPHEN PO      Take 650 mg by mouth every 4 hours as needed for pain        calcium carbonate 500 MG tablet    OS-KARLA 500 mg [place]. Ca     Take 500 mg by mouth daily        carvedilol 25 MG tablet    COREG    270 tablet    Take 1.5 tablets (37.5 mg) by mouth 2 times daily (with meals)    Essential hypertension with goal blood pressure less than 140/90       JANTOVEN 2.5 MG tablet   Generic drug:  warfarin     90 tablet    TAKE ONE TABLET BY MOUTH EVERY DAY, EXCEPT TAKE ONE-HALF TABLET BY MOUTH ON FRIDAYS, OR AS INSTRUCTED BASED ON INR RESULTS    Chronic atrial fibrillation (H)       losartan 100 MG tablet    COZAAR    90 tablet    Take 1 tablet (100 mg) by mouth daily    Essential hypertension, benign       OCUVITE PO      Take 1 tablet by mouth daily        vitamin B complex with vitamin C Tabs tablet      Take 1 tablet by mouth daily        VITAMIN C PO      Take 500 mg by mouth daily        VITAMIN D (CHOLECALCIFEROL) PO      Take 2,000 Units by mouth daily.

## 2018-06-29 NOTE — PROGRESS NOTES
[facility]   Outpatient Sleep Medicine Consultation  June 29, 2018      Name: Tucker Slater MRN# [id number]   Age: 87 year old YOB: 1931     Date of Consultation: June 29, 2018  Consultation is requested by: SELMA Landis CNP [Dr Perez]  [address]  [place] [address]  Primary care provider: Kwadwo Winslow           Reason for Sleep Consult:     Tucker Slater is a 87 year old male whom we are asked to see at the request of cardiology  and Janeth Mcdaniels in the setting of cardiovascular disease for possible sleep-related breathing disorder         Assessment and Plan:     Summary Sleep Diagnoses:      Mild nocturnal hypoxemia for age likely complex sleep apnea     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  Oxygen alone may be in reasonable alternative to treat this degree of hypoxemia in the setting.    This patient currently has relatively good sleep quality but limited sleep time-the use of a CPAP device would likely degrade his sleep quality.    Summary Counseling:  See instructions    Counseling included a comprehensive review of diagnostic and therapeutic strategies as well as risks of inadequate therapy.  Educational materials provided in instructions.             History of Present Illness:     Tucker Slater is a 87 year old male with atrial fibrillation/bradycardias status post pacer placement was referred for possible sleep apnea in the setting of daytime fatigue and abnormal oximetry.  This patient was accompanied by his wife who does verify mild continuous snoring while sleeping without increased sleep movement or  prolonged apneas.  Both are aware of only rare snorting at night.  This patient has normal ejection fraction but with aortic and mitral valve disease as well as persisting hypertension and some peripheral edema.  He is denying dyspnea on exertion although has mobility limitations secondary to his previous hip replacement and knee replacements.  He denies daytime sleepiness and has a normal Sherman Sleepiness Scale of 4.  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        PREVIOUS IN- LAB or HOME SLEEP STUDIES:   Date: June 7, 2018   TYPE: Oximetry only   ALICIA: 28   Time SPO2 less than or equal to 88% 60 minutes      SLEEP-WAKE SCHEDULE:     SCALES       SLEEP APNEA: Stopbang score         INSOMNIA:  Insomnia severity score:        SLEEPINESS: Sherman sleepiness scale: Sherman sleepiness scale 4  SLEEP COMPLAINTS:  Cardio-respiratory    Snoring- occ/week  Dyspnea- denies  Morning headaches or confusion-denies  Coexisting Lung disease: denies     Does patient have a bed partner: Yes  Has bed partner been sleeping separately because of snoring:  denies            RLS Screen: When you try to relax in the evening or sleep at  night, do you ever have unpleasant, restless feelings in your  legs that can be relieved by walking or movement? denies    Periodic limb movement: denies    Narcolepsy:  denies sudden urges of sleep attacks    Cataplexy:  denies     Sleep paralysis:  denies      Hallucinations:   denies       Sleep Behaviors:    Leg symptoms/movements: denies    Motor restlessness:denies    Night terrors: denies    Bruxism: denies    Automatic behaviors: none    Other subjective complaints:    Anxiety or rumination denies    Pain and discomfort at  night: denies    Waking up with heart pounding or racing: denies    GERD or aspiration:denies         Parasomnia:   NREM - denies recurrent persistent confusional arousal, night eating, sleep walking or sleep terrors   REM  - denies dream enactment; injuries              Medications:      Current Outpatient Prescriptions   Medication Sig     ACETAMINOPHEN PO Take 650 mg by mouth every 4 hours as needed for pain     Ascorbic Acid (VITAMIN C PO) Take 500 mg by mouth daily      calcium carbonate (OS-KARLA 500 MG [place]. [place]) 500 MG tablet Take 500 mg by mouth daily      carvedilol (COREG) 25 MG tablet Take 1.5 tablets (37.5 mg) by mouth 2 times daily (with meals)     JANTOVEN 2.5 MG tablet TAKE ONE TABLET BY MOUTH EVERY DAY, EXCEPT TAKE ONE-HALF TABLET BY MOUTH ON FRIDAYS, OR AS INSTRUCTED BASED ON INR RESULTS     losartan (COZAAR) 100 MG tablet Take 1 tablet (100 mg) by mouth daily     Multiple Vitamins-Minerals (OCUVITE PO) Take 1 tablet by mouth daily      vitamin B complex with vitamin C (VITAMIN  B COMPLEX) TABS tablet Take 1 tablet by mouth daily     VITAMIN D, CHOLECALCIFEROL, PO Take 2,000 Units by mouth daily.     No current facility-administered medications for this visit.         Allergies   Allergen Reactions     Morphine Nausea and Vomiting     Amlodipine      Edema            Past Medical History:     Does not need 02 supplement at night   Past Medical History:   Diagnosis Date     Arrhythmia     A Fib     Balance problem     related to neuropathy in feet     Bradycardia      Carcinoma in situ of bladder 2000    bladder cancer ;; follow w Urology w periodic cysto      Essential hypertension, benign      Generalized osteoarthrosis, unspecified site knees    s/p R total knee 8/09 ; will do L 6/10      Numbness and tingling     toes and fingers     Pacemaker     [employer]      Pain in joint, shoulder region     L shoulder rotater tear; no surgery      Persistent atrial fibrillation (H) 1/30/15     Prostate CA (H) 2008-9    radiation     Prostate cancer (H) 11/08    completed RT 3/09     Renal disease     elevated creatinine     Shoulder impingement     R shoulder impingement etc see MRI 4/09      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     toes both feet               Past Surgical History:      Past Surgical History:   Procedure Laterality Date     ARTHROPLASTY HIP Right 3/27/2017    Procedure: ARTHROPLASTY HIP;  Surgeon: Jigar Wynn MD;  Location: [facility] OR     C NONSPECIFIC PROCEDURE      bilat inguinal hernia repairs ( 3total procedures)      C NONSPECIFIC PROCEDURE      pilonidal cyst     C NONSPECIFIC PROCEDURE      T + A     C NONSPECIFIC PROCEDURE       R+L total knee     CARDIOVERSION  3/26/15     COLONOSCOPY       IMPLANT PACEMAKER  3/26/15     RELEASE CARPAL TUNNEL Right 2017    Procedure: RELEASE CARPAL TUNNEL;  Right carpal tunnel release;  Surgeon: Alonso Barboza MD;  Location: [facility] OR            Social History:     Social History   Substance Use Topics     Smoking status: Former Smoker     Quit date: 1977     Smokeless tobacco: Never Used     Alcohol use No              Family History:     Family History   Problem Relation Age of Onset     HEART DISEASE Father       87yo     Coronary Artery Disease Father      HEART DISEASE Mother       74yo     Coronary Artery Disease Mother      Family History Negative Brother                  Review of Systems:     A complete 10 point review of systems was negative other than HPI or as commented below:   Occasional episodic nasal congestion postnasal discharge  Irregular heart rhythm  Chronic lower extremity swelling  Numbness in feet and lower extremities  Occasional cough with phlegm especially when clearing sinus secretions  Intermittent constipation diarrhea abdominal discomfort-consider lactose intolerance           Physical Examination:   There were no vitals taken for this visit.      Constitutional: . Awake, alert, cooperative, dressed casually, good eye contact, comfortably sitting in a chair, in no apparent distress  Mood: euthymic; affect congruent with full range and intensity.  Attention/Concentration:  Normal   Eyes: No icterus.  ENT: Mallampati Class: II.  Cardiovascular:  Regular S1 and S2, grade 2 systolic murmur, no carotid bruits  Neck: Supple, no thyroid enlargement.   Pulmonary:  Chest symmetric, lungs clear bilaterally and no crackles, wheezes or rales  Extremities:  2+ pedal edema.  Muscle/joint: Strength and tone normal   Skin:  No rash or significant lesions.   Gait Normal.  Neurologic: Alert, oriented x3, no focal neurological deficit, cranial nerves grossly normal            Data: All pertinent previous laboratory data reviewed     No results found for: PH, PHARTERIAL, PO2, [id number], SAT, PCO2, HCO3, BASEEXCESS, MARIZA, BEB  Lab Results   Component Value Date    TSH 1.25 12/29/2017    TSH 1.37 05/01/2017     Lab Results   Component Value Date     (H) 06/20/2018    GLC 88 06/13/2018     Lab Results   Component Value Date    HGB 14.5 12/29/2017    HGB 14.3 08/25/2017     Lab Results   Component Value Date    BUN 24 06/20/2018    BUN 35 (H) 06/13/2018    CR 1.40 (H) 06/20/2018    CR 1.72 (H) 06/13/2018     Lab Results   Component Value Date    AST 18 12/29/2017    AST 16 04/18/2017    ALT 21 12/29/2017    ALT 23 04/18/2017    ALKPHOS 73 12/29/2017    ALKPHOS 102 04/18/2017    BILITOTAL 1.0 12/29/2017    BILITOTAL 0.6 04/18/2017       Echocardiology:  Study Date: 05/19/2017 03:05 PM    Interpretation Summary     Left ventricular systolic function is low normal.  The visual ejection fraction is estimated at 50-55%.  There is moderate (2+) mitral regurgitation.  There is mild (1+) aortic regurgitation.  Compared to 2015, degree of MR is slightly worse. The study was technically  difficult.    Copy to: Kwadwo Winslow MD 6/29/2018     Total time spent with patient: 60 min >50% counseling

## 2018-06-29 NOTE — NURSING NOTE
"Chief Complaint   Patient presents with     Consult     Snores per wife, stops breathing, chokes, gasps,  No SS or cpap.       Initial /81  Pulse 71  Resp 18  Ht 1.778 m (5' 10\")  Wt 85.7 kg (189 lb)  SpO2 95%  BMI 27.12 kg/m2 Estimated body mass index is 27.12 kg/(m^2) as calculated from the following:    Height as of this encounter: 1.778 m (5' 10\").    Weight as of this encounter: 85.7 kg (189 lb).    Medication Reconciliation: complete    Neck circumference: 16 inches / 41 centimeters.  Ess 4    Chichi Bolton LPN/CMA    DME: N    "

## 2018-07-02 ENCOUNTER — DOCUMENTATION ONLY (OUTPATIENT)
Dept: SLEEP MEDICINE | Facility: CLINIC | Age: 83
End: 2018-07-02
Payer: COMMERCIAL

## 2018-07-05 NOTE — PROGRESS NOTES
Scan of MUKESH in recliner at 45 % , no oxygen results 7/1/18.  Resulted and routed to Dr Ritter for his review.  Dr Ritter to call Tucker with results.

## 2018-07-06 ENCOUNTER — TELEPHONE (OUTPATIENT)
Dept: SLEEP MEDICINE | Facility: CLINIC | Age: 83
End: 2018-07-06

## 2018-07-06 DIAGNOSIS — R06.3 CHEYNE-STOKES BREATHING: Primary | ICD-10-CM

## 2018-07-06 NOTE — TELEPHONE ENCOUNTER
Telephone call-overnight oximetry shows an oxygen desaturation index of 47 with 62 minutes of hypoxemia with saturations less than or equal to 88%.  This may reflect Cheyne-Tam, obstructive sleep apnea or a mixture- complex sleep apnea.  We have arranged for overnight sleep testing after discussing with the patient today.  EPAP will be used for obstructive events and adaptive servo ventilation will be considered for complex sleep apnea given his ejection fraction greater than 50%.

## 2018-07-06 NOTE — PROGRESS NOTES
Telephone call-    Sleep study to be recommended based on oximetry.  Patient not available for call-message left.

## 2018-07-10 ENCOUNTER — ANTICOAGULATION THERAPY VISIT (OUTPATIENT)
Dept: ANTICOAGULATION | Facility: CLINIC | Age: 83
End: 2018-07-10
Payer: COMMERCIAL

## 2018-07-10 ENCOUNTER — OFFICE VISIT (OUTPATIENT)
Dept: CARDIOLOGY | Facility: CLINIC | Age: 83
End: 2018-07-10
Attending: NURSE PRACTITIONER
Payer: COMMERCIAL

## 2018-07-10 VITALS
HEART RATE: 74 BPM | HEIGHT: 70 IN | DIASTOLIC BLOOD PRESSURE: 94 MMHG | WEIGHT: 201 LBS | BODY MASS INDEX: 28.77 KG/M2 | SYSTOLIC BLOOD PRESSURE: 170 MMHG

## 2018-07-10 DIAGNOSIS — I10 ESSENTIAL HYPERTENSION, BENIGN: ICD-10-CM

## 2018-07-10 DIAGNOSIS — I10 ESSENTIAL HYPERTENSION WITH GOAL BLOOD PRESSURE LESS THAN 140/90: ICD-10-CM

## 2018-07-10 DIAGNOSIS — Z79.01 LONG-TERM (CURRENT) USE OF ANTICOAGULANTS: ICD-10-CM

## 2018-07-10 LAB
ANION GAP SERPL CALCULATED.3IONS-SCNC: 8 MMOL/L (ref 3–14)
BUN SERPL-MCNC: 24 MG/DL (ref 7–30)
CALCIUM SERPL-MCNC: 8.7 MG/DL (ref 8.5–10.1)
CHLORIDE SERPL-SCNC: 110 MMOL/L (ref 94–109)
CO2 SERPL-SCNC: 24 MMOL/L (ref 20–32)
CREAT SERPL-MCNC: 1.33 MG/DL (ref 0.66–1.25)
GFR SERPL CREATININE-BSD FRML MDRD: 51 ML/MIN/1.7M2
GLUCOSE SERPL-MCNC: 131 MG/DL (ref 70–99)
INR POINT OF CARE: 2.5 (ref 0.86–1.14)
POTASSIUM SERPL-SCNC: 3.6 MMOL/L (ref 3.4–5.3)
SODIUM SERPL-SCNC: 142 MMOL/L (ref 133–144)

## 2018-07-10 PROCEDURE — 85610 PROTHROMBIN TIME: CPT | Mod: QW

## 2018-07-10 PROCEDURE — 99214 OFFICE O/P EST MOD 30 MIN: CPT | Performed by: NURSE PRACTITIONER

## 2018-07-10 PROCEDURE — 36416 COLLJ CAPILLARY BLOOD SPEC: CPT

## 2018-07-10 PROCEDURE — 99207 ZZC NO CHARGE NURSE ONLY: CPT

## 2018-07-10 PROCEDURE — 80048 BASIC METABOLIC PNL TOTAL CA: CPT | Performed by: NURSE PRACTITIONER

## 2018-07-10 RX ORDER — TRIAMTERENE/HYDROCHLOROTHIAZID 37.5-25 MG
1 TABLET ORAL DAILY
Qty: 60 TABLET | Refills: 3 | Status: SHIPPED | OUTPATIENT
Start: 2018-07-10 | End: 2019-02-20

## 2018-07-10 NOTE — MR AVS SNAPSHOT
After Visit Summary   7/10/2018    Tucker Slater    MRN: [id number]           Patient Information     Date Of Birth          3/13/1931        Visit Information        Provider Department      7/10/2018 3:10 PM Janeth Mcdaniels APRN CNP [facility]        Today's Diagnoses     Essential hypertension with goal blood pressure less than 140/90          Care Instructions    Start Maxzide daily (smaller dose than before)  Return in 2 wks with labs and visit with Nurse for BP check  Wear compression stockings  Get sleep study          Follow-ups after your visit        Additional Services     Follow-Up with Nurse       BP check at time of labs.  Maxzide re-added at lower dose                  Your next 10 appointments already scheduled     Jul 10, 2018  4:15 PM CDT   Anticoagulation Visit with [facility] CL[facility]IC   [facility]    [address]  [facility] [id number] [phone]            Jul 24, 2018 11:00 AM CDT   Nurse Only with RU [facility] HRT NURSE   [facility] ([facility])    [address]  [facility] [id number] [phone]            Jul 24, 2018 11:15 AM CDT   LAB with RU LAB   [facility] [place] ([facility])    [address]  [facility] [id number] [phone]           Please do not eat 10-12 hours before your appointment if you are coming in fasting for labs on lipids, cholesterol, or glucose (sugar). This does not apply to pregnant women. Water, hot tea and black coffee (with nothing added) are okay. Do not drink other fluids, diet soda or chew gum.            Aug 23, 2018  3:15 PM CDT   Remote PPM Check with KIDD TECH1   [facility]   [place] ([facility])    [address] [place] [address] [address]  [place] [address]  "  [phone] OPT 2           This appointment is for a remote check of your pacemaker.  This is not an appointment at the office.              Future tests that were ordered for you today     Open Future Orders        Priority Expected Expires Ordered    Follow-Up with Nurse Routine 7/24/2018 7/10/2019 7/10/2018    Basic metabolic panel Routine 7/24/2018 7/10/2019 7/10/2018            Who to contact     If you have questions or need follow up information about today's clinic visit or your schedule please contact [facility] directly at [phone].  Normal or non-critical lab and imaging results will be communicated to you by MyChart, letter or phone within 4 business days after the clinic has received the results. If you do not hear from us within 7 days, please contact the clinic through MyChart or phone. If you have a critical or abnormal lab result, we will notify you by phone as soon as possible.  Submit refill requests through [employer] or call your pharmacy and they will forward the refill request to us. Please allow 3 business days for your refill to be completed.          Additional Information About Your Visit        Care EveryWhere ID     This is your Care EveryWhere ID. This could be used by other organizations to access your [place] medical records  [id number]        Your Vitals Were     Pulse Height BMI (Body Mass Index)             74 1.778 m (5' 10\") 28.84 kg/m2          Blood Pressure from Last 3 Encounters:   07/10/18 (!) 170/94   06/29/18 156/81   06/20/18 158/78    Weight from Last 3 Encounters:   07/10/18 91.2 kg (201 lb)   06/29/18 85.7 kg (189 lb)   06/05/18 85.9 kg (189 lb 4.8 oz)              We Performed the Following     Follow-Up with Cardiac Advanced Practice Provider          Today's Medication Changes          These changes are accurate as of 7/10/18  3:48 PM.  If you have any questions, ask your nurse or doctor.               Start taking " these medicines.        Dose/Directions    triamterene-hydrochlorothiazide 37.5-25 MG per tablet   Commonly known as:  MAXZIDE-25   Used for:  Essential hypertension with goal blood pressure less than 140/90   Started by:  Janeth Mcdaniels APRN CNP        Dose:  1 tablet   Take 1 tablet by mouth daily   Quantity:  60 tablet   Refills:  3            Where to get your medicines      These medications were sent to [facility] [place] [address]  [address] [facility] [address]     Phone:  [phone]     triamterene-hydrochlorothiazide 37.5-25 MG per tablet                Primary Care Provider Office Phone # Fax #    Kwadwo Winslow [phone]       [address]  [facility] [address]        Equal Access to Services     [facility] ParkMAYANK : Hadii mat hickey hadasho Soomaali, waaxda luqadaha, qaybta kaalmada adeegyada, gisela parekh . So [facility] [phone].    ATENCIÓN: Si habla español, tiene a kraft disposición servicios gratuitos de asistencia lingüística. [facility] [phone].    We comply with applicable federal civil rights laws and [place] laws. We do not discriminate on the basis of race, color, national origin, age, disability, sex, sexual orientation, or gender identity.            Thank you!     Thank you for choosing [facility]  for your care. Our goal is always to provide you with excellent care. Hearing back from our patients is one way we can continue to improve our services. Please take a few minutes to complete the written survey that you may receive in the mail after your visit with us. Thank you!             Your Updated Medication List - Protect others around you: Learn how to safely use, store and throw away your medicines at www.disposemymeds.org.          This list is accurate as of 7/10/18  3:48 PM.  Always use your most recent med list.                   Brand  Name Dispense Instructions for use Diagnosis    ACETAMINOPHEN PO      Take 650 mg by mouth every 4 hours as needed for pain        calcium carbonate 500 MG tablet    OS-KARLA 500 mg [place]. [place]     Take 500 mg by mouth daily        carvedilol 25 MG tablet    COREG    270 tablet    Take 1.5 tablets (37.5 mg) by mouth 2 times daily (with meals)    Essential hypertension with goal blood pressure less than 140/90       JANTOVEN 2.5 MG tablet   Generic drug:  warfarin     90 tablet    TAKE ONE TABLET BY MOUTH EVERY DAY, EXCEPT TAKE ONE-HALF TABLET BY MOUTH ON FRIDAYS, OR AS INSTRUCTED BASED ON INR RESULTS    Chronic atrial fibrillation (H)       losartan 100 MG tablet    COZAAR    90 tablet    Take 1 tablet (100 mg) by mouth daily    Essential hypertension, benign       OCUVITE PO      Take 1 tablet by mouth daily        triamterene-hydrochlorothiazide 37.5-25 MG per tablet    MAXZIDE-25    60 tablet    Take 1 tablet by mouth daily    Essential hypertension with goal blood pressure less than 140/90       vitamin B complex with vitamin C Tabs tablet      Take 1 tablet by mouth daily        VITAMIN C PO      Take 500 mg by mouth daily        VITAMIN D (CHOLECALCIFEROL) PO      Take 2,000 Units by mouth daily.

## 2018-07-10 NOTE — LETTER
7/10/2018      Kwadwo Winslow MD  [address] [facility] [address]      RE: Tucker Slater       Dear Colleague,    I had the pleasure of seeing Tucker Slater in the [facility].    Service Date: 07/10/2018      HISTORY OF PRESENT ILLNESS:  This 87-year-old male presents to [facility] today for a followup visit.  He is a patient of Dr. Perez seen in our clinic for atrial fibrillation, symptomatic bradycardia status post permanent pacemaker, hypertension, chronic kidney disease, abnormal overnight oximetry.      Tucker has a longstanding history of permanent atrial fibrillation.  His heart rate has been controlled since 2016, and he is asymptomatic.  He is on warfarin for CVA prophylaxis, being managed through his primary medical doctor.  In 2015 he developed symptomatic bradycardia and underwent a permanent pacemaker implantation.  He is followed through [facility], and it appears to be appropriately functioning.      More recently I have seen Tucker for hypertension.  He has required multiple agents to control his blood pressure.  Due to worsening renal insufficiency, I asked him to stop his Cozaar and Maxzide.  I increased his carvedilol to 37.5 mg b.i.d.  In followup his blood pressure was still elevated; however, his creatinine level improved to 1.4.  I elected to add back his Cozaar 100 mg.  I encouraged hydration, as he was drinking very little water.  He was also set up for overnight oximetry, which was abnormal, and did seek a sleep evaluation.  The sleep physician is recommending proceeding with a sleep study.  He returns today for BMP and reassessment.      Tucker overall tells me he is doing well.  He checks blood pressure on a regular basis and brought the readings in to me today.  For the past couple of weeks he has shown increased systolic blood pressure readings to averaging around 160 mmHg.  He denies any  lightheadedness, dizziness, blurred vision.  He does get around slowly as he has hip issues.  He has no chest pain with activity or at rest or near-syncope.  He does have chronic dyspnea on exertion that worsens when going up the stairs.  This has been relatively unchanged for the past year.  He has no sensations of palpitations.  He does admit to some peripheral edema intermittently.  His swelling does resolve by morning.  In the past, he was given compression stockings but has not been wearing them.  He denies orthopnea.      I reviewed his BMP.  This shows a sodium 142, potassium 3.6, BUN of 24, creatinine 1.33.      PHYSICAL EXAMINATION:  His blood pressure today was checked against his home machine.  These numbers have been fairly consistent.  Blood pressure 160/88 with a heart rate of 74 beats per minute and is irregular.  His lungs are clear.  He does have 1+ pitting shin edema noted bilaterally.  His weight is up about 10 pounds from last month.  He does check his weight at home and tells me he feels his weight has been up about 5 pounds.      IMPRESSION AND PLAN:   1.  Hypertension.  Blood pressure elevated today.  Recently his Maxzide was stopped due to renal insufficiency.  His creatinine level has greatly improved.  He has tolerated the increased dose of carvedilol.  Due to persistent elevated blood pressure readings, I have asked him to add back Maxzide at a lower dose at 37.5/25 mg.  We will have him return in 2 weeks with a BMP and a blood pressure assessment with the nurse.  Due to his chronic dyspnea on exertion, we also talked about possibly of proceeding with ischemic evaluation.  However, he deferred at this time and would like to work at getting his blood pressure better controlled and obtaining a sleep study.   2.  Permanent atrial fibrillation.  Heart rate well controlled.  I will not make any changes.  Continue INR assessments through his primary medical doctor as he is on warfarin.   3.   Status post permanent pacemaker for symptomatic bradycardia.   4.  Abnormal overnight oximetry.  Upcoming sleep study planned.        Thank you for allowing me to participate in this patient's care.  Follow up as planned.         SELMA SANTOS, JEN             D: 07/10/2018   T: 2018   MT: COLBY      Name:     ROSS LORENZANA   MRN:      [id number]        Account:      [id number]   :      1931           Service Date: 07/10/2018      Document: [id number]         Outpatient Encounter Prescriptions as of 7/10/2018   Medication Sig Dispense Refill     ACETAMINOPHEN PO Take 650 mg by mouth every 4 hours as needed for pain       Ascorbic Acid (VITAMIN C PO) Take 500 mg by mouth daily        calcium carbonate (OS-KARLA 500 MG [place]. [place]) 500 MG tablet Take 500 mg by mouth daily        carvedilol (COREG) 25 MG tablet Take 1.5 tablets (37.5 mg) by mouth 2 times daily (with meals) 270 tablet 3     JANTOVEN 2.5 MG tablet TAKE ONE TABLET BY MOUTH EVERY DAY, EXCEPT TAKE ONE-HALF TABLET BY MOUTH ON , OR AS INSTRUCTED BASED ON INR RESULTS 90 tablet 1     losartan (COZAAR) 100 MG tablet Take 1 tablet (100 mg) by mouth daily 90 tablet 1     Multiple Vitamins-Minerals (OCUVITE PO) Take 1 tablet by mouth daily        triamterene-hydrochlorothiazide (MAXZIDE-25) 37.5-25 MG per tablet Take 1 tablet by mouth daily 60 tablet 3     vitamin B complex with vitamin C (VITAMIN  B COMPLEX) TABS tablet Take 1 tablet by mouth daily       VITAMIN D, CHOLECALCIFEROL, PO Take 2,000 Units by mouth daily.       No facility-administered encounter medications on file as of 7/10/2018.        Again, thank you for allowing me to participate in the care of your patient.      Sincerely,    SELMA Santos CNP     [facility]

## 2018-07-10 NOTE — MR AVS SNAPSHOT
Tucker ZHAO Slater   7/10/2018 4:15 PM   Anticoagulation Therapy Visit    Description:  87 year old male   Provider:  [facility]   Department:  [facility]           INR as of 7/10/2018     Today's INR 2.5      Anticoagulation Summary as of 7/10/2018     INR goal 2.0-3.0   Today's INR 2.5   Full warfarin instructions 7/11: 1.25 mg; 7/18: 1.25 mg; Otherwise 1.25 mg on Fri; 2.5 mg all other days   Next INR check 7/24/2018    Indications   Long-term (current) use of anticoagulants [Z79.01] [Z79.01]  Atrial fibrillation (H) [I48.91]         Your next Anticoagulation Clinic appointment(s)     Jul 24, 2018  4:00 PM CDT   [facility]ticoagulation Visit with [facility]   [facility]    [address] [facility] [address]  [facility] [id number] [phone]              Contact Numbers     [facility] Phone Numbers:  Anticoagulation Clinic Appointments : [phone]  Anticoagulation Nurse: [phone]         July 2018 Details    Sun Mon Tue Wed Thu Fri Sat     1               2               3               4               5               6               7                 8               9               10      2.5 mg   See details      11      1.25 mg         12      2.5 mg         13      1.25 mg         14      2.5 mg           15      2.5 mg         16      2.5 mg         17      2.5 mg         18      1.25 mg         19      2.5 mg         20      1.25 mg         21      2.5 mg           22      2.5 mg         23      2.5 mg         24            25               26               27               28                 29               30               31                    Date Details   07/10 This INR check       Date of next INR:  7/24/2018         How to take your warfarin dose     To take:  1.25 mg Take 0.5 of a 2.5 mg tablet.    To take:  2.5 mg Take 1 of the 2.5 mg tablets.

## 2018-07-10 NOTE — PATIENT INSTRUCTIONS
Start Maxzide daily (smaller dose than before)  Return in 2 wks with labs and visit with Nurse for BP check  Wear compression stockings  Get sleep study

## 2018-07-10 NOTE — LETTER
7/10/2018    Kwadwo Winslow MD  [address] [facility] [address]    RE: Tucker Slater       Dear Colleague,    I had the pleasure of seeing Tucker Slater in the [facility].    HPI and Plan:  #[id number]  See dictation    Orders Placed This Encounter   Procedures     Basic metabolic panel     Follow-Up with Nurse       Orders Placed This Encounter   Medications     triamterene-hydrochlorothiazide (MAXZIDE-25) 37.5-25 MG per tablet     Sig: Take 1 tablet by mouth daily     Dispense:  60 tablet     Refill:  3       There are no discontinued medications.      Encounter Diagnosis   Name Primary?     Essential hypertension with goal blood pressure less than 140/90        CURRENT MEDICATIONS:  Current Outpatient Prescriptions   Medication Sig Dispense Refill     ACETAMINOPHEN PO Take 650 mg by mouth every 4 hours as needed for pain       Ascorbic Acid (VITAMIN C PO) Take 500 mg by mouth daily        calcium carbonate (OS-KARLA 500 MG [place]. [place]) 500 MG tablet Take 500 mg by mouth daily        carvedilol (COREG) 25 MG tablet Take 1.5 tablets (37.5 mg) by mouth 2 times daily (with meals) 270 tablet 3     JANTOVEN 2.5 MG tablet TAKE ONE TABLET BY MOUTH EVERY DAY, EXCEPT TAKE ONE-HALF TABLET BY MOUTH ON FRIDAYS, OR AS INSTRUCTED BASED ON INR RESULTS 90 tablet 1     losartan (COZAAR) 100 MG tablet Take 1 tablet (100 mg) by mouth daily 90 tablet 1     Multiple Vitamins-Minerals (OCUVITE PO) Take 1 tablet by mouth daily        triamterene-hydrochlorothiazide (MAXZIDE-25) 37.5-25 MG per tablet Take 1 tablet by mouth daily 60 tablet 3     vitamin B complex with vitamin C (VITAMIN  B COMPLEX) TABS tablet Take 1 tablet by mouth daily       VITAMIN D, CHOLECALCIFEROL, PO Take 2,000 Units by mouth daily.         ALLERGIES     Allergies   Allergen Reactions     Merbromin      Other reaction(s): Other, see comments  Severe reaction as child. Amalgam fillings OK.     Morphine Nausea and  Vomiting     Amlodipine      Edema       PAST MEDICAL HISTORY:  Past Medical History:   Diagnosis Date     Arrhythmia     A Fib     Balance problem     related to neuropathy in feet     Bradycardia      Carcinoma in situ of bladder     bladder cancer ;; follow w Urology w periodic cysto      Essential hypertension, benign      Generalized osteoarthrosis, unspecified site knees    s/p R total knee  ; will do L 6/10      Numbness and tingling     toes and fingers     Pacemaker     [employer]      Pain in joint, shoulder region     L shoulder rotater tear; no surgery      Persistent atrial fibrillation (H) 1/30/15     Prostate CA (H) 2008    radiation     Prostate cancer (H)     completed RT 3/09     Renal disease     elevated creatinine     Shoulder impingement     R shoulder impingement etc see MRI       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     toes both feet        PAST SURGICAL HISTORY:  Past Surgical History:   Procedure Laterality Date     ARTHROPLASTY HIP Right 3/27/2017    Procedure: ARTHROPLASTY HIP;  Surgeon: Jigar Wynn MD;  Location: [facility] OR     C NONSPECIFIC PROCEDURE      bilat inguinal hernia repairs ( 3total procedures)      C NONSPECIFIC PROCEDURE      pilonidal cyst     C NONSPECIFIC PROCEDURE      T + A     C NONSPECIFIC PROCEDURE       R+L total knee     CARDIOVERSION  3/26/15     COLONOSCOPY       IMPLANT PACEMAKER  3/26/15     RELEASE CARPAL TUNNEL Right 2017    Procedure: RELEASE CARPAL TUNNEL;  Right carpal tunnel release;  Surgeon: Alonso Barboza MD;  Location: [facility] OR       FAMILY HISTORY:  Family History   Problem Relation Age of Onset     HEART DISEASE Father       89yo     Coronary Artery Disease Father      HEART DISEASE Mother       76yo     Coronary Artery Disease Mother      Family History Negative Brother        SOCIAL HISTORY:  Social History     Social History     Marital status:      Spouse  "name: Alba     Number of children: 3     Years of education: N/A     Occupational History      Retired      [employer]     Social History Main Topics     Smoking status: Former Smoker     Quit date: 9/12/1977     Smokeless tobacco: Never Used     Alcohol use No     Drug use: No     Sexual activity: No     Other Topics Concern     Caffeine Concern No     4-5 cups per week      Sleep Concern No     Stress Concern No     Weight Concern No     Special Diet No     Exercise No     yard work and moves around a lot     Social History Narrative       Review of Systems:  Skin:  Positive for   hx skin cancer   Eyes:  Positive for glasses    ENT:  Positive for postnasal drainage;sinus trouble    Respiratory:  Positive for shortness of breath     Cardiovascular:  Negative      Gastroenterology: Negative      Genitourinary:  Negative      Musculoskeletal:  Negative      Neurologic:  Positive for numbness or tingling of hands;numbness or tingling of feet    Psychiatric:  Negative      Heme/Lymph/Imm:  Negative      Endocrine:  Negative        Physical Exam:  Vitals: BP (!) 170/94 (BP Location: Right arm, Patient Position: Sitting, Cuff Size: Adult Regular)  Pulse 74  Ht 1.778 m (5' 10\")  Wt 91.2 kg (201 lb)  BMI 28.84 kg/m2    Constitutional:  cooperative;in no acute distress        Skin:  warm and dry to the touch          Head:  normocephalic        Eyes:  pupils equal and round        Lymph:      ENT:  no pallor or cyanosis        Neck:  JVP normal        Respiratory:  clear to auscultation;normal symmetry;normal respiratory excursion         Cardiac: regular rhythm;normal S1 and S2     no presence of murmur          pulses full and equal                                        GI:  abdomen soft;BS normoactive        Extremities and Muscular Skeletal:  no spinal abnormalities noted   1+;pitting;bilateral LE edema          Neurological:  affect appropriate        Psych:  Alert and Oriented x 3          CC  Janeth Mcdaniels" SELMA CNP  [address]  [place] [id number]                    Thank you for allowing me to participate in the care of your patient.      Sincerely,     SELMA Santos CNP     [facility]    cc:   SELMA Landis CNP  [address]  [place] [id number]

## 2018-07-10 NOTE — PROGRESS NOTES
ANTICOAGULATION FOLLOW-UP CLINIC VISIT    Patient Name:  Tucker Slater  Date:  7/10/2018  Contact Type:  Face to Face    SUBJECTIVE:     Patient Findings     Positives Change in medications (Per pt, will restart Maxzide today, this does not interfere with warfarin. Pt als reports increase in dose of Carvedilol and loasartan, these do not interfere with warfarin. ), No Problem Findings           OBJECTIVE    INR Protime   Date Value Ref Range Status   07/10/2018 2.5 (A) 0.86 - 1.14 Final       ASSESSMENT / PLAN  INR assessment THER    Recheck INR In: 2 WEEKS    INR Location Clinic      Anticoagulation Summary as of 7/10/2018     INR goal 2.0-3.0   Today's INR 2.5   Warfarin maintenance plan 1.25 mg (2.5 mg x 0.5) on Fri; 2.5 mg (2.5 mg x 1) all other days   Full warfarin instructions 7/11: 1.25 mg; 7/18: 1.25 mg; Otherwise 1.25 mg on Fri; 2.5 mg all other days   Weekly warfarin total 16.25 mg   Plan last modified Ivory Machado RN (7/7/2017)   Next INR check 7/24/2018   Priority INR   Target end date     Indications   Long-term (current) use of anticoagulants [Z79.01] [Z79.01]  Atrial fibrillation (H) [I48.91]         Anticoagulation Episode Summary     INR check location     Preferred lab     Send INR reminders to [facility]    Comments       Anticoagulation Care Providers     Provider Role Specialty Phone number    Kwadwo Winslow MD Responsible Internal Medicine [phone]            See the Encounter Report to view Anticoagulation Flowsheet and Dosing Calendar (Go to Encounters tab in chart review, and find the Anticoagulation Therapy Visit)    Dosage adjustment made based on physician directed care plan.    Gail Valdovinos RN

## 2018-07-10 NOTE — PROGRESS NOTES
HPI and Plan:  #[id number]  See dictation    Orders Placed This Encounter   Procedures     Basic metabolic panel     Follow-Up with Nurse       Orders Placed This Encounter   Medications     triamterene-hydrochlorothiazide (MAXZIDE-25) 37.5-25 MG per tablet     Sig: Take 1 tablet by mouth daily     Dispense:  60 tablet     Refill:  3       There are no discontinued medications.      Encounter Diagnosis   Name Primary?     Essential hypertension with goal blood pressure less than 140/90        CURRENT MEDICATIONS:  Current Outpatient Prescriptions   Medication Sig Dispense Refill     ACETAMINOPHEN PO Take 650 mg by mouth every 4 hours as needed for pain       Ascorbic Acid (VITAMIN C PO) Take 500 mg by mouth daily        calcium carbonate (OS-KARLA 500 MG [place]. CA) 500 MG tablet Take 500 mg by mouth daily        carvedilol (COREG) 25 MG tablet Take 1.5 tablets (37.5 mg) by mouth 2 times daily (with meals) 270 tablet 3     JANTOVEN 2.5 MG tablet TAKE ONE TABLET BY MOUTH EVERY DAY, EXCEPT TAKE ONE-HALF TABLET BY MOUTH ON FRIDAYS, OR AS INSTRUCTED BASED ON INR RESULTS 90 tablet 1     losartan (COZAAR) 100 MG tablet Take 1 tablet (100 mg) by mouth daily 90 tablet 1     Multiple Vitamins-Minerals (OCUVITE PO) Take 1 tablet by mouth daily        triamterene-hydrochlorothiazide (MAXZIDE-25) 37.5-25 MG per tablet Take 1 tablet by mouth daily 60 tablet 3     vitamin B complex with vitamin C (VITAMIN  B COMPLEX) TABS tablet Take 1 tablet by mouth daily       VITAMIN D, CHOLECALCIFEROL, PO Take 2,000 Units by mouth daily.         ALLERGIES     Allergies   Allergen Reactions     Merbromin      Other reaction(s): Other, see comments  Severe reaction as child. Amalgam fillings OK.     Morphine Nausea and Vomiting     Amlodipine      Edema       PAST MEDICAL HISTORY:  Past Medical History:   Diagnosis Date     Arrhythmia     A Fib     Balance problem     related to neuropathy in feet     Bradycardia      Carcinoma in situ of bladder 2000     bladder cancer ;; follow w Urology w periodic cysto      Essential hypertension, benign      Generalized osteoarthrosis, unspecified site knees    s/p R total knee  ; will do L 6/10      Numbness and tingling     toes and fingers     Pacemaker     [employer]      Pain in joint, shoulder region     L shoulder rotater tear; no surgery      Persistent atrial fibrillation (H) 1/30/15     Prostate CA (H) -    radiation     Prostate cancer (H)     completed RT 3/09     Renal disease     elevated creatinine     Shoulder impingement     R shoulder impingement etc see MRI       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     toes both feet        PAST SURGICAL HISTORY:  Past Surgical History:   Procedure Laterality Date     ARTHROPLASTY HIP Right 3/27/2017    Procedure: ARTHROPLASTY HIP;  Surgeon: Jigar Wynn MD;  Location: [facility] OR     C NONSPECIFIC PROCEDURE      bilat inguinal hernia repairs ( 3total procedures)      C NONSPECIFIC PROCEDURE      pilonidal cyst     C NONSPECIFIC PROCEDURE      T + A     C NONSPECIFIC PROCEDURE       R+L total knee     CARDIOVERSION  3/26/15     COLONOSCOPY       IMPLANT PACEMAKER  3/26/15     RELEASE CARPAL TUNNEL Right 2017    Procedure: RELEASE CARPAL TUNNEL;  Right carpal tunnel release;  Surgeon: Alonso Barboza MD;  Location: [facility] OR       FAMILY HISTORY:  Family History   Problem Relation Age of Onset     HEART DISEASE Father       89yo     Coronary Artery Disease Father      HEART DISEASE Mother       74yo     Coronary Artery Disease Mother      Family History Negative Brother        SOCIAL HISTORY:  Social History     Social History     Marital status:      Spouse name: Alba     Number of children: 3     Years of education: N/A     Occupational History      Retired      [employer]     Social History Main Topics     Smoking status: Former Smoker     Quit date: 1977     Smokeless tobacco:  "Never Used     Alcohol use No     Drug use: No     Sexual activity: No     Other Topics Concern     Caffeine Concern No     4-5 cups per week      Sleep Concern No     Stress Concern No     Weight Concern No     Special Diet No     Exercise No     yard work and moves around a lot     Social History Narrative       Review of Systems:  Skin:  Positive for   hx skin cancer   Eyes:  Positive for glasses    ENT:  Positive for postnasal drainage;sinus trouble    Respiratory:  Positive for shortness of breath     Cardiovascular:  Negative      Gastroenterology: Negative      Genitourinary:  Negative      Musculoskeletal:  Negative      Neurologic:  Positive for numbness or tingling of hands;numbness or tingling of feet    Psychiatric:  Negative      Heme/Lymph/Imm:  Negative      Endocrine:  Negative        Physical Exam:  Vitals: BP (!) 170/94 (BP Location: Right arm, Patient Position: Sitting, Cuff Size: Adult Regular)  Pulse 74  Ht 1.778 m (5' 10\")  Wt 91.2 kg (201 lb)  BMI 28.84 kg/m2    Constitutional:  cooperative;in no acute distress        Skin:  warm and dry to the touch          Head:  normocephalic        Eyes:  pupils equal and round        Lymph:      ENT:  no pallor or cyanosis        Neck:  JVP normal        Respiratory:  clear to auscultation;normal symmetry;normal respiratory excursion         Cardiac: regular rhythm;normal S1 and S2     no presence of murmur          pulses full and equal                                        GI:  abdomen soft;BS normoactive        Extremities and Muscular Skeletal:  no spinal abnormalities noted   1+;pitting;bilateral LE edema          Neurological:  affect appropriate        Psych:  Alert and Oriented x 3          CC  Janeth Mcdaniels, APRN CNP  [address]  [place] [address]                  "

## 2018-07-11 NOTE — PROGRESS NOTES
Service Date: 07/10/2018      HISTORY OF PRESENT ILLNESS:  This 87-year-old male presents to [facility] today for a followup visit.  He is a patient of Dr. Perez seen in our clinic for atrial fibrillation, symptomatic bradycardia status post permanent pacemaker, hypertension, chronic kidney disease, abnormal overnight oximetry.      Tucker has a longstanding history of permanent atrial fibrillation.  His heart rate has been controlled since 2016, and he is asymptomatic.  He is on warfarin for CVA prophylaxis, being managed through his primary medical doctor.  In 2015 he developed symptomatic bradycardia and underwent a permanent pacemaker implantation.  He is followed through [facility], and it appears to be appropriately functioning.      More recently I have seen Tucker for hypertension.  He has required multiple agents to control his blood pressure.  Due to worsening renal insufficiency, I asked him to stop his Cozaar and Maxzide.  I increased his carvedilol to 37.5 mg b.i.d.  In followup his blood pressure was still elevated; however, his creatinine level improved to 1.4.  I elected to add back his Cozaar 100 mg.  I encouraged hydration, as he was drinking very little water.  He was also set up for overnight oximetry, which was abnormal, and did seek a sleep evaluation.  The sleep physician is recommending proceeding with a sleep study.  He returns today for BMP and reassessment.      Tucker overall tells me he is doing well.  He checks blood pressure on a regular basis and brought the readings in to me today.  For the past couple of weeks he has shown increased systolic blood pressure readings to averaging around 160 mmHg.  He denies any lightheadedness, dizziness, blurred vision.  He does get around slowly as he has hip issues.  He has no chest pain with activity or at rest or near-syncope.  He does have chronic dyspnea on exertion that worsens when going up the  stairs.  This has been relatively unchanged for the past year.  He has no sensations of palpitations.  He does admit to some peripheral edema intermittently.  His swelling does resolve by morning.  In the past, he was given compression stockings but has not been wearing them.  He denies orthopnea.      I reviewed his BMP.  This shows a sodium 142, potassium 3.6, BUN of 24, creatinine 1.33.      PHYSICAL EXAMINATION:  His blood pressure today was checked against his home machine.  These numbers have been fairly consistent.  Blood pressure 160/88 with a heart rate of 74 beats per minute and is irregular.  His lungs are clear.  He does have 1+ pitting shin edema noted bilaterally.  His weight is up about 10 pounds from last month.  He does check his weight at home and tells me he feels his weight has been up about 5 pounds.      IMPRESSION AND PLAN:   1.  Hypertension.  Blood pressure elevated today.  Recently his Maxzide was stopped due to renal insufficiency.  His creatinine level has greatly improved.  He has tolerated the increased dose of carvedilol.  Due to persistent elevated blood pressure readings, I have asked him to add back Maxzide at a lower dose at 37.5/25 mg.  We will have him return in 2 weeks with a BMP and a blood pressure assessment with the nurse.  Due to his chronic dyspnea on exertion, we also talked about possibly of proceeding with ischemic evaluation.  However, he deferred at this time and would like to work at getting his blood pressure better controlled and obtaining a sleep study.   2.  Permanent atrial fibrillation.  Heart rate well controlled.  I will not make any changes.  Continue INR assessments through his primary medical doctor as he is on warfarin.   3.  Status post permanent pacemaker for symptomatic bradycardia.   4.  Abnormal overnight oximetry.  Upcoming sleep study planned.        Thank you for allowing me to participate in this patient's care.  Follow up as planned.          SELMA JONES, CNP             D: 07/10/2018   T: 2018   MT: COLBY      Name:     ROSS LORENZANA   MRN:      [id number]        Account:      [id number]   :      1931           Service Date: 07/10/2018      Document: [id number]

## 2018-07-12 ENCOUNTER — TELEPHONE (OUTPATIENT)
Dept: SLEEP MEDICINE | Facility: CLINIC | Age: 83
End: 2018-07-12

## 2018-07-12 NOTE — TELEPHONE ENCOUNTER
LVM on home number for Alba .  Have scheduled sleep study at [facility] for this coming Monday 7/16/18.  Left nurseline number for her to call back and confirm this appointment.  Also, want to give her information regarding the study time, location, and offered her a room if she wanted to stay the night, as will have one room not being used.

## 2018-07-16 ENCOUNTER — THERAPY VISIT (OUTPATIENT)
Dept: SLEEP MEDICINE | Facility: CLINIC | Age: 83
End: 2018-07-16
Payer: COMMERCIAL

## 2018-07-16 DIAGNOSIS — R06.3 CHEYNE-STOKES BREATHING: ICD-10-CM

## 2018-07-16 PROCEDURE — 95811 POLYSOM 6/>YRS CPAP 4/> PARM: CPT | Performed by: INTERNAL MEDICINE

## 2018-07-16 NOTE — MR AVS SNAPSHOT
After Visit Summary   7/16/2018    Tucker Slater    MRN: [id number]           Patient Information     Date Of Birth          3/13/1931        Visit Information        Provider Department      7/16/2018 8:30 PM BED 3 SH SLEEP [facility]        Today's Diagnoses     Cheyne-Tam breathing          Care Instructions    [facility]    1. Your sleep study will be reviewed by a sleep physician within the next few days.     2. Please follow up in the sleep clinic as scheduled, or, make an appointment with your sleep provider to be seen within two weeks to discuss the results of the sleep study.    3. If you have any questions or problems with your treatment plan, please contact your sleep clinic provider at [phone] to further manage your condition.    4. Please review your attached medication list, and, at your follow-up appointment advise your sleep clinic provider about any changes.    5. Go to [email or web address] for more information about your sleep problems.    NKECHI Phillips  July 17, 2018                  Follow-ups after your visit        Your next 10 appointments already scheduled     Jul 20, 2018  9:00 AM CDT   Return Sleep Patient with Hari Ritter MD   [facility] ([place] [facility])    [id number] [place] [address]  [facility] [id number] [phone]            Jul 24, 2018 11:00 AM CDT   Nurse Only with RU [facility] HRT NURSE   [facility] ([facility])    [address] [place] [address]  [facility] [id number] [phone]            Jul 24, 2018 11:15 AM CDT   LAB with RU LAB   [facility] [place] ([facility])    [address] [place] [address]  [facility] [id number] [phone]           Please do not eat 10-12 hours before your appointment if you are coming in fasting for labs on lipids,  cholesterol, or glucose (sugar). This does not apply to pregnant women. Water, hot tea and black coffee (with nothing added) are okay. Do not drink other fluids, diet soda or chew gum.            Jul 24, 2018  4:00 PM CDT   Anticoagulation Visit with [facility] ANTICOAGULATION CLINIC   [facility] ([facility])    [address]  [facility] [id number] [phone]            Aug 23, 2018  3:15 PM CDT   Remote PPM Check with KIDD TECH1   [facility] ([facility])    [address] [facility] [address]  [place] [address] [phone] OPT 2           This appointment is for a remote check of your pacemaker.  This is not an appointment at the office.              Who to contact     If you have questions or need follow up information about today's clinic visit or your schedule please contact [facility] directly at [phone].  Normal or non-critical lab and imaging results will be communicated to you by Huixiaoerhart, letter or phone within 4 business days after the clinic has received the results. If you do not hear from us within 7 days, please contact the clinic through Huixiaoerhart or phone. If you have a critical or abnormal lab result, we will notify you by phone as soon as possible.  Submit refill requests through [employer] [place] or call your pharmacy and they will forward the refill request to us. Please allow 3 business days for your refill to be completed.          Additional Information About Your Visit        Care EveryWhere ID     This is your Care EveryWhere ID. This could be used by other organizations to access your [facility] medical records  [id number]         Blood Pressure from Last 3 Encounters:   07/10/18 (!) 170/94   06/29/18 156/81   06/20/18 158/78    Weight from Last 3 Encounters:   07/10/18 91.2 kg (201 lb)   06/29/18 85.7 kg (189 lb)   06/05/18 85.9 kg (189 lb 4.8 oz)              We Performed the Following      Comprehensive Sleep Study        Primary Care Provider Office Phone # Fax #    Kwadwo Winslow [phone]       [address] [facility] [address]        Equal Access to Services     MEREDITH JIM : Enrique mat hickey luiso Sojose lali, waaxda luqadaha, qaybta kaalmada adetonyda, gisela croweanna eddy. So [facility] [phone].    ATENCIÓN: Si habla español, tiene a kraft disposición servicios gratuitos de asistencia lingüística. Llame al [phone].    We comply with applicable federal civil rights laws and [place] laws. We do not discriminate on the basis of race, color, national origin, age, disability, sex, sexual orientation, or gender identity.            Thank you!     Thank you for choosing [place] [facility]  for your care. Our goal is always to provide you with excellent care. Hearing back from our patients is one way we can continue to improve our services. Please take a few minutes to complete the written survey that you may receive in the mail after your visit with us. Thank you!             Your Updated Medication List - Protect others around you: Learn how to safely use, store and throw away your medicines at www.disposemymeds.org.          This list is accurate as of 7/16/18 11:59 PM.  Always use your most recent med list.                   Brand Name Dispense Instructions for use Diagnosis    ACETAMINOPHEN PO      Take 650 mg by mouth every 4 hours as needed for pain        calcium carbonate 500 MG tablet    OS-KARLA 500 mg [place]. [place]     Take 500 mg by mouth daily        carvedilol 25 MG tablet    COREG    270 tablet    Take 1.5 tablets (37.5 mg) by mouth 2 times daily (with meals)    Essential hypertension with goal blood pressure less than 140/90       JANTOVEN 2.5 MG tablet   Generic drug:  warfarin     90 tablet    TAKE ONE TABLET BY MOUTH EVERY DAY, EXCEPT TAKE ONE-HALF TABLET BY MOUTH ON FRIDAYS, OR AS INSTRUCTED BASED ON INR RESULTS    Chronic  atrial fibrillation (H)       losartan 100 MG tablet    COZAAR    90 tablet    Take 1 tablet (100 mg) by mouth daily    Essential hypertension, benign       OCUVITE PO      Take 1 tablet by mouth daily        triamterene-hydrochlorothiazide 37.5-25 MG per tablet    MAXZIDE-25    60 tablet    Take 1 tablet by mouth daily    Essential hypertension with goal blood pressure less than 140/90       vitamin B complex with vitamin C Tabs tablet      Take 1 tablet by mouth daily        VITAMIN C PO      Take 500 mg by mouth daily        VITAMIN D (CHOLECALCIFEROL) PO      Take 2,000 Units by mouth daily.

## 2018-07-17 NOTE — PATIENT INSTRUCTIONS
[place] [facility]    1. Your sleep study will be reviewed by a sleep physician within the next few days.     2. Please follow up in the sleep clinic as scheduled, or, make an appointment with your sleep provider to be seen within two weeks to discuss the results of the sleep study.    3. If you have any questions or problems with your treatment plan, please contact your sleep clinic provider at [phone] to further manage your condition.    4. Please review your attached medication list, and, at your follow-up appointment advise your sleep clinic provider about any changes.    5. Go to [email or web address] for more information about your sleep problems.    Janette Osei, RPSGT  July 17, 2018

## 2018-07-17 NOTE — PROGRESS NOTES
Completed a split night PSG per provider order.    Preliminary AHI >30.  A final therapeutic PAP pressure was not achieved.    Supine REM was not seen on therapeutic pressure.    Patient reports feeling not refreshed in AM.  Pt not able to tolerate therapy well.

## 2018-07-18 ENCOUNTER — TELEPHONE (OUTPATIENT)
Dept: SLEEP MEDICINE | Facility: CLINIC | Age: 83
End: 2018-07-18

## 2018-07-18 DIAGNOSIS — G47.33 OSA (OBSTRUCTIVE SLEEP APNEA): Primary | ICD-10-CM

## 2018-07-18 DIAGNOSIS — R09.02 HYPOXEMIA: ICD-10-CM

## 2018-07-18 LAB — SLPCOMP: NORMAL

## 2018-07-18 NOTE — TELEPHONE ENCOUNTER
Telephone call:  Patient informed about results of sleep study which demonstrated obstructive sleep apnea eliminated with CPAP and persisting hypoxemia.

## 2018-07-20 ENCOUNTER — CARE COORDINATION (OUTPATIENT)
Dept: SLEEP MEDICINE | Facility: CLINIC | Age: 83
End: 2018-07-20

## 2018-07-20 DIAGNOSIS — R09.02 HYPOXEMIA: ICD-10-CM

## 2018-07-20 DIAGNOSIS — I50.42 CHRONIC COMBINED SYSTOLIC AND DIASTOLIC CONGESTIVE HEART FAILURE (H): Primary | ICD-10-CM

## 2018-07-20 NOTE — Clinical Note
Patient to follow up with Primary Care provider regarding elevated blood pressure.  I have ordered an echocardiogram for evaluation of mitral disease and ventricular function given worsening clinical symptoms suggesting heart failure

## 2018-07-20 NOTE — PROGRESS NOTES
I Working with Merline PADILLA at [facility] for Tucker to obtain autopap, and nocturnal oxygen therapy 7/19/18.  .  Tucker scheduled to be setup on Monday 7/23/18.  Merline would like clarification of Tucker's CHF diagnosis to qualify him for the nocturnal oxygen.  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  All information scanned into email to Merline.

## 2018-07-20 NOTE — PROGRESS NOTES
87-year-old gentleman with mitral insufficiency and atrial fibrillation with some worsening and nocturnal dyspnea and sleep study demonstrating Cheyne-Tam breathing with long circulation time compatible with heart failure.  We have initiated CPAP for obstructive sleep apnea and oxygen for treatment of his hypoxemia and periodic breathing and will obtain follow-up echo given evidence of clinical worsening and evidence of congestive heart failure clinically this may be important in directing planning using possible adaptive servo ventilation in the future.  Patient should have blood pressure rechecked given recent hypertension with follow-up with Dr. Winslow.

## 2018-07-23 ENCOUNTER — DOCUMENTATION ONLY (OUTPATIENT)
Dept: SLEEP MEDICINE | Facility: CLINIC | Age: 83
End: 2018-07-23
Payer: COMMERCIAL

## 2018-07-23 NOTE — PROGRESS NOTES
Patient was offered choice of vendor and chose [facility].  Patient Tucker Slater was set up at [facility] on July 23, 2018. Patient received a Resmed AirSense 10 CPAP. Pressures were set at 5 cm H2O.   Patient s ramp is 5 cm H2O for Off and FLEX/EPR is EPR, 2.  Patient received a Resmed Mask name: P10  Pillow mask Size Medium, heated tubing and heated humidifier.  Patient is enrolled in the STM Program and does not need to meet compliance. Patient has a follow up on tbd, patient will call and setup.    ASUNCION PORTILLO

## 2018-07-24 ENCOUNTER — TELEPHONE (OUTPATIENT)
Dept: SLEEP MEDICINE | Facility: CLINIC | Age: 83
End: 2018-07-24

## 2018-07-24 ENCOUNTER — ANTICOAGULATION THERAPY VISIT (OUTPATIENT)
Dept: ANTICOAGULATION | Facility: CLINIC | Age: 83
End: 2018-07-24
Payer: COMMERCIAL

## 2018-07-24 ENCOUNTER — CARE COORDINATION (OUTPATIENT)
Dept: CARDIOLOGY | Facility: CLINIC | Age: 83
End: 2018-07-24

## 2018-07-24 ENCOUNTER — ALLIED HEALTH/NURSE VISIT (OUTPATIENT)
Dept: CARDIOLOGY | Facility: CLINIC | Age: 83
End: 2018-07-24
Attending: NURSE PRACTITIONER
Payer: COMMERCIAL

## 2018-07-24 VITALS — HEART RATE: 74 BPM | SYSTOLIC BLOOD PRESSURE: 152 MMHG | DIASTOLIC BLOOD PRESSURE: 78 MMHG

## 2018-07-24 DIAGNOSIS — I10 ESSENTIAL HYPERTENSION WITH GOAL BLOOD PRESSURE LESS THAN 140/90: ICD-10-CM

## 2018-07-24 DIAGNOSIS — I10 BENIGN ESSENTIAL HYPERTENSION: Primary | ICD-10-CM

## 2018-07-24 DIAGNOSIS — Z79.01 LONG-TERM (CURRENT) USE OF ANTICOAGULANTS: ICD-10-CM

## 2018-07-24 LAB
ANION GAP SERPL CALCULATED.3IONS-SCNC: 6 MMOL/L (ref 3–14)
BUN SERPL-MCNC: 33 MG/DL (ref 7–30)
CALCIUM SERPL-MCNC: 8.9 MG/DL (ref 8.5–10.1)
CHLORIDE SERPL-SCNC: 109 MMOL/L (ref 94–109)
CO2 SERPL-SCNC: 26 MMOL/L (ref 20–32)
CREAT SERPL-MCNC: 1.29 MG/DL (ref 0.66–1.25)
GFR SERPL CREATININE-BSD FRML MDRD: 53 ML/MIN/1.7M2
GLUCOSE SERPL-MCNC: 79 MG/DL (ref 70–99)
INR POINT OF CARE: 1.6 (ref 0.86–1.14)
POTASSIUM SERPL-SCNC: 4.1 MMOL/L (ref 3.4–5.3)
SODIUM SERPL-SCNC: 141 MMOL/L (ref 133–144)

## 2018-07-24 PROCEDURE — 99207 ZZC NO CHARGE NURSE ONLY: CPT

## 2018-07-24 PROCEDURE — 85610 PROTHROMBIN TIME: CPT | Mod: QW

## 2018-07-24 PROCEDURE — 80048 BASIC METABOLIC PNL TOTAL CA: CPT | Performed by: NURSE PRACTITIONER

## 2018-07-24 PROCEDURE — 36416 COLLJ CAPILLARY BLOOD SPEC: CPT

## 2018-07-24 PROCEDURE — 99204 OFFICE O/P NEW MOD 45 MIN: CPT | Performed by: NURSE PRACTITIONER

## 2018-07-24 NOTE — PROGRESS NOTES
Improved BP and creatinine. Now using CPAP  Continue current medical regimen, but need to recheck again in 1 month and consider increasing Coreg to 50mg BID.  Thanks, JS      BMP next month  THanks, JAIRO

## 2018-07-24 NOTE — PROGRESS NOTES
ALLIED HEALTH NURSE VISIT    Reason for visit: BP check and BMP      On 07-10-18, Janeth Mcdaniels NP restarted Maxide, but at a lower dose, 37.5/25 mg/d        Per Janeth Mcdaniels NP's OV notes, patient's BP was 160/88, HR was 74.  If you look at patient's vitals for that day, it states BP was 170/94, HR 74, taken w/patient's machine. The wrong BP/HR was entered?      -------------------------------------------------------  Today's BP/HR: 152/78, 74     Labs  Component      Latest Ref Rng & Units 7/24/2018   Sodium      133 - 144 mmol/L 141   Potassium      3.4 - 5.3 mmol/L 4.1   Chloride      94 - 109 mmol/L 109   Carbon Dioxide      20 - 32 mmol/L 26   Anion Gap      3 - 14 mmol/L 6   Glucose      70 - 99 mg/dL 79   Urea Nitrogen      7 - 30 mg/dL 33 (H)   Creatinine      0.66 - 1.25 mg/dL 1.29 (H)   GFR Estimate      >60 mL/min/1.7m2 53 (L)   GFR Estimate If Black      >60 mL/min/1.7m2 64   Calcium      8.5 - 10.1 mg/dL 8.9     To compare....      Assessment/Plan     Patient's BP has improved. Patient had no concerning symptoms to report.     Patient stated he recently had his sleep study and was diagnosed with sleep apnea. Patient is now receiving treatment. CPAP w/Oxygen.      Messaged Janeth Mcdaniels NP to review visit.      Chantal GORDILLO

## 2018-07-24 NOTE — PROGRESS NOTES
ANTICOAGULATION FOLLOW-UP CLINIC VISIT    Patient Name:  Tucker Slater  Date:  7/24/2018  Contact Type:  Face to Face    SUBJECTIVE:     Patient Findings     Positives Change in diet/appetite (Pt reports having more greens than usual.)    Comments Pt denies any missed dose. Pt did have a dose reduction in May d/t INR being elevated.            OBJECTIVE    INR Protime   Date Value Ref Range Status   07/24/2018 1.6 (A) 0.86 - 1.14 Final       ASSESSMENT / PLAN  INR assessment SUB    Recheck INR In: 2 WEEKS    INR Location Clinic      Anticoagulation Summary as of 7/24/2018     INR goal 2.0-3.0   Today's INR 1.6!   Warfarin maintenance plan 1.25 mg (2.5 mg x 0.5) on Fri; 2.5 mg (2.5 mg x 1) all other days   Full warfarin instructions 7/24: 5 mg; Otherwise 1.25 mg on Fri; 2.5 mg all other days   Weekly warfarin total 16.25 mg   Plan last modified Ivory Machado RN (7/7/2017)   Next INR check 8/7/2018   Priority INR   Target end date     Indications   Long-term (current) use of anticoagulants [Z79.01] [Z79.01]  Atrial fibrillation (H) with mitral regurgitation and congestive heart failure [I48.91]         Anticoagulation Episode Summary     INR check location     Preferred lab     Send INR reminders to [facility]    Comments       Anticoagulation Care Providers     Provider Role Specialty Phone number    Kwadwo Winslow MD [facility] Internal Medicine [phone]            See the Encounter Report to view Anticoagulation Flowsheet and Dosing Calendar (Go to Encounters tab in chart review, and find the Anticoagulation Therapy Visit)    Dosage adjustment made based on physician directed care plan.    Gail Valdovinos RN

## 2018-07-24 NOTE — MR AVS SNAPSHOT
After Visit Summary   7/24/2018    Tucker Slater    MRN: [id number]           Patient Information     Date Of Birth          3/13/1931        Visit Information        Provider Department      7/24/2018 11:00 AM RU [facility] HRT NURSE [facility]        Today's Diagnoses     Essential hypertension with goal blood pressure less than 140/90           Follow-ups after your visit        Your next 10 appointments already scheduled     Jul 24, 2018  4:00 PM CDT   Anticoagulation Visit with RI ANTICOAGULATION CLINIC   [facility] ([facility])    [address]  [facility] [id number] [phone]            Aug 22, 2018 12:30 PM CDT   Ech Complete with [id number] ([facility])    [address]  [facility] [address] [phone]           1.  Please bring or wear a comfortable two-piece outfit. 2.  You may eat, drink and take your normal medicines. 3.  For any questions that cannot be answered, please contact the ordering physician 4.  Please do not wear perfumes or scented lotions on the day of your exam. ***Please check-in at the [place] Registration Office located in [address] in the [facility] building. When you are finished registering, please go to [address] and have a seat. The technician will call your name for the test.            Aug 23, 2018  3:15 PM CDT   Remote PPM Check with KIDD TECH1   [facility] ([facility])    [address] [place] [address] [address]  [facility] [address] [phone] OPT 2           This appointment is for a remote check of your pacemaker.  This is not an appointment at the office.              Who to contact     If you have questions or need follow up information about today's clinic visit or your schedule please contact [facility]  [facility] directly at [phone].  Normal or non-critical lab and imaging results will be communicated to you by MyChart, letter or phone within 4 business days after the clinic has received the results. If you do not hear from us within 7 days, please contact the clinic through MyChart or phone. If you have a critical or abnormal lab result, we will notify you by phone as soon as possible.  Submit refill requests through [employer] or call your pharmacy and they will forward the refill request to us. Please allow 3 business days for your refill to be completed.          Additional Information About Your Visit        Care EveryWhere ID     This is your Care EveryWhere ID. This could be used by other organizations to access your [facility] medical records  [id number]        Your Vitals Were     Pulse                   74            Blood Pressure from Last 3 Encounters:   07/24/18 152/78   07/10/18 (!) 170/94   06/29/18 156/81    Weight from Last 3 Encounters:   07/10/18 91.2 kg (201 lb)   06/29/18 85.7 kg (189 lb)   06/05/18 85.9 kg (189 lb 4.8 oz)              We Performed the Following     Follow-Up with Nurse        Primary Care Provider Office Phone # Fax #    Kwadwo Winslow [phone]       [address] [facility] [address]        Equal Access to Services     MEREDITH JIM : Hadii aad ku hadasho Soomaali, waaxda luqadaha, qaybta kaalmada adeegyada, gisela eddy. So [facility] [phone].    ATENCIÓN: Si habla español, tiene a kraft disposición servicios gratuitos de asistencia lingüística. Llame al [phone].    We comply with applicable federal civil rights laws and [place] laws. We do not discriminate on the basis of race, color, national origin, age, disability, sex, sexual orientation, or gender identity.            Thank you!     Thank you for choosing [facility]  for your care. Our goal is  always to provide you with excellent care. Hearing back from our patients is one way we can continue to improve our services. Please take a few minutes to complete the written survey that you may receive in the mail after your visit with us. Thank you!             Your Updated Medication List - Protect others around you: Learn how to safely use, store and throw away your medicines at www.disposemymeds.org.          This list is accurate as of 7/24/18 11:23 AM.  Always use your most recent med list.                   Brand Name Dispense Instructions for use Diagnosis    ACETAMINOPHEN PO      Take 650 mg by mouth every 4 hours as needed for pain        calcium carbonate 500 MG tablet    OS-KARLA 500 mg [place]. [place]     Take 500 mg by mouth daily        carvedilol 25 MG tablet    COREG    270 tablet    Take 1.5 tablets (37.5 mg) by mouth 2 times daily (with meals)    Essential hypertension with goal blood pressure less than 140/90       JANTOVEN 2.5 MG tablet   Generic drug:  warfarin     90 tablet    TAKE ONE TABLET BY MOUTH EVERY DAY, EXCEPT TAKE ONE-HALF TABLET BY MOUTH ON FRIDAYS, OR AS INSTRUCTED BASED ON INR RESULTS    Chronic atrial fibrillation (H)       losartan 100 MG tablet    COZAAR    90 tablet    Take 1 tablet (100 mg) by mouth daily    Essential hypertension, benign       OCUVITE PO      Take 1 tablet by mouth daily        order for DME     1 Device    Oxygen 2 Li/min at night with CPAP SPO2 less than or equal to oxygen saturation 89% on effective CPAP in patient with known cardiomyopathy    Hypoxemia, DAWSON (obstructive sleep apnea)       order for DME     1 Device    Oxygen 2 Li/min at night    Hypoxemia       triamterene-hydrochlorothiazide 37.5-25 MG per tablet    MAXZIDE-25    60 tablet    Take 1 tablet by mouth daily    Essential hypertension with goal blood pressure less than 140/90       vitamin B complex with vitamin C Tabs tablet      Take 1 tablet by mouth daily        VITAMIN C PO      Take 500 mg by  mouth daily        VITAMIN D (CHOLECALCIFEROL) PO      Take 2,000 Units by mouth daily.

## 2018-07-24 NOTE — TELEPHONE ENCOUNTER
Letter of medical necessity from [facility] has been rev'd and put in Dr. Ritter's box for review and signature.    MINOR Reyna

## 2018-07-24 NOTE — MR AVS SNAPSHOT
Tucker CECE Slater   7/24/2018 4:00 PM   Anticoagulation Therapy Visit    Description:  87 year old male   Provider:  [facility] ANTICOAGULATION CLINIC   Department:  [facility] Anti Coagulation           INR as of 7/24/2018     Today's INR 1.6!      Anticoagulation Summary as of 7/24/2018     INR goal 2.0-3.0   Today's INR 1.6!   Full warfarin instructions 7/24: 5 mg; Otherwise 1.25 mg on Fri; 2.5 mg all other days   Next INR check 8/7/2018    Indications   Long-term (current) use of anticoagulants [Z79.01] [Z79.01]  Atrial fibrillation (H) with mitral regurgitation and congestive heart failure [I48.91]         Your next Anticoagulation Clinic appointment(s)     Jul 24, 2018  4:00 PM CDT   Anticoagulation Visit with RI ANTICOAGULATION CLINIC   [facility] ([facility])    [address] [place] [facility]   [facility] [id number] [phone]            Aug 07, 2018  2:30 PM CDT   Anticoagulation Visit with RI ANTICOAGULATION CLINIC   [facility] ([facility])    [address]  [facility] [address] [phone]              Contact Numbers     [facility] Phone Numbers:  Anticoagulation Clinic Appointments : [phone]  Anticoagulation Nurse: [phone]         July 2018 Details    Sun Mon Tue Wed Thu Fri Sat     1               2               3               4               5               6               7                 8               9               10               11               12               13               14                 15               16               17               18               19               20               21                 22               23               24      5 mg   See details      25      2.5 mg         26      2.5 mg         27      1.25 mg         28      2.5 mg           29      2.5 mg         30      2.5 mg         31      2.5 mg              Date Details   07/24 This INR check                How to take your warfarin dose     To take:  1.25 mg Take 0.5 of a 2.5 mg tablet.    To take:  2.5 mg Take 1 of the 2.5 mg tablets.    To take:  5 mg Take 2 of the 2.5 mg tablets.           August 2018 Details    Sun Mon Tue Wed Thu Fri Sat        1      2.5 mg         2      2.5 mg         3      1.25 mg         4      2.5 mg           5      2.5 mg         6      2.5 mg         7            8               9               10               11                 12               13               14               15               16               17               18                 19               20               21               22               23               24               25                 26               27               28               29               30               31                 Date Details   No additional details    Date of next INR:  8/7/2018         How to take your warfarin dose     To take:  1.25 mg Take 0.5 of a 2.5 mg tablet.    To take:  2.5 mg Take 1 of the 2.5 mg tablets.

## 2018-07-24 NOTE — NURSING NOTE
ALLIED HEALTH NURSE VISIT    Time of visit: 11 am    Today's blood pressure: 152/78 mmHg  Today's heart rate: 74 bpm     See care coordination notes for visit details.     Visit ordered by: Janeth Mcdaniels NP   Provider in clinic today: Dr. Andrews   Patient's cardiologist: Dr. Ana Cornejo RN

## 2018-07-25 ENCOUNTER — MEDICAL CORRESPONDENCE (OUTPATIENT)
Dept: HEALTH INFORMATION MANAGEMENT | Facility: CLINIC | Age: 83
End: 2018-07-25

## 2018-07-25 NOTE — PROGRESS NOTES
Called patient and reviewed recommendations. Patient was advised to continue his medical regimen. Patient had no questions.     BMP/OV with Janeth Mcdaniels NP on 08-27-18     Chantal GORDILLO

## 2018-07-25 NOTE — TELEPHONE ENCOUNTER
Letter signed and faxed back to [facility]    Ivory Zapata [facility] ~[place]

## 2018-07-26 ENCOUNTER — DOCUMENTATION ONLY (OUTPATIENT)
Dept: SLEEP MEDICINE | Facility: CLINIC | Age: 83
End: 2018-07-26

## 2018-07-26 NOTE — PROGRESS NOTES
3 DAY STM VISIT    Diagnostic AHI: 60.5       Patient contacted for 3 day STM visit  Subjective measures:  Wife reporting things are going well.  He was not able to come to phone.  He is feeling benefit from therapy.  Wife reports that last night he was making a gurgling sound she thinks it may be his mouth open.       Device type: Auto-CPAP  PAP settings from order::  CPAP min 5 cm  H20       CPAP max 5 cm  H20        Mask type:    Nasal Mask     Device settings from machine       CPAP fixed 5.0 cm  H20  Assessment: Nightly usage over four hours.  Action plan: Pt to have f/u 14 day [facility] visit.  Patient has a follow up visit scheduled:   No  Will schedule at 14 day [facility] .

## 2018-07-26 NOTE — PROGRESS NOTES
Patient called back. He has some questions regarding cleaning and up keep. Discussed cleaning and replacement scheduled.

## 2018-07-31 DIAGNOSIS — G47.33 OSA (OBSTRUCTIVE SLEEP APNEA): Primary | ICD-10-CM

## 2018-08-07 ENCOUNTER — DOCUMENTATION ONLY (OUTPATIENT)
Dept: SLEEP MEDICINE | Facility: CLINIC | Age: 83
End: 2018-08-07

## 2018-08-07 ENCOUNTER — ANTICOAGULATION THERAPY VISIT (OUTPATIENT)
Dept: ANTICOAGULATION | Facility: CLINIC | Age: 83
End: 2018-08-07
Payer: COMMERCIAL

## 2018-08-07 DIAGNOSIS — Z79.01 LONG-TERM (CURRENT) USE OF ANTICOAGULANTS: ICD-10-CM

## 2018-08-07 LAB — INR POINT OF CARE: 2.8 (ref 0.86–1.14)

## 2018-08-07 PROCEDURE — 99207 ZZC NO CHARGE NURSE ONLY: CPT

## 2018-08-07 PROCEDURE — 36416 COLLJ CAPILLARY BLOOD SPEC: CPT

## 2018-08-07 PROCEDURE — 85610 PROTHROMBIN TIME: CPT | Mod: QW

## 2018-08-07 NOTE — PROGRESS NOTES
ANTICOAGULATION FOLLOW-UP CLINIC VISIT    Patient Name:  Tucker Slater  Date:  8/7/2018  Contact Type:  Face to Face    SUBJECTIVE:     Patient Findings     Positives No Problem Findings           OBJECTIVE    INR Protime   Date Value Ref Range Status   08/07/2018 2.8 (A) 0.86 - 1.14 Final       ASSESSMENT / PLAN  INR assessment THER    Recheck INR In: 2 WEEKS    INR Location Clinic      Anticoagulation Summary as of 8/7/2018     INR goal 2.0-3.0   Today's INR 2.8   Warfarin maintenance plan 1.25 mg (2.5 mg x 0.5) on Fri; 2.5 mg (2.5 mg x 1) all other days   Full warfarin instructions 1.25 mg on Fri; 2.5 mg all other days   Weekly warfarin total 16.25 mg   Plan last modified Ivory Machado RN (7/7/2017)   Next INR check 8/22/2018   Priority INR   Target end date     Indications   Long-term (current) use of anticoagulants [Z79.01] [Z79.01]  Atrial fibrillation (H) with mitral regurgitation and congestive heart failure [I48.91]         Anticoagulation Episode Summary     INR check location     Preferred lab     Send INR reminders to [facility]    Comments       Anticoagulation Care Providers     Provider Role Specialty Phone number    Kwadwo Winslow MD Responsible Internal Medicine [phone]            See the Encounter Report to view Anticoagulation Flowsheet and Dosing Calendar (Go to Encounters tab in chart review, and find the Anticoagulation Therapy Visit)    Dosage adjustment made based on physician directed care plan.    Gail Valdovinos RN

## 2018-08-07 NOTE — MR AVS SNAPSHOT
Tucker Slater   8/7/2018 2:30 PM   Anticoagulation Therapy Visit    Description:  87 year old male   Provider[facility]  [facility]   Department:  [facility]           INR as of 8/7/2018     Today's INR 2.8      Anticoagulation Summary as of 8/7/2018     INR goal 2.0-3.0   Today's INR 2.8   Full warfarin instructions 1.25 mg on Fri; 2.5 mg all other days   Next INR check 8/22/2018    Indications   Long-term (current) use of anticoagulants [Z79.01] [Z79.01]  Atrial fibrillation (H) with mitral regurgitation and congestive heart failure [I48.91]         Your next Anticoagulation Clinic appointment(s)     Aug 22, 2018 11:45 AM CDT   Anticoagulation Visit with [facility]   [facility] ([facility])    [address] [facility] [address]  [facility] [address] [phone]              Contact Numbers     [facility] Phone Numbers:  Anticoagulation Clinic Appointments : [phone]  Anticoagulation Nurse: [phone]         August 2018 Details    Sun Mon Tue Wed Thu Fri Sat        1               2               3               4                 5               6               7      2.5 mg   See details      8      2.5 mg         9      2.5 mg         10      1.25 mg         11      2.5 mg           12      2.5 mg         13      2.5 mg         14      2.5 mg         15      2.5 mg         16      2.5 mg         17      1.25 mg         18      2.5 mg           19      2.5 mg         20      2.5 mg         21      2.5 mg         22            23               24               25                 26               27               28               29               30               31                 Date Details   08/07 This INR check       Date of next INR:  8/22/2018         How to take your warfarin dose     To take:  1.25 mg Take 0.5 of a 2.5 mg tablet.    To take:  2.5 mg Take 1 of the 2.5 mg tablets.

## 2018-08-08 ENCOUNTER — DOCUMENTATION ONLY (OUTPATIENT)
Dept: SLEEP MEDICINE | Facility: CLINIC | Age: 83
End: 2018-08-08

## 2018-08-08 NOTE — PROGRESS NOTES
STM pt call in     Diagnostic AHI: 60.5   PSG    Subjective measures:   Pt is feeling benefit from therapy.  He is having some issues with congestion.    Assessment: Pt meeting objective benchmarks.  Patient meeting subjective benchmarks.   Action plan: pt to have 30 day STM visit.  Increased humidity remotely.  He had some questions on humidity that answers.      Device type: Auto-CPAP  PAP settings:    CPAP fixed 5.0 cm  H20     Mask type:  Nasal Pillows  Objective measures: 14 day rolling measures      Compliance  100 %      Leak  25.6 lpm  last  upload      AHI 6.83   last  Upload-central apneas.      Average number of minutes 448     Average hours of usage 7.5          Objective measure goal  Compliance   Goal >70%  Leak   Goal < 24 lpm  AHI  Goal < 5  Usage  Goal >240

## 2018-08-17 DIAGNOSIS — I48.20 CHRONIC ATRIAL FIBRILLATION (H): ICD-10-CM

## 2018-08-17 RX ORDER — WARFARIN SODIUM 2.5 MG/1
TABLET ORAL
Qty: 90 TABLET | Refills: 0 | Status: SHIPPED | OUTPATIENT
Start: 2018-08-17 | End: 2018-11-20

## 2018-08-17 NOTE — TELEPHONE ENCOUNTER
"Requested Prescriptions   Pending Prescriptions Disp Refills     JANTOVEN 2.5 MG tablet [Pharmacy Med Name: JANTOVEN 2.5MG TABS]  Last Written Prescription Date:  3/9/18  Last Fill Quantity: 90 TABLET,  # refills: 1   Last office visit: 12/29/2017 with prescribing provider:  XAVIER   Future Office Visit:   Next 5 appointments (look out 90 days)     Aug 27, 2018  1:50 PM CDT   Return Visit with SELMA Landis CNP   [facility] ([facility])    [address] [address] [address] [phone]                  90 tablet 1     Sig: TAKE ONE TABLET BY MOUTH EVERY DAY, EXCEPT TAKE ONE-HALF TABLET BY MOUTH ON FRIDAYS, OR AS INSTRUCTED BASED ON INR RESULTS    Vitamin K Antagonists Failed    8/17/2018  9:40 AM       Failed - INR is within goal in the past 6 weeks    Confirm INR is within goal in the past 6 weeks.     Recent Labs   Lab Test 08/07/18   INR  2.8*                      Passed - Recent (12 mo) or future (30 days) visit within the authorizing provider's specialty    Patient had office visit in the last 12 months or has a visit in the next 30 days with authorizing provider or within the authorizing provider's specialty.  See \"Patient Info\" tab in inbasket, or \"Choose Columns\" in Meds & Orders section of the refill encounter.           Passed - Patient is 18 years of age or older          "

## 2018-08-17 NOTE — TELEPHONE ENCOUNTER
Warfarin dosing is managed by INR Clinic.  Prescription approved per [facility] Refill Protocol.  Ivory Machado RN

## 2018-08-22 ENCOUNTER — ANTICOAGULATION THERAPY VISIT (OUTPATIENT)
Dept: ANTICOAGULATION | Facility: CLINIC | Age: 83
End: 2018-08-22
Payer: COMMERCIAL

## 2018-08-22 ENCOUNTER — HOSPITAL ENCOUNTER (OUTPATIENT)
Dept: CARDIOLOGY | Facility: CLINIC | Age: 83
Discharge: HOME | End: 2018-08-22
Attending: INTERNAL MEDICINE | Admitting: INTERNAL MEDICINE
Payer: MEDICARE

## 2018-08-22 DIAGNOSIS — I48.91 ATRIAL FIBRILLATION (H): ICD-10-CM

## 2018-08-22 DIAGNOSIS — I50.42 CHRONIC COMBINED SYSTOLIC AND DIASTOLIC CONGESTIVE HEART FAILURE (H): ICD-10-CM

## 2018-08-22 DIAGNOSIS — Z79.01 LONG-TERM (CURRENT) USE OF ANTICOAGULANTS: ICD-10-CM

## 2018-08-22 LAB — INR POINT OF CARE: 2.2 (ref 0.86–1.14)

## 2018-08-22 PROCEDURE — 36416 COLLJ CAPILLARY BLOOD SPEC: CPT

## 2018-08-22 PROCEDURE — 85610 PROTHROMBIN TIME: CPT | Mod: QW

## 2018-08-22 PROCEDURE — 99207 ZZC NO CHARGE NURSE ONLY: CPT

## 2018-08-22 PROCEDURE — 25500064 ZZH RX 255 OP 636: Performed by: INTERNAL MEDICINE

## 2018-08-22 PROCEDURE — 93306 TTE W/DOPPLER COMPLETE: CPT | Mod: 26 | Performed by: INTERNAL MEDICINE

## 2018-08-22 RX ADMIN — HUMAN ALBUMIN MICROSPHERES AND PERFLUTREN 3 ML: 10; .22 INJECTION, SOLUTION INTRAVENOUS at 13:15

## 2018-08-22 NOTE — PROGRESS NOTES
ANTICOAGULATION FOLLOW-UP CLINIC VISIT    Patient Name:  Tucker Slater  Date:  8/22/2018  Contact Type:  Face to Face    SUBJECTIVE:     Patient Findings     Positives No Problem Findings           OBJECTIVE    INR Protime   Date Value Ref Range Status   08/22/2018 2.2 (A) 0.86 - 1.14 Final       ASSESSMENT / PLAN  INR assessment THER    Recheck INR In: 4 WEEKS    INR Location Clinic      Anticoagulation Summary as of 8/22/2018     INR goal 2.0-3.0   Today's INR 2.2   Warfarin maintenance plan 1.25 mg (2.5 mg x 0.5) on Fri; 2.5 mg (2.5 mg x 1) all other days   Full warfarin instructions 1.25 mg on Fri; 2.5 mg all other days   Weekly warfarin total 16.25 mg   No change documented Ivory Machado RN   Plan last modified Ivory Machado RN (7/7/2017)   Next INR check 9/19/2018   Priority INR   Target end date     Indications   Long-term (current) use of anticoagulants [Z79.01] [Z79.01]  Atrial fibrillation (H) with mitral regurgitation and congestive heart failure [I48.91]         Anticoagulation Episode Summary     INR check location     Preferred lab     Send INR reminders to [facility]    Comments       Anticoagulation Care Providers     Provider Role Specialty Phone number    Kwadwo Winslow MD Responsible Internal Medicine [phone]            See the Encounter Report to view Anticoagulation Flowsheet and Dosing Calendar (Go to Encounters tab in chart review, and find the Anticoagulation Therapy Visit)    Dosage adjustment made based on physician directed care plan.    Ivory Machado RN

## 2018-08-22 NOTE — MR AVS SNAPSHOT
Tucker Slater   8/22/2018 11:45 AM   Anticoagulation Therapy Visit    Description:  87 year old male   Provider[facility]  [facility]   Department:  [facility]           INR as of 8/22/2018     Today's INR 2.2      Anticoagulation Summary as of 8/22/2018     INR goal 2.0-3.0   Today's INR 2.2   Full warfarin instructions 1.25 mg on Fri; 2.5 mg all other days   Next INR check 9/19/2018    Indications   Long-term (current) use of anticoagulants [Z79.01] [Z79.01]  Atrial fibrillation (H) with mitral regurgitation and congestive heart failure [I48.91]         Your next Anticoagulation Clinic appointment(s)     Sep 19, 2018 11:45 AM CDT   [facility]    [address] [facility] [address]  [facility] [address] [phone]              Contact Numbers     [facility] Phone Numbers:  Anticoagulation Clinic Appointments : [phone]  Anticoagulation Nurse: [phone]         August 2018 Details    Sun Mon Tue Wed Thu Fri Sat        1               2               3               4                 5               6               7               8               9               10               11                 12               13               14               15               16               17               18                 19               20               21               22      2.5 mg   See details      23      2.5 mg         24      1.25 mg         25      2.5 mg           26      2.5 mg         27      2.5 mg         28      2.5 mg         29      2.5 mg         30      2.5 mg         31      1.25 mg           Date Details   08/22 This INR check               How to take your warfarin dose     To take:  1.25 mg Take 0.5 of a 2.5 mg tablet.    To take:  2.5 mg Take 1 of the 2.5 mg tablets.           September 2018 Details    Sun Mon Tue Wed Thu Fri Sat           1      2.5 mg            2      2.5 mg         3      2.5 mg         4      2.5 mg         5      2.5 mg         6      2.5 mg         7      1.25 mg         8      2.5 mg           9      2.5 mg         10      2.5 mg         11      2.5 mg         12      2.5 mg         13      2.5 mg         14      1.25 mg         15      2.5 mg           16      2.5 mg         17      2.5 mg         18      2.5 mg         19            20               21               22                 23               24               25               26               27               28               29                 30                      Date Details   No additional details    Date of next INR:  9/19/2018         How to take your warfarin dose     To take:  1.25 mg Take 0.5 of a 2.5 mg tablet.    To take:  2.5 mg Take 1 of the 2.5 mg tablets.

## 2018-08-23 ENCOUNTER — ALLIED HEALTH/NURSE VISIT (OUTPATIENT)
Dept: CARDIOLOGY | Facility: CLINIC | Age: 83
End: 2018-08-23
Payer: COMMERCIAL

## 2018-08-23 ENCOUNTER — DOCUMENTATION ONLY (OUTPATIENT)
Dept: SLEEP MEDICINE | Facility: CLINIC | Age: 83
End: 2018-08-23

## 2018-08-23 DIAGNOSIS — Z95.0 CARDIAC PACEMAKER IN SITU: Primary | ICD-10-CM

## 2018-08-23 PROCEDURE — 93296 REM INTERROG EVL PM/IDS: CPT | Performed by: INTERNAL MEDICINE

## 2018-08-23 PROCEDURE — 93294 REM INTERROG EVL PM/LDLS PM: CPT | Performed by: INTERNAL MEDICINE

## 2018-08-23 NOTE — PROGRESS NOTES
30 DAY [facility] VISIT    Diagnostic AHI: 60.5   PSG    VM left requesting call back.       Assessment: Pt not meeting objective benchmarks for AHI    Action plan: waiting for patient to return call.   Patient has no follow up visit currently scheduled.   Device type: Auto-CPAP  PAP settings:     CPAP fixed 5.0 cm  H20 with O2      Mask type:  Nasal Pillows  Objective measures: 14 day rolling measures      Compliance  100 %      Leak  21.68 lpm  last  upload      AHI 8.1   last  Upload-primary central apneas       Average number of minutes 452      Objective measure goal  Compliance   Goal >70%  Leak   Goal < 24 lpm  AHI  Goal < 5  Usage  Goal >240

## 2018-08-23 NOTE — MR AVS SNAPSHOT
After Visit Summary   8/23/2018    Tucker Slater    MRN: [id number]           Patient Information     Date Of Birth          3/13/1931        Visit Information        Provider Department      8/23/2018 3:15 PM KIDD TECH1 [facility]        Today's Diagnoses     Cardiac pacemaker in situ    -  1       Follow-ups after your visit        Your next 10 appointments already scheduled     Aug 23, 2018 10:45 AM CDT   Telephone Visit PAP Review 30 Day with VIRTUAL CARE COORDINATOR 1   [facility] ([facility])    [address] [place] [address], [address]  [facility] [id number] [phone]            Aug 23, 2018  3:15 PM CDT   Remote PPM Check with KIDD TECH1   [facility] ([facility])    [address]  [facility] [id number] [phone] OPT 2           This appointment is for a remote check of your pacemaker.  This is not an appointment at the office.            Aug 27, 2018  1:00 PM CDT   LAB with RU LAB   [facility] ([facility])    [address]  [facility] [address] [phone]           Please do not eat 10-12 hours before your appointment if you are coming in fasting for labs on lipids, cholesterol, or glucose (sugar). This does not apply to pregnant women. Water, hot tea and black coffee (with nothing added) are okay. Do not drink other fluids, diet soda or chew gum.            Aug 27, 2018  1:50 PM CDT   Return Visit with SELMA Landis CNP   [facility] ([facility])    [address]  [facility] [address] [phone]            Sep 19, 2018 11:45 AM CDT   Anticoagulation Visit with [facility]   [facility] ([facility])    [address]  [facility] [address]    [phone]            Nov 29, 2018  4:00 PM CST   Remote PPM Check with KIDD TECH1   [facility]   [place] ([facility])    [address]  [place] [address] [phone] OPT 2           This appointment is for a remote check of your pacemaker.  This is not an appointment at the office.              Future tests that were ordered for you today     Open Future Orders        Priority Expected Expires Ordered    ECHO COMPLETE WITH OPTISON Routine  7/20/2019 7/20/2018            Who to contact     If you have questions or need follow up information about today's clinic visit or your schedule please contact [facility] directly at [phone].  Normal or non-critical lab and imaging results will be communicated to you by MyChart, letter or phone within 4 business days after the clinic has received the results. If you do not hear from us within 7 days, please contact the clinic through MyChart or phone. If you have a critical or abnormal lab result, we will notify you by phone as soon as possible.  Submit refill requests through [employer] or call your pharmacy and they will forward the refill request to us. Please allow 3 business days for your refill to be completed.          Additional Information About Your Visit        Care EveryWhere ID     This is your Care EveryWhere ID. This could be used by other organizations to access your [facility] medical records  [id number]         Blood Pressure from Last 3 Encounters:   07/24/18 152/78   07/10/18 (!) 170/94   06/29/18 156/81    Weight from Last 3 Encounters:   07/10/18 91.2 kg (201 lb)   06/29/18 85.7 kg (189 lb)   06/05/18 85.9 kg (189 lb 4.8 oz)              We Performed the Following     INTERROGATION DEVICE EVAL REMOTE, PACER/ICD (53424)     PM DEVICE INTERROGATE REMOTE (76112)        Primary Care Provider Office Phone # Fax #    Kwadwo Winslow [phone]  [phone]       [address] [facility] [address]        Equal Access to Services     MEREDITH JIM : Hadii aad ku hadrekhao Soriddhi, waaxda luqadaha, qaybta kaalmada adead, gisela urszulain hayaaregan beckervinicio chowsia eddy. So [facility] [phone].    ATENCIÓN: Si habla español, tiene a kraft disposición servicios gratuitos de asistencia lingüística. [place] al [phone].    We comply with applicable federal civil rights laws and [place] laws. We do not discriminate on the basis of race, color, national origin, age, disability, sex, sexual orientation, or gender identity.            Thank you!     Thank you for choosing [facility]  for your care. Our goal is always to provide you with excellent care. Hearing back from our patients is one way we can continue to improve our services. Please take a few minutes to complete the written survey that you may receive in the mail after your visit with us. Thank you!             Your Updated Medication List - Protect others around you: Learn how to safely use, store and throw away your medicines at www.disposemymeds.org.          This list is accurate as of 8/23/18  8:37 AM.  Always use your most recent med list.                   Brand Name Dispense Instructions for use Diagnosis    ACETAMINOPHEN PO      Take 650 mg by mouth every 4 hours as needed for pain        calcium carbonate 500 MG tablet    OS-KARLA 500 mg [place]. Ca     Take 500 mg by mouth daily        carvedilol 25 MG tablet    COREG    270 tablet    Take 1.5 tablets (37.5 mg) by mouth 2 times daily (with meals)    Essential hypertension with goal blood pressure less than 140/90       JANTOVEN 2.5 MG tablet   Generic drug:  warfarin     90 tablet    TAKE ONE TABLET BY MOUTH EVERY DAY, EXCEPT TAKE ONE-HALF TABLET BY MOUTH ON FRIDAYS, OR AS INSTRUCTED BASED ON INR RESULTS    Chronic atrial fibrillation (H)       losartan 100 MG tablet    COZAAR    90 tablet    Take 1 tablet (100  mg) by mouth daily    Essential hypertension, benign       OCUVITE PO      Take 1 tablet by mouth daily        order for DME     1 Device    Oxygen 2 Li/min at night with CPAP SPO2 less than or equal to oxygen saturation 89% on effective CPAP in patient with known cardiomyopathy    Hypoxemia, DAWSON (obstructive sleep apnea)       order for DME     1 Device    Oxygen 2 Li/min at night    Hypoxemia       triamterene-hydrochlorothiazide 37.5-25 MG per tablet    MAXZIDE-25    60 tablet    Take 1 tablet by mouth daily    Essential hypertension with goal blood pressure less than 140/90       vitamin B complex with vitamin C Tabs tablet      Take 1 tablet by mouth daily        VITAMIN C PO      Take 500 mg by mouth daily        VITAMIN D (CHOLECALCIFEROL) PO      Take 2,000 Units by mouth daily.

## 2018-08-23 NOTE — PROGRESS NOTES
[employer]S) Remote PPM Device Check  : 92 %, Chronic AFib, taking Jantoven  Mode: VVIR        Presenting Rhythm: AFib with   Heart Rate: Adequate rates per histogram  Sensing: Stable    Pacing Threshold: Stable    Impedance: Stable  Battery Status: 10.5-11.1 years  Atrial Arrhythmia: N/A  Ventricular Arrhythmia: None     Care Plan: F/u PPM Merlin q 3 months. LM with results. STEPHAN LainezT

## 2018-08-27 ENCOUNTER — OFFICE VISIT (OUTPATIENT)
Dept: CARDIOLOGY | Facility: CLINIC | Age: 83
End: 2018-08-27
Payer: COMMERCIAL

## 2018-08-27 VITALS
DIASTOLIC BLOOD PRESSURE: 80 MMHG | HEIGHT: 70 IN | SYSTOLIC BLOOD PRESSURE: 140 MMHG | BODY MASS INDEX: 27.04 KG/M2 | WEIGHT: 188.9 LBS | HEART RATE: 72 BPM

## 2018-08-27 DIAGNOSIS — I10 BENIGN ESSENTIAL HYPERTENSION: ICD-10-CM

## 2018-08-27 DIAGNOSIS — I10 ESSENTIAL HYPERTENSION, BENIGN: ICD-10-CM

## 2018-08-27 LAB
ANION GAP SERPL CALCULATED.3IONS-SCNC: 7 MMOL/L (ref 3–14)
BUN SERPL-MCNC: 34 MG/DL (ref 7–30)
CALCIUM SERPL-MCNC: 8.5 MG/DL (ref 8.5–10.1)
CHLORIDE SERPL-SCNC: 111 MMOL/L (ref 94–109)
CO2 SERPL-SCNC: 23 MMOL/L (ref 20–32)
CREAT SERPL-MCNC: 1.41 MG/DL (ref 0.66–1.25)
GFR SERPL CREATININE-BSD FRML MDRD: 48 ML/MIN/1.7M2
GLUCOSE SERPL-MCNC: 98 MG/DL (ref 70–99)
POTASSIUM SERPL-SCNC: 4.1 MMOL/L (ref 3.4–5.3)
SODIUM SERPL-SCNC: 141 MMOL/L (ref 133–144)

## 2018-08-27 PROCEDURE — 99214 OFFICE O/P EST MOD 30 MIN: CPT | Performed by: NURSE PRACTITIONER

## 2018-08-27 PROCEDURE — 36415 COLL VENOUS BLD VENIPUNCTURE: CPT | Performed by: NURSE PRACTITIONER

## 2018-08-27 PROCEDURE — 80048 BASIC METABOLIC PNL TOTAL CA: CPT | Performed by: NURSE PRACTITIONER

## 2018-08-27 RX ORDER — LOSARTAN POTASSIUM 100 MG/1
100 TABLET ORAL DAILY
Qty: 90 TABLET | Refills: 1 | Status: SHIPPED | OUTPATIENT
Start: 2018-08-27 | End: 2019-02-20

## 2018-08-27 NOTE — PROGRESS NOTES
HPI and Plan: #[id number]  See dictation    Orders Placed This Encounter   Procedures     Basic metabolic panel     Follow-Up with Electrophysiologist       Orders Placed This Encounter   Medications     losartan (COZAAR) 100 MG tablet     Sig: Take 1 tablet (100 mg) by mouth daily     Dispense:  90 tablet     Refill:  1       Medications Discontinued During This Encounter   Medication Reason     losartan (COZAAR) 100 MG tablet Reorder         Encounter Diagnoses   Name Primary?     Benign essential hypertension      Essential hypertension, benign        CURRENT MEDICATIONS:  Current Outpatient Prescriptions   Medication Sig Dispense Refill     ACETAMINOPHEN PO Take 650 mg by mouth every 4 hours as needed for pain       Ascorbic Acid (VITAMIN C PO) Take 500 mg by mouth daily        calcium carbonate (OS-KARLA 500 MG [place]. CA) 500 MG tablet Take 500 mg by mouth daily        carvedilol (COREG) 25 MG tablet Take 1.5 tablets (37.5 mg) by mouth 2 times daily (with meals) 270 tablet 3     JANTOVEN 2.5 MG tablet TAKE ONE TABLET BY MOUTH EVERY DAY, EXCEPT TAKE ONE-HALF TABLET BY MOUTH ON FRIDAYS, OR AS INSTRUCTED BASED ON INR RESULTS 90 tablet 0     losartan (COZAAR) 100 MG tablet Take 1 tablet (100 mg) by mouth daily 90 tablet 1     Multiple Vitamins-Minerals (OCUVITE PO) Take 1 tablet by mouth daily        order for DME Oxygen 2 Li/min  at night 1 Device 0     order for DME Oxygen 2 Li/min  at night with CPAP  SPO2 less than or equal to oxygen saturation 89% on effective CPAP in patient with known cardiomyopathy 1 Device 0     triamterene-hydrochlorothiazide (MAXZIDE-25) 37.5-25 MG per tablet Take 1 tablet by mouth daily 60 tablet 3     vitamin B complex with vitamin C (VITAMIN  B COMPLEX) TABS tablet Take 1 tablet by mouth daily       VITAMIN D, CHOLECALCIFEROL, PO Take 2,000 Units by mouth daily.       [DISCONTINUED] losartan (COZAAR) 100 MG tablet Take 1 tablet (100 mg) by mouth daily 90 tablet 1       ALLERGIES      Allergies   Allergen Reactions     Merbromin      Other reaction(s): Other, see comments  Severe reaction as child. Amalgam fillings OK.     Morphine Nausea and Vomiting     Amlodipine      Edema       PAST MEDICAL HISTORY:  Past Medical History:   Diagnosis Date     Arrhythmia     A Fib     Balance problem     related to neuropathy in feet     Bradycardia      Carcinoma in situ of bladder     bladder cancer ;; follow w Urology w periodic cysto      Essential hypertension, benign      Generalized osteoarthrosis, unspecified site knees    s/p R total knee  ; will do L 6/10      Numbness and tingling     toes and fingers     Pacemaker     [employer]      Pain in joint, shoulder region     L shoulder rotater tear; no surgery      Persistent atrial fibrillation (H) 1/30/15     Prostate CA (H) 2008    radiation     Prostate cancer (H)     completed RT 3/09     Renal disease     elevated creatinine     Shoulder impingement     R shoulder impingement etc see MRI       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     toes both feet        PAST SURGICAL HISTORY:  Past Surgical History:   Procedure Laterality Date     ARTHROPLASTY HIP Right 3/27/2017    Procedure: ARTHROPLASTY HIP;  Surgeon: Jigar Wynn MD;  Location: [facility] OR     C NONSPECIFIC PROCEDURE      bilat inguinal hernia repairs ( 3total procedures)      C NONSPECIFIC PROCEDURE      pilonidal cyst     C NONSPECIFIC PROCEDURE      T + A     C NONSPECIFIC PROCEDURE       R+L total knee     CARDIOVERSION  3/26/15     COLONOSCOPY       IMPLANT PACEMAKER  3/26/15     RELEASE CARPAL TUNNEL Right 2017    Procedure: RELEASE CARPAL TUNNEL;  Right carpal tunnel release;  Surgeon: Alonso Barboza MD;  Location: [facility] OR       FAMILY HISTORY:  Family History   Problem Relation Age of Onset     HEART DISEASE Father       87yo     Coronary Artery Disease Father      HEART DISEASE Mother       74yo      "Coronary Artery Disease Mother      Family History Negative Brother        SOCIAL HISTORY:  Social History     Social History     Marital status:      Spouse name: Alba     Number of children: 3     Years of education: N/A     Occupational History      Retired      w [employer]     Social History Main Topics     Smoking status: Former Smoker     Quit date: 9/12/1977     Smokeless tobacco: Never Used     Alcohol use No     Drug use: No     Sexual activity: No     Other Topics Concern     Caffeine Concern No     4-5 cups per week      Sleep Concern No     Stress Concern No     Weight Concern No     Special Diet No     Exercise No     yard work and moves around a lot     Social History Narrative       Review of Systems:  Skin:  Positive for   hx skin cancer -  hx of MOHS surgery    Eyes:  Positive for glasses reading glasses, cataract extraction of both eyes   ENT:  Positive for postnasal drainage;sinus trouble    Respiratory:  Positive for CPAP;sleep apnea;cough PND cough    Cardiovascular:    Positive for;edema decreased edema ,   Gastroenterology: Negative      Genitourinary:  Positive for   hx of bladder cancer  ,   Musculoskeletal:  Negative   2 new knees ,  new right hip - has some issues with strength in  right hip   Neurologic:  Positive for numbness or tingling of hands;numbness or tingling of feet neuropathy in feet more than hands -   balance issues   Psychiatric:  Negative      Heme/Lymph/Imm:  Positive for allergies RX allergies ,  hx of hay fever   Endocrine:  Negative        Physical Exam:  Vitals: /80 (BP Location: Right arm, Patient Position: Sitting, Cuff Size: Adult Regular)  Pulse 72  Ht 1.778 m (5' 10\")  Wt 85.7 kg (188 lb 14.4 oz)  BMI 27.1 kg/m2    Constitutional:  cooperative;in no acute distress        Skin:  warm and dry to the touch          Head:  normocephalic        Eyes:  pupils equal and round        Lymph:      ENT:  no pallor or cyanosis        Neck:  JVP normal    "     Respiratory:  clear to auscultation;normal symmetry;normal respiratory excursion         Cardiac: regular rhythm;normal S1 and S2     no presence of murmur          pulses full and equal                                        GI:  abdomen soft;BS normoactive        Extremities and Muscular Skeletal:  no spinal abnormalities noted   1+;pitting;bilateral LE edema          Neurological:  affect appropriate        Psych:  Alert and Oriented x 3          CC  SELMA Landis CNP  [address]  [place] [address]

## 2018-08-27 NOTE — LETTER
8/27/2018    Kwadwo Winslow MD  [address] [facility] [address]    RE: Tucker Slater       Dear Colleague,    I had the pleasure of seeing Tucker Slater in the [facility].    HPI and Plan: #[id number]  See dictation    Orders Placed This Encounter   Procedures     Basic metabolic panel     Follow-Up with Electrophysiologist       Orders Placed This Encounter   Medications     losartan (COZAAR) 100 MG tablet     Sig: Take 1 tablet (100 mg) by mouth daily     Dispense:  90 tablet     Refill:  1       Medications Discontinued During This Encounter   Medication Reason     losartan (COZAAR) 100 MG tablet Reorder         Encounter Diagnoses   Name Primary?     Benign essential hypertension      Essential hypertension, benign        CURRENT MEDICATIONS:  Current Outpatient Prescriptions   Medication Sig Dispense Refill     ACETAMINOPHEN PO Take 650 mg by mouth every 4 hours as needed for pain       Ascorbic Acid (VITAMIN C PO) Take 500 mg by mouth daily        calcium carbonate (OS-KARLA 500 MG [place]. CA) 500 MG tablet Take 500 mg by mouth daily        carvedilol (COREG) 25 MG tablet Take 1.5 tablets (37.5 mg) by mouth 2 times daily (with meals) 270 tablet 3     JANTOVEN 2.5 MG tablet TAKE ONE TABLET BY MOUTH EVERY DAY, EXCEPT TAKE ONE-HALF TABLET BY MOUTH ON FRIDAYS, OR AS INSTRUCTED BASED ON INR RESULTS 90 tablet 0     losartan (COZAAR) 100 MG tablet Take 1 tablet (100 mg) by mouth daily 90 tablet 1     Multiple Vitamins-Minerals (OCUVITE PO) Take 1 tablet by mouth daily        order for DME Oxygen 2 Li/min  at night 1 Device 0     order for DME Oxygen 2 Li/min  at night with CPAP  SPO2 less than or equal to oxygen saturation 89% on effective CPAP in patient with known cardiomyopathy 1 Device 0     triamterene-hydrochlorothiazide (MAXZIDE-25) 37.5-25 MG per tablet Take 1 tablet by mouth daily 60 tablet 3     vitamin B complex with vitamin C (VITAMIN  B COMPLEX) TABS tablet  Take 1 tablet by mouth daily       VITAMIN D, CHOLECALCIFEROL, PO Take 2,000 Units by mouth daily.       [DISCONTINUED] losartan (COZAAR) 100 MG tablet Take 1 tablet (100 mg) by mouth daily 90 tablet 1       ALLERGIES     Allergies   Allergen Reactions     Merbromin      Other reaction(s): Other, see comments  Severe reaction as child. Amalgam fillings OK.     Morphine Nausea and Vomiting     Amlodipine      Edema       PAST MEDICAL HISTORY:  Past Medical History:   Diagnosis Date     Arrhythmia     A Fib     Balance problem     related to neuropathy in feet     Bradycardia      Carcinoma in situ of bladder 2000    bladder cancer ;; follow w Urology w periodic cysto      Essential hypertension, benign      Generalized osteoarthrosis, unspecified site knees    s/p R total knee 8/09 ; will do L 6/10      Numbness and tingling     toes and fingers     Pacemaker     [employer]      Pain in joint, shoulder region     L shoulder rotater tear; no surgery      Persistent atrial fibrillation (H) 1/30/15     Prostate CA (H) 2008-9    radiation     Prostate cancer (H) 11/08    completed RT 3/09     Renal disease     elevated creatinine     Shoulder impingement     R shoulder impingement etc see MRI 4/09      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     toes both feet        PAST SURGICAL HISTORY:  Past Surgical History:   Procedure Laterality Date     ARTHROPLASTY HIP Right 3/27/2017    Procedure: ARTHROPLASTY HIP;  Surgeon: Jigar Wynn MD;  Location: [facility]     C NONSPECIFIC PROCEDURE      bilat inguinal hernia repairs ( 3total procedures)      C NONSPECIFIC PROCEDURE      pilonidal cyst     C NONSPECIFIC PROCEDURE      T + A     C NONSPECIFIC PROCEDURE  8/09     R+L total knee     CARDIOVERSION  3/26/15     COLONOSCOPY       IMPLANT PACEMAKER  3/26/15     RELEASE CARPAL TUNNEL Right 4/19/2017    Procedure: RELEASE CARPAL TUNNEL;  Right carpal tunnel release;  Surgeon: Alonso Barboza,  "MD;  Location: [facility] OR       FAMILY HISTORY:  Family History   Problem Relation Age of Onset     HEART DISEASE Father       87yo     Coronary Artery Disease Father      HEART DISEASE Mother       74yo     Coronary Artery Disease Mother      Family History Negative Brother        SOCIAL HISTORY:  Social History     Social History     Marital status:      Spouse name: Alba     Number of children: 3     Years of education: N/A     Occupational History      Retired      w [employer]     Social History Main Topics     Smoking status: Former Smoker     Quit date: 1977     Smokeless tobacco: Never Used     Alcohol use No     Drug use: No     Sexual activity: No     Other Topics Concern     Caffeine Concern No     4-5 cups per week      Sleep Concern No     Stress Concern No     Weight Concern No     Special Diet No     Exercise No     yard work and moves around a lot     Social History Narrative       Review of Systems:  Skin:  Positive for   hx skin cancer -  hx of MOHS surgery    Eyes:  Positive for glasses reading glasses, cataract extraction of both eyes   ENT:  Positive for postnasal drainage;sinus trouble    Respiratory:  Positive for CPAP;sleep apnea;cough PND cough    Cardiovascular:    Positive for;edema decreased edema ,   Gastroenterology: Negative      Genitourinary:  Positive for   hx of bladder cancer  ,   Musculoskeletal:  Negative   2 new knees ,  new right hip - has some issues with strength in  right hip   Neurologic:  Positive for numbness or tingling of hands;numbness or tingling of feet neuropathy in feet more than hands -   balance issues   Psychiatric:  Negative      Heme/Lymph/Imm:  Positive for allergies RX allergies ,  hx of hay fever   Endocrine:  Negative        Physical Exam:  Vitals: /80 (BP Location: Right arm, Patient Position: Sitting, Cuff Size: Adult Regular)  Pulse 72  Ht 1.778 m (5' 10\")  Wt 85.7 kg (188 lb 14.4 oz)  BMI 27.1 " kg/m2    Constitutional:  cooperative;in no acute distress        Skin:  warm and dry to the touch          Head:  normocephalic        Eyes:  pupils equal and round        Lymph:      ENT:  no pallor or cyanosis        Neck:  JVP normal        Respiratory:  clear to auscultation;normal symmetry;normal respiratory excursion         Cardiac: regular rhythm;normal S1 and S2     no presence of murmur          pulses full and equal                                        GI:  abdomen soft;BS normoactive        Extremities and Muscular Skeletal:  no spinal abnormalities noted   1+;pitting;bilateral LE edema          Neurological:  affect appropriate        Psych:  Alert and Oriented x 3          CC  SELMA Landis CNP  [address]  [place] [address]                    Thank you for allowing me to participate in the care of your patient.      Sincerely,     SELMA Santos [facility]    cc:   SELMA Landis CNP  [address]  [place] [address]

## 2018-08-27 NOTE — MR AVS SNAPSHOT
After Visit Summary   8/27/2018    Tucker Slater    MRN: [id number]           Patient Information     Date Of Birth          3/13/1931        Visit Information        Provider Department      8/27/2018 1:50 PM Janeth Mcdaniels APRN CNP [facility]        Today's Diagnoses     Benign essential hypertension        Essential hypertension, benign           Follow-ups after your visit        Additional Services     Follow-Up with Electrophysiologist                 Your next 10 appointments already scheduled     Sep 19, 2018 11:45 AM CDT   Anticoagulation Visit with [facility]   [facility]    [address]  [facility] [address]   [phone]            Nov 29, 2018  4:00 PM CST   Remote PPM Check with KIDD TECH1   [facility] ([facility])    [facility] [facility] [address]  [facility] [id number] [phone] OPT 2           This appointment is for a remote check of your pacemaker.  This is not an appointment at the office.              Future tests that were ordered for you today     Open Future Orders        Priority Expected Expires Ordered    Follow-Up with Electrophysiologist Routine 2/23/2019 8/27/2019 8/27/2018    Basic metabolic panel Routine 2/23/2019 8/27/2019 8/27/2018            Who to contact     If you have questions or need follow up information about today's clinic visit or your schedule please contact [facility] directly at [phone].  Normal or non-critical lab and imaging results will be communicated to you by MyChart, letter or phone within 4 business days after the clinic has received the results. If you do not hear from us within 7 days, please contact the clinic through MyChart or phone. If you have a critical or abnormal lab result, we will notify you by phone as soon  "as possible.  Submit refill requests through [employer] or call your pharmacy and they will forward the refill request to us. Please allow 3 business days for your refill to be completed.          Additional Information About Your Visit        Care EveryWhere ID     This is your Care EveryWhere ID. This could be used by other organizations to access your [facility] medical records  [id number]        Your Vitals Were     Pulse Height BMI (Body Mass Index)             72 1.778 m (5' 10\") 27.1 kg/m2          Blood Pressure from Last 3 Encounters:   08/27/18 140/80   07/24/18 152/78   07/10/18 (!) 170/94    Weight from Last 3 Encounters:   08/27/18 85.7 kg (188 lb 14.4 oz)   07/10/18 91.2 kg (201 lb)   06/29/18 85.7 kg (189 lb)              We Performed the Following     Follow-Up with Cardiac Advanced Practice Provider          Where to get your medicines      These medications were sent to [facility] [place], [place] - [address]  [address] [facility] [address]     Phone:  [phone]     losartan 100 MG tablet          Primary Care Provider Office Phone # Fax #    Kwadwo Winslow [phone]       [address]  [facility] [address]        Equal Access to Services     MEREDITH JIM : Hadii aad ku hadasho Soomaali, waaxda luqadaha, qaybta kaalmada adeegyada, waxtori ferrer hayanna eddy. So [facility] [phone].    ATENCIÓN: Si habla español, tiene a kraft disposición servicios gratuitos de asistencia lingüística. Llame al [phone].    We comply with applicable federal civil rights laws and [place] laws. We do not discriminate on the basis of race, color, national origin, age, disability, sex, sexual orientation, or gender identity.            Thank you!     Thank you for choosing [facility]  for your care. Our goal is always to provide you with excellent care. Hearing back from our patients is one " way we can continue to improve our services. Please take a few minutes to complete the written survey that you may receive in the mail after your visit with us. Thank you!             Your Updated Medication List - Protect others around you: Learn how to safely use, store and throw away your medicines at www.disposemymeds.org.          This list is accurate as of 8/27/18  2:23 PM.  Always use your most recent med list.                   Brand Name Dispense Instructions for use Diagnosis    ACETAMINOPHEN PO      Take 650 mg by mouth every 4 hours as needed for pain        calcium carbonate 500 mg {elemental} 500 MG tablet    OS-KARLA     Take 500 mg by mouth daily        carvedilol 25 MG tablet    COREG    270 tablet    Take 1.5 tablets (37.5 mg) by mouth 2 times daily (with meals)    Essential hypertension with goal blood pressure less than 140/90       JANTOVEN 2.5 MG tablet   Generic drug:  warfarin     90 tablet    TAKE ONE TABLET BY MOUTH EVERY DAY, EXCEPT TAKE ONE-HALF TABLET BY MOUTH ON FRIDAYS, OR AS INSTRUCTED BASED ON INR RESULTS    Chronic atrial fibrillation (H)       losartan 100 MG tablet    COZAAR    90 tablet    Take 1 tablet (100 mg) by mouth daily    Essential hypertension, benign       OCUVITE PO      Take 1 tablet by mouth daily        order for DME     1 Device    Oxygen 2 Li/min at night with CPAP SPO2 less than or equal to oxygen saturation 89% on effective CPAP in patient with known cardiomyopathy    Hypoxemia, DAWSON (obstructive sleep apnea)       order for DME     1 Device    Oxygen 2 Li/min at night    Hypoxemia       triamterene-hydrochlorothiazide 37.5-25 MG per tablet    MAXZIDE-25    60 tablet    Take 1 tablet by mouth daily    Essential hypertension with goal blood pressure less than 140/90       vitamin B complex with vitamin C Tabs tablet      Take 1 tablet by mouth daily        VITAMIN C PO      Take 500 mg by mouth daily        VITAMIN D (CHOLECALCIFEROL) PO      Take 2,000 Units by  mouth daily.

## 2018-08-27 NOTE — PROGRESS NOTES
Service Date: 08/27/2018      HISTORY OF PRESENT ILLNESS:  This 87-year-old male presents to [facility] today for a followup visit.  He is a patient of Dr. Perez, seen in our clinic for permanent atrial fibrillation, symptomatic bradycardia, hypertension, chronic kidney disease, sleep apnea.      Tucker developed symptomatic bradycardia and underwent a single-chamber permanent pacemaker in 2015.  He has a longstanding history of permanent atrial fibrillation.  His heart rate has been controlled since 2016 and he is asymptomatic.  He is on chronic warfarin.      Over the past few months, we have seen Tucker for hypertension.  Due to some renal insufficiency, at one point, his Cozaar and Maxzide were lowered.  In fact, at one point his creatinine was 1.7.  In followup, his creatinine improved to 1.3 with the medication changes and the increased hydration.  However, his blood pressures hien and I increased his Cozaar back to 100 mg and eventually his Maxzide was readded back.      Over the past few months, he was found to have significant sleep apnea and is now on CPAP.  He returns today for a BMP and reassessment.      Tucker tells me he is doing well.  He is fairly active for his age, but does not participate in any regular formal exercise program as he has had hip issues.  In general, he tells me he is sleeping better and has improved energy now with CPAP.  He has not felt any chest pain with activity or at rest.  He is not short of breath.  Tucker denies any palpitations, lightheadedness, dizziness or near-syncope.      Tucker checks his blood pressure at home and tells me his systolic blood pressure is running around 120 mmHg on a regular basis.      PHYSICAL EXAMINATION:  His blood pressure today is 140/80, heart rate is 72 beats per minute and is regular.  His lungs are clear.  There is no significant peripheral edema.      I reviewed his laboratory work today showing  sodium 141, potassium 4.1, BUN 34, creatinine 1.4.      IMPRESSION AND PLAN:   1.  Hypertension.  Blood pressure is at goal today on carvedilol, Cozaar and Maxzide.  He does have some chronic stable mild renal insufficiency.  I encouraged hydration.  He will continue to monitor his blood pressure at home and tell me if his systolic blood pressure remains elevated over 140 mmHg on a regular basis.   2.  Permanent atrial fibrillation.  He is on chronic warfarin.  Recent interrogation of his device showed adequate heart rates.   3.  Status post single-chamber permanent pacemaker for symptomatic bradycardia in .  He is 92% ventricular paced.   4.  Treated sleep apnea.   5.  Recent echocardiogram shows a left ventricular ejection fraction of 45%-50% with global hypokinesia, mild mitral regurgitation, mild pulmonary hypertension and mild ascending aorta dilatation.  He denies any angina symptoms.  There are no significant signs and symptoms of heart failure today.      Thanks for allowing me to participate in this patient's care.         SELMA JONES, JEN             D: 2018   T: 2018   MT: al      Name:     ROSS LORENZANA   MRN:      [id number]        Account:      [id number]   :      1931           Service Date: 2018      Document: [id number]

## 2018-08-27 NOTE — LETTER
8/27/2018      Kwadwo Winslow MD  [address] [facility] [address]      RE: Tucker Slater       Dear Colleague,    I had the pleasure of seeing Tucker Slater in the [facility].    Service Date: 08/27/2018      HISTORY OF PRESENT ILLNESS:  This 87-year-old male presents to [facility] today for a followup visit.  He is a patient of Dr. Perez, seen in our clinic for permanent atrial fibrillation, symptomatic bradycardia, hypertension, chronic kidney disease, sleep apnea.      Tucker developed symptomatic bradycardia and underwent a single-chamber permanent pacemaker in 2015.  He has a longstanding history of permanent atrial fibrillation.  His heart rate has been controlled since 2016 and he is asymptomatic.  He is on chronic warfarin.      Over the past few months, we have seen Tucker for hypertension.  Due to some renal insufficiency, at one point, his Cozaar and Maxzide were lowered.  In fact, at one point his creatinine was 1.7.  In followup, his creatinine improved to 1.3 with the medication changes and the increased hydration.  However, his blood pressures hien and I increased his Cozaar back to 100 mg and eventually his Maxzide was readded back.      Over the past few months, he was found to have significant sleep apnea and is now on CPAP.  He returns today for a BMP and reassessment.      Tucker tells me he is doing well.  He is fairly active for his age, but does not participate in any regular formal exercise program as he has had hip issues.  In general, he tells me he is sleeping better and has improved energy now with CPAP.  He has not felt any chest pain with activity or at rest.  He is not short of breath.  Tucker denies any palpitations, lightheadedness, dizziness or near-syncope.      Tucker checks his blood pressure at home and tells me his systolic blood pressure is running around 120 mmHg on a regular  basis.      PHYSICAL EXAMINATION:  His blood pressure today is 140/80, heart rate is 72 beats per minute and is regular.  His lungs are clear.  There is no significant peripheral edema.      I reviewed his laboratory work today showing sodium 141, potassium 4.1, BUN 34, creatinine 1.4.      IMPRESSION AND PLAN:   1.  Hypertension.  Blood pressure is at goal today on carvedilol, Cozaar and Maxzide.  He does have some chronic stable mild renal insufficiency.  I encouraged hydration.  He will continue to monitor his blood pressure at home and tell me if his systolic blood pressure remains elevated over 140 mmHg on a regular basis.   2.  Permanent atrial fibrillation.  He is on chronic warfarin.  Recent interrogation of his device showed adequate heart rates.   3.  Status post single-chamber permanent pacemaker for symptomatic bradycardia in .  He is 92% ventricular paced.   4.  Treated sleep apnea.   5.  Recent echocardiogram shows a left ventricular ejection fraction of 45%-50% with global hypokinesia, mild mitral regurgitation, mild pulmonary hypertension and mild ascending aorta dilatation.  He denies any angina symptoms.  There are no significant signs and symptoms of heart failure today.      Thanks for allowing me to participate in this patient's care.         SELMA JONES, CNP             D: 2018   T: 2018   MT: al      Name:     ROSS LORENZANA   MRN:      [id number]        Account:      [id number]   :      1931           Service Date: 2018      Document: [id number]           Outpatient Encounter Prescriptions as of 2018   Medication Sig Dispense Refill     ACETAMINOPHEN PO Take 650 mg by mouth every 4 hours as needed for pain       Ascorbic Acid (VITAMIN C PO) Take 500 mg by mouth daily        calcium carbonate (OS-KARLA 500 MG [place]. [place]) 500 MG tablet Take 500 mg by mouth daily        carvedilol (COREG) 25 MG tablet Take 1.5 tablets (37.5 mg) by mouth 2 times daily  (with meals) 270 tablet 3     JANTOVEN 2.5 MG tablet TAKE ONE TABLET BY MOUTH EVERY DAY, EXCEPT TAKE ONE-HALF TABLET BY MOUTH ON FRIDAYS, OR AS INSTRUCTED BASED ON INR RESULTS 90 tablet 0     losartan (COZAAR) 100 MG tablet Take 1 tablet (100 mg) by mouth daily 90 tablet 1     Multiple Vitamins-Minerals (OCUVITE PO) Take 1 tablet by mouth daily        order for DME Oxygen 2 Li/min  at night 1 Device 0     order for DME Oxygen 2 Li/min  at night with CPAP  SPO2 less than or equal to oxygen saturation 89% on effective CPAP in patient with known cardiomyopathy 1 Device 0     triamterene-hydrochlorothiazide (MAXZIDE-25) 37.5-25 MG per tablet Take 1 tablet by mouth daily 60 tablet 3     vitamin B complex with vitamin C (VITAMIN  B COMPLEX) TABS tablet Take 1 tablet by mouth daily       VITAMIN D, CHOLECALCIFEROL, PO Take 2,000 Units by mouth daily.       [DISCONTINUED] JANTOVEN 2.5 MG tablet TAKE ONE TABLET BY MOUTH EVERY DAY, EXCEPT TAKE ONE-HALF TABLET BY MOUTH ON FRIDAYS, OR AS INSTRUCTED BASED ON INR RESULTS 90 tablet 1     [DISCONTINUED] losartan (COZAAR) 100 MG tablet Take 1 tablet (100 mg) by mouth daily 90 tablet 1     No facility-administered encounter medications on file as of 8/27/2018.        Again, thank you for allowing me to participate in the care of your patient.      Sincerely,    SELMA Santos [facility]

## 2018-09-19 ENCOUNTER — ANTICOAGULATION THERAPY VISIT (OUTPATIENT)
Dept: ANTICOAGULATION | Facility: CLINIC | Age: 83
End: 2018-09-19
Payer: COMMERCIAL

## 2018-09-19 DIAGNOSIS — I48.91 ATRIAL FIBRILLATION (H): ICD-10-CM

## 2018-09-19 DIAGNOSIS — Z79.01 LONG-TERM (CURRENT) USE OF ANTICOAGULANTS: ICD-10-CM

## 2018-09-19 LAB — INR POINT OF CARE: 2.5 (ref 0.86–1.14)

## 2018-09-19 PROCEDURE — 99207 ZZC NO CHARGE NURSE ONLY: CPT

## 2018-09-19 PROCEDURE — 36416 COLLJ CAPILLARY BLOOD SPEC: CPT

## 2018-09-19 PROCEDURE — 85610 PROTHROMBIN TIME: CPT | Mod: QW

## 2018-09-19 NOTE — MR AVS SNAPSHOT
Tucker ZHAO Slater   9/19/2018 11:45 AM   Anticoagulation Therapy Visit    Description:  87 year old male   Provider[facility]  [facility]   Department:  [facility]           INR as of 9/19/2018     Today's INR 2.5      Anticoagulation Summary as of 9/19/2018     INR goal 2.0-3.0   Today's INR 2.5   Full warfarin instructions 1.25 mg on Fri; 2.5 mg all other days   Next INR check 10/17/2018    Indications   Long-term (current) use of anticoagulants [Z79.01] [Z79.01]  Atrial fibrillation (H) with mitral regurgitation and congestive heart failure [I48.91]         Your next Anticoagulation Clinic appointment(s)     Oct 17, 2018 11:45 AM CDT   Anticoagulation Visit with [facility]   [facility]    [address] [facility] [address]  [facility] [address] [phone]              Contact Numbers     [facility] Phone Numbers:  Anticoagulation Clinic Appointments : [phone]  Anticoagulation Nurse: [phone]         September 2018 Details    Sun Mon Tue Wed Thu Fri Sat           1                 2               3               4               5               6               7               8                 9               10               11               12               13               14               15                 16               17               18               19      2.5 mg   See details      20      2.5 mg         21      1.25 mg         22      2.5 mg           23      2.5 mg         24      2.5 mg         25      2.5 mg         26      2.5 mg         27      2.5 mg         28      1.25 mg         29      2.5 mg           30      2.5 mg                Date Details   09/19 This INR check               How to take your warfarin dose     To take:  1.25 mg Take 0.5 of a 2.5 mg tablet.    To take:  2.5 mg Take 1 of the 2.5 mg tablets.           October 2018 Details    Sun Mon Tue Wed Thu Fri Sat      1      2.5 mg          2      2.5 mg         3      2.5 mg         4      2.5 mg         5      1.25 mg         6      2.5 mg           7      2.5 mg         8      2.5 mg         9      2.5 mg         10      2.5 mg         11      2.5 mg         12      1.25 mg         13      2.5 mg           14      2.5 mg         15      2.5 mg         16      2.5 mg         17            18               19               20                 21               22               23               24               25               26               27                 28               29               30               31                   Date Details   No additional details    Date of next INR:  10/17/2018         How to take your warfarin dose     To take:  1.25 mg Take 0.5 of a 2.5 mg tablet.    To take:  2.5 mg Take 1 of the 2.5 mg tablets.

## 2018-09-19 NOTE — PROGRESS NOTES
ANTICOAGULATION FOLLOW-UP CLINIC VISIT    Patient Name:  Tucker Slater  Date:  9/19/2018  Contact Type:  Face to Face    SUBJECTIVE:     Patient Findings     Positives No Problem Findings           OBJECTIVE    INR Protime   Date Value Ref Range Status   09/19/2018 2.5 (A) 0.86 - 1.14 Final       ASSESSMENT / PLAN  INR assessment THER    Recheck INR In: 4 WEEKS    INR Location Clinic      Anticoagulation Summary as of 9/19/2018     INR goal 2.0-3.0   Today's INR 2.5   Warfarin maintenance plan 1.25 mg (2.5 mg x 0.5) on Fri; 2.5 mg (2.5 mg x 1) all other days   Full warfarin instructions 1.25 mg on Fri; 2.5 mg all other days   Weekly warfarin total 16.25 mg   No change documented Ivory Machado RN   Plan last modified Ivory Machado RN (7/7/2017)   Next INR check 10/17/2018   Priority INR   Target end date     Indications   Long-term (current) use of anticoagulants [Z79.01] [Z79.01]  Atrial fibrillation (H) with mitral regurgitation and congestive heart failure [I48.91]         Anticoagulation Episode Summary     INR check location     Preferred lab     Send INR reminders to [facility]    Comments       Anticoagulation Care Providers     Provider Role Specialty Phone number    Kwadwo Winslow MD Responsible Internal Medicine [phone]            See the Encounter Report to view Anticoagulation Flowsheet and Dosing Calendar (Go to Encounters tab in chart review, and find the Anticoagulation Therapy Visit)    Dosage adjustment made based on physician directed care plan.    Ivory Machado RN

## 2018-10-17 ENCOUNTER — ANTICOAGULATION THERAPY VISIT (OUTPATIENT)
Dept: ANTICOAGULATION | Facility: CLINIC | Age: 83
End: 2018-10-17
Payer: COMMERCIAL

## 2018-10-17 DIAGNOSIS — I48.91 ATRIAL FIBRILLATION (H): ICD-10-CM

## 2018-10-17 LAB — INR POINT OF CARE: 2.6 (ref 0.86–1.14)

## 2018-10-17 PROCEDURE — 85610 PROTHROMBIN TIME: CPT | Mod: QW

## 2018-10-17 PROCEDURE — 99207 ZZC NO CHARGE NURSE ONLY: CPT

## 2018-10-17 PROCEDURE — 36416 COLLJ CAPILLARY BLOOD SPEC: CPT

## 2018-10-17 NOTE — PROGRESS NOTES
ANTICOAGULATION FOLLOW-UP CLINIC VISIT    Patient Name:  Tucker Slater  Date:  10/17/2018  Contact Type:  Face to Face    SUBJECTIVE:     Patient Findings     Positives No Problem Findings           OBJECTIVE    INR Protime   Date Value Ref Range Status   10/17/2018 2.6 (A) 0.86 - 1.14 Final       ASSESSMENT / PLAN  INR assessment THER    Recheck INR In: 4 WEEKS    INR Location Clinic      Anticoagulation Summary as of 10/17/2018     INR goal 2.0-3.0   Today's INR 2.6   Warfarin maintenance plan 1.25 mg (2.5 mg x 0.5) on Fri; 2.5 mg (2.5 mg x 1) all other days   Full warfarin instructions 1.25 mg on Fri; 2.5 mg all other days   Weekly warfarin total 16.25 mg   No change documented Ivory Machado RN   Plan last modified Ivory Machado RN (7/7/2017)   Next INR check 11/14/2018   Priority INR   Target end date     Indications   Long-term (current) use of anticoagulants [Z79.01] [Z79.01]  Atrial fibrillation (H) with mitral regurgitation and congestive heart failure [I48.91]         Anticoagulation Episode Summary     INR check location     Preferred lab     Send INR reminders to [facility]    Comments       Anticoagulation Care Providers     Provider Role Specialty Phone number    Kwadwo Winslow MD Responsible Internal Medicine [phone]            See the Encounter Report to view Anticoagulation Flowsheet and Dosing Calendar (Go to Encounters tab in chart review, and find the Anticoagulation Therapy Visit)    Dosage adjustment made based on physician directed care plan.    Ivory Machado RN

## 2018-10-17 NOTE — MR AVS SNAPSHOT
Tucker ZHAO Slater   10/17/2018 11:45 AM   Anticoagulation Therapy Visit    Description:  87 year old male   Provider[facility]  [facility]   Department:  [facility]           INR as of 10/17/2018     Today's INR 2.6      Anticoagulation Summary as of 10/17/2018     INR goal 2.0-3.0   Today's INR 2.6   Full warfarin instructions 1.25 mg on Fri; 2.5 mg all other days   Next INR check 11/14/2018    Indications   Long-term (current) use of anticoagulants [Z79.01] [Z79.01]  Atrial fibrillation (H) with mitral regurgitation and congestive heart failure [I48.91]         Your next Anticoagulation Clinic appointment(s)     Nov 14, 2018 11:00 AM CST   Anticoagulation Visit with [facility]   [facility] ([facility])    [address] [facility] [address]  [facility] [address] [phone]              Contact Numbers     [facility] Phone Numbers:  Anticoagulation Clinic Appointments : [phone]  Anticoagulation Nurse: [phone]         October 2018 Details    Sun Mon Tue Wed Thu Fri Sat      1               2               3               4               5               6                 7               8               9               10               11               12               13                 14               15               16               17      2.5 mg   See details      18      2.5 mg         19      1.25 mg         20      2.5 mg           21      2.5 mg         22      2.5 mg         23      2.5 mg         24      2.5 mg         25      2.5 mg         26      1.25 mg         27      2.5 mg           28      2.5 mg         29      2.5 mg         30      2.5 mg         31      2.5 mg             Date Details   10/17 This INR check               How to take your warfarin dose     To take:  1.25 mg Take 0.5 of a 2.5 mg tablet.    To take:  2.5 mg Take 1 of the 2.5 mg tablets.           November 2018 Details    Sun Mon Tue Wed Thu Fri Sat          1      2.5 mg         2      1.25 mg         3      2.5 mg           4      2.5 mg         5      2.5 mg         6      2.5 mg         7      2.5 mg         8      2.5 mg         9      1.25 mg         10      2.5 mg           11      2.5 mg         12      2.5 mg         13      2.5 mg         14            15               16               17                 18               19               20               21               22               23               24                 25               26               27               28               29               30                 Date Details   No additional details    Date of next INR:  11/14/2018         How to take your warfarin dose     To take:  1.25 mg Take 0.5 of a 2.5 mg tablet.    To take:  2.5 mg Take 1 of the 2.5 mg tablets.

## 2018-11-14 ENCOUNTER — ALLIED HEALTH/NURSE VISIT (OUTPATIENT)
Dept: NURSING | Facility: CLINIC | Age: 83
End: 2018-11-14
Payer: COMMERCIAL

## 2018-11-14 ENCOUNTER — ANTICOAGULATION THERAPY VISIT (OUTPATIENT)
Dept: ANTICOAGULATION | Facility: CLINIC | Age: 83
End: 2018-11-14
Payer: COMMERCIAL

## 2018-11-14 DIAGNOSIS — Z23 NEED FOR PROPHYLACTIC VACCINATION AND INOCULATION AGAINST INFLUENZA: Primary | ICD-10-CM

## 2018-11-14 DIAGNOSIS — Z79.01 LONG TERM CURRENT USE OF ANTICOAGULANTS WITH INR GOAL OF 2.0-3.0: ICD-10-CM

## 2018-11-14 DIAGNOSIS — I48.91 ATRIAL FIBRILLATION (H): ICD-10-CM

## 2018-11-14 LAB — INR POINT OF CARE: 2.9 (ref 0.86–1.14)

## 2018-11-14 PROCEDURE — 99207 ZZC NO CHARGE NURSE ONLY: CPT

## 2018-11-14 PROCEDURE — G0008 ADMIN INFLUENZA VIRUS VAC: HCPCS

## 2018-11-14 PROCEDURE — 36416 COLLJ CAPILLARY BLOOD SPEC: CPT

## 2018-11-14 PROCEDURE — 85610 PROTHROMBIN TIME: CPT | Mod: QW

## 2018-11-14 PROCEDURE — 90662 IIV NO PRSV INCREASED AG IM: CPT

## 2018-11-14 NOTE — PROGRESS NOTES

## 2018-11-14 NOTE — MR AVS SNAPSHOT
After Visit Summary   11/14/2018    Tucker Slater    MRN: [id number]           Patient Information     Date Of Birth          3/13/1931        Visit Information        Provider Department      11/14/2018 10:45 AM [facility] N[facility]SE [facility]        Today's Diagnoses     Need for prophylactic vaccination and inoculation against influenza    -  1       Follow-ups after your visit        Your next 10 appointments already scheduled     Nov 29, 2018  4:00 PM CST   Remote PPM Check with KIDD TECH1   [facility] ([facility])    [address]  [facility] [address] [phone] OPT 2           This appointment is for a remote check of your pacemaker.  This is not an appointment at the office.            Dec 12, 2018 11:00 AM CST   Anticoagulation Visit with [facility]   [facility] ([facility])    [address]  [facility] [address] [phone]              Who to contact     If you have questions or need follow up information about today's clinic visit or your schedule please contact [facility] directly at [phone].  Normal or non-critical lab and imaging results will be communicated to you by MyChart, letter or phone within 4 business days after the clinic has received the results. If you do not hear from us within 7 days, please contact the clinic through MyChart or phone. If you have a critical or abnormal lab result, we will notify you by phone as soon as possible.  Submit refill requests through [employer] or call your pharmacy and they will forward the refill request to us. Please allow 3 business days for your refill to be completed.          Additional Information About Your Visit        Care EveryWhere ID     This is your Care EveryWhere ID. This could be used by other organizations to access your [place] medical records  [id number]          Blood Pressure from Last 3 Encounters:   08/27/18 140/80   07/24/18 152/78   07/10/18 (!) 170/94    Weight from Last 3 Encounters:   08/27/18 188 lb 14.4 oz (85.7 kg)   07/10/18 201 lb (91.2 kg)   06/29/18 189 lb (85.7 kg)              We Performed the Following     FLU VACCINE, INCREASED ANTIGEN, PRESV FREE, AGE 65+ [18060]     Vaccine Administration, Initial [32529]        Primary Care Provider Office Phone # Fax #    Kwadwo Winslow [phone]       [address] [facility] [address]        Equal Access to Services     MEREDITH JIM : Enrique Whitaker, wacorinne gold, qaybta kaalmada sonal, gisela parekh . [facility] [phone].    ATENCIÓN: Si habla español, tiene a kraft disposición servicios gratuitos de asistencia lingüística. Llame al [phone].    We comply with applicable federal civil rights laws and [place] laws. We do not discriminate on the basis of race, color, national origin, age, disability, sex, sexual orientation, or gender identity.            Thank you!     Thank you for choosing [facility]  for your care. Our goal is always to provide you with excellent care. Hearing back from our patients is one way we can continue to improve our services. Please take a few minutes to complete the written survey that you may receive in the mail after your visit with us. Thank you!             Your Updated Medication List - Protect others around you: Learn how to safely use, store and throw away your medicines at www.disposemymeds.org.          This list is accurate as of 11/14/18 12:04 PM.  Always use your most recent med list.                   Brand Name Dispense Instructions for use Diagnosis    ACETAMINOPHEN PO      Take 650 mg by mouth every 4 hours as needed for pain        calcium carbonate 500 mg (elemental) 500 MG tablet    OS-KARLA     Take 500 mg by mouth daily        carvedilol 25 MG tablet    COREG    270  tablet    Take 1.5 tablets (37.5 mg) by mouth 2 times daily (with meals)    Essential hypertension with goal blood pressure less than 140/90       JANTOVEN 2.5 MG tablet   Generic drug:  warfarin     90 tablet    TAKE ONE TABLET BY MOUTH EVERY DAY, EXCEPT TAKE ONE-HALF TABLET BY MOUTH ON FRIDAYS, OR AS INSTRUCTED BASED ON INR RESULTS    Chronic atrial fibrillation (H)       losartan 100 MG tablet    COZAAR    90 tablet    Take 1 tablet (100 mg) by mouth daily    Essential hypertension, benign       OCUVITE PO      Take 1 tablet by mouth daily        order for DME     1 Device    Oxygen 2 Li/min at night with CPAP SPO2 less than or equal to oxygen saturation 89% on effective CPAP in patient with known cardiomyopathy    Hypoxemia, DAWSON (obstructive sleep apnea)       order for DME     1 Device    Oxygen 2 Li/min at night    Hypoxemia       triamterene-hydrochlorothiazide 37.5-25 MG per tablet    MAXZIDE-25    60 tablet    Take 1 tablet by mouth daily    Essential hypertension with goal blood pressure less than 140/90       vitamin B complex with vitamin C Tabs tablet      Take 1 tablet by mouth daily        VITAMIN C PO      Take 500 mg by mouth daily        VITAMIN D (CHOLECALCIFEROL) PO      Take 2,000 Units by mouth daily.

## 2018-11-14 NOTE — PROGRESS NOTES
ANTICOAGULATION FOLLOW-UP CLINIC VISIT    Patient Name:  Tucker Slater  Date:  11/14/2018  Contact Type:  Face to Face    SUBJECTIVE:     Patient Findings     Positives No Problem Findings           OBJECTIVE    INR Protime   Date Value Ref Range Status   11/14/2018 2.9 (A) 0.86 - 1.14 Final       ASSESSMENT / PLAN  INR assessment THER    Recheck INR In: 4 WEEKS    INR Location Clinic      Anticoagulation Summary as of 11/14/2018     INR goal 2.0-3.0   Today's INR 2.9   Warfarin maintenance plan 1.25 mg (2.5 mg x 0.5) on Fri; 2.5 mg (2.5 mg x 1) all other days   Full warfarin instructions 1.25 mg on Fri; 2.5 mg all other days   Weekly warfarin total 16.25 mg   No change documented Ivory Machado RN   Plan last modified Ivory Machado RN (7/7/2017)   Next INR check 12/12/2018   Priority INR   Target end date     Indications   Atrial fibrillation (H) with mitral regurgitation and congestive heart failure [I48.91]  Long term current use of anticoagulants with INR goal of 2.0-3.0 [Z79.01]         Anticoagulation Episode Summary     INR check location     Preferred lab     Send INR reminders to [facility]    Comments       Anticoagulation Care Providers     Provider Role Specialty Phone number    Kwadwo Winslow MD Responsible Internal Medicine [phone]            See the Encounter Report to view Anticoagulation Flowsheet and Dosing Calendar (Go to Encounters tab in chart review, and find the Anticoagulation Therapy Visit)    Dosage adjustment made based on physician directed care plan.    Ivory Machado RN

## 2018-11-14 NOTE — MR AVS SNAPSHOT
Tucker Slater   11/14/2018 11:00 AM   Anticoagulation Therapy Visit    Description:  87 year old male   Provider[facility]  [facility]   Department:  [facility]           INR as of 11/14/2018     Today's INR 2.9      Anticoagulation Summary as of 11/14/2018     INR goal 2.0-3.0   Today's INR 2.9   Full warfarin instructions 1.25 mg on Fri; 2.5 mg all other days   Next INR check 12/12/2018    Indications   Atrial fibrillation (H) with mitral regurgitation and congestive heart failure [I48.91]  Long term current use of anticoagulants with INR goal of 2.0-3.0 [Z79.01]         Your next Anticoagulation Clinic appointment(s)     Dec 12, 2018 11:00 AM CST   Anticoagulation Visit with [facility]   [facility] ([facility])    [address] [facility] [address]  [facility] [id number] [phone]              Contact Numbers     [facility] Phone Numbers:  Anticoagulation Clinic Appointments : [phone]  Anticoagulation Nurse: [phone]         November 2018 Details    Sun Mon Tue Wed Thu Fri Sat         1               2               3                 4               5               6               7               8               9               10                 11               12               13               14      2.5 mg   See details      15      2.5 mg         16      1.25 mg         17      2.5 mg           18      2.5 mg         19      2.5 mg         20      2.5 mg         21      2.5 mg         22      2.5 mg         23      1.25 mg         24      2.5 mg           25      2.5 mg         26      2.5 mg         27      2.5 mg         28      2.5 mg         29      2.5 mg         30      1.25 mg           Date Details   11/14 This INR check               How to take your warfarin dose     To take:  1.25 mg Take 0.5 of a 2.5 mg tablet.    To take:  2.5 mg Take 1 of the 2.5 mg tablets.           December 2018 Details    Sun Mon Tue Wed  Thu Fri Sat           1      2.5 mg           2      2.5 mg         3      2.5 mg         4      2.5 mg         5      2.5 mg         6      2.5 mg         7      1.25 mg         8      2.5 mg           9      2.5 mg         10      2.5 mg         11      2.5 mg         12            13               14               15                 16               17               18               19               20               21               22                 23               24               25               26               27               28               29                 30               31                     Date Details   No additional details    Date of next INR:  12/12/2018         How to take your warfarin dose     To take:  1.25 mg Take 0.5 of a 2.5 mg tablet.    To take:  2.5 mg Take 1 of the 2.5 mg tablets.

## 2018-11-14 NOTE — NURSING NOTE
Prior to injection verified patient identity using patient's name and date of birth.  Due to injection administration, patient instructed to remain in clinic for 15 minutes  afterwards, and to report any adverse reaction to me immediately.    Screening Questionnaire for Adult Immunization    Are you sick today?   No   Do you have allergies to medications, food, a vaccine component or latex?   No   Have you ever had a serious reaction after receiving a vaccination?   No   Do you have a long-term health problem with heart disease, lung disease, asthma, kidney disease, metabolic disease (e.g. diabetes), anemia, or other blood disorder?   Yes, HTN, heart, kidney   Do you have cancer, leukemia, HIV/AIDS, or any other immune system problem?   No   In the past 3 months, have you taken medications that affect  your immune system, such as prednisone, other steroids, or anticancer drugs; drugs for the treatment of rheumatoid arthritis, Crohn s disease, or psoriasis; or have you had radiation treatments?   No   Have you had a seizure, or a brain or other nervous system problem?   No   During the past year, have you received a transfusion of blood or blood     products, or been given immune (gamma) globulin or antiviral drug?   No   For women: Are you pregnant or is there a chance you could become        pregnant during the next month?   No   Have you received any vaccinations in the past 4 weeks?   No     Immunization questionnaire answers were all negative except one.        Per orders of Dr. Winslow, injection of Flu shot given by Jose Cruz Blanco. Patient instructed to remain in clinic for 15 minutes afterwards, and to report any adverse reaction to me immediately.       Screening performed by Jose Cruz Blanco on 11/14/2018 at 11:59 AM.

## 2018-11-20 DIAGNOSIS — I48.20 CHRONIC ATRIAL FIBRILLATION (H): ICD-10-CM

## 2018-11-20 RX ORDER — WARFARIN SODIUM 2.5 MG/1
TABLET ORAL
Qty: 90 TABLET | Refills: 0 | Status: SHIPPED | OUTPATIENT
Start: 2018-11-20 | End: 2019-03-12

## 2018-11-20 NOTE — TELEPHONE ENCOUNTER
Prescription approved per [facility] Refill Protocol.  Gail Valdovinos RN

## 2018-11-20 NOTE — TELEPHONE ENCOUNTER
"Requested Prescriptions   Pending Prescriptions Disp Refills     JANTOVEN 2.5 MG tablet [Pharmacy Med Name: JANTOVEN 2.5MG TABS]  Last Written Prescription Date:  8/17/18  Last Fill Quantity: 90,  # refills: 0   Last office visit: 12/29/2017 with prescribing provider:  Goldy   Future Office Visit:     90 tablet 0     Sig: TAKE ONE TABLET BY MOUTH EVERY DAY, EXCEPT TAKE ONE-HALF TABLET BY MOUTH ON FRIDAYS, OR AS INSTRUCTED BASED ON INR RESULTS    Vitamin K Antagonists Failed    11/20/2018  8:36 AM       Failed - INR is within goal in the past 6 weeks    Confirm INR is within goal in the past 6 weeks.     Recent Labs   Lab Test 11/14/18   INR  2.9*                      Passed - Recent (12 mo) or future (30 days) visit within the authorizing provider's specialty    Patient had office visit in the last 12 months or has a visit in the next 30 days with authorizing provider or within the authorizing provider's specialty.  See \"Patient Info\" tab in inbasket, or \"Choose Columns\" in Meds & Orders section of the refill encounter.             Passed - Patient is 18 years of age or older          "

## 2018-11-29 ENCOUNTER — ALLIED HEALTH/NURSE VISIT (OUTPATIENT)
Dept: CARDIOLOGY | Facility: CLINIC | Age: 83
End: 2018-11-29
Payer: COMMERCIAL

## 2018-11-29 DIAGNOSIS — Z95.0 CARDIAC PACEMAKER IN SITU: Primary | ICD-10-CM

## 2018-11-29 PROCEDURE — 93296 REM INTERROG EVL PM/IDS: CPT | Performed by: INTERNAL MEDICINE

## 2018-11-29 PROCEDURE — 93294 REM INTERROG EVL PM/LDLS PM: CPT | Performed by: INTERNAL MEDICINE

## 2018-11-29 NOTE — PROGRESS NOTES
[employer] (S) Remote PPM Device Check  : 94 %, Chronic AFib, taking Jantoven  Mode: VVIR        Presenting Rhythm: AFib with   Heart Rate: Adequate rates per histogram  Sensing: Stable    Pacing Threshold: Stable    Impedance: Stable  Battery Status: 10.7-11.4 years  Atrial Arrhythmia: N/A  Ventricular Arrhythmia: None     Care Plan: F/u annual threhsold in 3 months, order placed. MigelCVT

## 2018-12-12 ENCOUNTER — ANTICOAGULATION THERAPY VISIT (OUTPATIENT)
Dept: ANTICOAGULATION | Facility: CLINIC | Age: 83
End: 2018-12-12
Payer: COMMERCIAL

## 2018-12-12 DIAGNOSIS — I48.91 ATRIAL FIBRILLATION (H): ICD-10-CM

## 2018-12-12 DIAGNOSIS — Z79.01 LONG TERM CURRENT USE OF ANTICOAGULANTS WITH INR GOAL OF 2.0-3.0: ICD-10-CM

## 2018-12-12 LAB — INR POINT OF CARE: 2.4 (ref 0.86–1.14)

## 2018-12-12 PROCEDURE — 99207 ZZC NO CHARGE NURSE ONLY: CPT

## 2018-12-12 PROCEDURE — 85610 PROTHROMBIN TIME: CPT | Mod: QW

## 2018-12-12 PROCEDURE — 36416 COLLJ CAPILLARY BLOOD SPEC: CPT

## 2018-12-12 NOTE — PROGRESS NOTES
ANTICOAGULATION FOLLOW-UP CLINIC VISIT    Patient Name:  Tucker Slater  Date:  2018  Contact Type:  Face to Face    SUBJECTIVE:     Patient Findings     Positives:   No Problem Findings           OBJECTIVE    INR Protime   Date Value Ref Range Status   2018 2.4 (A) 0.86 - 1.14 Final       ASSESSMENT / PLAN  INR assessment THER    Recheck INR In: 4 WEEKS    INR Location Clinic      Anticoagulation Summary  As of 2018    INR goal:   2.0-3.0   TTR:   71.1 % (1.8 y)   INR used for dosin.4 (2018)   Warfarin maintenance plan:   1.25 mg (2.5 mg x 0.5) every Fri; 2.5 mg (2.5 mg x 1) all other days   Full warfarin instructions:   1.25 mg every Fri; 2.5 mg all other days   Weekly warfarin total:   16.25 mg   No change documented:   Ivory Machado RN   Plan last modified:   Ivory Machado RN (2017)   Next INR check:   2019   Priority:   INR   Target end date:       Indications    Atrial fibrillation (H) with mitral regurgitation and congestive heart failure [I48.91]  Long term current use of anticoagulants with INR goal of 2.0-3.0 [Z79.01]             Anticoagulation Episode Summary     INR check location:       Preferred lab:       Send INR reminders to:   [facility]    Comments:         Anticoagulation Care Providers     Provider Role Specialty Phone number    Kwadwo Winslow MD [facility] Internal Medicine [phone]            See the Encounter Report to view Anticoagulation Flowsheet and Dosing Calendar (Go to Encounters tab in chart review, and find the Anticoagulation Therapy Visit)    Dosage adjustment made based on physician directed care plan.    Ivory Machado RN

## 2018-12-26 ENCOUNTER — TELEPHONE (OUTPATIENT)
Dept: SLEEP MEDICINE | Facility: CLINIC | Age: 83
End: 2018-12-26

## 2019-01-09 ENCOUNTER — ANTICOAGULATION THERAPY VISIT (OUTPATIENT)
Dept: ANTICOAGULATION | Facility: CLINIC | Age: 84
End: 2019-01-09
Payer: COMMERCIAL

## 2019-01-09 DIAGNOSIS — I48.91 ATRIAL FIBRILLATION (H): ICD-10-CM

## 2019-01-09 DIAGNOSIS — Z79.01 LONG TERM CURRENT USE OF ANTICOAGULANTS WITH INR GOAL OF 2.0-3.0: ICD-10-CM

## 2019-01-09 LAB — INR POINT OF CARE: 2.6 (ref 0.86–1.14)

## 2019-01-09 PROCEDURE — 99207 ZZC NO CHARGE NURSE ONLY: CPT

## 2019-01-09 PROCEDURE — 36416 COLLJ CAPILLARY BLOOD SPEC: CPT

## 2019-01-09 PROCEDURE — 85610 PROTHROMBIN TIME: CPT | Mod: QW

## 2019-01-09 NOTE — PROGRESS NOTES
ANTICOAGULATION FOLLOW-UP CLINIC VISIT    Patient Name:  Tucker Slater  Date:  2019  Contact Type:  Face to Face    SUBJECTIVE:     Patient Findings     Positives:   No Problem Findings           OBJECTIVE    INR Protime   Date Value Ref Range Status   2019 2.6 (A) 0.86 - 1.14 Final       ASSESSMENT / PLAN  INR assessment THER    Recheck INR In: 6 WEEKS    INR Location Clinic      Anticoagulation Summary  As of 2019    INR goal:   2.0-3.0   TTR:   72.3 % (1.8 y)   INR used for dosin.6 (2019)   Warfarin maintenance plan:   1.25 mg (2.5 mg x 0.5) every Fri; 2.5 mg (2.5 mg x 1) all other days   Full warfarin instructions:   1.25 mg every Fri; 2.5 mg all other days   Weekly warfarin total:   16.25 mg   No change documented:   Ivory Machado RN   Plan last modified:   Ivory Machado RN (2017)   Next INR check:   2019   Priority:   INR   Target end date:       Indications    Atrial fibrillation (H) with mitral regurgitation and congestive heart failure [I48.91]  Long term current use of anticoagulants with INR goal of 2.0-3.0 [Z79.01]             Anticoagulation Episode Summary     INR check location:       Preferred lab:       Send INR reminders to:   [facility]    Comments:         Anticoagulation Care Providers     Provider Role Specialty Phone number    Kwadwo Winslow MD [facility] Internal Medicine [phone]            See the Encounter Report to view Anticoagulation Flowsheet and Dosing Calendar (Go to Encounters tab in chart review, and find the Anticoagulation Therapy Visit)    Dosage adjustment made based on physician directed care plan.    Ivory Machado RN

## 2019-01-16 ENCOUNTER — OFFICE VISIT (OUTPATIENT)
Dept: URGENT CARE | Facility: URGENT CARE | Age: 84
End: 2019-01-16
Payer: COMMERCIAL

## 2019-01-16 VITALS
DIASTOLIC BLOOD PRESSURE: 68 MMHG | BODY MASS INDEX: 27.92 KG/M2 | SYSTOLIC BLOOD PRESSURE: 118 MMHG | RESPIRATION RATE: 24 BRPM | WEIGHT: 194.6 LBS | TEMPERATURE: 98.6 F | OXYGEN SATURATION: 95 % | HEART RATE: 77 BPM

## 2019-01-16 DIAGNOSIS — R05.8 PRODUCTIVE COUGH: ICD-10-CM

## 2019-01-16 DIAGNOSIS — R50.9 FEVER AND CHILLS: Primary | ICD-10-CM

## 2019-01-16 PROCEDURE — 99213 OFFICE O/P EST LOW 20 MIN: CPT | Performed by: FAMILY MEDICINE

## 2019-01-16 RX ORDER — CODEINE PHOSPHATE AND GUAIFENESIN 10; 100 MG/5ML; MG/5ML
SOLUTION ORAL
Qty: 120 ML | Refills: 0 | Status: SHIPPED | OUTPATIENT
Start: 2019-01-16 | End: 2019-04-19

## 2019-01-16 NOTE — PROGRESS NOTES
SUBJECTIVE: Tucker Slater is a 87 year old male presenting with a chief complaint of fever and cough .  Onset of symptoms was 3 day(s) ago.  Course of illness is same but no fever today.    Severity moderate  Current and Associated symptoms: runny nose, stuffy nose and cough - productive  Treatment measures tried include OTC.  Predisposing factors include None.    Past Medical History:   Diagnosis Date     Arrhythmia     A Fib     Balance problem     related to neuropathy in feet     Bradycardia      Carcinoma in situ of bladder 2000    bladder cancer ;; follow w Urology w periodic cysto      Essential hypertension, benign      Generalized osteoarthrosis, unspecified site knees    s/p R total knee 8/09 ; will do L 6/10      Numbness and tingling     toes and fingers     Pacemaker     [employer]      Pain in joint, shoulder region     L shoulder rotater tear; no surgery      Persistent atrial fibrillation (H) 1/30/15     Prostate CA (H) 2008-9    radiation     Prostate cancer (H) 11/08    completed RT 3/09     Renal disease     elevated creatinine     Shoulder impingement     R shoulder impingement etc see MRI 4/09      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     toes both feet        Past Surgical History:   Procedure Laterality Date     ARTHROPLASTY HIP Right 3/27/2017    Procedure: ARTHROPLASTY HIP;  Surgeon: Jigar Wynn MD;  Location: [facility] OR     C NONSPECIFIC PROCEDURE      bilat inguinal hernia repairs ( 3total procedures)      C NONSPECIFIC PROCEDURE      pilonidal cyst     C NONSPECIFIC PROCEDURE      T + A     C NONSPECIFIC PROCEDURE  8/09     R+L total knee     CARDIOVERSION  3/26/15     COLONOSCOPY       IMPLANT PACEMAKER  3/26/15     RELEASE CARPAL TUNNEL Right 4/19/2017    Procedure: RELEASE CARPAL TUNNEL;  Right carpal tunnel release;  Surgeon: Alonso Barboza MD;  Location: [facility] OR       Family History   Problem Relation Age of Onset     Heart Disease Father           89yo     Coronary Artery Disease Father      Heart Disease Mother          76yo     Coronary Artery Disease Mother      Family History Negative Brother        Social History     Tobacco Use     Smoking status: Former Smoker     Last attempt to quit: 1977     Years since quittin.3     Smokeless tobacco: Never Used   Substance Use Topics     Alcohol use: No     Alcohol/week: 0.0 oz        Allergies   Allergen Reactions     Merbromin      Other reaction(s): Other, see comments  Severe reaction as child. Amalgam fillings OK.     Morphine Nausea and Vomiting     Amlodipine      Edema     ROS:  SKIN: no rash  GI: no vomiting    OBJECTIVE:  /68   Pulse 77   Temp 98.6  F (37  C) (Oral)   Resp 24   Wt 88.3 kg (194 lb 9.6 oz)   SpO2 95%   BMI 27.92 kg/m     GENERAL APPEARANCE: healthy, alert and no distress  EYES: EOMI,  PERRL, conjunctiva clear  HENT: ear canals and TM's normal.  Nose and mouth without ulcers, erythema or lesions  NECK: supple, nontender, no lymphadenopathy  RESP: lungs clear to auscultation - no rales, rhonchi or wheezes  SKIN: no suspicious lesions or rashes      ICD-10-CM    1. Fever and chills R50.9    2. Productive cough R05 guaiFENesin-codeine (ROBITUSSIN AC) 100-10 MG/5ML solution     Fluids/Rest, f/u if worse/not any better

## 2019-01-18 ENCOUNTER — OFFICE VISIT (OUTPATIENT)
Dept: URGENT CARE | Facility: URGENT CARE | Age: 84
End: 2019-01-18
Payer: COMMERCIAL

## 2019-01-18 VITALS
DIASTOLIC BLOOD PRESSURE: 88 MMHG | HEART RATE: 81 BPM | TEMPERATURE: 97.8 F | WEIGHT: 193.06 LBS | BODY MASS INDEX: 27.7 KG/M2 | OXYGEN SATURATION: 97 % | SYSTOLIC BLOOD PRESSURE: 142 MMHG

## 2019-01-18 DIAGNOSIS — R05.9 COUGH: Primary | ICD-10-CM

## 2019-01-18 PROCEDURE — 99214 OFFICE O/P EST MOD 30 MIN: CPT | Performed by: FAMILY MEDICINE

## 2019-01-18 RX ORDER — AMOXICILLIN 875 MG
875 TABLET ORAL 2 TIMES DAILY
Qty: 20 TABLET | Refills: 0 | Status: SHIPPED | OUTPATIENT
Start: 2019-01-18 | End: 2019-04-19

## 2019-01-18 RX ORDER — PREDNISONE 20 MG/1
20 TABLET ORAL DAILY
Qty: 5 TABLET | Refills: 0 | Status: SHIPPED | OUTPATIENT
Start: 2019-01-18 | End: 2019-04-19

## 2019-01-18 ASSESSMENT — ENCOUNTER SYMPTOMS
FATIGUE: 1
SINUS PAIN: 1
SINUS PRESSURE: 1
COUGH: 1

## 2019-01-18 NOTE — PROGRESS NOTES
SUBJECTIVE:   Tucker Slater is a 87 year old male presenting with a chief complaint of   Chief Complaint   Patient presents with     Cough     cough, running nose and no energy for a few days.    Previously seen given cough medication.    His cough continues.  However he was offered antibiotics on his recent visit and he decided that he would like to try without.    His cough is much worse and he is having pains at the epigastric area with his cough.  Productive at times of discolored sputum in addition he has had discolored drainage in his nose.  He is an established patient of [facility].    URI Adult    Onset of symptoms was 10 day(s) ago.  Course of illness is worsening.    Severity moderate  Current and Associated symptoms: cough - productive  Treatment measures tried include Tylenol/Ibuprofen.  Predisposing factors include None.        Review of Systems   Constitutional: Positive for fatigue.   HENT: Positive for sinus pressure and sinus pain.    Respiratory: Positive for cough.    All other systems reviewed and are negative.      Past Medical History:   Diagnosis Date     Arrhythmia     A Fib     Balance problem     related to neuropathy in feet     Bradycardia      Carcinoma in situ of bladder 2000    bladder cancer ;; follow w Urology w periodic cysto      Essential hypertension, benign      Generalized osteoarthrosis, unspecified site knees    s/p R total knee 8/09 ; will do L 6/10      Numbness and tingling     toes and fingers     Pacemaker     [employer]      Pain in joint, shoulder region     L shoulder rotater tear; no surgery      Persistent atrial fibrillation (H) 1/30/15     Prostate CA (H) 2008-9    radiation     Prostate cancer (H) 11/08    completed RT 3/09     Renal disease     elevated creatinine     Shoulder impingement     R shoulder impingement etc see MRI 4/09      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 5 tablet 0     ACETAMINOPHEN PO Take 650 mg by mouth every 4 hours as needed for pain       Ascorbic Acid (VITAMIN C PO) Take 500 mg by mouth daily        calcium carbonate (OS-KARLA 500 MG [place]. [place]) 500 MG tablet Take 500 mg by mouth daily        carvedilol (COREG) 25 MG tablet Take 1.5 tablets (37.5 mg) by mouth 2 times daily (with meals) 270 tablet 3     guaiFENesin-codeine (ROBITUSSIN AC) 100-10 MG/5ML solution 1-2 tsp po q 4-6hrs prn cough 120 mL 0     JANTOVEN 2.5 MG tablet TAKE ONE TABLET BY MOUTH EVERY DAY, EXCEPT TAKE ONE-HALF TABLET BY MOUTH ON , OR AS INSTRUCTED BASED ON INR RESULTS 90 tablet 0     losartan (COZAAR) 100 MG tablet Take 1 tablet (100 mg) by mouth daily 90 tablet 1     Multiple Vitamins-Minerals (OCUVITE PO) Take 1 tablet by mouth daily        order for DME Oxygen 2 Li/min  at night 1 Device 0     order for DME Oxygen 2 Li/min  at night with CPAP  SPO2 less than or equal to oxygen saturation 89% on effective CPAP in patient with known cardiomyopathy 1 Device 0     triamterene-hydrochlorothiazide (MAXZIDE-25) 37.5-25 MG per tablet Take 1 tablet by mouth daily 60 tablet 3     vitamin B complex with vitamin C (VITAMIN  B COMPLEX) TABS tablet Take 1 tablet by mouth daily       VITAMIN D, CHOLECALCIFEROL, PO Take 2,000 Units by mouth daily.       Social History     Tobacco Use     Smoking status: Former Smoker     Last attempt to quit: 1977     Years since quittin.3     Smokeless tobacco: Never Used   Substance  Use Topics     Alcohol use: No     Alcohol/week: 0.0 oz       OBJECTIVE  /88   Pulse 81   Temp 97.8  F (36.6  C) (Oral)   Wt 87.6 kg (193 lb 1 oz)   SpO2 97%   BMI 27.70 kg/m      Physical Exam   Constitutional: He is oriented to person, place, and time. He appears well-developed.   HENT:   Head: Normocephalic.   Inferior turbinates are enlarged   Eyes: EOM are normal. Pupils are equal, round, and reactive to light.   Neck: Normal range of motion. Neck supple.   Cardiovascular: Normal rate and regular rhythm.   Pulmonary/Chest: Effort normal and breath sounds normal.   Musculoskeletal: Normal range of motion.   Neurological: He is alert and oriented to person, place, and time.   Psychiatric: He has a normal mood and affect.   Nursing note and vitals reviewed.      Labs:  No results found for this or any previous visit (from the past 24 hour(s)).    X-Ray was not done.    ASSESSMENT:      ICD-10-CM    1. Cough R05 predniSONE (DELTASONE) 20 MG tablet     amoxicillin (AMOXIL) 875 MG tablet    2.  Sinusitis  3.  Bronchitis    Medical Decision Making:    Differential Diagnosis:  URI Adult/Peds:  Acute right otitis media, Acute left otitis media, Bronchitis-viral, Croup, Pneumonia, Sinusitis, Viral pharyngitis, Viral syndrome and Viral upper respiratory illness    Serious Comorbid Conditions:  Adult:  None    PLAN:    URI Adult:  Tylenol and Ibuprofen.    Followup:    If in the next 48-72 hours is not improved I would have him seen again    If not improving or if condition worsens, follow up with your Primary Care Provider    There are no Patient Instructions on file for this visit.

## 2019-01-24 ENCOUNTER — DOCUMENTATION ONLY (OUTPATIENT)
Dept: SLEEP MEDICINE | Facility: CLINIC | Age: 84
End: 2019-01-24

## 2019-01-24 NOTE — PROGRESS NOTES
6 month [facility] Recheck Visit     Diagnostic AHI: 60.5    PSG    Message left for patient to return call     Assessment: Pt not meeting objective benchmarks for compliance     Action plan: waiting for patient to return call.   pt to follow up per provider request       Device type: Auto-CPAP  PAP settings:     CPAP fixed 5.0 cm  H20      Objective measures: 14 day rolling measures -last usage was on 9/15/2018        Objective measure goal  Compliance   Goal >70%  Leak   Goal < 24 lpm  AHI  Goal < 5  Usage  Goal >240

## 2019-01-28 NOTE — PROGRESS NOTES
Patient returned call.    He states that he still using the device. Angela is not calling in.  He has been ill and  that he had to take antibiotics.    He feels that his sleep is deeper and he is more rested.  No problems with pressure or mask discomfort.      Call transferred to [facility]  Due to insurance change.

## 2019-02-20 ENCOUNTER — OFFICE VISIT (OUTPATIENT)
Dept: CARDIOLOGY | Facility: CLINIC | Age: 84
End: 2019-02-20
Attending: NURSE PRACTITIONER
Payer: COMMERCIAL

## 2019-02-20 VITALS
WEIGHT: 192 LBS | OXYGEN SATURATION: 98 % | HEART RATE: 64 BPM | SYSTOLIC BLOOD PRESSURE: 136 MMHG | HEIGHT: 70 IN | BODY MASS INDEX: 27.49 KG/M2 | DIASTOLIC BLOOD PRESSURE: 78 MMHG

## 2019-02-20 DIAGNOSIS — I10 ESSENTIAL HYPERTENSION WITH GOAL BLOOD PRESSURE LESS THAN 140/90: ICD-10-CM

## 2019-02-20 DIAGNOSIS — I48.20 CHRONIC ATRIAL FIBRILLATION (H): Primary | ICD-10-CM

## 2019-02-20 DIAGNOSIS — I10 BENIGN ESSENTIAL HYPERTENSION: ICD-10-CM

## 2019-02-20 LAB
ANION GAP SERPL CALCULATED.3IONS-SCNC: 8 MMOL/L (ref 3–14)
BUN SERPL-MCNC: 32 MG/DL (ref 7–30)
CALCIUM SERPL-MCNC: 8.9 MG/DL (ref 8.5–10.1)
CHLORIDE SERPL-SCNC: 110 MMOL/L (ref 94–109)
CO2 SERPL-SCNC: 23 MMOL/L (ref 20–32)
CREAT SERPL-MCNC: 1.35 MG/DL (ref 0.66–1.25)
GFR SERPL CREATININE-BSD FRML MDRD: 47 ML/MIN/{1.73_M2}
GLUCOSE SERPL-MCNC: 90 MG/DL (ref 70–99)
POTASSIUM SERPL-SCNC: 4.1 MMOL/L (ref 3.4–5.3)
SODIUM SERPL-SCNC: 141 MMOL/L (ref 133–144)

## 2019-02-20 PROCEDURE — 36415 COLL VENOUS BLD VENIPUNCTURE: CPT | Performed by: NURSE PRACTITIONER

## 2019-02-20 PROCEDURE — 99214 OFFICE O/P EST MOD 30 MIN: CPT | Performed by: INTERNAL MEDICINE

## 2019-02-20 PROCEDURE — 80048 BASIC METABOLIC PNL TOTAL CA: CPT | Performed by: NURSE PRACTITIONER

## 2019-02-20 RX ORDER — LOSARTAN POTASSIUM 100 MG/1
100 TABLET ORAL DAILY
Qty: 90 TABLET | Refills: 3 | Status: ON HOLD | OUTPATIENT
Start: 2019-02-20 | End: 2019-06-05

## 2019-02-20 RX ORDER — TRIAMTERENE/HYDROCHLOROTHIAZID 37.5-25 MG
1 TABLET ORAL DAILY
Qty: 90 TABLET | Refills: 3 | Status: ON HOLD | OUTPATIENT
Start: 2019-02-20 | End: 2019-06-05

## 2019-02-20 ASSESSMENT — MIFFLIN-ST. JEOR: SCORE: 1552.16

## 2019-02-20 NOTE — PROGRESS NOTES
Service Date: 02/20/2019      HISTORY OF PRESENT ILLNESS:    It is my pleasure to see Mr. Tucker Slater, a delightful 87-year-old male with the following cardiac/medical issues:     1.  Permanent atrial fibrillation/atypical atrial flutter.  Associated with bradycardia.  Pacemaker implantation in 2015.  On warfarin for anticoagulation.   2.  Mild LV dysfunction by echocardiography in 2018, EF 45%-50%.  Possibly related to RV pacing.   3.  Hypertension.   4.  Obstructive sleep apnea.  On CPAP since 2018.      Mr. Slater is feeling well.  Janeth saw him 3 times in 2018 and adjusted his blood pressure meds.  He is currently doing well on carvedilol 37.5 mg b.i.d., Maxzide 37.5/25 mg daily and losartan 100 mg daily.      His INR has been monitored by Dr. Winslow's office.      Mr. Slater is modestly active.  He does not have symptoms to suggest angina.  He has no orthopnea, PND or lower extremity edema.      PHYSICAL EXAMINATION:   VITAL SIGNS:  Blood pressure 136/78, pulse 64 and regular (paced), weight 87 kg, height 177 cm.   GENERAL:  He is a very pleasant elderly male who is alert, oriented, in no distress.   HEENT:  Normocephalic.   NECK:  Supple with 2+ carotid pulses, no bruits.   LUNGS:  Clear.   CARDIOVASCULAR:  Normal JVP, regular paced rhythm, no apparent gallop or murmur.   ABDOMEN:  Mildly obese, soft, nontender.   EXTREMITIES:  Trace edema.      DIAGNOSTIC STUDIES:    Most recent pacemaker interrogation from 11/2018 showed normal function and estimated device longevity between 10 and 11 years.      IMPRESSION:   1.  Permanent atrial arrhythmia (mostly atypical atrial flutter).  Mr. Slater's pacemaker is functioning well.  He is on warfarin for anticoagulation.  His mild LV dysfunction may be related to RV pacing.  He does not have symptoms of CHF.  I did not make changes.   2.  Obstructive sleep apnea.  This was diagnosed in 2018.  The patient is compliant with his CPAP and tells me that he sleeps  longer and better with it.  His sleep has vastly improved with sleep apnea treatment.   3.  Hypertension under very good control.      RECOMMENDATIONS:   1.  Continue current cardiac medications.   2.  I requested a follow-up with Janeth in our [facility] in 1 year.  We would be happy to see him sooner if he has issues in the interim.      Time spent 25 minutes, greater than 50% of time was discussion.         HARVINDER MONROY MD, FACC         cc:     Kwadwo Winslow MD   [facility]   [address]   [place] [address]          D: 2019   T: 2019   MT: JACQUIE      Name:     ROSS LORENZANA   MRN:      [id number]        Account:      [id number]   :      [id number]           Service Date: 2019      Document: [id number]

## 2019-02-20 NOTE — LETTER
2/20/2019    Kwadwo Winslow MD  [address] [facility] [address]    RE: Tucker Slater       Dear Colleague,    I had the pleasure of seeing Tucker Slater in the [facility].    HPI and Plan:   See dictation    Orders Placed This Encounter   Procedures     Follow-Up with Cardiac Advanced Practice Provider       Orders Placed This Encounter   Medications     losartan (COZAAR) 100 MG tablet     Sig: Take 1 tablet (100 mg) by mouth daily     Dispense:  90 tablet     Refill:  3     triamterene-HCTZ (MAXZIDE-25) 37.5-25 MG tablet     Sig: Take 1 tablet by mouth daily     Dispense:  90 tablet     Refill:  3       Medications Discontinued During This Encounter   Medication Reason     losartan (COZAAR) 100 MG tablet Reorder     triamterene-hydrochlorothiazide (MAXZIDE-25) 37.5-25 MG per tablet Reorder         Encounter Diagnoses   Name Primary?     Benign essential hypertension Yes     Essential hypertension, benign      Essential hypertension with goal blood pressure less than 140/90        CURRENT MEDICATIONS:  Current Outpatient Medications   Medication Sig Dispense Refill     ACETAMINOPHEN PO Take 650 mg by mouth every 4 hours as needed for pain       Ascorbic Acid (VITAMIN C PO) Take 500 mg by mouth daily        calcium carbonate (OS-KARLA 500 MG [place]. [place]) 500 MG tablet Take 500 mg by mouth daily        carvedilol (COREG) 25 MG tablet Take 1.5 tablets (37.5 mg) by mouth 2 times daily (with meals) 270 tablet 3     JANTOVEN 2.5 MG tablet TAKE ONE TABLET BY MOUTH EVERY DAY, EXCEPT TAKE ONE-HALF TABLET BY MOUTH ON FRIDAYS, OR AS INSTRUCTED BASED ON INR RESULTS 90 tablet 0     losartan (COZAAR) 100 MG tablet Take 1 tablet (100 mg) by mouth daily 90 tablet 3     Multiple Vitamins-Minerals (OCUVITE PO) Take 1 tablet by mouth daily        order for DME Oxygen 2 Li/min  at night 1 Device 0     order for DME Oxygen 2 Li/min  at night with CPAP  SPO2 less than or equal to oxygen  saturation 89% on effective CPAP in patient with known cardiomyopathy 1 Device 0     triamterene-HCTZ (MAXZIDE-25) 37.5-25 MG tablet Take 1 tablet by mouth daily 90 tablet 3     vitamin B complex with vitamin C (VITAMIN  B COMPLEX) TABS tablet Take 1 tablet by mouth daily       VITAMIN D, CHOLECALCIFEROL, PO Take 2,000 Units by mouth daily.         ALLERGIES     Allergies   Allergen Reactions     Merbromin      Other reaction(s): Other, see comments  Severe reaction as child. Amalgam fillings OK.     Morphine Nausea and Vomiting     Amlodipine      Edema       PAST MEDICAL HISTORY:  Past Medical History:   Diagnosis Date     Arrhythmia     A Fib     Balance problem     related to neuropathy in feet     Bradycardia      Carcinoma in situ of bladder 2000    bladder cancer ;; follow w Urology w periodic cysto      Essential hypertension, benign      Generalized osteoarthrosis, unspecified site knees    s/p R total knee 8/09 ; will do L 6/10      Numbness and tingling     toes and fingers     Pacemaker     [employer]      Pain in joint, shoulder region     L shoulder rotater tear; no surgery      Persistent atrial fibrillation (H) 1/30/15     Prostate CA (H) 2008-9    radiation     Prostate cancer (H) 11/08    completed RT 3/09     Renal disease     elevated creatinine     Shoulder impingement     R shoulder impingement etc see MRI 4/09      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     toes both feet        PAST SURGICAL HISTORY:  Past Surgical History:   Procedure Laterality Date     ARTHROPLASTY HIP Right 3/27/2017    Procedure: ARTHROPLASTY HIP;  Surgeon: Jigar Wynn MD;  Location: [facility] OR     C NONSPECIFIC PROCEDURE      bilat inguinal hernia repairs ( 3total procedures)      C NONSPECIFIC PROCEDURE      pilonidal cyst     C NONSPECIFIC PROCEDURE      T + A     C NONSPECIFIC PROCEDURE  8/09     R+L total knee     CARDIOVERSION  3/26/15     COLONOSCOPY       IMPLANT PACEMAKER  3/26/15      RELEASE CARPAL TUNNEL Right 2017    Procedure: RELEASE CARPAL TUNNEL;  Right carpal tunnel release;  Surgeon: Alonso Barboza MD;  Location: [facility] OR       FAMILY HISTORY:  Family History   Problem Relation Age of Onset     Heart Disease Father          87yo     Coronary Artery Disease Father      Heart Disease Mother          76yo     Coronary Artery Disease Mother      Family History Negative Brother        SOCIAL HISTORY:  Social History     Socioeconomic History     Marital status:      Spouse name: Alba     Number of children: 3     Years of education: None     Highest education level: None   Occupational History     Employer: RETIRED     Comment:  w [employer]   Social Needs     Financial resource strain: None     Food insecurity:     Worry: None     Inability: None     Transportation needs:     Medical: None     Non-medical: None   Tobacco Use     Smoking status: Former Smoker     Last attempt to quit: 1977     Years since quittin.4     Smokeless tobacco: Never Used   Substance and Sexual Activity     Alcohol use: No     Alcohol/week: 0.0 oz     Drug use: No     Sexual activity: No     Partners: Female   Lifestyle     Physical activity:     Days per week: None     Minutes per session: None     Stress: None   Relationships     Social connections:     Talks on phone: None     Gets together: None     Attends Yarsanism service: None     Active member of club or organization: None     Attends meetings of clubs or organizations: None     Relationship status: None     Intimate partner violence:     Fear of current or ex partner: None     Emotionally abused: None     Physically abused: None     Forced sexual activity: None   Other Topics Concern     Parent/sibling w/ CABG, MI or angioplasty before 65F 55M? Not Asked      Service Not Asked     Blood Transfusions Not Asked     Caffeine Concern No     Comment: 4-5 cups per week      Occupational Exposure Not  Asked     Hobby Hazards Not Asked     Sleep Concern No     Stress Concern No     Weight Concern No     Special Diet No     Back Care Not Asked     Exercise No     Comment: yard work and moves around a lot     Bike Helmet Not Asked     Seat Belt Not Asked     Self-Exams Not Asked   Social History Narrative     None       Review of Systems:  Skin:  Negative       Eyes:  Negative      ENT:  Negative      Respiratory:  Negative       Cardiovascular:  Negative      Gastroenterology: Negative      Genitourinary:  Negative      Musculoskeletal:  Negative      Neurologic:  Negative      Psychiatric:  Negative      Heme/Lymph/Imm:  Negative      Endocrine:  Positive for diabetes      [id number]        Thank you for allowing me to participate in the care of your patient.      Sincerely,     Aleena Perez MD     [facility]    cc:   SELMA Landis CNP  [address]  [place] [id number]

## 2019-02-20 NOTE — LETTER
2/20/2019      Kwadwo Winslow MD  [address] [facility] [address]      RE: Tucker Slater       Dear Colleague,    I had the pleasure of seeing Tucker Slater in the [facility].    Service Date: 02/20/2019      HISTORY OF PRESENT ILLNESS:    It is my pleasure to see Mr. Tucker Slater, a delightful 87-year-old male with the following cardiac/medical issues:     1.  Permanent atrial fibrillation/atypical atrial flutter.  Associated with bradycardia.  Pacemaker implantation in 2015.  On warfarin for anticoagulation.   2.  Mild LV dysfunction by echocardiography in 2018, EF 45%-50%.  Possibly related to RV pacing.   3.  Hypertension.   4.  Obstructive sleep apnea.  On CPAP since 2018.      Mr. Slater is feeling well.  Janeth saw him 3 times in 2018 and adjusted his blood pressure meds.  He is currently doing well on carvedilol 37.5 mg b.i.d., Maxzide 37.5/25 mg daily and losartan 100 mg daily.      His INR has been monitored by Dr. Winslow's office.      Mr. Slater is modestly active.  He does not have symptoms to suggest angina.  He has no orthopnea, PND or lower extremity edema.      PHYSICAL EXAMINATION:   VITAL SIGNS:  Blood pressure 136/78, pulse 64 and regular (paced), weight 87 kg, height 177 cm.   GENERAL:  He is a very pleasant elderly male who is alert, oriented, in no distress.   HEENT:  Normocephalic.   NECK:  Supple with 2+ carotid pulses, no bruits.   LUNGS:  Clear.   CARDIOVASCULAR:  Normal JVP, regular paced rhythm, no apparent gallop or murmur.   ABDOMEN:  Mildly obese, soft, nontender.   EXTREMITIES:  Trace edema.      DIAGNOSTIC STUDIES:    Most recent pacemaker interrogation from 11/2018 showed normal function and estimated device longevity between 10 and 11 years.      IMPRESSION:   1.  Permanent atrial arrhythmia (mostly atypical atrial flutter).  Mr. Slater's pacemaker is functioning well.  He is on warfarin for anticoagulation.  His mild  LV dysfunction may be related to RV pacing.  He does not have symptoms of CHF.  I did not make changes.   2.  Obstructive sleep apnea.  This was diagnosed in 2018.  The patient is compliant with his CPAP and tells me that he sleeps longer and better with it.  His sleep has vastly improved with sleep apnea treatment.   3.  Hypertension under very good control.      RECOMMENDATIONS:   1.  Continue current cardiac medications.   2.  I requested a follow-up with Janeth in our [facility] in 1 year.  We would be happy to see him sooner if he has issues in the interim.      Time spent 25 minutes, greater than 50% of time was discussion.         HARVINDER MONROY MD, FACC         cc:     Kwadwo Winslow MD   [facility]   [address]   [place] [address]          D: 2019   T: 2019   MT: JACQUIE      Name:     ROSS LORENZANA   MRN:      [id number]        Account:      [id number]   :      1931           Service Date: 2019      Document: [id number]           Outpatient Encounter Medications as of 2019   Medication Sig Dispense Refill     ACETAMINOPHEN PO Take 650 mg by mouth every 4 hours as needed for pain       Ascorbic Acid (VITAMIN C PO) Take 500 mg by mouth daily        calcium carbonate (OS-KARLA 500 MG [place]. [place]) 500 MG tablet Take 500 mg by mouth daily        carvedilol (COREG) 25 MG tablet Take 1.5 tablets (37.5 mg) by mouth 2 times daily (with meals) 270 tablet 3     JANTOVEN 2.5 MG tablet TAKE ONE TABLET BY MOUTH EVERY DAY, EXCEPT TAKE ONE-HALF TABLET BY MOUTH ON , OR AS INSTRUCTED BASED ON INR RESULTS 90 tablet 0     losartan (COZAAR) 100 MG tablet Take 1 tablet (100 mg) by mouth daily 90 tablet 3     Multiple Vitamins-Minerals (OCUVITE PO) Take 1 tablet by mouth daily        order for DME Oxygen 2 Li/min  at night 1 Device 0     order for DME Oxygen 2 Li/min  at night with CPAP  SPO2 less than or equal to oxygen saturation 89% on effective CPAP  in patient with known cardiomyopathy 1 Device 0     triamterene-HCTZ (MAXZIDE-25) 37.5-25 MG tablet Take 1 tablet by mouth daily 90 tablet 3     vitamin B complex with vitamin C (VITAMIN  B COMPLEX) TABS tablet Take 1 tablet by mouth daily       VITAMIN D, CHOLECALCIFEROL, PO Take 2,000 Units by mouth daily.       [] amoxicillin (AMOXIL) 875 MG tablet Take 1 tablet (875 mg) by mouth 2 times daily for 10 days 20 tablet 0     [] guaiFENesin-codeine (ROBITUSSIN AC) 100-10 MG/5ML solution 1-2 tsp po q 4-6hrs prn cough 120 mL 0     [] predniSONE (DELTASONE) 20 MG tablet Take 20 mg by mouth daily for 5 days. 5 tablet 0     [DISCONTINUED] losartan (COZAAR) 100 MG tablet Take 1 tablet (100 mg) by mouth daily 90 tablet 1     [DISCONTINUED] triamterene-hydrochlorothiazide (MAXZIDE-25) 37.5-25 MG per tablet Take 1 tablet by mouth daily 60 tablet 3     No facility-administered encounter medications on file as of 2019.            Again, thank you for allowing me to participate in the care of your patient.      Sincerely,    Aleena Perez MD     [facility]

## 2019-02-20 NOTE — PROGRESS NOTES
HPI and Plan:   See dictation    Orders Placed This Encounter   Procedures     Follow-Up with Cardiac Advanced Practice Provider       Orders Placed This Encounter   Medications     losartan (COZAAR) 100 MG tablet     Sig: Take 1 tablet (100 mg) by mouth daily     Dispense:  90 tablet     Refill:  3     triamterene-HCTZ (MAXZIDE-25) 37.5-25 MG tablet     Sig: Take 1 tablet by mouth daily     Dispense:  90 tablet     Refill:  3       Medications Discontinued During This Encounter   Medication Reason     losartan (COZAAR) 100 MG tablet Reorder     triamterene-hydrochlorothiazide (MAXZIDE-25) 37.5-25 MG per tablet Reorder         Encounter Diagnoses   Name Primary?     Benign essential hypertension Yes     Essential hypertension, benign      Essential hypertension with goal blood pressure less than 140/90        CURRENT MEDICATIONS:  Current Outpatient Medications   Medication Sig Dispense Refill     ACETAMINOPHEN PO Take 650 mg by mouth every 4 hours as needed for pain       Ascorbic Acid (VITAMIN C PO) Take 500 mg by mouth daily        calcium carbonate (OS-KARLA 500 MG [place]. [place]) 500 MG tablet Take 500 mg by mouth daily        carvedilol (COREG) 25 MG tablet Take 1.5 tablets (37.5 mg) by mouth 2 times daily (with meals) 270 tablet 3     JANTOVEN 2.5 MG tablet TAKE ONE TABLET BY MOUTH EVERY DAY, EXCEPT TAKE ONE-HALF TABLET BY MOUTH ON FRIDAYS, OR AS INSTRUCTED BASED ON INR RESULTS 90 tablet 0     losartan (COZAAR) 100 MG tablet Take 1 tablet (100 mg) by mouth daily 90 tablet 3     Multiple Vitamins-Minerals (OCUVITE PO) Take 1 tablet by mouth daily        order for DME Oxygen 2 Li/min  at night 1 Device 0     order for DME Oxygen 2 Li/min  at night with CPAP  SPO2 less than or equal to oxygen saturation 89% on effective CPAP in patient with known cardiomyopathy 1 Device 0     triamterene-HCTZ (MAXZIDE-25) 37.5-25 MG tablet Take 1 tablet by mouth daily 90 tablet 3     vitamin B complex with vitamin C (VITAMIN  B COMPLEX)  TABS tablet Take 1 tablet by mouth daily       VITAMIN D, CHOLECALCIFEROL, PO Take 2,000 Units by mouth daily.         ALLERGIES     Allergies   Allergen Reactions     Merbromin      Other reaction(s): Other, see comments  Severe reaction as child. Amalgam fillings OK.     Morphine Nausea and Vomiting     Amlodipine      Edema       PAST MEDICAL HISTORY:  Past Medical History:   Diagnosis Date     Arrhythmia     A Fib     Balance problem     related to neuropathy in feet     Bradycardia      Carcinoma in situ of bladder 2000    bladder cancer ;; follow w Urology w periodic cysto      Essential hypertension, benign      Generalized osteoarthrosis, unspecified site knees    s/p R total knee 8/09 ; will do L 6/10      Numbness and tingling     toes and fingers     Pacemaker     [employer]      Pain in joint, shoulder region     L shoulder rotater tear; no surgery      Persistent atrial fibrillation (H) 1/30/15     Prostate CA (H) 2008-9    radiation     Prostate cancer (H) 11/08    completed RT 3/09     Renal disease     elevated creatinine     Shoulder impingement     R shoulder impingement etc see MRI 4/09      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     toes both feet        PAST SURGICAL HISTORY:  Past Surgical History:   Procedure Laterality Date     ARTHROPLASTY HIP Right 3/27/2017    Procedure: ARTHROPLASTY HIP;  Surgeon: Jigar Wynn MD;  Location: [facility] OR     C NONSPECIFIC PROCEDURE      bilat inguinal hernia repairs ( 3total procedures)      C NONSPECIFIC PROCEDURE      pilonidal cyst     C NONSPECIFIC PROCEDURE      T + A     C NONSPECIFIC PROCEDURE  8/09     R+L total knee     CARDIOVERSION  3/26/15     COLONOSCOPY       IMPLANT PACEMAKER  3/26/15     RELEASE CARPAL TUNNEL Right 4/19/2017    Procedure: RELEASE CARPAL TUNNEL;  Right carpal tunnel release;  Surgeon: Alonso Barboza MD;  Location: [facility] OR       FAMILY HISTORY:  Family History   Problem Relation Age of Onset      Heart Disease Father          89yo     Coronary Artery Disease Father      Heart Disease Mother          74yo     Coronary Artery Disease Mother      Family History Negative Brother        SOCIAL HISTORY:  Social History     Socioeconomic History     Marital status:      Spouse name: Alba     Number of children: 3     Years of education: None     Highest education level: None   Occupational History     Employer: RETIRED     Comment:  w [employer]   Social Needs     Financial resource strain: None     Food insecurity:     Worry: None     Inability: None     Transportation needs:     Medical: None     Non-medical: None   Tobacco Use     Smoking status: Former Smoker     Last attempt to quit: 1977     Years since quittin.4     Smokeless tobacco: Never Used   Substance and Sexual Activity     Alcohol use: No     Alcohol/week: 0.0 oz     Drug use: No     Sexual activity: No     Partners: Female   Lifestyle     Physical activity:     Days per week: None     Minutes per session: None     Stress: None   Relationships     Social connections:     Talks on phone: None     Gets together: None     Attends Restoration service: None     Active member of club or organization: None     Attends meetings of clubs or organizations: None     Relationship status: None     Intimate partner violence:     Fear of current or ex partner: None     Emotionally abused: None     Physically abused: None     Forced sexual activity: None   Other Topics Concern     Parent/sibling w/ CABG, MI or angioplasty before 65F 55M? Not Asked      Service Not Asked     Blood Transfusions Not Asked     Caffeine Concern No     Comment: 4-5 cups per week      Occupational Exposure Not Asked     Hobby Hazards Not Asked     Sleep Concern No     Stress Concern No     Weight Concern No     Special Diet No     Back Care Not Asked     Exercise No     Comment: yard work and moves around a lot     Bike Helmet Not Asked      Seat Belt Not Asked     Self-Exams Not Asked   Social History Narrative     None       Review of Systems:  Skin:  Negative       Eyes:  Negative      ENT:  Negative      Respiratory:  Negative       Cardiovascular:  Negative      Gastroenterology: Negative      Genitourinary:  Negative      Musculoskeletal:  Negative      Neurologic:  Negative      Psychiatric:  Negative      Heme/Lymph/Imm:  Negative      Endocrine:  Positive for diabetes      [id number]

## 2019-02-21 ENCOUNTER — ANTICOAGULATION THERAPY VISIT (OUTPATIENT)
Dept: ANTICOAGULATION | Facility: CLINIC | Age: 84
End: 2019-02-21
Payer: COMMERCIAL

## 2019-02-21 DIAGNOSIS — Z79.01 LONG TERM CURRENT USE OF ANTICOAGULANTS WITH INR GOAL OF 2.0-3.0: ICD-10-CM

## 2019-02-21 LAB — INR POINT OF CARE: 2.2 (ref 0.86–1.14)

## 2019-02-21 PROCEDURE — 36416 COLLJ CAPILLARY BLOOD SPEC: CPT

## 2019-02-21 PROCEDURE — 99207 ZZC NO CHARGE NURSE ONLY: CPT

## 2019-02-21 PROCEDURE — 85610 PROTHROMBIN TIME: CPT | Mod: QW

## 2019-02-21 NOTE — PROGRESS NOTES
ANTICOAGULATION FOLLOW-UP CLINIC VISIT    Patient Name:  Tucker Slater  Date:  2019  Contact Type:  Face to Face    SUBJECTIVE:     Patient Findings     Positives:   No Problem Findings    Comments:   Pt denies any changes or missed warfarin doses since last INR visit. Pt denies any bleeding or bruising problems.              OBJECTIVE    INR Protime   Date Value Ref Range Status   2019 2.2 (A) 0.86 - 1.14 Final       ASSESSMENT / PLAN  INR assessment THER    Recheck INR In: 6 WEEKS    INR Location Clinic      Anticoagulation Summary  As of 2019    INR goal:   2.0-3.0   TTR:   74.0 % (2 y)   INR used for dosin.2 (2019)   Warfarin maintenance plan:   1.25 mg (2.5 mg x 0.5) every Fri; 2.5 mg (2.5 mg x 1) all other days   Full warfarin instructions:   1.25 mg every Fri; 2.5 mg all other days   Weekly warfarin total:   16.25 mg   No change documented:   Gail Valdovinos RN   Plan last modified:   Ivory Machado RN (2017)   Next INR check:   2019   Priority:   INR   Target end date:       Indications    Atrial fibrillation (H) with mitral regurgitation and congestive heart failure [I48.91]  Long term current use of anticoagulants with INR goal of 2.0-3.0 [Z79.01]             Anticoagulation Episode Summary     INR check location:       Preferred lab:       Send INR reminders to:   [facility]    Comments:         Anticoagulation Care Providers     Provider Role Specialty Phone number    Kwadwo Winslow MD [facility] Internal Medicine [phone]            See the Encounter Report to view Anticoagulation Flowsheet and Dosing Calendar (Go to Encounters tab in chart review, and find the Anticoagulation Therapy Visit)    Dosage adjustment made based on physician directed care plan.    Gail Valdovinos, RN

## 2019-03-05 ENCOUNTER — ANTICOAGULATION THERAPY VISIT (OUTPATIENT)
Dept: ANTICOAGULATION | Facility: CLINIC | Age: 84
End: 2019-03-05
Payer: COMMERCIAL

## 2019-03-05 DIAGNOSIS — Z79.01 LONG TERM CURRENT USE OF ANTICOAGULANTS WITH INR GOAL OF 2.0-3.0: ICD-10-CM

## 2019-03-05 LAB — INR POINT OF CARE: 2.8 (ref 0.86–1.14)

## 2019-03-05 PROCEDURE — 85610 PROTHROMBIN TIME: CPT | Mod: QW

## 2019-03-05 PROCEDURE — 99207 ZZC NO CHARGE NURSE ONLY: CPT

## 2019-03-05 PROCEDURE — 36416 COLLJ CAPILLARY BLOOD SPEC: CPT

## 2019-03-05 NOTE — PROGRESS NOTES
ANTICOAGULATION FOLLOW-UP CLINIC VISIT    Patient Name:  Tucker Slater  Date:  3/5/2019  Contact Type:  Face to Face    SUBJECTIVE:     Patient Findings     Positives:   Change in diet/appetite (Pt reports may have had fewer green veggies. ), Bruising (Pt made INR appt today because he has had a bruise on right bicep area since yester day morning when he woke up. Pt reports had been doing more lifting of things than usual.and also woke up laying on right arm. Pt advied to watch bruise..)    Comments:   Pt denies any changes or missed warfarin doses since last INR visit. Pt denies any bleeding problems. Bruise on right bicep area, pt states bruise has moved down arm a little. Pt reports arm is soft and non-tender. Pt advised bruise will probably continue to move down his arm and to call INR clinic if pt has any pain or worsening symptoms or if pt has any questions.              OBJECTIVE    INR Protime   Date Value Ref Range Status   2019 2.8 (A) 0.86 - 1.14 Final       ASSESSMENT / PLAN  INR assessment THER    Recheck INR In: 4 WEEKS    INR Location Clinic      Anticoagulation Summary  As of 3/5/2019    INR goal:   2.0-3.0   TTR:   74.4 % (2 y)   INR used for dosin.8 (3/5/2019)   Warfarin maintenance plan:   1.25 mg (2.5 mg x 0.5) every Fri; 2.5 mg (2.5 mg x 1) all other days   Full warfarin instructions:   3/5: 1.25 mg; Otherwise 1.25 mg every Fri; 2.5 mg all other days   Weekly warfarin total:   16.25 mg   Plan last modified:   Ivory Machado RN (2017)   Next INR check:   2019   Priority:   INR   Target end date:       Indications    Atrial fibrillation (H) with mitral regurgitation and congestive heart failure [I48.91]  Long term current use of anticoagulants with INR goal of 2.0-3.0 [Z79.01]             Anticoagulation Episode Summary     INR check location:       Preferred lab:       Send INR reminders to:   [facility]    Comments:         Anticoagulation Care Providers     Provider Role  Specialty Phone number    Kwadwo Winslow MD [facility] Internal Medicine [phone]            See the Encounter Report to view Anticoagulation Flowsheet and Dosing Calendar (Go to Encounters tab in chart review, and find the Anticoagulation Therapy Visit)    Dosage adjustment made based on physician directed care plan.    Gail Valdovinos RN

## 2019-03-06 ENCOUNTER — ANCILLARY PROCEDURE (OUTPATIENT)
Dept: CARDIOLOGY | Facility: CLINIC | Age: 84
End: 2019-03-06
Attending: INTERNAL MEDICINE
Payer: COMMERCIAL

## 2019-03-06 DIAGNOSIS — Z95.0 CARDIAC PACEMAKER IN SITU: ICD-10-CM

## 2019-03-06 PROCEDURE — 93280 PM DEVICE PROGR EVAL DUAL: CPT | Performed by: INTERNAL MEDICINE

## 2019-03-12 DIAGNOSIS — I48.20 CHRONIC ATRIAL FIBRILLATION (H): ICD-10-CM

## 2019-03-12 RX ORDER — WARFARIN SODIUM 2.5 MG/1
TABLET ORAL
Qty: 30 TABLET | Refills: 0 | Status: SHIPPED | OUTPATIENT
Start: 2019-03-12 | End: 2019-04-19

## 2019-03-12 NOTE — TELEPHONE ENCOUNTER
"Rx sent for 30 days, pt needs to be seen by PCP, last appt was 12/29/17    Warfarin 2.5 mg      Last Written Prescription Date:  11/201/18  Last Fill Quantity: 90,   # refills: 0  Last Office Visit: 12/29/17  Future Office visit:       Requested Prescriptions   Pending Prescriptions Disp Refills     JANTOVEN 2.5 MG tablet [Pharmacy Med Name: JANTOVEN 2.5MG TABS] 90 tablet 0     Sig: TAKE ONE TABLET BY MOUTH EVERY DAY, EXCEPT TAKE ONE-HALF TABLET BY MOUTH ON FRIDAYS, OR AS INSTRUCTED BASED ON INR RESULTS    Vitamin K Antagonists Failed - 3/12/2019  2:26 PM       Failed - INR is within goal in the past 6 weeks    Confirm INR is within goal in the past 6 weeks.     Recent Labs   Lab Test 03/05/19   INR 2.8*                      Passed - Recent (12 mo) or future (30 days) visit within the authorizing provider's specialty    Patient had office visit in the last 12 months or has a visit in the next 30 days with authorizing provider or within the authorizing provider's specialty.  See \"Patient Info\" tab in inbasket, or \"Choose Columns\" in Meds & Orders section of the refill encounter.             Passed - Medication is active on med list       Passed - Patient is 18 years of age or older        Prescription approved per [facility] Refill Protocol.  Gail Valdovinos RN    "

## 2019-03-13 LAB
MDC_IDC_LEAD_IMPLANT_DT: NORMAL
MDC_IDC_LEAD_IMPLANT_DT: NORMAL
MDC_IDC_LEAD_LOCATION: NORMAL
MDC_IDC_LEAD_LOCATION: NORMAL
MDC_IDC_LEAD_LOCATION_DETAIL_1: NORMAL
MDC_IDC_LEAD_LOCATION_DETAIL_1: NORMAL
MDC_IDC_LEAD_MFG: NORMAL
MDC_IDC_LEAD_MFG: NORMAL
MDC_IDC_LEAD_MODEL: NORMAL
MDC_IDC_LEAD_MODEL: NORMAL
MDC_IDC_LEAD_POLARITY_TYPE: NORMAL
MDC_IDC_LEAD_POLARITY_TYPE: NORMAL
MDC_IDC_LEAD_SERIAL: NORMAL
MDC_IDC_LEAD_SERIAL: NORMAL
MDC_IDC_MSMT_BATTERY_REMAINING_LONGEVITY: 128 MO
MDC_IDC_MSMT_BATTERY_STATUS: NORMAL
MDC_IDC_MSMT_BATTERY_VOLTAGE: 2.98 V
MDC_IDC_MSMT_LEADCHNL_RA_PACING_THRESHOLD_AMPLITUDE: 0.75 V
MDC_IDC_MSMT_LEADCHNL_RA_PACING_THRESHOLD_PULSEWIDTH: 0.5 MS
MDC_IDC_MSMT_LEADCHNL_RA_SENSING_INTR_AMPL: 3.7 MV
MDC_IDC_MSMT_LEADCHNL_RV_IMPEDANCE_VALUE: 425 OHM
MDC_IDC_MSMT_LEADCHNL_RV_PACING_THRESHOLD_AMPLITUDE: 0.62 V
MDC_IDC_MSMT_LEADCHNL_RV_PACING_THRESHOLD_PULSEWIDTH: 0.5 MS
MDC_IDC_PG_IMPLANT_DTM: NORMAL
MDC_IDC_PG_MFG: NORMAL
MDC_IDC_PG_MODEL: NORMAL
MDC_IDC_PG_SERIAL: NORMAL
MDC_IDC_PG_TYPE: NORMAL
MDC_IDC_SESS_CLINIC_NAME: NORMAL
MDC_IDC_SESS_DTM: NORMAL
MDC_IDC_SESS_TYPE: NORMAL
MDC_IDC_SET_BRADY_HYSTRATE: 60 {BEATS}/MIN
MDC_IDC_SET_BRADY_LOWRATE: 70 {BEATS}/MIN
MDC_IDC_SET_BRADY_MAX_SENSOR_RATE: 120 {BEATS}/MIN
MDC_IDC_SET_BRADY_MODE: NORMAL
MDC_IDC_SET_BRADY_NIGHT_RATE: NORMAL
MDC_IDC_SET_LEADCHNL_RA_PACING_ANODE_ELECTRODE_1: NORMAL
MDC_IDC_SET_LEADCHNL_RA_PACING_ANODE_LOCATION_1: NORMAL
MDC_IDC_SET_LEADCHNL_RA_PACING_CATHODE_ELECTRODE_1: NORMAL
MDC_IDC_SET_LEADCHNL_RA_PACING_CATHODE_LOCATION_1: NORMAL
MDC_IDC_SET_LEADCHNL_RA_PACING_POLARITY: NORMAL
MDC_IDC_SET_LEADCHNL_RA_SENSING_ANODE_ELECTRODE_1: NORMAL
MDC_IDC_SET_LEADCHNL_RA_SENSING_ANODE_LOCATION_1: NORMAL
MDC_IDC_SET_LEADCHNL_RA_SENSING_CATHODE_ELECTRODE_1: NORMAL
MDC_IDC_SET_LEADCHNL_RA_SENSING_CATHODE_LOCATION_1: NORMAL
MDC_IDC_SET_LEADCHNL_RA_SENSING_POLARITY: NORMAL
MDC_IDC_SET_LEADCHNL_RV_PACING_AMPLITUDE: 0.88
MDC_IDC_SET_LEADCHNL_RV_PACING_ANODE_ELECTRODE_1: NORMAL
MDC_IDC_SET_LEADCHNL_RV_PACING_ANODE_LOCATION_1: NORMAL
MDC_IDC_SET_LEADCHNL_RV_PACING_CAPTURE_MODE: NORMAL
MDC_IDC_SET_LEADCHNL_RV_PACING_CATHODE_ELECTRODE_1: NORMAL
MDC_IDC_SET_LEADCHNL_RV_PACING_CATHODE_LOCATION_1: NORMAL
MDC_IDC_SET_LEADCHNL_RV_PACING_POLARITY: NORMAL
MDC_IDC_SET_LEADCHNL_RV_PACING_PULSEWIDTH: 0.5 MS
MDC_IDC_SET_LEADCHNL_RV_SENSING_ANODE_ELECTRODE_1: NORMAL
MDC_IDC_SET_LEADCHNL_RV_SENSING_ANODE_LOCATION_1: NORMAL
MDC_IDC_SET_LEADCHNL_RV_SENSING_CATHODE_ELECTRODE_1: NORMAL
MDC_IDC_SET_LEADCHNL_RV_SENSING_CATHODE_LOCATION_1: NORMAL
MDC_IDC_SET_LEADCHNL_RV_SENSING_POLARITY: NORMAL
MDC_IDC_SET_LEADCHNL_RV_SENSING_SENSITIVITY: 2 MV
MDC_IDC_STAT_AT_MODE_SW_COUNT: 0
MDC_IDC_STAT_BRADY_RA_PERCENT_PACED: 0 %
MDC_IDC_STAT_BRADY_RV_PERCENT_PACED: 94 %

## 2019-03-29 ENCOUNTER — ANTICOAGULATION THERAPY VISIT (OUTPATIENT)
Dept: ANTICOAGULATION | Facility: CLINIC | Age: 84
End: 2019-03-29
Payer: COMMERCIAL

## 2019-03-29 DIAGNOSIS — Z79.01 LONG TERM CURRENT USE OF ANTICOAGULANTS WITH INR GOAL OF 2.0-3.0: ICD-10-CM

## 2019-03-29 DIAGNOSIS — I48.91 ATRIAL FIBRILLATION (H): ICD-10-CM

## 2019-03-29 LAB — INR POINT OF CARE: 3.3 (ref 0.86–1.14)

## 2019-03-29 PROCEDURE — 85610 PROTHROMBIN TIME: CPT | Mod: QW

## 2019-03-29 PROCEDURE — 99207 ZZC NO CHARGE NURSE ONLY: CPT

## 2019-03-29 PROCEDURE — 36416 COLLJ CAPILLARY BLOOD SPEC: CPT

## 2019-03-29 NOTE — PROGRESS NOTES
ANTICOAGULATION FOLLOW-UP CLINIC VISIT    Patient Name:  Tucker Slater  Date:  3/29/2019  Contact Type:  Face to Face    SUBJECTIVE:     Patient Findings     Positives:   Bruising    Comments:   Patient has a large dark purple bruise on his right shoulder and bicep area.  He had a similar bruise about 3 weeks ago when he was here for his last INR.  Previous bruise did clear up, and returned about 3 days ago.  Rates pain at a 1 out of 10.  No bumps felt on exam.  Bruising is turning a yellow color at the shoulder area and dark purple near the elbow.  Patient does not remember bumping that area or over using the arm recently.  Advised patient to go to  if pain becomes worse or bruising continues to spread.  INR was supratherapeutic today so dose was lowered to try to keep INR closer to 2.0.  Patient has an appointment with MD on 4/3/19.  The patient was assessed for diet, medication, and activity level changes, missed or extra doses, with no problem findings.             OBJECTIVE    INR Protime   Date Value Ref Range Status   03/29/2019 3.3 (A) 0.86 - 1.14 Final       ASSESSMENT / PLAN  INR assessment SUPRA    Recheck INR In: 5 DAYS    INR Location Clinic      Anticoagulation Summary  As of 3/29/2019    INR goal:   2.0-3.0   TTR:   73.3 % (2.1 y)   INR used for dosing:   3.3! (3/29/2019)   Warfarin maintenance plan:   1.25 mg (2.5 mg x 0.5) every Fri; 2.5 mg (2.5 mg x 1) all other days   Full warfarin instructions:   3/29: Hold; 3/30: 1.25 mg; Otherwise 1.25 mg every Fri; 2.5 mg all other days   Weekly warfarin total:   16.25 mg   Plan last modified:   Ivory Machado RN (7/7/2017)   Next INR check:   4/3/2019   Priority:   INR   Target end date:       Indications    Atrial fibrillation (H) with mitral regurgitation and congestive heart failure [I48.91]  Long term current use of anticoagulants with INR goal of 2.0-3.0 [Z79.01]             Anticoagulation Episode Summary     INR check location:       Preferred  lab:       Send INR reminders to:   [facility]    Comments:         Anticoagulation Care Providers     Provider Role Specialty Phone number    Kwadwo Winslow MD [facility] Internal Medicine [phone]            See the Encounter Report to view Anticoagulation Flowsheet and Dosing Calendar (Go to Encounters tab in chart review, and find the Anticoagulation Therapy Visit)    Dosage adjustment made based on physician directed care plan.    Ivory Machado RN

## 2019-04-03 ENCOUNTER — ANTICOAGULATION THERAPY VISIT (OUTPATIENT)
Dept: ANTICOAGULATION | Facility: CLINIC | Age: 84
End: 2019-04-03
Payer: COMMERCIAL

## 2019-04-03 ENCOUNTER — ANCILLARY PROCEDURE (OUTPATIENT)
Dept: GENERAL RADIOLOGY | Facility: CLINIC | Age: 84
End: 2019-04-03
Attending: FAMILY MEDICINE
Payer: COMMERCIAL

## 2019-04-03 ENCOUNTER — OFFICE VISIT (OUTPATIENT)
Dept: INTERNAL MEDICINE | Facility: CLINIC | Age: 84
End: 2019-04-03
Payer: COMMERCIAL

## 2019-04-03 VITALS
SYSTOLIC BLOOD PRESSURE: 148 MMHG | TEMPERATURE: 98.3 F | BODY MASS INDEX: 27.99 KG/M2 | RESPIRATION RATE: 14 BRPM | HEIGHT: 70 IN | HEART RATE: 69 BPM | DIASTOLIC BLOOD PRESSURE: 71 MMHG | OXYGEN SATURATION: 98 % | WEIGHT: 195.5 LBS

## 2019-04-03 DIAGNOSIS — M25.511 ACUTE PAIN OF RIGHT SHOULDER: Primary | ICD-10-CM

## 2019-04-03 DIAGNOSIS — Z79.01 LONG TERM CURRENT USE OF ANTICOAGULANTS WITH INR GOAL OF 2.0-3.0: ICD-10-CM

## 2019-04-03 DIAGNOSIS — I48.91 ATRIAL FIBRILLATION (H): ICD-10-CM

## 2019-04-03 DIAGNOSIS — S40.011A TRAUMATIC HEMATOMA OF RIGHT SHOULDER, INITIAL ENCOUNTER: ICD-10-CM

## 2019-04-03 DIAGNOSIS — M25.511 ACUTE PAIN OF RIGHT SHOULDER: ICD-10-CM

## 2019-04-03 LAB — INR POINT OF CARE: 2.4 (ref 0.86–1.14)

## 2019-04-03 PROCEDURE — 85610 PROTHROMBIN TIME: CPT | Mod: QW

## 2019-04-03 PROCEDURE — 36416 COLLJ CAPILLARY BLOOD SPEC: CPT

## 2019-04-03 PROCEDURE — 73030 X-RAY EXAM OF SHOULDER: CPT | Mod: RT

## 2019-04-03 PROCEDURE — 99207 ZZC NO CHARGE NURSE ONLY: CPT

## 2019-04-03 PROCEDURE — 99213 OFFICE O/P EST LOW 20 MIN: CPT | Performed by: FAMILY MEDICINE

## 2019-04-03 ASSESSMENT — MIFFLIN-ST. JEOR: SCORE: 1563.03

## 2019-04-03 NOTE — PROGRESS NOTES
ANTICOAGULATION FOLLOW-UP CLINIC VISIT    Patient Name:  Tucker Slater  Date:  4/3/2019  Contact Type:  Face to Face    SUBJECTIVE:     Patient Findings     Positives:   Bruising (Bruising on right arm.  This is slowly improving.  Seeing MD today to have bruise evaluated.  Will try to keep INR closer to 2.0 since jessica has had two large bruises in the last month.)           OBJECTIVE    INR Protime   Date Value Ref Range Status   2019 2.4 (A) 0.86 - 1.14 Final       ASSESSMENT / PLAN  INR assessment THER    Recheck INR In: 2 WEEKS    INR Location Clinic      Anticoagulation Summary  As of 4/3/2019    INR goal:   2.0-3.0   TTR:   73.2 % (2.1 y)   INR used for dosin.4 (4/3/2019)   Warfarin maintenance plan:   1.25 mg (2.5 mg x 0.5) every Fri; 2.5 mg (2.5 mg x 1) all other days   Full warfarin instructions:   : 1.25 mg; : 1.25 mg; Otherwise 1.25 mg every Fri; 2.5 mg all other days   Weekly warfarin total:   16.25 mg   Plan last modified:   Ivory Machado RN (2017)   Next INR check:   2019   Priority:   INR   Target end date:       Indications    Atrial fibrillation (H) with mitral regurgitation and congestive heart failure [I48.91]  Long term current use of anticoagulants with INR goal of 2.0-3.0 [Z79.01]             Anticoagulation Episode Summary     INR check location:       Preferred lab:       Send INR reminders to:   [facility]    Comments:         Anticoagulation Care Providers     Provider Role Specialty Phone number    Kwadwo Winslow MD [facility] Internal Medicine [phone]            See the Encounter Report to view Anticoagulation Flowsheet and Dosing Calendar (Go to Encounters tab in chart review, and find the Anticoagulation Therapy Visit)    Dosage adjustment made based on physician directed care plan.    Ivory Machado RN

## 2019-04-03 NOTE — PROGRESS NOTES
CHIEF COMPLAINT    Swelling, bruising R shoulder      HISTORY    Patient describes pain and decreased ROM R shoulder going back as much as 10 yr when pulling branches.    About a week ago while sweeping floor developed discomfort and swelling R shoulder. Ecchymosis noted. Not especially tender.    Has h/o arthritis.    Takes warfarin for a fib.      Patient Active Problem List   Diagnosis     Essential hypertension with goal blood pressure less than 140/90     Carcinoma in situ of bladder     Hypertrophy of prostate with urinary obstruction     Generalized osteoarthrosis, unspecified site     Hereditary and idiopathic peripheral neuropathy     Pain in joint, shoulder region     CARDIOVASCULAR SCREENING; LDL GOAL LESS THAN 130     Advanced directives, counseling/discussion     Peripheral neuropathy     GI bleed     Anemia     Near syncope     Anemia due to blood loss, acute     Atrial fibrillation (H) with mitral regurgitation and congestive heart failure     Bradycardia     CKD (chronic kidney disease) stage 3, GFR 30-59 ml/min (H)     Cardiac pacemaker in situ     Long-term (current) use of anticoagulants [Z79.01]     Degenerative arthritis of hip     Malignant neoplasm of other specified sites of bladder     Dysplasia of prostate     Long term current use of anticoagulants with INR goal of 2.0-3.0       REVIEW OF SYSTEMS    No fever  No breathing problems  No CP  No nausea or abd pain      Past Medical History:   Diagnosis Date     Arrhythmia     A Fib     Balance problem     related to neuropathy in feet     Bradycardia      Carcinoma in situ of bladder 2000    bladder cancer ;; follow w Urology w periodic cysto      Essential hypertension, benign      Generalized osteoarthrosis, unspecified site knees    s/p R total knee 8/09 ; will do L 6/10      Numbness and tingling     toes and fingers     Pacemaker     [employer]      Pain in joint, shoulder region     L shoulder rotater tear; no surgery      Persistent  "atrial fibrillation (H) 1/30/15     Prostate CA (H) 2008-9    radiation     Prostate cancer (H) 11/08    completed RT 3/09     Renal disease     elevated creatinine     Shoulder impingement     R shoulder impingement etc see MRI 4/09      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     toes both feet        EXAM  /71 (BP Location: Right arm, Patient Position: Sitting, Cuff Size: Adult Regular)   Pulse 69   Temp 98.3  F (36.8  C) (Oral)   Resp 14   Ht 1.778 m (5' 10\")   Wt 88.7 kg (195 lb 8 oz)   SpO2 98%   BMI 28.05 kg/m      87 y/o, NAD  Neck non tender  Chest non labored breathing  CV RSR, occ premature  R shoulder: swelling, ecchymosis anterior shoulder, R chest, prox R arm, crepitus, active flexion and abduction about 30 degree.      X ray:  SHOULDER RIGHT THREE OR MORE VIEWS    4/3/2019 4:26 PM      HISTORY: Acute pain of right shoulder     COMPARISON: None.                                                                      IMPRESSION: Small subacromial spur. Moderate acromioclavicular  degenerative changes. Glenohumeral joint is unremarkable. No acute  fracture or subluxation.     MAHAMED GARCIA MD      (M25.511) Acute pain of right shoulder  (primary encounter diagnosis)  Comment:   Plan: XR Shoulder Right G/E 3 Views, ORTHOPEDICS         ADULT REFERRAL            (S40.011A) Traumatic hematoma of right shoulder, initial encounter  Comment:   Plan: ORTHOPEDICS ADULT REFERRAL            (Z79.01) Long term current use of anticoagulants with INR goal of 2.0-3.0  Comment:   Plan:         "

## 2019-04-10 ENCOUNTER — OFFICE VISIT (OUTPATIENT)
Dept: ORTHOPEDICS | Facility: CLINIC | Age: 84
End: 2019-04-10
Payer: COMMERCIAL

## 2019-04-10 VITALS
DIASTOLIC BLOOD PRESSURE: 78 MMHG | BODY MASS INDEX: 27.92 KG/M2 | WEIGHT: 195 LBS | SYSTOLIC BLOOD PRESSURE: 138 MMHG | HEIGHT: 70 IN

## 2019-04-10 DIAGNOSIS — M19.011 PRIMARY OSTEOARTHRITIS OF RIGHT SHOULDER: Primary | ICD-10-CM

## 2019-04-10 DIAGNOSIS — S46.219A TEAR OF BICEPS TENDON: ICD-10-CM

## 2019-04-10 DIAGNOSIS — R26.89 BALANCE DISORDER: ICD-10-CM

## 2019-04-10 DIAGNOSIS — M75.121 COMPLETE TEAR OF RIGHT ROTATOR CUFF: ICD-10-CM

## 2019-04-10 PROCEDURE — 99214 OFFICE O/P EST MOD 30 MIN: CPT | Performed by: FAMILY MEDICINE

## 2019-04-10 ASSESSMENT — MIFFLIN-ST. JEOR: SCORE: 1560.76

## 2019-04-10 NOTE — PROGRESS NOTES
ASSESSMENT & PLAN    1. Primary osteoarthritis of right shoulder    2. Tear of biceps tendon    3. Complete tear of right rotator cuff    4. 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 Desires to address the shoulder initially. When he's ready he will follow-up and I will place rehab services referral for balance/vestibular.    Follow-up if pain is not improving    -----    SUBJECTIVE  Tucker Slater is a/an 88 year old Right handed male who is seen in consultation at the request of  Ramiro Ramos M.D. for evaluation of right shoulder pain. The patient is seen by themselves.  Patient is also complaining of neuropathy and significant concerns regarding his balance.    Onset: 10+ years of right shoulder pain, with new development of swelling and bruising in right upper arm after sweeping the garage 2 weeks ago  Location of Pain: right shoulder and upper arm  Rating of Pain at worst: 5/10  Rating of Pain Currently: 0/10  Worsened by: reaching overhead  Better with: activity modification   Treatments tried: rest/activity avoidance and ice  Associated symptoms: decreased ROM in both shoulders for several years, ecchymosis in right upper arm  Orthopedic history: Rotator cuff injury 10+ years ago  neuropathy in both feet which affects his balance  Relevant surgical history: NO  Patient Social History: retired    Patient's past medical, surgical, social, and family histories were reviewed today and no changes are noted.    REVIEW OF SYSTEMS:  10 point ROS is negative other than symptoms noted above in HPI, Past Medical History or as stated below  Constitutional: NEGATIVE for fever, chills, change in weight  Skin: NEGATIVE for worrisome rashes, moles or lesions  GI/: NEGATIVE  "for bowel or bladder changes  Neuro: NEGATIVE for weakness, dizziness or paresthesias    OBJECTIVE:  /78   Ht 1.778 m (5' 10\")   Wt 88.5 kg (195 lb)   BMI 27.98 kg/m     General: healthy, alert and in no distress  HEENT: no scleral icterus or conjunctival erythema  Skin: no suspicious lesions or rash. No jaundice.  CV: regular rhythm by palpation  Resp: normal respiratory effort without conversational dyspnea   Psych: normal mood and affect  Gait: normal steady gait with appropriate coordination and balance  Neuro: normal light touch sensory exam of the bilateral upper extremities.    MSK:  RIGHT SHOULDER  Inspection:   bruising and discoloration over proximal/anterior chest wall and down the bicep   palpation:    Tender with crepitus across the bicipital groove.  Remainder of bony and tendinous landmarks are nontender.  Active Range of Motion:   abduction 300, ,   Strength:    Scapular plane abduction 3+/5,  ER 3+/5, IR 5-/5, biceps 3+/5  Special Tests:    Positive: drop arm and Speed's    Independent visualization of the below image:    Recent Results (from the past 744 hour(s))   XR Shoulder Right G/E 3 Views    Narrative    SHOULDER RIGHT THREE OR MORE VIEWS    4/3/2019 4:26 PM     HISTORY: Acute pain of right shoulder    COMPARISON: None.      Impression    IMPRESSION: Small subacromial spur. Moderate acromioclavicular  degenerative changes. Glenohumeral joint is unremarkable. No acute  fracture or subluxation.    MAHAMED GARCIA MD     Patient's conditions were thoroughly discussed during today's visit with greater than 50% of the visit spent counseling the patient with total time spent face-to-face with the patient being 30 minutes.    Kian Hinojosa, DO [facility]    "

## 2019-04-10 NOTE — LETTER
4/10/2019         RE: Tucker Slater  [address] [facility] [address]        Dear Colleague,    Thank you for referring your patient, Tucker Slater, to the [facility]. Please see a copy of my visit note below.    ASSESSMENT & PLAN    1. Primary osteoarthritis of right shoulder    2. Tear of biceps tendon    3. Complete tear of right rotator cuff    4. 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 Desires to address the shoulder initially. When he's ready he will follow-up and I will place rehab services referral for balance/vestibular.    Follow-up if pain is not improving    -----    SUBJECTIVE  Tucker Slater is a/an 88 year old Right handed male who is seen in consultation at the request of  Ramiro Ramos M.D. for evaluation of right shoulder pain. The patient is seen by themselves.  Patient is also complaining of neuropathy and significant concerns regarding his balance.    Onset: 10+ years of right shoulder pain, with new development of swelling and bruising in right upper arm after sweeping the garage 2 weeks ago  Location of Pain: right shoulder and upper arm  Rating of Pain at worst: 5/10  Rating of Pain Currently: 0/10  Worsened by: reaching overhead  Better with: activity modification   Treatments tried: rest/activity avoidance and ice  Associated symptoms: decreased ROM in both shoulders for several years, ecchymosis in right upper arm  Orthopedic history: Rotator cuff injury 10+ years ago  neuropathy in both feet which affects his balance  Relevant surgical history: NO  Patient Social History: retired    Patient's past medical, surgical, social, and family histories were reviewed today and no changes are  "noted.    REVIEW OF SYSTEMS:  10 point ROS is negative other than symptoms noted above in HPI, Past Medical History or as stated below  Constitutional: NEGATIVE for fever, chills, change in weight  Skin: NEGATIVE for worrisome rashes, moles or lesions  GI/: NEGATIVE for bowel or bladder changes  Neuro: NEGATIVE for weakness, dizziness or paresthesias    OBJECTIVE:  /78   Ht 1.778 m (5' 10\")   Wt 88.5 kg (195 lb)   BMI 27.98 kg/m      General: healthy, alert and in no distress  HEENT: no scleral icterus or conjunctival erythema  Skin: no suspicious lesions or rash. No jaundice.  CV: regular rhythm by palpation  Resp: normal respiratory effort without conversational dyspnea   Psych: normal mood and affect  Gait: normal steady gait with appropriate coordination and balance  Neuro: normal light touch sensory exam of the bilateral upper extremities.    MSK:  RIGHT SHOULDER  Inspection:   bruising and discoloration over proximal/anterior chest wall and down the bicep   palpation:    Tender with crepitus across the bicipital groove.  Remainder of bony and tendinous landmarks are nontender.  Active Range of Motion:   abduction 300, ,   Strength:    Scapular plane abduction 3+/5,  ER 3+/5, IR 5-/5, biceps 3+/5  Special Tests:    Positive: drop arm and Speed's    Independent visualization of the below image:    Recent Results (from the past 744 hour(s))   XR Shoulder Right G/E 3 Views    Narrative    SHOULDER RIGHT THREE OR MORE VIEWS    4/3/2019 4:26 PM     HISTORY: Acute pain of right shoulder    COMPARISON: None.      Impression    IMPRESSION: Small subacromial spur. Moderate acromioclavicular  degenerative changes. Glenohumeral joint is unremarkable. No acute  fracture or subluxation.    MAHAMED GARCIA MD     Patient's conditions were thoroughly discussed during today's visit with greater than 50% of the visit spent counseling the patient with total time spent face-to-face with the patient being 30 " minutes.    Kian Hinojosa DO [facility]      Again, thank you for allowing me to participate in the care of your patient.        Sincerely,        Kian Hinojosa DO

## 2019-04-10 NOTE — PATIENT INSTRUCTIONS
1. Primary osteoarthritis of right shoulder    2. Tear of biceps tendon    3. Complete tear of right rotator cuff    4. Balance disorder      Lack of range of motion is due to an old complete rotator cuff tear  New injury and bruising is because you have torn your bicep tendon  Physical therapy: [facility] - [phone]  Reviewed xray - arthritis    Follow-up if pain is not improving

## 2019-04-17 ENCOUNTER — ANTICOAGULATION THERAPY VISIT (OUTPATIENT)
Dept: ANTICOAGULATION | Facility: CLINIC | Age: 84
End: 2019-04-17
Payer: COMMERCIAL

## 2019-04-17 DIAGNOSIS — Z79.01 LONG TERM CURRENT USE OF ANTICOAGULANTS WITH INR GOAL OF 2.0-3.0: ICD-10-CM

## 2019-04-17 DIAGNOSIS — I48.91 ATRIAL FIBRILLATION (H): ICD-10-CM

## 2019-04-17 LAB — INR POINT OF CARE: 1.9 (ref 0.86–1.14)

## 2019-04-17 PROCEDURE — 99207 ZZC NO CHARGE NURSE ONLY: CPT

## 2019-04-17 PROCEDURE — 36416 COLLJ CAPILLARY BLOOD SPEC: CPT

## 2019-04-17 PROCEDURE — 85610 PROTHROMBIN TIME: CPT | Mod: QW

## 2019-04-17 NOTE — PROGRESS NOTES
ANTICOAGULATION FOLLOW-UP CLINIC VISIT    Patient Name:  Tucker Slater  Date:  2019  Contact Type:  Face to Face    SUBJECTIVE:     Patient Findings     Positives:   Missed doses (Missed dose on  this week.  Only took 1.25 mg on Monday instead of 2.5 mg)    Comments:   The patient was assessed for diet, medication, and activity level changes, bruising or bleeding, with no problem findings.             OBJECTIVE    INR Protime   Date Value Ref Range Status   2019 1.9 (A) 0.86 - 1.14 Final       ASSESSMENT / PLAN  INR assessment THER    Recheck INR In: 2 WEEKS    INR Location Clinic      Anticoagulation Summary  As of 2019    INR goal:   2.0-3.0   TTR:   73.4 % (2.1 y)   INR used for dosin.9! (2019)   Warfarin maintenance plan:   1.25 mg (2.5 mg x 0.5) every Tue, Fri; 2.5 mg (2.5 mg x 1) all other days   Full warfarin instructions:   1.25 mg every Tue, Fri; 2.5 mg all other days   Weekly warfarin total:   15 mg   Plan last modified:   Ivory Machado RN (2019)   Next INR check:   2019   Priority:   INR   Target end date:       Indications    Atrial fibrillation (H) with mitral regurgitation and congestive heart failure [I48.91]  Long term current use of anticoagulants with INR goal of 2.0-3.0 [Z79.01]             Anticoagulation Episode Summary     INR check location:       Preferred lab:       Send INR reminders to:   [facility]    Comments:         Anticoagulation Care Providers     Provider Role Specialty Phone number    Kwadwo Winslow MD [facility] Internal Medicine [phone]            See the Encounter Report to view Anticoagulation Flowsheet and Dosing Calendar (Go to Encounters tab in chart review, and find the Anticoagulation Therapy Visit)    Dosage adjustment made based on physician directed care plan.    Ivory Machado RN

## 2019-04-18 ENCOUNTER — THERAPY VISIT (OUTPATIENT)
Dept: PHYSICAL THERAPY | Facility: CLINIC | Age: 84
End: 2019-04-18
Attending: FAMILY MEDICINE
Payer: COMMERCIAL

## 2019-04-18 DIAGNOSIS — S46.219A TEAR OF BICEPS TENDON: ICD-10-CM

## 2019-04-18 DIAGNOSIS — M75.121 COMPLETE TEAR OF RIGHT ROTATOR CUFF: ICD-10-CM

## 2019-04-18 DIAGNOSIS — M19.011 PRIMARY OSTEOARTHRITIS OF RIGHT SHOULDER: ICD-10-CM

## 2019-04-18 PROCEDURE — 97162 PT EVAL MOD COMPLEX 30 MIN: CPT | Mod: GP | Performed by: PHYSICAL THERAPIST

## 2019-04-18 PROCEDURE — 97110 THERAPEUTIC EXERCISES: CPT | Mod: GP | Performed by: PHYSICAL THERAPIST

## 2019-04-18 PROCEDURE — 97530 THERAPEUTIC ACTIVITIES: CPT | Mod: GP | Performed by: PHYSICAL THERAPIST

## 2019-04-18 NOTE — PROGRESS NOTES
[facility] Initial Evaluation  Subjective:  Patient reports he was sweeping his garage a few weeks ago (early April 2019).  Notes he has a chronic history of bilateral shoulder issues with falls and over use.  Was seen by Dr. Hinojosa with referral to PT to attempt some ROM and strengthening.     The history is provided by the patient.   Tucker Slater is a 88 year old male with a right shoulder condition.  Condition occurred with:  Repetition/overuse.  Condition occurred: at home.  This is a new condition     Patient reports pain:  Anterior.  Radiates to:  Upper arm.  Pain is described as aching and is intermittent and reported as 1/10.  Associated symptoms:  Loss of motion/stiffness and loss of strength. Pain is the same all the time.  Symptoms are exacerbated by using arm at shoulder level and other (using arm pushing/wiping) and relieved by rest.    Special tests:  X-ray.      General health as reported by patient is good.  Pertinent medical history includes:  Cancer, heart problems, numbness/tingling and implaned devices.    Other surgeries include:  Cancer surgery and orthopedic surgery.  Current medications:  Cardiac medication and high blood pressure medication.    Employment status: retired.          Red flags:  None as reported by the patient.                        Objective:  System                   Shoulder Evaluation:  ROM:  AROM:    Flexion:  Left:  80    Right:  45    Abduction:  Left: 70   Right:  45      External Rotation:  Left:  70    Right:  70            Extension/Internal Rotation:  Left:  T12    Right:  L1    PROM:    Flexion:  Left:  135    Right: 130      Abduction:  Left:  115    Right:  105    Internal Rotation:  Left:  75    Right:  75  External Rotation:  Left:  90    Right:  90                    Strength:    Flexion: Left:3-/5   Pain:    Right: 3-/5     Pain:   Extension:  Left: 4+/5    Pain:    Right: 4+/5    Pain:  Abduction:  Left: 3-/5  Pain:    Right: 3-/5      Pain:  Adduction:  Left: 5/5    Pain:    Right: 5/5     Pain:    External Rotation:   Left:3/5     Pain:   Right:3/5     Pain:              Special Tests:    Left shoulder positive for the following special tests:  Rotator cuff tear  Right shoulder positive for the following special tests:Rotator cuff tear    Mobility Tests:  normal                                                 General     ROS    Assessment/Plan:    Patient is a 88 year old male with right side shoulder complaints.    Patient has the following significant findings with corresponding treatment plan.                Diagnosis 1:  R RC tear, biceps tear  Pain -  manual therapy, splint/taping/bracing/orthotics, self management, education, directional preference exercise and home program  Decreased ROM/flexibility - manual therapy and therapeutic exercise  Decreased strength - therapeutic exercise and therapeutic activities  Impaired muscle performance - neuro re-education  Decreased function - therapeutic activities    Therapy Evaluation Codes:   1) History comprised of:   Personal factors that impact the plan of care:      Time since onset of symptoms.    Comorbidity factors that impact the plan of care are:      Cancer, Heart problems, High blood pressure, Implanted device, Numbness/tingling and Sleep disorder/apnea.     Medications impacting care: Cardiac and High blood pressure.  2) Examination of Body Systems comprised of:   Body structures and functions that impact the plan of care:      Shoulder.   Activity limitations that impact the plan of care are:      Bathing, Driving, Dressing, Lifting and Working.  3) Clinical presentation characteristics are:   Evolving/Changing.  4) Decision-Making    Moderate complexity using standardized patient assessment instrument and/or measureable assessment of functional outcome.  Cumulative Therapy Evaluation is: Moderate complexity.    Previous and current functional limitations:  (See Goal Flow Sheet for  this information)    Short term and Long term goals: (See Goal Flow Sheet for this information)     Communication ability:  Patient appears to be able to clearly communicate and understand verbal and written communication and follow directions correctly.  Treatment Explanation - The following has been discussed with the patient:   RX ordered/plan of care  Anticipated outcomes  Possible risks and side effects  This patient would benefit from PT intervention to resume normal activities.   Rehab potential is good.    Frequency:  1 X week, once daily  Duration:  for 8 weeks  Discharge Plan:  Achieve all LTG.  Independent in home treatment program.  Reach maximal therapeutic benefit.    Please refer to the daily flowsheet for treatment today, total treatment time and time spent performing 1:1 timed codes.

## 2019-04-19 ENCOUNTER — APPOINTMENT (OUTPATIENT)
Dept: CT IMAGING | Facility: CLINIC | Age: 84
DRG: 663 | End: 2019-04-19
Attending: EMERGENCY MEDICINE
Payer: COMMERCIAL

## 2019-04-19 ENCOUNTER — HOSPITAL ENCOUNTER (INPATIENT)
Facility: CLINIC | Age: 84
LOS: 2 days | Discharge: HOME | DRG: 663 | End: 2019-04-24
Attending: EMERGENCY MEDICINE | Admitting: HOSPITALIST
Payer: COMMERCIAL

## 2019-04-19 ENCOUNTER — TELEPHONE (OUTPATIENT)
Dept: INTERNAL MEDICINE | Facility: CLINIC | Age: 84
End: 2019-04-19

## 2019-04-19 DIAGNOSIS — R33.9 URINARY RETENTION: ICD-10-CM

## 2019-04-19 DIAGNOSIS — M25.572 PAIN IN JOINT, ANKLE AND FOOT, LEFT: Primary | ICD-10-CM

## 2019-04-19 DIAGNOSIS — N40.1 HYPERTROPHY OF PROSTATE WITH URINARY OBSTRUCTION: ICD-10-CM

## 2019-04-19 DIAGNOSIS — N13.8 HYPERTROPHY OF PROSTATE WITH URINARY OBSTRUCTION: ICD-10-CM

## 2019-04-19 DIAGNOSIS — R31.0 GROSS HEMATURIA: ICD-10-CM

## 2019-04-19 DIAGNOSIS — N32.89 BLADDER MASS: ICD-10-CM

## 2019-04-19 PROBLEM — R31.9 HEMATURIA: Status: ACTIVE | Noted: 2019-04-19

## 2019-04-19 LAB
ALBUMIN UR-MCNC: >600 MG/DL
ANION GAP SERPL CALCULATED.3IONS-SCNC: 7 MMOL/L (ref 3–14)
APPEARANCE UR: ABNORMAL
BASOPHILS # BLD AUTO: 0.1 10E9/L (ref 0–0.2)
BASOPHILS NFR BLD AUTO: 0.7 %
BILIRUB UR QL STRIP: NEGATIVE
BUN SERPL-MCNC: 34 MG/DL (ref 7–30)
CALCIUM SERPL-MCNC: 9 MG/DL (ref 8.5–10.1)
CHLORIDE SERPL-SCNC: 110 MMOL/L (ref 94–109)
CO2 SERPL-SCNC: 23 MMOL/L (ref 20–32)
COLOR UR AUTO: ABNORMAL
CREAT SERPL-MCNC: 1.36 MG/DL (ref 0.66–1.25)
DIFFERENTIAL METHOD BLD: ABNORMAL
EOSINOPHIL # BLD AUTO: 0.3 10E9/L (ref 0–0.7)
EOSINOPHIL NFR BLD AUTO: 4.8 %
ERYTHROCYTE [DISTWIDTH] IN BLOOD BY AUTOMATED COUNT: 13.9 % (ref 10–15)
GFR SERPL CREATININE-BSD FRML MDRD: 46 ML/MIN/{1.73_M2}
GLUCOSE SERPL-MCNC: 99 MG/DL (ref 70–99)
GLUCOSE UR STRIP-MCNC: 150 MG/DL
HCT VFR BLD AUTO: 41.9 % (ref 40–53)
HGB BLD-MCNC: 13.5 G/DL (ref 13.3–17.7)
HGB UR QL STRIP: ABNORMAL
IMM GRANULOCYTES # BLD: 0 10E9/L (ref 0–0.4)
IMM GRANULOCYTES NFR BLD: 0.3 %
INR PPP: 1.8 (ref 0.86–1.14)
KETONES UR STRIP-MCNC: NEGATIVE MG/DL
LEUKOCYTE ESTERASE UR QL STRIP: NEGATIVE
LYMPHOCYTES # BLD AUTO: 0.7 10E9/L (ref 0.8–5.3)
LYMPHOCYTES NFR BLD AUTO: 9.8 %
MCH RBC QN AUTO: 31.7 PG (ref 26.5–33)
MCHC RBC AUTO-ENTMCNC: 32.2 G/DL (ref 31.5–36.5)
MCV RBC AUTO: 98 FL (ref 78–100)
MONOCYTES # BLD AUTO: 0.5 10E9/L (ref 0–1.3)
MONOCYTES NFR BLD AUTO: 7.5 %
NEUTROPHILS # BLD AUTO: 5.5 10E9/L (ref 1.6–8.3)
NEUTROPHILS NFR BLD AUTO: 76.9 %
NITRATE UR QL: NEGATIVE
NRBC # BLD AUTO: 0 10*3/UL
NRBC BLD AUTO-RTO: 0 /100
PH UR STRIP: 8 PH (ref 5–7)
PLATELET # BLD AUTO: 193 10E9/L (ref 150–450)
POTASSIUM SERPL-SCNC: 4 MMOL/L (ref 3.4–5.3)
RBC # BLD AUTO: 4.26 10E12/L (ref 4.4–5.9)
RBC #/AREA URNS AUTO: >182 /HPF (ref 0–2)
SODIUM SERPL-SCNC: 140 MMOL/L (ref 133–144)
SOURCE: ABNORMAL
SP GR UR STRIP: 1.01 (ref 1–1.03)
UROBILINOGEN UR STRIP-MCNC: NORMAL MG/DL (ref 0–2)
WBC # BLD AUTO: 7.1 10E9/L (ref 4–11)

## 2019-04-19 PROCEDURE — G0378 HOSPITAL OBSERVATION PER HR: HCPCS

## 2019-04-19 PROCEDURE — 96361 HYDRATE IV INFUSION ADD-ON: CPT

## 2019-04-19 PROCEDURE — 74178 CT ABD&PLV WO CNTR FLWD CNTR: CPT

## 2019-04-19 PROCEDURE — 36415 COLL VENOUS BLD VENIPUNCTURE: CPT | Performed by: EMERGENCY MEDICINE

## 2019-04-19 PROCEDURE — 51702 INSERT TEMP BLADDER CATH: CPT

## 2019-04-19 PROCEDURE — 96374 THER/PROPH/DIAG INJ IV PUSH: CPT | Mod: 59

## 2019-04-19 PROCEDURE — 25000132 ZZH RX MED GY IP 250 OP 250 PS 637: Performed by: PHYSICIAN ASSISTANT

## 2019-04-19 PROCEDURE — 85610 PROTHROMBIN TIME: CPT | Performed by: EMERGENCY MEDICINE

## 2019-04-19 PROCEDURE — 99285 EMERGENCY DEPT VISIT HI MDM: CPT | Mod: 25

## 2019-04-19 PROCEDURE — 25000128 H RX IP 250 OP 636: Performed by: EMERGENCY MEDICINE

## 2019-04-19 PROCEDURE — 99220 ZZC INITIAL OBSERVATION CARE,LEVL III: CPT | Performed by: PHYSICIAN ASSISTANT

## 2019-04-19 PROCEDURE — 25000125 ZZHC RX 250: Performed by: PHYSICIAN ASSISTANT

## 2019-04-19 PROCEDURE — 96375 TX/PRO/DX INJ NEW DRUG ADDON: CPT

## 2019-04-19 PROCEDURE — 81003 URINALYSIS AUTO W/O SCOPE: CPT | Performed by: EMERGENCY MEDICINE

## 2019-04-19 PROCEDURE — 51700 IRRIGATION OF BLADDER: CPT

## 2019-04-19 PROCEDURE — 51798 US URINE CAPACITY MEASURE: CPT

## 2019-04-19 PROCEDURE — 85025 COMPLETE CBC W/AUTO DIFF WBC: CPT | Performed by: EMERGENCY MEDICINE

## 2019-04-19 PROCEDURE — 96376 TX/PRO/DX INJ SAME DRUG ADON: CPT

## 2019-04-19 PROCEDURE — 80048 BASIC METABOLIC PNL TOTAL CA: CPT | Performed by: EMERGENCY MEDICINE

## 2019-04-19 RX ORDER — WARFARIN SODIUM 2.5 MG/1
1.25 TABLET ORAL
Status: ON HOLD | COMMUNITY
End: 2019-04-24

## 2019-04-19 RX ORDER — HYDROMORPHONE HYDROCHLORIDE 1 MG/ML
0.5 INJECTION, SOLUTION INTRAMUSCULAR; INTRAVENOUS; SUBCUTANEOUS
Status: DISCONTINUED | OUTPATIENT
Start: 2019-04-19 | End: 2019-04-19

## 2019-04-19 RX ORDER — AMOXICILLIN 250 MG
1 CAPSULE ORAL 2 TIMES DAILY PRN
Status: DISCONTINUED | OUTPATIENT
Start: 2019-04-19 | End: 2019-04-24 | Stop reason: HOSPADM

## 2019-04-19 RX ORDER — ONDANSETRON 4 MG/1
4 TABLET, ORALLY DISINTEGRATING ORAL EVERY 6 HOURS PRN
Status: DISCONTINUED | OUTPATIENT
Start: 2019-04-19 | End: 2019-04-24 | Stop reason: HOSPADM

## 2019-04-19 RX ORDER — HYDROMORPHONE HYDROCHLORIDE 1 MG/ML
0.2 INJECTION, SOLUTION INTRAMUSCULAR; INTRAVENOUS; SUBCUTANEOUS
Status: DISCONTINUED | OUTPATIENT
Start: 2019-04-19 | End: 2019-04-24 | Stop reason: HOSPADM

## 2019-04-19 RX ORDER — OXYCODONE HYDROCHLORIDE 5 MG/1
5 TABLET ORAL
Status: DISCONTINUED | OUTPATIENT
Start: 2019-04-19 | End: 2019-04-24 | Stop reason: HOSPADM

## 2019-04-19 RX ORDER — HYDROMORPHONE HYDROCHLORIDE 1 MG/ML
0.5 INJECTION, SOLUTION INTRAMUSCULAR; INTRAVENOUS; SUBCUTANEOUS
Status: COMPLETED | OUTPATIENT
Start: 2019-04-19 | End: 2019-04-19

## 2019-04-19 RX ORDER — PROCHLORPERAZINE MALEATE 5 MG
5 TABLET ORAL EVERY 6 HOURS PRN
Status: DISCONTINUED | OUTPATIENT
Start: 2019-04-19 | End: 2019-04-24 | Stop reason: HOSPADM

## 2019-04-19 RX ORDER — NALOXONE HYDROCHLORIDE 0.4 MG/ML
.1-.4 INJECTION, SOLUTION INTRAMUSCULAR; INTRAVENOUS; SUBCUTANEOUS
Status: DISCONTINUED | OUTPATIENT
Start: 2019-04-19 | End: 2019-04-24 | Stop reason: HOSPADM

## 2019-04-19 RX ORDER — ONDANSETRON 2 MG/ML
4 INJECTION INTRAMUSCULAR; INTRAVENOUS EVERY 6 HOURS PRN
Status: DISCONTINUED | OUTPATIENT
Start: 2019-04-19 | End: 2019-04-24 | Stop reason: HOSPADM

## 2019-04-19 RX ORDER — WARFARIN SODIUM 2.5 MG/1
2.5 TABLET ORAL
Status: ON HOLD | COMMUNITY
End: 2019-04-24

## 2019-04-19 RX ORDER — ACETAMINOPHEN 325 MG/1
650 TABLET ORAL EVERY 4 HOURS PRN
Status: DISCONTINUED | OUTPATIENT
Start: 2019-04-19 | End: 2019-04-24 | Stop reason: HOSPADM

## 2019-04-19 RX ORDER — MORPHINE SULFATE 4 MG/ML
4 INJECTION, SOLUTION INTRAMUSCULAR; INTRAVENOUS ONCE
Status: DISCONTINUED | OUTPATIENT
Start: 2019-04-19 | End: 2019-04-19

## 2019-04-19 RX ORDER — IOPAMIDOL 755 MG/ML
500 INJECTION, SOLUTION INTRAVASCULAR ONCE
Status: COMPLETED | OUTPATIENT
Start: 2019-04-19 | End: 2019-04-19

## 2019-04-19 RX ORDER — ATROPA BELLADONNA AND OPIUM 16.2; 3 MG/1; MG/1
30 SUPPOSITORY RECTAL EVERY 8 HOURS PRN
Status: DISCONTINUED | OUTPATIENT
Start: 2019-04-19 | End: 2019-04-24 | Stop reason: HOSPADM

## 2019-04-19 RX ORDER — FENTANYL CITRATE 50 UG/ML
25 INJECTION, SOLUTION INTRAMUSCULAR; INTRAVENOUS ONCE
Status: COMPLETED | OUTPATIENT
Start: 2019-04-19 | End: 2019-04-19

## 2019-04-19 RX ORDER — AMOXICILLIN 250 MG
2 CAPSULE ORAL 2 TIMES DAILY PRN
Status: DISCONTINUED | OUTPATIENT
Start: 2019-04-19 | End: 2019-04-24 | Stop reason: HOSPADM

## 2019-04-19 RX ORDER — PROCHLORPERAZINE 25 MG
12.5 SUPPOSITORY, RECTAL RECTAL EVERY 12 HOURS PRN
Status: DISCONTINUED | OUTPATIENT
Start: 2019-04-19 | End: 2019-04-24 | Stop reason: HOSPADM

## 2019-04-19 RX ORDER — LOSARTAN POTASSIUM 100 MG/1
100 TABLET ORAL DAILY
Status: DISCONTINUED | OUTPATIENT
Start: 2019-04-20 | End: 2019-04-24 | Stop reason: HOSPADM

## 2019-04-19 RX ORDER — POLYETHYLENE GLYCOL 3350 17 G/17G
17 POWDER, FOR SOLUTION ORAL DAILY PRN
Status: DISCONTINUED | OUTPATIENT
Start: 2019-04-19 | End: 2019-04-24 | Stop reason: HOSPADM

## 2019-04-19 RX ADMIN — Medication 0.5 MG: at 19:35

## 2019-04-19 RX ADMIN — ATROPA BELLADONNA AND OPIUM 1 SUPPOSITORY: 16.2; 3 SUPPOSITORY RECTAL at 22:16

## 2019-04-19 RX ADMIN — CARVEDILOL 37.5 MG: 25 TABLET, FILM COATED ORAL at 22:16

## 2019-04-19 RX ADMIN — FENTANYL CITRATE 25 MCG: 50 INJECTION, SOLUTION INTRAMUSCULAR; INTRAVENOUS at 18:59

## 2019-04-19 RX ADMIN — SODIUM CHLORIDE 1000 ML: 9 INJECTION, SOLUTION INTRAVENOUS at 17:16

## 2019-04-19 RX ADMIN — IOPAMIDOL 100 ML: 755 INJECTION, SOLUTION INTRAVENOUS at 17:27

## 2019-04-19 RX ADMIN — Medication 0.5 MG: at 21:00

## 2019-04-19 RX ADMIN — SODIUM CHLORIDE 65 ML: 9 INJECTION, SOLUTION INTRAVENOUS at 17:27

## 2019-04-19 RX ADMIN — Medication 0.5 MG: at 20:31

## 2019-04-19 ASSESSMENT — ENCOUNTER SYMPTOMS
DYSURIA: 0
DIFFICULTY URINATING: 0
FLANK PAIN: 0
HEMATURIA: 1
BACK PAIN: 0

## 2019-04-19 NOTE — TELEPHONE ENCOUNTER
Reason for call:  Patient reporting a symptom    Symptom or request: blood in urine     Duration (how long have symptoms been present): started today     Have you been treated for this before? No    Additional comments: would like advice on what he should do next Uses pharmacy[facility] in [place]     Phone Number patient can be reached at:  Home number on file [phone] (home)    Best Time:  asap    Can we leave a detailed message on this number:  YES    Call taken on 4/19/2019 at 12:15 PM by Mitzy Spence

## 2019-04-19 NOTE — ED PROVIDER NOTES
History     Chief Complaint:  Hematuria    The history is provided by the patient.      [facility] 88 [facility] a [facility] of [facility], prostate cancer, bladder cancer who presents to the emergency department for evaluation of hematuria. The patient reports he awoke today at baseline but around 1200 had an episode of hematuria. He notes history of bladder cancer in 2000. He denies any dysuria, back pain, or flank pain, as well as any difficulty urinating, testicular pain, or penile pain. The patient was seen by Dr. Chandler Ford of urology for his bladder cancer in the past, but has not been seen in around 2 years.    Allergies:  Merbromin  Morphine  Amlodipine     Medications:    Coreg  Jantoven  Cozaar  Maxzide     Past Medical History:    Arrhythmia  Balance problem   Bradycardia  Carcinoma in situ of bladder  HTN  Osteoarthrosis  Numbness and tingling  Pain in joint, shoulder  Atrial fibrillation  Prostate CA  Renal disease  Shoulder impingement    Past Surgical History:    Arthroplasty hip  Bilateral inguinal hernia repair  Pilonidal cyst  T&A  Bilateral total knee arthroplasty  Cardioversion  Colonscopy  Pacemaker  Release carpal tunnel    Family History:    Heart disease  CAD    Social History:  Presents with wife.  Former smoker, quit 1977.  Negative for alcohol use.  Marital Status:   [2]     Review of Systems   Genitourinary: Positive for hematuria. Negative for difficulty urinating, dysuria, flank pain, penile pain and testicular pain.   Musculoskeletal: Negative for back pain.   All other systems reviewed and are negative.    Physical Exam     Patient Vitals for the past 24 hrs:   BP Temp Temp src Pulse Heart Rate Resp SpO2   04/19/19 1715 160/87 -- -- 70 -- -- --   04/19/19 1310 153/85 98.4  F (36.9  C) Temporal 76 76 18 96 %     Physical Exam   Constitutional: Vital signs reviewed as above.   HENT:    Head: No external signs of trauma noted.   Eyes:  Conjunctivae are normal. Pupils are equal, round, and reactive to light.   Cardiovascular:    Normal rate, regular rhythm and normal heart sounds.     Exam reveals no friction rub.     No murmur heard.  Pulmonary/Chest:    Effort normal and breath sounds normal.    No respiratory distress.    There are no wheezes.    There are no rales.   Gastrointestinal:    Soft.    Bowel sounds normal.    There is no distension.    There is no tenderness.    There is no rebound or guarding.   :   Normal appearance of external male genitalia   No gross blood at urethral meatus   No obvious penile trauma/laceration/abrasion   Left testicle appears non-swollen. No left testicular tenderness. No masses palpated   Right testicle appears non-swollen. No right testicular tenderness. No masses palpated.    Musculoskeletal:    Normal range of motion.    Normal Tone  Neurological: Patient is alert and oriented to person, place, and time.   Skin: Skin is warm and dry. Patient is not diaphoretic  Psychiatric: The patient appears mildly anxious      Emergency Department Course     Imaging:  Radiographic findings were communicated with the patient who voiced understanding of the findings.    CT Abdomen Pelvis w/o & w Contrast   Preliminary Result   IMPRESSION:   1. The urinary bladder is distended. There is a radiodense structure   in the dependent aspect of the urinary bladder which could represent   swirling of contrast, thrombus or mass. Prostate gland is enlarged and   could be part of the reason for the density in the urinary bladder.   Ultrasound may be helpful for further evaluation.   2. No contrast is seen in the mid to upper right intrarenal collecting   system and upper right renal pelvis as well as the right ureter. This   could be due to decreased function of the right kidney as compared to   the left.    3. Fatty atrophy in some of the gluteus muscles on the left is likely   due to chronic degenerative disc disease and  spinal/neural foraminal   stenosis.   4. No definite metastases are seen.        Laboratory:  UA with micro: Glucose 150, Blood Large, pH 8.0 (H), Albumin >600, RBC >182 (H), o/w negative    CBC: WBC: 7.1, HGB: 13.5, PLT: 193  BMP: Chloride 110 (H), Urea Nitrogen 34 (H), Creatinine 1.36 (H), GFR Estimate 46 (L), o/w WNL     INR: 1.80 (H)    Interventions:    Medications   0.9% sodium chloride BOLUS (1,000 mLs Intravenous New Bag 4/19/19 1716)   iopamidol (ISOVUE-370) solution 500 mL (100 mLs Intravenous Given 4/19/19 1727)   0.9% sodium chloride BOLUS (0 mLs Intravenous Stopped 4/19/19 1751)   fentaNYL (PF) (SUBLIMAZE) injection 25 mcg (25 mcg Intravenous Given 4/19/19 1859)       Emergency Department Course:  Nursing notes and vitals reviewed. 1425 I performed an exam of the patient as documented above.     IV inserted. Medicine administered as documented above. Blood drawn. This was sent to the lab for further testing, results above.    The patient was sent for a CT Abd/Pelvis while in the emergency department, findings above.     1440 I spoke with Dr. Ford, urology, regarding the patient.    1506 I rechecked the patient and discussed the results of his workup thus far.     1750 I rechecked and updated the patient.    1824 I rechecked the patient.    1900 I rechecked the patient. Repeat bladder US shows 453 mL. Will order 3 way catheter and CBI, and place in observation    1907 D/W Dr. Mercado from urology (at [facility]).    1909 D/W Yelena Burns PA-C, working with Dr. Zee    Findings and plan explained to the Patient who consents to admission. Discussed the patient with the hospitalist team, who will place the patient in an observation bed for further monitoring, evaluation, and treatment.    Impression & Plan      Medical Decision Making:  This 88-year-old male patient presents the ED due to hematuria.  Please see the HPI and exam for specifics.  Patient remained well in the ED.  He had frequent sensation that he needed  to urinate and notes gross blood each time he goes.  During his time in the ED he began to get more and more uncomfortable.  I discussed the case with his outpatient urologist told the patient has not been seen there in several years.  They recommended a CT urogram and follow-up in the outpatient setting.  As the patient was becoming more and more uncomfortable bladder scan was ordered.  The initial scan showed 44 mL though the patient began to have more significant discomfort and based on the images and the CT seconds bladder scan was ordered which showed volumes around 450 mL.  Given this a three-way catheter was placed and the patient was placed in observation for continuous bladder irrigation.    Diagnosis:    ICD-10-CM    1. Gross hematuria R31.0    2. Bladder mass N32.89    3. Urinary retention R33.9        Disposition:  Placed in observation    Reinier DE, am serving as a scribe on 4/19/2019 at 2:26 PM to personally document services performed by Mannie Reno DO based on my observations and the provider's statements to me.     Reinier Victor  4/19/2019   [facility] EMERGENCY DEPARTMENT       Mannie Reno DO  04/19/19 1911

## 2019-04-19 NOTE — ED TRIAGE NOTES
Pt states he began urinating blood around noon today. States hx of prostate cancer in 2000. Denies pain or injury. ABC's intact, alert and oriented X3.

## 2019-04-20 LAB
ANION GAP SERPL CALCULATED.3IONS-SCNC: 4 MMOL/L (ref 3–14)
BASOPHILS # BLD AUTO: 0 10E9/L (ref 0–0.2)
BASOPHILS NFR BLD AUTO: 0.4 %
BUN SERPL-MCNC: 33 MG/DL (ref 7–30)
CALCIUM SERPL-MCNC: 8.2 MG/DL (ref 8.5–10.1)
CHLORIDE SERPL-SCNC: 110 MMOL/L (ref 94–109)
CO2 SERPL-SCNC: 24 MMOL/L (ref 20–32)
CREAT SERPL-MCNC: 1.3 MG/DL (ref 0.66–1.25)
DIFFERENTIAL METHOD BLD: ABNORMAL
EOSINOPHIL # BLD AUTO: 0 10E9/L (ref 0–0.7)
EOSINOPHIL NFR BLD AUTO: 0 %
ERYTHROCYTE [DISTWIDTH] IN BLOOD BY AUTOMATED COUNT: 13.9 % (ref 10–15)
GFR SERPL CREATININE-BSD FRML MDRD: 49 ML/MIN/{1.73_M2}
GLUCOSE SERPL-MCNC: 143 MG/DL (ref 70–99)
HCT VFR BLD AUTO: 39.8 % (ref 40–53)
HGB BLD-MCNC: 12.4 G/DL (ref 13.3–17.7)
HGB BLD-MCNC: 12.8 G/DL (ref 13.3–17.7)
HGB BLD-MCNC: 13 G/DL (ref 13.3–17.7)
IMM GRANULOCYTES # BLD: 0.1 10E9/L (ref 0–0.4)
IMM GRANULOCYTES NFR BLD: 0.6 %
INR PPP: 1.84 (ref 0.86–1.14)
LYMPHOCYTES # BLD AUTO: 0.5 10E9/L (ref 0.8–5.3)
LYMPHOCYTES NFR BLD AUTO: 6 %
MCH RBC QN AUTO: 32.3 PG (ref 26.5–33)
MCHC RBC AUTO-ENTMCNC: 32.7 G/DL (ref 31.5–36.5)
MCV RBC AUTO: 99 FL (ref 78–100)
MONOCYTES # BLD AUTO: 0.4 10E9/L (ref 0–1.3)
MONOCYTES NFR BLD AUTO: 5.2 %
NEUTROPHILS # BLD AUTO: 7.2 10E9/L (ref 1.6–8.3)
NEUTROPHILS NFR BLD AUTO: 87.8 %
NRBC # BLD AUTO: 0 10*3/UL
NRBC BLD AUTO-RTO: 0 /100
PLATELET # BLD AUTO: 170 10E9/L (ref 150–450)
POTASSIUM SERPL-SCNC: 4 MMOL/L (ref 3.4–5.3)
RBC # BLD AUTO: 4.02 10E12/L (ref 4.4–5.9)
SODIUM SERPL-SCNC: 138 MMOL/L (ref 133–144)
WBC # BLD AUTO: 8.1 10E9/L (ref 4–11)

## 2019-04-20 PROCEDURE — 85025 COMPLETE CBC W/AUTO DIFF WBC: CPT | Performed by: PHYSICIAN ASSISTANT

## 2019-04-20 PROCEDURE — 85018 HEMOGLOBIN: CPT | Performed by: PHYSICIAN ASSISTANT

## 2019-04-20 PROCEDURE — 96375 TX/PRO/DX INJ NEW DRUG ADDON: CPT

## 2019-04-20 PROCEDURE — 25000128 H RX IP 250 OP 636: Performed by: PHYSICIAN ASSISTANT

## 2019-04-20 PROCEDURE — 99225 ZZC SUBSEQUENT OBSERVATION CARE,LEVEL II: CPT | Performed by: PHYSICIAN ASSISTANT

## 2019-04-20 PROCEDURE — 25000132 ZZH RX MED GY IP 250 OP 250 PS 637: Performed by: PHYSICIAN ASSISTANT

## 2019-04-20 PROCEDURE — 36415 COLL VENOUS BLD VENIPUNCTURE: CPT | Performed by: PHYSICIAN ASSISTANT

## 2019-04-20 PROCEDURE — 99221 1ST HOSP IP/OBS SF/LOW 40: CPT | Performed by: UROLOGY

## 2019-04-20 PROCEDURE — 85610 PROTHROMBIN TIME: CPT | Performed by: PHYSICIAN ASSISTANT

## 2019-04-20 PROCEDURE — G0378 HOSPITAL OBSERVATION PER HR: HCPCS

## 2019-04-20 PROCEDURE — 80048 BASIC METABOLIC PNL TOTAL CA: CPT | Performed by: PHYSICIAN ASSISTANT

## 2019-04-20 RX ADMIN — CARVEDILOL 37.5 MG: 25 TABLET, FILM COATED ORAL at 17:24

## 2019-04-20 RX ADMIN — Medication 1 LOZENGE: at 22:33

## 2019-04-20 RX ADMIN — CARVEDILOL 37.5 MG: 25 TABLET, FILM COATED ORAL at 08:49

## 2019-04-20 RX ADMIN — ONDANSETRON 4 MG: 2 INJECTION INTRAMUSCULAR; INTRAVENOUS at 03:04

## 2019-04-20 RX ADMIN — LOSARTAN POTASSIUM 100 MG: 100 TABLET ORAL at 08:49

## 2019-04-20 RX ADMIN — Medication 1 LOZENGE: at 19:33

## 2019-04-20 NOTE — CONSULTS
Urology Consult History and Physical    Name: Tucker Slater    MRN: [id number]   YOB: 1931       We were asked to see Tucker Slater at the request of Dr. Burns for evaluation and treatment of gross hematuria.          Chief Complaint:   Gross hematuria    History is obtained from the patient          History of Present Illness:   Tucker Slater is a 88 year old male who is being seen for evaluation of Gross hematuria.    He has history of bladder cancer for which he followed with Dr. Ford in [place]. Diagnosed in 2000 which what sounds like low grade superficial bladder cancer. No evidence of recurrence on surveillance cystoscopy for >15 years. Last cystoscopy 2-3 years ago. Also history of prostate cancer s/p radiation ([id number]), CKD (baseline Cr 1.3-1.5), permanent A-fib on Warfarin, s/p pacemaker, HTN, DAWSON on CPAP, mild LV dysfunction (EF of 45-50%), OA, and peripheral neuropathy who presented to the ED on 4/19/19 for evaluation of hematuria. He reports hematuria started suddenly on 4/19 with no prior hematuria. No flank pain. 24fr 3-way placed in the ER with irrigation of significant clot. CT urogram demonstrated a large clot burden in the bladder.    He would like to establish care with a Urologist in [place].             Past Medical History:     Past Medical History:   Diagnosis Date     Arrhythmia     A Fib     Balance problem     related to neuropathy in feet     Bradycardia      Carcinoma in situ of bladder 2000    bladder cancer ;; follow w Urology w periodic cysto      Essential hypertension, benign      Generalized osteoarthrosis, unspecified site knees    s/p R total knee 8/09 ; will do L 6/10      Numbness and tingling     toes and fingers     Pacemaker     [employer]      Pain in joint, shoulder region     L shoulder rotater tear; no surgery      Persistent atrial fibrillation (H) 1/30/15     Prostate CA (H) 2008-9    radiation     Prostate cancer (H) 11/08     completed RT 3/09     Renal disease     elevated creatinine     Shoulder impingement     R shoulder impingement etc see MRI       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     toes both feet             Past Surgical History:     Past Surgical History:   Procedure Laterality Date     ARTHROPLASTY HIP Right 3/27/2017    Procedure: ARTHROPLASTY HIP;  Surgeon: Jigar Wynn MD;  Location: [facility] OR     C NONSPECIFIC PROCEDURE      bilat inguinal hernia repairs ( 3total procedures)      C NONSPECIFIC PROCEDURE      pilonidal cyst     C NONSPECIFIC PROCEDURE      T + A     C NONSPECIFIC PROCEDURE       R+L total knee     CARDIOVERSION  3/26/15     COLONOSCOPY       IMPLANT PACEMAKER  3/26/15     RELEASE CARPAL TUNNEL Right 2017    Procedure: RELEASE CARPAL TUNNEL;  Right carpal tunnel release;  Surgeon: Alonso Barboza MD;  Location: [facility] OR            Social History:     Social History     Tobacco Use     Smoking status: Former Smoker     Last attempt to quit: 1977     Years since quittin.6     Smokeless tobacco: Never Used   Substance Use Topics     Alcohol use: No     Alcohol/week: 0.0 oz            Family History:     Family History   Problem Relation Age of Onset     Heart Disease Father          87yo     Coronary Artery Disease Father      Heart Disease Mother          76yo     Coronary Artery Disease Mother      Family History Negative Brother               Allergies:     Allergies   Allergen Reactions     Merbromin      Other reaction(s): Other, see comments  Severe reaction as child. Amalgam fillings OK.     Morphine Nausea and Vomiting     Amlodipine      Edema            Medications:     Current Facility-Administered Medications   Medication     acetaminophen (TYLENOL) tablet 650 mg     carvedilol (COREG) tablet 37.5 mg     HYDROmorphone (PF) (DILAUDID) injection 0.2 mg     losartan (COZAAR) tablet 100 mg     melatonin tablet 1 mg      naloxone (NARCAN) injection 0.1-0.4 mg     ondansetron (ZOFRAN-ODT) ODT tab 4 mg    Or     ondansetron (ZOFRAN) injection 4 mg     opium-belladonna (B&O SUPPRETTES) 30-16.2 MG per suppository 1 suppository     oxyCODONE (ROXICODONE) tablet 5 mg     polyethylene glycol (MIRALAX/GLYCOLAX) Packet 17 g     prochlorperazine (COMPAZINE) injection 5 mg    Or     prochlorperazine (COMPAZINE) tablet 5 mg    Or     prochlorperazine (COMPAZINE) Suppository 12.5 mg     senna-docusate (SENOKOT-S/PERICOLACE) 8.6-50 MG per tablet 1 tablet    Or     senna-docusate (SENOKOT-S/PERICOLACE) 8.6-50 MG per tablet 2 tablet             Review of Systems:    ROS: 10 point ROS neg other than the symptoms noted above in the HPI.          Physical Exam:     Patient Vitals for the past 24 hrs:   BP Temp Temp src Pulse Heart Rate Resp SpO2   04/20/19 0311 149/74 96.1  F (35.6  C) Oral 74 -- 16 96 %   04/19/19 2327 152/76 95.9  F (35.5  C) Oral -- 73 20 96 %   04/19/19 2133 167/83 95.4  F (35.2  C) Oral -- 72 20 94 %   04/19/19 2100 (!) 157/106 -- -- 70 -- -- 96 %   04/19/19 2045 (!) 166/159 -- -- 69 -- -- 93 %   04/19/19 2030 (!) 169/101 -- -- 70 -- -- 93 %   04/19/19 2015 136/78 -- -- 75 -- -- 96 %   04/19/19 2000 (!) 163/92 -- -- 74 -- -- 97 %   04/19/19 1945 159/88 -- -- 70 -- -- 99 %   04/19/19 1930 (!) 160/141 -- -- (!) 44 -- -- 91 %   04/19/19 1915 (!) 170/113 -- -- 71 -- -- 93 %   04/19/19 1900 (!) 163/91 -- -- 70 -- -- --   04/19/19 1715 160/87 -- -- 70 -- -- --   04/19/19 1310 153/85 98.4  F (36.9  C) Temporal 76 76 18 96 %     General: age-appropriate appearing male in NAD  HEENT: Head AT/NC, EOMI, CN Grossly intact  Lungs: no respiratory distress, or pursed lip breathing  Heart: No obvious jugular venous distension present  Back: no bony midline tenderness, no CVAT bilaterally.  Abdomen: soft, non-distended, non-tender. No organomegaly  : normal male external genitalia.   Lymph: no palpable inguinal lymphadenopathy.  LE: no edema.  pneumoboots in place.  Musculoskeltal: extremities normal, no peripheral edema  Skin: no suspicious lesions or rashes  Neuro: Alert, oriented, speech and mentation normal;  moving all 4 extremities equally.  Psych: affect and mood normal  [place]: on CBI          Data:   All laboratory data reviewed:    INR   Date Value Ref Range Status   04/19/2019 1.80 (H) 0.86 - 1.14 Final        Recent Labs   Lab 04/20/19  0624 04/20/19  0008 04/19/19  1513   WBC 8.1  --  7.1   HGB 13.0* 12.8* 13.5     --  193     Recent Labs   Lab 04/19/19  1513      POTASSIUM 4.0   CHLORIDE 110*   CO2 23   BUN 34*   CR 1.36*   GLC 99   KARLA 9.0     Recent Labs   Lab 04/19/19  1312   COLOR Red   APPEARANCE Cloudy   URINEGLC 150*   URINEBILI Negative   URINEKETONE Negative   SG 1.015   URINEPH 8.0*   PROTEIN >600*   NITRITE Negative   LEUKEST Negative   RBCU >182*     Component       PSA   Latest Ref Rng & Units       0 - 4 ug/L   7/23/2008       17.30 (H)   4/21/2009       5.14 (H)   4/29/2009       5.14@   7/15/2009       4.15 (H)   2/3/2010       2.60   8/2/2010       2.01   2/23/2011      12:00 AM 2.24   2/23/2011      11:40 AM 2.24   4/12/2012       2.03   10/24/2012       2.35   5/15/2013       2.28   12/20/2013       2.45   6/2/2014       2.15   5/11/2015       2.38   6/11/2015       3.05   10/2/2017       2.00       All pertinent imaging reviewed:    All imaging studies reviewed by me.  I personally reviewed these imaging films.  A formal report from radiology will follow.    FINDINGS: Increased interstitial markings in the bases could represent  vascular congestion or mild scarring. There are pacer/defibrillator  wires in the heart. Mild asymmetric thickening of the wall of the  distal esophagus versus a small hiatal hernia with gastric rugae is  noted. Heart is mildly enlarged. There are thoracic aortic  calcifications. Visualized mediastinal contents are otherwise  unremarkable.     Patient is status post right hip  arthroplasty causing streak artifact  mildly limiting evaluation of the pelvis. Degenerative changes are  noted in the spine. No aggressive osseous lesions are seen.     Subtle densities in the dependent aspect of the gallbladder likely  represent gallstones but are less dense than typically seen. These  measure up to approximately 1.3 cm. The liver, gallbladder, pancreas,  spleen, bilateral adrenal glands otherwise enhance normally. Several  cysts are seen in the right kidney. The kidneys are mildly atrophic.  There is normal enhancement of the left kidney and left intrarenal  collecting system. Left ureter is unremarkable. There is normal  enhancement of the right kidney and inferior intrarenal collecting  system. No significant contrast is seen in the upper right kidney  intrarenal collecting system and upper aspect of the right renal  pelvis and the right ureter which could represent slightly decreased  function of the right kidney as compared to the left kidney. A  definite mass in this region is not seen.     No nephrolithiasis is identified. No adenopathy, free fluid or free  air is seen in the peritoneal cavity.     Urinary bladder contains a heterogeneously dense structure posteriorly  measuring up to 10 cm in diameter. This could represent contrast  swirling in the urinary bladder. This could also represent hematoma  and/or mass. Urinary bladder is otherwise distended. There is  questionable mild thickening of the posterior aspect of the urinary  bladder wall. Prostate gland is enlarged and could be part of the  reason for the density in the urinary bladder.     No adenopathy, free fluid or free air seen in the peritoneal cavity.  There is nonaneurysmal atherosclerosis. Incidental note of fatty  atrophy of some of the left gluteus muscles. This could be due to  chronic degenerative disc disease in the spine. There are areas of  stenosis in the central canal and neural foramina which are only  partially  imaged on this study.     The colon is grossly of normal caliber. There are scattered  diverticuli in the colon but are most predominantly noted in the  descending colon and proximal sigmoid colon. Structure likely  representing a normal appendix is seen in the region of the cecum.  Small bowel is of normal caliber. Stomach is mostly decompressed but  otherwise unremarkable.                                                                      IMPRESSION:  1. The urinary bladder is distended. There is a radiodense structure  in the dependent aspect of the urinary bladder which could represent  swirling of contrast, thrombus or mass. Prostate gland is enlarged and  could be part of the reason for the density in the urinary bladder.  Ultrasound may be helpful for further evaluation.  2. No contrast is seen in the mid to upper right intrarenal collecting  system and upper right renal pelvis as well as the right ureter. This  could be due to decreased function of the right kidney as compared to  the left.   3. Fatty atrophy in some of the gluteus muscles on the left is likely  due to chronic degenerative disc disease and spinal/neural foraminal  stenosis.  4. No definite metastases are seen.            Impression and Plan:   Impression:   88 year old man with history of bladder cancer and prostate cancer s/p EBRT with gross hematuria      Plan:   Gross hematuria  - I hand irrigated with 2L NaCl with removal of significant clot burden. Irrigated to clear with placed back on slow drip CBI  - No plans for OR today  - OK for diet  - Continue CBI and titrate to light pink in the tubing  - We discussed the need for cystoscopy in the future, however will plan for this on out-patient basis  - We discussed that his hematuria may be related to his history of bladder cancer, or the radiation history with baseline anti-coagulation  - Will continue to follow     Héctor Baltazar   Urology  [facility]  [facility]  Phone [phone]

## 2019-04-20 NOTE — PLAN OF CARE
ROOM # 231    Living Situation (if not independent, order SW consult): With wife in a house  Facility name:  : [facility] (wife) [phone] home phone    Activity level at baseline: ind  Activity level on admit: sba      Patient registered to observation; given Patient Bill of Rights; given the opportunity to ask questions about observation status and their plan of care.  Patient has been oriented to the observation room, bathroom and call light is in place.    Discussed discharge goals and expectations with patient/family.

## 2019-04-20 NOTE — PLAN OF CARE
PRIMARY DIAGNOSIS: HEMATURIA  OUTPATIENT/OBSERVATION GOALS TO BE MET BEFORE DISCHARGE:  1. Activity and level of assistance: Up with standby assistance.    2. Pain status: Improved controlled with opium-belladonna suppository     3. Return to near baseline physical activity: Yes     Discharge Planner Nurse   Safe discharge environment identified: Yes  Barriers to discharge: Yes       Vitals are Temp: 95.9  F (35.5  C) Temp src: Oral BP: 152/76 Pulse: 70 Heart Rate: 73 Resp: 20 SpO2: 96 %.  Patient is Alert and Oriented x4. They are SBA with Activity.  Pt is a NPO diet.  They are denying pain.  Patient is [place] locked.. CBI running at moderate, strawberry colored output noted. No clots noted. Plan of care: Continue monitoring CBI, urology consult       Please review provider order for any additional goals.   Nurse to notify provider when observation goals have been met and patient is ready for discharge.

## 2019-04-20 NOTE — PROGRESS NOTES
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  INR 1.8 on admission.  No significant anemia.  CT demonstrated significant clot burden in the bladder, urology performed irrigation today.  Appreciate urology evaluation and recommendations  --continue CBI to light pink colored urine  --holding warfarin  --repeat hemoglobin in AM  --pain control with PRN acetaminophen, oxycodone, B&O suppositories (not requiring)  --likely cystoscopy as outpatient      Permanent atrial fibrillation on warfarin  History of bradycardia s/p PPM  Hypertension  Mild LV dysfunction without current evidence of heart failure  --holding warfarin due to hematuria.  Repeat INR in AM  --continue carvedilol and losartan with hold parameters  --hold maxzide     Bilateral shoulder pain: outpatient PT for OA of both shoulders and tear of right biceps tendon and complete tear of right rotator cuff.  Ortho evaluated as outpatient  CKD stage 3, baseline creatinine 1.3: at baseline  DAWSON on CPAP          Diet: Regular Diet Adult    DVT Prophylaxis: Pneumatic Compression Devices  [place] Catheter: in place, indication: Retention, hematuria requiring CBI  Code Status: Full Code      Disposition Plan   Expected discharge: 1-2 days, recommended to prior living arrangement once no longer requiring CBI.  Entered: Carmelita Martinez PA-C 04/20/2019, 12:44 PM       The patient's care was discussed with the Attending Physician, Dr. Zee.    Carmelita Martinez PA-C  Hospitalist Service  [facility]    ______________________________________________________________________    Interval History   Comfortable after irrigation of clots this morning.  Denies dyspnea.      Data  reviewed today: I reviewed all medications, new labs and imaging results over the last 24 hours. I personally reviewed no images or EKG's today.    Physical Exam   Vital Signs: Temp: 97.3  F (36.3  C) Temp src: Oral BP: 103/55 Pulse: 73 Heart Rate: 73 Resp: 18 SpO2: 93 % O2 Device: None (Room air)    Weight: 0 lbs 0 oz    Constitutional: nontoxic appearing man sitting up in bed  Eyes: no icterus  HEENT: mucous membranes moist  Respiratory: clear bilaterally  Cardiovascular: RRR  GI: normoactive bowel sounds, soft, nontender  Lymph/Hematologic: no bruising.   Genitourinary: catheter in place with grapefruit colored urine  Skin: no bruising  Musculoskeletal: normal muscle bulk and tone  Neurologic: nonfocal  Psychiatric: alert, oriented, pleasant        Data   Recent Labs   Lab 04/20/19  1231 04/20/19  0820 04/20/19  0624 04/20/19  0008 04/19/19  1513  04/19/19  1501 04/17/19   WBC  --   --  8.1  --  7.1  --   --   --    HGB 12.4*  --  13.0* 12.8* 13.5   < >  --   --    MCV  --   --  99  --  98  --   --   --    PLT  --   --  170  --  193  --   --   --    INR  --  1.84*  --   --   --   --  1.80* 1.9*   NA  --   --  138  --  140  --   --   --    POTASSIUM  --   --  4.0  --  4.0  --   --   --    CHLORIDE  --   --  110*  --  110*  --   --   --    CO2  --   --  24  --  23  --   --   --    BUN  --   --  33*  --  34*  --   --   --    CR  --   --  1.30*  --  1.36*  --   --   --    ANIONGAP  --   --  4  --  7  --   --   --    KARLA  --   --  8.2*  --  9.0  --   --   --    GLC  --   --  143*  --  99  --   --   --     < > = values in this interval not displayed.     Recent Results (from the past 24 hour(s))   CT Abdomen Pelvis w/o & w Contrast    Narrative    CT ABDOMEN PELVIS WITHOUT AND WITH CONTRAST  4/19/2019 5:52 PM    HISTORY: Hematuria. History of bladder cancer.    TECHNIQUE: Scans obtained from the diaphragm through the pelvis  without and with IV contrast, 100 mL Isovue-370. Radiation dose for  this scan was reduced  using automated exposure control, adjustment of  the mA and/or kV according to patient size, or  iterative reconstruction technique.    COMPARISON: CT abdomen and pelvis dated 10/30/2008. Renal ultrasound  dated 9/20/2017.    FINDINGS: Increased interstitial markings in the bases could represent  vascular congestion or mild scarring. There are pacer/defibrillator  wires in the heart. Mild asymmetric thickening of the wall of the  distal esophagus versus a small hiatal hernia with gastric rugae is  noted. Heart is mildly enlarged. There are thoracic aortic  calcifications. Visualized mediastinal contents are otherwise  unremarkable.    Patient is status post right hip arthroplasty causing streak artifact  mildly limiting evaluation of the pelvis. Degenerative changes are  noted in the spine. No aggressive osseous lesions are seen.    Subtle densities in the dependent aspect of the gallbladder likely  represent gallstones but are less dense than typically seen. These  measure up to approximately 1.3 cm. The liver, gallbladder, pancreas,  spleen, bilateral adrenal glands otherwise enhance normally. Several  cysts are seen in the right kidney. The kidneys are mildly atrophic.  There is normal enhancement of the left kidney and left intrarenal  collecting system. Left ureter is unremarkable. There is normal  enhancement of the right kidney and inferior intrarenal collecting  system. No significant contrast is seen in the upper right kidney  intrarenal collecting system and upper aspect of the right renal  pelvis and the right ureter which could represent slightly decreased  function of the right kidney as compared to the left kidney. A  definite mass in this region is not seen.    No nephrolithiasis is identified. No adenopathy, free fluid or free  air is seen in the peritoneal cavity.    Urinary bladder contains a heterogeneously dense structure posteriorly  measuring up to 10 cm in diameter. This could represent  contrast  swirling in the urinary bladder. This could also represent hematoma  and/or mass. Urinary bladder is otherwise distended. There is  questionable mild thickening of the posterior aspect of the urinary  bladder wall. Prostate gland is enlarged and could be part of the  reason for the density in the urinary bladder.    No adenopathy, free fluid or free air seen in the peritoneal cavity.  There is nonaneurysmal atherosclerosis. Incidental note of fatty  atrophy of some of the left gluteus muscles. This could be due to  chronic degenerative disc disease in the spine. There are areas of  stenosis in the central canal and neural foramina which are only  partially imaged on this study.    The colon is grossly of normal caliber. There are scattered  diverticuli in the colon but are most predominantly noted in the  descending colon and proximal sigmoid colon. Structure likely  representing a normal appendix is seen in the region of the cecum.  Small bowel is of normal caliber. Stomach is mostly decompressed but  otherwise unremarkable.      Impression    IMPRESSION:  1. The urinary bladder is distended. There is a radiodense structure  in the dependent aspect of the urinary bladder which could represent  swirling of contrast, thrombus or mass. Prostate gland is enlarged and  could be part of the reason for the density in the urinary bladder.  Ultrasound may be helpful for further evaluation.  2. No contrast is seen in the mid to upper right intrarenal collecting  system and upper right renal pelvis as well as the right ureter. This  could be due to decreased function of the right kidney as compared to  the left.   3. Fatty atrophy in some of the gluteus muscles on the left is likely  due to chronic degenerative disc disease and spinal/neural foraminal  stenosis.  4. No definite metastases are seen.    ARTURO STEPHENSON MD

## 2019-04-20 NOTE — ED NOTES
Upon initial insertion of 3 way crawford. 300 mL of bright red blood was noted. Following the initial output the catheter stopped giving output. RN did manual irrigation with 60 mL of sterile water. Dark red blood and multiple small clots where returned in the 60mL syringe. Patient hooked back up to continuous irrigation, output a dark pink color.

## 2019-04-20 NOTE — H&P
[facility]    Observation Unit  Hospitalist History and Physical    Name: Tucker Slater    MRN: [id number]  YOB: 1931    Age: 88 year old  Date of Admission:  4/19/2019  Date of Service (when I saw the patient): 04/19/19    Assessment & Plan   Tucker Slater is a 88 year old male with PMH significant for bladder cancer s/p surgical resection (2000), prostate cancer s/p radiation ([id number]), CKD (baseline Cr 1.3-1.5), permanent A-fib on Warfarin, s/p pacemaker, HTN, DAWSON on CPAP, mild LV dysfunction (EF of 45-50%), OA, and peripheral neuropathy who presented to the ED on 4/19/19 for evaluation of hematuria. ED workup reveals chloride of 110, BUN of 34, Cr of 1.36, Hgb of 13.5, INR of 1.8, UA shows red urine with 150 glucose, >600 protein, large blood, and >182 RBC, and CT scan of abdomen/pelvis shows distended urinary bladder, radiodense structure in dependent aspect of urinary bladder which could represent swirling of contrast, thrombus, or mass, prostate gland enlarged and could be part of the reason for the density in the urinary bladder, no contrast seen in the mid to upper right intrarenal collecting system and upper right renal pelvis as well righter ureter (could be due to decreased function of the right kidney), and no definite metastases are seen. ED provider discussed case with patient's previous urologist Dr. Ford as well Dr. Mercado on call for [facility].          1. Hematuria: acute onset today at 12 PM without associated pain until patient was in the ED and had difficulty voiding and found to be retaining urine. 3 way catheter was placed in the ED and bladder irrigation was initiated with removal of clots and improvement of pain. Known h/o bladder cancer in 2000. Patient's previous urologist Dr. Ford was contacted while he was in the ED and recommended CT urogram. CT scan showed area of urinary bladder that could represent contrast, thrombus, or mass, concern  for malignancy with known cancer history. Suspect element of ongoing bleeding related to patient being on Warfarin for A-fib. INR subtherapeutic at 1.8 today. Hgb stable at 13.5.    - Continue CBI   - Urology consult  - Serial hemoglobins  - Hold Warfarin  - Pain control with PRN Tylenol, B&O suppositories for spasms, low dose Oxycodone, and IV Dilaudid for severe pain  - NPO in case of procedure tomorrow     2. Permanent A-fib on Warfarin: denies chest pain, palpitations, or shortness of breath, INR subtherapeutic at 1.8 today, rated controlled, follows with Dr. Perez of cardiology for management   - Recheck INR in AM  - Hold Warfarin due to #1   - Continue PTA Carvedilol with parameters    3. H/o bradycardia, s/p pacemaker: implanted in 2015, most recent pacemaker interrogation on 3/6/19 with device functioning within normal parameters      4. HTN, mild LV dysfunction: BP stable and most recent echocardiogram in 08/2018 shows EF of 45-50%.   - Continue PTA Losartan with parameters  - Hold Maxzide with concern for dehydration with ongoing blood loss from #1    5. CKD: baseline creatinine of 1.3-1.5 with Cr of 1.36 today  - Recheck BMP in AM  - Avoid nephrotoxic agents     6. H/o prostate cancer: completed radiation therapy in 2009 without recurrence and stable PSAs in the 2 range     7. Bilateral shoulder pain: OA in both shoulders along with tear of right biceps tendon and complete tear of right rotator cuff, recently evaluated of Dr. Hinojosa of orthopedics who recommended outpatient PT which patient started on 4/18/19. Decreased mobility of right UE due to tears.     DVT Prophylaxis: Pneumatic Compression Devices  Code Status: Full Code, discussed with patient and wife at bedside   Disposition: Expected discharge in 24-48 hours, will admit to observation unit.     Primary Care Physician   Kwadwo Winslow    Chief Complaint   Hematuria      History obtained via discussion with ED provider, Dr. Reno, chart  review, and interview with patient     History of Present Illness   Tucker Slater is a 88 year old male who presents with concern for new hematuria that started at 12 PM today.  Patient states that he was in his usual state of health this morning and did not notice any hematuria prior to 12 PM today.  Denies associated back or flank pain, abdominal pain, dysuria, urinary urgency, or urinary frequency with onset of hematuria.  Denies recent fever, chills, nausea, vomiting, weight loss, chest pain, shortness of breath, lightheadedness, or dizziness.  The patient states that he had a normal bowel movement earlier today without blood.  Patient states since arriving to the emergency room he started feeling as if he had the urge to urinate but had difficulty voiding along with pain that acutely started in the base of his penis as well across his suprapubic region.  The patient states he does have a history of bladder and prostate cancer that he was initially followed closely by his urologist Dr. Ford but due to continued unremarkable visits and no new concerns the patient has not been seen by his urologist in about 2 years. The patient states that his pain is currently controlled after receiving medications in the emergency room and initiating bladder irrigation. The patient states that he is compliant with his warfarin dosing as well INR checks even though his INR is subtherapeutic today.    Past Medical History    Past Medical History:   Diagnosis Date     Arrhythmia     A Fib     Balance problem     related to neuropathy in feet     Bradycardia      Carcinoma in situ of bladder 2000    bladder cancer ;; follow w Urology w periodic cysto      Essential hypertension, benign      Generalized osteoarthrosis, unspecified site knees    s/p R total knee 8/09 ; will do L 6/10      Numbness and tingling     toes and fingers     Pacemaker     [employer]      Pain in joint, shoulder region     L shoulder rotater tear; no  surgery      Persistent atrial fibrillation (H) 1/30/15     Prostate CA (H) 2008-9    radiation     Prostate cancer (H) 11/08    completed RT 3/09     Renal disease     elevated creatinine     Shoulder impingement     R shoulder impingement etc see MRI 4/09      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     toes both feet      Past Surgical History   Past Surgical History:   Procedure Laterality Date     ARTHROPLASTY HIP Right 3/27/2017    Procedure: ARTHROPLASTY HIP;  Surgeon: Jigar Wynn MD;  Location: [facility] OR     C NONSPECIFIC PROCEDURE      bilat inguinal hernia repairs ( 3total procedures)      C NONSPECIFIC PROCEDURE      pilonidal cyst     C NONSPECIFIC PROCEDURE      T + A     C NONSPECIFIC PROCEDURE  8/09     R+L total knee     CARDIOVERSION  3/26/15     COLONOSCOPY       IMPLANT PACEMAKER  3/26/15     RELEASE CARPAL TUNNEL Right 4/19/2017    Procedure: RELEASE CARPAL TUNNEL;  Right carpal tunnel release;  Surgeon: Alonso Barboza MD;  Location: [facility] OR     Prior to Admission Medications   Prior to Admission Medications   Prescriptions Last Dose Informant Patient Reported? Taking?   ACETAMINOPHEN PO   Yes No   Sig: Take 650 mg by mouth every 4 hours as needed for pain   Ascorbic Acid (VITAMIN C PO)   Yes No   Sig: Take 500 mg by mouth daily    JANTOVEN 2.5 MG tablet   No No   Sig: TAKE ONE TABLET BY MOUTH EVERY DAY, EXCEPT TAKE ONE-HALF TABLET BY MOUTH ON FRIDAYS, OR AS INSTRUCTED BASED ON INR RESULTS   Multiple Vitamins-Minerals (OCUVITE PO)   Yes No   Sig: Take 1 tablet by mouth daily    VITAMIN D, CHOLECALCIFEROL, PO   Yes No   Sig: Take 2,000 Units by mouth daily.   amoxicillin (AMOXIL) 875 MG tablet   No No   Sig: Take 1 tablet (875 mg) by mouth 2 times daily for 10 days   calcium carbonate (OS-KARLA 500 MG [place]) 500 MG tablet   Yes No   Sig: Take 500 mg by mouth daily    carvedilol (COREG) 25 MG tablet   No No   Sig: Take 1.5 tablets (37.5 mg) by mouth 2 times  daily (with meals)   guaiFENesin-codeine (ROBITUSSIN AC) 100-10 MG/5ML solution   No No   Si-2 tsp po q 4-6hrs prn cough   losartan (COZAAR) 100 MG tablet   No No   Sig: Take 1 tablet (100 mg) by mouth daily   order for DME   No No   Sig: Oxygen 2 Li/min  at night with CPAP  SPO2 less than or equal to oxygen saturation 89% on effective CPAP in patient with known cardiomyopathy   order for DME   No No   Sig: Oxygen 2 Li/min  at night   predniSONE (DELTASONE) 20 MG tablet   No No   Sig: Take 20 mg by mouth daily for 5 days.   triamterene-HCTZ (MAXZIDE-25) 37.5-25 MG tablet   No No   Sig: Take 1 tablet by mouth daily   vitamin B complex with vitamin C (VITAMIN  B COMPLEX) TABS tablet   Yes No   Sig: Take 1 tablet by mouth daily      Facility-Administered Medications: None     Allergies   Allergies   Allergen Reactions     Merbromin      Other reaction(s): Other, see comments  Severe reaction as child. Amalgam fillings OK.     Morphine Nausea and Vomiting     Amlodipine      Edema     Social History   Social History     Tobacco Use     Smoking status: Former Smoker     Last attempt to quit: 1977     Years since quittin.6     Smokeless tobacco: Never Used   Substance Use Topics     Alcohol use: No     Alcohol/week: 0.0 oz     Social History     Social History Narrative     Not on file     Family History   Family history reviewed with patient and is noncontributory.    Review of Systems   A Comprehensive greater than 10 system review of systems was carried out.  Pertinent positives and negatives are noted above.  Otherwise negative for contributory information.    Physical Exam   Temp: 98.4  F (36.9  C) Temp src: Temporal BP: (!) 163/91 Pulse: 70 Heart Rate: 76 Resp: 18 SpO2: 96 % O2 Device: None (Room air)    Vital Signs with Ranges  Temp:  [98.4  F (36.9  C)] 98.4  F (36.9  C)  Pulse:  [70-76] 70  Heart Rate:  [76] 76  Resp:  [18] 18  BP: (153-163)/(85-91) 163/91  SpO2:  [96 %] 96 %  0 lbs 0 oz    GEN:   Alert, oriented x 3, pleasant and interactive, appears comfortable, no overt distress  HEENT:  Normocephalic/atraumatic, no scleral icterus, no nasal discharge, mouth moist.  CV:  Regular rate and rhythm, no murmur or JVD.  S1 + S2 noted, no S3 or S4.  LUNGS:  Clear to auscultation bilaterally without rales/rhonchi/wheezing/retractions.  Symmetric chest rise on inhalation noted.  ABD:  Active bowel sounds, soft, non-tender, non-distended.  No rebound/guarding/rigidity.  : no gross blood at urethral meatus, 3 way catheter in place  EXT:  No edema.  No cyanosis.  No acute joint synovitis noted.  SKIN:  Dry to touch, no exanthems noted in the visualized areas.  NEURO:  Symmetric muscle strength, sensation to touch grossly intact.  Coordination symmetric on general exam.  No new focal deficits appreciated.    Data   Data reviewed today:  I personally reviewed all labs and imaging from this visit.     Results for orders placed or performed during the hospital encounter of 04/19/19   CT Abdomen Pelvis w/o & w Contrast    Narrative    CT ABDOMEN PELVIS WITHOUT AND WITH CONTRAST  4/19/2019 5:52 PM    HISTORY: Hematuria. History of bladder cancer.    TECHNIQUE: Scans obtained from the diaphragm through the pelvis  without and with IV contrast, 100 mL Isovue-370. Radiation dose for  this scan was reduced using automated exposure control, adjustment of  the mA and/or kV according to patient size, or  iterative reconstruction technique.    COMPARISON: CT abdomen and pelvis dated 10/30/2008. Renal ultrasound  dated 9/20/2017.    FINDINGS: Increased interstitial markings in the bases could represent  vascular congestion or mild scarring. There are pacer/defibrillator  wires in the heart. Mild asymmetric thickening of the wall of the  distal esophagus versus a small hiatal hernia with gastric rugae is  noted. Heart is mildly enlarged. There are thoracic aortic  calcifications. Visualized mediastinal contents are  otherwise  unremarkable.    Patient is status post right hip arthroplasty causing streak artifact  mildly limiting evaluation of the pelvis. Degenerative changes are  noted in the spine. No aggressive osseous lesions are seen.    Subtle densities in the dependent aspect of the gallbladder likely  represent gallstones but are less dense than typically seen. These  measure up to approximately 1.3 cm. The liver, gallbladder, pancreas,  spleen, bilateral adrenal glands otherwise enhance normally. Several  cysts are seen in the right kidney. The kidneys are mildly atrophic.  There is normal enhancement of the left kidney and left intrarenal  collecting system. Left ureter is unremarkable. There is normal  enhancement of the right kidney and inferior intrarenal collecting  system. No significant contrast is seen in the upper right kidney  intrarenal collecting system and upper aspect of the right renal  pelvis and the right ureter which could represent slightly decreased  function of the right kidney as compared to the left kidney. A  definite mass in this region is not seen.    No nephrolithiasis is identified. No adenopathy, free fluid or free  air is seen in the peritoneal cavity.    Urinary bladder contains a heterogeneously dense structure posteriorly  measuring up to 10 cm in diameter. This could represent contrast  swirling in the urinary bladder. This could also represent hematoma  and/or mass. Urinary bladder is otherwise distended. There is  questionable mild thickening of the posterior aspect of the urinary  bladder wall. Prostate gland is enlarged and could be part of the  reason for the density in the urinary bladder.    No adenopathy, free fluid or free air seen in the peritoneal cavity.  There is nonaneurysmal atherosclerosis. Incidental note of fatty  atrophy of some of the left gluteus muscles. This could be due to  chronic degenerative disc disease in the spine. There are areas of  stenosis in the  central canal and neural foramina which are only  partially imaged on this study.    The colon is grossly of normal caliber. There are scattered  diverticuli in the colon but are most predominantly noted in the  descending colon and proximal sigmoid colon. Structure likely  representing a normal appendix is seen in the region of the cecum.  Small bowel is of normal caliber. Stomach is mostly decompressed but  otherwise unremarkable.      Impression    IMPRESSION:  1. The urinary bladder is distended. There is a radiodense structure  in the dependent aspect of the urinary bladder which could represent  swirling of contrast, thrombus or mass. Prostate gland is enlarged and  could be part of the reason for the density in the urinary bladder.  Ultrasound may be helpful for further evaluation.  2. No contrast is seen in the mid to upper right intrarenal collecting  system and upper right renal pelvis as well as the right ureter. This  could be due to decreased function of the right kidney as compared to  the left.   3. Fatty atrophy in some of the gluteus muscles on the left is likely  due to chronic degenerative disc disease and spinal/neural foraminal  stenosis.  4. No definite metastases are seen.   UA reflex to Microscopic   Result Value Ref Range    Color Urine Red     Appearance Urine Cloudy     Glucose Urine 150 (A) NEG^Negative mg/dL    Bilirubin Urine Negative NEG^Negative    Ketones Urine Negative NEG^Negative mg/dL    Specific Gravity Urine 1.015 1.003 - 1.035    Blood Urine Large (A) NEG^Negative    pH Urine 8.0 (H) 5.0 - 7.0 pH    Protein Albumin Urine >600 (A) NEG^Negative mg/dL    Urobilinogen mg/dL Normal 0.0 - 2.0 mg/dL    Nitrite Urine Negative NEG^Negative    Leukocyte Esterase Urine Negative NEG^Negative    Source Midstream Urine     RBC Urine >182 (H) 0 - 2 /HPF   INR   Result Value Ref Range    INR 1.80 (H) 0.86 - 1.14   CBC with platelets differential   Result Value Ref Range    WBC 7.1 4.0 - 11.0  10e9/L    RBC Count 4.26 (L) 4.4 - 5.9 10e12/L    Hemoglobin 13.5 13.3 - 17.7 g/dL    Hematocrit 41.9 40.0 - 53.0 %    MCV 98 78 - 100 fl    MCH 31.7 26.5 - 33.0 pg    MCHC 32.2 31.5 - 36.5 g/dL    RDW 13.9 10.0 - 15.0 %    Platelet Count 193 150 - 450 10e9/L    Diff Method Automated Method     % Neutrophils 76.9 %    % Lymphocytes 9.8 %    % Monocytes 7.5 %    % Eosinophils 4.8 %    % Basophils 0.7 %    % Immature Granulocytes 0.3 %    Nucleated RBCs 0 0 /100    Absolute Neutrophil 5.5 1.6 - 8.3 10e9/L    Absolute Lymphocytes 0.7 (L) 0.8 - 5.3 10e9/L    Absolute Monocytes 0.5 0.0 - 1.3 10e9/L    Absolute Eosinophils 0.3 0.0 - 0.7 10e9/L    Absolute Basophils 0.1 0.0 - 0.2 10e9/L    Abs Immature Granulocytes 0.0 0 - 0.4 10e9/L    Absolute Nucleated RBC 0.0    Basic metabolic panel   Result Value Ref Range    Sodium 140 133 - 144 mmol/L    Potassium 4.0 3.4 - 5.3 mmol/L    Chloride 110 (H) 94 - 109 mmol/L    Carbon Dioxide 23 20 - 32 mmol/L    Anion Gap 7 3 - 14 mmol/L    Glucose 99 70 - 99 mg/dL    Urea Nitrogen 34 (H) 7 - 30 mg/dL    Creatinine 1.36 (H) 0.66 - 1.25 mg/dL    GFR Estimate 46 (L) >60 mL/min/[1.73_m2]    GFR Estimate If Black 53 (L) >60 mL/min/[1.73_m2]    Calcium 9.0 8.5 - 10.1 mg/dL     Yelena Burns PA-C

## 2019-04-20 NOTE — ED NOTES
[facility]  ED Nurse Handoff Report    Tucker Slater is a 88 year old male   ED Chief complaint: Hematuria  . ED Diagnosis:   Final diagnoses:   Gross hematuria   Bladder mass   Urinary retention     Allergies:   Allergies   Allergen Reactions     Merbromin      Other reaction(s): Other, see comments  Severe reaction as child. Amalgam fillings OK.     Morphine Nausea and Vomiting     Amlodipine      Edema       Code Status: Full Code  Activity level - Baseline/Home:  Independent. Activity Level - Current:   Stand with Assist. Lift room needed: No. Bariatric: No   Needed: No   Isolation: No. Infection: Not Applicable.     Vital Signs:   Vitals:    04/19/19 1310 04/19/19 1715   BP: 153/85 160/87   Pulse: 76 70   Resp: 18    Temp: 98.4  F (36.9  C)    TempSrc: Temporal    SpO2: 96%        Cardiac Rhythm:  ,      Pain level: 0-10 Pain Scale: 8  Patient confused: No. Patient Falls Risk: Yes.   Elimination Status: Has voided and Urethral catheter (crawford) in place; refer to orders to discontinue as per protocol    Patient Report - Initial Complaint: heamturia. Focused Assessment: pt c/o hematuria that started today. Pt currently on warfarin for afib. patient has a hx of bladder cancerTests Performed: CT, blood work. Abnormal Results:   Labs Ordered and Resulted from Time of ED Arrival Up to the Time of Departure from the ED   URINE MACROSCOPIC WITH REFLEX TO MICRO - Abnormal; Notable for the following components:       Result Value    Glucose Urine 150 (*)     Blood Urine Large (*)     pH Urine 8.0 (*)     Protein Albumin Urine >600 (*)     RBC Urine >182 (*)     All other components within normal limits   INR - Abnormal; Notable for the following components:    INR 1.80 (*)     All other components within normal limits   CBC WITH PLATELETS DIFFERENTIAL - Abnormal; Notable for the following components:    RBC Count 4.26 (*)     Absolute Lymphocytes 0.7 (*)     All other components within  normal limits   BASIC METABOLIC PANEL - Abnormal; Notable for the following components:    Chloride 110 (*)     Urea Nitrogen 34 (*)     Creatinine 1.36 (*)     GFR Estimate 46 (*)     GFR Estimate If Black 53 (*)     All other components within normal limits   PERIPHERAL IV CATHETER     CT Abdomen Pelvis w/o & w Contrast   Preliminary Result   IMPRESSION:   1. The urinary bladder is distended. There is a radiodense structure   in the dependent aspect of the urinary bladder which could represent   swirling of contrast, thrombus or mass. Prostate gland is enlarged and   could be part of the reason for the density in the urinary bladder.   Ultrasound may be helpful for further evaluation.   2. No contrast is seen in the mid to upper right intrarenal collecting   system and upper right renal pelvis as well as the right ureter. This   could be due to decreased function of the right kidney as compared to   the left.    3. Fatty atrophy in some of the gluteus muscles on the left is likely   due to chronic degenerative disc disease and spinal/neural foraminal   stenosis.   4. No definite metastases are seen.        .   Treatments provided: pain medication, 3 way catheter placed  Family Comments: wife at bedside  OBS brochure/video discussed/provided to patient:  Yes  ED Medications:   Medications   morphine (PF) injection 4 mg (has no administration in time range)   0.9% sodium chloride BOLUS (1,000 mLs Intravenous New Bag 4/19/19 1716)   iopamidol (ISOVUE-370) solution 500 mL (100 mLs Intravenous Given 4/19/19 1727)   0.9% sodium chloride BOLUS (0 mLs Intravenous Stopped 4/19/19 1751)   fentaNYL (PF) (SUBLIMAZE) injection 25 mcg (25 mcg Intravenous Given 4/19/19 1859)     Drips infusing:  Yes  For the majority of the shift, the patient's behavior Green. Interventions performed were frequent rounding.     Severe Sepsis OR Septic Shock Diagnosis Present: No      ED Nurse Name/Phone Number: Ana Diaz,   7:06  PM    RECEIVING UNIT ED HANDOFF REVIEW    Above ED Nurse Handoff Report was reviewed: Yes  Reviewed by: Esperanza Zapien on April 19, 2019 at 8:51 PM

## 2019-04-20 NOTE — PLAN OF CARE
PRIMARY DIAGNOSIS: Hematuria  OUTPATIENT/OBSERVATION GOALS TO BE MET BEFORE DISCHARGE:  ADLs back to baseline: Yes     Activity and level of assistance: Up with standby assistance.     Pain status: Pain free.     Return to near baseline physical activity: Yes          Discharge Planner Nurse   Safe discharge environment identified: Yes  Barriers to discharge: Yes       Entered by: Yan Mccollum 04/20/2019      Pt alert and oriented x4. He denies pain at this time. Urology hand irrigated this AM, irrigated lots of small clots. CBI running at slow/moderate rate, pink, clear urine output. SBA. SL. Regular diet. VSS. Will continue to monitor.     /55 (BP Location: Right arm)   Pulse 73   Temp 97.3  F (36.3  C) (Oral)   Resp 18   SpO2 93%        Please review provider order for any additional goals.   Nurse to notify provider when observation goals have been met and patient is ready for discharge.

## 2019-04-20 NOTE — PROGRESS NOTES
Pt reported pressure in bladder and had leaking around catheter. Hand irrigated for about 15 minutes, removed a lot of small to medium sized clots. Restarted CBI with clamp open all the way. Will continue to monitor.

## 2019-04-20 NOTE — PLAN OF CARE
PRIMARY DIAGNOSIS: Hematuria  OUTPATIENT/OBSERVATION GOALS TO BE MET BEFORE DISCHARGE:  ADLs back to baseline: Yes    Activity and level of assistance: Up with standby assistance.    Pain status: Pain free.    Return to near baseline physical activity: Yes     Discharge Planner Nurse   Safe discharge environment identified: Yes  Barriers to discharge: Yes       Entered by: Yan Mccollum 04/20/2019     Pt alert and oriented x4. He denies pain at this time. Urology hand irrigated this AM, irrigated lots of small clots. CBI running at moderate rate, pink, clear urine output. SBA. SL. Regular diet. VSS. Will continue to monitor.    /65 (BP Location: Right arm)   Pulse 73   Temp 97.3  F (36.3  C) (Oral)   Resp 18   SpO2 96%        Please review provider order for any additional goals.   Nurse to notify provider when observation goals have been met and patient is ready for discharge.

## 2019-04-20 NOTE — PLAN OF CARE
PRIMARY DIAGNOSIS: HEMATURIA  OUTPATIENT/OBSERVATION GOALS TO BE MET BEFORE DISCHARGE:  1. Activity and level of assistance: Up with standby assistance.    2. Pain status: Improved controlled with opium-belladonna suppository     3. Return to near baseline physical activity: Yes     Discharge Planner Nurse   Safe discharge environment identified: Yes  Barriers to discharge: Yes       Vitals are Temp: 96.1  F (35.6  C) Temp src: Oral BP: 149/74 Pulse: 74 Heart Rate: 73 Resp: 16 SpO2: 96 %.  Patient is Alert and Oriented x4. They are SBA with Activity.  Pt is a NPO diet.  They are denying pain.  Patient is Saline locked. CBI running at moderate, strawberry colored output noted. No clots noted. Patient had vomiting episode without nausea, IV zofran given. Plan of care: Continue monitoring CBI, urology consult       Please review provider order for any additional goals.   Nurse to notify provider when observation goals have been met and patient is ready for discharge.

## 2019-04-21 LAB
CREAT SERPL-MCNC: 1.38 MG/DL (ref 0.66–1.25)
GFR SERPL CREATININE-BSD FRML MDRD: 45 ML/MIN/{1.73_M2}
HGB BLD-MCNC: 11.9 G/DL (ref 13.3–17.7)
INR PPP: 1.8 (ref 0.86–1.14)

## 2019-04-21 PROCEDURE — 99225 ZZC SUBSEQUENT OBSERVATION CARE,LEVEL II: CPT | Performed by: PHYSICIAN ASSISTANT

## 2019-04-21 PROCEDURE — 36415 COLL VENOUS BLD VENIPUNCTURE: CPT | Performed by: PHYSICIAN ASSISTANT

## 2019-04-21 PROCEDURE — 25000132 ZZH RX MED GY IP 250 OP 250 PS 637: Performed by: PHYSICIAN ASSISTANT

## 2019-04-21 PROCEDURE — 99207 ZZC CDG-MDM COMPONENT: MEETS MODERATE - UP CODED: CPT | Performed by: PHYSICIAN ASSISTANT

## 2019-04-21 PROCEDURE — 82565 ASSAY OF CREATININE: CPT | Performed by: PHYSICIAN ASSISTANT

## 2019-04-21 PROCEDURE — 85610 PROTHROMBIN TIME: CPT | Performed by: PHYSICIAN ASSISTANT

## 2019-04-21 PROCEDURE — 85018 HEMOGLOBIN: CPT | Performed by: PHYSICIAN ASSISTANT

## 2019-04-21 PROCEDURE — G0378 HOSPITAL OBSERVATION PER HR: HCPCS

## 2019-04-21 RX ORDER — SENNOSIDES 8.6 MG
1-2 TABLET ORAL 2 TIMES DAILY PRN
Status: DISCONTINUED | OUTPATIENT
Start: 2019-04-21 | End: 2019-04-24 | Stop reason: HOSPADM

## 2019-04-21 RX ADMIN — CARVEDILOL 37.5 MG: 25 TABLET, FILM COATED ORAL at 07:42

## 2019-04-21 RX ADMIN — LOSARTAN POTASSIUM 100 MG: 100 TABLET ORAL at 07:43

## 2019-04-21 RX ADMIN — SENNOSIDES AND DOCUSATE SODIUM 2 TABLET: 8.6; 5 TABLET ORAL at 10:38

## 2019-04-21 RX ADMIN — CARVEDILOL 37.5 MG: 25 TABLET, FILM COATED ORAL at 19:09

## 2019-04-21 RX ADMIN — Medication 1 LOZENGE: at 00:35

## 2019-04-21 NOTE — PLAN OF CARE
PRIMARY DIAGNOSIS: HEMATURIA   OUTPATIENT/OBSERVATION GOALS TO BE MET BEFORE DISCHARGE:  ADLs back to baseline: Yes     Activity and level of assistance: Up with standby assistance.     Pain status: Pain free.     Return to near baseline physical activity: Yes     Patient alert and oriented x4. Vitals are Temp: 98.1  F (36.7  C) Temp src: Oral BP: 140/73 Pulse: 73 Heart Rate: 68 Resp: 16 SpO2: 96 % RA. Denies pain. Patient reported mild pressure in bladder that was relieved by walking around room. CBI infusing at a slow to moderate rate with clear light-pink output. Lozenge given for scratchy throat. Plan to continue with CBI at this time. Urology following.      Discharge Planner Nurse   Safe discharge environment identified: Yes  Barriers to discharge: No       Entered by: Toma Wall 04/21/2019   Please review provider order for any additional goals.   Nurse to notify provider when observation goals have been met and patient is ready for discharge.

## 2019-04-21 NOTE — PROGRESS NOTES
Spoke with on call urologist. CBI to be turned back on and very slow drip and titrated to off again if urine can remain clear. Urine currently maroon in crawford tube.

## 2019-04-21 NOTE — PLAN OF CARE
PRIMARY DIAGNOSIS: hematuria  Primary Symptoms: bladder discomfort     OUTPATIENT/OBSERVATION GOALS TO BE MET BEFORE DISCHARGE:     1. Orthostatic symptoms (BP decrease or HR increase with patient upright)?  No  2. Vital Signs stable? Yes  3. Documented urine output: Yes, urine is maroon in color. Urology paged. CBI turned back on very very slow.   4. Tolerating PO fluid: Yes  5. Symptoms improved: Yes  6. Labs WNL? Yes  7. ADLs back to baseline?  No  8. Activity and level of assistance: Up with standby assistance. SBA 1  9. Pain status: Pain free.  10. Barriers to discharge noted Yes Hematuria without  clots in cath      Interpretation of rhythm per telemetry tech: NA

## 2019-04-21 NOTE — PROGRESS NOTES
Pt urine looking dark and red in catheter tube. He denies any pain or pressure. Hand irrigated without complaint, no clots noted. Catheter draining and not obstructions noted.

## 2019-04-21 NOTE — PROGRESS NOTES
[facility] Urology Progress Note          Assessment and Plan:   Active Problems:    Hematuria    Assessment: CBI running at slow drip this AM with clear urine output    Plan:   - CBI clamped this AM  - If he remains clear throughout the day will plan for crawford removal and Trial of void tomorrow AM  - He will need out-patient follow up for cystoscopy in he next few weeks. He prefers to following at [place] and our office will arrange pending Trial of void tomorrow.      Héctor Baltazar MD   [facility]  Office: [phone]               Interval History:   doing well; no cp, sob, n/v/d, or abd pain. Crawford clear on slow drip CBI              Review of Systems:   The 5 point Review of Systems is negative other than noted in the HPI             Medications:     Current Facility-Administered Medications Ordered in Epic   Medication Dose Route Frequency Last Rate Last Dose     acetaminophen (TYLENOL) tablet 650 mg  650 mg Oral Q4H PRN         benzocaine-menthol (CHLORASEPTIC) 6-10 MG lozenge 1 lozenge  1 lozenge Buccal Q1H PRN   1 lozenge at 04/21/19 0035     carvedilol (COREG) tablet 37.5 mg  37.5 mg Oral BID w/meals   37.5 mg at 04/21/19 0742     HYDROmorphone (PF) (DILAUDID) injection 0.2 mg  0.2 mg Intravenous Q2H PRN         losartan (COZAAR) tablet 100 mg  100 mg Oral Daily   100 mg at 04/21/19 0743     melatonin tablet 1 mg  1 mg Oral At Bedtime PRN         naloxone (NARCAN) injection 0.1-0.4 mg  0.1-0.4 mg Intravenous Q2 Min PRN         ondansetron (ZOFRAN-ODT) ODT tab 4 mg  4 mg Oral Q6H PRN        Or     ondansetron (ZOFRAN) injection 4 mg  4 mg Intravenous Q6H PRN   4 mg at 04/20/19 0304     opium-belladonna (B&O SUPPRETTES) 30-16.2 MG per suppository 1 suppository  30 mg Rectal Q8H PRN   1 suppository at 04/19/19 2216     oxyCODONE (ROXICODONE) tablet 5 mg  5 mg Oral Q3H PRN         polyethylene glycol (MIRALAX/GLYCOLAX) Packet 17 g  17 g Oral Daily PRN         prochlorperazine  (COMPAZINE) injection 5 mg  5 mg Intravenous Q6H PRN        Or     prochlorperazine (COMPAZINE) tablet 5 mg  5 mg Oral Q6H PRN        Or     prochlorperazine (COMPAZINE) Suppository 12.5 mg  12.5 mg Rectal Q12H PRN         senna-docusate (SENOKOT-S/PERICOLACE) 8.6-50 MG per tablet 1 tablet  1 tablet Oral BID PRN        Or     senna-docusate (SENOKOT-S/PERICOLACE) 8.6-50 MG per tablet 2 tablet  2 tablet Oral BID PRN         No current Epic-ordered outpatient medications on file.                  Physical Exam:   Vitals were reviewed  Patient Vitals for the past 8 hrs:   BP Temp Temp src Pulse Heart Rate Resp SpO2   04/21/19 0746 137/75 97.9  F (36.6  C) Oral 71 -- 16 96 %   04/21/19 0401 140/73 98.1  F (36.7  C) Oral -- 68 16 96 %     GEN: NAD, lying in bed  HEENT: EOMI  NECK: Supple  ABD: Obese, soft  EXT: No LE edema  : Tinoco clear on slow drip CBI           Data:   No results found for: NTBNPI, NTBNP  Lab Results   Component Value Date    WBC 8.1 04/20/2019    WBC 7.1 04/19/2019    WBC 6.2 12/29/2017    HGB 11.9 (L) 04/21/2019    HGB 12.4 (L) 04/20/2019    HGB 13.0 (L) 04/20/2019    HCT 39.8 (L) 04/20/2019    HCT 41.9 04/19/2019    HCT 44.5 12/29/2017    MCV 99 04/20/2019    MCV 98 04/19/2019    MCV 97 12/29/2017     04/20/2019     04/19/2019     12/29/2017     Lab Results   Component Value Date    INR 1.80 (H) 04/21/2019    INR 1.84 (H) 04/20/2019    INR 1.80 (H) 04/19/2019

## 2019-04-21 NOTE — PLAN OF CARE
PRIMARY DIAGNOSIS: Hematuria  Primary Symptoms: Bloody urine    OUTPATIENT/OBSERVATION GOALS TO BE MET BEFORE DISCHARGE:    1. Orthostatic symptoms (BP decrease or HR increase with patient upright)?  No  2. Vital Signs stable? No  3. Documented urine output: Yes-CBI clamped by urology at 8.  4. Tolerating PO fluid: Yes  5. Symptoms improved: Yes  6. Labs WNL? Yes  7. ADLs back to baseline?  Yes  8. Activity and level of assistance: Up with standby assistance.  9. Pain status: Pain free.  10. Barriers to discharge noted No    Interpretation of rhythm per telemetry tech: NONE

## 2019-04-21 NOTE — PROGRESS NOTES
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  INR 1.8 on admission.  No significant anemia.  CT demonstrated significant clot burden in the bladder, urology performed irrigation yesterday.  Appreciate urology evaluation and recommendations  --CBI clamped this AM by urology  --holding warfarin  --repeat hemoglobin in AM  --pain control with PRN acetaminophen, oxycodone, B&O suppositories (not requiring)  --If remains clear will plan for Tinoco removal and voiding trial tomorrow AM  --likely cystoscopy as outpatient, per urology, in next few weeks (in [place])    Permanent atrial fibrillation on warfarin  History of bradycardia s/p PPM  Hypertension  Mild LV dysfunction without current evidence of heart failure  --holding warfarin due to hematuria.  Repeat INR in AM  --continue carvedilol and losartan with hold parameters  --hold maxzide     Bilateral shoulder pain: outpatient PT for OA of both shoulders and tear of right biceps tendon and complete tear of right rotator cuff.  Ortho evaluated as outpatient  CKD stage 3, baseline creatinine 1.3: at baseline  DAWSON on CPAP          Diet: Regular Diet Adult    DVT Prophylaxis: Pneumatic Compression Devices  Tinoco Catheter: in place, indication: Retention;Gross Hematuria, hematuria requiring CBI  Code Status: Full Code      Disposition Plan   Expected discharge: 1-2 days, recommended to prior living arrangement once no longer requiring CBI.  Entered: Ashleigh Eagle PA-C 04/21/2019, 3:14 PM       The patient's care was discussed with the Attending Physician, Dr. Zee.    Ashleigh Eagle PA-C  Hospitalist Service  [facility]    ______________________________________________________________________    Interval History   Comfortable after irrigation of clots this morning.  Denies dyspnea.      Data reviewed today: I reviewed all medications, new labs and imaging results over the last 24 hours. I personally reviewed no images or EKG's today.    Physical Exam   Vital Signs: Temp: 98.8  F (37.1  C) Temp src: Oral BP: 113/57 Pulse: 70 Heart Rate: 70 Resp: 16 SpO2: 94 % O2 Device: None (Room air)    Weight: 0 lbs 0 oz    Constitutional: nontoxic appearing man sitting up in bed  Eyes: no icterus  HEENT: mucous membranes moist  Respiratory: clear bilaterally  Cardiovascular: RRR  GI: normoactive bowel sounds, soft, nontender  Lymph/Hematologic: no bruising.   Genitourinary: catheter in place with grapefruit colored urine  Skin: no bruising  Musculoskeletal: normal muscle bulk and tone  Neurologic: nonfocal  Psychiatric: alert, oriented, pleasant        Data   Recent Labs   Lab 04/21/19  0615 04/20/19  1231 04/20/19  0820 04/20/19  0624  04/19/19  1513 04/19/19  1501   WBC  --   --   --  8.1  --  7.1  --    HGB 11.9* 12.4*  --  13.0*   < > 13.5  --    MCV  --   --   --  99  --  98  --    PLT  --   --   --  170  --  193  --    INR 1.80*  --  1.84*  --   --   --  1.80*   NA  --   --   --  138  --  140  --    POTASSIUM  --   --   --  4.0  --  4.0  --    CHLORIDE  --   --   --  110*  --  110*  --    CO2  --   --   --  24  --  23  --    BUN  --   --   --  33*  --  34*  --    CR 1.38*  --   --  1.30*  --  1.36*  --    ANIONGAP  --   --   --  4  --  7  --    KARLA  --   --   --  8.2*  --  9.0  --    GLC  --   --   --  143*  --  99  --     < > = values in this interval not displayed.     No results found for this or any previous visit (from the past 24 hour(s)).

## 2019-04-21 NOTE — PLAN OF CARE
PRIMARY DIAGNOSIS: HEMATURIA   OUTPATIENT/OBSERVATION GOALS TO BE MET BEFORE DISCHARGE:  ADLs back to baseline: Yes    Activity and level of assistance: Up with standby assistance.    Pain status: Pain free.    Return to near baseline physical activity: Yes    Patient alert and oriented x4. Vitals are Temp: 97.6  F (36.4  C) Temp src: Oral BP: 120/61 Pulse: 73 Heart Rate: 66 Resp: 18 SpO2: 94 % RA. Denies pain. Patient reported mild pressure in bladder that was relieved by walking around room. CBI infusing at a slow to moderate rate with clear light-pink output. Lozenge given for scratchy throat. Plan to continue with CBI at this time. Urology following.     Discharge Planner Nurse   Safe discharge environment identified: Yes  Barriers to discharge: No       Entered by: Toma Wall 04/21/2019      Please review provider order for any additional goals.   Nurse to notify provider when observation goals have been met and patient is ready for discharge.

## 2019-04-21 NOTE — PLAN OF CARE
PRIMARY DIAGNOSIS: HEMATURIA    OUTPATIENT/OBSERVATION GOALS TO BE MET BEFORE DISCHARGE:    ADLs back to baseline: Yes, requiring SBA due to CBI and crawford     Activity and level of assistance: Up with standby assistance.     Pain status: Pain free.     Return to near baseline physical activity: Yes     VSS on RA. A&Ox4. SBA. Pt's wife and daughter visited. Independently positioning and no redness noted on skin. CBI running at a slow to moderate pace, output is clear/light pink. SL, pt tolerating oral intake, regular diet. PCDs on and functioning. Pt denies pain or pressure. Pt reported scratchy throat, throat lozenge given. Plan: continue CBI, monitor for pain and/or pressure          Discharge Planner Nurse   Safe discharge environment identified: Yes, home with wife  Barriers to discharge: Yes, CBI still running at a moderate pace         Entered by: Esperanza Zapien 04/20/2019     Please review provider order for any additional goals.   Nurse to notify provider when observation goals have been met and patient is ready for discharge.

## 2019-04-21 NOTE — PLAN OF CARE
PRIMARY DIAGNOSIS: HEMATURIA  OUTPATIENT/OBSERVATION GOALS TO BE MET BEFORE DISCHARGE:  ADLs back to baseline: Yes, requiring SBA due to CBI and crawford    Activity and level of assistance: Up with standby assistance.    Pain status: Pain free.    Return to near baseline physical activity: Yes    VSS on RA. A&Ox4. SBA. Pt is reading a magazine. Independently positioning and no redness noted on skin. CBI running at a moderate pace, output is clear/light pink. SL, pt tolerating oral intake, regular diet. PCDs on and functioning. Pt denies pain or pressure. Plan: continue CBI, monitor for pain and/or pressure     Discharge Planner Nurse   Safe discharge environment identified: Yes, home with wife  Barriers to discharge: Yes, CBI still running at a moderate pace       Entered by: Esperanza Zapien 04/20/2019 7:12 PM     Please review provider order for any additional goals.   Nurse to notify provider when observation goals have been met and patient is ready for discharge.

## 2019-04-21 NOTE — PLAN OF CARE
PRIMARY DIAGNOSIS: hematuria  Primary Symptoms: bladder discomfort    OUTPATIENT/OBSERVATION GOALS TO BE MET BEFORE DISCHARGE:    1. Orthostatic symptoms (BP decrease or HR increase with patient upright)?  No  2. Vital Signs stable? Yes  3. Documented urine output: Yes  4. Tolerating PO fluid: Yes  5. Symptoms improved: Yes  6. Labs WNL? Yes  7. ADLs back to baseline?  No  8. Activity and level of assistance: Up with standby assistance. SBA 1  9. Pain status: Pain free.  10. Barriers to discharge noted Yes Hematuria without  clots in cath     Interpretation of rhythm per telemetry tech: NA

## 2019-04-22 LAB
ANION GAP SERPL CALCULATED.3IONS-SCNC: 5 MMOL/L (ref 3–14)
BASOPHILS # BLD AUTO: 0.1 10E9/L (ref 0–0.2)
BASOPHILS NFR BLD AUTO: 0.7 %
BUN SERPL-MCNC: 29 MG/DL (ref 7–30)
CALCIUM SERPL-MCNC: 8.7 MG/DL (ref 8.5–10.1)
CHLORIDE SERPL-SCNC: 108 MMOL/L (ref 94–109)
CO2 SERPL-SCNC: 25 MMOL/L (ref 20–32)
CREAT SERPL-MCNC: 1.34 MG/DL (ref 0.66–1.25)
DIFFERENTIAL METHOD BLD: ABNORMAL
EOSINOPHIL # BLD AUTO: 0.3 10E9/L (ref 0–0.7)
EOSINOPHIL NFR BLD AUTO: 4.1 %
ERYTHROCYTE [DISTWIDTH] IN BLOOD BY AUTOMATED COUNT: 13.7 % (ref 10–15)
GFR SERPL CREATININE-BSD FRML MDRD: 47 ML/MIN/{1.73_M2}
GLUCOSE SERPL-MCNC: 95 MG/DL (ref 70–99)
HCT VFR BLD AUTO: 36.3 % (ref 40–53)
HGB BLD-MCNC: 11.5 G/DL (ref 13.3–17.7)
HGB BLD-MCNC: 11.9 G/DL (ref 13.3–17.7)
HGB BLD-MCNC: 11.9 G/DL (ref 13.3–17.7)
IMM GRANULOCYTES # BLD: 0 10E9/L (ref 0–0.4)
IMM GRANULOCYTES NFR BLD: 0.1 %
INR PPP: 1.55 (ref 0.86–1.14)
LYMPHOCYTES # BLD AUTO: 0.8 10E9/L (ref 0.8–5.3)
LYMPHOCYTES NFR BLD AUTO: 10.8 %
MCH RBC QN AUTO: 32.2 PG (ref 26.5–33)
MCHC RBC AUTO-ENTMCNC: 32.8 G/DL (ref 31.5–36.5)
MCV RBC AUTO: 98 FL (ref 78–100)
MONOCYTES # BLD AUTO: 0.9 10E9/L (ref 0–1.3)
MONOCYTES NFR BLD AUTO: 11.3 %
NEUTROPHILS # BLD AUTO: 5.5 10E9/L (ref 1.6–8.3)
NEUTROPHILS NFR BLD AUTO: 73 %
NRBC # BLD AUTO: 0 10*3/UL
NRBC BLD AUTO-RTO: 0 /100
PLATELET # BLD AUTO: 157 10E9/L (ref 150–450)
POTASSIUM SERPL-SCNC: 3.7 MMOL/L (ref 3.4–5.3)
RBC # BLD AUTO: 3.7 10E12/L (ref 4.4–5.9)
SODIUM SERPL-SCNC: 138 MMOL/L (ref 133–144)
WBC # BLD AUTO: 7.5 10E9/L (ref 4–11)

## 2019-04-22 PROCEDURE — 80048 BASIC METABOLIC PNL TOTAL CA: CPT | Performed by: PHYSICIAN ASSISTANT

## 2019-04-22 PROCEDURE — 25000132 ZZH RX MED GY IP 250 OP 250 PS 637: Performed by: PHYSICIAN ASSISTANT

## 2019-04-22 PROCEDURE — 36415 COLL VENOUS BLD VENIPUNCTURE: CPT | Performed by: PHYSICIAN ASSISTANT

## 2019-04-22 PROCEDURE — 12000011 ZZH R&B MS OVERFLOW

## 2019-04-22 PROCEDURE — 85025 COMPLETE CBC W/AUTO DIFF WBC: CPT | Performed by: PHYSICIAN ASSISTANT

## 2019-04-22 PROCEDURE — 85018 HEMOGLOBIN: CPT | Performed by: PHYSICIAN ASSISTANT

## 2019-04-22 PROCEDURE — 99232 SBSQ HOSP IP/OBS MODERATE 35: CPT | Performed by: UROLOGY

## 2019-04-22 PROCEDURE — G0378 HOSPITAL OBSERVATION PER HR: HCPCS

## 2019-04-22 PROCEDURE — 99232 SBSQ HOSP IP/OBS MODERATE 35: CPT | Performed by: PHYSICIAN ASSISTANT

## 2019-04-22 PROCEDURE — 85610 PROTHROMBIN TIME: CPT | Performed by: PHYSICIAN ASSISTANT

## 2019-04-22 RX ADMIN — LOSARTAN POTASSIUM 100 MG: 100 TABLET ORAL at 08:48

## 2019-04-22 RX ADMIN — Medication 1 LOZENGE: at 20:11

## 2019-04-22 RX ADMIN — CARVEDILOL 37.5 MG: 25 TABLET, FILM COATED ORAL at 08:48

## 2019-04-22 RX ADMIN — CARVEDILOL 37.5 MG: 25 TABLET, FILM COATED ORAL at 17:09

## 2019-04-22 NOTE — PROGRESS NOTES
[facility]  Internal Medicine  Progress Note    Date of Service: 4/22/2019    Patient: Tucker Slater  MRN: [id number]  Admission Date: 4/19/2019  Hospital Day # 4    Assessment & Plan: Tucker Slater is a 88 year old male with PMH significant for bladder cancer s/p surgical resection (2000), prostate cancer s/p radiation (9983-6297), CKD (baseline Cr 1.3-1.5), permanent A-fib on Warfarin, s/p pacemaker, HTN, DAWSON on CPAP, mild LV dysfunction (EF of 45-50%), OA, and peripheral neuropathy who presented to the ED on 4/19/19 for evaluation of hematuria.    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  INR 1.8 on admission down to 1.55 today.  CT demonstrated significant clot burden in the bladder, urology performed irrigation and is on CBI.  Hgb remains stable at 11.9 overnight.  - continue CBI per Urology  - continue to hold pta warfarin  - monitor serial hemoglobin levels  - plan for cystoscopy on 4/23/19    Permanent A-fib on Warfarin - INR subtherapeutic, holding warfarin due to acute bleeding as above.  Rated controlled, follows with Dr. Perez of cardiology for management   - Recheck INR in AM  - Hold Warfarin due to #1   - Continue PTA Carvedilol with parameters     H/o bradycardia, s/p pacemaker - implanted in 2015, most recent pacemaker interrogation on 3/6/19 with device functioning within normal parameters       HTN, mild LV dysfunction - BP stable and most recent echocardiogram in 08/2018 shows EF of 45-50%.  Appears euvolemic on exam  - Continue PTA Losartan with parameters  - Holding Maxzide with concern for dehydration with ongoing blood loss from #1     CKD - baseline creatinine of 1.3-1.5 with Cr of 1.34 today  - Recheck BMP in AM  - Avoid nephrotoxic agents     CODE: Full  DVT: scd  Diet/fluids:  regular, npo after midnight    Antoinette Myles MS, PA-C  Hospitalist Physician Assistant  [facility]  Pager: [phone]      Subjective & Interval Hx:    Patient complaints of intermittent lower abdominal spasms/cramping.  Denies nausea, emesis, chest pain, shortness of breath.      Last 24 hr care team notes reviewed.   ROS:  4 point ROS including Respiratory, CV, GI and , other than that noted in the HPI, is negative.    Physical Exam:    Blood pressure 119/60, pulse 64, temperature 97.1  F (36.2  C), temperature source Oral, resp. rate 18, SpO2 97 %. on room air  General: Alert, interactive, NAD, sitting up in a chair eating breakfast.  Resp: clear to auscultation bilaterally, no crackles or wheezes  Cardiac: regular rate and rhythm, no murmur  Abdomen: Soft, nontender, nondistended. +BS.  No HSM or masses, no rebound or guarding.  Tinoco catheter in place with bag bright red blood.  Extremities: No LE edema  Neuro: Alert & oriented x 3, moves all extremities equally    Labs & Images:  Reviewed in Epic   Medications:    Current Facility-Administered Medications   Medication     acetaminophen (TYLENOL) tablet 650 mg     benzocaine-menthol (CHLORASEPTIC) 6-10 MG lozenge 1 lozenge     carvedilol (COREG) tablet 37.5 mg     HYDROmorphone (PF) (DILAUDID) injection 0.2 mg     losartan (COZAAR) tablet 100 mg     melatonin tablet 1 mg     naloxone (NARCAN) injection 0.1-0.4 mg     ondansetron (ZOFRAN-ODT) ODT tab 4 mg    Or     ondansetron (ZOFRAN) injection 4 mg     opium-belladonna (B&O SUPPRETTES) 30-16.2 MG per suppository 1 suppository     oxyCODONE (ROXICODONE) tablet 5 mg     polyethylene glycol (MIRALAX/GLYCOLAX) Packet 17 g     prochlorperazine (COMPAZINE) injection 5 mg    Or     prochlorperazine (COMPAZINE) tablet 5 mg    Or     prochlorperazine (COMPAZINE) Suppository 12.5 mg     senna-docusate (SENOKOT-S/PERICOLACE) 8.6-50 MG per tablet 1 tablet    Or     senna-docusate (SENOKOT-S/PERICOLACE)  8.6-50 MG per tablet 2 tablet     sennosides (SENOKOT) tablet 1-2 tablet

## 2019-04-22 NOTE — PLAN OF CARE
PRIMARY DIAGNOSIS: HEMATURIA  OUTPATIENT/OBSERVATION GOALS TO BE MET BEFORE DISCHARGE:  1. ADLs back to baseline: No    2. Activity and level of assistance: Up with standby assistance.    3. Pain status: Pain free.    4. Return to near baseline physical activity: Yes     Discharge Planner Nurse   Safe discharge environment identified: No  Barriers to discharge: Yes  Vitals are Temp: 97  F (36.1  C) Temp src: Oral BP: 125/73 Pulse: 69 Heart Rate: 71 Resp: 18 SpO2: 95 %.  Patient is Alert and Oriented x4. They are SBA with Activity.  Pt is a Regular diet.  They are denying pain.  Patient is Saline locked. CBI red output, running at moderate, no clots. Plan of care: urology following, outpatient follow up for cytoscopy in the next few weeks       Please review provider order for any additional goals.   Nurse to notify provider when observation goals have been met and patient is ready for discharge.

## 2019-04-22 NOTE — PLAN OF CARE
Pt alert and oriented x4. He has denied pain or pressure all shift. CBI running at slow rate. Pinkish clear urine output. Regular diet. NPO @ midnight for possible procedure tomorrow. Stand by assist. ASAD ROWELL. Urology following.     /60   Pulse 64   Temp 97.1  F (36.2  C) (Oral)   Resp 18   SpO2 97%

## 2019-04-22 NOTE — PROGRESS NOTES
Patient c/o dizziness and lightheadedness when rising from chair or bed. Patient urine darker red in color, CBI increased.

## 2019-04-22 NOTE — PLAN OF CARE
Declining     PRIMARY DIAGNOSIS: hematuria  Primary Symptoms: bladder discomfort     OUTPATIENT/OBSERVATION GOALS TO BE MET BEFORE DISCHARGE:     1. Orthostatic symptoms (BP decrease or HR increase with patient upright)?  No  2. Vital Signs stable? Yes  3. Documented urine output: Yes.  CBI turned back on after patient clotted and had bright red output. Hand irrigated x2 this shift.   4. Tolerating PO fluid: Yes  5. Symptoms improved: Patient CBI back on.   6. Labs WNL? Yes  7. ADLs back to baseline?  No  8. Activity and level of assistance: Up with standby assistance. SBA 1  9. Pain status: Pain free.  10. Barriers to discharge NO     Holding warfarin due to hematuria. Hgb in AM. CBI running very slow, urine light red in color.

## 2019-04-22 NOTE — PROGRESS NOTES
[facility] Urology Progress Note       ADDENDUM: Discussed with Dr. Bernabe. INR still elevated. Ok to eat. Recheck in AM and may need cysto tomorrow.              Assessment and Plan:   Active Problems:    Hematuria    Assessment: CBI running at slow drip this AM with clear urine output    Plan:   - May need cysto/TURBT today, but INR yesterday 1.8. Add on INR for today.  -Will discuss with Dr. Bernabe. NPO for now until definitive plan in place    Adrienne Kelley PA-C, [place]LL   [facility]  Office: [phone]               Interval History:   doing well; no cp, sob, n/v/d, or abd pain. Tinoco clear on slow drip CBI, CBI stopped during our visit and turned to watermelon.               Review of Systems:   The 5 point Review of Systems is negative other than noted in the HPI             Medications:     Current Facility-Administered Medications Ordered in Epic   Medication Dose Route Frequency Last Rate Last Dose     acetaminophen (TYLENOL) tablet 650 mg  650 mg Oral Q4H PRN         benzocaine-menthol (CHLORASEPTIC) 6-10 MG lozenge 1 lozenge  1 lozenge Buccal Q1H PRN   1 lozenge at 04/21/19 0035     carvedilol (COREG) tablet 37.5 mg  37.5 mg Oral BID w/meals   37.5 mg at 04/21/19 1909     HYDROmorphone (PF) (DILAUDID) injection 0.2 mg  0.2 mg Intravenous Q2H PRN         losartan (COZAAR) tablet 100 mg  100 mg Oral Daily   100 mg at 04/21/19 0743     melatonin tablet 1 mg  1 mg Oral At Bedtime PRN         naloxone (NARCAN) injection 0.1-0.4 mg  0.1-0.4 mg Intravenous Q2 Min PRN         ondansetron (ZOFRAN-ODT) ODT tab 4 mg  4 mg Oral Q6H PRN        Or     ondansetron (ZOFRAN) injection 4 mg  4 mg Intravenous Q6H PRN   4 mg at 04/20/19 0304     opium-belladonna (B&O SUPPRETTES) 30-16.2 MG per suppository 1 suppository  30 mg Rectal Q8H PRN   1 suppository at 04/19/19 2216     oxyCODONE (ROXICODONE) tablet 5 mg  5 mg Oral Q3H PRN         polyethylene glycol (MIRALAX/GLYCOLAX) Packet 17 g  17 g  Oral Daily PRN         prochlorperazine (COMPAZINE) injection 5 mg  5 mg Intravenous Q6H PRN        Or     prochlorperazine (COMPAZINE) tablet 5 mg  5 mg Oral Q6H PRN        Or     prochlorperazine (COMPAZINE) Suppository 12.5 mg  12.5 mg Rectal Q12H PRN         senna-docusate (SENOKOT-S/PERICOLACE) 8.6-50 MG per tablet 1 tablet  1 tablet Oral BID PRN        Or     senna-docusate (SENOKOT-S/PERICOLACE) 8.6-50 MG per tablet 2 tablet  2 tablet Oral BID PRN   2 tablet at 04/21/19 1038     sennosides (SENOKOT) tablet 1-2 tablet  1-2 tablet Oral BID PRN         No current Epic-ordered outpatient medications on file.                  Physical Exam:   Vitals were reviewed  Patient Vitals for the past 8 hrs:   BP Temp Temp src Pulse Resp SpO2   04/22/19 0322 125/73 97  F (36.1  C) Oral 69 18 95 %   04/22/19 0027 132/69 98.4  F (36.9  C) Oral 71 18 96 %     GEN: NAD, lying in bed  HEENT: EOMI  NECK: Supple  ABD: Obese, soft  EXT: No LE edema  : Tinoco clear on slow drip CBI, watermelon when stopped           Data:   No results found for: NTBNPI, NTBNP  Lab Results   Component Value Date    WBC 7.5 04/22/2019    WBC 8.1 04/20/2019    WBC 7.1 04/19/2019    HGB 11.9 (L) 04/22/2019    HGB 11.9 (L) 04/21/2019    HGB 12.4 (L) 04/20/2019    HCT 36.3 (L) 04/22/2019    HCT 39.8 (L) 04/20/2019    HCT 41.9 04/19/2019    MCV 98 04/22/2019    MCV 99 04/20/2019    MCV 98 04/19/2019     04/22/2019     04/20/2019     04/19/2019     Lab Results   Component Value Date    INR 1.80 (H) 04/21/2019    INR 1.84 (H) 04/20/2019    INR 1.80 (H) 04/19/2019

## 2019-04-23 ENCOUNTER — ANESTHESIA (OUTPATIENT)
Dept: SURGERY | Facility: CLINIC | Age: 84
DRG: 663 | End: 2019-04-23
Payer: COMMERCIAL

## 2019-04-23 ENCOUNTER — SURGERY (OUTPATIENT)
Age: 84
End: 2019-04-23
Payer: COMMERCIAL

## 2019-04-23 ENCOUNTER — ANESTHESIA EVENT (OUTPATIENT)
Dept: SURGERY | Facility: CLINIC | Age: 84
DRG: 663 | End: 2019-04-23
Payer: COMMERCIAL

## 2019-04-23 LAB
ANION GAP SERPL CALCULATED.3IONS-SCNC: 4 MMOL/L (ref 3–14)
BUN SERPL-MCNC: 30 MG/DL (ref 7–30)
CALCIUM SERPL-MCNC: 8.3 MG/DL (ref 8.5–10.1)
CHLORIDE SERPL-SCNC: 111 MMOL/L (ref 94–109)
CO2 SERPL-SCNC: 25 MMOL/L (ref 20–32)
CREAT SERPL-MCNC: 1.28 MG/DL (ref 0.66–1.25)
ERYTHROCYTE [DISTWIDTH] IN BLOOD BY AUTOMATED COUNT: 13.5 % (ref 10–15)
GFR SERPL CREATININE-BSD FRML MDRD: 50 ML/MIN/{1.73_M2}
GLUCOSE SERPL-MCNC: 98 MG/DL (ref 70–99)
HCT VFR BLD AUTO: 35.5 % (ref 40–53)
HGB BLD-MCNC: 11.2 G/DL (ref 13.3–17.7)
HGB BLD-MCNC: 11.3 G/DL (ref 13.3–17.7)
HGB BLD-MCNC: 11.5 G/DL (ref 13.3–17.7)
INR PPP: 1.48 (ref 0.86–1.14)
MCH RBC QN AUTO: 32.5 PG (ref 26.5–33)
MCHC RBC AUTO-ENTMCNC: 32.4 G/DL (ref 31.5–36.5)
MCV RBC AUTO: 100 FL (ref 78–100)
PLATELET # BLD AUTO: 153 10E9/L (ref 150–450)
POTASSIUM SERPL-SCNC: 3.7 MMOL/L (ref 3.4–5.3)
RBC # BLD AUTO: 3.54 10E12/L (ref 4.4–5.9)
SODIUM SERPL-SCNC: 140 MMOL/L (ref 133–144)
URATE SERPL-MCNC: 6 MG/DL (ref 3.5–7.2)
WBC # BLD AUTO: 8.5 10E9/L (ref 4–11)

## 2019-04-23 PROCEDURE — 25000132 ZZH RX MED GY IP 250 OP 250 PS 637: Performed by: UROLOGY

## 2019-04-23 PROCEDURE — 85018 HEMOGLOBIN: CPT | Performed by: UROLOGY

## 2019-04-23 PROCEDURE — 12000011 ZZH R&B MS OVERFLOW

## 2019-04-23 PROCEDURE — 36000050 ZZH SURGERY LEVEL 2 1ST 30 MIN: Performed by: UROLOGY

## 2019-04-23 PROCEDURE — 71000014 ZZH RECOVERY PHASE 1 LEVEL 2 FIRST HR: Performed by: UROLOGY

## 2019-04-23 PROCEDURE — 80048 BASIC METABOLIC PNL TOTAL CA: CPT | Performed by: PHYSICIAN ASSISTANT

## 2019-04-23 PROCEDURE — 25000132 ZZH RX MED GY IP 250 OP 250 PS 637: Performed by: PHYSICIAN ASSISTANT

## 2019-04-23 PROCEDURE — 99207 ZZC CDG-MDM COMPONENT: MEETS LOW - DOWN CODED: CPT | Performed by: HOSPITALIST

## 2019-04-23 PROCEDURE — 85610 PROTHROMBIN TIME: CPT | Performed by: PHYSICIAN ASSISTANT

## 2019-04-23 PROCEDURE — 99232 SBSQ HOSP IP/OBS MODERATE 35: CPT | Performed by: HOSPITALIST

## 2019-04-23 PROCEDURE — 37000009 ZZH ANESTHESIA TECHNICAL FEE, EACH ADDTL 15 MIN: Performed by: UROLOGY

## 2019-04-23 PROCEDURE — 85027 COMPLETE CBC AUTOMATED: CPT | Performed by: PHYSICIAN ASSISTANT

## 2019-04-23 PROCEDURE — 84550 ASSAY OF BLOOD/URIC ACID: CPT | Performed by: PHYSICIAN ASSISTANT

## 2019-04-23 PROCEDURE — 25800030 ZZH RX IP 258 OP 636: Performed by: NURSE ANESTHETIST, CERTIFIED REGISTERED

## 2019-04-23 PROCEDURE — 37000008 ZZH ANESTHESIA TECHNICAL FEE, 1ST 30 MIN: Performed by: UROLOGY

## 2019-04-23 PROCEDURE — 25000128 H RX IP 250 OP 636: Performed by: NURSE ANESTHETIST, CERTIFIED REGISTERED

## 2019-04-23 PROCEDURE — 0T5B8ZZ DESTRUCTION OF BLADDER, VIA NATURAL OR ARTIFICIAL OPENING ENDOSCOPIC: ICD-10-PCS | Performed by: UROLOGY

## 2019-04-23 PROCEDURE — 36000052 ZZH SURGERY LEVEL 2 EA 15 ADDTL MIN: Performed by: UROLOGY

## 2019-04-23 PROCEDURE — 36415 COLL VENOUS BLD VENIPUNCTURE: CPT | Performed by: UROLOGY

## 2019-04-23 PROCEDURE — 0TCB8ZZ EXTIRPATION OF MATTER FROM BLADDER, VIA NATURAL OR ARTIFICIAL OPENING ENDOSCOPIC: ICD-10-PCS | Performed by: UROLOGY

## 2019-04-23 PROCEDURE — 25000125 ZZHC RX 250: Performed by: UROLOGY

## 2019-04-23 PROCEDURE — 85018 HEMOGLOBIN: CPT | Performed by: PHYSICIAN ASSISTANT

## 2019-04-23 PROCEDURE — 25000128 H RX IP 250 OP 636: Performed by: PHYSICIAN ASSISTANT

## 2019-04-23 PROCEDURE — 25000132 ZZH RX MED GY IP 250 OP 250 PS 637: Performed by: HOSPITALIST

## 2019-04-23 PROCEDURE — 25800025 ZZH RX 258: Performed by: UROLOGY

## 2019-04-23 PROCEDURE — 84550 ASSAY OF BLOOD/URIC ACID: CPT | Performed by: HOSPITALIST

## 2019-04-23 PROCEDURE — 36415 COLL VENOUS BLD VENIPUNCTURE: CPT | Performed by: PHYSICIAN ASSISTANT

## 2019-04-23 PROCEDURE — 40000935 ZZH STATISTIC OUTPATIENT (NON-OBS) EVE

## 2019-04-23 PROCEDURE — 93010 ELECTROCARDIOGRAM REPORT: CPT | Performed by: INTERNAL MEDICINE

## 2019-04-23 PROCEDURE — 25000125 ZZHC RX 250: Performed by: NURSE ANESTHETIST, CERTIFIED REGISTERED

## 2019-04-23 PROCEDURE — 27210794 ZZH OR GENERAL SUPPLY STERILE: Performed by: UROLOGY

## 2019-04-23 PROCEDURE — 52214 CYSTOSCOPY AND TREATMENT: CPT | Performed by: UROLOGY

## 2019-04-23 PROCEDURE — 0W3R8ZZ CONTROL BLEEDING IN GENITOURINARY TRACT, VIA NATURAL OR ARTIFICIAL OPENING ENDOSCOPIC: ICD-10-PCS | Performed by: UROLOGY

## 2019-04-23 PROCEDURE — 40000306 ZZH STATISTIC PRE PROC ASSESS II: Performed by: UROLOGY

## 2019-04-23 RX ORDER — CEFAZOLIN SODIUM 2 G/100ML
2 INJECTION, SOLUTION INTRAVENOUS
Status: COMPLETED | OUTPATIENT
Start: 2019-04-23 | End: 2019-04-23

## 2019-04-23 RX ORDER — MEPERIDINE HYDROCHLORIDE 50 MG/ML
12.5 INJECTION INTRAMUSCULAR; INTRAVENOUS; SUBCUTANEOUS
Status: DISCONTINUED | OUTPATIENT
Start: 2019-04-23 | End: 2019-04-23 | Stop reason: HOSPADM

## 2019-04-23 RX ORDER — FENTANYL CITRATE 50 UG/ML
25-50 INJECTION, SOLUTION INTRAMUSCULAR; INTRAVENOUS
Status: DISCONTINUED | OUTPATIENT
Start: 2019-04-23 | End: 2019-04-23 | Stop reason: HOSPADM

## 2019-04-23 RX ORDER — NALOXONE HYDROCHLORIDE 0.4 MG/ML
.1-.4 INJECTION, SOLUTION INTRAMUSCULAR; INTRAVENOUS; SUBCUTANEOUS
Status: DISCONTINUED | OUTPATIENT
Start: 2019-04-23 | End: 2019-04-23 | Stop reason: HOSPADM

## 2019-04-23 RX ORDER — SODIUM CHLORIDE, SODIUM LACTATE, POTASSIUM CHLORIDE, CALCIUM CHLORIDE 600; 310; 30; 20 MG/100ML; MG/100ML; MG/100ML; MG/100ML
INJECTION, SOLUTION INTRAVENOUS CONTINUOUS
Status: DISCONTINUED | OUTPATIENT
Start: 2019-04-23 | End: 2019-04-23 | Stop reason: HOSPADM

## 2019-04-23 RX ORDER — METOPROLOL TARTRATE 1 MG/ML
1-2 INJECTION, SOLUTION INTRAVENOUS EVERY 5 MIN PRN
Status: DISCONTINUED | OUTPATIENT
Start: 2019-04-23 | End: 2019-04-23 | Stop reason: HOSPADM

## 2019-04-23 RX ORDER — ALBUTEROL SULFATE 0.83 MG/ML
2.5 SOLUTION RESPIRATORY (INHALATION) EVERY 4 HOURS PRN
Status: DISCONTINUED | OUTPATIENT
Start: 2019-04-23 | End: 2019-04-23 | Stop reason: HOSPADM

## 2019-04-23 RX ORDER — FENTANYL CITRATE 50 UG/ML
INJECTION, SOLUTION INTRAMUSCULAR; INTRAVENOUS PRN
Status: DISCONTINUED | OUTPATIENT
Start: 2019-04-23 | End: 2019-04-23

## 2019-04-23 RX ORDER — DIMENHYDRINATE 50 MG/ML
25 INJECTION, SOLUTION INTRAMUSCULAR; INTRAVENOUS
Status: DISCONTINUED | OUTPATIENT
Start: 2019-04-23 | End: 2019-04-23 | Stop reason: HOSPADM

## 2019-04-23 RX ORDER — DIMENHYDRINATE 50 MG/ML
25 INJECTION, SOLUTION INTRAMUSCULAR; INTRAVENOUS
Status: DISCONTINUED | OUTPATIENT
Start: 2019-04-23 | End: 2019-04-23

## 2019-04-23 RX ORDER — PROPOFOL 10 MG/ML
INJECTION, EMULSION INTRAVENOUS PRN
Status: DISCONTINUED | OUTPATIENT
Start: 2019-04-23 | End: 2019-04-23

## 2019-04-23 RX ORDER — FINASTERIDE 5 MG/1
5 TABLET, FILM COATED ORAL DAILY
Status: DISCONTINUED | OUTPATIENT
Start: 2019-04-23 | End: 2019-04-24 | Stop reason: HOSPADM

## 2019-04-23 RX ORDER — HYDROMORPHONE HYDROCHLORIDE 1 MG/ML
.3-.5 INJECTION, SOLUTION INTRAMUSCULAR; INTRAVENOUS; SUBCUTANEOUS EVERY 10 MIN PRN
Status: DISCONTINUED | OUTPATIENT
Start: 2019-04-23 | End: 2019-04-23 | Stop reason: HOSPADM

## 2019-04-23 RX ORDER — ONDANSETRON 2 MG/ML
4 INJECTION INTRAMUSCULAR; INTRAVENOUS EVERY 30 MIN PRN
Status: DISCONTINUED | OUTPATIENT
Start: 2019-04-23 | End: 2019-04-23 | Stop reason: HOSPADM

## 2019-04-23 RX ORDER — ATROPA BELLADONNA AND OPIUM 16.2; 3 MG/1; MG/1
SUPPOSITORY RECTAL PRN
Status: DISCONTINUED | OUTPATIENT
Start: 2019-04-23 | End: 2019-04-23 | Stop reason: HOSPADM

## 2019-04-23 RX ORDER — CEFAZOLIN SODIUM 1 G/50ML
1 INJECTION, SOLUTION INTRAVENOUS SEE ADMIN INSTRUCTIONS
Status: DISCONTINUED | OUTPATIENT
Start: 2019-04-23 | End: 2019-04-24

## 2019-04-23 RX ORDER — EPHEDRINE SULFATE 50 MG/ML
INJECTION, SOLUTION INTRAVENOUS PRN
Status: DISCONTINUED | OUTPATIENT
Start: 2019-04-23 | End: 2019-04-23

## 2019-04-23 RX ORDER — SODIUM CHLORIDE, SODIUM LACTATE, POTASSIUM CHLORIDE, CALCIUM CHLORIDE 600; 310; 30; 20 MG/100ML; MG/100ML; MG/100ML; MG/100ML
INJECTION, SOLUTION INTRAVENOUS CONTINUOUS PRN
Status: DISCONTINUED | OUTPATIENT
Start: 2019-04-23 | End: 2019-04-23

## 2019-04-23 RX ORDER — HYDROXYZINE HYDROCHLORIDE 50 MG/1
50 TABLET, FILM COATED ORAL ONCE
Status: COMPLETED | OUTPATIENT
Start: 2019-04-23 | End: 2019-04-23

## 2019-04-23 RX ORDER — LIDOCAINE HYDROCHLORIDE 10 MG/ML
INJECTION, SOLUTION INFILTRATION; PERINEURAL PRN
Status: DISCONTINUED | OUTPATIENT
Start: 2019-04-23 | End: 2019-04-23

## 2019-04-23 RX ORDER — ONDANSETRON 4 MG/1
4 TABLET, ORALLY DISINTEGRATING ORAL EVERY 30 MIN PRN
Status: DISCONTINUED | OUTPATIENT
Start: 2019-04-23 | End: 2019-04-23 | Stop reason: HOSPADM

## 2019-04-23 RX ORDER — DEXAMETHASONE SODIUM PHOSPHATE 4 MG/ML
INJECTION, SOLUTION INTRA-ARTICULAR; INTRALESIONAL; INTRAMUSCULAR; INTRAVENOUS; SOFT TISSUE PRN
Status: DISCONTINUED | OUTPATIENT
Start: 2019-04-23 | End: 2019-04-23

## 2019-04-23 RX ORDER — CIPROFLOXACIN 500 MG/1
500 TABLET, FILM COATED ORAL EVERY 12 HOURS SCHEDULED
Status: DISCONTINUED | OUTPATIENT
Start: 2019-04-23 | End: 2019-04-24 | Stop reason: HOSPADM

## 2019-04-23 RX ADMIN — PHENYLEPHRINE HYDROCHLORIDE 100 MCG: 10 INJECTION, SOLUTION INTRAMUSCULAR; INTRAVENOUS; SUBCUTANEOUS at 13:26

## 2019-04-23 RX ADMIN — OXYCODONE HYDROCHLORIDE 5 MG: 5 TABLET ORAL at 00:21

## 2019-04-23 RX ADMIN — CIPROFLOXACIN HYDROCHLORIDE 500 MG: 500 TABLET, FILM COATED ORAL at 19:43

## 2019-04-23 RX ADMIN — CARVEDILOL 37.5 MG: 25 TABLET, FILM COATED ORAL at 18:05

## 2019-04-23 RX ADMIN — ONDANSETRON 4 MG: 2 INJECTION INTRAMUSCULAR; INTRAVENOUS at 13:27

## 2019-04-23 RX ADMIN — ACETAMINOPHEN 650 MG: 325 TABLET, FILM COATED ORAL at 00:21

## 2019-04-23 RX ADMIN — CARVEDILOL 37.5 MG: 25 TABLET, FILM COATED ORAL at 09:12

## 2019-04-23 RX ADMIN — ATROPA BELLADONNA AND OPIUM 1 SUPPOSITORY: 16.2; 3 SUPPOSITORY RECTAL at 13:35

## 2019-04-23 RX ADMIN — PROPOFOL 150 MG: 10 INJECTION, EMULSION INTRAVENOUS at 13:10

## 2019-04-23 RX ADMIN — PHENYLEPHRINE HYDROCHLORIDE 100 MCG: 10 INJECTION, SOLUTION INTRAMUSCULAR; INTRAVENOUS; SUBCUTANEOUS at 13:22

## 2019-04-23 RX ADMIN — HYDROXYZINE HYDROCHLORIDE 50 MG: 50 TABLET, FILM COATED ORAL at 02:29

## 2019-04-23 RX ADMIN — WATER 6000 ML: 100 IRRIGANT IRRIGATION at 13:30

## 2019-04-23 RX ADMIN — LIDOCAINE HYDROCHLORIDE 50 MG: 10 INJECTION, SOLUTION INFILTRATION; PERINEURAL at 13:10

## 2019-04-23 RX ADMIN — FENTANYL CITRATE 50 MCG: 50 INJECTION, SOLUTION INTRAMUSCULAR; INTRAVENOUS at 13:10

## 2019-04-23 RX ADMIN — FENTANYL CITRATE 25 MCG: 50 INJECTION, SOLUTION INTRAMUSCULAR; INTRAVENOUS at 13:22

## 2019-04-23 RX ADMIN — CEFAZOLIN SODIUM 2 G: 2 INJECTION, SOLUTION INTRAVENOUS at 13:15

## 2019-04-23 RX ADMIN — LOSARTAN POTASSIUM 100 MG: 100 TABLET ORAL at 09:12

## 2019-04-23 RX ADMIN — FINASTERIDE 5 MG: 5 TABLET, FILM COATED ORAL at 19:43

## 2019-04-23 RX ADMIN — DEXAMETHASONE SODIUM PHOSPHATE 4 MG: 4 INJECTION, SOLUTION INTRA-ARTICULAR; INTRALESIONAL; INTRAMUSCULAR; INTRAVENOUS; SOFT TISSUE at 13:12

## 2019-04-23 RX ADMIN — Medication 1 LOZENGE: at 21:41

## 2019-04-23 RX ADMIN — SODIUM CHLORIDE, POTASSIUM CHLORIDE, SODIUM LACTATE AND CALCIUM CHLORIDE: 600; 310; 30; 20 INJECTION, SOLUTION INTRAVENOUS at 13:06

## 2019-04-23 RX ADMIN — PHENYLEPHRINE HYDROCHLORIDE 100 MCG: 10 INJECTION, SOLUTION INTRAMUSCULAR; INTRAVENOUS; SUBCUTANEOUS at 13:20

## 2019-04-23 RX ADMIN — DICLOFENAC 2 G: 10 GEL TOPICAL at 19:43

## 2019-04-23 RX ADMIN — EPHEDRINE SULFATE 10 MG: 50 INJECTION, SOLUTION INTRAVENOUS at 13:29

## 2019-04-23 ASSESSMENT — ENCOUNTER SYMPTOMS: DYSRHYTHMIAS: 1

## 2019-04-23 NOTE — ANESTHESIA CARE TRANSFER NOTE
Patient: Tucker Slater    Procedure(s):  CYSTOSCOPY, fulguration of bleeders, Exam under anesthesia    Diagnosis: gross hematuria  Diagnosis Additional Information: No value filed.    Anesthesia Type:   General     Note:  Airway :LMA  Patient transferred to:PACU  Comments: VSS.  Spontaneously breathing O2 per lma.  Report given to RN.Handoff Report: Identifed the Patient, Identified the Reponsible Provider, Reviewed the pertinent medical history, Discussed the surgical course, Reviewed Intra-OP anesthesia mangement and issues during anesthesia, Set expectations for post-procedure period and Allowed opportunity for questions and acknowledgement of understanding      Vitals: (Last set prior to Anesthesia Care Transfer)    CRNA VITALS  4/23/2019 1309 - 4/23/2019 1346      4/23/2019             Pulse:  75    SpO2:  99 %    Resp Rate (observed):  15                Electronically Signed By: SELMA Wetzel CRNA  April 23, 2019  1:46 PM

## 2019-04-23 NOTE — PLAN OF CARE
"/64 (BP Location: Right arm)   Pulse 67   Temp 98.9  F (37.2  C) (Oral)   Resp 18   SpO2 98%     Orientation: A&Ox4  Neurological: in tact  Pain status: reporting L) lateral ankle pain  Activity: SBA  Peripheral neurovascular: baseline BLE neuropathy, reported \"nothing new\"  Resp: no SOB reported or assessed  Lungs: clear on auscultation  Cardiac: pacemaker, pulse irregular  GI: WNL  : CBI running at slow rate with watermelon-colored output, small clots noted in catheter and cath bag. Pt denies abd pain or pressure.  Skin: bruising, marie, dry/flaky skin  IVF: PIV SL  Meds: scheduled meds given  Labs/imaging: INR 1.48  Diet: NPO  Consults: urology  Plan: cysto planned for 1400, wife is present at bedside.  "

## 2019-04-23 NOTE — ANESTHESIA PREPROCEDURE EVALUATION
Anesthesia Pre-Procedure Evaluation    Patient: Tucker Slater   MRN: [id number] : 3/13/1931          Preoperative Diagnosis: gross hematuria    Procedure(s):  CYSTOSCOPY, fulguration of bleeders  possible  CYSTOSCOPY, TRANSURETHRAL RESECTION of bladder TUMOR    Past Medical History:   Diagnosis Date     Arrhythmia     A Fib     Balance problem     related to neuropathy in feet     Bradycardia      Carcinoma in situ of bladder     bladder cancer ;; follow w Urology w periodic cysto      Essential hypertension, benign      Generalized osteoarthrosis, unspecified site knees    s/p R total knee  ; will do L 6/10      Numbness and tingling     toes and fingers     Pacemaker     [employer]      Pain in joint, shoulder region     L shoulder rotater tear; no surgery      Persistent atrial fibrillation (H) 1/30/15     Prostate CA (H) -    radiation     Prostate cancer (H)     completed RT 3/09     Renal disease     elevated creatinine     Shoulder impingement     R shoulder impingement etc see MRI       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     toes both feet      Past Surgical History:   Procedure Laterality Date     ARTHROPLASTY HIP Right 3/27/2017    Procedure: ARTHROPLASTY HIP;  Surgeon: Jigar Wynn MD;  Location: [facility] OR     C NONSPECIFIC PROCEDURE      bilat inguinal hernia repairs ( 3total procedures)      C NONSPECIFIC PROCEDURE      pilonidal cyst     C NONSPECIFIC PROCEDURE      T + A     C NONSPECIFIC PROCEDURE       R+L total knee     CARDIOVERSION  3/26/15     COLONOSCOPY       IMPLANT PACEMAKER  3/26/15     RELEASE CARPAL TUNNEL Right 2017    Procedure: RELEASE CARPAL TUNNEL;  Right carpal tunnel release;  Surgeon: Alonso Barboza MD;  Location: [facility] OR     Anesthesia Evaluation     .             ROS/MED HX    ENT/Pulmonary:  - neg pulmonary ROS     Neurologic:  - neg neurologic ROS     Cardiovascular: Comment: LV dysfunction - neg  cardiovascular ROS   (+) hypertension----. : . . . pacemaker :. dysrhythmias a-fib, .       METS/Exercise Tolerance:     Hematologic:  - neg hematologic  ROS       Musculoskeletal:  - neg musculoskeletal ROS       GI/Hepatic:  - neg GI/hepatic ROS       Renal/Genitourinary: Comment: Bladder CA with resection - ROS Renal section negative   (+) chronic renal disease,       Endo:  - neg endo ROS       Psychiatric:  - neg psychiatric ROS       Infectious Disease:  - neg infectious disease ROS       Malignancy:   (+) Malignancy History of Other  Other CA Bladder status post Surgery and Radiation      - no malignancy   Other: Comment: .Lab Test        04/23/19 04/22/19 04/22/19 04/22/19 04/22/19 04/21/19      --          04/20/19                       0625          2142          1511          0844          0726          0615           --           0624          WBC          8.5           --           --           --          7.5           --           --          8.1           HGB          11.5*        11.5*        11.9*         --          11.9*        11.9*          < >        13.0*         MCV          100           --           --           --          98            --           --          99            PLT          153           --           --           --          157           --           --          170           INR          1.48*         --           --          1.55*         --          1.80*          < >         --            < > = values in this interval not displayed.                  Lab Test        04/23/19 04/22/19 04/21/19 04/20/19                       0625          0726          0615          0624          NA           140          138           --          138           POTASSIUM    3.7          3.7           --          4.0           CHLORIDE     111*         108           --          110*          CO2          25           25            --          24           "  BUN          30           29            --          33*           CR           1.28*        1.34*        1.38*        1.30*         ANIONGAP     4            5             --          4             KARLA          8.3*         8.7           --          8.2*          GLC          98           95            --          143*             - neg other ROS                             Lab Results   Component Value Date    WBC 8.5 04/23/2019    HGB 11.5 (L) 04/23/2019    HCT 35.5 (L) 04/23/2019     04/23/2019     04/23/2019    POTASSIUM 3.7 04/23/2019    CHLORIDE 111 (H) 04/23/2019    CO2 25 04/23/2019    BUN 30 04/23/2019    CR 1.28 (H) 04/23/2019    GLC 98 04/23/2019    KARLA 8.3 (L) 04/23/2019    ALBUMIN 3.3 (L) 12/29/2017    PROTTOTAL 6.9 12/29/2017    ALT 21 12/29/2017    AST 18 12/29/2017    ALKPHOS 73 12/29/2017    BILITOTAL 1.0 12/29/2017    PTT 35 03/15/2012    INR 1.48 (H) 04/23/2019    TSH 1.25 12/29/2017       Preop Vitals  BP Readings from Last 3 Encounters:   04/23/19 119/64   04/10/19 138/78   04/03/19 148/71    Pulse Readings from Last 3 Encounters:   04/23/19 67   04/03/19 69   02/20/19 64      Resp Readings from Last 3 Encounters:   04/23/19 18   04/03/19 14   01/16/19 24    SpO2 Readings from Last 3 Encounters:   04/23/19 98%   04/03/19 98%   02/20/19 98%      Temp Readings from Last 1 Encounters:   04/23/19 98.9  F (37.2  C) (Oral)    Ht Readings from Last 1 Encounters:   04/10/19 1.778 m (5' 10\")      Wt Readings from Last 1 Encounters:   04/10/19 88.5 kg (195 lb)    Estimated body mass index is 27.98 kg/m  as calculated from the following:    Height as of 4/10/19: 1.778 m (5' 10\").    Weight as of 4/10/19: 88.5 kg (195 lb).       Anesthesia Plan      History & Physical Review  History and physical reviewed and following examination; no interval change.    ASA Status:  3 .        Plan for General with Intravenous induction.   PONV prophylaxis:  Ondansetron (or other 5HT-3) and Dexamethasone or " Solumedrol       Postoperative Care  Postoperative pain management:  IV analgesics, Oral pain medications and Multi-modal analgesia.      Consents  Anesthetic plan, risks, benefits and alternatives discussed with:  Patient or representative..                 Zack Jenkins DO                    .

## 2019-04-23 NOTE — PLAN OF CARE
Neuro: A&O, anxious person   Cardiac: WNL  Lungs: clear  GI: WNL  : CBI running at moderate rate. Urine goes from light red to dark red, CBI titrated to keep blood from clotting and keep urine diluated   Skin: WNL  Pain: denies pain, denies pressure in bladder.    Meds: whole   Labs/tests: INR elevated   Diet: Regular   Activity: SBA with walker   Misc: Patient dizzy and lightheaded with rising from bed or chair.   Plan: NPO p MN for cystoscopy or TURP tomorrow.

## 2019-04-23 NOTE — PROGRESS NOTES
Pt sent down to surgery, wife accompanied. Belongings kept in room, pt expected to come back to room after surgery. Report given to surgery RN.

## 2019-04-23 NOTE — PROGRESS NOTES
[facility] Urology Progress Note          Assessment and Plan:   Active Problems:    Hematuria    Assessment: Hx of prostate radiotherapy and bladder CA    Plan: cystoscopy, evac of bladder hematoma, possible fulguration of the prostate vs TURBT with Dr. Ramirez today. INR <1.5    Adrienne Kelley PA-C  [facility]  [phone]               Interval History:    Tinoco mod CBI              Review of Systems:   The 5 point Review of Systems is negative other than noted in the HPI             Medications:     Current Facility-Administered Medications Ordered in Epic   Medication Dose Route Frequency Last Rate Last Dose     acetaminophen (TYLENOL) tablet 650 mg  650 mg Oral Q4H PRN   650 mg at 04/23/19 0021     benzocaine-menthol (CHLORASEPTIC) 6-10 MG lozenge 1 lozenge  1 lozenge Buccal Q1H PRN   1 lozenge at 04/22/19 2011     carvedilol (COREG) tablet 37.5 mg  37.5 mg Oral BID w/meals   37.5 mg at 04/22/19 1709     ceFAZolin (ANCEF) intermittent infusion 1 g  1 g Intravenous See Admin Instructions         ceFAZolin (ANCEF) intermittent infusion 2 g in 100 mL dextrose PRE-MIX  2 g Intravenous Pre-Op/Pre-procedure x 1 dose         HYDROmorphone (PF) (DILAUDID) injection 0.2 mg  0.2 mg Intravenous Q2H PRN         losartan (COZAAR) tablet 100 mg  100 mg Oral Daily   100 mg at 04/22/19 0848     melatonin tablet 1 mg  1 mg Oral At Bedtime PRN         naloxone (NARCAN) injection 0.1-0.4 mg  0.1-0.4 mg Intravenous Q2 Min PRN         ondansetron (ZOFRAN-ODT) ODT tab 4 mg  4 mg Oral Q6H PRN        Or     ondansetron (ZOFRAN) injection 4 mg  4 mg Intravenous Q6H PRN   4 mg at 04/20/19 0304     opium-belladonna (B&O SUPPRETTES) 30-16.2 MG per suppository 1 suppository  30 mg Rectal Q8H PRN   1 suppository at 04/19/19 2216     oxyCODONE (ROXICODONE) tablet 5 mg  5 mg Oral Q3H PRN   5 mg at 04/23/19 0021     polyethylene glycol (MIRALAX/GLYCOLAX) Packet 17 g  17 g Oral Daily PRN         prochlorperazine  (COMPAZINE) injection 5 mg  5 mg Intravenous Q6H PRN        Or     prochlorperazine (COMPAZINE) tablet 5 mg  5 mg Oral Q6H PRN        Or     prochlorperazine (COMPAZINE) Suppository 12.5 mg  12.5 mg Rectal Q12H PRN         senna-docusate (SENOKOT-S/PERICOLACE) 8.6-50 MG per tablet 1 tablet  1 tablet Oral BID PRN        Or     senna-docusate (SENOKOT-S/PERICOLACE) 8.6-50 MG per tablet 2 tablet  2 tablet Oral BID PRN   2 tablet at 04/21/19 1038     sennosides (SENOKOT) tablet 1-2 tablet  1-2 tablet Oral BID PRN         No current Epic-ordered outpatient medications on file.                  Physical Exam:   Vitals were reviewed  Patient Vitals for the past 8 hrs:   BP Temp Temp src Resp SpO2   04/23/19 0434 122/68 97.9  F (36.6  C) Oral 18 95 %     GEN: NAD, lying in bed  EYES: EOMI  MOUTH: MMM  NECK: Supple  RESP: Unlabored breathing  CARDIAC: No LE edema  SKIN: Warm  ABD: soft  NEURO: AAO  : watermelon           Data:   No results found for: NTBNPI, NTBNP  Lab Results   Component Value Date    WBC 8.5 04/23/2019    WBC 7.5 04/22/2019    WBC 8.1 04/20/2019    HGB 11.5 (L) 04/23/2019    HGB 11.5 (L) 04/22/2019    HGB 11.9 (L) 04/22/2019    HCT 35.5 (L) 04/23/2019    HCT 36.3 (L) 04/22/2019    HCT 39.8 (L) 04/20/2019     04/23/2019    MCV 98 04/22/2019    MCV 99 04/20/2019     04/23/2019     04/22/2019     04/20/2019     Lab Results   Component Value Date    INR 1.48 (H) 04/23/2019    INR 1.55 (H) 04/22/2019    INR 1.80 (H) 04/21/2019

## 2019-04-23 NOTE — PROGRESS NOTES
[facility]  Internal Medicine  Progress Note    Date of Service: 4/23/2019    Patient: Tucker Slater  MRN: [id number]  Admission Date: 4/19/2019  Hospital Day # 5    Assessment & Plan: Tukcer Slater is a 88 year old male with PMH significant for bladder cancer s/p surgical resection (2000), prostate cancer s/p radiation ([id number]), CKD (baseline Cr 1.3-1.5), permanent A-fib on Warfarin, s/p pacemaker, HTN, DAWSON on CPAP, mild LV dysfunction (EF of 45-50%), OA, and peripheral neuropathy who presented to the ED on 4/19/19 for evaluation of hematuria.    Acute Blood Loss Anemia 2/2 Gross hematuria    - has been on CBI    - possibly related to radiation cystitis or bladder tumor    - history of low grade superficial bladder cancer    - history of prostate cancer      - CT demonstrated significant clot burden in the bladder, urology performed irrigation and is on CBI    - plan for OR today for cysto    Left lateral ankle pain    - may be gout    - he has never had, but his father has    - will check uric acid and start with voltaren gel     Permanent A-fib on Warfarin     - holding coumadin for cysto, will re-start once plan made by Urology    - rate controlled, follows with Dr. Perez of cardiology for management     - Continue PTA Carvedilol with parameters     H/o bradycardia, s/p pacemaker     - implanted in 2015, most recent pacemaker interrogation on 3/6/19 with device functioning within normal parameters       HTN, mild LV dysfunction    - BP stable and most recent echocardiogram in 08/2018 shows EF of 45-50%.    - Continue PTA Losartan with parameters    - Holding Maxzide with concern for dehydration with ongoing blood loss from #1     CKD - baseline creatinine of 1.3-1.5 with Cr of 1. 28 today    - Recheck BMP in AM    - Avoid nephrotoxic agents     Offered to update wife. Patient states she will be here and is aware of the plan    CODE: Full  DVT: scd  Diet/fluids: regular, npo for  zeke Zee MD    Subjective & Interval Hx:    Patient doing ok. No chest pain, sob, abdo pain. Started having left lateral ankle pain last night    Last 24 hr care team notes reviewed.   ROS:  4 point ROS including Respiratory, CV, GI and , other than that noted in the HPI, is negative.    Physical Exam:    Blood pressure 119/64, pulse 67, temperature 98.9  F (37.2  C), temperature source Oral, resp. rate 18, SpO2 98 %. on room air  General: Alert, interactive, NAD, sitting up in a chair eating breakfast.  Resp: clear to auscultation bilaterally, no crackles or wheezes  Cardiac: regular rate and rhythm, no murmur  Abdomen: Soft, nontender, nondistended. +BS.  No HSM or masses, no rebound or guarding.  Tinoco catheter in place with bag bright red blood.  Extremities: No LE edema. Left lat malleolus: mild swelling and erythema  Neuro: Alert & oriented x 3, moves all extremities equally    Labs & Images:  Reviewed in Epic   Medications:    Current Facility-Administered Medications   Medication     acetaminophen (TYLENOL) tablet 650 mg     benzocaine-menthol (CHLORASEPTIC) 6-10 MG lozenge 1 lozenge     carvedilol (COREG) tablet 37.5 mg     ceFAZolin (ANCEF) intermittent infusion 1 g     ceFAZolin (ANCEF) intermittent infusion 2 g in 100 mL dextrose PRE-MIX     HYDROmorphone (PF) (DILAUDID) injection 0.2 mg     losartan (COZAAR) tablet 100 mg     melatonin tablet 1 mg     naloxone (NARCAN) injection 0.1-0.4 mg     ondansetron (ZOFRAN-ODT) ODT tab 4 mg    Or     ondansetron (ZOFRAN) injection 4 mg     opium-belladonna (B&O SUPPRETTES) 30-16.2 MG per suppository 1 suppository     oxyCODONE (ROXICODONE) tablet 5 mg     polyethylene glycol (MIRALAX/GLYCOLAX) Packet 17 g     prochlorperazine (COMPAZINE) injection 5 mg    Or     prochlorperazine (COMPAZINE) tablet 5 mg    Or     prochlorperazine (COMPAZINE) Suppository 12.5 mg     senna-docusate (SENOKOT-S/PERICOLACE) 8.6-50 MG per tablet 1 tablet    Or      senna-docusate (SENOKOT-S/PERICOLACE) 8.6-50 MG per tablet 2 tablet     sennosides (SENOKOT) tablet 1-2 tablet

## 2019-04-23 NOTE — ANESTHESIA POSTPROCEDURE EVALUATION
Patient: Tucker Slater    Procedure(s):  CYSTOSCOPY, fulguration of bleeders, Exam under anesthesia    Diagnosis:gross hematuria  Diagnosis Additional Information: Gross hematuria with clot urinary retention, history of prostate cancer, history of bladder cancer    Anesthesia Type:  General    Note:  Anesthesia Post Evaluation    Patient location during evaluation: PACU  Patient participation: Able to fully participate in evaluation  Level of consciousness: awake and alert  Pain management: adequate  Airway patency: patent  Cardiovascular status: acceptable  Respiratory status: acceptable  Hydration status: acceptable  PONV: none     Anesthetic complications: None          Last vitals:  Vitals:    04/23/19 1423 04/23/19 1430 04/23/19 1501   BP:  118/61 120/60   Pulse:  72    Resp:  24 15   Temp: 98.2  F (36.8  C)  95.7  F (35.4  C)   SpO2:  99% 96%         Electronically Signed By: John Paul Caruso MD  April 23, 2019  3:04 PM

## 2019-04-23 NOTE — PLAN OF CARE
Pt NPO 0000 for cysto later this afternoon. Pt had left lower ankle pain 8/10- MD notified and one time dose atarax given. CBI running at slow rate- melon colored returns. Pt has not endorsed feeling full or having any abdominal pain. No further acute overnight. Lungs clear, SL. Up SBA.

## 2019-04-23 NOTE — PROGRESS NOTES
Pt returned from surgery. CBI running, red output in catheter bag. Pt denies abd/bladder pressure or pain. VSS, pt on capno and WNL. Pt was weaned to RA and O2sats WNL. Pt pivot transferred from surgery bed to room bed. Wife at bedside. Pt reported he's hungry and will order food soon - is regular diet.    /60 (BP Location: Right arm)   Pulse 72   Temp 95.7  F (35.4  C) (Oral)   Resp 15   SpO2 96%

## 2019-04-23 NOTE — PROGRESS NOTES
Patient informed nurse that he has a painful left ankle. Ankle slightly reddened, only tender when patient moves ankle to the left. Patient declines any pain medications or any intervention for this pain. Will continue to monitor

## 2019-04-23 NOTE — PROVIDER NOTIFICATION
Md notified x2 of left lower ankle pain 8/10. MD ordered atarax. Med given, will reassess as able.      Wounds

## 2019-04-24 VITALS
OXYGEN SATURATION: 98 % | HEART RATE: 78 BPM | SYSTOLIC BLOOD PRESSURE: 116 MMHG | DIASTOLIC BLOOD PRESSURE: 61 MMHG | RESPIRATION RATE: 16 BRPM | TEMPERATURE: 97.7 F

## 2019-04-24 LAB
ANION GAP SERPL CALCULATED.3IONS-SCNC: 6 MMOL/L (ref 3–14)
BASOPHILS # BLD AUTO: 0 10E9/L (ref 0–0.2)
BASOPHILS NFR BLD AUTO: 0.1 %
BUN SERPL-MCNC: 32 MG/DL (ref 7–30)
CALCIUM SERPL-MCNC: 8.6 MG/DL (ref 8.5–10.1)
CHLORIDE SERPL-SCNC: 106 MMOL/L (ref 94–109)
CO2 SERPL-SCNC: 25 MMOL/L (ref 20–32)
CREAT SERPL-MCNC: 1.36 MG/DL (ref 0.66–1.25)
DIFFERENTIAL METHOD BLD: ABNORMAL
EOSINOPHIL # BLD AUTO: 0 10E9/L (ref 0–0.7)
EOSINOPHIL NFR BLD AUTO: 0 %
ERYTHROCYTE [DISTWIDTH] IN BLOOD BY AUTOMATED COUNT: 13.1 % (ref 10–15)
GFR SERPL CREATININE-BSD FRML MDRD: 46 ML/MIN/{1.73_M2}
GLUCOSE SERPL-MCNC: 116 MG/DL (ref 70–99)
HCT VFR BLD AUTO: 35.8 % (ref 40–53)
HGB BLD-MCNC: 11.1 G/DL (ref 13.3–17.7)
HGB BLD-MCNC: 11.6 G/DL (ref 13.3–17.7)
IMM GRANULOCYTES # BLD: 0.1 10E9/L (ref 0–0.4)
IMM GRANULOCYTES NFR BLD: 0.5 %
LYMPHOCYTES # BLD AUTO: 0.4 10E9/L (ref 0.8–5.3)
LYMPHOCYTES NFR BLD AUTO: 4.3 %
MCH RBC QN AUTO: 32.1 PG (ref 26.5–33)
MCHC RBC AUTO-ENTMCNC: 32.4 G/DL (ref 31.5–36.5)
MCV RBC AUTO: 99 FL (ref 78–100)
MONOCYTES # BLD AUTO: 0.6 10E9/L (ref 0–1.3)
MONOCYTES NFR BLD AUTO: 6.7 %
NEUTROPHILS # BLD AUTO: 8.2 10E9/L (ref 1.6–8.3)
NEUTROPHILS NFR BLD AUTO: 88.4 %
NRBC # BLD AUTO: 0 10*3/UL
NRBC BLD AUTO-RTO: 0 /100
PLATELET # BLD AUTO: 179 10E9/L (ref 150–450)
POTASSIUM SERPL-SCNC: 3.9 MMOL/L (ref 3.4–5.3)
RBC # BLD AUTO: 3.61 10E12/L (ref 4.4–5.9)
SODIUM SERPL-SCNC: 137 MMOL/L (ref 133–144)
WBC # BLD AUTO: 9.3 10E9/L (ref 4–11)

## 2019-04-24 PROCEDURE — 85018 HEMOGLOBIN: CPT | Performed by: UROLOGY

## 2019-04-24 PROCEDURE — 99231 SBSQ HOSP IP/OBS SF/LOW 25: CPT | Performed by: UROLOGY

## 2019-04-24 PROCEDURE — 99239 HOSP IP/OBS DSCHRG MGMT >30: CPT | Performed by: HOSPITALIST

## 2019-04-24 PROCEDURE — 25000132 ZZH RX MED GY IP 250 OP 250 PS 637: Performed by: UROLOGY

## 2019-04-24 PROCEDURE — 36415 COLL VENOUS BLD VENIPUNCTURE: CPT | Performed by: UROLOGY

## 2019-04-24 PROCEDURE — 85025 COMPLETE CBC W/AUTO DIFF WBC: CPT | Performed by: UROLOGY

## 2019-04-24 PROCEDURE — 80048 BASIC METABOLIC PNL TOTAL CA: CPT | Performed by: UROLOGY

## 2019-04-24 RX ORDER — FINASTERIDE 5 MG/1
5 TABLET, FILM COATED ORAL DAILY
Qty: 30 TABLET | Refills: 1 | Status: SHIPPED | OUTPATIENT
Start: 2019-04-25 | End: 2019-06-14

## 2019-04-24 RX ADMIN — LOSARTAN POTASSIUM 100 MG: 100 TABLET ORAL at 08:21

## 2019-04-24 RX ADMIN — CIPROFLOXACIN HYDROCHLORIDE 500 MG: 500 TABLET, FILM COATED ORAL at 08:21

## 2019-04-24 RX ADMIN — DICLOFENAC 2 G: 10 GEL TOPICAL at 08:21

## 2019-04-24 RX ADMIN — SENNOSIDES 1 TABLET: 8.6 TABLET, FILM COATED ORAL at 08:32

## 2019-04-24 RX ADMIN — FINASTERIDE 5 MG: 5 TABLET, FILM COATED ORAL at 08:21

## 2019-04-24 RX ADMIN — DICLOFENAC 2 G: 10 GEL TOPICAL at 14:00

## 2019-04-24 RX ADMIN — CARVEDILOL 37.5 MG: 25 TABLET, FILM COATED ORAL at 08:21

## 2019-04-24 NOTE — OP NOTE
Procedure Date: 04/23/2019      PREOPERATIVE DIAGNOSES:     1.  Gross hematuria.     2.  Clot urinary retention.     3.  History of prostate cancer.     4.  History of bladder cancer.      POSTOPERATIVE DIAGNOSES:     1.  Gross hematuria.     2.  Clot urinary retention.     3.  History of prostate cancer.     4.  History of bladder cancer.      PROCEDURE:  EUA, video cystopanendoscopy, evacuation of clots, fulguration of prostatic veins.      SURGEON:  Ilir Ramirez Jr, MD      ANESTHESIA:  General laryngeal mask.      ESTIMATED BLOOD LOSS:  100 mL of formed clot removed.      INDICATIONS:  The patient is an 88-year-old male who has a history of prostate cancer treated with radiation and bladder cancer treated almost 20 years ago with no recurrences.  The patient was followed by Dr. Ford at [facility], but has not seen Dr. Ford in at least 2 years.  He started having bleeding on Friday and was admitted to the hospital and found to have clot urinary retention.  He was put on a 3-way Tinoco catheter and continuous bladder irrigations and his Jantoven was discontinued.  His INRs have decreased slowly and this morning is 1.48.  He now presents for cystoscopy and evacuation of clots.  He will have a PSA drawn sometime in the near future once he is catheter free.      PROCEDURE:  The patient was given 2 g of IV Ancef and taken to cystoscopy and he was placed supine on the operating table.  After adequate general laryngeal mask anesthesia, his Tinoco catheter was removed and he was placed in lithotomy and his genitalia were prepped and draped in a sterile fashion.  A #26 Estonian Storz resectoscope sheath with 30 degree lens was used to visualize the urethra and bladder initially.  Using the Storz clot evacuator all clot was evacuated from the bladder and approximately 100 mL or more of a dark formed clot was removed.  There was very little active bleeding and this was from the prostatic fossa where there were many  superficial and circuitous veins consistent with prior radiation.  There was no radiation changes in the bladder, no tumors or stones and normal ureteral orifices.  Using the small rollerball, the prominent prostatic veins were cauterized.  I then found 2 areas on the posterior bladder wall where the silicone catheter had traumatized the bladder into the muscularis and these 2 areas were cauterized for hemostasis.  There was no evidence of perforation of the bladder.  The resectoscope was removed and a 20-Thai three-way Tinoco was inserted in the bladder easily and this was a latex catheter to prevent further trauma.  Then 15 mL was placed in the 30 mL balloon and the Tinoco was placed to closed gravity drainage and continuous normal saline irrigation and secured to the patient's thigh with a Velcro strap without traction.  Rectal exam revealed no rectal mass and a soft prostate.  He was given a B and O suppository and taken to the recovery room in stable condition.  He will remain off his warfarin for now and be started on finasteride 5 mg daily and hopefully be discharged in the next day or two.  He will see me in a month with a PSA in the office.         CLEVE BOUDREAUX JR, MD             D: 2019   T: 2019   MT: WT      Name:     ROSS LORENZANA   MRN:      [id number]        Account:        [id number]   :      1931           Procedure Date: 2019      Document: [id number]       cc: Kwadwo Boudreaux Jr, MD

## 2019-04-24 NOTE — PROGRESS NOTES
UROLOGY    Tinoco out, awaiting first void. See Dr. Ramirez in 4 weeks with PSA (our office will call to arrange). Continue on finasteride 5mg daily long term. Can restart Coumadin tomorrow.     Discussed with Dr. Zee.     Adrienne Kelley PA-C  [facility]  [phone]

## 2019-04-24 NOTE — DISCHARGE SUMMARY
[facility]  Hospitalist Discharge Summary       Date of Admission:  4/19/2019  Date of Discharge:  4/24/2019  Discharging Provider: Prince Zee MD      Discharge Diagnoses   Acute blood loss anemia, hematuria, s/p cystoscopy fulguration of prostatic veins    Follow-ups Needed After Discharge   Follow-up Appointments     Follow-up and recommended labs and tests       Follow up with primary care provider, Kwadwo Winslow, within 7 days   for hospital follow- up.  The following labs/tests are recommended:   follow-up on hemoglobin. Follow-up on INR (plan to re-start your coumadin   in 3 days). Follow-up with Urology ([facility] [phone]) in 1-2   weeks.             Unresulted Labs Ordered in the Past 30 Days of this Admission     No orders found from 2/18/2019 to 4/20/2019.      These results will be followed up by NA    Discharge Disposition   Discharged to home  Condition at discharge: Stable    Hospital Course     Tucker Slater is a 88 year old male with PMH significant for bladder cancer s/p surgical resection (2000), prostate cancer s/p radiation (4095-9282), CKD (baseline Cr 1.3-1.5), permanent A-fib on Warfarin, s/p pacemaker, HTN, DAWSON on CPAP, mild LV dysfunction (EF of 45-50%), OA, and peripheral neuropathy who presented to the ED on 4/19/19 for evaluation of hematuria.     Acute Blood Loss Anemia 2/2 Gross hematuria    - has been on CBI    - possibly related to radiation cystitis or bladder tumor    - history of low grade superficial bladder cancer    - history of prostate cancer      - CT demonstrated significant clot burden in the bladder, urology performed irrigation and is on CBI     Left lateral ankle pain    - may be gout    - he has never had, but his father has    - will check uric acid and start with voltaren gel      Permanent A-fib on Warfarin     - holding coumadin for cysto, will re-start once plan made by Urology    - rate controlled, follows with Dr. Perez of  cardiology for management     - Continue PTA Carvedilol with parameters     H/o bradycardia, s/p pacemaker     - implanted in 2015, most recent pacemaker interrogation on 3/6/19 with device functioning within normal parameters       HTN, mild LV dysfunction    - BP stable and most recent echocardiogram in 08/2018 shows EF of 45-50%.    - Continue PTA Losartan with parameters    - Holding Maxzide with concern for dehydration with ongoing blood loss from #1     CKD - baseline creatinine of 1.3-1.5 with Cr of 1. 28 today    - Recheck BMP in AM    - Avoid nephrotoxic agents     Patient had cysto yesterday with evacuation of clots and fulguration of the prostatic veins. He is doing well. Urine is clear. Tinoco is discontinued. We are waiting for him to urinate. He will discharge home. We will restart his BP meds and have him hold coumadin for another 3 days. In terms of his left ankle, it is much better today (swelling and redness are decreased as well). Uric acid was normal. I will cont him on the voltaren gel as this has helped tremendously. He will discharge home.     Consultations This Hospital Stay   UROLOGY IP CONSULT    Code Status   Full Code    Time Spent on this Encounter   I, Prince Zee, personally saw the patient today and spent greater than 30 minutes discharging this patient.       Prince Zee MD  [facility]  ______________________________________________________________________    Physical Exam   Vital Signs: Temp: 95.7  F (35.4  C) Temp src: Oral BP: 132/82 Pulse: 78 Heart Rate: 77 Resp: 16 SpO2: 93 % O2 Device: None (Room air) Oxygen Delivery: 2 LPM  Weight: 0 lbs 0 oz  Constitutional: awake, alert, cooperative, no apparent distress, and appears stated age  Respiratory: No increased work of breathing, good air exchange, clear to auscultation bilaterally, no crackles or wheezing  Cardiovascular: Normal apical impulse, regular rate and rhythm, normal S1 and S2, no S3 or S4, and no  murmur noted  GI: No scars, normal bowel sounds, soft, non-distended, non-tender, no masses palpated, no hepatosplenomegally  Urine clear yellow today       Primary Care Physician   Kwadwo Winslow    Discharge Orders      Reason for your hospital stay    Anemia due to hematuria. S/p cystoscopy     Follow-up and recommended labs and tests     Follow up with primary care provider, Kwadwo Winslow, within 7 days for hospital follow- up.  The following labs/tests are recommended: follow-up on hemoglobin. Follow-up on INR (plan to re-start your coumadin in 3 days). Follow-up with Urology ([facility] [phone]) in 1-2 weeks.     Activity    Your activity upon discharge: activity as tolerated     Discharge Instructions    Hold your coumadin for 3 more days then re-start as you were previously taking. You will need an INR check next week.     Diet    Follow this diet upon discharge: Orders Placed This Encounter      Regular Diet Adult       Significant Results and Procedures   Most Recent 3 CBC's:  Recent Labs   Lab Test 04/24/19  0621 04/23/19  2245 04/23/19  1411 04/23/19  0625  04/22/19  0726   WBC 9.3  --   --  8.5  --  7.5   HGB 11.6* 11.2* 11.3* 11.5*   < > 11.9*   MCV 99  --   --  100  --  98     --   --  153  --  157    < > = values in this interval not displayed.     Most Recent 3 BMP's:  Recent Labs   Lab Test 04/24/19  0621 04/23/19  0625 04/22/19  0726    140 138   POTASSIUM 3.9 3.7 3.7   CHLORIDE 106 111* 108   CO2 25 25 25   BUN 32* 30 29   CR 1.36* 1.28* 1.34*   ANIONGAP 6 4 5   KARLA 8.6 8.3* 8.7   * 98 95     Most Recent 3 INR's:  Recent Labs   Lab Test 04/23/19  0625 04/22/19  0844 04/21/19  0615   INR 1.48* 1.55* 1.80*     Most Recent Urinalysis:  Recent Labs   Lab Test 04/19/19  1312 09/12/17  1423   COLOR Red Yellow   APPEARANCE Cloudy Clear   URINEGLC 150* Negative   URINEBILI Negative Negative   URINEKETONE Negative Negative   SG 1.015 1.015   UBLD Large* Negative    URINEPH 8.0* 7.0   PROTEIN >600* Trace*   UROBILINOGEN  --  0.2   NITRITE Negative Negative   LEUKEST Negative Negative   RBCU >182* O - 2   WBCU  --  O - 2   ,   Results for orders placed or performed during the hospital encounter of 04/19/19   CT Abdomen Pelvis w/o & w Contrast    Narrative    CT ABDOMEN PELVIS WITHOUT AND WITH CONTRAST  4/19/2019 5:52 PM    HISTORY: Hematuria. History of bladder cancer.    TECHNIQUE: Scans obtained from the diaphragm through the pelvis  without and with IV contrast, 100 mL [id number]. Radiation dose for  this scan was reduced using automated exposure control, adjustment of  the mA and/or kV according to patient size, or  iterative reconstruction technique.    COMPARISON: CT abdomen and pelvis dated 10/30/2008. Renal ultrasound  dated 9/20/2017.    FINDINGS: Increased interstitial markings in the bases could represent  vascular congestion or mild scarring. There are pacer/defibrillator  wires in the heart. Mild asymmetric thickening of the wall of the  distal esophagus versus a small hiatal hernia with gastric rugae is  noted. Heart is mildly enlarged. There are thoracic aortic  calcifications. Visualized mediastinal contents are otherwise  unremarkable.    Patient is status post right hip arthroplasty causing streak artifact  mildly limiting evaluation of the pelvis. Degenerative changes are  noted in the spine. No aggressive osseous lesions are seen.    Subtle densities in the dependent aspect of the gallbladder likely  represent gallstones but are less dense than typically seen. These  measure up to approximately 1.3 cm. The liver, gallbladder, pancreas,  spleen, bilateral adrenal glands otherwise enhance normally. Several  cysts are seen in the right kidney. The kidneys are mildly atrophic.  There is normal enhancement of the left kidney and left intrarenal  collecting system. Left ureter is unremarkable. There is normal  enhancement of the right kidney and inferior  intrarenal collecting  system. No significant contrast is seen in the upper right kidney  intrarenal collecting system and upper aspect of the right renal  pelvis and the right ureter which could represent slightly decreased  function of the right kidney as compared to the left kidney. A  definite mass in this region is not seen.    No nephrolithiasis is identified. No adenopathy, free fluid or free  air is seen in the peritoneal cavity.    Urinary bladder contains a heterogeneously dense structure posteriorly  measuring up to 10 cm in diameter. This could represent contrast  swirling in the urinary bladder. This could also represent hematoma  and/or mass. Urinary bladder is otherwise distended. There is  questionable mild thickening of the posterior aspect of the urinary  bladder wall. Prostate gland is enlarged and could be part of the  reason for the density in the urinary bladder.    No adenopathy, free fluid or free air seen in the peritoneal cavity.  There is nonaneurysmal atherosclerosis. Incidental note of fatty  atrophy of some of the left gluteus muscles. This could be due to  chronic degenerative disc disease in the spine. There are areas of  stenosis in the central canal and neural foramina which are only  partially imaged on this study.    The colon is grossly of normal caliber. There are scattered  diverticuli in the colon but are most predominantly noted in the  descending colon and proximal sigmoid colon. Structure likely  representing a normal appendix is seen in the region of the cecum.  Small bowel is of normal caliber. Stomach is mostly decompressed but  otherwise unremarkable.      Impression    IMPRESSION:  1. The urinary bladder is distended. There is a radiodense structure  in the dependent aspect of the urinary bladder which could represent  swirling of contrast, thrombus or mass. Prostate gland is enlarged and  could be part of the reason for the density in the urinary bladder.  Ultrasound  may be helpful for further evaluation.  2. No contrast is seen in the mid to upper right intrarenal collecting  system and upper right renal pelvis as well as the right ureter. This  could be due to decreased function of the right kidney as compared to  the left.   3. Fatty atrophy in some of the gluteus muscles on the left is likely  due to chronic degenerative disc disease and spinal/neural foraminal  stenosis.  4. No definite metastases are seen.    ARTURO STEPHENSON MD       Discharge Medications   Current Discharge Medication List      START taking these medications    Details   diclofenac (VOLTAREN) 1 % topical gel Place 2 g onto the skin 4 times daily  Qty: 1 Tube, Refills: 0    Comments: Re-label for home use  Associated Diagnoses: Pain in joint, ankle and foot, left      finasteride (PROSCAR) 5 MG tablet Take 1 tablet (5 mg) by mouth daily  Qty: 30 tablet, Refills: 1    Associated Diagnoses: Hypertrophy of prostate with urinary obstruction         CONTINUE these medications which have NOT CHANGED    Details   ACETAMINOPHEN PO Take 650 mg by mouth every 4 hours as needed for pain      Ascorbic Acid (VITAMIN C PO) Take 500 mg by mouth daily       calcium carbonate (OS-KARLA 500 MG [place]. [place]) 500 MG tablet Take 500 mg by mouth daily       carvedilol (COREG) 25 MG tablet Take 1.5 tablets (37.5 mg) by mouth 2 times daily (with meals)  Qty: 270 tablet, Refills: 3    Associated Diagnoses: Essential hypertension with goal blood pressure less than 140/90      losartan (COZAAR) 100 MG tablet Take 1 tablet (100 mg) by mouth daily  Qty: 90 tablet, Refills: 3      Multiple Vitamins-Minerals (OCUVITE PO) Take 1 tablet by mouth daily       triamterene-HCTZ (MAXZIDE-25) 37.5-25 MG tablet Take 1 tablet by mouth daily  Qty: 90 tablet, Refills: 3    Associated Diagnoses: Essential hypertension with goal blood pressure less than 140/90      vitamin B complex with vitamin C (VITAMIN  B COMPLEX) TABS tablet Take 1 tablet by mouth daily       VITAMIN D, CHOLECALCIFEROL, PO Take 2,000 Units by mouth daily.      !! order for DME Oxygen 2 Li/min  at night  Qty: 1 Device, Refills: 0    Associated Diagnoses: Hypoxemia      !! order for DME Oxygen 2 Li/min  at night with CPAP  SPO2 less than or equal to oxygen saturation 89% on effective CPAP in patient with known cardiomyopathy  Qty: 1 Device, Refills: 0    Associated Diagnoses: Hypoxemia; DAWSON (obstructive sleep apnea)       !! - Potential duplicate medications found. Please discuss with provider.      STOP taking these medications       warfarin (COUMADIN) 2.5 MG tablet Comments:   Reason for Stopping:         warfarin (COUMADIN) 2.5 MG tablet Comments:   Reason for Stopping:             Allergies   Allergies   Allergen Reactions     Merbromin      Other reaction(s): Other, see comments  Severe reaction as child. Amalgam fillings OK.     Morphine Nausea and Vomiting     Amlodipine      Edema

## 2019-04-24 NOTE — PLAN OF CARE
Patient's After Visit Summary was reviewed with patient and wife   Patient verbalized understanding of After Visit Summary, recommended follow up and was given an opportunity to ask questions.   Discharge medications sent home with patient/family: YES- voltaren and proscar  Discharged with spouse    OBSERVATION patient END time: 1821

## 2019-04-24 NOTE — DISCHARGE INSTRUCTIONS
See Dr. Ramirez in 4 weeks (our office will call to arrange). Continue on finasteride 5mg daily long term

## 2019-04-24 NOTE — PLAN OF CARE
Patient alert and oriented  Pain in foot, only when walking on it/moving  Orders for bedrest  Tinoco Catheter in place, CBI infusing  Urine output is clear/red tinged  Peripheral IV saline locked  Tolerating diet  BS positive  VSS, on room air  Continue with plan of care

## 2019-04-24 NOTE — PLAN OF CARE
A&Ox4, VSS. CBI running slow rate overnight, output dark yellow in color, couple tiny clots noted. Denies any pain besides L ankle pain- Voltaren gel applied, pt reported relief with gel. Tolerating regular diet, denies nausea. Refused Capno overnight. Orders for bedrest for activity. PCD's on. PIV SL. Will continue to monitor.

## 2019-04-24 NOTE — PHARMACY-ANTICOAGULATION SERVICE
Clinical Pharmacy- Warfarin Discharge Note  This patient is currently on warfarin at home: 1.25 mg Tuesday and Friday and 2.5 mg rest of week  Coumadin has been held since admit on 4/19/2019: Surgery noted       Anticoagulation Dose History     Recent Dosing and Labs Latest Ref Rng & Units 4/3/2019 4/17/2019 4/19/2019 4/20/2019 4/21/2019 4/22/2019 4/23/2019    INR 0.86 - 1.14 - - 1.80(H) 1.84(H) 1.80(H) 1.55(H) 1.48(H)    INR 0.86 - 1.14 2.4(A) 1.9(A) - - - - -          MD and urology would like patient to hold his Coumadin for the next 3 days, then restart as per previous schedule (so start on Saturday of this week) and INR to be checked next week.    Urology note today states that ok for patient to restart Coumadin tomorrow: 4/25/18

## 2019-04-24 NOTE — PLAN OF CARE
PRIMARY DIAGNOSIS: Cysto  OUTPATIENT/OBSERVATION GOALS TO BE MET BEFORE DISCHARGE:  1. Stable vital signs Yes  2. Tolerating diet:Yes  3. Pain controlled with oral pain medications:  declines pain meds   4. Positive bowel sounds:  Yes  5. Voiding without difficulty:  has voided only once, a small amt.  6. Able to ambulate:  Yes  7. Provider specific discharge goals met:  No    Discharge Planner Nurse   Safe discharge environment identified: Yes  Barriers to discharge: No       Entered by: Janette Montoya 04/24/2019 3:33 PM     Pt is alert and oriented. Denies pain from procedure, but does have a sore left ankle. Voltaren gel and ice pack applied. Pt lungs are clear, bowel sounds active X4, passing gas. Ambulated the halls today X2. Tinoco removed at 1030, pt voided 50, . Urine was dark rachel with sediment and clots. Encouraged fluids. Ok for pt to go home if voiding WDL.

## 2019-04-25 ENCOUNTER — TELEPHONE (OUTPATIENT)
Dept: INTERNAL MEDICINE | Facility: CLINIC | Age: 84
End: 2019-04-25

## 2019-04-25 DIAGNOSIS — Z92.89 HISTORY OF RECENT HOSPITALIZATION: Primary | ICD-10-CM

## 2019-04-25 LAB — INTERPRETATION ECG - MUSE: NORMAL

## 2019-04-25 NOTE — TELEPHONE ENCOUNTER
"ED/Discharge Protocol    \"Hi, my name is Elen Fong, a registered nurse, and I am calling on behalf of Dr. Winslow's office at [place].  I am calling to follow up and see how things are going for you after your recent visit.\"    \"I see that you were in the (ER/UC/IP) on 4/19/19.    How are you doing now that you are home?\"     Patient states he is doing well.    Is patient experiencing symptoms that may require a hospital visit?  No.      Discharge Instructions    \"Let's review your discharge instructions.  What is/are the follow-up recommendations?  Pt. Response: Follow up with primary care provider in 7 days, and follow up with [facility]    \"Were you instructed to make a follow-up appointment?\"  Pt. Response: Yes.  Has appointment been made?   No.  \"Can I help you schedule that appointment?\" Yes      \"When you see the provider, I would recommend that you bring your discharge instructions with you.    Medications    \"How many new medications are you on since your hospitalization/ED visit?\"    2 or more - Epic MTM referral needed  \"How many of your current medicines changed (dose, timing, name, etc.) while you were in the hospital/ED visit?\"   0-1  \"Do you have questions about your medications?\"   No  \"Were you newly diagnosed with heart failure, COPD, diabetes or did you have a heart attack?\"   No  For patients on insulin: \"Did you start on insulin in the hospital or did you have your insulin dose changed?\"   No    Medication reconciliation completed? Yes    Was MTM referral placed (*Make sure to put transitions as reason for referral)?   Yes - \"The Medication Therapy  will call you within the next few days to schedule an appointment with an MTM pharmacist to discuss your medicines and make sure they are working as well as they possibly can for you. This visit can be done in a [place] clinic or on the phone and is at no charge to you.\"    Call Summary    \"Do you have any questions or " "concerns about your condition or care plan at the moment?\"    No  Triage nurse advice given: N/A    Patient was in ER once in the past year (assess appropriateness of ER visits.)      \"If you have questions or things don't continue to improve, we encourage you contact us through the main clinic number,  [phone].  Even if the clinic is not open, triage nurses are available 24/7 to help you.     We would like you to know that our clinic has extended hours (provide information).  We also have urgent care (provide details on closest location and hours/contact info)\"      \"Thank you for your time and take care!\"      "

## 2019-04-26 ENCOUNTER — TELEPHONE (OUTPATIENT)
Dept: INTERNAL MEDICINE | Facility: CLINIC | Age: 84
End: 2019-04-26

## 2019-04-26 DIAGNOSIS — C61 PROSTATE CANCER (H): Primary | ICD-10-CM

## 2019-04-26 NOTE — TELEPHONE ENCOUNTER
Fax received from [facility] for review and signature.  Put in Dr. Winslow's in basket.

## 2019-04-29 ENCOUNTER — ALLIED HEALTH/NURSE VISIT (OUTPATIENT)
Dept: PHARMACY | Facility: CLINIC | Age: 84
End: 2019-04-29
Payer: COMMERCIAL

## 2019-04-29 DIAGNOSIS — R52 PAIN: ICD-10-CM

## 2019-04-29 DIAGNOSIS — I48.91 ATRIAL FIBRILLATION, UNSPECIFIED TYPE (H): ICD-10-CM

## 2019-04-29 DIAGNOSIS — Z78.9 TAKES DIETARY SUPPLEMENTS: ICD-10-CM

## 2019-04-29 DIAGNOSIS — N13.8 HYPERTROPHY OF PROSTATE WITH URINARY OBSTRUCTION: ICD-10-CM

## 2019-04-29 DIAGNOSIS — I10 ESSENTIAL HYPERTENSION WITH GOAL BLOOD PRESSURE LESS THAN 140/90: ICD-10-CM

## 2019-04-29 DIAGNOSIS — M25.579 ANKLE PAIN, UNSPECIFIED CHRONICITY, UNSPECIFIED LATERALITY: ICD-10-CM

## 2019-04-29 DIAGNOSIS — N40.1 HYPERTROPHY OF PROSTATE WITH URINARY OBSTRUCTION: ICD-10-CM

## 2019-04-29 DIAGNOSIS — D64.9 ANEMIA, UNSPECIFIED TYPE: Primary | ICD-10-CM

## 2019-04-29 PROCEDURE — 99605 MTMS BY PHARM NP 15 MIN: CPT | Performed by: PHARMACIST

## 2019-04-29 PROCEDURE — 99607 MTMS BY PHARM ADDL 15 MIN: CPT | Performed by: PHARMACIST

## 2019-04-29 NOTE — PROGRESS NOTES
"SUBJECTIVE/OBJECTIVE:                Tucker Slater is a 88 year old male called for a transitions of care visit.  He was discharged from [facility] on 04/24/19 for Acute blood loss anemia, hematuria, s/p cystoscopy fulguration of prostatic veins    Chief Complaint: No complaints. Reports is feeling \"fine, but weak\".  Personal Healthcare Goals: get stronger.    Allergies/ADRs: Reviewed in Epic  Tobacco: No tobacco use   Alcohol: 10 or more beverages /week. But none now.  Caffeine: one coffee.  Activity: walks around.  PMH: Reviewed in Epic    Medication Adherence/Access:  no issues reported  Using [facility] pharmacy    Anemia: Pt reports this is resolving. On 04/24/19 hemoglobin was 11/1 g/dl. Reports will see PCP on 05/01.      Afib/HTN: Patient is currently taking Jantoven 2.5 mg except on Tuesday and Thursday for anticoagulation.Reports he just restarted Jantoven last night after his hospital stay. Reports is having INR done in a couple of days. Patient reports no current concerns of bruising or bleeding. Patient does have a hx of GI bleed.   Patient does not self-monitor BP, because it is always stable.  Patient reports also taking carvedilol 37.5 mg by mouth twice daily, losartan 100 mg once daily, triamterene-HCTZ 37.5-25 mg, 1 tablet daily.  [id number] Score         Ankle pain: Reports used diclofenac 1% cream and feels this is really helped. Reports stopped using it because does not need it any longer, but will keep it on hand just in case.    Pain/general: Reports takes acetaminophen very seldom, and never exceeds 3000 mg/day. .    Hypertrophy of prostate with urinary obstruction: Reports started finasteride 5 mg once daily.     Supplements: Patient reports takes a multivitamin, once daily, calcium 500 mg, 1 tablet daily, B complex 1 tablet daily, vitamin C 500 mg daily.  Reports his wife believes that these supplements are important so he takes them.    Today's Vitals: There were no vitals " taken for this visit.    ASSESSMENT:                 Current medications were reviewed today.      Medication Adherence: excellent, no issues identified    Anemia: Improving with no medication. Pt following up with PCP on 05/01/19.      Afib/HTN: Appears stable. Pt appears to be tolerating medications well. Pt seeing PCP on 05/01/19.     Ankle pain: Resolved.     Pain/general: Stable.     Hypertrophy of prostate with urinary obstruction: Assessment at follow up. Pt just started therapy.    Supplements: Stable.      PLAN:                  Post Discharge Medication Reconciliation Status: discharge medications reconciled, continue medications without change.    1) No considerations for medication changes today.    I spent 30 minutes with this patient today. I offer these suggestions with the understanding that I don't fully understand Tucker's past medical history and the complexity of his health conditions. Tucker should make no changes without the approval of his physician. A copy of the visit note was provided to the patient's primary care provider.    Will follow up in two weeks with a phone call.    The patient was mailed a summary of these recommendations as an after visit summary.    Amanda Dee, [place] Pharmacist.   [phone]

## 2019-04-30 NOTE — PATIENT INSTRUCTIONS
"Recommendations from today's MTM visit:                                                    MTM (medication therapy management) is a service provided by a clinical pharmacist designed to help you get the most of out of your medicines.   Today we reviewed what your medicines are for, how to know if they are working, that your medicines are safe and how to make your medicine regimen as easy as possible.     1) No considerations for medication changes today.    Next MTM visit: I will call you again to follow up on Friday, May 10th  at 1:30. This will appear as a \"telemed\" appointment. Please DO NOT come to this appointment.       To schedule another MTM appointment, please call the clinic directly or you may call the MTM scheduling line at [phone] or toll-free at [phone].     My Clinical Pharmacist's contact information:                                                      It was a pleasure talking with you today!  Please feel free to contact me with any questions or concerns you have.      Amanda Dee, MTM Pharmacist.   [phone]      You may receive a survey about the MTM services you received by email and/or US Mail.  I would appreciate your feedback to help me serve you better in the future. Your comments will be anonymous.        "

## 2019-05-01 ENCOUNTER — ANTICOAGULATION THERAPY VISIT (OUTPATIENT)
Dept: ANTICOAGULATION | Facility: CLINIC | Age: 84
End: 2019-05-01
Payer: COMMERCIAL

## 2019-05-01 ENCOUNTER — MEDICAL CORRESPONDENCE (OUTPATIENT)
Dept: HEALTH INFORMATION MANAGEMENT | Facility: CLINIC | Age: 84
End: 2019-05-01

## 2019-05-01 ENCOUNTER — OFFICE VISIT (OUTPATIENT)
Dept: INTERNAL MEDICINE | Facility: CLINIC | Age: 84
End: 2019-05-01
Payer: COMMERCIAL

## 2019-05-01 VITALS
BODY MASS INDEX: 27.35 KG/M2 | TEMPERATURE: 97.8 F | DIASTOLIC BLOOD PRESSURE: 62 MMHG | OXYGEN SATURATION: 98 % | WEIGHT: 191 LBS | RESPIRATION RATE: 12 BRPM | SYSTOLIC BLOOD PRESSURE: 120 MMHG | HEART RATE: 72 BPM | HEIGHT: 70 IN

## 2019-05-01 DIAGNOSIS — N18.30 CKD (CHRONIC KIDNEY DISEASE) STAGE 3, GFR 30-59 ML/MIN (H): ICD-10-CM

## 2019-05-01 DIAGNOSIS — I48.91 ATRIAL FIBRILLATION, UNSPECIFIED TYPE (H): ICD-10-CM

## 2019-05-01 DIAGNOSIS — Z79.01 LONG TERM CURRENT USE OF ANTICOAGULANTS WITH INR GOAL OF 2.0-3.0: ICD-10-CM

## 2019-05-01 DIAGNOSIS — Z09 HOSPITAL DISCHARGE FOLLOW-UP: Primary | ICD-10-CM

## 2019-05-01 DIAGNOSIS — R31.0 GROSS HEMATURIA: ICD-10-CM

## 2019-05-01 DIAGNOSIS — D09.0 CARCINOMA IN SITU OF BLADDER: ICD-10-CM

## 2019-05-01 DIAGNOSIS — D62 ANEMIA DUE TO BLOOD LOSS, ACUTE: ICD-10-CM

## 2019-05-01 LAB — INR POINT OF CARE: 1.1 (ref 0.86–1.14)

## 2019-05-01 PROCEDURE — 36416 COLLJ CAPILLARY BLOOD SPEC: CPT

## 2019-05-01 PROCEDURE — 85610 PROTHROMBIN TIME: CPT | Mod: QW

## 2019-05-01 PROCEDURE — 99207 ZZC NO CHARGE NURSE ONLY: CPT

## 2019-05-01 PROCEDURE — 99495 TRANSJ CARE MGMT MOD F2F 14D: CPT | Performed by: INTERNAL MEDICINE

## 2019-05-01 RX ORDER — WARFARIN SODIUM 2.5 MG/1
2.5 TABLET ORAL DAILY
COMMUNITY
End: 2019-05-25

## 2019-05-01 ASSESSMENT — MIFFLIN-ST. JEOR: SCORE: 1542.62

## 2019-05-01 NOTE — PROGRESS NOTES
ANTICOAGULATION FOLLOW-UP CLINIC VISIT    Patient Name:  Tucker Slater  Date:  2019  Contact Type:  Face to Face    SUBJECTIVE:     Patient Findings     Comments:   The patient was assessed for diet, medication, and activity level changes, missed or extra doses, with no problem findings.          Anticoagulation-related hospital admission (Patient was hospitalized from - for hematuria.  Warfarin was intentionally held during this time.  Restarted warfarin on .  No futher hematuria since discharge.)     OBJECTIVE    INR Protime   Date Value Ref Range Status   2019 1.1 0.86 - 1.14 Final       ASSESSMENT / PLAN  INR assessment SUB    Recheck INR In: 5 DAYS    INR Location Clinic      Anticoagulation Summary  As of 2019    INR goal:   2.0-3.0   TTR:   73.0 % (2.1 y)   INR used for dosin.1! (2019)   Warfarin maintenance plan:   1.25 mg (2.5 mg x 0.5) every Tue, Fri; 2.5 mg (2.5 mg x 1) all other days   Full warfarin instructions:   : 3.75 mg; 5/3: 2.5 mg; Otherwise 1.25 mg every Tue, Fri; 2.5 mg all other days   Weekly warfarin total:   15 mg   Plan last modified:   Ivory Machado RN (2019)   Next INR check:   2019   Priority:   INR   Target end date:       Indications    Atrial fibrillation (H) with mitral regurgitation and congestive heart failure [I48.91]  Long term current use of anticoagulants with INR goal of 2.0-3.0 [Z79.01]             Anticoagulation Episode Summary     INR check location:       Preferred lab:       Send INR reminders to:   [facility]    Comments:         Anticoagulation Care Providers     Provider Role Specialty Phone number    Kwadwo Winslow MD Responsible Internal Medicine [phone]            See the Encounter Report to view Anticoagulation Flowsheet and Dosing Calendar (Go to Encounters tab in chart review, and find the Anticoagulation Therapy Visit)    Dosage adjustment made based on physician directed care plan.    Ivory Machado,  RN

## 2019-05-01 NOTE — PROGRESS NOTES
SUBJECTIVE:   Tucker Slater is a 88 year old male who presents to clinic today for the following   health issues:          Hospital Follow-up Visit:    Hospital/Nursing Home/IP Rehab Facility: [facility]  Date of Admission: 04/19/19  Date of Discharge: 04/24/19  Reason(s) for Admission: Acute blood loss anemia, hematuria, s/p cystoscopy fulguration of prostatic veins            Problems taking medications regularly:  None       Medication changes since discharge: None       Problems adhering to non-medication therapy:  None    Summary of hospitalization:  [facility] discharge summary reviewed  Diagnostic Tests/Treatments reviewed.  Follow up needed: urology   Other Healthcare Providers Involved in Patient s Care:         Specialist appointment - urology in 3 weeks   Update since discharge: improved.       Post Discharge Medication Reconciliation: discharge medications reconciled, continue medications without change.  Plan of care communicated with patient     Coding guidelines for this visit:  Type of Medical   Decision Making Face-to-Face Visit       within 7 Days of discharge Face-to-Face Visit        within 14 days of discharge   Moderate Complexity [id number]   High Complexity [id number]          Patient is seen for a follow up visit.  Recently hospitalized for hematuria. Developed bleeding with urination. No pain.   On anticoagulation with Coumadin for h/o a fib.   Has h/o bladder cancer and prostate cancer, in remission.   Urology saw him in hospital and performed cystoscopy. Had clots, irrigated, bleeding sites were cauterized and contained. No recurrent bleeding. Coumadin was held and now restarted. No bleeding reported. No fever, abdominal or flank pain. Has follow up appointment in 3 weeks with urology.   Had mild anemia, no symptoms of dizziness, SOB , CP.   Has h/o CKD, renal function is stable with creatinine 1.5.   Started on Proscar for treatment of prostate enlargement,  possibly contributing to bleeding.         Additional history: as documented    Reviewed  and updated as needed this visit by clinical staff         Reviewed and updated as needed this visit by Provider         Patient Active Problem List   Diagnosis     Essential hypertension with goal blood pressure less than 140/90     Carcinoma in situ of bladder     Hypertrophy of prostate with urinary obstruction     Generalized osteoarthrosis, unspecified site     Hereditary and idiopathic peripheral neuropathy     Pain in joint, shoulder region     CARDIOVASCULAR SCREENING; LDL GOAL LESS THAN 130     Advanced directives, counseling/discussion     Peripheral neuropathy     GI bleed     Anemia     Near syncope     Anemia due to blood loss, acute     Atrial fibrillation (H) with mitral regurgitation and congestive heart failure     Bradycardia     CKD (chronic kidney disease) stage 3, GFR 30-59 ml/min (H)     Cardiac pacemaker in situ     Long-term (current) use of anticoagulants [Z79.01]     Degenerative arthritis of hip     Malignant neoplasm of other specified sites of bladder     Dysplasia of prostate     Long term current use of anticoagulants with INR goal of 2.0-3.0     Hematuria     Past Surgical History:   Procedure Laterality Date     ARTHROPLASTY HIP Right 3/27/2017    Procedure: ARTHROPLASTY HIP;  Surgeon: Jigar Wynn MD;  Location: [facility] OR     C NONSPECIFIC PROCEDURE      bilat inguinal hernia repairs ( 3total procedures)      C NONSPECIFIC PROCEDURE      pilonidal cyst     C NONSPECIFIC PROCEDURE      T + A     C NONSPECIFIC PROCEDURE  8/09     R+L total knee     CARDIOVERSION  3/26/15     COLONOSCOPY       CYSTOSCOPY, FULGURATE BLEEDERS, EVACUATE CLOT(S), COMBINED N/A 4/23/2019    Procedure: Exam under anesthesia, video cystopanendoscopy, evacuation of clots, fulguration of prostatic veins.;  Surgeon: Ilir Ramirez MD;  Location: [facility] OR     IMPLANT PACEMAKER  3/26/15     RELEASE CARPAL TUNNEL Right  2017    Procedure: RELEASE CARPAL TUNNEL;  Right carpal tunnel release;  Surgeon: Alonso Barboza MD;  Location: [facility] OR       Social History     Tobacco Use     Smoking status: Former Smoker     Last attempt to quit: 1977     Years since quittin.6     Smokeless tobacco: Never Used   Substance Use Topics     Alcohol use: No     Alcohol/week: 0.0 oz     Family History   Problem Relation Age of Onset     Heart Disease Father          89yo     Coronary Artery Disease Father      Heart Disease Mother          76yo     Coronary Artery Disease Mother      Family History Negative Brother          Current Outpatient Medications   Medication Sig Dispense Refill     ACETAMINOPHEN PO Take 650 mg by mouth every 4 hours as needed for pain       Ascorbic Acid (VITAMIN C PO) Take 500 mg by mouth daily        calcium carbonate (OS-KARLA 500 MG [place]. [place]) 500 MG tablet Take 500 mg by mouth daily        carvedilol (COREG) 25 MG tablet Take 1.5 tablets (37.5 mg) by mouth 2 times daily (with meals) 270 tablet 3     diclofenac (VOLTAREN) 1 % topical gel Place 2 g onto the skin 4 times daily 1 Tube 0     finasteride (PROSCAR) 5 MG tablet Take 1 tablet (5 mg) by mouth daily 30 tablet 1     losartan (COZAAR) 100 MG tablet Take 1 tablet (100 mg) by mouth daily 90 tablet 3     Multiple Vitamins-Minerals (OCUVITE PO) Take 1 tablet by mouth daily        triamterene-HCTZ (MAXZIDE-25) 37.5-25 MG tablet Take 1 tablet by mouth daily 90 tablet 3     vitamin B complex with vitamin C (VITAMIN  B COMPLEX) TABS tablet Take 1 tablet by mouth daily       VITAMIN D, CHOLECALCIFEROL, PO Take 2,000 Units by mouth daily.       warfarin (COUMADIN) 2.5 MG tablet Take 2.5 mg by mouth daily Jantoven       order for DME Oxygen 2 Li/min  at night 1 Device 0     order for DME Oxygen 2 Li/min  at night with CPAP  SPO2 less than or equal to oxygen saturation 89% on effective CPAP in patient with known cardiomyopathy 1 Device 0  "      ROS:  Constitutional, HEENT, cardiovascular, pulmonary, GI, , musculoskeletal, neuro, skin, endocrine and psych systems are negative, except as otherwise noted.    OBJECTIVE:     /62   Pulse 72   Temp 97.8  F (36.6  C) (Oral)   Resp 12   Ht 1.778 m (5' 10\")   Wt 86.6 kg (191 lb)   SpO2 98%   BMI 27.41 kg/m    Body mass index is 27.41 kg/m .   GENERAL: healthy, alert and no distress  EYES: Eyes grossly normal to inspection, PERRL and conjunctivae and sclerae normal  HENT: ear canals and TM's normal, nose and mouth without ulcers or lesions  NECK: no adenopathy, no asymmetry, masses, or scars and thyroid normal to palpation  RESP: lungs clear to auscultation - no rales, rhonchi or wheezes  CV: regular rate and rhythm, normal S1 S2, no S3 or S4, no murmur, click or rub, no peripheral edema and peripheral pulses strong  ABDOMEN: soft, nontender, no hepatosplenomegaly, no masses and bowel sounds normal  MS: no gross musculoskeletal defects noted, no edema    Diagnostic Test Results:  Results for orders placed or performed during the hospital encounter of 04/19/19   CT Abdomen Pelvis w/o & w Contrast    Narrative    CT ABDOMEN PELVIS WITHOUT AND WITH CONTRAST  4/19/2019 5:52 PM    HISTORY: Hematuria. History of bladder cancer.    TECHNIQUE: Scans obtained from the diaphragm through the pelvis  without and with IV contrast, 100 mL Isovue-370. Radiation dose for  this scan was reduced using automated exposure control, adjustment of  the mA and/or kV according to patient size, or  iterative reconstruction technique.    COMPARISON: CT abdomen and pelvis dated 10/30/2008. Renal ultrasound  dated 9/20/2017.    FINDINGS: Increased interstitial markings in the bases could represent  vascular congestion or mild scarring. There are pacer/defibrillator  wires in the heart. Mild asymmetric thickening of the wall of the  distal esophagus versus a small hiatal hernia with gastric rugae is  noted. Heart is mildly " enlarged. There are thoracic aortic  calcifications. Visualized mediastinal contents are otherwise  unremarkable.    Patient is status post right hip arthroplasty causing streak artifact  mildly limiting evaluation of the pelvis. Degenerative changes are  noted in the spine. No aggressive osseous lesions are seen.    Subtle densities in the dependent aspect of the gallbladder likely  represent gallstones but are less dense than typically seen. These  measure up to approximately 1.3 cm. The liver, gallbladder, pancreas,  spleen, bilateral adrenal glands otherwise enhance normally. Several  cysts are seen in the right kidney. The kidneys are mildly atrophic.  There is normal enhancement of the left kidney and left intrarenal  collecting system. Left ureter is unremarkable. There is normal  enhancement of the right kidney and inferior intrarenal collecting  system. No significant contrast is seen in the upper right kidney  intrarenal collecting system and upper aspect of the right renal  pelvis and the right ureter which could represent slightly decreased  function of the right kidney as compared to the left kidney. A  definite mass in this region is not seen.    No nephrolithiasis is identified. No adenopathy, free fluid or free  air is seen in the peritoneal cavity.    Urinary bladder contains a heterogeneously dense structure posteriorly  measuring up to 10 cm in diameter. This could represent contrast  swirling in the urinary bladder. This could also represent hematoma  and/or mass. Urinary bladder is otherwise distended. There is  questionable mild thickening of the posterior aspect of the urinary  bladder wall. Prostate gland is enlarged and could be part of the  reason for the density in the urinary bladder.    No adenopathy, free fluid or free air seen in the peritoneal cavity.  There is nonaneurysmal atherosclerosis. Incidental note of fatty  atrophy of some of the left gluteus muscles. This could be due  to  chronic degenerative disc disease in the spine. There are areas of  stenosis in the central canal and neural foramina which are only  partially imaged on this study.    The colon is grossly of normal caliber. There are scattered  diverticuli in the colon but are most predominantly noted in the  descending colon and proximal sigmoid colon. Structure likely  representing a normal appendix is seen in the region of the cecum.  Small bowel is of normal caliber. Stomach is mostly decompressed but  otherwise unremarkable.      Impression    IMPRESSION:  1. The urinary bladder is distended. There is a radiodense structure  in the dependent aspect of the urinary bladder which could represent  swirling of contrast, thrombus or mass. Prostate gland is enlarged and  could be part of the reason for the density in the urinary bladder.  Ultrasound may be helpful for further evaluation.  2. No contrast is seen in the mid to upper right intrarenal collecting  system and upper right renal pelvis as well as the right ureter. This  could be due to decreased function of the right kidney as compared to  the left.   3. Fatty atrophy in some of the gluteus muscles on the left is likely  due to chronic degenerative disc disease and spinal/neural foraminal  stenosis.  4. No definite metastases are seen.    ARTURO STEPHENSON MD   UA reflex to Microscopic   Result Value Ref Range    Color Urine Red     Appearance Urine Cloudy     Glucose Urine 150 (A) NEG^Negative mg/dL    Bilirubin Urine Negative NEG^Negative    Ketones Urine Negative NEG^Negative mg/dL    Specific Gravity Urine 1.015 1.003 - 1.035    Blood Urine Large (A) NEG^Negative    pH Urine 8.0 (H) 5.0 - 7.0 pH    Protein Albumin Urine >600 (A) NEG^Negative mg/dL    Urobilinogen mg/dL Normal 0.0 - 2.0 mg/dL    Nitrite Urine Negative NEG^Negative    Leukocyte Esterase Urine Negative NEG^Negative    Source Midstream Urine     RBC Urine >182 (H) 0 - 2 /HPF   INR   Result Value Ref Range     INR 1.80 (H) 0.86 - 1.14   CBC with platelets differential   Result Value Ref Range    WBC 7.1 4.0 - 11.0 10e9/L    RBC Count 4.26 (L) 4.4 - 5.9 10e12/L    Hemoglobin 13.5 13.3 - 17.7 g/dL    Hematocrit 41.9 40.0 - 53.0 %    MCV 98 78 - 100 fl    MCH 31.7 26.5 - 33.0 pg    MCHC 32.2 31.5 - 36.5 g/dL    RDW 13.9 10.0 - 15.0 %    Platelet Count 193 150 - 450 10e9/L    Diff Method Automated Method     % Neutrophils 76.9 %    % Lymphocytes 9.8 %    % Monocytes 7.5 %    % Eosinophils 4.8 %    % Basophils 0.7 %    % Immature Granulocytes 0.3 %    Nucleated RBCs 0 0 /100    Absolute Neutrophil 5.5 1.6 - 8.3 10e9/L    Absolute Lymphocytes 0.7 (L) 0.8 - 5.3 10e9/L    Absolute Monocytes 0.5 0.0 - 1.3 10e9/L    Absolute Eosinophils 0.3 0.0 - 0.7 10e9/L    Absolute Basophils 0.1 0.0 - 0.2 10e9/L    Abs Immature Granulocytes 0.0 0 - 0.4 10e9/L    Absolute Nucleated RBC 0.0    Basic metabolic panel   Result Value Ref Range    Sodium 140 133 - 144 mmol/L    Potassium 4.0 3.4 - 5.3 mmol/L    Chloride 110 (H) 94 - 109 mmol/L    Carbon Dioxide 23 20 - 32 mmol/L    Anion Gap 7 3 - 14 mmol/L    Glucose 99 70 - 99 mg/dL    Urea Nitrogen 34 (H) 7 - 30 mg/dL    Creatinine 1.36 (H) 0.66 - 1.25 mg/dL    GFR Estimate 46 (L) >60 mL/min/[1.73_m2]    GFR Estimate If Black 53 (L) >60 mL/min/[1.73_m2]    Calcium 9.0 8.5 - 10.1 mg/dL   Hemoglobin   Result Value Ref Range    Hemoglobin 12.8 (L) 13.3 - 17.7 g/dL   Basic metabolic panel   Result Value Ref Range    Sodium 138 133 - 144 mmol/L    Potassium 4.0 3.4 - 5.3 mmol/L    Chloride 110 (H) 94 - 109 mmol/L    Carbon Dioxide 24 20 - 32 mmol/L    Anion Gap 4 3 - 14 mmol/L    Glucose 143 (H) 70 - 99 mg/dL    Urea Nitrogen 33 (H) 7 - 30 mg/dL    Creatinine 1.30 (H) 0.66 - 1.25 mg/dL    GFR Estimate 49 (L) >60 mL/min/[1.73_m2]    GFR Estimate If Black 56 (L) >60 mL/min/[1.73_m2]    Calcium 8.2 (L) 8.5 - 10.1 mg/dL   CBC with platelets differential   Result Value Ref Range    WBC 8.1 4.0 - 11.0  10e9/L    RBC Count 4.02 (L) 4.4 - 5.9 10e12/L    Hemoglobin 13.0 (L) 13.3 - 17.7 g/dL    Hematocrit 39.8 (L) 40.0 - 53.0 %    MCV 99 78 - 100 fl    MCH 32.3 26.5 - 33.0 pg    MCHC 32.7 31.5 - 36.5 g/dL    RDW 13.9 10.0 - 15.0 %    Platelet Count 170 150 - 450 10e9/L    Diff Method Automated Method     % Neutrophils 87.8 %    % Lymphocytes 6.0 %    % Monocytes 5.2 %    % Eosinophils 0.0 %    % Basophils 0.4 %    % Immature Granulocytes 0.6 %    Nucleated RBCs 0 0 /100    Absolute Neutrophil 7.2 1.6 - 8.3 10e9/L    Absolute Lymphocytes 0.5 (L) 0.8 - 5.3 10e9/L    Absolute Monocytes 0.4 0.0 - 1.3 10e9/L    Absolute Eosinophils 0.0 0.0 - 0.7 10e9/L    Absolute Basophils 0.0 0.0 - 0.2 10e9/L    Abs Immature Granulocytes 0.1 0 - 0.4 10e9/L    Absolute Nucleated RBC 0.0    Hemoglobin   Result Value Ref Range    Hemoglobin 12.4 (L) 13.3 - 17.7 g/dL   INR   Result Value Ref Range    INR 1.84 (H) 0.86 - 1.14   Hemoglobin   Result Value Ref Range    Hemoglobin 11.9 (L) 13.3 - 17.7 g/dL   INR   Result Value Ref Range    INR 1.80 (H) 0.86 - 1.14   Creatinine   Result Value Ref Range    Creatinine 1.38 (H) 0.66 - 1.25 mg/dL    GFR Estimate 45 (L) >60 mL/min/[1.73_m2]    GFR Estimate If Black 52 (L) >60 mL/min/[1.73_m2]   CBC with platelets differential   Result Value Ref Range    WBC 7.5 4.0 - 11.0 10e9/L    RBC Count 3.70 (L) 4.4 - 5.9 10e12/L    Hemoglobin 11.9 (L) 13.3 - 17.7 g/dL    Hematocrit 36.3 (L) 40.0 - 53.0 %    MCV 98 78 - 100 fl    MCH 32.2 26.5 - 33.0 pg    MCHC 32.8 31.5 - 36.5 g/dL    RDW 13.7 10.0 - 15.0 %    Platelet Count 157 150 - 450 10e9/L    Diff Method Automated Method     % Neutrophils 73.0 %    % Lymphocytes 10.8 %    % Monocytes 11.3 %    % Eosinophils 4.1 %    % Basophils 0.7 %    % Immature Granulocytes 0.1 %    Nucleated RBCs 0 0 /100    Absolute Neutrophil 5.5 1.6 - 8.3 10e9/L    Absolute Lymphocytes 0.8 0.8 - 5.3 10e9/L    Absolute Monocytes 0.9 0.0 - 1.3 10e9/L    Absolute Eosinophils 0.3 0.0 -  0.7 10e9/L    Absolute Basophils 0.1 0.0 - 0.2 10e9/L    Abs Immature Granulocytes 0.0 0 - 0.4 10e9/L    Absolute Nucleated RBC 0.0    Basic metabolic panel   Result Value Ref Range    Sodium 138 133 - 144 mmol/L    Potassium 3.7 3.4 - 5.3 mmol/L    Chloride 108 94 - 109 mmol/L    Carbon Dioxide 25 20 - 32 mmol/L    Anion Gap 5 3 - 14 mmol/L    Glucose 95 70 - 99 mg/dL    Urea Nitrogen 29 7 - 30 mg/dL    Creatinine 1.34 (H) 0.66 - 1.25 mg/dL    GFR Estimate 47 (L) >60 mL/min/[1.73_m2]    GFR Estimate If Black 54 (L) >60 mL/min/[1.73_m2]    Calcium 8.7 8.5 - 10.1 mg/dL   INR   Result Value Ref Range    INR 1.55 (H) 0.86 - 1.14   Hemoglobin   Result Value Ref Range    Hemoglobin 11.9 (L) 13.3 - 17.7 g/dL   Hemoglobin   Result Value Ref Range    Hemoglobin 11.5 (L) 13.3 - 17.7 g/dL   INR   Result Value Ref Range    INR 1.48 (H) 0.86 - 1.14   Basic metabolic panel   Result Value Ref Range    Sodium 140 133 - 144 mmol/L    Potassium 3.7 3.4 - 5.3 mmol/L    Chloride 111 (H) 94 - 109 mmol/L    Carbon Dioxide 25 20 - 32 mmol/L    Anion Gap 4 3 - 14 mmol/L    Glucose 98 70 - 99 mg/dL    Urea Nitrogen 30 7 - 30 mg/dL    Creatinine 1.28 (H) 0.66 - 1.25 mg/dL    GFR Estimate 50 (L) >60 mL/min/[1.73_m2]    GFR Estimate If Black 57 (L) >60 mL/min/[1.73_m2]    Calcium 8.3 (L) 8.5 - 10.1 mg/dL   CBC with platelets   Result Value Ref Range    WBC 8.5 4.0 - 11.0 10e9/L    RBC Count 3.54 (L) 4.4 - 5.9 10e12/L    Hemoglobin 11.5 (L) 13.3 - 17.7 g/dL    Hematocrit 35.5 (L) 40.0 - 53.0 %     78 - 100 fl    MCH 32.5 26.5 - 33.0 pg    MCHC 32.4 31.5 - 36.5 g/dL    RDW 13.5 10.0 - 15.0 %    Platelet Count 153 150 - 450 10e9/L   Hemoglobin   Result Value Ref Range    Hemoglobin 11.3 (L) 13.3 - 17.7 g/dL   Uric acid   Result Value Ref Range    Uric Acid 6.0 3.5 - 7.2 mg/dL   Hemoglobin   Result Value Ref Range    Hemoglobin 11.2 (L) 13.3 - 17.7 g/dL   CBC with platelets differential   Result Value Ref Range    WBC 9.3 4.0 - 11.0 10e9/L     RBC Count 3.61 (L) 4.4 - 5.9 10e12/L    Hemoglobin 11.6 (L) 13.3 - 17.7 g/dL    Hematocrit 35.8 (L) 40.0 - 53.0 %    MCV 99 78 - 100 fl    MCH 32.1 26.5 - 33.0 pg    MCHC 32.4 31.5 - 36.5 g/dL    RDW 13.1 10.0 - 15.0 %    Platelet Count 179 150 - 450 10e9/L    Diff Method Automated Method     % Neutrophils 88.4 %    % Lymphocytes 4.3 %    % Monocytes 6.7 %    % Eosinophils 0.0 %    % Basophils 0.1 %    % Immature Granulocytes 0.5 %    Nucleated RBCs 0 0 /100    Absolute Neutrophil 8.2 1.6 - 8.3 10e9/L    Absolute Lymphocytes 0.4 (L) 0.8 - 5.3 10e9/L    Absolute Monocytes 0.6 0.0 - 1.3 10e9/L    Absolute Eosinophils 0.0 0.0 - 0.7 10e9/L    Absolute Basophils 0.0 0.0 - 0.2 10e9/L    Abs Immature Granulocytes 0.1 0 - 0.4 10e9/L    Absolute Nucleated RBC 0.0      *Note: Due to a large number of results and/or encounters for the requested time period, some results have not been displayed. A complete set of results can be found in Results Review.       ASSESSMENT/PLAN:     Problem List Items Addressed This Visit     Carcinoma in situ of bladder    Anemia due to blood loss, acute    Atrial fibrillation (H) with mitral regurgitation and congestive heart failure    CKD (chronic kidney disease) stage 3, GFR 30-59 ml/min (H)    Hematuria      Other Visit Diagnoses     Hospital discharge follow-up    -  Primary           No recurrent bleeding in urine  Check INR today  Cont Proscar  No symptoms of anemia  Follow up with urology     Follow-Up:in 3 months     Kwadwo Winslow MD  [facility]

## 2019-05-01 NOTE — NURSING NOTE
"Vital signs:  Temp: 97.8  F (36.6  C) Temp src: Oral BP: 120/62 Pulse: 72   Resp: 12 SpO2: 98 %     Height: 177.8 cm (5' 10\") Weight: 86.6 kg (191 lb)  Estimated body mass index is 27.41 kg/m  as calculated from the following:    Height as of this encounter: 1.778 m (5' 10\").    Weight as of this encounter: 86.6 kg (191 lb).          "

## 2019-05-03 ENCOUNTER — TRANSFERRED RECORDS (OUTPATIENT)
Dept: HEALTH INFORMATION MANAGEMENT | Facility: CLINIC | Age: 84
End: 2019-05-03

## 2019-05-03 ENCOUNTER — TELEPHONE (OUTPATIENT)
Dept: INTERNAL MEDICINE | Facility: CLINIC | Age: 84
End: 2019-05-03

## 2019-05-03 NOTE — TELEPHONE ENCOUNTER
[facility] called to let Dr. Winslow know that Tucker has possible quantifol screening for vascular disease. Dr. Winslow will get a report mailed to him. Perhaps, he has moderate impaired peripheral vascular blood flow on the right

## 2019-05-06 ENCOUNTER — ANTICOAGULATION THERAPY VISIT (OUTPATIENT)
Dept: ANTICOAGULATION | Facility: CLINIC | Age: 84
End: 2019-05-06
Payer: COMMERCIAL

## 2019-05-06 DIAGNOSIS — I48.91 ATRIAL FIBRILLATION, UNSPECIFIED TYPE (H): ICD-10-CM

## 2019-05-06 DIAGNOSIS — Z79.01 LONG TERM CURRENT USE OF ANTICOAGULANTS WITH INR GOAL OF 2.0-3.0: ICD-10-CM

## 2019-05-06 LAB — INR POINT OF CARE: 1.4 (ref 0.86–1.14)

## 2019-05-06 PROCEDURE — 99207 ZZC NO CHARGE NURSE ONLY: CPT

## 2019-05-06 PROCEDURE — 36416 COLLJ CAPILLARY BLOOD SPEC: CPT

## 2019-05-06 PROCEDURE — 85610 PROTHROMBIN TIME: CPT | Mod: QW

## 2019-05-06 NOTE — PROGRESS NOTES
ANTICOAGULATION FOLLOW-UP CLINIC VISIT    Patient Name:  Tucker Slater  Date:  2019  Contact Type:  Face to Face    SUBJECTIVE:     Patient Findings     Comments:   The patient was assessed for diet, medication, and activity level changes, missed or extra doses, bruising or bleeding, with no problem findings.  Recently hospitalized for hematuria.  He has not had any hematuria since discharge.             OBJECTIVE    INR Protime   Date Value Ref Range Status   2019 1.4 (A) 0.86 - 1.14 Final       ASSESSMENT / PLAN  INR assessment SUB    Recheck INR In: 4 DAYS    INR Location Clinic      Anticoagulation Summary  As of 2019    INR goal:   2.0-3.0   TTR:   72.5 % (2.1 y)   INR used for dosin.4! (2019)   Warfarin maintenance plan:   1.25 mg (2.5 mg x 0.5) every Tue, Fri; 2.5 mg (2.5 mg x 1) all other days   Full warfarin instructions:   : 2.5 mg; Otherwise 1.25 mg every Tue, Fri; 2.5 mg all other days   Weekly warfarin total:   15 mg   Plan last modified:   Ivory Machado RN (2019)   Next INR check:   5/10/2019   Priority:   INR   Target end date:       Indications    Atrial fibrillation (H) with mitral regurgitation and congestive heart failure [I48.91]  Long term current use of anticoagulants with INR goal of 2.0-3.0 [Z79.01]             Anticoagulation Episode Summary     INR check location:       Preferred lab:       Send INR reminders to:   [facility]    Comments:         Anticoagulation Care Providers     Provider Role Specialty Phone number    Kwadwo Winslow MD [facility] Internal Medicine [phone]            See the Encounter Report to view Anticoagulation Flowsheet and Dosing Calendar (Go to Encounters tab in chart review, and find the Anticoagulation Therapy Visit)    Dosage adjustment made based on physician directed care plan.    Ivory Machado RN

## 2019-05-10 ENCOUNTER — ANTICOAGULATION THERAPY VISIT (OUTPATIENT)
Dept: ANTICOAGULATION | Facility: CLINIC | Age: 84
End: 2019-05-10
Payer: COMMERCIAL

## 2019-05-10 ENCOUNTER — ALLIED HEALTH/NURSE VISIT (OUTPATIENT)
Dept: PHARMACY | Facility: CLINIC | Age: 84
End: 2019-05-10
Payer: COMMERCIAL

## 2019-05-10 DIAGNOSIS — I48.91 ATRIAL FIBRILLATION, UNSPECIFIED TYPE (H): ICD-10-CM

## 2019-05-10 DIAGNOSIS — D64.9 ANEMIA, UNSPECIFIED TYPE: Primary | ICD-10-CM

## 2019-05-10 DIAGNOSIS — Z79.01 LONG TERM CURRENT USE OF ANTICOAGULANTS WITH INR GOAL OF 2.0-3.0: ICD-10-CM

## 2019-05-10 LAB — INR POINT OF CARE: 1.9 (ref 0.86–1.14)

## 2019-05-10 PROCEDURE — 99207 ZZC NO CHARGE NURSE ONLY: CPT

## 2019-05-10 PROCEDURE — 85610 PROTHROMBIN TIME: CPT | Mod: QW

## 2019-05-10 PROCEDURE — 36416 COLLJ CAPILLARY BLOOD SPEC: CPT

## 2019-05-10 PROCEDURE — 99606 MTMS BY PHARM EST 15 MIN: CPT | Performed by: PHARMACIST

## 2019-05-10 NOTE — PROGRESS NOTES
SUBJECTIVE/OBJECTIVE:                Tucker Slater is a 88 year old male called for a follow up to a transitions of care visit.  He was discharged from [facility] on 04/24/19 for Acute blood loss anemia, hematuria, s/p cystoscopy fulguration of prostatic veins    Chief Complaint: NO questions or concerns today. Follow up from Amanda Dee's MARIFER visit.     Allergies/ADRs: Reviewed in Epic  Tobacco: No tobacco use   Alcohol: 10 or more beverages /week. But none now.  Caffeine: one coffee.  Activity: walks around.  PMH: Reviewed in Epic     Medication Adherence/Access:  Denies missed doses. Taking medications twice daily.     Anemia due to blood loss: Pt had a bleed in his bladder, possibly due structural damage from previous radiation damage. Pt was seen in the hospital after he noticed hematuria. Has not had any other signs/sx since hospitalization of bleeds and has been restarted on Warfarin.    Today's Vitals: There were no vitals taken for this visit.    ASSESSMENT:                 Current medications were reviewed today.      Medication Adherence: excellent, no issues identified    Anemia due to blood loss: Stable, no changes today.     PLAN:                  Post Discharge Medication Reconciliation Status: discharge medications reconciled, continue medications without change.    I spent 10 minutes with this patient today. A copy of the visit note was provided to the patient's primary care provider.    Will follow up as needed    The patient declined a summary of these recommendations as an after visit summary.    Richard Friedman, PharmD  [facility] Pharmacist    Phone: [phone]

## 2019-05-10 NOTE — PROGRESS NOTES
ANTICOAGULATION FOLLOW-UP CLINIC VISIT    Patient Name:  Tucker Slater  Date:  5/10/2019  Contact Type:  Face to Face    SUBJECTIVE:  Patient Findings     Comments:   The patient was assessed for diet, medication, and activity level changes, missed or extra doses, bruising or bleeding, with no problem findings.  Continues to not have any hematuria since hospital discharge.          Clinical Outcomes     Negatives:   Major bleeding event, Thromboembolic event, Anticoagulation-related hospital admission, Anticoagulation-related ED visit, Anticoagulation-related fatality    Comments:   The patient was assessed for diet, medication, and activity level changes, missed or extra doses, bruising or bleeding, with no problem findings.  Continues to not have any hematuria since hospital discharge.             OBJECTIVE    INR Protime   Date Value Ref Range Status   05/10/2019 1.9 (A) 0.86 - 1.14 Final       ASSESSMENT / PLAN  INR assessment THER    Recheck INR In: 1 WEEK    INR Location Clinic      Anticoagulation Summary  As of 5/10/2019    INR goal:   2.0-3.0   TTR:   72.1 % (2.1 y)   INR used for dosin.9! (5/10/2019)   Warfarin maintenance plan:   1.25 mg (2.5 mg x 0.5) every Tue, Fri; 2.5 mg (2.5 mg x 1) all other days   Full warfarin instructions:   5/10: 2.5 mg; : 2.5 mg; Otherwise 1.25 mg every Tue, Fri; 2.5 mg all other days   Weekly warfarin total:   15 mg   Plan last modified:   Ivory Machado RN (2019)   Next INR check:   2019   Priority:   INR   Target end date:       Indications    Atrial fibrillation (H) with mitral regurgitation and congestive heart failure [I48.91]  Long term current use of anticoagulants with INR goal of 2.0-3.0 [Z79.01]             Anticoagulation Episode Summary     INR check location:       Preferred lab:       Send INR reminders to:   [facility]    Comments:         Anticoagulation Care Providers     Provider Role Specialty Phone number    Kwadwo Winslow MD  [facility] [phone]            See the Encounter Report to view Anticoagulation Flowsheet and Dosing Calendar (Go to Encounters tab in chart review, and find the Anticoagulation Therapy Visit)    Dosage adjustment made based on physician directed care plan.    Ivory Machado RN

## 2019-05-17 ENCOUNTER — ANTICOAGULATION THERAPY VISIT (OUTPATIENT)
Dept: ANTICOAGULATION | Facility: CLINIC | Age: 84
End: 2019-05-17
Payer: COMMERCIAL

## 2019-05-17 DIAGNOSIS — Z79.01 LONG TERM CURRENT USE OF ANTICOAGULANTS WITH INR GOAL OF 2.0-3.0: ICD-10-CM

## 2019-05-17 LAB — INR POINT OF CARE: 2.3 (ref 0.86–1.14)

## 2019-05-17 PROCEDURE — 85610 PROTHROMBIN TIME: CPT | Mod: QW

## 2019-05-17 PROCEDURE — 99207 ZZC NO CHARGE NURSE ONLY: CPT

## 2019-05-17 PROCEDURE — 36416 COLLJ CAPILLARY BLOOD SPEC: CPT

## 2019-05-17 NOTE — PROGRESS NOTES
ANTICOAGULATION FOLLOW-UP CLINIC VISIT    Patient Name:  Tucker Slater  Date:  2019  Contact Type:  Face to Face    SUBJECTIVE:  Patient Findings     Comments:   The patient was assessed for diet, medication, and activity level changes, missed or extra doses, bruising or bleeding, with no problem findings. Patient requesting to resume maintenance dose, patient requesting to keep INR closer to 2 in light of recent hematuria.         Clinical Outcomes     Negatives:   Major bleeding event, Thromboembolic event, Anticoagulation-related hospital admission, Anticoagulation-related ED visit, Anticoagulation-related fatality    Comments:   The patient was assessed for diet, medication, and activity level changes, missed or extra doses, bruising or bleeding, with no problem findings. Patient requesting to resume maintenance dose, patient requesting to keep INR closer to 2 in light of recent hematuria.            OBJECTIVE    INR Protime   Date Value Ref Range Status   2019 2.3 (A) 0.86 - 1.14 Final       ASSESSMENT / PLAN  INR assessment THER    Recheck INR In: 1 WEEK    INR Location Clinic      Anticoagulation Summary  As of 2019    INR goal:   2.0-3.0   TTR:   72.1 % (2.2 y)   INR used for dosin.3 (2019)   Warfarin maintenance plan:   1.25 mg (2.5 mg x 0.5) every Tue, Fri; 2.5 mg (2.5 mg x 1) all other days   Full warfarin instructions:   1.25 mg every Tue, Fri; 2.5 mg all other days   Weekly warfarin total:   15 mg   Plan last modified:   Ivory Machado RN (2019)   Next INR check:   2019   Priority:   INR   Target end date:       Indications    Atrial fibrillation (H) with mitral regurgitation and congestive heart failure [I48.91]  Long term current use of anticoagulants with INR goal of 2.0-3.0 [Z79.01]             Anticoagulation Episode Summary     INR check location:       Preferred lab:       Send INR reminders to:   [facility]    Comments:         Anticoagulation Care Providers      Provider Role Specialty Phone number    Kwadwo Winslow MD Responsible Internal Medicine [phone]            See the Encounter Report to view Anticoagulation Flowsheet and Dosing Calendar (Go to Encounters tab in chart review, and find the Anticoagulation Therapy Visit)    Dosage adjustment made based on physician directed care plan.    Gail Valdovinos RN

## 2019-05-21 DIAGNOSIS — C61 PROSTATE CANCER (H): ICD-10-CM

## 2019-05-21 PROCEDURE — 36415 COLL VENOUS BLD VENIPUNCTURE: CPT | Performed by: UROLOGY

## 2019-05-21 PROCEDURE — 84153 ASSAY OF PSA TOTAL: CPT | Performed by: UROLOGY

## 2019-05-22 LAB — PSA SERPL-MCNC: 1.94 UG/L (ref 0–4)

## 2019-05-23 ENCOUNTER — OFFICE VISIT (OUTPATIENT)
Dept: UROLOGY | Facility: CLINIC | Age: 84
End: 2019-05-23
Payer: COMMERCIAL

## 2019-05-23 VITALS
WEIGHT: 191 LBS | SYSTOLIC BLOOD PRESSURE: 142 MMHG | DIASTOLIC BLOOD PRESSURE: 70 MMHG | HEIGHT: 70 IN | HEART RATE: 80 BPM | BODY MASS INDEX: 27.35 KG/M2

## 2019-05-23 DIAGNOSIS — C61 PROSTATE CANCER (H): Primary | ICD-10-CM

## 2019-05-23 LAB
ALBUMIN UR-MCNC: 100 MG/DL
APPEARANCE UR: CLEAR
BILIRUB UR QL STRIP: NEGATIVE
COLOR UR AUTO: YELLOW
GLUCOSE UR STRIP-MCNC: NEGATIVE MG/DL
HGB UR QL STRIP: ABNORMAL
KETONES UR STRIP-MCNC: NEGATIVE MG/DL
LEUKOCYTE ESTERASE UR QL STRIP: NEGATIVE
NITRATE UR QL: NEGATIVE
PH UR STRIP: 6 PH (ref 5–7)
SOURCE: ABNORMAL
SP GR UR STRIP: 1.02 (ref 1–1.03)
UROBILINOGEN UR STRIP-ACNC: 0.2 EU/DL (ref 0.2–1)

## 2019-05-23 PROCEDURE — 99213 OFFICE O/P EST LOW 20 MIN: CPT | Performed by: UROLOGY

## 2019-05-23 PROCEDURE — 81003 URINALYSIS AUTO W/O SCOPE: CPT | Mod: QW | Performed by: UROLOGY

## 2019-05-23 ASSESSMENT — MIFFLIN-ST. JEOR: SCORE: 1542.62

## 2019-05-23 ASSESSMENT — PAIN SCALES - GENERAL: PAINLEVEL: NO PAIN (0)

## 2019-05-23 NOTE — NURSING NOTE
Chief Complaint   Patient presents with     Clinic Care Coordination - Follow-up     History of Prosate Cancer      Perla Valdovinos LPN

## 2019-05-23 NOTE — PROGRESS NOTES
Tucker Slater is an 88-year-old gentleman who I met in the hospital with clot urinary retention.  He underwent clot evacuation and cautery of prostatic veins.  He has a history of prostate cancer treated many years ago with external radiation.  He also has a history of bladder cancer and has had no recurrence in almost 20 years.  He used to see a urologist in [place] on a regular basis but was told he needed no further follow-up more than 2 years ago.  He is on Coumadin for atrial fibrillation.  He is currently having no difficulty voiding, dysuria or hematuria.  His PSA is stable at 1.94.  Hopefully, we can keep his INR at 2.5 to avoid further bleeding  Other past medical history: Balance issues, bradycardia, hypertension, osteoarthrosis, pacemaker, joint pain, renal disease, peripheral neuropathy, former smoker  Medications: Tylenol, vitamin C, calcium carbonate, Coreg, finasteride, losartan, multivitamin, Maxide, vitamin B, vitamin C complex, vitamin D, Coumadin  Allergies: merbroman, morphine, amlodipine  Exam: Alert and oriented, normal appearance, normal respirations, neuro grossly intact  Patient had a normal digital rectal exam on EUA  Assessment: History of bladder cancer-no recurrence                         History of prostate cancer, PSA stable and normal CONSTANZA recent gross hematuria with urinary retention secondary to Coumadin and enlarged prostatic veins  Plan: See me in 1 year for digital rectal exam, PSA, urinalysis and urine FISH test.  Possible cystoscopy.  Continue finasteride daily long-term

## 2019-05-24 ENCOUNTER — TELEPHONE (OUTPATIENT)
Dept: INTERNAL MEDICINE | Facility: CLINIC | Age: 84
End: 2019-05-24

## 2019-05-24 ENCOUNTER — ANTICOAGULATION THERAPY VISIT (OUTPATIENT)
Dept: ANTICOAGULATION | Facility: CLINIC | Age: 84
End: 2019-05-24
Payer: COMMERCIAL

## 2019-05-24 DIAGNOSIS — I48.91 ATRIAL FIBRILLATION (H): ICD-10-CM

## 2019-05-24 DIAGNOSIS — Z79.01 LONG TERM CURRENT USE OF ANTICOAGULANTS WITH INR GOAL OF 2.0-3.0: ICD-10-CM

## 2019-05-24 DIAGNOSIS — I48.20 CHRONIC ATRIAL FIBRILLATION (H): Primary | ICD-10-CM

## 2019-05-24 LAB — INR POINT OF CARE: 2.1 (ref 0.86–1.14)

## 2019-05-24 PROCEDURE — 99207 ZZC NO CHARGE NURSE ONLY: CPT

## 2019-05-24 PROCEDURE — 36416 COLLJ CAPILLARY BLOOD SPEC: CPT

## 2019-05-24 PROCEDURE — 85610 PROTHROMBIN TIME: CPT | Mod: QW

## 2019-05-24 NOTE — PROGRESS NOTES
ANTICOAGULATION FOLLOW-UP CLINIC VISIT    Patient Name:  Tucker Slater  Date:  2019  Contact Type:  Face to Face    SUBJECTIVE:  Patient Findings     Comments:   The patient was assessed for diet, medication, and activity level changes, missed or extra doses, bruising or bleeding, with no problem findings.          Clinical Outcomes     Negatives:   Major bleeding event, Thromboembolic event, Anticoagulation-related hospital admission, Anticoagulation-related ED visit, Anticoagulation-related fatality    Comments:   The patient was assessed for diet, medication, and activity level changes, missed or extra doses, bruising or bleeding, with no problem findings.             OBJECTIVE    INR Protime   Date Value Ref Range Status   2019 2.1 (A) 0.86 - 1.14 Final       ASSESSMENT / PLAN  INR assessment THER    Recheck INR In: 2 WEEKS    INR Location Clinic      Anticoagulation Summary  As of 2019    INR goal:   2.0-3.0   TTR:   72.4 % (2.2 y)   INR used for dosin.1 (2019)   Warfarin maintenance plan:   1.25 mg (2.5 mg x 0.5) every Tue, Fri; 2.5 mg (2.5 mg x 1) all other days   Full warfarin instructions:   1.25 mg every Tue, Fri; 2.5 mg all other days   Weekly warfarin total:   15 mg   No change documented:   Ivory Machado RN   Plan last modified:   Ivory Machado RN (2019)   Next INR check:   2019   Priority:   INR   Target end date:       Indications    Atrial fibrillation (H) with mitral regurgitation and congestive heart failure [I48.91]  Long term current use of anticoagulants with INR goal of 2.0-3.0 [Z79.01]             Anticoagulation Episode Summary     INR check location:       Preferred lab:       Send INR reminders to:   [facility]    Comments:   Patient has a history of hematuria when INR is elevated.  Will try to keep INR closer to 2.0.      Anticoagulation Care Providers     Provider Role Specialty Phone number    Kwadwo Winslow MD Responsible Internal Medicine  [phone]            See the Encounter Report to view Anticoagulation Flowsheet and Dosing Calendar (Go to Encounters tab in chart review, and find the Anticoagulation Therapy Visit)    Dosage adjustment made based on physician directed care plan.    Ivory Machado RN

## 2019-05-25 DIAGNOSIS — I48.91 ATRIAL FIBRILLATION (H): Primary | ICD-10-CM

## 2019-05-25 NOTE — TELEPHONE ENCOUNTER
"Requested Prescriptions   Pending Prescriptions Disp Refills     JANTOVEN 2.5 MG tablet [Pharmacy Med Name: JANTOVEN 2.5MG TABS] 28 tablet 0     Sig: TAKE ONE TABLET (2.5MG) BY MOUTH ONCE DAILY EXCEPT TAKE ONE-HALF TABLET (1.25MG) ON FRIDAYS OR AS DIRECTED BASED ON INR RESULTS       Vitamin K Antagonists Failed - 5/25/2019  8:32 AM        Failed - INR is within goal in the past 6 weeks     Confirm INR is within goal in the past 6 weeks.     Recent Labs   Lab Test 05/24/19   INR 2.1*                       Passed - Recent (12 mo) or future (30 days) visit within the authorizing provider's specialty     Patient had office visit in the last 12 months or has a visit in the next 30 days with authorizing provider or within the authorizing provider's specialty.  See \"Patient Info\" tab in inbasket, or \"Choose Columns\" in Meds & Orders section of the refill encounter.              Passed - Medication is active on med list        Passed - Patient is 18 years of age or older          "

## 2019-05-28 ENCOUNTER — TELEPHONE (OUTPATIENT)
Dept: INTERNAL MEDICINE | Facility: CLINIC | Age: 84
End: 2019-05-28

## 2019-05-28 RX ORDER — WARFARIN SODIUM 2.5 MG/1
TABLET ORAL
Qty: 30 TABLET | Refills: 1 | Status: ON HOLD | OUTPATIENT
Start: 2019-05-28 | End: 2019-06-04

## 2019-05-28 NOTE — TELEPHONE ENCOUNTER
Reason for Call:  Other addend notes    Detailed comments: Yan from  [employer] called to ask if Dr. Winslow could addend his notes from the office visit on 5/1/19 to state pt needs oxygen, he benefits from it's use and  recommends to continue use. She will also be sending an order for oxygen that needs to be signed. Call Yan if you have questions at [phone]    Phone Number Patient can be reached at: Other phone number:  [phone]    Best Time: any    Can we leave a detailed message on this number? YES    Call taken on 5/28/2019 at 10:33 AM by Viky Ambrose

## 2019-05-28 NOTE — TELEPHONE ENCOUNTER
His O2 sat has been normal on last exam. I have not ordered oxygen. Unable to addend note, as I have not recommended it.

## 2019-05-28 NOTE — TELEPHONE ENCOUNTER
Warfarin dosing is managed by INR Clinic.  Prescription approved per [facility] Refill Protocol.  Ivory Machado RN

## 2019-05-31 ENCOUNTER — TELEPHONE (OUTPATIENT)
Dept: SLEEP MEDICINE | Facility: CLINIC | Age: 84
End: 2019-05-31

## 2019-05-31 ENCOUNTER — TELEPHONE (OUTPATIENT)
Dept: INTERNAL MEDICINE | Facility: CLINIC | Age: 84
End: 2019-05-31

## 2019-05-31 NOTE — TELEPHONE ENCOUNTER
Pt uses this Oxygen for nocturnal time. Call to Yan. She is going to have pt schedule appt with Dr Billings, his sleep doctor.

## 2019-05-31 NOTE — TELEPHONE ENCOUNTER
Certificate of medical necessity for oxygen received from [facility] on 5/31/2019. Forregan placed in providers box for review and signature.    Ivory Zapata [facility]

## 2019-06-01 ENCOUNTER — NURSE TRIAGE (OUTPATIENT)
Dept: NURSING | Facility: CLINIC | Age: 84
End: 2019-06-01

## 2019-06-01 ENCOUNTER — HOSPITAL ENCOUNTER (INPATIENT)
Facility: CLINIC | Age: 84
LOS: 3 days | Discharge: HOME | DRG: 696 | End: 2019-06-05
Attending: EMERGENCY MEDICINE | Admitting: INTERNAL MEDICINE
Payer: COMMERCIAL

## 2019-06-01 ENCOUNTER — HOSPITAL ENCOUNTER (EMERGENCY)
Facility: CLINIC | Age: 84
Discharge: HOME | DRG: 696 | End: 2019-06-01
Attending: EMERGENCY MEDICINE | Admitting: EMERGENCY MEDICINE
Payer: COMMERCIAL

## 2019-06-01 VITALS
DIASTOLIC BLOOD PRESSURE: 80 MMHG | OXYGEN SATURATION: 96 % | WEIGHT: 189.6 LBS | BODY MASS INDEX: 27.2 KG/M2 | RESPIRATION RATE: 18 BRPM | TEMPERATURE: 98.7 F | HEART RATE: 70 BPM | SYSTOLIC BLOOD PRESSURE: 150 MMHG

## 2019-06-01 DIAGNOSIS — R52 PAIN: ICD-10-CM

## 2019-06-01 DIAGNOSIS — R31.9 HEMATURIA, UNSPECIFIED TYPE: ICD-10-CM

## 2019-06-01 DIAGNOSIS — I48.19 PERSISTENT ATRIAL FIBRILLATION (H): Primary | ICD-10-CM

## 2019-06-01 DIAGNOSIS — R33.9 URINARY RETENTION: ICD-10-CM

## 2019-06-01 DIAGNOSIS — K59.00 CONSTIPATION, UNSPECIFIED CONSTIPATION TYPE: ICD-10-CM

## 2019-06-01 LAB
ALBUMIN UR-MCNC: 200 MG/DL
ANION GAP SERPL CALCULATED.3IONS-SCNC: 6 MMOL/L (ref 3–14)
APPEARANCE UR: ABNORMAL
BASOPHILS # BLD AUTO: 0 10E9/L (ref 0–0.2)
BASOPHILS NFR BLD AUTO: 0.5 %
BILIRUB UR QL STRIP: NEGATIVE
BUN SERPL-MCNC: 36 MG/DL (ref 7–30)
CALCIUM SERPL-MCNC: 8.4 MG/DL (ref 8.5–10.1)
CHLORIDE SERPL-SCNC: 111 MMOL/L (ref 94–109)
CO2 SERPL-SCNC: 23 MMOL/L (ref 20–32)
COLOR UR AUTO: ABNORMAL
CREAT SERPL-MCNC: 1.31 MG/DL (ref 0.66–1.25)
DIFFERENTIAL METHOD BLD: ABNORMAL
EOSINOPHIL # BLD AUTO: 0.2 10E9/L (ref 0–0.7)
EOSINOPHIL NFR BLD AUTO: 2.9 %
ERYTHROCYTE [DISTWIDTH] IN BLOOD BY AUTOMATED COUNT: 13.2 % (ref 10–15)
GFR SERPL CREATININE-BSD FRML MDRD: 48 ML/MIN/{1.73_M2}
GLUCOSE SERPL-MCNC: 107 MG/DL (ref 70–99)
GLUCOSE UR STRIP-MCNC: NEGATIVE MG/DL
HCT VFR BLD AUTO: 39.2 % (ref 40–53)
HGB BLD-MCNC: 13 G/DL (ref 13.3–17.7)
HGB UR QL STRIP: ABNORMAL
IMM GRANULOCYTES # BLD: 0 10E9/L (ref 0–0.4)
IMM GRANULOCYTES NFR BLD: 0.4 %
INR PPP: 2.07 (ref 0.86–1.14)
KETONES UR STRIP-MCNC: NEGATIVE MG/DL
LEUKOCYTE ESTERASE UR QL STRIP: NEGATIVE
LYMPHOCYTES # BLD AUTO: 0.7 10E9/L (ref 0.8–5.3)
LYMPHOCYTES NFR BLD AUTO: 9 %
MCH RBC QN AUTO: 32.3 PG (ref 26.5–33)
MCHC RBC AUTO-ENTMCNC: 33.2 G/DL (ref 31.5–36.5)
MCV RBC AUTO: 97 FL (ref 78–100)
MONOCYTES # BLD AUTO: 0.5 10E9/L (ref 0–1.3)
MONOCYTES NFR BLD AUTO: 6.8 %
NEUTROPHILS # BLD AUTO: 6 10E9/L (ref 1.6–8.3)
NEUTROPHILS NFR BLD AUTO: 80.4 %
NITRATE UR QL: NEGATIVE
NRBC # BLD AUTO: 0 10*3/UL
NRBC BLD AUTO-RTO: 0 /100
PH UR STRIP: 7 PH (ref 5–7)
PLATELET # BLD AUTO: 192 10E9/L (ref 150–450)
POTASSIUM SERPL-SCNC: 4 MMOL/L (ref 3.4–5.3)
RBC # BLD AUTO: 4.03 10E12/L (ref 4.4–5.9)
RBC #/AREA URNS AUTO: >182 /HPF (ref 0–2)
SODIUM SERPL-SCNC: 140 MMOL/L (ref 133–144)
SOURCE: ABNORMAL
SP GR UR STRIP: 1.01 (ref 1–1.03)
UROBILINOGEN UR STRIP-MCNC: NORMAL MG/DL (ref 0–2)
WBC # BLD AUTO: 7.5 10E9/L (ref 4–11)
WBC #/AREA URNS AUTO: 0 /HPF (ref 0–5)

## 2019-06-01 PROCEDURE — 85025 COMPLETE CBC W/AUTO DIFF WBC: CPT | Performed by: EMERGENCY MEDICINE

## 2019-06-01 PROCEDURE — 25000132 ZZH RX MED GY IP 250 OP 250 PS 637: Performed by: INTERNAL MEDICINE

## 2019-06-01 PROCEDURE — 85610 PROTHROMBIN TIME: CPT | Performed by: EMERGENCY MEDICINE

## 2019-06-01 PROCEDURE — 81001 URINALYSIS AUTO W/SCOPE: CPT | Performed by: EMERGENCY MEDICINE

## 2019-06-01 PROCEDURE — 51702 INSERT TEMP BLADDER CATH: CPT

## 2019-06-01 PROCEDURE — 99219 ZZC INITIAL OBSERVATION CARE,LEVL II: CPT | Performed by: INTERNAL MEDICINE

## 2019-06-01 PROCEDURE — 25800025 ZZH RX 258: Performed by: INTERNAL MEDICINE

## 2019-06-01 PROCEDURE — 51700 IRRIGATION OF BLADDER: CPT

## 2019-06-01 PROCEDURE — 51798 US URINE CAPACITY MEASURE: CPT

## 2019-06-01 PROCEDURE — 36415 COLL VENOUS BLD VENIPUNCTURE: CPT | Performed by: EMERGENCY MEDICINE

## 2019-06-01 PROCEDURE — G0378 HOSPITAL OBSERVATION PER HR: HCPCS

## 2019-06-01 PROCEDURE — 99285 EMERGENCY DEPT VISIT HI MDM: CPT

## 2019-06-01 PROCEDURE — 80048 BASIC METABOLIC PNL TOTAL CA: CPT | Performed by: EMERGENCY MEDICINE

## 2019-06-01 PROCEDURE — 99284 EMERGENCY DEPT VISIT MOD MDM: CPT | Mod: 25

## 2019-06-01 RX ORDER — LIDOCAINE HYDROCHLORIDE 20 MG/ML
JELLY TOPICAL
Status: DISCONTINUED
Start: 2019-06-01 | End: 2019-06-01 | Stop reason: HOSPADM

## 2019-06-01 RX ORDER — NALOXONE HYDROCHLORIDE 0.4 MG/ML
.1-.4 INJECTION, SOLUTION INTRAMUSCULAR; INTRAVENOUS; SUBCUTANEOUS
Status: DISCONTINUED | OUTPATIENT
Start: 2019-06-01 | End: 2019-06-05 | Stop reason: HOSPADM

## 2019-06-01 RX ORDER — LOSARTAN POTASSIUM 100 MG/1
100 TABLET ORAL DAILY
Status: DISCONTINUED | OUTPATIENT
Start: 2019-06-02 | End: 2019-06-05

## 2019-06-01 RX ORDER — LANOLIN ALCOHOL/MO/W.PET/CERES
3 CREAM (GRAM) TOPICAL
Status: DISCONTINUED | OUTPATIENT
Start: 2019-06-01 | End: 2019-06-05 | Stop reason: HOSPADM

## 2019-06-01 RX ORDER — ACETAMINOPHEN 325 MG/1
650 TABLET ORAL EVERY 4 HOURS PRN
Status: DISCONTINUED | OUTPATIENT
Start: 2019-06-01 | End: 2019-06-05 | Stop reason: HOSPADM

## 2019-06-01 RX ORDER — FINASTERIDE 5 MG/1
5 TABLET, FILM COATED ORAL DAILY
Status: DISCONTINUED | OUTPATIENT
Start: 2019-06-02 | End: 2019-06-05 | Stop reason: HOSPADM

## 2019-06-01 RX ADMIN — CARVEDILOL 37.5 MG: 25 TABLET, FILM COATED ORAL at 23:05

## 2019-06-01 RX ADMIN — SODIUM CHLORIDE 6000 ML: 900 IRRIGANT IRRIGATION at 23:02

## 2019-06-01 ASSESSMENT — ENCOUNTER SYMPTOMS
SHORTNESS OF BREATH: 0
MYALGIAS: 0
DYSURIA: 1
ARTHRALGIAS: 0
HEMATURIA: 1
DIFFICULTY URINATING: 1

## 2019-06-01 NOTE — ED AVS SNAPSHOT
[facility] Emergency Department  [address]  [facility] [address]  Phone:  [phone]  Fax:  [phone]                                    Tucker Slater   MRN: [id number]    Department:  [facility] Emergency Department   Date of Visit:  6/1/2019           After Visit Summary Signature Page    I have received my discharge instructions, and my questions have been answered. I have discussed any challenges I see with this plan with the nurse or doctor.    ..........................................................................................................................................  Patient/Patient Representative Signature      ..........................................................................................................................................  Patient Representative Print Name and Relationship to Patient    ..................................................               ................................................  Date                                   Time    ..........................................................................................................................................  Reviewed by Signature/Title    ...................................................              ..............................................  Date                                               Time          22EPIC Rev 08/18

## 2019-06-01 NOTE — TELEPHONE ENCOUNTER
Tucker is having blood in his urine today.  He is on blood thinners . He urinated twice this morning and it was blood and urine, the next 3 times it has been all blood , around 60- 90 ml each time. He talked to his surgeon and he wanted him to talk with his PCP on Monday to adjust his blood thinners , but now he is bleeding and needs to be seen.     Loreto Escamilla RN/ [facility]      Reason for Disposition    Patient sounds very sick or weak to the triager    Additional Information    Negative: Shock suspected (e.g., cold/pale/clammy skin, too weak to stand, low BP, rapid pulse)    Negative: Sounds like a life-threatening emergency to the triager    Negative: [1] Unable to urinate (or only a few drops) > 4 hours AND     [2] bladder feels very full (e.g., palpable bladder or strong urge to urinate)    Negative: [1] Decreased urination and [2] drinking very little AND [2] dehydration suspected (e.g., dark urine, no urine > 12 hours, very dry mouth, very lightheaded)    Protocols used: URINARY SYMPTOMS-A-AH

## 2019-06-01 NOTE — ED TRIAGE NOTES
Pt presents with hematuria starting this morning. States has been urinating this afternoon, but now can't go and thinks there is a clot blocking his urethra because that's what happened over Easter. ABCs intact.

## 2019-06-01 NOTE — ED PROVIDER NOTES
History     Chief Complaint:  Dysuria    HPI   Tucker Slater is a 88 year old male with a history of hematuria, on warfarin who presents with dysuria. At around 1000 today the patient went to the bathroom, he had a bowl movement and found blood in his urine. Later on in the day he had an increased urinary urgency, however was not able to go and it was painful. He reports that this this is similar to the last time he had a urethral blood clot and was worried that it was another clot so he presented to the ED. Here, he denies any other bruising or bleeding, chest pain, shortness of breath, or any other pain.     Allergies:  Merbromin  Morphine  Amlodipine     Medications:    Glucosamine  Aspirin 81  Coreg  Proscar  Cozaar  Maxzide  Warfarin     Past Medical History:    Arrhythmia  Bradycardia  Carcinoma in situ of bladder  HTN  Osteoarthrosis  Pacemaker  A-Fib with mitral regurgitation and congestive heart failure  Prostate Cancer  Renal disease  Hematuria  CKD    Past Surgical History:    Arthroplasty Hip  Bilateral inguinal hernia repairs x3  T&A  Cardioversion  Colonoscpy  Cystoscopy, fulgurate bleeders, evacuate clots, combined  Implant pacemaker  Release carpal tunnel  Excision pilonidal cyst  Cataract removal bilateral    Family History:    CAD- father and mother    Social History:  Smoking Status: Former Smoker  Smokeless Tobacco: negative  Alcohol Use: negative  Drug Use: negative  Marital Status:   [2]     Review of Systems   Respiratory: Negative for shortness of breath.    Cardiovascular: Negative for chest pain.   Genitourinary: Positive for difficulty urinating, dysuria and hematuria.   Musculoskeletal: Negative for arthralgias and myalgias.   All other systems reviewed and are negative.        Physical Exam     Patient Vitals for the past 24 hrs:   BP Temp Temp src Pulse Heart Rate Resp SpO2 Weight   06/01/19 1800 137/79 -- -- 70 -- -- 95 % --   06/01/19 1730 129/76 -- -- 69 -- -- 94 % --    06/01/19 1645 -- -- -- -- -- -- 97 % --   06/01/19 1630 132/75 -- -- 72 -- -- 96 % --   06/01/19 1615 -- -- -- 72 -- -- 93 % --   06/01/19 1610 128/75 -- -- -- -- -- -- --   06/01/19 1504 163/85 98.7  F (37.1  C) Temporal -- 72 18 97 % 86 kg (189 lb 9.5 oz)       Physical Exam  Constitutional: Patient is well appearing. No distress.  Head: Atraumatic.  Mouth/Throat: Oropharynx is clear and moist. No oropharyngeal exudate.  Eyes: Conjunctivae and EOM are normal. No scleral icterus.  Neck: Normal range of motion. Neck supple.   Cardiovascular: Normal rate, regular rhythm, normal heart sounds and intact distal pulses.   Pulmonary/Chest: Breath sounds normal. No respiratory distress.  Abdominal: Soft. Bowel sounds are normal. No distension. No tenderness. No rebound or guarding.    crawford placed upon my exam with hematuria out of tubing.    Musculoskeletal: Normal range of motion. No edema or tenderness.   Neurological: Alert and orientated to person, place, and time. No observable focal neuro deficit  Skin: Warm and dry. No rash noted. Not diaphoretic. No bruising      Emergency Department Course     Laboratory:  CBC: WBC: 7.5, HGB: 13.0 (L), PLT: 192  BMP: Glucose 107 (H), Chloride 111 (H), BUN 36 (H), GFR 48 (L), Calcium 8.4 (L), o/w WNL (Creatinine: 1.31 (H))    INR: 2.07 (H)    UA with Microscopic: Blood large (A), Protein albumin 200 (A), RBC >182 (H), o/w WNL    Emergency Department Course:  Nursing notes and vitals reviewed. ([id number]) I performed an exam of the patient as documented above.      IV inserted. Blood drawn. This was sent to the lab for further testing, results above.    The patient provided a urine sample here in the emergency department. This was sent for laboratory testing, findings above.     I rechecked the patient and discussed the results of his workup thus far.      Findings and plan explained to the Patient. Patient discharged home with instructions regarding supportive care, medications,  and reasons to return. The importance of close follow-up was reviewed.     I personally reviewed the laboratory results with the Patient and answered all related questions prior to discharge.         Impression & Plan    Medical Decision Making:  Tucker Slater is a 88 year old male who presents for evaluation of hematuria and decreased urinary output.    The history and exam are consistent with acute urinary retention and hematuria which is confirmed after crawford catheter placement.   3 way placed with #l irrigation followed by an hour of obs with good crawford function.  A urinalysis was obtained and was + only for blood.  Will send home with catheter and have patient followup with urology in 3-5 days for removal.  Discussed talking with urology on Monday for holding coumadin since <2.5  Strict return and follow up given.      Diagnosis:    ICD-10-CM    1. Urinary retention R33.9    2. Hematuria, unspecified type R31.9        Disposition:  discharged to home    Discharge Medications:     Medication List      There are no discharge medications for this visit.         Scribe Disclosure:  I, Zohra Elias, am serving as a scribe on 6/1/2019 at 4:12 PM to personally document services performed by José Clarke MD based on my observations and the provider's statements to me.     Zohra Elias  6/1/2019   [facility] EMERGENCY DEPARTMENT       José Clarke MD  06/01/19 2620

## 2019-06-01 NOTE — ED NOTES
Pt's catheter still flowing freely, approx 200 mL pink urine noted in drainage bag. Pt has been drinking water. Denies abd/ pelvic pressure or pain.

## 2019-06-01 NOTE — LETTER
Transition Communication Hand-off for Care Transitions to Next Level of Care Provider    Name: Tucker Slater  : 3/13/1931  MRN #: [id number]  Primary Care Provider: Kwadwo Winslow  Primary Care MD Name: Dr. Winslow  Primary Clinic: [address] [facility] [address]  Primary Care Clinic Name: [facility]  Reason for Hospitalization:  Hematuria, unspecified type [R31.9]  Admit Date/Time: 2019  8:41 PM  Discharge Date: 19  Payor Source: Payor: HUMANA / Plan: HUMANA MEDICARE ADVANTAGE / Product Type: Medicare /     Readmission Assessment Measure (FABIO) Risk Score/category: Elevated          Reason for Communication Hand-off Referral: Multiple providers/specialties    Discharge Plan: Home       Concern for non-adherence with plan of care:   No  Discharge Needs Assessment:  Needs      Most Recent Value   # of Referrals Placed by CTS  Scheduled Follow-up appointments          Already enrolled in Tele-monitoring program and name of program:  NA  Follow-up specialty is recommended: Yes    Follow-up plan:    Future Appointments   Date Time Provider Department Center   2019  3:40 PM Kwadwo Winslow MD [facility] [place]   2019 12:00 [facility]   2019  4:15 PM Aleena Perez MD [facility] PSA CLIN   2019 11:00 AM Kwadwo Winslow MD [facility] [place]   2019  4:00 PM Hari Ritter MD [facility] SLEEP       Any outstanding tests or procedures:        Referrals     Future Labs/Procedures    Follow-Up with Electrophysiologist             Key Recommendations:  CTS identifies pt as high risk due to Elevated FABIO. Patient was admitted on 19 for hematuria. Patient was seen in ER earlier on 19 and sent home with a crawford. Patient had low blood pressure during hospitalization that responded to IVF and medication changes. Patient has had one previous hospitalization in April for anemia d/t hematuria requiring a cysto. Patient has a history of a stroke,  Afib on coumadin, pacemaker, HTN, and DAWSON, and Prostrate/ Bladder cancer. Patient has a CPAP with O2 at home through [facility]. Patient had urology and cardiology consults in hospital. Patient has a  through [employer].     Patient discharged to home with instructions to check blood pressure daily.   Recommendations at discharge include The following labs/tests are recommended: chem   7, blood pressure check.   At this appointment you will need to discuss:  1. when or if you should re-start your coumadin  2. Review your blood pressure and discuss if your maxzide or losartan need   to be re-started.     An appointment has been made for you with Dr. Perez (cardiology) to   discuss the Watchman procedure. (Tuesday, July 30 at 3:45 PM [facility]   [address], [place] [address] [phone])        Nitza Casillas    AVS/Discharge Summary is the source of truth; this is a helpful guide for improved communication of patient story

## 2019-06-01 NOTE — ED NOTES
Pt with no drainage with initial catheter. 3 way crawford placed with no drainage initially until manually irrigated. Large multiple clots expressed. Catheter now patent with CBI running. Plan per MD to run 3 L bag through catheter and reassess.

## 2019-06-02 LAB
ANION GAP SERPL CALCULATED.3IONS-SCNC: 5 MMOL/L (ref 3–14)
BUN SERPL-MCNC: 28 MG/DL (ref 7–30)
CALCIUM SERPL-MCNC: 8.4 MG/DL (ref 8.5–10.1)
CHLORIDE SERPL-SCNC: 110 MMOL/L (ref 94–109)
CO2 SERPL-SCNC: 25 MMOL/L (ref 20–32)
CREAT SERPL-MCNC: 1.23 MG/DL (ref 0.66–1.25)
ERYTHROCYTE [DISTWIDTH] IN BLOOD BY AUTOMATED COUNT: 13 % (ref 10–15)
GFR SERPL CREATININE-BSD FRML MDRD: 52 ML/MIN/{1.73_M2}
GLUCOSE SERPL-MCNC: 95 MG/DL (ref 70–99)
HCT VFR BLD AUTO: 37.5 % (ref 40–53)
HGB BLD-MCNC: 11.9 G/DL (ref 13.3–17.7)
HGB BLD-MCNC: 12.3 G/DL (ref 13.3–17.7)
HGB BLD-MCNC: 12.4 G/DL (ref 13.3–17.7)
INR PPP: 1.93 (ref 0.86–1.14)
MCH RBC QN AUTO: 31.9 PG (ref 26.5–33)
MCHC RBC AUTO-ENTMCNC: 32.8 G/DL (ref 31.5–36.5)
MCV RBC AUTO: 97 FL (ref 78–100)
PLATELET # BLD AUTO: 165 10E9/L (ref 150–450)
POTASSIUM SERPL-SCNC: 3.5 MMOL/L (ref 3.4–5.3)
RBC # BLD AUTO: 3.85 10E12/L (ref 4.4–5.9)
SODIUM SERPL-SCNC: 140 MMOL/L (ref 133–144)
WBC # BLD AUTO: 6 10E9/L (ref 4–11)

## 2019-06-02 PROCEDURE — 99231 SBSQ HOSP IP/OBS SF/LOW 25: CPT | Performed by: UROLOGY

## 2019-06-02 PROCEDURE — 85610 PROTHROMBIN TIME: CPT | Performed by: INTERNAL MEDICINE

## 2019-06-02 PROCEDURE — 99232 SBSQ HOSP IP/OBS MODERATE 35: CPT | Performed by: PHYSICIAN ASSISTANT

## 2019-06-02 PROCEDURE — 36415 COLL VENOUS BLD VENIPUNCTURE: CPT | Performed by: INTERNAL MEDICINE

## 2019-06-02 PROCEDURE — 99222 1ST HOSP IP/OBS MODERATE 55: CPT | Performed by: INTERNAL MEDICINE

## 2019-06-02 PROCEDURE — 12000011 ZZH R&B MS OVERFLOW

## 2019-06-02 PROCEDURE — 85018 HEMOGLOBIN: CPT | Performed by: PHYSICIAN ASSISTANT

## 2019-06-02 PROCEDURE — 25000132 ZZH RX MED GY IP 250 OP 250 PS 637: Performed by: INTERNAL MEDICINE

## 2019-06-02 PROCEDURE — G0378 HOSPITAL OBSERVATION PER HR: HCPCS

## 2019-06-02 PROCEDURE — 36415 COLL VENOUS BLD VENIPUNCTURE: CPT | Performed by: PHYSICIAN ASSISTANT

## 2019-06-02 PROCEDURE — 80048 BASIC METABOLIC PNL TOTAL CA: CPT | Performed by: INTERNAL MEDICINE

## 2019-06-02 PROCEDURE — 25800025 ZZH RX 258: Performed by: INTERNAL MEDICINE

## 2019-06-02 PROCEDURE — 85027 COMPLETE CBC AUTOMATED: CPT | Performed by: INTERNAL MEDICINE

## 2019-06-02 RX ADMIN — SODIUM CHLORIDE 6000 ML: 900 IRRIGANT IRRIGATION at 13:01

## 2019-06-02 RX ADMIN — SODIUM CHLORIDE 6000 ML: 900 IRRIGANT IRRIGATION at 00:22

## 2019-06-02 RX ADMIN — LOSARTAN POTASSIUM 100 MG: 100 TABLET ORAL at 07:56

## 2019-06-02 RX ADMIN — SODIUM CHLORIDE 3000 ML: 900 IRRIGANT IRRIGATION at 05:51

## 2019-06-02 RX ADMIN — SODIUM CHLORIDE 3000 ML: 900 IRRIGANT IRRIGATION at 06:19

## 2019-06-02 RX ADMIN — SODIUM CHLORIDE 6000 ML: 900 IRRIGANT IRRIGATION at 01:13

## 2019-06-02 RX ADMIN — SODIUM CHLORIDE 3000 ML: 900 IRRIGANT IRRIGATION at 04:57

## 2019-06-02 RX ADMIN — SODIUM CHLORIDE 3000 ML: 900 IRRIGANT IRRIGATION at 05:48

## 2019-06-02 RX ADMIN — SODIUM CHLORIDE 3000 ML: 900 IRRIGANT IRRIGATION at 02:45

## 2019-06-02 RX ADMIN — CARVEDILOL 37.5 MG: 25 TABLET, FILM COATED ORAL at 17:54

## 2019-06-02 RX ADMIN — CARVEDILOL 37.5 MG: 25 TABLET, FILM COATED ORAL at 07:55

## 2019-06-02 RX ADMIN — FINASTERIDE 5 MG: 5 TABLET, FILM COATED ORAL at 07:56

## 2019-06-02 RX ADMIN — SODIUM CHLORIDE 3000 ML: 900 IRRIGANT IRRIGATION at 06:54

## 2019-06-02 NOTE — PLAN OF CARE
PRIMARY DIAGNOSIS: Hematuria  OUTPATIENT/OBSERVATION GOALS TO BE MET BEFORE DISCHARGE:  ADLs back to baseline: Yes     Activity and level of assistance: Up with standby assistance.     Pain status: Pain free.     Return to near baseline physical activity: Yes          Discharge Planner Nurse   Safe discharge environment identified: Yes  Barriers to discharge: Yes       Entered by: Yan Mccollum 06/02/2019      Pt alert and oriented x4. He denies any pain or bladder discomfort/pressure. CBI running at moderate rate. Urine is pink colored w/ minimal clots. Urology saw him today - said ok to eat, cardiology consult. No IV. SBA. Regular diet now. Will cont to monitor and provide supportive cares. Tele monitoring - A fib PVC's HR 60's.     /60 (BP Location: Left arm)   Pulse 72   Temp 97.1  F (36.2  C) (Oral)   Resp 16   Wt 83 kg (183 lb)   SpO2 96%   BMI 26.26 kg/m         Please review provider order for any additional goals.   Nurse to notify provider when observation goals have been met and patient is ready for discharge.

## 2019-06-02 NOTE — ED PROVIDER NOTES
History     Chief Complaint:  Catheter Problem    HPI   Tucker Slater is a 88 year old male who presents with catheter problems. The patient was dischared home an hour ago and presents to the ED because he reports his catheter is not draining.    Allergies:  Merbromin  Morphine  Amlodipine     Medications:    Glucosamine  Aspirin 81  Coreg  Proscar  Cozaar  Maxzide  Warfarin      Past Medical History:    Arrhythmia  Bradycardia  Carcinoma in situ of bladder  HTN  Osteoarthrosis  Pacemaker  A-Fib with mitral regurgitation and congestive heart failure  Prostate Cancer  Renal disease  Hematuria  CKD     Past Surgical History:    Arthroplasty Hip  Bilateral inguinal hernia repairs x3  T&A  Cardioversion  Colonoscpy  Cystoscopy, fulgurate bleeders, evacuate clots, combined  Implant pacemaker  Release carpal tunnel  Excision pilonidal cyst  Cataract removal bilateral     Family History:    CAD- father and mother     Social History:  Smoking Status: Former Smoker  Smokeless Tobacco: negative  Alcohol Use: negative  Drug Use: negative  Marital Status:   [2]    Review of Systems   Genitourinary:        Catheter problems     All other systems reviewed and are negative.        Physical Exam     Patient Vitals for the past 24 hrs:   BP Temp Temp src Pulse Resp SpO2 Weight   06/01/19 2028 159/82 97.6  F (36.4  C) Oral 71 16 99 % 83 kg (183 lb)       Physical Exam  Constitutional: Patient is well appearing. No distress.  Head: Atraumatic.  Mouth/Throat: Oropharynx is clear and moist. No oropharyngeal exudate.  Eyes: Conjunctivae and EOM are normal. No scleral icterus.  Neck: Normal range of motion. Neck supple.   Cardiovascular: Normal rate, regular rhythm, normal heart sounds and intact distal pulses.   Pulmonary/Chest: Breath sounds normal. No respiratory distress.  Abdominal: Soft. Bowel sounds are normal. No distension. No tenderness. No rebound or guarding.   : crawford in place draining blood colored urine.  No  overflow at meatus.  No suprapubic tenderness or fullness  Musculoskeletal: Normal range of motion. No edema or tenderness.   Neurological: Alert and orientated to person, place, and time. No observable focal neuro deficit  Skin: Warm and dry. No rash noted. Not diaphoretic.     Emergency Department Course     Emergency Department Course:  Nursing notes and vitals reviewed. (2045) I performed an exam of the patient as documented above.     (2116) I rechecked the patient and discussed the results of his workup thus far.     (2111) I consulted with Dr. Ramirez, Urology, regarding the patient's history and presentation here in the emergency department.    Patient will be admitted for observation.        Impression & Plan    Medical Decision Making:  Pt returns very soon after discharge stating crawford not draining but yet no signs of retention at this time.  David RN and I check crawford and irrigated and flowing freely without clots.  No urine retained.  Wife and pt very anxious stating they just cant manage this at home and also need food.  Discussed with urology that offered two options stop coumadin now and go home or obs over night stop coumadin and go home.  Pt has chosen latter.  Amid to obs for further workup and mgmt.      Diagnosis:    ICD-10-CM    1. Hematuria, unspecified type R31.9      Disposition:  Admitted to observation.    Scribe Disclosure:  I, Zohra Elias, am serving as a scribe on 6/1/2019 at 8:46 PM to personally document services performed by José Clarke MD based on my observations and the provider's statements to me.     Zohra Elias  6/1/2019   [facility] EMERGENCY DEPARTMENT       José Clarke MD  06/01/19 2130

## 2019-06-02 NOTE — H&P
[facility]    History and Physical  Hospitalist    Name: Tucker Slater    MRN: [id number]  YOB: 1931    Age: 88 year old  Primary care provider: Kwadwo Winslow       Date of Admission:  6/1/2019  Date of Service (when I saw the patient): 06/01/19    Assessment & Plan   Tucker Slater is a 88 year old male with a past medical history significant for bladder cancer s/p surgical resection (2000), prostate cancer s/p radiation (2563-0829), CKD (baseline Cr 1.3-1.5), permanent A-fib on Warfarin, s/p pacemaker, HTN, DAWSON on CPAP, mild LV dysfunction (EF of 45-50%), OA, and peripheral neuropathy who presents with hematuria.     He was recently hospitalized at the end of April 2019 with gross hematuria with clot retention.  At that time he required cystoscopy with evacuation of clots and fulguration of prosthetic veins.  The patient reports that he has done completely okay since then until today when he had recurrence of hematuria and retention.  He was seen in the ER and had a three-way catheter placed with manual irrigation with clot evacuation and flow of urine.  He was discharged home but noticed decreased flow of urine and suprapubic pressure, and came back to the ER.  Urology was contacted in the ER.  Now being placed under observation for monitoring overnight.    #Gross hematuria.  #Known history of bladder and prostate cancer  At this point patient is hemodynamically stable.  His hemoglobin earlier today was 13.  He has small amount of urine in the bag.  At this time, we will place him back on continuous bladder irrigation.  Monitor overnight in the hospital.  Request a formal urology consultation.  I will keep him n.p.o. after midnight in case he needs any procedure.  Hold the dose of warfarin tonight.  Repeat hemoglobin in the morning    #Permanent atrial fibrillation, atypical atrial flutter  #Status post pacemaker placement in 2015  Follows with EP cardiology.  We will  continue him on the carvedilol 37.5 mg twice daily.  Hold the dose of warfarin tonight.  His INR is therapeutic today at 2.    #Chronic kidney disease.  Stage 3.  Creatinine is stable    #Hypertension, mild LV dysfunction.  EF of 45 to 50% on echo from last year.  Resume his carvedilol and losartan with parameters.  Hold the dose of Maxide      DVT Prophylaxis: Warfarin  Code Status: Full Code    Disposition: Expected discharge in 1 days once hematuria improved    Plan of care was discussed with the patient in great detail. All of the questions were answered extensively. Patient is comfortable with the plan and agrees with it.     Bay Bryant    Chief Complaint   hematuria    History is obtained from the patient     Case discussed with ER provider Dr. Clarke     History of Present Illness   Tucker Slater is a 88 year old male who presents with hematuria. Patient tells me that he has done totally fine since his cystoscopy procedure in April. This morning he noticed some blood in his urine. Afterwards, he noticed difficulty urination and feeling of pressure and spasm. This felt similar to the last time he had clots and so he came to the ER. He presented to ER and a had a 3 way cathter placed. Manual irrigation was done was large clots expressed. 3L bag was run through CBI with pink urine flowing and he was discharged home. After going home, patient did not notice any urine in bag and also felt some pressure coming up so he became worried and came back. It was irrigated with small amount of normal saline with no clots and urine output through crawford. ER provider discussed case with urology. Patient did not feel comfortable going home, so he is being brought in for observation overnight.     Past Medical History    I have reviewed this patient's medical history and updated it with pertinent information if needed.   Past Medical History:   Diagnosis Date     Arrhythmia     A Fib     Balance problem     related  to neuropathy in feet     Bradycardia      Carcinoma in situ of bladder 2000    bladder cancer ;; follow w Urology w periodic cysto      Essential hypertension, benign      Generalized osteoarthrosis, unspecified site knees    s/p R total knee 8/09 ; will do L 6/10      Numbness and tingling     toes and fingers     Pacemaker     [employer]      Pain in joint, shoulder region     L shoulder rotater tear; no surgery      Persistent atrial fibrillation (H) 1/30/15     Prostate CA (H) 2008-9    radiation     Prostate cancer (H) 11/08    completed RT 3/09     Renal disease     elevated creatinine     Shoulder impingement     R shoulder impingement etc see MRI 4/09      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     toes both feet        Past Surgical History   I have reviewed this patient's surgical history and updated it with pertinent information if needed.  Past Surgical History:   Procedure Laterality Date     ARTHROPLASTY HIP Right 3/27/2017    Procedure: ARTHROPLASTY HIP;  Surgeon: Jigar Wynn MD;  Location: [facility] OR     C NONSPECIFIC PROCEDURE      bilat inguinal hernia repairs ( 3total procedures)      C NONSPECIFIC PROCEDURE      pilonidal cyst     C NONSPECIFIC PROCEDURE      T + A     C NONSPECIFIC PROCEDURE  8/09     R+L total knee     CARDIOVERSION  3/26/15     COLONOSCOPY       CYSTOSCOPY, FULGURATE BLEEDERS, EVACUATE CLOT(S), COMBINED N/A 4/23/2019    Procedure: Exam under anesthesia, video cystopanendoscopy, evacuation of clots, fulguration of prostatic veins.;  Surgeon: Ilir Ramirez MD;  Location: [facility] OR     IMPLANT PACEMAKER  3/26/15     RELEASE CARPAL TUNNEL Right 4/19/2017    Procedure: RELEASE CARPAL TUNNEL;  Right carpal tunnel release;  Surgeon: Alonso Barboza MD;  Location: [facility] OR       Prior to Admission Medications   Prior to Admission Medications   Prescriptions Last Dose Informant Patient Reported? Taking?   ACETAMINOPHEN PO   Yes Yes   Sig: Take 650 mg  by mouth every 4 hours as needed for pain   Ascorbic Acid (VITAMIN C PO) 6/1/2019 at am  Yes Yes   Sig: Take 500 mg by mouth daily    Multiple Vitamins-Minerals (OCUVITE PO) 6/1/2019 at am  Yes Yes   Sig: Take 1 tablet by mouth daily    VITAMIN D, CHOLECALCIFEROL, PO 6/1/2019 at am  Yes Yes   Sig: Take 2,000 Units by mouth daily.   calcium carbonate (OS-KARLA 500 MG [place]) 500 MG tablet 6/1/2019 at am  Yes Yes   Sig: Take 500 mg by mouth daily    carvedilol (COREG) 25 MG tablet 6/1/2019 at am  No Yes   Sig: Take 1.5 tablets (37.5 mg) by mouth 2 times daily (with meals)   finasteride (PROSCAR) 5 MG tablet 6/1/2019 at am  No Yes   Sig: Take 1 tablet (5 mg) by mouth daily   losartan (COZAAR) 100 MG tablet 6/1/2019 at am  No Yes   Sig: Take 1 tablet (100 mg) by mouth daily   order for DME   No No   Sig: Oxygen 2 Li/min  at night with CPAP  SPO2 less than or equal to oxygen saturation 89% on effective CPAP in patient with known cardiomyopathy   order for DME   No No   Sig: Oxygen 2 Li/min  at night   triamterene-HCTZ (MAXZIDE-25) 37.5-25 MG tablet 6/1/2019 at am  No Yes   Sig: Take 1 tablet by mouth daily   vitamin B complex with vitamin C (VITAMIN  B COMPLEX) TABS tablet 6/1/2019 at am  Yes Yes   Sig: Take 1 tablet by mouth daily   warfarin (JANTOVEN) 2.5 MG tablet 5/31/2019 at 1.25mg  No Yes   Sig: Take one tablet (2.5 mg) daily except a half tablet (1.25 mg) Tu and Fri or as directed by INR clinic.      Facility-Administered Medications: None     Allergies   Allergies   Allergen Reactions     Merbromin      Other reaction(s): Other, see comments  Severe reaction as child. Amalgam fillings OK.     Morphine Nausea and Vomiting     Amlodipine      Edema       Social History   I have reviewed this patient's social history and updated it with pertinent information if needed.   Social History     Socioeconomic History     Marital status:      Spouse name: Alba     Number of children: 3     Years of education: Not  on file     Highest education level: Not on file   Occupational History     Employer: RETIRED     Comment: engineer [employer]   Social Needs     Financial resource strain: Not on file     Food insecurity:     Worry: Not on file     Inability: Not on file     Transportation needs:     Medical: Not on file     Non-medical: Not on file   Tobacco Use     Smoking status: Former Smoker     Last attempt to quit: 1977     Years since quittin.7     Smokeless tobacco: Never Used   Substance and Sexual Activity     Alcohol use: No     Alcohol/week: 0.0 oz     Drug use: No     Sexual activity: Never     Partners: Female   Lifestyle     Physical activity:     Days per week: Not on file     Minutes per session: Not on file     Stress: Not on file   Relationships     Social connections:     Talks on phone: Not on file     Gets together: Not on file     Attends Baptism service: Not on file     Active member of club or organization: Not on file     Attends meetings of clubs or organizations: Not on file     Relationship status: Not on file     Intimate partner violence:     Fear of current or ex partner: Not on file     Emotionally abused: Not on file     Physically abused: Not on file     Forced sexual activity: Not on file   Other Topics Concern     Parent/sibling w/ CABG, MI or angioplasty before 65F 55M? Not Asked      Service Not Asked     Blood Transfusions Not Asked     Caffeine Concern No     Comment: 4-5 cups per week      Occupational Exposure Not Asked     Hobby Hazards Not Asked     Sleep Concern No     Stress Concern No     Weight Concern No     Special Diet No     Back Care Not Asked     Exercise No     Comment: yard work and moves around a lot     Bike Helmet Not Asked     Seat Belt Not Asked     Self-Exams Not Asked   Social History Narrative     Not on file         Family History   I have reviewed this patient's family history and updated it with pertinent information if needed.   Family History    Problem Relation Age of Onset     Heart Disease Father          87yo     Coronary Artery Disease Father      Heart Disease Mother          74yo     Coronary Artery Disease Mother      Family History Negative Brother        Review of Systems   The 10 point Review of Systems is negative other than noted in the HPI or here.     Physical Exam   Temp: 97.6  F (36.4  C) Temp src: Oral BP: 159/82 Pulse: 71   Resp: 16 SpO2: 99 % O2 Device: None (Room air)    Vital Signs with Ranges  Temp:  [97.6  F (36.4  C)-98.7  F (37.1  C)] 97.6  F (36.4  C)  Pulse:  [69-72] 71  Heart Rate:  [72] 72  Resp:  [16-18] 16  BP: (128-163)/(75-85) 159/82  SpO2:  [93 %-99 %] 99 %  183 lbs 0 oz    GENERAL:  Awake and alert, Comfortable appearing, No acute distress.  PSYCH: Pleasant, Cooperative, normal affect, no hallucinations   EYES: EOMI, conjunctiva clear  HEENT:  Head is normocephalic, atraumatic, Neck is Supple, trachea is midline   CARDIOVASCULAR: Irregular rate and rhythm, Normal S1, S2, soft systolic murmur  PULMONARY:  Clear to auscultation bilaterally with good entry on both sides  CHEST: Good inspiratory effort bilaterally   GI: Abdomen is soft, non tender, non-distended, normal bowel sounds. No rebound or guarding   SKIN:  Dry, warm to touch. No obvious exanthems on exposed areas  EXTREMITIES:  Good capillary refill with signs of adequate peripheral perfusion. No pitting edema   Neuro: Awake and oriented x 3. Moving all extremities with good strength, Grossly non-focal.   MSK: Good range of motion in all major joints of upper and lower extremity, No acute joint synovitis of the major joints is noted.     Data   Data reviewed today:  I personally reviewed.    All lab data and imaging results from today have been reviewed.     Recent Labs   Lab 19  1640 19  1546   WBC  --  7.5   HGB  --  13.0*   MCV  --  97   PLT  --  192   INR 2.07*  --    NA  --  140   POTASSIUM  --  4.0   CHLORIDE  --  111*   CO2  --   23   BUN  --  36*   CR  --  1.31*   ANIONGAP  --  6   KARLA  --  8.4*   GLC  --  107*       No results found for this or any previous visit (from the past 24 hour(s)).

## 2019-06-02 NOTE — CONSULTS
Tucker Slater is an 88-year-old gentleman admitted with total gross painless hematuria and clot urinary retention yesterday.  He was seen 6 weeks ago in the same situation and went to surgery and underwent clot evacuation and cauterization of superficial prostatic veins that were bleeding.  He has a history of adenocarcinoma of the prostate treated with radiation many years ago and a history of bladder cancer diagnosed many years ago and followed by Dr. Ford-Dr. Ford discontinued follow-up 2 years ago.  The patient had a CT scan that showed no stones or hydronephrosis but incomplete filling of the right renal pelvis.  No tissue density is seen in the renal pelvis.  His PSA is stable at 1.94.  On cystoscopy he had no recurrence of bladder cancer and a benign feeling prostate.  Other past medical history: Atrial fibrillation, bradycardia, hypertension, osteoarthrosis, pacemaker, mitral regurgitation, history of congestive heart failure, chronic renal disease.  Surgeries include cystoscopy, hip arthroplasty, bilateral inguinal herniorrhaphies, tonsillectomy, cardioversion, colonoscopy, pacemaker, carpal tunnel release, cataract surgery, pilonidal cystectomy  Medications: Glucosamine, low-dose aspirin, Coreg, Proscar, Cozaar, Maxide, warfarin  Allergies: Broman, morphine, amlodipine exam: Alert and oriented  Urine returns are pink on a slow saline drip.  Normal external genitalia  Labs: Normal electrolytes, creatinine 1.23, hemoglobin 12.3, platelets 165, INR 1.93 this morning.  Urinalysis on admission shows no evidence for infection assessment:  Assessment: Gross painless hematuria with radiation changes and prostate but not bladder.  Suspect bleeding is due to Coumadin and radiation changes.  No evidence for recurrent bladder cancer.  Incomplete filling of right renal pelvis.  INR 1.93  Plan: Observation on continuous bladder irrigation.  Bleeding will stop when INR corrects.  Will need cystoscopy and right  retrograde ureteropyelogram if bleeding does not cease with correction of INR.  Urine FISH test when off irrigation  Cardiology to see

## 2019-06-02 NOTE — PHARMACY-ADMISSION MEDICATION HISTORY
Admission medication history interview status for this patient is complete. See [facility] admission navigator for allergy information, prior to admission medications and immunization status.     Medication history interview source(s):Patient and Family  Medication history resources (including written lists, pill bottles, clinic record):None    Changes made to PTA medication list:  Added: none  Deleted: voltaren gel  Changed: none    Actions taken by pharmacist (provider contacted, etc):None     Additional medication history information:None    Medication reconciliation/reorder completed by provider prior to medication history? No    For patients on insulin therapy: no (Yes/No)   Lantus/levemir/NPH/Mix 70/30 dose: ___ in AM/PM or twice daily   Sliding scale Novolog Y/N   If Yes, do you have a baseline novolog pre-meal dose: ______units with meals   Patients eat three meals a day: Y/N ---  How many episodes of hypoglycemia (low blood glucose) do you have weekly: ---   How many missed doses do you have a week: ---  How many times do you check your blood glucose per day: ---  Any Barriers to therapy: cost of medications/comfortable with giving injections (if applicable)/ comfortable and confident with current diabetes regimen ---      Prior to Admission medications    Medication Sig Last Dose Taking? Auth Provider   ACETAMINOPHEN PO Take 650 mg by mouth every 4 hours as needed for pain  Yes Reported, Patient   Ascorbic Acid (VITAMIN C PO) Take 500 mg by mouth daily  6/1/2019 at am Yes Reported, Patient   calcium carbonate (OS-KARLA 500 MG [place]) 500 MG tablet Take 500 mg by mouth daily  6/1/2019 at am Yes Reported, Patient   carvedilol (COREG) 25 MG tablet Take 1.5 tablets (37.5 mg) by mouth 2 times daily (with meals) 6/1/2019 at am Yes Janeth Mcdaniels APRN CNP   finasteride (PROSCAR) 5 MG tablet Take 1 tablet (5 mg) by mouth daily 6/1/2019 at am Yes Prince Zee MD   losartan (COZAAR) 100 MG tablet Take 1 tablet (100  mg) by mouth daily 6/1/2019 at am Yes Aleena Perez MD   Multiple Vitamins-Minerals (OCUVITE PO) Take 1 tablet by mouth daily  6/1/2019 at am Yes Reported, Patient   triamterene-HCTZ (MAXZIDE-25) 37.5-25 MG tablet Take 1 tablet by mouth daily 6/1/2019 at am Yes Aleena Perez MD   vitamin B complex with vitamin C (VITAMIN  B COMPLEX) TABS tablet Take 1 tablet by mouth daily 6/1/2019 at am Yes Reported, Patient   VITAMIN D, CHOLECALCIFEROL, PO Take 2,000 Units by mouth daily. 6/1/2019 at am Yes Unknown, Entered By History   warfarin (JANTOVEN) 2.5 MG tablet Take one tablet (2.5 mg) daily except a half tablet (1.25 mg) Tu and Fri or as directed by INR clinic. 5/31/2019 at 1.25mg Yes Kwadwo Winslow MD   order for DME Oxygen 2 Li/min  at night   Hari Ritter MD   order for DME Oxygen 2 Li/min  at night with CPAP  SPO2 less than or equal to oxygen saturation 89% on effective CPAP in patient with known cardiomyopathy   Hari Ritter MD

## 2019-06-02 NOTE — CONSULTS
Recurrent hematuria (2) so Dr. Ramirez requested consult for warfarin alternatives. He uses for atrial fibrillation stroke risk.  NOAC's will carry same risk of bleeding (especially given low INR of 2.1) when this happened. They are somewhat harder to reverse.  If he is at significant risk in future, Watchman atrial occluder device could be considered (he would need 6 months of Plavix).  Discussed with him and will arrange outpatient visit with Dr. Perez to discuss.

## 2019-06-02 NOTE — ED TRIAGE NOTES
Had 3 way crawford placed and bladder irrigated earlier today.  Large amount of blood in catheter then.  Discharged home an hour ago.  States that the catheter is not draining again and wants to be admitted.  ABCDs intact.

## 2019-06-02 NOTE — PROGRESS NOTES
ROOM # 225     Living Situation (if not independent, order SW consult): Home with wife   Facility name:  : [facility]     Activity level at baseline: Ind   Activity level on admit: SBA       Patient registered to observation; given Patient Bill of Rights; given the opportunity to ask questions about observation status and their plan of care.  Patient has been oriented to the observation room, bathroom and call light is in place.    Discussed discharge goals and expectations with patient/family.

## 2019-06-02 NOTE — PLAN OF CARE
"PRIMARY DIAGNOSIS: \"HEMATURIA  OUTPATIENT/OBSERVATION GOALS TO BE MET BEFORE DISCHARGE:  1. ADLs back to baseline: No    2. Activity and level of assistance: Up with standby assistance.    3. Pain status: Intermittent bladder pain but gets better after irrigation    4. Return to near baseline physical activity: Yes     Discharge Planner Nurse   Safe discharge environment identified: Yes  Barriers to discharge: Yes       Entered by: Rosa DONG Che 06/02/2019 5:42 AM    Patient alert and oriented  x 4. VSS. Up with SBA .  CBI started, bright red bloody urine. Irrigated at 0010 with 180 ml of saline and got copious amount of dark red clots. Patient felt better. Urine gradually clearing up from bright red to cherry pink. NPO at midnight for Urology consult in the morning  Please review provider order for any additional goals.   Nurse to notify provider when observation goals have been met and patient is ready for discharge.  "

## 2019-06-02 NOTE — PLAN OF CARE
PRIMARY DIAGNOSIS: Hematuria  OUTPATIENT/OBSERVATION GOALS TO BE MET BEFORE DISCHARGE:  ADLs back to baseline: Yes    Activity and level of assistance: Up with standby assistance.    Pain status: Pain free.    Return to near baseline physical activity: Yes     Discharge Planner Nurse   Safe discharge environment identified: Yes  Barriers to discharge: Yes       Entered by: Yan Mccollum 06/02/2019     Pt alert and oriented x4. He denies any pain or bladder discomfort/pressure. CBI running at moderate rate. Urine is cherry colored w/ minimal clots. Urology saw him today - said ok to eat, cardiology consult. No IV. SBA. Regular diet now. Will cont to monitor and provide supportive cares.    /66 (BP Location: Left arm)   Pulse 68   Temp 97.4  F (36.3  C) (Oral)   Resp 16   Wt 83 kg (183 lb)   SpO2 97%   BMI 26.26 kg/m         Please review provider order for any additional goals.   Nurse to notify provider when observation goals have been met and patient is ready for discharge.

## 2019-06-02 NOTE — PLAN OF CARE
"PRIMARY DIAGNOSIS: \"HEMATURIA  OUTPATIENT/OBSERVATION GOALS TO BE MET BEFORE DISCHARGE:  ADLs back to baseline: No    Activity and level of assistance: Up with standby assistance.    Pain status: Intermittent bladder pain but gets better after irrigation    Return to near baseline physical activity: Yes     Discharge Planner Nurse   Safe discharge environment identified: Yes  Barriers to discharge: Yes       Entered by: Rosa DONG Che 06/02/2019 1:39 AM    Patient alert and oriented  x 4. VSS. Up with SBA .  CBI started, bright red bloody urine. Irrigated at 0010 with 180 ml of saline and got copious amount of dark red clots. Patient felt better. Urine gradually clearing up from bright red to cherry pink. NPO at midnight for Urology consult in the morning  Please review provider order for any additional goals.   Nurse to notify provider when observation goals have been met and patient is ready for discharge.  "

## 2019-06-02 NOTE — PROGRESS NOTES
[facility]  Internal Medicine  Progress Note    Date of Service: 6/2/2019    Patient: Tucker Slater  MRN: [id number]  Admission Date: 6/1/2019  Hospital Day # 2      Assessment & Plan: Tucker Slater is a 88 year old male with a past medical history significant for bladder cancer s/p surgical resection (2000), prostate cancer s/p radiation (3545-7731), CKD (baseline Cr 1.3-1.5), permanent A-fib on Warfarin, s/p pacemaker, HTN, DAWSON on CPAP, mild LV dysfunction (EF of 45-50%), OA, and peripheral neuropathy who presented to the ED 6/1 with hematuria.   He was recently hospitalized at the end of April 2019 with gross hematuria with clot retention.  At that time he required cystoscopy with evacuation of clots and fulguration of prosthetic veins.    Gross hematuria  Known history of bladder and prostate cancer - patient continues with gross hematuria with ongoing continuous bladder irrigation.  Hgb down to 12.3 from 13.0 on admission.  Urology was consulted and suspect bleeding is due to radiation changes while on warfarin.  - continue CBI   - monitor serial hemoglobin levels  - INR trending down  - per Urology, patient will need cystoscopy and right retrograde ureteropyelogram if bleeding does not cease with correction of INR.    Permanent atrial fibrillation, atypical atrial flutter  Status post pacemaker placement in 2015 - follows with EP cardiology.  INR today 1.93    - continue on pta Carvedilol 37.5 mg twice daily  - monitor INR daily  - hold warfarin     Chronic kidney disease.  Stage 3.  Creatinine is stable     Hypertension, mild LV dysfunction -  EF of 45 to 50% on echo from last year.    - continue pta carvedilol and losartan  - hold pta Maxzide      CODE: full  DVT: scd  Diet/fluids: regular    Antoinette WRENC  Hospitalist Physician Assistant  [facility]  Pager: [phone]      Subjective & Interval Hx:    Patient reports lower bladder spasms have resolved.  He  denies shortness of breath, chest pain, abdominal pain, nausea.    Last 24 hr care team notes reviewed.   ROS:  4 point ROS including Respiratory, CV, GI and , other than that noted in the HPI, is negative.    Physical Exam:    Blood pressure 148/66, pulse 68, temperature 97.4  F (36.3  C), temperature source Oral, resp. rate 16, weight 83 kg (183 lb), SpO2 97 %. on room air  General: Alert, interactive, NAD  Resp: clear to auscultation bilaterally, no crackles or wheezes  Cardiac: regular rate and rhythm, no murmur  Abdomen: Soft, nontender, nondistended. +BS.  No rebound or guarding.  [place] with bright red blood.  Extremities: No LE edema  Neuro: Alert & oriented x 3, moves all extremities equally      Labs & Images:  Reviewed in Epic   Medications:    Current Facility-Administered Medications   Medication     acetaminophen (TYLENOL) tablet 650 mg     carvedilol (COREG) tablet 37.5 mg     finasteride (PROSCAR) tablet 5 mg     losartan (COZAAR) tablet 100 mg     melatonin tablet 3 mg     naloxone (NARCAN) injection 0.1-0.4 mg     sodium chloride 0.9% (bag) irrigation

## 2019-06-02 NOTE — ED NOTES
[facility]  ED Nurse Handoff Report    Tucker Slater is a 88 year old male   ED Chief complaint: catheter problems  . ED Diagnosis:   Final diagnoses:   Hematuria, unspecified type     Allergies:   Allergies   Allergen Reactions     Merbromin      Other reaction(s): Other, see comments  Severe reaction as child. Amalgam fillings OK.     Morphine Nausea and Vomiting     Amlodipine      Edema       Code Status: Full Code  Activity level - Baseline/Home:  Independent. Activity Level - Current:   Independent. Lift room needed: No. Bariatric: No   Needed: No   Isolation: No. Infection: Not Applicable.     Vital Signs:   Vitals:    06/01/19 2028   BP: 159/82   Pulse: 71   Resp: 16   Temp: 97.6  F (36.4  C)   TempSrc: Oral   SpO2: 99%   Weight: 83 kg (183 lb)       Cardiac Rhythm:  ,      Pain level: 0-10 Pain Scale: 2  Patient confused: No. Patient Falls Risk: Yes.   Elimination Status: Urethral catheter (crawford) in place; orders for patient to discharge with crawford    Patient Report - Initial Complaint: Had 3 way crawford placed and bladder irrigated earlier today.  Large amount of blood in catheter then.  Discharged home an hour ago.  States that the catheter is not draining again and wants to be admitted.  ABCDs intact.. Focused Assessment: Genitourinary - Genitourinary WDL: -WDL except; urine  Urine Characteristics: red    Tests Performed: none. Abnormal Results: None on this visit.  Labs completed earlier today.   Treatments provided: Cath irrigated.  Family Comments: Wife present.   Pt was discharged from ER @ 1900 and returned 1 hr later.  States that he doesn't feel comfortable caring for cath at home.  OBS brochure/video discussed/provided to patient:  Yes  ED Medications: Medications - No data to display  Drips infusing:  No  For the majority of the shift, the patient's behavior Green. Interventions performed were none.     Severe Sepsis OR Septic Shock Diagnosis Present: No      ED  Nurse Name/Phone Number: Davidson Oconnor,   9:24 PM  RECEIVING UNIT ED HANDOFF REVIEW    Above ED Nurse Handoff Report was reviewed: Yes  Reviewed by: Rubi Slater on June 1, 2019 at 10:12 PM

## 2019-06-03 ENCOUNTER — MEDICAL CORRESPONDENCE (OUTPATIENT)
Dept: HEALTH INFORMATION MANAGEMENT | Facility: CLINIC | Age: 84
End: 2019-06-03

## 2019-06-03 LAB
ANION GAP SERPL CALCULATED.3IONS-SCNC: 4 MMOL/L (ref 3–14)
BUN SERPL-MCNC: 25 MG/DL (ref 7–30)
CALCIUM SERPL-MCNC: 8.4 MG/DL (ref 8.5–10.1)
CHLORIDE SERPL-SCNC: 107 MMOL/L (ref 94–109)
CO2 SERPL-SCNC: 28 MMOL/L (ref 20–32)
CREAT SERPL-MCNC: 1.35 MG/DL (ref 0.66–1.25)
ERYTHROCYTE [DISTWIDTH] IN BLOOD BY AUTOMATED COUNT: 13 % (ref 10–15)
GFR SERPL CREATININE-BSD FRML MDRD: 46 ML/MIN/{1.73_M2}
GLUCOSE SERPL-MCNC: 91 MG/DL (ref 70–99)
HCT VFR BLD AUTO: 37.2 % (ref 40–53)
HGB BLD-MCNC: 12.2 G/DL (ref 13.3–17.7)
HGB BLD-MCNC: 12.3 G/DL (ref 13.3–17.7)
INR PPP: 1.6 (ref 0.86–1.14)
MCH RBC QN AUTO: 32.3 PG (ref 26.5–33)
MCHC RBC AUTO-ENTMCNC: 33.1 G/DL (ref 31.5–36.5)
MCV RBC AUTO: 98 FL (ref 78–100)
PLATELET # BLD AUTO: 157 10E9/L (ref 150–450)
POTASSIUM SERPL-SCNC: 3.5 MMOL/L (ref 3.4–5.3)
RBC # BLD AUTO: 3.81 10E12/L (ref 4.4–5.9)
SODIUM SERPL-SCNC: 139 MMOL/L (ref 133–144)
WBC # BLD AUTO: 7.5 10E9/L (ref 4–11)

## 2019-06-03 PROCEDURE — 85018 HEMOGLOBIN: CPT | Performed by: PHYSICIAN ASSISTANT

## 2019-06-03 PROCEDURE — 36415 COLL VENOUS BLD VENIPUNCTURE: CPT | Performed by: PHYSICIAN ASSISTANT

## 2019-06-03 PROCEDURE — 80048 BASIC METABOLIC PNL TOTAL CA: CPT | Performed by: INTERNAL MEDICINE

## 2019-06-03 PROCEDURE — 25000132 ZZH RX MED GY IP 250 OP 250 PS 637: Performed by: INTERNAL MEDICINE

## 2019-06-03 PROCEDURE — 12000011 ZZH R&B MS OVERFLOW

## 2019-06-03 PROCEDURE — 36415 COLL VENOUS BLD VENIPUNCTURE: CPT | Performed by: INTERNAL MEDICINE

## 2019-06-03 PROCEDURE — 85610 PROTHROMBIN TIME: CPT | Performed by: INTERNAL MEDICINE

## 2019-06-03 PROCEDURE — 99207 ZZC CDG-MDM COMPONENT: MEETS MODERATE - UP CODED: CPT | Performed by: PHYSICIAN ASSISTANT

## 2019-06-03 PROCEDURE — 85027 COMPLETE CBC AUTOMATED: CPT | Performed by: INTERNAL MEDICINE

## 2019-06-03 PROCEDURE — 99232 SBSQ HOSP IP/OBS MODERATE 35: CPT | Performed by: PHYSICIAN ASSISTANT

## 2019-06-03 PROCEDURE — 25800025 ZZH RX 258: Performed by: INTERNAL MEDICINE

## 2019-06-03 RX ADMIN — FINASTERIDE 5 MG: 5 TABLET, FILM COATED ORAL at 08:29

## 2019-06-03 RX ADMIN — LOSARTAN POTASSIUM 100 MG: 100 TABLET ORAL at 08:29

## 2019-06-03 RX ADMIN — SODIUM CHLORIDE 3000 ML: 900 IRRIGANT IRRIGATION at 20:51

## 2019-06-03 RX ADMIN — SODIUM CHLORIDE 6000 ML: 900 IRRIGANT IRRIGATION at 23:06

## 2019-06-03 RX ADMIN — CARVEDILOL 37.5 MG: 25 TABLET, FILM COATED ORAL at 17:59

## 2019-06-03 RX ADMIN — CARVEDILOL 37.5 MG: 25 TABLET, FILM COATED ORAL at 08:28

## 2019-06-03 RX ADMIN — SODIUM CHLORIDE 3000 ML: 900 IRRIGANT IRRIGATION at 06:54

## 2019-06-03 RX ADMIN — SODIUM CHLORIDE 3000 ML: 900 IRRIGANT IRRIGATION at 03:12

## 2019-06-03 NOTE — CONSULTS
CTS identifies pt as high risk due to Elevated FABIO. Patient was admitted on 6/1/19 for hematuria. Patient was seen in ER earlier on 6/1/19 and sent home with a crawford. Patient has had one previous hospitalization in April for anemia d/t hematuria requiring a cysto. Patient has a history of a stroke, Afib on coumadin, pacemaker, HTN, and DAWSON, and Prostrate/ Bladder cancer. Patient has a CPAP with O2 at home through [facility]. Patient had urology and cardiology consults in hospital. Patient has a  through [facility].   Hospital follow up appointment was scheduled for the pt with Dr. Winslow at the [facility] for Tuesday June 11th at 3:40 PM. AVS discharge instructions were updated for the pt.     Handoff will be given to PCP clinic at discharge.     CM will continue to follow patient for any additional discharge needs.     Nitza LYN  Care Transition Services  [phone]

## 2019-06-03 NOTE — PLAN OF CARE
Patient alert and oriented x4. Vitals are Temp: 97.9  F (36.6  C) Temp src: Oral BP: 122/71 Pulse: 73   Resp: 16 SpO2: 95 % RA. Denies pain and pressure. CBI running at moderate rate, light pink output with small clots noted. Apical pulse irregular. Lung sounds clear throughout. Baseline numbness and tingling to BLE noted. Tinoco patet. Denies nausea and GI symptoms. Plan to continue with CBI and supportive cares.

## 2019-06-03 NOTE — PROGRESS NOTES
Progress Note    History of hemorrhagic/radiation cystitis.  Urine clear on CBI this afternoon, but did require irrigation of clots shortly after having a BM earlier this morning.  INR 1.6 today.    Continue CBI for now - awaiting normalization of his INR with hopes that urine will clear spontaneously.  NPO at MN for possible cystoscopy and fulguration tomorrow if ongoing hematuria.  We also discussed potential for hyperbaric oxygen or other options if hematuria persists.  Urology will follow    Jacob Vincent MD  Urology  [facility]  Clinic Phone [phone]

## 2019-06-03 NOTE — PLAN OF CARE
PRIMARY DIAGNOSIS: Hematuria  OUTPATIENT/OBSERVATION GOALS TO BE MET BEFORE DISCHARGE:  1. ADLs back to baseline: Yes     2. Activity and level of assistance: Up with standby assistance.     3. Pain status: Pain free.     4. Return to near baseline physical activity: Yes    Blood pressure 140/70, pulse 65, temperature 96.7  F (35.9  C), temperature source Oral, resp. rate 16, weight 83 kg (183 lb), SpO2 97 %.    VSS.  LS clear, adequate sats on room air.  Monitor per tele tech:  Atrial Fib, occasional PVCs, HR 60-70s.   Continuous bladder irrigation continues at a moderate rate.  Urine/irrigation is cherry red in color, with a few small clots passed. Patient tolerating regular diet.  Plan:  Continue to provide supportive cares.        Discharge Planner Nurse   Safe discharge environment identified: Yes  Barriers to discharge: Yes       Entered by: Karen Lesch, RN                    Please review provider order for any additional goals.   Nurse to notify provider when observation goals have been met and patient is ready for discharge.

## 2019-06-03 NOTE — PROGRESS NOTES
[facility]  Internal Medicine  Progress Note    Date of Service: 6/3/2019    Patient: Tucker Slater  MRN: [id number]  Admission Date: 6/1/2019  Hospital Day # 3    Assessment & Plan: Tucker Slater is a 88 year old male with a past medical history significant for bladder cancer s/p surgical resection (2000), prostate cancer s/p radiation (6139-1060), CKD (baseline Cr 1.3-1.5), permanent A-fib on Warfarin, s/p pacemaker, HTN, DAWSON on CPAP, mild LV dysfunction (EF of 45-50%), OA, and peripheral neuropathy who presented to the ED 6/1 with hematuria.   He was recently hospitalized at the end of April 2019 with gross hematuria with clot retention.  At that time he required cystoscopy with evacuation of clots and fulguration of prosthetic veins.     Gross hematuria  Known history of bladder and prostate cancer - patient continues with gross hematuria with ongoing continuous bladder irrigation.  Bleeding lightening from admission.  Hgb down to 12.3 from 13.0 on admission.  Urology was consulted and suspect bleeding is due to radiation changes while on warfarin.  - continue CBI   - monitor hgb q12 hours  - INR trending down  - per Urology, patient will need cystoscopy and right retrograde ureteropyelogram if bleeding does not cease with correction of INR.     Permanent atrial fibrillation, atypical atrial flutter  Status post pacemaker placement in 2015 - follows with EP cardiology.  INR today 1.6.  Cardiology evaluated patient at the request of Urology for anticoagulation options.  Cardiology recommends warfarin as his option for now and consider a Watchman device.  Patient will follow up with Dr. Perez upon discharge.  - continue on pta Carvedilol 37.5 mg twice daily  - monitor INR daily  - hold warfarin     Chronic kidney disease - baseline creatinine 1.3-1.5.  Creatinine is stable     Hypertension, mild LV dysfunction -  EF of 45 to 50% 8/2018.    - continue pta carvedilol and losartan  - hold pta  Maxzide    DAWSON - continue pta cpap        CODE: full  DVT: scd  Diet/fluids: regular        Antoinette Mariear MS PA-C  Hospitalist Physician Assistant  [facility]  Pager: [phone]      Subjective & Interval Hx:    Patient denies chest pain, shortness of breath, abdominal pain, nausea, emesis.  He is tolerating a diet well.    Last 24 hr care team notes reviewed.   ROS:  4 point ROS including Respiratory, CV, GI and , other than that noted in the HPI, is negative    Physical Exam:    Blood pressure 135/69, pulse 71, temperature 95.1  F (35.1  C), resp. rate 16, weight 83 kg (183 lb), SpO2 95 %.  General: Alert, interactive, NAD, lying in bed, pleasant and cooperative.  Resp: clear to auscultation bilaterally, no crackles or wheezes  Cardiac: irregular rate and rhythm, no murmur  Abdomen: Soft, nontender, nondistended. +BS.  No rebound or guarding.  [place] with light pink urine  Extremities: No LE edema  Skin: Warm and dry  Neuro: Alert & oriented x 3, moves all extremities equally    Labs & Images:  Reviewed in Epic   Medications:    Current Facility-Administered Medications   Medication     acetaminophen (TYLENOL) tablet 650 mg     carvedilol (COREG) tablet 37.5 mg     finasteride (PROSCAR) tablet 5 mg     losartan (COZAAR) tablet 100 mg     melatonin tablet 3 mg     naloxone (NARCAN) injection 0.1-0.4 mg     sodium chloride 0.9% (bag) irrigation

## 2019-06-03 NOTE — CONSULTS
Consult Date:  06/02/2019      CARDIOLOGY CONSULTATION      PATIENT HISTORY:  Mr. Tucker Slater is 88 years old and has been admitted to the hospital with hematuria.  He has been seen by Dr. Ramirez of Urology who has requested a cardiology consultation.      Mr. Slater is on warfarin anticoagulation for prevention of stroke in the presence of atrial fibrillation.  He has a history of chronic atrial fibrillation and atypical flutter associated with bradycardia such that he underwent permanent pacemaker placement in 2015.  He has been on warfarin anticoagulation since that time.  He is followed by Dr. Zita Perez.  He also has mild left ventricular dysfunction based on echocardiography done in 2018, which suggested an ejection fraction of 45%-50%.  Dr. Perez has felt this may be the result of right ventricular pacing.  He has hypertension and obstructive sleep apnea, which he treats with CPAP.      He has had 2 episodes of hematuria in the last month.  On admission, his INR was only 2.1.  He has otherwise been feeling well but notes that the hematuria has made him anxious.  Indeed, he has a Tinoco catheter in place with visual hematuria.      Dr. Ramirez notes painless hematuria and urinary clot retention.  Six weeks ago, he required surgery for clot evacuation and cauterization of superficial prostatic veins that were bleeding.  It turns out he has a history of prostatic adenocarcinoma with radiation and a history of bladder cancer diagnosed many years ago.  CT scanning has failed to demonstrate nephrolithiasis or hydronephrosis.  On cystoscopy, had no evidence of recurrent bladder cancer.  His anticoagulation has been reversed.  Dr. Ramirez plans to proceed with cystoscopy and a right retrograde ureteral pyelogram, if the bleeding does not stop with normalization of the INR.      MEDICATIONS:   1.  Carvedilol 37.5 mg p.o. b.i.d.   2.  Proscar 5 mg p.o. daily.   3.  Losartan 100 mg p.o. daily.      ALLERGIES:   MERBROMIN, MORPHINE, AMLODIPINE.      PAST MEDICAL HISTORY:   1.  Atrial fibrillation.   2.  Prior permanent pacemaker placement.   3.  Mild left ventricular systolic dysfunction.   4.  Stage III chronic kidney disease.   5.  Hypertension.   6.  History of prostate cancer with radiation.   7.  History of bladder cancer, no recurrence.   8.  Peripheral neuropathy.   9.  Prior right hip arthroplasty.   10.  Prior bilateral inguinal herniorrhaphies.   11.  Prior tonsillectomy and adenoidectomy.   12.  Prior right and left total knee arthroplasties.   13.  Prior right carpal tunnel release.      FAMILY HISTORY:  Significant for heart disease in his father.  His mother also had heart disease.      REVIEW OF SYSTEMS:  Negative except as noted in the HPI and limited to the current issue.      PHYSICAL EXAMINATION:   GENERAL:  This is a man in no apparent distress who was awakened from sleep.  He was conversant and expressed his gratitude for the discussion.   VITAL SIGNS:  Blood pressure was 125/69 mmHg, heart rate 72 beats per minute and regular, and the respiratory rate was 14-18 per minute.   CHEST:  Clear to auscultation.   CARDIAC:  On cardiac auscultation, there was a regular S1 and S2 without extra sounds or a murmur.   SKIN:  Warm and dry.   HEAD:  Normocephalic.     EYES:  The eyes were nonicteric.      LABORATORY DATA:  The hemoglobin was 12.4, INR 1.93 this a.m., hemoglobin 12.3, platelet count 165,000 and white blood cell count 6.  The sodium was 140, potassium 3.5, BUN 28, creatinine 1.23.      ASSESSMENT AND RECOMMENDATIONS:  With regard to a substitute for warfarin, the novel oral anticoagulants are likely to have the same risk of bleeding.  Mr. Slater was on warfarin but with an INR that has recently been at the very lowest limit for appropriate INRs.  As a matter of fact, warfarin is more likely to be easily reversed then any of the novel oral anticoagulants.      I did briefly discuss the Watchman  procedure with Mr. Lorenzana.  Given the severity of the complication from his prior hematuria, it would be desirable to avoid such an outcome again.  If there is not an easily resolved bleeding source found on his urologic evaluation, Mr. Lorenzana should have an evaluation for a Watchman device.  In the short-term, warfarin anticoagulation will need to be held, but that leaves him at an increased risk of stroke.  His risk is not inconsiderable given his decreased left ventricular systolic performance, age greater than 75 years, and history of hypertension.      I have ordered a followup with Dr. Perez.  He should have some information dropped off so he can review the Watchman device.  He will require some evaluation, but given the severity of the complications from his hematuria, I would hope the evaluation could proceed relatively efficaciously.  I did warn him that clopidogrel would be needed for 6 months and there would be a bleeding risk with the clopidogrel, though not likely as significant as with full anticoagulation.  I also explained that the Watchman device is not a complete replacement for anticoagulation and there does remain some heightened stroke risk, even with a successful Watchman device implantation.         RICKI CORREA MD          D: 2019   T: 2019   MT: WT      Name:     ROSS LORENZANA   MRN:      [id number]        Account:       [id number]   :      1931           Consult Date:  2019      Document: [id number]       cc: Kwadwo Winslow MD

## 2019-06-03 NOTE — PLAN OF CARE
/55 (BP Location: Left arm)   Pulse 64   Temp 97.2  F (36.2  C) (Oral)   Resp 16   Wt 83 kg (183 lb)   SpO2 96%   BMI 26.26 kg/m      Vitals stable. Pt alert and oriented x4 but forgetful at times. Bed alarm on for increased safety. Ax1 with walker and gait belt due to weakness. Pt went on walk and tolerated well. CBI now running slow. Hand irrigated x1 this afternoon, multiple clots removed. CBI output clear, pink. Denies pain. Hgb 12.3. INR 1.60. NPO at midnight. Urology following. Pt resting comfortably. Will continue to monitor and provide supportive cares.

## 2019-06-04 LAB
ANION GAP SERPL CALCULATED.3IONS-SCNC: 5 MMOL/L (ref 3–14)
BUN SERPL-MCNC: 24 MG/DL (ref 7–30)
CALCIUM SERPL-MCNC: 8.5 MG/DL (ref 8.5–10.1)
CHLORIDE SERPL-SCNC: 107 MMOL/L (ref 94–109)
CO2 SERPL-SCNC: 25 MMOL/L (ref 20–32)
CREAT SERPL-MCNC: 1.34 MG/DL (ref 0.66–1.25)
ERYTHROCYTE [DISTWIDTH] IN BLOOD BY AUTOMATED COUNT: 13 % (ref 10–15)
GFR SERPL CREATININE-BSD FRML MDRD: 47 ML/MIN/{1.73_M2}
GLUCOSE BLDC GLUCOMTR-MCNC: 96 MG/DL (ref 70–99)
GLUCOSE SERPL-MCNC: 101 MG/DL (ref 70–99)
HCT VFR BLD AUTO: 37.3 % (ref 40–53)
HGB BLD-MCNC: 12.4 G/DL (ref 13.3–17.7)
INR PPP: 1.68 (ref 0.86–1.14)
MCH RBC QN AUTO: 32.3 PG (ref 26.5–33)
MCHC RBC AUTO-ENTMCNC: 33.2 G/DL (ref 31.5–36.5)
MCV RBC AUTO: 97 FL (ref 78–100)
PLATELET # BLD AUTO: 162 10E9/L (ref 150–450)
POTASSIUM SERPL-SCNC: 3.8 MMOL/L (ref 3.4–5.3)
RBC # BLD AUTO: 3.84 10E12/L (ref 4.4–5.9)
SODIUM SERPL-SCNC: 137 MMOL/L (ref 133–144)
WBC # BLD AUTO: 9 10E9/L (ref 4–11)

## 2019-06-04 PROCEDURE — 85610 PROTHROMBIN TIME: CPT | Performed by: INTERNAL MEDICINE

## 2019-06-04 PROCEDURE — 25800025 ZZH RX 258: Performed by: INTERNAL MEDICINE

## 2019-06-04 PROCEDURE — 25000132 ZZH RX MED GY IP 250 OP 250 PS 637: Performed by: INTERNAL MEDICINE

## 2019-06-04 PROCEDURE — 25000132 ZZH RX MED GY IP 250 OP 250 PS 637: Performed by: UROLOGY

## 2019-06-04 PROCEDURE — 12000011 ZZH R&B MS OVERFLOW

## 2019-06-04 PROCEDURE — 93010 ELECTROCARDIOGRAM REPORT: CPT | Performed by: INTERNAL MEDICINE

## 2019-06-04 PROCEDURE — 36415 COLL VENOUS BLD VENIPUNCTURE: CPT | Performed by: INTERNAL MEDICINE

## 2019-06-04 PROCEDURE — 80048 BASIC METABOLIC PNL TOTAL CA: CPT | Performed by: INTERNAL MEDICINE

## 2019-06-04 PROCEDURE — 25000132 ZZH RX MED GY IP 250 OP 250 PS 637: Performed by: HOSPITALIST

## 2019-06-04 PROCEDURE — 99239 HOSP IP/OBS DSCHRG MGMT >30: CPT | Performed by: HOSPITALIST

## 2019-06-04 PROCEDURE — 00000146 ZZHCL STATISTIC GLUCOSE BY METER IP

## 2019-06-04 PROCEDURE — 99207 ZZC CDG-CODE CATEGORY CHANGED: CPT | Performed by: HOSPITALIST

## 2019-06-04 PROCEDURE — 85027 COMPLETE CBC AUTOMATED: CPT | Performed by: INTERNAL MEDICINE

## 2019-06-04 RX ORDER — DOCUSATE SODIUM 100 MG/1
100 CAPSULE, LIQUID FILLED ORAL 2 TIMES DAILY
Status: DISCONTINUED | OUTPATIENT
Start: 2019-06-04 | End: 2019-06-05 | Stop reason: HOSPADM

## 2019-06-04 RX ORDER — DOCUSATE SODIUM 100 MG/1
100 CAPSULE, LIQUID FILLED ORAL 2 TIMES DAILY PRN
Qty: 30 CAPSULE | Refills: 0 | Status: SHIPPED | OUTPATIENT
Start: 2019-06-04 | End: 2019-06-16

## 2019-06-04 RX ADMIN — CARVEDILOL 37.5 MG: 25 TABLET, FILM COATED ORAL at 08:22

## 2019-06-04 RX ADMIN — DOCUSATE SODIUM 100 MG: 100 CAPSULE, LIQUID FILLED ORAL at 08:22

## 2019-06-04 RX ADMIN — DICLOFENAC 2 G: 10 GEL TOPICAL at 11:55

## 2019-06-04 RX ADMIN — DICLOFENAC 2 G: 10 GEL TOPICAL at 15:47

## 2019-06-04 RX ADMIN — ACETAMINOPHEN 650 MG: 325 TABLET, FILM COATED ORAL at 10:24

## 2019-06-04 RX ADMIN — SODIUM CHLORIDE 3000 ML: 900 IRRIGANT IRRIGATION at 03:00

## 2019-06-04 RX ADMIN — SODIUM CHLORIDE 3000 ML: 900 IRRIGANT IRRIGATION at 01:27

## 2019-06-04 RX ADMIN — DICLOFENAC 2 G: 10 GEL TOPICAL at 21:20

## 2019-06-04 RX ADMIN — SODIUM CHLORIDE 3000 ML: 900 IRRIGANT IRRIGATION at 04:29

## 2019-06-04 RX ADMIN — FINASTERIDE 5 MG: 5 TABLET, FILM COATED ORAL at 08:22

## 2019-06-04 RX ADMIN — LOSARTAN POTASSIUM 100 MG: 100 TABLET ORAL at 08:22

## 2019-06-04 NOTE — PLAN OF CARE
/54 (BP Location: Right arm)   Pulse 72   Temp 98  F (36.7  C) (Oral)   Resp 16   Wt 83 kg (183 lb)   SpO2 97%   BMI 26.26 kg/m      Vitals stable. Patient alert and oriented x 4 but forgetful at times. SBA w/walker. Ambulated multiple times this shift. C/O ankle weakness/pain, voltaren gel given for tis with relief. Voided once with some blood in urine after CBI was removed. Patient voided again and still blood, we will plan to monitor him overnight, will update Dr. Ramirez. Denies pain. Will continue to monitor.

## 2019-06-04 NOTE — PROGRESS NOTES
Pt is here for hematuria and is being monitored on tele due to hx of a fib. Notified by nursing of rhythm changes noted by tele tech. An EKG was obtained which showed a fib with PVCs or aberrantly conducted complexes and LBBB. Compared to previous, he previously had a non-specific intra-ventricular conduction delay, otherwise unchanged. Pt denies chest pain. Continue to monitor, morning rounder to reassess.     Gena Velazquez PA-C

## 2019-06-04 NOTE — PLAN OF CARE
No acute events this shift. Pt is A&O. No clots overnight or evening from 7pm. CBI is running clear to watermelon colored. Pt is SL. Lungs clear bilaterally. Tele a-flutter 60's . Pt own cpap is not here- 2L NC applied overnight. Up assist 1 and walker. NPO since 0000.

## 2019-06-04 NOTE — PROGRESS NOTES
VSS  Alert and oriented  Urine clear, no clots  Hb 12.4  A: hematuria due to radiation, coumadin and straining with BMs  P: discontinue crawford, hold coumadin, drink more water, colace       See Dr Perez re: Watchman procedure

## 2019-06-04 NOTE — PLAN OF CARE
Vitals: wnl, coreg held this pm due to parameters   Labs: hgb 12.4, creat 1.34, INR 1.68  Neuro: wnl  GI: Catheter removed- Patient voiding small amounts afterwards. Will bladder soon shortly. Last void was only 50   : wnl, colace given today, LBM yesterday   Skin: wnl  Activity: x1  Diet: regular   Pain: 1/10 bilateral ankle pain- Voltaren cream applied   Plan: Check hgb, monitor voiding status, tele

## 2019-06-04 NOTE — PROGRESS NOTES
RT- Spoke with patient regarding home CPAP, patient does not have with him. He does not want to wear a hospital CPAP at this time, will try oxygen if needed.

## 2019-06-04 NOTE — DISCHARGE SUMMARY
[facility]  Hospitalist Discharge Summary       Date of Admission:  6/1/2019  Date of Discharge:  6/4/2019  Discharging Provider: Prnice Zee MD      Discharge Diagnoses   hematuria    Follow-ups Needed After Discharge   Follow-up Appointments     Follow-up and recommended labs and tests       Follow up with primary care provider, Kwadwo Winslow, within 7 days   for hospital follow- up.  The following labs/tests are recommended: chem   7. At this appointment you will need to discuss when you should re-start   your coumadin. An appointment has been made for you with Dr. Perez   (cardiology) to discuss the Watchman procedure. (Tuesday, July 30 at 3:45   PM [facility] [address], [place], [place] [id number] [phone])             Unresulted Labs Ordered in the Past 30 Days of this Admission     No orders found from 4/2/2019 to 6/2/2019.      These results will be followed up by NA    Discharge Disposition   Discharged to home  Condition at discharge: Stable    Hospital Course   Tucker Slater is a 88 year old male with a past medical history significant for bladder cancer s/p surgical resection (2000), prostate cancer s/p radiation ([id number]), CKD (baseline Cr 1.3-1.5), permanent A-fib on Warfarin, s/p pacemaker, HTN, DASWON on CPAP, mild LV dysfunction (EF of 45-50%), OA, and peripheral neuropathy who presented to the ED 6/1 with hematuria.   He was recently hospitalized at the end of April 2019 with gross hematuria with clot retention.  At that time he required cystoscopy with evacuation of clots and fulguration of prosthetic veins.    Patient was admitted to the hospital. He was placed on CBI and seen by Urology. His coumadin was held. Cardiology saw to patient to comment on the choice of his anticoagulation. They recommended coumadin once he stopped bleeding. At this point, hematuria has resolved. Itnoco is out and he has urinated. He will discharge home. He will not resume his coumadin  until he sees Dr. Winslow next week. At that point they can discuss re-starting his coumadin. He did also complain of some ankle pain. There is no swelling or erythema. May be stiff from lying in bed. Voltaren gel has helped in the past (ordered). I offered to call family. He states this is not necessary.    Consultations This Hospital Stay   UROLOGY IP CONSULT  CARDIOLOGY IP CONSULT  CARE COORDINATOR IP CONSULT  CARDIOLOGY IP CONSULT    Code Status   Full Code    Time Spent on this Encounter   I, Prince Zee, personally saw the patient today and spent greater than 30 minutes discharging this patient.       Prince Zee MD  [facility]  ______________________________________________________________________    Physical Exam   Vital Signs: Temp: 98  F (36.7  C) Temp src: Oral BP: 119/54 Pulse: 72   Resp: 16 SpO2: 97 % O2 Device: None (Room air) Oxygen Delivery: 2 LPM  Weight: 183 lbs 0 oz  Constitutional: awake, alert, cooperative, no apparent distress, and appears stated age  Respiratory: No increased work of breathing, good air exchange, clear to auscultation bilaterally, no crackles or wheezing  Cardiovascular: Normal apical impulse, regular rate and rhythm, normal S1 and S2, no S3 or S4, and no murmur noted  GI: No scars, normal bowel sounds, soft, non-distended, non-tender, no masses palpated, no hepatosplenomegally       Primary Care Physician   Kwadwo Winslow    Discharge Orders      Follow-Up with Electrophysiologist      Reason for your hospital stay    hematuria     Follow-up and recommended labs and tests     Follow up with primary care provider, Kwadwo Winslow, within 7 days for hospital follow- up.  The following labs/tests are recommended: chem 7. At this appointment you will need to discuss when you should re-start your coumadin. An appointment has been made for you with Dr. Perez (cardiology) to discuss the Watchman procedure. (Tuesday, July 30 at 3:45 PM [facility] [address]  [place] [place] [address] [phone])     Activity    Your activity upon discharge: activity as tolerated     Diet    Follow this diet upon discharge: Regular       Significant Results and Procedures   Most Recent 3 CBC's:  Recent Labs   Lab Test 06/04/19  0715 06/03/19  1800 06/03/19  0606  06/02/19  0552   WBC 9.0  --  7.5  --  6.0   HGB 12.4* 12.2* 12.3*   < > 12.3*   MCV 97  --  98  --  97     --  157  --  165    < > = values in this interval not displayed.     Most Recent 3 BMP's:  Recent Labs   Lab Test 06/04/19  0715 06/03/19  0606 06/02/19  0552    139 140   POTASSIUM 3.8 3.5 3.5   CHLORIDE 107 107 110*   CO2 25 28 25   BUN 24 25 28   CR 1.34* 1.35* 1.23   ANIONGAP 5 4 5   KARLA 8.5 8.4* 8.4*   * 91 95     Most Recent Urinalysis:  Recent Labs   Lab Test 06/01/19  1732 05/23/19  1109   COLOR Red Yellow   APPEARANCE Cloudy Clear   URINEGLC Negative Negative   URINEBILI Negative Negative   URINEKETONE Negative Negative   SG 1.010 1.025   UBLD Large* Large*   URINEPH 7.0 6.0   PROTEIN 200* 100*   UROBILINOGEN  --  0.2   NITRITE Negative Negative   LEUKEST Negative Negative   RBCU >182*  --    WBCU 0  --    ,   Results for orders placed or performed during the hospital encounter of 04/19/19   CT Abdomen Pelvis w/o & w Contrast    Narrative    CT ABDOMEN PELVIS WITHOUT AND WITH CONTRAST  4/19/2019 5:52 PM    HISTORY: Hematuria. History of bladder cancer.    TECHNIQUE: Scans obtained from the diaphragm through the pelvis  without and with IV contrast, 100 mL Isovue-370. Radiation dose for  this scan was reduced using automated exposure control, adjustment of  the mA and/or kV according to patient size, or  iterative reconstruction technique.    COMPARISON: CT abdomen and pelvis dated 10/30/2008. Renal ultrasound  dated 9/20/2017.    FINDINGS: Increased interstitial markings in the bases could represent  vascular congestion or mild scarring. There are pacer/defibrillator  wires in the  heart. Mild asymmetric thickening of the wall of the  distal esophagus versus a small hiatal hernia with gastric rugae is  noted. Heart is mildly enlarged. There are thoracic aortic  calcifications. Visualized mediastinal contents are otherwise  unremarkable.    Patient is status post right hip arthroplasty causing streak artifact  mildly limiting evaluation of the pelvis. Degenerative changes are  noted in the spine. No aggressive osseous lesions are seen.    Subtle densities in the dependent aspect of the gallbladder likely  represent gallstones but are less dense than typically seen. These  measure up to approximately 1.3 cm. The liver, gallbladder, pancreas,  spleen, bilateral adrenal glands otherwise enhance normally. Several  cysts are seen in the right kidney. The kidneys are mildly atrophic.  There is normal enhancement of the left kidney and left intrarenal  collecting system. Left ureter is unremarkable. There is normal  enhancement of the right kidney and inferior intrarenal collecting  system. No significant contrast is seen in the upper right kidney  intrarenal collecting system and upper aspect of the right renal  pelvis and the right ureter which could represent slightly decreased  function of the right kidney as compared to the left kidney. A  definite mass in this region is not seen.    No nephrolithiasis is identified. No adenopathy, free fluid or free  air is seen in the peritoneal cavity.    Urinary bladder contains a heterogeneously dense structure posteriorly  measuring up to 10 cm in diameter. This could represent contrast  swirling in the urinary bladder. This could also represent hematoma  and/or mass. Urinary bladder is otherwise distended. There is  questionable mild thickening of the posterior aspect of the urinary  bladder wall. Prostate gland is enlarged and could be part of the  reason for the density in the urinary bladder.    No adenopathy, free fluid or free air seen in the  peritoneal cavity.  There is nonaneurysmal atherosclerosis. Incidental note of fatty  atrophy of some of the left gluteus muscles. This could be due to  chronic degenerative disc disease in the spine. There are areas of  stenosis in the central canal and neural foramina which are only  partially imaged on this study.    The colon is grossly of normal caliber. There are scattered  diverticuli in the colon but are most predominantly noted in the  descending colon and proximal sigmoid colon. Structure likely  representing a normal appendix is seen in the region of the cecum.  Small bowel is of normal caliber. Stomach is mostly decompressed but  otherwise unremarkable.      Impression    IMPRESSION:  1. The urinary bladder is distended. There is a radiodense structure  in the dependent aspect of the urinary bladder which could represent  swirling of contrast, thrombus or mass. Prostate gland is enlarged and  could be part of the reason for the density in the urinary bladder.  Ultrasound may be helpful for further evaluation.  2. No contrast is seen in the mid to upper right intrarenal collecting  system and upper right renal pelvis as well as the right ureter. This  could be due to decreased function of the right kidney as compared to  the left.   3. Fatty atrophy in some of the gluteus muscles on the left is likely  due to chronic degenerative disc disease and spinal/neural foraminal  stenosis.  4. No definite metastases are seen.    ARTURO STEPHENSON MD       Discharge Medications   Current Discharge Medication List      START taking these medications    Details   diclofenac (VOLTAREN) 1 % topical gel Place 2 g onto the skin 4 times daily Re-label for home use  Qty: 1 Tube, Refills: 0    Associated Diagnoses: Pain      docusate sodium (COLACE) 100 MG capsule Take 1 capsule (100 mg) by mouth 2 times daily as needed for constipation  Qty: 30 capsule, Refills: 0    Associated Diagnoses: Constipation, unspecified constipation  type         CONTINUE these medications which have NOT CHANGED    Details   ACETAMINOPHEN PO Take 650 mg by mouth every 4 hours as needed for pain      Ascorbic Acid (VITAMIN C PO) Take 500 mg by mouth daily       calcium carbonate (OS-KARLA 500 MG [place]. [place]) 500 MG tablet Take 500 mg by mouth daily       carvedilol (COREG) 25 MG tablet Take 1.5 tablets (37.5 mg) by mouth 2 times daily (with meals)  Qty: 270 tablet, Refills: 3    Associated Diagnoses: Essential hypertension with goal blood pressure less than 140/90      finasteride (PROSCAR) 5 MG tablet Take 1 tablet (5 mg) by mouth daily  Qty: 30 tablet, Refills: 1    Associated Diagnoses: Hypertrophy of prostate with urinary obstruction      losartan (COZAAR) 100 MG tablet Take 1 tablet (100 mg) by mouth daily  Qty: 90 tablet, Refills: 3      Multiple Vitamins-Minerals (OCUVITE PO) Take 1 tablet by mouth daily       triamterene-HCTZ (MAXZIDE-25) 37.5-25 MG tablet Take 1 tablet by mouth daily  Qty: 90 tablet, Refills: 3    Associated Diagnoses: Essential hypertension with goal blood pressure less than 140/90      vitamin B complex with vitamin C (VITAMIN  B COMPLEX) TABS tablet Take 1 tablet by mouth daily      VITAMIN D, CHOLECALCIFEROL, PO Take 2,000 Units by mouth daily.      !! order for DME Oxygen 2 Li/min  at night  Qty: 1 Device, Refills: 0    Associated Diagnoses: Hypoxemia      !! order for DME Oxygen 2 Li/min  at night with CPAP  SPO2 less than or equal to oxygen saturation 89% on effective CPAP in patient with known cardiomyopathy  Qty: 1 Device, Refills: 0    Associated Diagnoses: Hypoxemia; DAWSON (obstructive sleep apnea)       !! - Potential duplicate medications found. Please discuss with provider.      STOP taking these medications       warfarin (JANTOVEN) 2.5 MG tablet Comments:   Reason for Stopping:             Allergies   Allergies   Allergen Reactions     Merbromin      Other reaction(s): Other, see comments  Severe reaction as child. Amalgam  fillings OK.     Morphine Nausea and Vomiting     Amlodipine      Edema

## 2019-06-04 NOTE — PROGRESS NOTES
Progress Note    Patient was to discharge today. Discharge orders/note done. Prior to discharge he has now had 2 episodes of hematuria. Dark blood/urine. No clots. No pain. Will have nursing give urology a heads up. Will monitor for now. Cancel discharge. Hb in am.  Prince Zee MD

## 2019-06-04 NOTE — TELEPHONE ENCOUNTER
Orders signed, faxed and sent to scanning.  Ivory Zapata [facility]

## 2019-06-05 VITALS
HEART RATE: 69 BPM | SYSTOLIC BLOOD PRESSURE: 126 MMHG | DIASTOLIC BLOOD PRESSURE: 60 MMHG | RESPIRATION RATE: 16 BRPM | OXYGEN SATURATION: 95 % | BODY MASS INDEX: 26.26 KG/M2 | WEIGHT: 183 LBS | TEMPERATURE: 96.1 F

## 2019-06-05 LAB
ANION GAP SERPL CALCULATED.3IONS-SCNC: 7 MMOL/L (ref 3–14)
BASOPHILS # BLD AUTO: 0 10E9/L (ref 0–0.2)
BASOPHILS NFR BLD AUTO: 0.4 %
BUN SERPL-MCNC: 33 MG/DL (ref 7–30)
CALCIUM SERPL-MCNC: 8.2 MG/DL (ref 8.5–10.1)
CHLORIDE SERPL-SCNC: 103 MMOL/L (ref 94–109)
CO2 SERPL-SCNC: 24 MMOL/L (ref 20–32)
CREAT SERPL-MCNC: 1.64 MG/DL (ref 0.66–1.25)
DIFFERENTIAL METHOD BLD: ABNORMAL
EOSINOPHIL # BLD AUTO: 0.2 10E9/L (ref 0–0.7)
EOSINOPHIL NFR BLD AUTO: 2.3 %
ERYTHROCYTE [DISTWIDTH] IN BLOOD BY AUTOMATED COUNT: 13 % (ref 10–15)
GFR SERPL CREATININE-BSD FRML MDRD: 37 ML/MIN/{1.73_M2}
GLUCOSE SERPL-MCNC: 89 MG/DL (ref 70–99)
HCT VFR BLD AUTO: 33.5 % (ref 40–53)
HGB BLD-MCNC: 11 G/DL (ref 13.3–17.7)
IMM GRANULOCYTES # BLD: 0.1 10E9/L (ref 0–0.4)
IMM GRANULOCYTES NFR BLD: 0.6 %
INR PPP: 1.67 (ref 0.86–1.14)
INTERPRETATION ECG - MUSE: NORMAL
LYMPHOCYTES # BLD AUTO: 0.7 10E9/L (ref 0.8–5.3)
LYMPHOCYTES NFR BLD AUTO: 8.8 %
MCH RBC QN AUTO: 32 PG (ref 26.5–33)
MCHC RBC AUTO-ENTMCNC: 32.8 G/DL (ref 31.5–36.5)
MCV RBC AUTO: 97 FL (ref 78–100)
MONOCYTES # BLD AUTO: 0.8 10E9/L (ref 0–1.3)
MONOCYTES NFR BLD AUTO: 10.6 %
NEUTROPHILS # BLD AUTO: 5.9 10E9/L (ref 1.6–8.3)
NEUTROPHILS NFR BLD AUTO: 77.3 %
NRBC # BLD AUTO: 0 10*3/UL
NRBC BLD AUTO-RTO: 0 /100
PLATELET # BLD AUTO: 170 10E9/L (ref 150–450)
POTASSIUM SERPL-SCNC: 3.7 MMOL/L (ref 3.4–5.3)
RBC # BLD AUTO: 3.44 10E12/L (ref 4.4–5.9)
SODIUM SERPL-SCNC: 134 MMOL/L (ref 133–144)
WBC # BLD AUTO: 7.7 10E9/L (ref 4–11)

## 2019-06-05 PROCEDURE — 25000128 H RX IP 250 OP 636: Performed by: PHYSICIAN ASSISTANT

## 2019-06-05 PROCEDURE — 85025 COMPLETE CBC W/AUTO DIFF WBC: CPT | Performed by: INTERNAL MEDICINE

## 2019-06-05 PROCEDURE — 99231 SBSQ HOSP IP/OBS SF/LOW 25: CPT | Performed by: UROLOGY

## 2019-06-05 PROCEDURE — 25000128 H RX IP 250 OP 636: Performed by: HOSPITALIST

## 2019-06-05 PROCEDURE — 99231 SBSQ HOSP IP/OBS SF/LOW 25: CPT | Performed by: HOSPITALIST

## 2019-06-05 PROCEDURE — 25000132 ZZH RX MED GY IP 250 OP 250 PS 637: Performed by: UROLOGY

## 2019-06-05 PROCEDURE — 36415 COLL VENOUS BLD VENIPUNCTURE: CPT | Performed by: INTERNAL MEDICINE

## 2019-06-05 PROCEDURE — 80048 BASIC METABOLIC PNL TOTAL CA: CPT | Performed by: INTERNAL MEDICINE

## 2019-06-05 PROCEDURE — 25000132 ZZH RX MED GY IP 250 OP 250 PS 637: Performed by: INTERNAL MEDICINE

## 2019-06-05 PROCEDURE — 85610 PROTHROMBIN TIME: CPT | Performed by: INTERNAL MEDICINE

## 2019-06-05 RX ORDER — SODIUM CHLORIDE AND POTASSIUM CHLORIDE 150; 900 MG/100ML; MG/100ML
INJECTION, SOLUTION INTRAVENOUS CONTINUOUS
Status: DISCONTINUED | OUTPATIENT
Start: 2019-06-05 | End: 2019-06-05 | Stop reason: HOSPADM

## 2019-06-05 RX ADMIN — DOCUSATE SODIUM 100 MG: 100 CAPSULE, LIQUID FILLED ORAL at 08:22

## 2019-06-05 RX ADMIN — SODIUM CHLORIDE 500 ML: 9 INJECTION, SOLUTION INTRAVENOUS at 11:30

## 2019-06-05 RX ADMIN — FINASTERIDE 5 MG: 5 TABLET, FILM COATED ORAL at 08:21

## 2019-06-05 RX ADMIN — CARVEDILOL 37.5 MG: 25 TABLET, FILM COATED ORAL at 08:22

## 2019-06-05 RX ADMIN — DICLOFENAC 2 G: 10 GEL TOPICAL at 08:49

## 2019-06-05 RX ADMIN — POTASSIUM CHLORIDE AND SODIUM CHLORIDE: 900; 150 INJECTION, SOLUTION INTRAVENOUS at 12:34

## 2019-06-05 RX ADMIN — DICLOFENAC 2 G: 10 GEL TOPICAL at 16:24

## 2019-06-05 RX ADMIN — DICLOFENAC 2 G: 10 GEL TOPICAL at 11:56

## 2019-06-05 RX ADMIN — ACETAMINOPHEN 650 MG: 325 TABLET, FILM COATED ORAL at 11:54

## 2019-06-05 RX ADMIN — SODIUM CHLORIDE 500 ML: 9 INJECTION, SOLUTION INTRAVENOUS at 16:21

## 2019-06-05 NOTE — PROGRESS NOTES
Notified of BP 88/46, patient with very mild light-headedness.  Repeating bolus, BP improved afterward.  No recurrent bleeding is noted.    May still discharge this evening.  Given instructions to hold carvedilol tonight, check BP daily and if BP <110, will hold hold carvedilol at home.  Call primary care if concerns about BP or HR.    Carmelita Martinez

## 2019-06-05 NOTE — PLAN OF CARE
A&Ox4.  BP down to 87/39, HR 70's.  Given 500cc NS IV Bolus and IVF infusing at 100cc/hr post bolus.  BP recheck 110/53, HR 70.  Had reported lightheadedness with lower BP, now states no lightheadedness while up in chair, slightly lightheaded with ambulation.  Ambulated x3 in halls with walker and SBA.  Voiding without difficulty.  No clots seen, urine pale brown in color.  Tolerating diet.  Good appetite.  Tylenol x1 for shoulder pain with relief.  Ankle pain managed with Voltaren gel scheduled.   Possible discharge this afternoon or tomorrow if stable.  Will continue to monitor.

## 2019-06-05 NOTE — PROGRESS NOTES
[facility]  Internal Medicine  Progress Note    Date of Service: 6/3/2019    Patient: Tucker Slater  MRN: [id number]  Admission Date: 6/1/2019      Assessment & Plan  Tucker Slater is a 88 year old male with a past medical history significant for bladder cancer s/p surgical resection (2000), prostate cancer s/p radiation (0431-0745), CKD (baseline Cr 1.3-1.5), permanent A-fib on Warfarin, s/p pacemaker, HTN, DAWSON on CPAP, mild LV dysfunction (EF of 45-50%), OA, and peripheral neuropathy who presented to the ED 6/1 with hematuria.  He was recently hospitalized at the end of April 2019 with gross hematuria with clot retention.  At that time he required cystoscopy with evacuation of clots and fulguration of prosthetic veins. Today with hypotension, increased Cr.     Gross hematuria in setting of known history of bladder and prostate cancer     - recurrent problem    - crawford discontinued yesterday    - having dark urine but no bright red blood or clots    - Hb stable    - urology has signed off    - plan will be to discharge OFF coumadin    - at this point I do not think there is anything more for urology to do    - likely will need to discharge home off coumadin and continue to monitor for increased bleeding    Hypotension    - today patient having symptomatic hypotension    - discontinue losartan    - IVF bolus, then IVF overnight (for rising Cr)    Acute on chronic renal failure    - hold losartan    - ivf    Atrial fibrillation/atrial flutter with pacemaker placed in 2015    - Cardiology recommends warfarin as his option for now and consider a Watchman device    - We have already made an appt with Dr. Perez (see AVS)    - continue on pta Carvedilol 37.5 mg twice daily    - monitor INR daily    - holding warfarin     Chronic kidney disease    - baseline creatinine 1.3-1.4    - mild increase in Cr today    - IVF overnight     Hypertension, mild LV dysfunction     -  EF of 45 to 50% 8/2018.       - continue pta carvedilol for a fib    - holding pta Maxzide and now holding losartan    DAWSON    - continue pta cpap        CODE: full  DVT: scd  Diet/fluids: regular      Prince Zee MD          Subjective & Interval Hx:        Physical Exam:    Blood pressure (!) 82/39, pulse 69, temperature 97.9  F (36.6  C), temperature source Oral, resp. rate 16, weight 83 kg (183 lb), SpO2 97 %.  General: Alert, interactive, NAD, lying in bed, pleasant and cooperative.  Resp: clear to auscultation bilaterally, no crackles or wheezes  Cardiac: irregular rate and rhythm, no murmur  Abdomen: Soft, nontender, nondistended. +BS.  No rebound or guarding.  Tinoco with light pink urine  Extremities: No LE edema  Skin: Warm and dry  Neuro: Alert & oriented x 3, moves all extremities equally    Labs & Images:  Reviewed in Epic   Medications:    Current Facility-Administered Medications   Medication     0.9% sodium chloride + KCl 20 mEq/L infusion     0.9% sodium chloride BOLUS     acetaminophen (TYLENOL) tablet 650 mg     carvedilol (COREG) tablet 37.5 mg     diclofenac (VOLTAREN) 1 % topical gel 2 g     docusate sodium (COLACE) capsule 100 mg     finasteride (PROSCAR) tablet 5 mg     melatonin tablet 3 mg     naloxone (NARCAN) injection 0.1-0.4 mg

## 2019-06-05 NOTE — PROGRESS NOTES
S: Pt.'s B/P 88/46.  Pt. Up to bathroom with minimal dizziness.  B: Pt.'s losartan held this morning, pt. Took morning carvedilol, pt. Received 500cc bolus this afternoon.    A: PERNELL Mae, notified. 500cc bolus  R: Will continue to monitor B/P and obtain orthostatics

## 2019-06-05 NOTE — PROVIDER NOTIFICATION
BP 87/39, HR 72, reports lightheadedness.  Dr. Zee updated.  IV NS bolus infusing, will start IVF after bolus.  Assisted back to bed with assist 1.  Will continue to monitor.

## 2019-06-05 NOTE — DISCHARGE SUMMARY
[facility]  Hospitalist Discharge Summary       Date of Admission:  6/1/2019  Date of Discharge:  6/4/2019  Discharging Provider: Prince Zee MD      Discharge Diagnoses   hematuria    Follow-ups Needed After Discharge   Follow-up Appointments     Follow-up and recommended labs and tests       Follow up with primary care provider, Kwadwo Winslow, within 7 days   for hospital follow- up.  The following labs/tests are recommended: chem   7, blood pressure check.   At this appointment you will need to discuss:  1. when or if you should re-start your coumadin  2. Review your blood pressure and discuss if your maxzide or losartan need   to be re-started    An appointment has been made for you with Dr. Perez (cardiology) to   discuss the Watchman procedure. (Tuesday, July 30 at 3:45 PM [facility]   [address] [place] [address] [phone])             Unresulted Labs Ordered in the Past 30 Days of this Admission     No orders found from 4/2/2019 to 6/2/2019.      These results will be followed up by NA    Discharge Disposition   Discharged to home  Condition at discharge: Stable    Hospital Course   Tucker Slater is a 88 year old male with a past medical history significant for bladder cancer s/p surgical resection (2000), prostate cancer s/p radiation ([id number]), CKD (baseline Cr 1.3-1.5), permanent A-fib on Warfarin, s/p pacemaker, HTN, DAWSON on CPAP, mild LV dysfunction (EF of 45-50%), OA, and peripheral neuropathy who presented to the ED 6/1 with hematuria.   He was recently hospitalized at the end of April 2019 with gross hematuria with clot retention.  At that time he required cystoscopy with evacuation of clots and fulguration of prosthetic veins.    Patient was admitted to the hospital. He was placed on CBI and seen by Urology. His coumadin was held. Cardiology saw to patient to comment on the choice of his anticoagulation. They recommended coumadin once he stopped bleeding.  At this point, hematuria has resolved. Tinoco is out and he has urinated. He will discharge home. He will not resume his coumadin until he sees Dr. Winslow next week. At that point they can discuss re-starting his coumadin. He did also complain of some ankle pain. There is no swelling or erythema. May be stiff from lying in bed. Voltaren gel has helped in the past (ordered). I offered to call family. He states this is not necessary.    Addendum:  Patient was to discharge yesterday but still had some dark urine and was worried so he stayed the night. Urine is clearing. He had some low BP this am which responded to 500cc IVF. At this point his maxzide has been held since admission and now his losartan will be stopped. His BPs here have been great. He will go home and record daily BPs to take to his PCP    Consultations This Hospital Stay   UROLOGY IP CONSULT  CARDIOLOGY IP CONSULT  CARE COORDINATOR IP CONSULT  CARDIOLOGY IP CONSULT    Code Status   Full Code    Time Spent on this Encounter   I, Prince Zee, personally saw the patient today and spent greater than 30 minutes discharging this patient.       Prince Zee MD  [facility]  ______________________________________________________________________    Physical Exam   Vital Signs: Temp: 97.9  F (36.6  C) Temp src: Oral BP: 110/53 Pulse: 69 Heart Rate: 70 Resp: 16 SpO2: 97 % O2 Device: None (Room air)    Weight: 183 lbs 0 oz  Constitutional: awake, alert, cooperative, no apparent distress, and appears stated age  Respiratory: No increased work of breathing, good air exchange, clear to auscultation bilaterally, no crackles or wheezing  Cardiovascular: Normal apical impulse, regular rate and rhythm, normal S1 and S2, no S3 or S4, and no murmur noted  GI: No scars, normal bowel sounds, soft, non-distended, non-tender, no masses palpated, no hepatosplenomegally       Primary Care Physician   Kwadwo Winslow    Discharge Orders      Follow-Up with  Electrophysiologist      Reason for your hospital stay    hematuria     Activity    Your activity upon discharge: activity as tolerated     Follow-up and recommended labs and tests     Follow up with primary care provider, Kwadwo Winslow, within 7 days for hospital follow- up.  The following labs/tests are recommended: chem 7, blood pressure check.   At this appointment you will need to discuss:  1. when or if you should re-start your coumadin  2. Review your blood pressure and discuss if your maxzide or losartan need to be re-started    An appointment has been made for you with Dr. Perez (cardiology) to discuss the Watchman procedure. (Tuesday, July 30 at 3:45 PM [facility] [address], [place] [address] [phone])     Discharge Instructions    Please records your blood pressure 1-2 times per day and take this record to your appt with Dr. Winslow     Diet    Follow this diet upon discharge: Regular       Significant Results and Procedures   Most Recent 3 CBC's:  Recent Labs   Lab Test 06/05/19  0548 06/04/19  0715 06/03/19  1800 06/03/19  0606   WBC 7.7 9.0  --  7.5   HGB 11.0* 12.4* 12.2* 12.3*   MCV 97 97  --  98    162  --  157     Most Recent 3 BMP's:  Recent Labs   Lab Test 06/05/19  0548 06/04/19  0715 06/03/19  0606    137 139   POTASSIUM 3.7 3.8 3.5   CHLORIDE 103 107 107   CO2 24 25 28   BUN 33* 24 25   CR 1.64* 1.34* 1.35*   ANIONGAP 7 5 4   KARLA 8.2* 8.5 8.4*   GLC 89 101* 91     Most Recent Urinalysis:  Recent Labs   Lab Test 06/01/19  1732 05/23/19  1109   COLOR Red Yellow   APPEARANCE Cloudy Clear   URINEGLC Negative Negative   URINEBILI Negative Negative   URINEKETONE Negative Negative   SG 1.010 1.025   UBLD Large* Large*   URINEPH 7.0 6.0   PROTEIN 200* 100*   UROBILINOGEN  --  0.2   NITRITE Negative Negative   LEUKEST Negative Negative   RBCU >182*  --    WBCU 0  --    ,   Results for orders placed or performed during the hospital encounter of 04/19/19   CT Abdomen  Pelvis w/o & w Contrast    Narrative    CT ABDOMEN PELVIS WITHOUT AND WITH CONTRAST  4/19/2019 5:52 PM    HISTORY: Hematuria. History of bladder cancer.    TECHNIQUE: Scans obtained from the diaphragm through the pelvis  without and with IV contrast, 100 mL Isovue-370. Radiation dose for  this scan was reduced using automated exposure control, adjustment of  the mA and/or kV according to patient size, or  iterative reconstruction technique.    COMPARISON: CT abdomen and pelvis dated 10/30/2008. Renal ultrasound  dated 9/20/2017.    FINDINGS: Increased interstitial markings in the bases could represent  vascular congestion or mild scarring. There are pacer/defibrillator  wires in the heart. Mild asymmetric thickening of the wall of the  distal esophagus versus a small hiatal hernia with gastric rugae is  noted. Heart is mildly enlarged. There are thoracic aortic  calcifications. Visualized mediastinal contents are otherwise  unremarkable.    Patient is status post right hip arthroplasty causing streak artifact  mildly limiting evaluation of the pelvis. Degenerative changes are  noted in the spine. No aggressive osseous lesions are seen.    Subtle densities in the dependent aspect of the gallbladder likely  represent gallstones but are less dense than typically seen. These  measure up to approximately 1.3 cm. The liver, gallbladder, pancreas,  spleen, bilateral adrenal glands otherwise enhance normally. Several  cysts are seen in the right kidney. The kidneys are mildly atrophic.  There is normal enhancement of the left kidney and left intrarenal  collecting system. Left ureter is unremarkable. There is normal  enhancement of the right kidney and inferior intrarenal collecting  system. No significant contrast is seen in the upper right kidney  intrarenal collecting system and upper aspect of the right renal  pelvis and the right ureter which could represent slightly decreased  function of the right kidney as compared  to the left kidney. A  definite mass in this region is not seen.    No nephrolithiasis is identified. No adenopathy, free fluid or free  air is seen in the peritoneal cavity.    Urinary bladder contains a heterogeneously dense structure posteriorly  measuring up to 10 cm in diameter. This could represent contrast  swirling in the urinary bladder. This could also represent hematoma  and/or mass. Urinary bladder is otherwise distended. There is  questionable mild thickening of the posterior aspect of the urinary  bladder wall. Prostate gland is enlarged and could be part of the  reason for the density in the urinary bladder.    No adenopathy, free fluid or free air seen in the peritoneal cavity.  There is nonaneurysmal atherosclerosis. Incidental note of fatty  atrophy of some of the left gluteus muscles. This could be due to  chronic degenerative disc disease in the spine. There are areas of  stenosis in the central canal and neural foramina which are only  partially imaged on this study.    The colon is grossly of normal caliber. There are scattered  diverticuli in the colon but are most predominantly noted in the  descending colon and proximal sigmoid colon. Structure likely  representing a normal appendix is seen in the region of the cecum.  Small bowel is of normal caliber. Stomach is mostly decompressed but  otherwise unremarkable.      Impression    IMPRESSION:  1. The urinary bladder is distended. There is a radiodense structure  in the dependent aspect of the urinary bladder which could represent  swirling of contrast, thrombus or mass. Prostate gland is enlarged and  could be part of the reason for the density in the urinary bladder.  Ultrasound may be helpful for further evaluation.  2. No contrast is seen in the mid to upper right intrarenal collecting  system and upper right renal pelvis as well as the right ureter. This  could be due to decreased function of the right kidney as compared to  the left.    3. Fatty atrophy in some of the gluteus muscles on the left is likely  due to chronic degenerative disc disease and spinal/neural foraminal  stenosis.  4. No definite metastases are seen.    ARTURO STEPHENSON MD       Discharge Medications   Current Discharge Medication List      START taking these medications    Details   diclofenac (VOLTAREN) 1 % topical gel Place 2 g onto the skin 4 times daily Re-label for home use  Qty: 1 Tube, Refills: 0    Associated Diagnoses: Pain      docusate sodium (COLACE) 100 MG capsule Take 1 capsule (100 mg) by mouth 2 times daily as needed for constipation  Qty: 30 capsule, Refills: 0    Associated Diagnoses: Constipation, unspecified constipation type         CONTINUE these medications which have NOT CHANGED    Details   ACETAMINOPHEN PO Take 650 mg by mouth every 4 hours as needed for pain      Ascorbic Acid (VITAMIN C PO) Take 500 mg by mouth daily       calcium carbonate (OS-KARLA 500 MG [place]. [place]) 500 MG tablet Take 500 mg by mouth daily       carvedilol (COREG) 25 MG tablet Take 1.5 tablets (37.5 mg) by mouth 2 times daily (with meals)  Qty: 270 tablet, Refills: 3    Associated Diagnoses: Essential hypertension with goal blood pressure less than 140/90      finasteride (PROSCAR) 5 MG tablet Take 1 tablet (5 mg) by mouth daily  Qty: 30 tablet, Refills: 1    Associated Diagnoses: Hypertrophy of prostate with urinary obstruction      Multiple Vitamins-Minerals (OCUVITE PO) Take 1 tablet by mouth daily       vitamin B complex with vitamin C (VITAMIN  B COMPLEX) TABS tablet Take 1 tablet by mouth daily      VITAMIN D, CHOLECALCIFEROL, PO Take 2,000 Units by mouth daily.      !! order for DME Oxygen 2 Li/min  at night  Qty: 1 Device, Refills: 0    Associated Diagnoses: Hypoxemia      !! order for DME Oxygen 2 Li/min  at night with CPAP  SPO2 less than or equal to oxygen saturation 89% on effective CPAP in patient with known cardiomyopathy  Qty: 1 Device, Refills: 0    Associated  Diagnoses: Hypoxemia; DAWSON (obstructive sleep apnea)       !! - Potential duplicate medications found. Please discuss with provider.      STOP taking these medications       losartan (COZAAR) 100 MG tablet Comments:   Reason for Stopping:         triamterene-HCTZ (MAXZIDE-25) 37.5-25 MG tablet Comments:   Reason for Stopping:         warfarin (JANTOVEN) 2.5 MG tablet Comments:   Reason for Stopping:             Allergies   Allergies   Allergen Reactions     Merbromin      Other reaction(s): Other, see comments  Severe reaction as child. Amalgam fillings OK.     Morphine Nausea and Vomiting     Amlodipine      Edema

## 2019-06-05 NOTE — PROGRESS NOTES
Urology    Tinoco removed yesterday and voiding well  Urine remains clear    A/P: Doing well  No urologic intervention planned if urine remains clear

## 2019-06-05 NOTE — PROGRESS NOTES
Pt. Walked in hallways, denied dizziness, SBA with 2 wheeled walker.  Pt. Reports he feels safe discharging home.  Pt. Informed on order to hold carvedilol if systolic (top) number is less than 110.

## 2019-06-05 NOTE — PLAN OF CARE
Vital signs:  Temp: 97.6  F (36.4  C) Temp src: Oral BP: 119/55 Pulse: 69 Heart Rate: 69 Resp: 20 SpO2: 95 %    Neuros: Neuros iintact  GI: Had large bowel movement yesterday. Bowel sounds positive x 4  :  Voiding, urine cherry in color. No clots noted  SKIN: Intact and dry.   Activity: Up with assist of 1  Diet: Regular  Pain: Volteran gel applied to Bilateral ankles with releif  Plan: Discharge home today

## 2019-06-06 ENCOUNTER — PATIENT OUTREACH (OUTPATIENT)
Dept: CARE COORDINATION | Facility: CLINIC | Age: 84
End: 2019-06-06

## 2019-06-06 ENCOUNTER — TELEPHONE (OUTPATIENT)
Dept: INTERNAL MEDICINE | Facility: CLINIC | Age: 84
End: 2019-06-06

## 2019-06-06 ENCOUNTER — TELEPHONE (OUTPATIENT)
Dept: CARDIOLOGY | Facility: CLINIC | Age: 84
End: 2019-06-06

## 2019-06-06 DIAGNOSIS — I10 ESSENTIAL HYPERTENSION WITH GOAL BLOOD PRESSURE LESS THAN 140/90: ICD-10-CM

## 2019-06-06 DIAGNOSIS — Z92.89 HISTORY OF RECENT HOSPITALIZATION: Primary | ICD-10-CM

## 2019-06-06 ASSESSMENT — ACTIVITIES OF DAILY LIVING (ADL): DEPENDENT_IADLS:: INDEPENDENT

## 2019-06-06 NOTE — TELEPHONE ENCOUNTER
IP F/U    Date: 06/05/19  Diagnosis: Hematuria, Unspecified Type, Persistent Atrial Fibrillation  Is patient active in care coordination? No  Was patient in TCU? No    Next 5 appointments (look out 90 days)    Jun 11, 2019  3:40 PM CDT  [facility] Visit with [facility]adwo Winslow MD  [facility] [address]  [facility] [address]  [phone]   Aug 02, 2019 11:00 AM CDT  PHYSICAL with Kwadwo Winslow MD  [facility] [address]  [facility] [address]  [phone]

## 2019-06-06 NOTE — PROGRESS NOTES
Pt. Reports he feels comfortable discharging, walked hallways after he had 500cc IV bolus.  Blood pressure improved.  Pt. Reported minimal lightheadedness prior to bolus, and pt. Denied lightheadedness after.  Pt. And pt.'s wife report understanding with medication changes.  Pt. Voided without difficulty, urine clear and brown in color consistent with previous documentation.  Pt. Denies pain.  Patient's After Visit Summary was reviewed with patient and/or spouse.   Patient verbalized understanding of After Visit Summary, recommended follow up and was given an opportunity to ask questions.   Discharge medications sent home with patient/family: YES   Discharged with spouse

## 2019-06-06 NOTE — PROGRESS NOTES
Clinic Care Coordination Contact    Situation: Patient chart reviewed by care coordinator.  CTS handoff received due to Elevated FABIO.  Patient has [employer] CM.    Background: Patient admitted to [facility] 6/1-6/5 for treatment of Hematuria.    Follow-up and recommended labs and tests       Follow up with primary care provider, Kwadwo Winslow, within 7 days   for hospital follow- up.  The following labs/tests are recommended: chem   7, blood pressure check.   At this appointment you will need to discuss:  1. when or if you should re-start your coumadin  2. Review your blood pressure and discuss if your maxzide or losartan need   to be re-started     An appointment has been made for you with Dr. Perez (cardiology) to   discuss the Watchman procedure. (Tuesday, July 30 at 3:45 PM [facility]   [address], [place] [address] [phone])       Assessment: Triage RN already made follow up outreach with patient and addressed concerns.  No clinic care coordination needs identified at this time.  Patient is well supported in community.    Plan/Recommendations: No outreaches will be made at this time.    Dodie Cueva, RN  Care Coordination  Phone:  [phone]  Email: [email or web address]

## 2019-06-06 NOTE — TELEPHONE ENCOUNTER
Patient was evaluated by cardiology while inpatient for consult for anticoagulation therapy (needing to be stopped d/t hematuria.... Discussed watchman with patient.. See progress note below for further details). Called patient to discuss any post hospital d/c questions he may have, review medication changes, and confirm f/u appts. Patient denied any questions regarding new medications or changes to PTA medications. Patient denied any SOB, chest pain, or light headedness. RN confirmed with patient that he has an apt scheduled on 6/12/19 device check (remote check) and on 7/30/19 with Dr. Perez to discuss watchman procedure. Patient advised to call clinic with any cardiac related questions or concerns prior to this sharon't. Patient verbalized understanding and agreed with plan.                 6/2/19 cardiology progress note  ASSESSMENT AND RECOMMENDATIONS:  With regard to a substitute for warfarin, the novel oral anticoagulants are likely to have the same risk of bleeding.  Mr. Slater was on warfarin but with an INR that has recently been at the very lowest limit for appropriate INRs.  As a matter of fact, warfarin is more likely to be easily reversed then any of the novel oral anticoagulants.      I did briefly discuss the Watchman procedure with Mr. Slater.  Given the severity of the complication from his prior hematuria, it would be desirable to avoid such an outcome again.  If there is not an easily resolved bleeding source found on his urologic evaluation, Mr. Slater should have an evaluation for a Watchman device.  In the short-term, warfarin anticoagulation will need to be held, but that leaves him at an increased risk of stroke.  His risk is not inconsiderable given his decreased left ventricular systolic performance, age greater than 75 years, and history of hypertension.      I have ordered a followup with Dr. Perez.  He should have some information dropped off so he can review the Watchman device.  He  will require some evaluation, but given the severity of the complications from his hematuria, I would hope the evaluation could proceed relatively efficaciously.  I did warn him that clopidogrel would be needed for 6 months and there would be a bleeding risk with the clopidogrel, though not likely as significant as with full anticoagulation.  I also explained that the Watchman device is not a complete replacement for anticoagulation and there does remain some heightened stroke risk, even with a successful Watchman device implantation.         RICKI CORREA MD

## 2019-06-06 NOTE — TELEPHONE ENCOUNTER
Patient states he is doing better now that he is home. Patient states the only concern he has is regarding his blood pressure medication. Patient states he was instructed in the hospital to hold his Carvedilol if his blood pressure is near/under 110 systolically. Patient states he normally takes 37.5 mg in the morning and the evening. Patient states he did not take either dose yesterday, or this morning because it blood pressures have been systolic 110, 108, 106. Patient wondering if his medication dose should be changed. Denies dizziness or lightheadedness. Will route to primary care provider, please advise.

## 2019-06-06 NOTE — TELEPHONE ENCOUNTER
"ED/Discharge Protocol    \"Hi, my name is Elen Hogan, a registered nurse, and I am calling on behalf of Dr. Winslow's office at [place].  I am calling to follow up and see how things are going for you after your recent visit.\"    \"I see that you were in the (ER/UC/IP) on 6/1/19.    How are you doing now that you are home?\"     Patient states he is doing better now that he is home. Patient states the only concern he has is regarding his blood pressure medication. Patient states he was instructed to hold his Carvedilol if his blood pressure is near/under 110 systolically. Patient states he normally takes 37.5 in the morning and the evening. Patient states he did not take either dose yesterday, or this morning because it blood pressures have been systolic 110, 108, 106. Patient wondering if his medication dose should be changed. Denies dizziness or lightheadedness.     Is patient experiencing symptoms that may require a hospital visit?  No.    Discharge Instructions    \"Let's review your discharge instructions.  What is/are the follow-up recommendations?  Pt. Response: Patient was instructed to follow up with primary care provider and cardiology    \"Were you instructed to make a follow-up appointment?\"  Pt. Response: Yes.  Has appointment been made?   Yes      \"When you see the provider, I would recommend that you bring your discharge instructions with you.    Medications    \"How many new medications are you on since your hospitalization/ED visit?\"    2 or more - Epic MTM referral needed  \"How many of your current medicines changed (dose, timing, name, etc.) while you were in the hospital/ED visit?\"   0-1  \"Do you have questions about your medications?\"   No  \"Were you newly diagnosed with heart failure, COPD, diabetes or did you have a heart attack?\"   No  For patients on insulin: \"Did you start on insulin in the hospital or did you have your insulin dose changed?\"   No    Medication reconciliation completed? " "Yes    Was MTM referral placed (*Make sure to put transitions as reason for referral)?   Yes - \"The Medication Therapy  will call you within the next few days to schedule an appointment with an MTM pharmacist to discuss your medicines and make sure they are working as well as they possibly can for you. This visit can be done in a [place] clinic or on the phone and is at no charge to you.\"    Call Summary    \"Do you have any questions or concerns about your condition or care plan at the moment?\"    No  Triage nurse advice given: N/A    Patient was in ER twice in the past year (assess appropriateness of ER visits.)      \"If you have questions or things don't continue to improve, we encourage you contact us through the main clinic number,  [phone].  Even if the clinic is not open, triage nurses are available 24/7 to help you.     We would like you to know that our clinic has extended hours (provide information).  We also have urgent care (provide details on closest location and hours/contact info)\"      \"Thank you for your time and take care!\"        "

## 2019-06-07 ENCOUNTER — ALLIED HEALTH/NURSE VISIT (OUTPATIENT)
Dept: PHARMACY | Facility: CLINIC | Age: 84
End: 2019-06-07
Payer: COMMERCIAL

## 2019-06-07 DIAGNOSIS — N40.1 HYPERTROPHY OF PROSTATE WITH URINARY OBSTRUCTION: ICD-10-CM

## 2019-06-07 DIAGNOSIS — M25.579 ANKLE PAIN, UNSPECIFIED CHRONICITY, UNSPECIFIED LATERALITY: ICD-10-CM

## 2019-06-07 DIAGNOSIS — K59.00 CONSTIPATION, UNSPECIFIED CONSTIPATION TYPE: ICD-10-CM

## 2019-06-07 DIAGNOSIS — Z78.9 TAKES DIETARY SUPPLEMENTS: ICD-10-CM

## 2019-06-07 DIAGNOSIS — I48.91 ATRIAL FIBRILLATION, UNSPECIFIED TYPE (H): Primary | ICD-10-CM

## 2019-06-07 DIAGNOSIS — N13.8 HYPERTROPHY OF PROSTATE WITH URINARY OBSTRUCTION: ICD-10-CM

## 2019-06-07 PROCEDURE — 99606 MTMS BY PHARM EST 15 MIN: CPT | Performed by: PHARMACIST

## 2019-06-07 PROCEDURE — 99607 MTMS BY PHARM ADDL 15 MIN: CPT | Performed by: PHARMACIST

## 2019-06-07 NOTE — TELEPHONE ENCOUNTER
Call to patient, informed of primary care provider's message.     Patient is scheduled for future appointment to follow-up from hospital visit with primary care provider.     Next 5 appointments (look out 90 days)    Jun 11, 2019  3:40 PM CDT  Office Visit with [facility]adwo [facility]low [facility]  [facility] [address] [place]  [facility] [address]  [phone]   Aug 02, 2019 11:00 AM CDT  PHYSICAL with Kwadwo Winslow MD  [facility] [address]  [facility] [address]  [phone]

## 2019-06-07 NOTE — PROGRESS NOTES
Transition Communication Hand-off for Care Transitions to Next Level of Care Provider    Name: Tucker Slater  : 3/13/1931  MRN #: [id number]  Primary Care Provider: Kwadwo Winslow  Primary Care MD Name: Dr. Winslow  Primary Clinic: [address] [facility] [address]  Primary Care Clinic Name: [facility]  Reason for Hospitalization:  Hematuria, unspecified type [R31.9]  Admit Date/Time: 2019  8:41 PM  Discharge Date: 19  Payor Source: Payor: HU[employer]NA / Plan: HU[employer]NA MEDICARE ADVANTAGE / Product Type: Medicare /     Readmission Assessment Measure (FABIO) Risk Score/category: Elevated          Reason for Communication Hand-off Referral: Multiple providers/specialties    Discharge Plan: Home       Concern for non-adherence with plan of care:   No  Discharge Needs Assessment:  Needs      Most Recent Value   # of Referrals Placed by CTS  Scheduled Follow-up appointments          Already enrolled in Tele-monitoring program and name of program:  NA  Follow-up specialty is recommended: Yes    Follow-up plan:    Future Appointments   Date Time Provider Department Center   2019  3:40 PM Kwadwo Winslow MD [facility]   2019 12:00 AM [facility]   2019  4:15 PM Aleena Perez MD [facility] PSA CLIN   2019 11:00 AM Kwadwo Winslow MD [facility]   2019  4:00 PM Hari Ritter MD [facility] SLEEP       Any outstanding tests or procedures:        Referrals     Future Labs/Procedures    Follow-Up with Electrophysiologist             Key Recommendations:  CTS identifies pt as high risk due to Elevated FABIO. Patient was admitted on 19 for hematuria. Patient was seen in ER earlier on 19 and sent home with a crawford. Patient had low blood pressure during hospitalization that responded to IVF and medication changes. Patient has had one previous hospitalization in April for anemia d/t hematuria requiring a cysto. Patient has a history of a stroke,  Afib on coumadin, pacemaker, HTN, and DAWSON, and Prostrate/ Bladder cancer. Patient has a CPAP with O2 at home through [facility] [facility]. Patient had urology and cardiology consults in hospital. Patient has a  through [employer].     Patient discharged to home with instructions to check blood pressure daily.   Recommendations at discharge include The following labs/tests are recommended: chem   7, blood pressure check.   At this appointment you will need to discuss:  1. when or if you should re-start your coumadin  2. Review your blood pressure and discuss if your maxzide or losartan need   to be re-started.     An appointment has been made for you with Dr. Perez (cardiology) to   discuss the Watchman procedure. (Tuesday, July 30 at 3:45 PM [facility]   [address], [place] [address] [phone])        Nitza Casillas    AVS/Discharge Summary is the source of truth; this is a helpful guide for improved communication of patient story

## 2019-06-07 NOTE — PROGRESS NOTES
SUBJECTIVE/OBJECTIVE:                Tucker Slater is a 88 year old male called for a transitions of care visit.  He was discharged from [facility] on 6/4/19 for hematuria/Dysuria/CBI. Hematuria may be a result of radiation on the prostate.     Chief Complaint: as told to stop taking Maxzide, Losartan, Jantoven. Will be seeing PCP 6/11/19    Allergies/ADRs: Reviewed in Epic  Tobacco: No tobacco use   Alcohol: not currently using  PMH: Reviewed in Epic    Medication Adherence/Access:  no issues reported  No missed doses since being home. Has not been taking Carvedilol or Warfarin    AFIB/Hypertension: Current medications include Carvedilol 25mg BID, unless BP is <110, then skip.  Patient does self-monitor BP. 120-140s.  Patient reports no current medication side effects. Pt has a pacemaker, does not know when he goes into AFIB. Is holding Warfarin due to Hematuria.   BP Readings from Last 3 Encounters:   06/05/19 126/60   06/01/19 150/80   05/23/19 142/70     Ankle pain: Pt is using Voltaren 1% prn, this is effective per patient. APAP for aches and pains.     Supplements: Pt is taking Vitamin C 500mg daily, Calcium 500mg daily, Vitamin B complex daily, Vitamin D 2000 units daily.     Constipation: Pt is taking Docusate 100mg BID, to help soften his stools. Having about 3-4 stools weekly.     BPH: Pt is taking Finasteride 5mg daily. Pt had bladder cancer back in 2000. Denies symptoms. Had radiation back in 4893-2410 due to increase PSA- which likely has led to hematuria.     Today's Vitals: There were no vitals taken for this visit.    ASSESSMENT:                 Current medications were reviewed today.      Medication Adherence: excellent, great historian    AFIB/Hypertension: Stable. Appropriately reduced Carvedilol to 25mg BID and has been holding dose if SBP <110. Will be discussing Hematuria + warfarin use with PCP next week. Appropriately holding warfarin now.     Ankle pain: Stable.     Supplements:  Stable.    Constipation: Stable.     BPH: Stable.    PLAN:                Post Discharge Medication Reconciliation Status: discharge medications reconciled, continue medications without change.    I spent 30 minutes with this patient today. I offer these suggestions for consideration by PCP. A copy of the visit note was provided to the patient's primary care provider.    Will follow up as needed.    The patient was given a summary of these recommendations as an after visit summary.    Richard Friedman PharmD  [facility] Pharmacist    Phone: [phone]

## 2019-06-07 NOTE — TELEPHONE ENCOUNTER
Recommend to change Carvedilol to 25 mg bid. Hold if BP less than 110 systolic.   Needs to follow up post hospital.

## 2019-06-08 ENCOUNTER — MEDICAL CORRESPONDENCE (OUTPATIENT)
Dept: HEALTH INFORMATION MANAGEMENT | Facility: CLINIC | Age: 84
End: 2019-06-08

## 2019-06-10 ENCOUNTER — APPOINTMENT (OUTPATIENT)
Dept: GENERAL RADIOLOGY | Facility: CLINIC | Age: 84
End: 2019-06-10
Attending: EMERGENCY MEDICINE
Payer: COMMERCIAL

## 2019-06-10 ENCOUNTER — APPOINTMENT (OUTPATIENT)
Dept: ULTRASOUND IMAGING | Facility: CLINIC | Age: 84
End: 2019-06-10
Attending: EMERGENCY MEDICINE
Payer: COMMERCIAL

## 2019-06-10 ENCOUNTER — HOSPITAL ENCOUNTER (EMERGENCY)
Facility: CLINIC | Age: 84
Discharge: HOME | End: 2019-06-10
Attending: EMERGENCY MEDICINE | Admitting: EMERGENCY MEDICINE
Payer: COMMERCIAL

## 2019-06-10 VITALS
SYSTOLIC BLOOD PRESSURE: 162 MMHG | DIASTOLIC BLOOD PRESSURE: 90 MMHG | OXYGEN SATURATION: 99 % | RESPIRATION RATE: 18 BRPM | TEMPERATURE: 98 F | HEART RATE: 74 BPM

## 2019-06-10 DIAGNOSIS — R31.9 HEMATURIA, UNSPECIFIED TYPE: ICD-10-CM

## 2019-06-10 DIAGNOSIS — M79.89 SWELLING OF LOWER EXTREMITY: ICD-10-CM

## 2019-06-10 LAB
ALBUMIN SERPL-MCNC: 2.9 G/DL (ref 3.4–5)
ALBUMIN UR-MCNC: 200 MG/DL
ALP SERPL-CCNC: 81 U/L (ref 40–150)
ALT SERPL W P-5'-P-CCNC: 35 U/L (ref 0–70)
ANION GAP SERPL CALCULATED.3IONS-SCNC: 6 MMOL/L (ref 3–14)
APPEARANCE UR: ABNORMAL
AST SERPL W P-5'-P-CCNC: 36 U/L (ref 0–45)
BACTERIA #/AREA URNS HPF: ABNORMAL /HPF
BASOPHILS # BLD AUTO: 0.1 10E9/L (ref 0–0.2)
BASOPHILS NFR BLD AUTO: 1 %
BILIRUB SERPL-MCNC: 0.4 MG/DL (ref 0.2–1.3)
BILIRUB UR QL STRIP: NEGATIVE
BUN SERPL-MCNC: 27 MG/DL (ref 7–30)
CALCIUM SERPL-MCNC: 8.3 MG/DL (ref 8.5–10.1)
CHLORIDE SERPL-SCNC: 112 MMOL/L (ref 94–109)
CO2 SERPL-SCNC: 24 MMOL/L (ref 20–32)
COLOR UR AUTO: ABNORMAL
CREAT SERPL-MCNC: 1.08 MG/DL (ref 0.66–1.25)
DIFFERENTIAL METHOD BLD: ABNORMAL
EOSINOPHIL # BLD AUTO: 0.3 10E9/L (ref 0–0.7)
EOSINOPHIL NFR BLD AUTO: 5.3 %
ERYTHROCYTE [DISTWIDTH] IN BLOOD BY AUTOMATED COUNT: 12.8 % (ref 10–15)
GFR SERPL CREATININE-BSD FRML MDRD: 61 ML/MIN/{1.73_M2}
GLUCOSE SERPL-MCNC: 99 MG/DL (ref 70–99)
GLUCOSE UR STRIP-MCNC: NEGATIVE MG/DL
HCT VFR BLD AUTO: 36.6 % (ref 40–53)
HGB BLD-MCNC: 11.8 G/DL (ref 13.3–17.7)
HGB UR QL STRIP: ABNORMAL
IMM GRANULOCYTES # BLD: 0 10E9/L (ref 0–0.4)
IMM GRANULOCYTES NFR BLD: 0.3 %
KETONES UR STRIP-MCNC: NEGATIVE MG/DL
LEUKOCYTE ESTERASE UR QL STRIP: NEGATIVE
LYMPHOCYTES # BLD AUTO: 0.6 10E9/L (ref 0.8–5.3)
LYMPHOCYTES NFR BLD AUTO: 10 %
MCH RBC QN AUTO: 31.8 PG (ref 26.5–33)
MCHC RBC AUTO-ENTMCNC: 32.2 G/DL (ref 31.5–36.5)
MCV RBC AUTO: 99 FL (ref 78–100)
MONOCYTES # BLD AUTO: 0.5 10E9/L (ref 0–1.3)
MONOCYTES NFR BLD AUTO: 9.1 %
NEUTROPHILS # BLD AUTO: 4.3 10E9/L (ref 1.6–8.3)
NEUTROPHILS NFR BLD AUTO: 74.3 %
NITRATE UR QL: NEGATIVE
NRBC # BLD AUTO: 0 10*3/UL
NRBC BLD AUTO-RTO: 0 /100
PH UR STRIP: 6 PH (ref 5–7)
PLATELET # BLD AUTO: 283 10E9/L (ref 150–450)
POTASSIUM SERPL-SCNC: 4.1 MMOL/L (ref 3.4–5.3)
PROT SERPL-MCNC: 6.7 G/DL (ref 6.8–8.8)
RBC # BLD AUTO: 3.71 10E12/L (ref 4.4–5.9)
RBC #/AREA URNS AUTO: >182 /HPF (ref 0–2)
SODIUM SERPL-SCNC: 142 MMOL/L (ref 133–144)
SOURCE: ABNORMAL
SP GR UR STRIP: 1.02 (ref 1–1.03)
UROBILINOGEN UR STRIP-MCNC: NORMAL MG/DL (ref 0–2)
WBC # BLD AUTO: 5.8 10E9/L (ref 4–11)
WBC #/AREA URNS AUTO: 21 /HPF (ref 0–5)
YEAST #/AREA URNS HPF: ABNORMAL /HPF

## 2019-06-10 PROCEDURE — 80053 COMPREHEN METABOLIC PANEL: CPT | Performed by: EMERGENCY MEDICINE

## 2019-06-10 PROCEDURE — 93970 EXTREMITY STUDY: CPT

## 2019-06-10 PROCEDURE — 87086 URINE CULTURE/COLONY COUNT: CPT | Performed by: EMERGENCY MEDICINE

## 2019-06-10 PROCEDURE — 87186 SC STD MICRODIL/AGAR DIL: CPT | Performed by: EMERGENCY MEDICINE

## 2019-06-10 PROCEDURE — 99285 EMERGENCY DEPT VISIT HI MDM: CPT | Mod: 25

## 2019-06-10 PROCEDURE — 81001 URINALYSIS AUTO W/SCOPE: CPT | Performed by: EMERGENCY MEDICINE

## 2019-06-10 PROCEDURE — 85025 COMPLETE CBC W/AUTO DIFF WBC: CPT | Performed by: EMERGENCY MEDICINE

## 2019-06-10 PROCEDURE — 87088 URINE BACTERIA CULTURE: CPT | Performed by: EMERGENCY MEDICINE

## 2019-06-10 PROCEDURE — 36415 COLL VENOUS BLD VENIPUNCTURE: CPT | Performed by: EMERGENCY MEDICINE

## 2019-06-10 PROCEDURE — 71046 X-RAY EXAM CHEST 2 VIEWS: CPT

## 2019-06-10 ASSESSMENT — ENCOUNTER SYMPTOMS
HEMATURIA: 1
DIZZINESS: 1
DYSURIA: 0
LIGHT-HEADEDNESS: 1

## 2019-06-10 NOTE — DISCHARGE INSTRUCTIONS
Return if you cannot empty bladder or you develop pain in the abdomen or passing large clots in the urine. Also return for trouble breathing, dizziness/lightheadedness or worsening swelling.

## 2019-06-10 NOTE — ED PROVIDER NOTES
History     Chief Complaint:  Hematuria    HPI   Tucker Slater is a 88 year old male with a history of hypertension, CKD, and dysplasia of prostate who presents with hematuria. The patient reported that in the last 7 weeks he has had 3 exacerbations of hematuria, the first being 7 weeks ago, the second one 2 weeks ago, including the one that brings him to the ED today. With the previous exacerbations, he has had blood clots that have required cauterization and continuous bladder irrigation. The patient was blood thinned on coumadin however he stopped taking it after the first episode. This exacerbation started at approximately 1200 when his urine was completely bright red with no identifiable clots. Proceeding urinations have been rachel in color with streaks of blood. Given his history of hematuria the patient presented to the ED for evaluation. Additionally reports bilateral lower extremity edema that he only noticed today while waiting in the ED. The patient reports that he is experiencing dizziness and lightheadedness but at baseline. He denied dysuria. The patient sees Dr. Ramirez for urology. Of note, the patient has a pacemaker for atrial fibrillation and he come by private car from home where he lives with his wife. He has been withholding anticoagulation as of late for recent hematuria.      Allergies:  Merbromin  Morphine  Amlodipine     Medications:    Vitamin C  Calcium carbonate  Coreg  Voltaren  Colace  Proscar  Multivitamin   Vitamin B complex with vitamin C  Vitamin D    Past Medical History:    Anemia  GI bleed  Peripheral neuropathy  HTN  Hypertrophy of prostate with urinary obstruction  Generalized osteoarthrosis  Dysplasia of prostate  Malignant neoplasm of bladder   Arrythmia  Balance problem  Pacemaker  Renal diseases   Shoulder impingment  Degenertive arthritis of hip  CKD  Bradycardia   Atrial fibrillation    Past Surgical History:    Hip arthroplasty  Bilateral inguinal hernia repair  x3  Pilonidal cyst remobal  T + A  Cardioversion  Colonoscopy  Cystoscopy, fulgurate bleeders, evacuate clots, combined  Implant pacemaker  Carpal tunnel release    Family History:    Heart disease - father, mother  CAD - father    Social History:  Former smoker: quit on 9/12/1977.  Negative for alcohol use.  Negative for drug use.  Marital Status:  .    Review of Systems   Cardiovascular: Positive for leg swelling.   Genitourinary: Positive for hematuria. Negative for dysuria.   Neurological: Positive for dizziness (baseline) and light-headedness (baseline).   All other systems reviewed and are negative.    Physical Exam     Patient Vitals for the past 24 hrs:   BP Temp Temp src Pulse Heart Rate Resp SpO2   06/10/19 1805 -- -- -- -- -- -- 99 %   06/10/19 1755 -- -- -- -- -- -- 100 %   06/10/19 1745 162/90 -- -- -- -- -- --   06/10/19 1417 (!) 161/97 98  F (36.7  C) Temporal 74 74 18 99 %     Physical Exam  VS: Reviewed per above  HENT: Mucous membranes moist  EYES: sclera anicteric  CV: Rate as noted, regular rhythm.   RESP: Effort normal. Breath sounds are normal bilaterally.  GI: no rebound, guarding or tenderness, not distended.  NEURO: Alert, moving all extremities  MSK: No deformity of the extremities  SKIN: Warm and dry    Emergency Department Course   Imaging:  Radiographic findings were communicated with the patient who voiced understanding of the findings.    XR Chest 2 views:   Suggestion of a trace pleural effusion is seen on lateral  view, not well seen on frontal view. No definite acute infiltrates, As per radiology.     US Lower Extremity Venous Duplex, right:  No DVT demonstrated, as per radiology.     Laboratory:  CBC: WBC: 5.8, HGB: 11.8 (L), PLT: 283  BMP: Chloride: 112 (H), Calcium: 8.3 (L), Albumin: 2.9 (L), Protein total: 6.7 (LO, o/w WNL (Creatinine: 1.08)      UA with Microscopic: Urine Blood: Large, Protein Albumin Urine: 200, RBC: >182 (H), Bacteria: Many, Yeast Urine: Many, o/w  WNL  Urine Culture: Pending       Emergency Department Course:  Nursing notes and vitals reviewed. (1546) I performed an exam of the patient as documented above.     IV inserted. Blood drawn. This was sent to the lab for further testing, results above.  The patient provided a urine sample here in the emergency department. This was sent for laboratory testing, findings above.     The patient was sent for a chest XR and lower extremity US while in the emergency department, findings above.     (1814) I rechecked the patient and discussed the results of his workup thus far.     Findings and plan explained to the Patient. Patient discharged home with instructions regarding supportive care, medications, and reasons to return. The importance of close follow-up was reviewed.     I personally reviewed the laboratory results with the Patient and answered all related questions prior to discharge.          Impression & Plan    Medical Decision Making:  Patient presents to the ER for evaluation of hematuria, bilateral lower extremity edema.  Initial vital signs unrevealing.  Exam reveals pretibial edema bilaterally, no abdominal tenderness.  Bedside ultrasound revealed only 100cc of urine in the bladder patient does not have sensation of difficulty emptying bladder.  Additionally he is not passing any clots.  Hemoglobin is stable.  Urinalysis has evidence of hematuria but no clear evidence of infection.  It was sent for culture.  I discussed with urology and recommended recheck in the clinic.  Respect to lower extremity edema, there is no evidence of DVT on ultrasound.  No evidence of pulmonary edema on chest x-ray to suggest significant heart failure.  Renal function appears to be at baseline.  Unclear etiology of lower extremity swelling, but recommended recheck with primary care provider for additional testing and evaluation.  Close return precautions discussed at discharge.    Diagnosis:    ICD-10-CM    1. Hematuria,  unspecified type R31.9 Urine Culture       Disposition:  discharged to home    Scribe Disclosure:  I, Gregoria Suki, am serving as a scribe on 6/10/2019 at 3:46 PM to personally document services performed by Hudson Triplett MD based on my observations and the provider's statements to me.         Esperanza Chaudhry  6/10/2019   [facility] EMERGENCY DEPARTMENT       Hudson Triplett MD  06/10/19 6814

## 2019-06-10 NOTE — ED TRIAGE NOTES
Pt here for hematuria, states it started an hour ago but same issues a week ago. ABC's intact, alert and oriented X3.

## 2019-06-10 NOTE — ED NOTES
Pt provided with discharge paperwork and educated on recommended follow-up with urology. Pt educated on how to manage symptoms at home and when to seek medical attention. Pt voiced understanding and denied any questions at discharge.

## 2019-06-10 NOTE — ED AVS SNAPSHOT
[facility] Emergency Department  [address]  [facility] [address]  Phone:  [phone]  Fax:  [phone]                                    Tucker Slater   MRN: [id number]    Department:  [facility] Emergency Department   Date of Visit:  6/10/2019           After Visit Summary Signature Page    I have received my discharge instructions, and my questions have been answered. I have discussed any challenges I see with this plan with the nurse or doctor.    ..........................................................................................................................................  Patient/Patient Representative Signature      ..........................................................................................................................................  Patient Representative Print Name and Relationship to Patient    ..................................................               ................................................  Date                                   Time    ..........................................................................................................................................  Reviewed by Signature/Title    ...................................................              ..............................................  Date                                               Time          22EPIC Rev 08/18

## 2019-06-12 ENCOUNTER — ANCILLARY PROCEDURE (OUTPATIENT)
Dept: CARDIOLOGY | Facility: CLINIC | Age: 84
End: 2019-06-12
Attending: INTERNAL MEDICINE
Payer: COMMERCIAL

## 2019-06-12 ENCOUNTER — OFFICE VISIT (OUTPATIENT)
Dept: UROLOGY | Facility: CLINIC | Age: 84
End: 2019-06-12
Payer: COMMERCIAL

## 2019-06-12 VITALS
BODY MASS INDEX: 26.48 KG/M2 | WEIGHT: 185 LBS | OXYGEN SATURATION: 98 % | HEART RATE: 71 BPM | HEIGHT: 70 IN | SYSTOLIC BLOOD PRESSURE: 150 MMHG | DIASTOLIC BLOOD PRESSURE: 80 MMHG

## 2019-06-12 DIAGNOSIS — R31.0 GROSS HEMATURIA: Primary | ICD-10-CM

## 2019-06-12 DIAGNOSIS — N40.1 HYPERTROPHY OF PROSTATE WITH URINARY OBSTRUCTION: ICD-10-CM

## 2019-06-12 DIAGNOSIS — N13.8 HYPERTROPHY OF PROSTATE WITH URINARY OBSTRUCTION: ICD-10-CM

## 2019-06-12 DIAGNOSIS — Z95.0 CARDIAC PACEMAKER IN SITU: ICD-10-CM

## 2019-06-12 DIAGNOSIS — N39.0 URINARY TRACT INFECTION: Primary | ICD-10-CM

## 2019-06-12 LAB
ALBUMIN UR-MCNC: >=300 MG/DL
APPEARANCE UR: CLEAR
BACTERIA SPEC CULT: ABNORMAL
BACTERIA SPEC CULT: ABNORMAL
BILIRUB UR QL STRIP: NEGATIVE
COLOR UR AUTO: YELLOW
GLUCOSE UR STRIP-MCNC: NEGATIVE MG/DL
HGB UR QL STRIP: ABNORMAL
KETONES UR STRIP-MCNC: NEGATIVE MG/DL
LEUKOCYTE ESTERASE UR QL STRIP: ABNORMAL
Lab: ABNORMAL
NITRATE UR QL: NEGATIVE
PH UR STRIP: 6.5 PH (ref 5–7)
SOURCE: ABNORMAL
SP GR UR STRIP: 1.02 (ref 1–1.03)
SPECIMEN SOURCE: ABNORMAL
UROBILINOGEN UR STRIP-ACNC: 0.2 EU/DL (ref 0.2–1)

## 2019-06-12 PROCEDURE — 81003 URINALYSIS AUTO W/O SCOPE: CPT | Performed by: UROLOGY

## 2019-06-12 PROCEDURE — 93294 REM INTERROG EVL PM/LDLS PM: CPT | Performed by: INTERNAL MEDICINE

## 2019-06-12 PROCEDURE — 87086 URINE CULTURE/COLONY COUNT: CPT | Performed by: UROLOGY

## 2019-06-12 PROCEDURE — 93296 REM INTERROG EVL PM/IDS: CPT | Performed by: INTERNAL MEDICINE

## 2019-06-12 PROCEDURE — 87186 SC STD MICRODIL/AGAR DIL: CPT | Performed by: UROLOGY

## 2019-06-12 PROCEDURE — 99212 OFFICE O/P EST SF 10 MIN: CPT | Performed by: UROLOGY

## 2019-06-12 PROCEDURE — 87088 URINE BACTERIA CULTURE: CPT | Performed by: UROLOGY

## 2019-06-12 RX ORDER — AMOXICILLIN 250 MG/1
250 CAPSULE ORAL 3 TIMES DAILY
Qty: 15 CAPSULE | Refills: 0 | Status: ON HOLD | OUTPATIENT
Start: 2019-06-12 | End: 2019-06-18

## 2019-06-12 ASSESSMENT — PAIN SCALES - GENERAL: PAINLEVEL: NO PAIN (0)

## 2019-06-12 ASSESSMENT — MIFFLIN-ST. JEOR: SCORE: 1515.4

## 2019-06-12 NOTE — PROGRESS NOTES
Tucker is a very kind 88-year-old male who underwent radiation therapy for prostate cancer many years ago.  He has been troubled with gross hematuria and clot urinary retention this year and is been hospitalized several times.  His PSA is stable and he is due to see me  Again next May in [place].  Other past medical history reviewed.  Medications currently include Coreg, finasteride.  He is off his Coumadin and Maxide.  He is had some swelling in his legs and will restart his Maxide and keep his feet up when he is resting during the day.  Allergies: No change  Exam: Urine is nonbloody- numerous white cells, negative nitrite-sent for culture.  He has no fever, chills or dysuria.  Alert and oriented, normal vital signs, normal appearance, normal respirations, neuro grossly intact.  1+ pitting edema right lower extremity, 2+ pitting edema left lower extremity  Assessment: History of bladder cancer-no recurrence.  History of prostate cancer-PSA stable.  Gross hematuria with clots due to prostatic radiation and Coumadin.  He has had recurrent bleeding and his prostate was cauterized once.  Plan: See me for cystoscopy and PSA next May.  He will meet with Dr. Perez in 1 week to discuss possible watchman procedure.  Continue finasteride daily

## 2019-06-12 NOTE — LETTER
6/12/2019       RE: Tucker Slater  [address] [facility] [address]     Dear Colleague,    Thank you for referring your patient, Tucker Slater, to the [facility] UROLOGY CL[facility]IC [place] [facility]. Please see a copy of my visit note below.    Tucker is a very kind 88-year-old male who underwent radiation therapy for prostate cancer many years ago.  He has been troubled with gross hematuria and clot urinary retention this year and is been hospitalized several times.  His PSA is stable and he is due to see me  Again next May in [place].  Other past medical history reviewed.  Medications currently include Coreg, finasteride.  He is off his Coumadin and Maxide.  He is had some swelling in his legs and will restart his Maxide and keep his feet up when he is resting during the day.  Allergies: No change  Exam: Urine is nonbloody- numerous white cells, negative nitrite-sent for culture.  He has no fever, chills or dysuria.  Alert and oriented, normal vital signs, normal appearance, normal respirations, neuro grossly intact.  1+ pitting edema right lower extremity, 2+ pitting edema left lower extremity  Assessment: History of bladder cancer-no recurrence.  History of prostate cancer-PSA stable.  Gross hematuria with clots due to prostatic radiation and Coumadin.  He has had recurrent bleeding and his prostate was cauterized once.  Plan: See me for cystoscopy and PSA next May.  He will meet with Dr. Perez in 1 week to discuss possible watchman procedure.  Continue finasteride daily    Again, thank you for allowing me to participate in the care of your patient.      Sincerely,    Ilir Ramirez MD

## 2019-06-12 NOTE — TELEPHONE ENCOUNTER
Pending Prescriptions:                       Disp   Refills    finasteride (PROSCAR) 5 MG tablet         30 tab*1            Sig: Take 1 tablet (5 mg) by mouth daily    Last filled full qty of 30 on 5-9-19 only had a qty of 10 left on prescription filled 6-8-19 so we will need a new prescription for 30      Thank you,  Brenda Samano [facility]  [phone]

## 2019-06-12 NOTE — NURSING NOTE
Ilir Ramirez MD  [facility]             Start amoxicillin 250mg tid X 5 days      I spoke with this patient and ordered the Amoxicillin.    Kamryn MON  -[facility]  June 12, 2019 4:59 PM

## 2019-06-12 NOTE — LETTER
6/12/2019      RE: Tucker Slater  [address] [facility] [address]       Tucker is a very kind 88-year-old male who underwent radiation therapy for prostate cancer many years ago.  He has been troubled with gross hematuria and clot urinary retention this year and is been hospitalized several times.  His PSA is stable and he is due to see me  Again next May in [place].  Other past medical history reviewed.  Medications currently include Coreg, finasteride.  He is off his Coumadin and Maxide.  He is had some swelling in his legs and will restart his Maxide and keep his feet up when he is resting during the day.  Allergies: No change  Exam: Urine is nonbloody- numerous white cells, negative nitrite-sent for culture.  He has no fever, chills or dysuria.  Alert and oriented, normal vital signs, normal appearance, normal respirations, neuro grossly intact.  1+ pitting edema right lower extremity, 2+ pitting edema left lower extremity  Assessment: History of bladder cancer-no recurrence.  History of prostate cancer-PSA stable.  Gross hematuria with clots due to prostatic radiation and Coumadin.  He has had recurrent bleeding and his prostate was cauterized once.  Plan: See me for cystoscopy and PSA next May.  He will meet with Dr. Perez in 1 week to discuss possible watchman procedure.  Continue finasteride daily    Ilir Ramirez MD

## 2019-06-13 NOTE — TELEPHONE ENCOUNTER
"Requested Prescriptions   Pending Prescriptions Disp Refills     finasteride (PROSCAR) 5 MG tablet 30 tablet 1     Sig: Take 1 tablet (5 mg) by mouth daily       Alpha Blockers Failed - 6/12/2019  3:32 PM        Failed - Blood pressure under 140/90 in past 12 months     BP Readings from Last 3 Encounters:   06/12/19 150/80   06/10/19 162/90   06/05/19 126/60                 Passed - Recent (12 mo) or future (30 days) visit within the authorizing provider's specialty     Patient had office visit in the last 12 months or has a visit in the next 30 days with authorizing provider or within the authorizing provider's specialty.  See \"Patient Info\" tab in inbasket, or \"Choose Columns\" in Meds & Orders section of the refill encounter.              Passed - Patient does not have Tadalafil, Vardenafil, or Sildenafil on their medication list        Passed - Medication is active on med list        Passed - Patient is 18 years of age or older        Routing refill request to provider for review/approval because:  Last 2 BPs out of parameters        "

## 2019-06-13 NOTE — NURSING NOTE
"Chief Complaint   Patient presents with     Hematuria     Patient here today for Blood in Urine       Blood pressure 150/80, pulse 71, height 1.778 m (5' 10\"), weight 83.9 kg (185 lb), SpO2 98 %. Body mass index is 26.54 kg/m .    Patient Active Problem List   Diagnosis     Essential hypertension with goal blood pressure less than 140/90     Carcinoma in situ of bladder     Hypertrophy of prostate with urinary obstruction     Generalized osteoarthrosis, unspecified site     Hereditary and idiopathic peripheral neuropathy     CARDIOVASCULAR SCREENING; LDL GOAL LESS THAN 130     Advanced directives, counseling/discussion     Peripheral neuropathy     GI bleed     Anemia     Near syncope     Anemia due to blood loss, acute     Atrial fibrillation (H) with mitral regurgitation and congestive heart failure     Bradycardia     CKD (chronic kidney disease) stage 3, GFR 30-59 ml/min (H)     Cardiac pacemaker in situ     Long-term (current) use of anticoagulants [Z79.01]     Degenerative arthritis of hip     Malignant neoplasm of other specified sites of bladder     Dysplasia of prostate     Long term current use of anticoagulants with INR goal of 2.0-3.0     Hematuria       Allergies   Allergen Reactions     Merbromin      Other reaction(s): Other, see comments  Severe reaction as child. Amalgam fillings OK.     Morphine Nausea and Vomiting     Amlodipine      Edema       Current Outpatient Medications   Medication Sig Dispense Refill     ACETAMINOPHEN PO Take 650 mg by mouth every 4 hours as needed for pain       Ascorbic Acid (VITAMIN C PO) Take 500 mg by mouth daily        calcium carbonate (OS-KARLA 500 MG [place]. [place]) 500 MG tablet Take 500 mg by mouth daily        carvedilol (COREG) 25 MG tablet Take 1.5 tablets (37.5 mg) by mouth 2 times daily (with meals) (Patient taking differently: Take 25 mg by mouth 2 times daily (with meals) ) 270 tablet 3     diclofenac (VOLTAREN) 1 % topical gel Place 2 g onto the skin 4 times " daily Re-label for home use 1 Tube 0     docusate sodium (COLACE) 100 MG capsule Take 1 capsule (100 mg) by mouth 2 times daily as needed for constipation 30 capsule 0     finasteride (PROSCAR) 5 MG tablet Take 1 tablet (5 mg) by mouth daily 30 tablet 1     Multiple Vitamins-Minerals (OCUVITE PO) Take 1 tablet by mouth daily        order for DME Oxygen 2 Li/min  at night 1 Device 0     order for DME Oxygen 2 Li/min  at night with CPAP  SPO2 less than or equal to oxygen saturation 89% on effective CPAP in patient with known cardiomyopathy 1 Device 0     vitamin B complex with vitamin C (VITAMIN  B COMPLEX) TABS tablet Take 1 tablet by mouth daily       VITAMIN D, CHOLECALCIFEROL, PO Take 2,000 Units by mouth daily.       amoxicillin (AMOXIL) 250 MG capsule Take 1 capsule (250 mg) by mouth 3 times daily for 5 days 15 capsule 0       Social History     Tobacco Use     Smoking status: Former Smoker     Last attempt to quit: 1977     Years since quittin.7     Smokeless tobacco: Never Used   Substance Use Topics     Alcohol use: No     Alcohol/week: 0.0 oz     Drug use: No       UA RESULTS:  Recent Labs   Lab Test 19  1256 06/10/19  1508   COLOR Yellow Red   APPEARANCE Clear Bloody   URINEGLC Negative Negative   URINEBILI Negative Negative   URINEKETONE Negative Negative   SG 1.025 1.019   UBLD Moderate* Large*   URINEPH 6.5 6.0   PROTEIN >=300* 200*   UROBILINOGEN 0.2  --    NITRITE Negative Negative   LEUKEST Large* Negative   RBCU  --  >182*   WBCU  --  21*

## 2019-06-14 ENCOUNTER — OFFICE VISIT (OUTPATIENT)
Dept: INTERNAL MEDICINE | Facility: CLINIC | Age: 84
End: 2019-06-14
Payer: COMMERCIAL

## 2019-06-14 VITALS
OXYGEN SATURATION: 95 % | HEART RATE: 98 BPM | SYSTOLIC BLOOD PRESSURE: 150 MMHG | BODY MASS INDEX: 28.06 KG/M2 | HEIGHT: 70 IN | TEMPERATURE: 97.6 F | RESPIRATION RATE: 12 BRPM | DIASTOLIC BLOOD PRESSURE: 70 MMHG | WEIGHT: 196 LBS

## 2019-06-14 DIAGNOSIS — R31.0 GROSS HEMATURIA: ICD-10-CM

## 2019-06-14 DIAGNOSIS — N30.01 ACUTE CYSTITIS WITH HEMATURIA: ICD-10-CM

## 2019-06-14 DIAGNOSIS — I48.19 PERSISTENT ATRIAL FIBRILLATION (H): Primary | ICD-10-CM

## 2019-06-14 DIAGNOSIS — Z09 HOSPITAL DISCHARGE FOLLOW-UP: ICD-10-CM

## 2019-06-14 DIAGNOSIS — I10 ESSENTIAL HYPERTENSION WITH GOAL BLOOD PRESSURE LESS THAN 140/90: ICD-10-CM

## 2019-06-14 PROCEDURE — 99214 OFFICE O/P EST MOD 30 MIN: CPT | Performed by: INTERNAL MEDICINE

## 2019-06-14 RX ORDER — TRIAMTERENE/HYDROCHLOROTHIAZID 37.5-25 MG
1 TABLET ORAL DAILY
Qty: 90 TABLET | Refills: 3 | Status: SHIPPED | OUTPATIENT
Start: 2019-06-14 | End: 2020-06-15

## 2019-06-14 RX ORDER — FINASTERIDE 5 MG/1
5 TABLET, FILM COATED ORAL DAILY
Qty: 30 TABLET | Refills: 1 | Status: SHIPPED | OUTPATIENT
Start: 2019-06-14 | End: 2019-08-26

## 2019-06-14 ASSESSMENT — MIFFLIN-ST. JEOR: SCORE: 1565.3

## 2019-06-14 NOTE — PATIENT INSTRUCTIONS
Finish the antibiotic   Start Coumadin after that if no recurrent blood in the urine  Keep taking the Maxzide   Check the blood pressure

## 2019-06-14 NOTE — PROGRESS NOTES
Subjective     Tucker Slater is a 88 year old male who presents to clinic today for the following health issues:    [facility]       Hospital Follow-up Visit:    Hospital/Nursing Home/IP Rehab Facility: [facility]  Date of Admission: 06/01/2019  Date of Discharge: 06/05/19  Reason(s) for Admission: Atrial Fibrillation,Hematuria, constipation            Problems taking medications regularly:  None       Medication changes since discharge: None       Problems adhering to non-medication therapy:  None    Summary of hospitalization:  [facility] discharge summary reviewed  Diagnostic Tests/Treatments reviewed.  Follow up needed: clinic follow up   Other Healthcare Providers Involved in Patient s Care:         Specialist appointment - urology and cardiology   Update since discharge: improved.       Post Discharge Medication Reconciliation: discharge medications reconciled, continue medications without change.  Plan of care communicated with patient     Coding guidelines for this visit:  Type of Medical   Decision Making Face-to-Face Visit       within 7 Days of discharge Face-to-Face Visit        within 14 days of discharge   Moderate Complexity [id number]   High Complexity [id number]            ED/UC Followup:    Facility:  [facility]  Date of visit: 06/10/2019  Reason for visit: Hematuria, swelling of lower extremety  Current Status: improved      Patient is seen for a follow up visit.  Recently hospitalized for hematuria, UTI, a fib history.   Has h/o a fib, on coumadin. Rate has been controlled. No symptoms of chest pain, dizziness, SOB.   Has h/o prostate radiation for cancer, bladder cancer, follows with urology. Has episodes of hematuria likely related to above plus recurrent UTIs.   Currently on amoxicillin for hemorrhagic cystitis, UC grew Enterococcus , sensitive .   No recurrent blood in the urine. Has been off Coumadin. Has follow up set up for cardiology .       Patient Active Problem List    Diagnosis     Essential hypertension with goal blood pressure less than 140/90     Carcinoma in situ of bladder     Hypertrophy of prostate with urinary obstruction     Generalized osteoarthrosis, unspecified site     Hereditary and idiopathic peripheral neuropathy     CARDIOVASCULAR SCREENING; LDL GOAL LESS THAN 130     Advanced directives, counseling/discussion     Peripheral neuropathy     GI bleed     Anemia     Near syncope     Anemia due to blood loss, acute     Atrial fibrillation (H) with mitral regurgitation and congestive heart failure     Bradycardia     CKD (chronic kidney disease) stage 3, GFR 30-59 ml/min (H)     Cardiac pacemaker in situ     Long-term (current) use of anticoagulants [Z79.01]     Degenerative arthritis of hip     Malignant neoplasm of other specified sites of bladder     Dysplasia of prostate     Long term current use of anticoagulants with INR goal of 2.0-3.0     Hematuria     Hospital discharge follow-up     Past Surgical History:   Procedure Laterality Date     ARTHROPLASTY HIP Right 3/27/2017    Procedure: ARTHROPLASTY HIP;  Surgeon: Jigar Wynn MD;  Location: [facility] OR     C NONSPECIFIC PROCEDURE      bilat inguinal hernia repairs ( 3total procedures)      C NONSPECIFIC PROCEDURE      pilonidal cyst     C NONSPECIFIC PROCEDURE      T + A     C NONSPECIFIC PROCEDURE  8/09     R+L total knee     CARDIOVERSION  3/26/15     COLONOSCOPY       CYSTOSCOPY       CYSTOSCOPY, FULGURATE BLEEDERS, EVACUATE CLOT(S), COMBINED N/A 4/23/2019    Procedure: Exam under anesthesia, video cystopanendoscopy, evacuation of clots, fulguration of prostatic veins.;  Surgeon: Ilir Ramirez MD;  Location: [facility] OR     HERNIA REPAIR       IMPLANT PACEMAKER  3/26/15     RELEASE CARPAL TUNNEL Right 4/19/2017    Procedure: RELEASE CARPAL TUNNEL;  Right carpal tunnel release;  Surgeon: Alonso Barboza MD;  Location: [facility] OR       Social History     Tobacco Use     Smoking status: Former Smoker     Last  attempt to quit: 1977     Years since quittin.7     Smokeless tobacco: Never Used   Substance Use Topics     Alcohol use: No     Alcohol/week: 0.0 oz     Family History   Problem Relation Age of Onset     Heart Disease Father          87yo     Coronary Artery Disease Father      Heart Disease Mother          74yo     Coronary Artery Disease Mother      Family History Negative Brother          Current Outpatient Medications   Medication Sig Dispense Refill     ACETAMINOPHEN PO Take 650 mg by mouth every 4 hours as needed for pain       amoxicillin (AMOXIL) 250 MG capsule Take 1 capsule (250 mg) by mouth 3 times daily for 5 days 15 capsule 0     Ascorbic Acid (VITAMIN C PO) Take 500 mg by mouth daily        calcium carbonate (OS-KARLA 500 MG [place]. [place]) 500 MG tablet Take 500 mg by mouth daily        carvedilol (COREG) 25 MG tablet Take 1.5 tablets (37.5 mg) by mouth 2 times daily (with meals) (Patient taking differently: Take 25 mg by mouth 2 times daily (with meals) ) 270 tablet 3     diclofenac (VOLTAREN) 1 % topical gel Place 2 g onto the skin 4 times daily Re-label for home use 1 Tube 0     docusate sodium (COLACE) 100 MG capsule Take 1 capsule (100 mg) by mouth 2 times daily as needed for constipation 30 capsule 0     finasteride (PROSCAR) 5 MG tablet Take 1 tablet (5 mg) by mouth daily 30 tablet 1     Multiple Vitamins-Minerals (OCUVITE PO) Take 1 tablet by mouth daily        triamterene-HCTZ (MAXZIDE-25) 37.5-25 MG tablet Take 1 tablet by mouth daily 90 tablet 3     vitamin B complex with vitamin C (VITAMIN  B COMPLEX) TABS tablet Take 1 tablet by mouth daily       VITAMIN D, CHOLECALCIFEROL, PO Take 2,000 Units by mouth daily.       order for DME Oxygen 2 Li/min  at night 1 Device 0     order for DME Oxygen 2 Li/min  at night with CPAP  SPO2 less than or equal to oxygen saturation 89% on effective CPAP in patient with known cardiomyopathy 1 Device 0         Reviewed and updated as needed  "this visit by Provider         Review of Systems   ROS COMP: Constitutional, HEENT, cardiovascular, pulmonary, GI, , musculoskeletal, neuro, skin, endocrine and psych systems are negative, except as otherwise noted.      Objective    Ht 1.778 m (5' 10\")   BMI 26.54 kg/m    Body mass index is 26.54 kg/m .  Physical Exam   GENERAL: healthy, alert and no distress  EYES: Eyes grossly normal to inspection, PERRL and conjunctivae and sclerae normal  NECK: no adenopathy, no asymmetry, masses, or scars and thyroid normal to palpation  RESP: lungs clear to auscultation - no rales, rhonchi or wheezes  CV: irregular rate and rhythm, normal S1 S2, no S3 or S4, no murmur, click or rub, no peripheral edema and peripheral pulses strong  ABDOMEN: soft, nontender, no hepatosplenomegaly, no masses and bowel sounds normal  MS: no gross musculoskeletal defects noted, no edema    Diagnostic Test Results:  Labs reviewed in Epic        Assessment & Plan   Problem List Items Addressed This Visit     Essential hypertension with goal blood pressure less than 140/90    Relevant Medications    triamterene-HCTZ (MAXZIDE-25) 37.5-25 MG tablet    Atrial fibrillation (H) with mitral regurgitation and congestive heart failure - Primary    Hematuria    Hospital discharge follow-up      Other Visit Diagnoses     Acute cystitis with hematuria             Continue with antibiotic treatment   Follow up with cardiology, for further management of anticoagulation risk/ benefit, possible Watchman device   Restart Maxzide, BP elevated, monitor  Monitor renal function     BMI:   Estimated body mass index is 28.12 kg/m  as calculated from the following:    Height as of this encounter: 1.778 m (5' 10\").    Weight as of this encounter: 88.9 kg (196 lb).           See Patient Instructions  Return in about 7 weeks (around 8/2/2019) for Physical Exam.    Kwadwo Winslow MD  [facility]        "

## 2019-06-14 NOTE — NURSING NOTE
"Vital signs:  Temp: 97.6  F (36.4  C) Temp src: Oral BP: 150/70 Pulse: 98   Resp: 12 SpO2: 95 %     Height: 177.8 cm (5' 10\") Weight: 88.9 kg (196 lb)  Estimated body mass index is 28.12 kg/m  as calculated from the following:    Height as of this encounter: 1.778 m (5' 10\").    Weight as of this encounter: 88.9 kg (196 lb).          "

## 2019-06-15 LAB
BACTERIA SPEC CULT: ABNORMAL
BACTERIA SPEC CULT: ABNORMAL
Lab: ABNORMAL
SPECIMEN SOURCE: ABNORMAL

## 2019-06-16 ENCOUNTER — HOSPITAL ENCOUNTER (INPATIENT)
Facility: CLINIC | Age: 84
LOS: 1 days | Discharge: HOME | DRG: 714 | End: 2019-06-20
Attending: EMERGENCY MEDICINE | Admitting: HOSPITALIST
Payer: COMMERCIAL

## 2019-06-16 DIAGNOSIS — R31.0 GROSS HEMATURIA: ICD-10-CM

## 2019-06-16 DIAGNOSIS — R33.9 URINARY RETENTION: ICD-10-CM

## 2019-06-16 LAB
ALBUMIN UR-MCNC: 300 MG/DL
ANION GAP SERPL CALCULATED.3IONS-SCNC: 9 MMOL/L (ref 3–14)
APPEARANCE UR: ABNORMAL
BASOPHILS # BLD AUTO: 0.1 10E9/L (ref 0–0.2)
BASOPHILS NFR BLD AUTO: 0.9 %
BILIRUB UR QL STRIP: NEGATIVE
BUN SERPL-MCNC: 28 MG/DL (ref 7–30)
CALCIUM SERPL-MCNC: 8.6 MG/DL (ref 8.5–10.1)
CHLORIDE SERPL-SCNC: 108 MMOL/L (ref 94–109)
CO2 SERPL-SCNC: 23 MMOL/L (ref 20–32)
COLOR UR AUTO: ABNORMAL
CREAT SERPL-MCNC: 1.26 MG/DL (ref 0.66–1.25)
DIFFERENTIAL METHOD BLD: ABNORMAL
EOSINOPHIL # BLD AUTO: 0.2 10E9/L (ref 0–0.7)
EOSINOPHIL NFR BLD AUTO: 3.6 %
ERYTHROCYTE [DISTWIDTH] IN BLOOD BY AUTOMATED COUNT: 13.1 % (ref 10–15)
GFR SERPL CREATININE-BSD FRML MDRD: 51 ML/MIN/{1.73_M2}
GLUCOSE SERPL-MCNC: 96 MG/DL (ref 70–99)
GLUCOSE UR STRIP-MCNC: NEGATIVE MG/DL
HCT VFR BLD AUTO: 33.3 % (ref 40–53)
HGB BLD-MCNC: 11.2 G/DL (ref 13.3–17.7)
HGB UR QL STRIP: ABNORMAL
IMM GRANULOCYTES # BLD: 0 10E9/L (ref 0–0.4)
IMM GRANULOCYTES NFR BLD: 0.3 %
INR PPP: 1.16 (ref 0.86–1.14)
KETONES UR STRIP-MCNC: NEGATIVE MG/DL
LEUKOCYTE ESTERASE UR QL STRIP: NEGATIVE
LYMPHOCYTES # BLD AUTO: 0.6 10E9/L (ref 0.8–5.3)
LYMPHOCYTES NFR BLD AUTO: 9.5 %
MCH RBC QN AUTO: 32.1 PG (ref 26.5–33)
MCHC RBC AUTO-ENTMCNC: 33.6 G/DL (ref 31.5–36.5)
MCV RBC AUTO: 95 FL (ref 78–100)
MONOCYTES # BLD AUTO: 0.4 10E9/L (ref 0–1.3)
MONOCYTES NFR BLD AUTO: 6.2 %
NEUTROPHILS # BLD AUTO: 5 10E9/L (ref 1.6–8.3)
NEUTROPHILS NFR BLD AUTO: 79.5 %
NITRATE UR QL: NEGATIVE
NRBC # BLD AUTO: 0 10*3/UL
NRBC BLD AUTO-RTO: 0 /100
PH UR STRIP: 7.5 PH (ref 5–7)
PLATELET # BLD AUTO: 235 10E9/L (ref 150–450)
POTASSIUM SERPL-SCNC: 3.7 MMOL/L (ref 3.4–5.3)
RBC # BLD AUTO: 3.49 10E12/L (ref 4.4–5.9)
RBC #/AREA URNS AUTO: >182 /HPF (ref 0–2)
SODIUM SERPL-SCNC: 140 MMOL/L (ref 133–144)
SOURCE: ABNORMAL
SP GR UR STRIP: 1.02 (ref 1–1.03)
UROBILINOGEN UR STRIP-MCNC: NORMAL MG/DL (ref 0–2)
WBC # BLD AUTO: 6.3 10E9/L (ref 4–11)
WBC #/AREA URNS AUTO: 0 /HPF (ref 0–5)

## 2019-06-16 PROCEDURE — 85025 COMPLETE CBC W/AUTO DIFF WBC: CPT | Performed by: EMERGENCY MEDICINE

## 2019-06-16 PROCEDURE — 99207 ZZC CDG-MDM COMPONENT: MEETS LOW - DOWN CODED: CPT | Performed by: PHYSICIAN ASSISTANT

## 2019-06-16 PROCEDURE — 99220 ZZC INITIAL OBSERVATION CARE,LEVL III: CPT | Performed by: PHYSICIAN ASSISTANT

## 2019-06-16 PROCEDURE — G0378 HOSPITAL OBSERVATION PER HR: HCPCS

## 2019-06-16 PROCEDURE — 51702 INSERT TEMP BLADDER CATH: CPT

## 2019-06-16 PROCEDURE — 85610 PROTHROMBIN TIME: CPT | Performed by: EMERGENCY MEDICINE

## 2019-06-16 PROCEDURE — 81001 URINALYSIS AUTO W/SCOPE: CPT | Performed by: EMERGENCY MEDICINE

## 2019-06-16 PROCEDURE — 80048 BASIC METABOLIC PNL TOTAL CA: CPT | Performed by: EMERGENCY MEDICINE

## 2019-06-16 PROCEDURE — 25000132 ZZH RX MED GY IP 250 OP 250 PS 637: Performed by: PHYSICIAN ASSISTANT

## 2019-06-16 PROCEDURE — 51798 US URINE CAPACITY MEASURE: CPT

## 2019-06-16 PROCEDURE — 99285 EMERGENCY DEPT VISIT HI MDM: CPT | Mod: 25

## 2019-06-16 PROCEDURE — 87086 URINE CULTURE/COLONY COUNT: CPT | Performed by: EMERGENCY MEDICINE

## 2019-06-16 RX ORDER — ONDANSETRON 2 MG/ML
4 INJECTION INTRAMUSCULAR; INTRAVENOUS EVERY 6 HOURS PRN
Status: DISCONTINUED | OUTPATIENT
Start: 2019-06-16 | End: 2019-06-20 | Stop reason: HOSPADM

## 2019-06-16 RX ORDER — ACETAMINOPHEN 325 MG/1
650 TABLET ORAL EVERY 4 HOURS PRN
Status: DISCONTINUED | OUTPATIENT
Start: 2019-06-16 | End: 2019-06-20 | Stop reason: HOSPADM

## 2019-06-16 RX ORDER — DOCUSATE SODIUM 100 MG/1
100 CAPSULE, LIQUID FILLED ORAL 2 TIMES DAILY PRN
COMMUNITY
End: 2024-02-26

## 2019-06-16 RX ORDER — AMOXICILLIN 250 MG
2 CAPSULE ORAL 2 TIMES DAILY PRN
Status: DISCONTINUED | OUTPATIENT
Start: 2019-06-16 | End: 2019-06-20 | Stop reason: HOSPADM

## 2019-06-16 RX ORDER — FINASTERIDE 5 MG/1
5 TABLET, FILM COATED ORAL DAILY
Status: DISCONTINUED | OUTPATIENT
Start: 2019-06-16 | End: 2019-06-20 | Stop reason: HOSPADM

## 2019-06-16 RX ORDER — AMOXICILLIN 250 MG
1 CAPSULE ORAL 2 TIMES DAILY PRN
Status: DISCONTINUED | OUTPATIENT
Start: 2019-06-16 | End: 2019-06-20 | Stop reason: HOSPADM

## 2019-06-16 RX ORDER — AMOXICILLIN 250 MG/1
250 CAPSULE ORAL 3 TIMES DAILY
Status: COMPLETED | OUTPATIENT
Start: 2019-06-16 | End: 2019-06-17

## 2019-06-16 RX ORDER — ONDANSETRON 4 MG/1
4 TABLET, ORALLY DISINTEGRATING ORAL EVERY 6 HOURS PRN
Status: DISCONTINUED | OUTPATIENT
Start: 2019-06-16 | End: 2019-06-20 | Stop reason: HOSPADM

## 2019-06-16 RX ORDER — LIDOCAINE 40 MG/G
CREAM TOPICAL
Status: DISCONTINUED | OUTPATIENT
Start: 2019-06-16 | End: 2019-06-20 | Stop reason: HOSPADM

## 2019-06-16 RX ORDER — NALOXONE HYDROCHLORIDE 0.4 MG/ML
.1-.4 INJECTION, SOLUTION INTRAMUSCULAR; INTRAVENOUS; SUBCUTANEOUS
Status: DISCONTINUED | OUTPATIENT
Start: 2019-06-16 | End: 2019-06-18

## 2019-06-16 RX ORDER — TRAMADOL HYDROCHLORIDE 50 MG/1
50 TABLET ORAL EVERY 6 HOURS PRN
Status: DISCONTINUED | OUTPATIENT
Start: 2019-06-16 | End: 2019-06-20 | Stop reason: HOSPADM

## 2019-06-16 RX ORDER — POLYETHYLENE GLYCOL 3350 17 G/17G
17 POWDER, FOR SOLUTION ORAL DAILY PRN
Status: DISCONTINUED | OUTPATIENT
Start: 2019-06-16 | End: 2019-06-20 | Stop reason: HOSPADM

## 2019-06-16 RX ORDER — TRIAMTERENE/HYDROCHLOROTHIAZID 37.5-25 MG
1 TABLET ORAL DAILY
Status: DISCONTINUED | OUTPATIENT
Start: 2019-06-16 | End: 2019-06-20 | Stop reason: HOSPADM

## 2019-06-16 RX ADMIN — AMOXICILLIN 250 MG: 250 CAPSULE ORAL at 18:10

## 2019-06-16 RX ADMIN — TRIAMTERENE AND HYDROCHLOROTHIAZIDE 1 TABLET: 37.5; 25 TABLET ORAL at 18:13

## 2019-06-16 RX ADMIN — CARVEDILOL 37.5 MG: 25 TABLET, FILM COATED ORAL at 18:10

## 2019-06-16 RX ADMIN — FINASTERIDE 5 MG: 5 TABLET, FILM COATED ORAL at 18:11

## 2019-06-16 RX ADMIN — AMOXICILLIN 250 MG: 250 CAPSULE ORAL at 22:08

## 2019-06-16 ASSESSMENT — MIFFLIN-ST. JEOR
SCORE: 1534.91
SCORE: 1515.4

## 2019-06-16 NOTE — PLAN OF CARE
ROOM # 224    Living Situation (if not independent, order SW consult): Lives with Wife   Facility name:  : [facility]     Activity level at baseline: Ind   Activity level on admit: Up with SBA      Patient registered to observation; given Patient Bill of Rights; given the opportunity to ask questions about observation status and their plan of care.  Patient has been oriented to the observation room, bathroom and call light is in place.    Discussed discharge goals and expectations with patient/family.

## 2019-06-16 NOTE — ED PROVIDER NOTES
History     Chief Complaint:  Urinary Retention    HPI   Tucker Slater is a 88 year old male with a history of prostate cancer who presents with urinary retention. The patient states that this morning he woke up at 0500 with hematuria, and clots. The patient states he now has some red leakage and is unable to pass urine. The patient denies any vomiting, diarrhea, fever, chest pain, shortness of breath or other symptoms. The patient has been off of his coumadin since 4/21 and last had his INR checked 3 weeks ago with a result of 1.9.     Allergies:  Morphine  Amlodipine  Merbromin    Medications:    Maxzide  Proscar  Colace  Voltaren  Coreg  amoxicillin     Past Medical History:    Arrhythmia   Balance problem   Bradycardia   Carcinoma in situ of bladder   Essential hypertension, benign   Generalized osteoarthrosis, unspecified site   Hernia, abdominal   Numbness and tingling   Pacemaker   Pain in joint, shoulder region   Palpitations   Persistent atrial fibrillation    Prostate cancer    Renal disease   Shoulder impingement   Unspecified hereditary and idiopathic peripheral neuropathy       Past Surgical History:    Hip replacement right  Hernia repair X3  T & A  Bilateral knee replacement  cardioversion  Pacemaker placement    Family History:    CAD- father, mother  CHF- mother, father    Social History:  The patient was accompanied to the ED by wife.  Smoking Status: Former Smoker  Smokeless Tobacco: Never Used  Alcohol Use: Positive  Marital Status:      Review of Systems   All other systems reviewed and are negative.        Physical Exam     Patient Vitals for the past 24 hrs:   BP Temp Temp src Pulse Heart Rate Resp SpO2 Height Weight   06/16/19 1430 153/83 -- -- 70 -- -- 98 % -- --   06/16/19 1330 (!) 162/92 -- -- 70 -- -- 98 % -- --   06/16/19 1300 143/86 -- -- 70 -- -- 95 % -- --   06/16/19 1230 144/82 -- -- 70 -- -- 98 % -- --   06/16/19 1200 131/76 -- -- 70 -- -- 98 % -- --   06/16/19 1130  "139/72 -- -- 69 -- -- 95 % -- --   06/16/19 1100 131/79 -- -- 73 -- -- 98 % -- --   06/16/19 1030 136/78 -- -- 71 -- -- 97 % -- --   06/16/19 1015 125/71 -- -- 70 -- -- 96 % -- --   06/16/19 1000 134/76 -- -- 69 -- -- 96 % -- --   06/16/19 0945 151/77 -- -- 70 -- -- 100 % -- --   06/16/19 0930 151/83 -- -- 70 -- -- 99 % -- --   06/16/19 0924 159/84 97.3  F (36.3  C) Oral -- 70 18 99 % 1.778 m (5' 10\") 83.9 kg (185 lb)         Physical Exam  General: Resting on the bed.  Head: No obvious trauma to head.  Ears, Nose, Throat:  External ears normal.  Nose normal.  Eyes:  Conjunctivae clear.    Neck: Normal range of motion.  Neck supple.   CV: Regular rate and rhythm.  No murmurs.      Respiratory: Effort normal and breath sounds normal.  No wheezing or crackles.   Gastrointestinal: Soft.  No distension. There is mild suprapubic tenderness.    Neuro: Alert. Moving all extremities appropriately.  Normal speech.    Skin: Skin is warm and dry.  No rash noted.   : gross blood at ureteral meatus      Emergency Department Course     Laboratory:  Laboratory findings were communicated with the patient who voiced understanding of the findings.    CBC: WBC 6.3, HGB 11.2 (L),   BMP:  o/w WNL (Creatinine 1.26H))  INR: 1.16 (H)    UA with micro: Urine blood large (A) pH 7.5 (H) protein albumin 300 (A) RBC/HPF >182 (H)  o/w negative  Urine Culture: Pending    Emergency Department Course:     Nursing notes and vitals reviewed.    0940 I performed an exam of the patient as documented above.     1005 The patient provided a urine sample here in the emergency department. This was sent for laboratory testing, findings above.    1021 IV was inserted and blood was drawn for laboratory testing, results above.    1101 I returned to check on patient.     1129 I spoke with Dr. Art Jacob of urology regarding patient's presentation, findings, and plan of care.     1226 I returned to check on patient.     1455 The nurse informed me on the " patient's concern about reoccurring clots.     1503 I returned to check on patient.     1514 I spoke with Dr. Art Jacob of urology regarding patient's presentation, findings, and plan of care.     1522 I personally reviewed the laboratory  results with the patient  and answered all related questions prior to admit.    1535  I spoke with Marcus ACOSTA of the Hospitalist service from [facility] regarding patient's presentation, findings, and plan of care.      Impression & Plan      Medical Decision Makin-year-old male with history of prior urine retention secondary to gross hematuria, A. fib off warfarin presents with retention.  Vital signs are unremarkable.  Broad differential was pursued including not limited to urinary obstruction, clot, UTI, medication induced, electric metabolic or renal dysfunction, etc.  CBC shows no leukocytosis and ongoing anemia.  No evidence of active hemorrhage.  BMP shows no acute electrolyte or metabolic abnl.  Creatinine is mildly elevated from prior but is consistent with patient's baseline.  INR is 1.16, dramatically improved from prior.  Patient is off warfarin.  He is off warfarin because he has had continued issues with ongoing bleeding.  His urinalysis has large blood but no evidence of infectious etiology at this time.  Patient was on antibiotics for possible UTI, and he reports taking his amoxicillin.  At this point do not suspect UTI.  Catheter with three-way irrigation was placed.  We are able to irrigate continuously for 6 L and had some clearance of clots.  Patient continued to have hematuria but no gross clots.  Bladder catheter continue to irrigate.  Discussed with urology x2.  Initially for management recommendations then subsequently for reassessment.  Had lengthy discussion with patient.  He is very anxious.  He does not feel comfortable at home.  At this point will admit for continuous irrigation and urology consultation.  No clear etiology of patient's  bleeding.  His symptoms have markedly improved with the [place] catheter in place.  Patient and family are reassured by plan to admit.    Diagnosis:    ICD-10-CM    1. Urinary retention R33.9    2. Gross hematuria R31.0         Disposition:   The patient is admitted into the care of Dr. Arias.    Scribe Disclosure:  I, Maisha Rodríguez, am serving as a scribe at 9:43 AM on 6/16/2019 to document services personally performed by Sarai Bernabe MD based on my observations and the provider's statements to me.    [facility] EMERGENCY DEPARTMENT       Sarai Bernabe MD  06/16/19 3230

## 2019-06-16 NOTE — ED TRIAGE NOTES
A&Ox4.  ABC's intact. Pt c/o urinary retention with some drainage of blood.  Hx of clots and retention related to the prostate bleeding.  Seen 4 times since [facility] for this same issue.  Last void 0500.  Pain 6/10 at this time with spasms.

## 2019-06-16 NOTE — ED NOTES
[facility]  ED Nurse Handoff Report    Tucker Slater is a 88 year old male   ED Chief complaint: Urinary Retention  . ED Diagnosis:   Final diagnoses:   Urinary retention   Gross hematuria     Allergies:   Allergies   Allergen Reactions     Merbromin      Other reaction(s): Other, see comments  Severe reaction as child. Amalgam fillings OK.     Morphine Nausea and Vomiting     Amlodipine      Edema       Code Status: Full Code  Activity level - Baseline/Home:  Independent. Activity Level - Current:   Assist X 1. Lift room needed: No. Bariatric: No   Needed: No   Isolation: No. Infection: Not Applicable.     Vital Signs:   Vitals:    06/16/19 1230 06/16/19 1300 06/16/19 1330 06/16/19 1430   BP: 144/82 143/86 (!) 162/92 153/83   Pulse: 70 70 70 70   Resp:       Temp:       TempSrc:       SpO2: 98% 95% 98% 98%   Weight:       Height:           Cardiac Rhythm:  ,      Pain level: 0-10 Pain Scale: 6  Patient confused: No. Patient Falls Risk: Yes.   Elimination Status: Urethral catheter (crawford) in place; refer to orders to discontinue as per protocol    Patient Report - Initial Complaint: Urinary Retention . Focused Assessment:    09:23 ED Triage Notes Filed A&Ox4.  ABC's intact. Pt c/o urinary retention with some drainage of blood.  Hx of clots and retention related to the prostate bleeding.  Seen 4 times since Easter for this same issue.  Last void 0500.  Pain 6/10 at this time with spasms.      09:30 Genitourinary Genitourinary - Bladder Scan Volume (mL): 550 ml          09:40 ED Notes Filed Pt was bladder scanned for 540 ml of fluid.     10:15 Genitourinary Genitourinary - Urine Characteristics: red; clots  Genitourinary Comment: A&Ox4. ABC's intact. pt c/o pain and spasms with urinary retention. Hx of prostate problems. has been seen 4 times for similar issue. Pain 5/10 at rest, increases with spams.      14:44 Genitourinary Genitourinary - Genitourinary Comment: irrigation has been off  "for >1hr. Urine is darker red, 250ml out, no clots. Denies pain at this time. Pt and family are concerned about care at home and verbalized frustration that \"no one really understands what is going on\".          Tests Performed: labs, crawford with 3-way irrigation. Abnormal Results:   Labs Ordered and Resulted from Time of ED Arrival Up to the Time of Departure from the ED   CBC WITH PLATELETS DIFFERENTIAL - Abnormal; Notable for the following components:       Result Value    RBC Count 3.49 (*)     Hemoglobin 11.2 (*)     Hematocrit 33.3 (*)     Absolute Lymphocytes 0.6 (*)     All other components within normal limits   BASIC METABOLIC PANEL - Abnormal; Notable for the following components:    Creatinine 1.26 (*)     GFR Estimate 51 (*)     GFR Estimate If Black 59 (*)     All other components within normal limits   INR - Abnormal; Notable for the following components:    INR 1.16 (*)     All other components within normal limits   ROUTINE UA WITH MICROSCOPIC - Abnormal; Notable for the following components:    Blood Urine Large (*)     pH Urine 7.5 (*)     Protein Albumin Urine 300 (*)     RBC Urine >182 (*)     All other components within normal limits   BLADDER SCAN AND STRAIGHT CATH   IP ACUTE INDWELLING URINARY CATHETER (CRAWFORD)   DISCONTINUE INDWELLING URINARY CATHETER (CRAWFORD)   BLADDER IRRIGATION   URINE CULTURE AEROBIC BACTERIAL        Treatments provided: bladder irrigation, monitoring   Family Comments: wife and daughter at bedside  OBS brochure/video discussed/provided to patient:  Yes  ED Medications: Medications - No data to display  Drips infusing:  No  For the majority of the shift, the patient's behavior Green. Interventions performed were n/a.     Severe Sepsis OR Septic Shock Diagnosis Present: No      ED Nurse Name/Phone Number: Merly Ramez,   3:40 PM      RECEIVING UNIT ED HANDOFF REVIEW    Above ED Nurse Handoff Report was reviewed: Yes  Reviewed by: Harika Mota on June 16, 2019 at 4:08 PM "

## 2019-06-16 NOTE — PHARMACY-ADMISSION MEDICATION HISTORY
Admission medication history interview status for this patient is complete. See [facility] admission navigator for allergy information, prior to admission medications and immunization status.     Medication history interview source(s):Patient and Family  Medication history resources (including written lists, pill bottles, clinic record):None  Primary pharmacy:[facility]    Changes made to PTA medication list:  Added: -  Deleted: -  Changed: ocuvite to BID    Actions taken by pharmacist (provider contacted, etc):None     Additional medication history information:None    Medication reconciliation/reorder completed by provider prior to medication history? No    Do you take OTC medications (eg tylenol, ibuprofen, fish oil, eye/ear drops, etc)? yes(Y/N)    For patients on insulin therapy: no (Y/N)  Lantus/levemir/NPH/Mix 70/30 dose:   (Y/N) (see Med list for doses)   Sliding scale Novolog Y/N  If Yes, do you have a baseline novolog pre-meal dose:  units with meals  Patients eat three meals a day:   Y/N    How many episodes of hypoglycemia do you have per week: _______  How many missed doses do you have per week: ______  How many times do you check your blood glucose per day: _______  Do you have a Continuous glucose monitor (CGM)   Y/N (remind pt that not approved for hospital use)   Any Barriers to therapy - Be specific :  cost of medications, comfortable with giving injections (if applicable), comfortable and confident with current diabetes regimen: Y/N ______________      Prior to Admission medications    Medication Sig Last Dose Taking? Auth Provider   ACETAMINOPHEN PO Take 650 mg by mouth every 4 hours as needed for pain  Yes Reported, Patient   amoxicillin (AMOXIL) 250 MG capsule Take 1 capsule (250 mg) by mouth 3 times daily for 5 days 6/16/2019 at x1 Yes Ilir Ramirez MD   Ascorbic Acid (VITAMIN C PO) Take 500 mg by mouth daily  6/15/2019 at am Yes Reported, Patient   calcium carbonate (OS-KARLA 500 MG [place]. [place]) 500 MG  tablet Take 500 mg by mouth daily  6/15/2019 at am Yes Reported, Patient   carvedilol (COREG) 25 MG tablet Take 1.5 tablets (37.5 mg) by mouth 2 times daily (with meals) 6/16/2019 at x1 Yes Janeth Mcdaniels APRN CNP   diclofenac (VOLTAREN) 1 % topical gel Place 2 g onto the skin 4 times daily Re-label for home use  Patient taking differently: Place 2 g onto the skin 4 times daily as needed Re-label for home use PRN Yes Prince Zee MD   docusate sodium (COLACE) 100 MG capsule Take 100 mg by mouth 2 times daily 6/16/2019 at x1 Yes Unknown, Entered By History   finasteride (PROSCAR) 5 MG tablet Take 1 tablet (5 mg) by mouth daily 6/15/2019 at am Yes Kwadwo Winslow MD   Multiple Vitamins-Minerals (OCUVITE PO) Take 1 tablet by mouth BID 6/15/2019 at am Yes Reported, Patient   triamterene-HCTZ (MAXZIDE-25) 37.5-25 MG tablet Take 1 tablet by mouth daily 6/15/2019 at am Yes Kwadwo Winslow MD   vitamin B complex with vitamin C (VITAMIN  B COMPLEX) TABS tablet Take 1 tablet by mouth daily 6/15/2019 at am Yes Reported, Patient   VITAMIN D, CHOLECALCIFEROL, PO Take 2,000 Units by mouth daily. 6/15/2019 at am Yes Unknown, Entered By History   order for DME Oxygen 2 Li/min  at night   Hari Ritter MD   order for DME Oxygen 2 Li/min  at night with CPAP  SPO2 less than or equal to oxygen saturation 89% on effective CPAP in patient with known cardiomyopathy   Hari Ritter MD

## 2019-06-16 NOTE — ED NOTES
Pt's son at bedside.  Continues to have red urine output, one small clot with last amount emptied.  Denies pain at this time.

## 2019-06-16 NOTE — H&P
[facility] Out[facility] / Observation Unit  History and Physical Exam     Tucker Slater MRN# [id number]   YOB: 1931 Age: 88 year old      Date of Admission:  6/16/2019    Primary care provider: Kwadwo Winslow          Assessment:   Tucker Slater is a 88 year old male with a PMH significant for bladder cancer s/p surgical resection (2000), prostate cancer s/p radiation ([id number]), CKD (baseline Cr 1.3-1.5), permanent A-fib on Warfarin, s/p pacemaker, HTN, DAWSON on CPAP, mild LV dysfunction (EF of 45-50%), OA, and peripheral neuropathy, who presents with complaints of recurrent hematuria and urinary retention.   Work up in the ED reveals: VSS. BMP shows a stable Cr of 1.26, otherwise unremarkable. CBC shows Hgb of 11.2, otherwise unremarkable. UA shows large blood and >182 RBCs, negative LE and nitrites. Urine culture pending.   Patient will be registered to Observation for further evaluation and symptom management for acute renal colic.     1. Recurrent gross hematuria - remote hx of prostate ca, s/p resection in 2000, multiple episodes of hematuria since April, had cystoscopy at that time with evacuation of clots and fulguration of prostatic veins.  Anticoagulation for a fib was restarted on discharge but stopped again during admission on 6/1 (per pt, he states he's been off coumadin since Easter). On 6/1 admission, gross hematuria resolved with bladder irrigation. Recurrent bleeding this morning, unable to void, not anticoagulated at this time. Hgb stable at 11.2, will recheck in AM. CBI started, Urology consulted.   2. Urinary retention - due to hematuria and clots, bladder irrigated in ED and crawford placed. Will continue CBI and consult Urology.   3. A fib - previously anticoagulated with coumadin, now on hold. Pt has follow up with cardiology at the end of July to discuss watchman procedure for a fib given recurrent issues with hematuria. Resume home Coreg  4. CKD - Cr stable at 1.26  5. HTN -  losartan discontinued during last admission. Maxide was held on discharge but recently restarted by PCP. Will resume Coreg and Maxide. Pt does complain of increased LE edema recently, likely due to being off diuretics.   6. DAWSON - uses CPAP  7. Chronic anemia - Hgb 11.2, stable, at baseline. Likely due to CKD         Plan:     1. [place] to Observation  2. Continue CBI  3. Cont supportive care with anti-emetics and pain control (no NSAIDs)  4. Consult Urology  5. Regular diet now, NPO after midnight  6. DVT prophylaxis: pt at low risk, encourage ambulation                  Chief Complaint:   Hematuria, urinary retention          History of Present Illness:   Tucker Slater is a 88 year old male with a PMH significant for bladder cancer s/p surgical resection (2000), prostate cancer s/p radiation ([id number]), CKD (baseline Cr 1.3-1.5), permanent A-fib on Warfarin, s/p pacemaker, HTN, DAWSON on CPAP, mild LV dysfunction (EF of 45-50%), OA, and peripheral neuropathy, who presents with complaints of recurrent hematuria and urinary retention. The patient notes recurrent issues with gross hematuria in the past several months. He presented here in April with hematuria, coumadin was held and he underwent cystoscopy with evacuation of clots and fulguration of the prostatic veins. His coumadin was restarted on discharge (per chart review) and he initially did well. He has been seen in the ED twice in June for recurrent hematuria and admitted on a third occasion due to hematuria, 6/1. During that admission, he was started on CBI and hematuria resolved. His coumadin was stopped. He developed hematuria again this morning and then developed urinary retention due to clots and subsequent pain related to retention. He otherwise feels great. He denies fever, chills, chest pain, SOB, abd pain, nausea, vomiting, diarrhea, constipation or dysuria. He does note recent increased LE edema as his Maxide was held on discharge but this was  recently restarted by his PCP.                  Past Medical History:     Past Medical History:   Diagnosis Date     Arrhythmia     A Fib     Balance problem     related to neuropathy in feet     Bradycardia      Carcinoma in situ of bladder 2000    bladder cancer ;; follow w Urology w periodic cysto      Essential hypertension, benign      Generalized osteoarthrosis, unspecified site knees    s/p R total knee 8/09 ; will do L 6/10      Hernia, abdominal      Numbness and tingling     toes and fingers     Pacemaker     [employer]      Pain in joint, shoulder region     L shoulder rotater tear; no surgery      Palpitations      Persistent atrial fibrillation (H) 1/30/15     Prostate CA (H) 2008-9    radiation     Prostate cancer (H) 11/08    completed RT 3/09     Renal disease     elevated creatinine     Shoulder impingement     R shoulder impingement etc see MRI 4/09      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     toes both feet                Past Surgical History:     Past Surgical History:   Procedure Laterality Date     ARTHROPLASTY HIP Right 3/27/2017    Procedure: ARTHROPLASTY HIP;  Surgeon: Jigar Wynn MD;  Location:  OR     C NONSPECIFIC PROCEDURE      bilat inguinal hernia repairs ( 3total procedures)      C NONSPECIFIC PROCEDURE      pilonidal cyst     C NONSPECIFIC PROCEDURE      T + A     C NONSPECIFIC PROCEDURE  8/09     R+L total knee     CARDIOVERSION  3/26/15     COLONOSCOPY       CYSTOSCOPY       CYSTOSCOPY, FULGURATE BLEEDERS, EVACUATE CLOT(S), COMBINED N/A 4/23/2019    Procedure: Exam under anesthesia, video cystopanendoscopy, evacuation of clots, fulguration of prostatic veins.;  Surgeon: Ilir Ramirez MD;  Location: [facility] OR     HERNIA REPAIR       IMPLANT PACEMAKER  3/26/15     RELEASE CARPAL TUNNEL Right 4/19/2017    Procedure: RELEASE CARPAL TUNNEL;  Right carpal tunnel release;  Surgeon: Alonso Barboza MD;  Location:  OR               Social  History:     Social History     Socioeconomic History     Marital status:      Spouse name: Alba     Number of children: 3     Years of education: Not on file     Highest education level: Not on file   Occupational History     Employer: RETIRED     Comment:  w [employer]   Social Needs     Financial resource strain: Not on file     Food insecurity:     Worry: Not on file     Inability: Not on file     Transportation needs:     Medical: Not on file     Non-medical: Not on file   Tobacco Use     Smoking status: Former Smoker     Last attempt to quit: 1977     Years since quittin.7     Smokeless tobacco: Never Used   Substance and Sexual Activity     Alcohol use: No     Alcohol/week: 0.0 oz     Drug use: No     Sexual activity: Never     Partners: Female   Lifestyle     Physical activity:     Days per week: Not on file     Minutes per session: Not on file     Stress: Not on file   Relationships     Social connections:     Talks on phone: Not on file     Gets together: Not on file     Attends Adventism service: Not on file     Active member of club or organization: Not on file     Attends meetings of clubs or organizations: Not on file     Relationship status: Not on file     Intimate partner violence:     Fear of current or ex partner: Not on file     Emotionally abused: Not on file     Physically abused: Not on file     Forced sexual activity: Not on file   Other Topics Concern     Parent/sibling w/ CABG, MI or angioplasty before 65F 55M? Not Asked      Service Not Asked     Blood Transfusions Not Asked     Caffeine Concern No     Comment: 4-5 cups per week      Occupational Exposure Not Asked     Hobby Hazards Not Asked     Sleep Concern No     Stress Concern No     Weight Concern No     Special Diet No     Back Care Not Asked     Exercise No     Comment: yard work and moves around a lot     Bike Helmet Not Asked     Seat Belt Not Asked     Self-Exams Not Asked   Social History Narrative      Not on file               Family History:     Family History   Problem Relation Age of Onset     Heart Disease Father          89yo     Coronary Artery Disease Father      Heart Disease Mother          74yo     Coronary Artery Disease Mother      Family History Negative Brother               Allergies:      Allergies   Allergen Reactions     Merbromin      Other reaction(s): Other, see comments  Severe reaction as child. Amalgam fillings OK.     Morphine Nausea and Vomiting     Amlodipine      Edema               Medications:     Prior to Admission medications    Medication Sig Last Dose Taking? Auth Provider   ACETAMINOPHEN PO Take 650 mg by mouth every 4 hours as needed for pain  Yes Reported, Patient   amoxicillin (AMOXIL) 250 MG capsule Take 1 capsule (250 mg) by mouth 3 times daily for 5 days 2019 at x1 Yes Ilir Ramierz MD   Ascorbic Acid (VITAMIN C PO) Take 500 mg by mouth daily  6/15/2019 at am Yes Reported, Patient   calcium carbonate (OS-KARLA 500 MG [place]. [place]) 500 MG tablet Take 500 mg by mouth daily  6/15/2019 at am Yes Reported, Patient   carvedilol (COREG) 25 MG tablet Take 1.5 tablets (37.5 mg) by mouth 2 times daily (with meals) 2019 at x1 Yes Janeth Mcdaniels APRN CNP   diclofenac (VOLTAREN) 1 % topical gel Place 2 g onto the skin 4 times daily Re-label for home use  Patient taking differently: Place 2 g onto the skin 4 times daily as needed Re-label for home use PRN Yes Prince Zee MD   docusate sodium (COLACE) 100 MG capsule Take 100 mg by mouth 2 times daily 2019 at x1 Yes Unknown, Entered By History   finasteride (PROSCAR) 5 MG tablet Take 1 tablet (5 mg) by mouth daily 6/15/2019 at am Yes Kwadwo Winslow MD   Multiple Vitamins-Minerals (OCUVITE PO) Take 1 tablet by mouth 2 times daily  6/15/2019 at am Yes Reported, Patient   triamterene-HCTZ (MAXZIDE-25) 37.5-25 MG tablet Take 1 tablet by mouth daily 6/15/2019 at am Yes Kwadwo Winslow MD  "  vitamin B complex with vitamin C (VITAMIN  B COMPLEX) TABS tablet Take 1 tablet by mouth daily 6/15/2019 at am Yes Reported, Patient   VITAMIN D, CHOLECALCIFEROL, PO Take 2,000 Units by mouth daily. 6/15/2019 at am Yes Unknown, Entered By History   order for DME Oxygen 2 Li/min  at night   Hari Ritter MD   order for DME Oxygen 2 Li/min  at night with CPAP  SPO2 less than or equal to oxygen saturation 89% on effective CPAP in patient with known cardiomyopathy   Hari Ritter MD              Review of Systems:   A Comprehensive greater than 10 system review of systems was carried out.  Pertinent positives and negatives are noted above.  Otherwise negative for contributory information.     Constitutional, neuro, ENT, endocrine, pulmonary, cardiac, gastrointestinal, genitourinary, musculoskeletal, integument and psychiatric systems are negative, except as otherwise noted.         Physical Exam:   Blood pressure 146/80, pulse 70, temperature 97.3  F (36.3  C), temperature source Oral, resp. rate 18, height 1.778 m (5' 10\"), weight 83.9 kg (185 lb), SpO2 98 %.    GENERAL:  Comfortable.  PSYCH: pleasant, oriented, No acute distress.  HEENT:  Atraumatic, normocephalic. Normal conjunctiva, normal hearing, and oropharynx is normal.  NECK:  Supple, no neck vein distention  HEART:  Irregularly irregular. Normal S1, S2 with no murmur, no pericardial rub, gallops or S3 or S4.  LUNGS:  Clear to auscultation, normal Respiratory effort. No wheezing, rales or ronchi.  GI:  Soft, normal bowel sounds. Non-tender, non distended.   EXTREMITIES:  1+ bilateral LE edema, +2 pulses bilateral and equal.  SKIN:  Dry to touch, No rash, wound or ulcerations.  NEUROLOGIC:  CN 2-12 intact, BL 5/5 symmetric upper and lower extremity strength, sensation is intact with no focal deficits.              Data:     Recent Labs   Lab 06/16/19  1021 06/10/19  1613   WBC 6.3 5.8   HGB 11.2* 11.8*   HCT 33.3* 36.6*   MCV 95 99    283     Recent " Labs   Lab 06/16/19  1021 06/10/19  1613    142   POTASSIUM 3.7 4.1   CHLORIDE 108 112*   CO2 23 24   ANIONGAP 9 6   GLC 96 99   BUN 28 27   CR 1.26* 1.08   GFRESTIMATED 51* 61   GFRESTBLACK 59* 71   KARLA 8.6 8.3*   PROTTOTAL  --  6.7*   ALBUMIN  --  2.9*   BILITOTAL  --  0.4   ALKPHOS  --  81   AST  --  36   ALT  --  35     Recent Labs   Lab 06/16/19  1021   INR 1.16*     Recent Labs   Lab 06/16/19  1005 06/12/19  1256   COLOR Red Yellow   APPEARANCE Cloudy Clear   URINEGLC Negative Negative   URINEBILI Negative Negative   URINEKETONE Negative Negative   SG 1.023 1.025   UBLD Large* Moderate*   URINEPH 7.5* 6.5   PROTEIN 300* >=300*   UROBILINOGEN  --  0.2   NITRITE Negative Negative   LEUKEST Negative Large*   RBCU >182*  --    WBCU 0  --          No results found for this or any previous visit (from the past 48 hour(s)).    Gena Velazquez PA-C

## 2019-06-17 LAB
ANION GAP SERPL CALCULATED.3IONS-SCNC: 5 MMOL/L (ref 3–14)
BACTERIA SPEC CULT: NO GROWTH
BASOPHILS # BLD AUTO: 0.1 10E9/L (ref 0–0.2)
BASOPHILS NFR BLD AUTO: 1 %
BUN SERPL-MCNC: 25 MG/DL (ref 7–30)
CALCIUM SERPL-MCNC: 8.6 MG/DL (ref 8.5–10.1)
CHLORIDE SERPL-SCNC: 107 MMOL/L (ref 94–109)
CO2 SERPL-SCNC: 28 MMOL/L (ref 20–32)
CREAT SERPL-MCNC: 1.21 MG/DL (ref 0.66–1.25)
DIFFERENTIAL METHOD BLD: ABNORMAL
EOSINOPHIL # BLD AUTO: 0.3 10E9/L (ref 0–0.7)
EOSINOPHIL NFR BLD AUTO: 4.5 %
ERYTHROCYTE [DISTWIDTH] IN BLOOD BY AUTOMATED COUNT: 13.1 % (ref 10–15)
GFR SERPL CREATININE-BSD FRML MDRD: 53 ML/MIN/{1.73_M2}
GLUCOSE SERPL-MCNC: 96 MG/DL (ref 70–99)
HCT VFR BLD AUTO: 34.3 % (ref 40–53)
HGB BLD-MCNC: 11.1 G/DL (ref 13.3–17.7)
IMM GRANULOCYTES # BLD: 0 10E9/L (ref 0–0.4)
IMM GRANULOCYTES NFR BLD: 0.4 %
LYMPHOCYTES # BLD AUTO: 1 10E9/L (ref 0.8–5.3)
LYMPHOCYTES NFR BLD AUTO: 12.9 %
Lab: NORMAL
MCH RBC QN AUTO: 31.6 PG (ref 26.5–33)
MCHC RBC AUTO-ENTMCNC: 32.4 G/DL (ref 31.5–36.5)
MCV RBC AUTO: 98 FL (ref 78–100)
MONOCYTES # BLD AUTO: 0.7 10E9/L (ref 0–1.3)
MONOCYTES NFR BLD AUTO: 9.9 %
NEUTROPHILS # BLD AUTO: 5.2 10E9/L (ref 1.6–8.3)
NEUTROPHILS NFR BLD AUTO: 71.3 %
NRBC # BLD AUTO: 0 10*3/UL
NRBC BLD AUTO-RTO: 0 /100
PLATELET # BLD AUTO: 246 10E9/L (ref 150–450)
POTASSIUM SERPL-SCNC: 4 MMOL/L (ref 3.4–5.3)
RBC # BLD AUTO: 3.51 10E12/L (ref 4.4–5.9)
SODIUM SERPL-SCNC: 140 MMOL/L (ref 133–144)
SPECIMEN SOURCE: NORMAL
WBC # BLD AUTO: 7.3 10E9/L (ref 4–11)

## 2019-06-17 PROCEDURE — 36415 COLL VENOUS BLD VENIPUNCTURE: CPT | Performed by: PHYSICIAN ASSISTANT

## 2019-06-17 PROCEDURE — G0378 HOSPITAL OBSERVATION PER HR: HCPCS

## 2019-06-17 PROCEDURE — 25000132 ZZH RX MED GY IP 250 OP 250 PS 637: Performed by: PHYSICIAN ASSISTANT

## 2019-06-17 PROCEDURE — 99225 ZZC SUBSEQUENT OBSERVATION CARE,LEVEL II: CPT | Performed by: PHYSICIAN ASSISTANT

## 2019-06-17 PROCEDURE — 99221 1ST HOSP IP/OBS SF/LOW 40: CPT | Mod: 57 | Performed by: UROLOGY

## 2019-06-17 PROCEDURE — 85025 COMPLETE CBC W/AUTO DIFF WBC: CPT | Performed by: PHYSICIAN ASSISTANT

## 2019-06-17 PROCEDURE — 80048 BASIC METABOLIC PNL TOTAL CA: CPT | Performed by: PHYSICIAN ASSISTANT

## 2019-06-17 RX ADMIN — FINASTERIDE 5 MG: 5 TABLET, FILM COATED ORAL at 08:17

## 2019-06-17 RX ADMIN — AMOXICILLIN 250 MG: 250 CAPSULE ORAL at 14:06

## 2019-06-17 RX ADMIN — TRIAMTERENE AND HYDROCHLOROTHIAZIDE 1 TABLET: 37.5; 25 TABLET ORAL at 08:17

## 2019-06-17 RX ADMIN — CARVEDILOL 37.5 MG: 25 TABLET, FILM COATED ORAL at 08:16

## 2019-06-17 RX ADMIN — SENNOSIDES AND DOCUSATE SODIUM 1 TABLET: 8.6; 5 TABLET ORAL at 20:15

## 2019-06-17 RX ADMIN — CARVEDILOL 37.5 MG: 25 TABLET, FILM COATED ORAL at 19:38

## 2019-06-17 RX ADMIN — AMOXICILLIN 250 MG: 250 CAPSULE ORAL at 08:16

## 2019-06-17 NOTE — PLAN OF CARE
PRIMARY DIAGNOSIS: CBI/HEMATURIA   OUTPATIENT/OBSERVATION GOALS TO BE MET BEFORE DISCHARGE:  1. ADLs back to baseline: Yes    2. Activity and level of assistance: Up with standby assistance.    3. Pain status: Pain free.    4. Return to near baseline physical activity: Yes    Patient alert and oriented x4. Vitals are Temp: 97.1  F (36.2  C) Temp src: Oral BP: 145/78 Pulse: 70 Heart Rate: 73 Resp: 16 SpO2: 95 % RA. Denies pain. CBI running at moderate rate with watermelon output. Patient NPO for urology consult. IV saline locked. Plan to continue with CBI and assist with supportive cares.      Discharge Planner Nurse   Safe discharge environment identified: Yes  Barriers to discharge: No       Entered by: Toma Wall 06/17/2019      Please review provider order for any additional goals.   Nurse to notify provider when observation goals have been met and patient is ready for discharge.

## 2019-06-17 NOTE — PLAN OF CARE
PRIMARY DIAGNOSIS: CBI / Hematuria   OUTPATIENT/OBSERVATION GOALS TO BE MET BEFORE DISCHARGE:  1. Pain Status: Pain free.    2. Return to near baseline physical activity: Yes    3. Cleared for discharge by consultants (if involved): No    Patient is alert and oriented x4. VSS. Denies pain. CBI hooked up and running. Before hooking up CBI patient was hand irrigated. Urine is now pinkish/red. Regular diet tolerated, NPO at midnight. SL. Urology is consulted for the AM.     Discharge Planner Nurse   Safe discharge environment identified: Yes  Barriers to discharge: No       Entered by: Harika Mota 06/16/2019 9:15 PM     Please review provider order for any additional goals.   Nurse to notify provider when observation goals have been met and patient is ready for discharge.

## 2019-06-17 NOTE — PLAN OF CARE
PRIMARY DIAGNOSIS: Hematuria    OUTPATIENT/OBSERVATION GOALS TO BE MET BEFORE DISCHARGE  Orthostatic performed: N/A    Stable Hgb Yes.   Recent Labs   Lab Test 06/17/19  0533 06/16/19  1021 06/10/19  1613   HGB 11.1* 11.2* 11.8*     Resolved or declined bleeding episodes: No,  CBI running with watermelon urine      Appropriate testing complete: Pending urology consult    Cleared for discharge by consultants (if involved): No    Safe discharge environment identified: Yes    Discharge Planner Nurse   Safe discharge environment identified: Yes  Barriers to discharge: Yes - CBI running, urology consult       Entered by: Jeanne Butler 06/17/2019        VSS. Denies pain or bladder pressure. CBI running at moderate rate; increased rate d/t dark, cherry color and 2-3 very small clots noted in tubing. Urine improved to light watermelon color after. Will titrate as able. NPO for urology consult. Plan: continue amoxicillin for UTI diagnosed PTA, urology consult, titrate CBI    Please review provider order for any additional goals.   Nurse to notify provider when observation goals have been met and patient is ready for discharge.

## 2019-06-17 NOTE — PLAN OF CARE
PRIMARY DIAGNOSIS: Hematuria    OUTPATIENT/OBSERVATION GOALS TO BE MET BEFORE DISCHARGE  1. Orthostatic performed: N/A    2. Stable Hgb Yes.   Recent Labs   Lab Test 06/17/19  0533 06/16/19  1021 06/10/19  1613   HGB 11.1* 11.2* 11.8*     3. Resolved or declined bleeding episodes: No,  CBI running with watermelon urine      4. Appropriate testing complete: Pending urology; will be NPO at midnight    5. Cleared for discharge by consultants (if involved): No    6. Safe discharge environment identified: Yes    Discharge Planner Nurse   Safe discharge environment identified: Yes  Barriers to discharge: Yes - CBI running, urology following       Entered by: Jeanne Butler 06/17/2019        VSS. Denies pain or bladder pressure. CBI running at moderate rate; attempted to titrate down, cherry red urine and one or two small clots noted. Increased rate, now watermelon color. Tolerating regular diet, will be NPO at 0000 per urology. Plan: Urology following, titrate CBI as tolerated, NPO at 0000    Please review provider order for any additional goals.   Nurse to notify provider when observation goals have been met and patient is ready for discharge.

## 2019-06-17 NOTE — PROGRESS NOTES
[facility]  Hospitalist Progress Note  Gena Velzaquez PA-C 06/17/2019    Reason for Stay (Diagnosis): hematuria, urinary retention         Assessment and Plan:      Summary of Stay: Tucker Slater is a 88 year old male with a PMH significant for bladder cancer s/p surgical resection (2000), prostate cancer s/p radiation ([id number]), CKD (baseline Cr 1.3-1.5), permanent A-fib on Warfarin, s/p pacemaker, HTN, DAWSON on CPAP, mild LV dysfunction (EF of 45-50%), OA, and peripheral neuropathy, who was admitted on 6/16/2019 with gross hematuria and urinary retention.       Problem List:   1. Recurrent gross hematuria - remote hx of prostate ca, s/p resection in 2000 and radiation therapy, multiple episodes of hematuria since April, had cystoscopy at that time with evacuation of clots and fulguration of prostatic veins. Anticoagulation for a fib was restarted on discharge but stopped again during admission on 6/1 (per pt, he states he's been off coumadin since Easter). On 6/1 admission, gross hematuria resolved with bladder irrigation. Recurrent bleeding the morning of admission, unable to void, and not anticoagulated. Hgb stable at 11.2, 11.1 today. Urology consulted, continue CBI, urine is still watermelon colored. Hyperbaric oxygen therapy may be an option, Dr. Ramirez to follow tomorrow and discuss this option. recheck hgb in AM.  2. Urinary retention - due to hematuria and clots, CBI started without difficulty. Urology followin  3. A fib - previously anticoagulated with coumadin, now on hold. Pt has follow up with cardiology at the end of July to discuss watchman procedure for a fib given recurrent issues with hematuria. Resume home Coreg  4. CKD - Cr stable  5. HTN - losartan discontinued during last admission. Maxide was held on discharge but recently restarted by PCP. Coreg and Maxide resumed. Pt does complain of increased LE edema recently, likely due to being off diuretics.   6. DAWSON - uses  "CPAP  7. Chronic anemia - Hgb 11.2, stable, at baseline. 11.1 today    DVT Prophylaxis: Ambulate every shift  Code Status: Full Code  Discharge Dispo: home  Estimated Disch Date / # of Days until Disch: hopefull able to discharge tomorrow        Interval History (Subjective):      Continues to have gross hematuria, CBI still running. Urology consulted. No other complaints.                   Physical Exam:      Last Vital Signs:  /80 (BP Location: Left arm)   Pulse 70   Temp 97.1  F (36.2  C) (Oral)   Resp 16   Ht 1.778 m (5' 10\")   Wt 85.9 kg (189 lb 4.8 oz)   SpO2 95%   BMI 27.16 kg/m        Intake/Output Summary (Last 24 hours) at 6/17/2019 1226  Last data filed at 6/17/2019 1158  Gross per 24 hour   Intake 240 ml   Output 6250 ml   Net -6010 ml       Constitutional: Awake, alert, cooperative, no apparent distress   Respiratory: Normal respiratory effort   Cardiovascular: Regular rate and rhythm   Abdomen:    Skin: No rashes, no cyanosis, dry to touch   Neuro: Alert and oriented x3, no weakness, numbness, memory loss   Extremities: 1+ LE edema, normal range of motion   Other(s):        All other systems: Negative          Medications:      All current medications were reviewed with changes reflected in problem list.         Data:      All new lab and imaging data was reviewed.   Labs:  Recent Labs   Lab 06/17/19  0533 06/16/19  1021 06/10/19  1613   WBC 7.3 6.3 5.8   HGB 11.1* 11.2* 11.8*   HCT 34.3* 33.3* 36.6*   MCV 98 95 99    235 283     Recent Labs   Lab 06/17/19  0533 06/16/19  1021 06/10/19  1613    140 142   POTASSIUM 4.0 3.7 4.1   CHLORIDE 107 108 112*   CO2 28 23 24   ANIONGAP 5 9 6   GLC 96 96 99   BUN 25 28 27   CR 1.21 1.26* 1.08   GFRESTIMATED 53* 51* 61   GFRESTBLACK 61 59* 71   KARLA 8.6 8.6 8.3*   PROTTOTAL  --   --  6.7*   ALBUMIN  --   --  2.9*   BILITOTAL  --   --  0.4   ALKPHOS  --   --  81   AST  --   --  36   ALT  --   --  35     Recent Labs   Lab 06/16/19  1021   INR " 1.16*     Recent Labs   Lab 06/16/19  1005 06/12/19  1256   COLOR Red Yellow   APPEARANCE Cloudy Clear   URINEGLC Negative Negative   URINEBILI Negative Negative   URINEKETONE Negative Negative   SG 1.023 1.025   UBLD Large* Moderate*   URINEPH 7.5* 6.5   PROTEIN 300* >=300*   UROBILINOGEN  --  0.2   NITRITE Negative Negative   LEUKEST Negative Large*   RBCU >182*  --    WBCU 0  --       Imaging:   No results found for this or any previous visit (from the past 48 hour(s)).    Gena Velazquez PA-C

## 2019-06-17 NOTE — CONSULTS
CTS identifies pt as high risk due to Elevated FABIO. Patient was admitted on 6/16/19 for hematuria. Patient was seen in ER earlier on 6/10/19.Patient has had two previous hospitalizations for anemia d/t hematuria requiring a cysto. Patient has a history of a stroke, Afib on coumadin, pacemaker, HTN, and DAWSON, and Prostrate/ Bladder cancer. Patient has a CPAP with O2 at home through [employer]. Patient has a  through [facility].  A follow up PCP appointment was scheduled for the patient with Dr. Winslow on June 26th at 1:40 PM.      No Handoff needed to PCP clinic at discharge. No gaps in care identified. Patient followed up with urology and PCP as scheduled after last hospitalization. Clinic care coordination followed up with patient and no further need to continue to follow.      CM will continue to follow patient for any additional discharge needs.     Nitza LYN  Care Transition Services  [phone]

## 2019-06-17 NOTE — PLAN OF CARE
"PRIMARY DIAGNOSIS: HEMATURIA     OUTPATIENT/OBSERVATION GOALS TO BE MET BEFORE DISCHARGE  1. Orthostatic performed: N/A    2. Stable Hgb Yes.   Recent Labs   Lab Test 06/17/19  0533 06/16/19  1021 06/10/19  1613   HGB 11.1* 11.2* 11.8*         3. Resolved or declined bleeding episodes: No, CBI running at moderate rate.     4. Appropriate testing complete: Urology consultation.     5. Cleared for discharge by consultants (if involved): No    6. Safe discharge environment identified: Yes    Discharge Planner Nurse   Safe discharge environment identified: Yes  Barriers to discharge: Yes       Entered by: Noemí Perez 06/17/2019 4:00 PM   /72   Pulse 70   Temp 96.6  F (35.9  C) (Oral)   Resp 16   Ht 1.778 m (5' 10\")   Wt 85.9 kg (189 lb 4.8 oz)   SpO2 98%   BMI 27.16 kg/m    A&Ox4. Numbness/tingling to BUE and BLE (baseline per pt), CMS otherwise intact. BS active x4, tolerating regular diet, passing flatus. CBI running at moderate rate, watermelon in color. Urology following. NPO at midnight. Will continue to monitor.     Please review provider order for any additional goals.   Nurse to notify provider when observation goals have been met and patient is ready for discharge.  "

## 2019-06-17 NOTE — CONSULTS
[facility] Consultation by [facility]    Tucker Slater MRN# [id number]   Age: 88 year old YOB: 1931     Date of Admission:  6/16/2019    Reason for consult: Recurrent gross hematuria       Requesting PA/MD: PERNELL Velazquez       Level of consult: Consult, follow and place orders           Assessment and Plan:   Assessment:   Recurrent gross hematuria, recent UTI, History of bladder cancer-with no recurrence.  History of prostate cancer-PSA stable.  Gross hematuria with clots due to prostatic radiation hx and Coumadin.          Plan:   New UC pend  CBI, titrate as able. Continue NPO until we reassess later today.  Considering Watchman procedure  INR was 1.16 on arrival  Discussed briefly oxygen chamber therapy. Would defer to Dr. Ramirez.     Dr. Bernabe updated.     Adrienne Kelley PA-C  [facility]  [phone]               Chief Complaint:   Gross hematuria     History is obtained from the patient and EMR.         History of Present Illness:   This patient is a 88 year old male who presents with gross hematuria. Well known to our service. He follows with Dr. Ramirez. He underwent radiation therapy for prostate cancer many years ago.  He has been troubled with gross hematuria and clot urinary retention this year and is been hospitalized several times.  His PSA is stable.  Seen on 6/12/19 and UC notable for enterococcus. He took a course of amoxicillin for this.     Tolerating [place] well. Urine hien' on slow gtt CBI. No fevers or chills. Considering a Watchman procedure. INR on arrival was 1.15 (has been off Coumadin).                 Past Medical History:     Past Medical History:   Diagnosis Date     Arrhythmia     A Fib     Balance problem     related to neuropathy in feet     Bradycardia      Carcinoma in situ of bladder 2000    bladder cancer ;; follow w Urology w periodic cysto      Essential hypertension, benign      Generalized osteoarthrosis, unspecified site knees    s/p  R total knee  ; will do L 6/10      Hernia, abdominal      Numbness and tingling     toes and fingers     Pacemaker     [employer]      Pain in joint, shoulder region     L shoulder rotater tear; no surgery      Palpitations      Persistent atrial fibrillation (H) 1/30/15     Prostate CA (H) 2008    radiation     Prostate cancer (H)     completed RT 3/09     Renal disease     elevated creatinine     Shoulder impingement     R shoulder impingement etc see MRI       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     toes both feet              Past Surgical History:     Past Surgical History:   Procedure Laterality Date     ARTHROPLASTY HIP Right 3/27/2017    Procedure: ARTHROPLASTY HIP;  Surgeon: Jigar Wynn MD;  Location: [facility] OR     C NONSPECIFIC PROCEDURE      bilat inguinal hernia repairs ( 3total procedures)      C NONSPECIFIC PROCEDURE      pilonidal cyst     C NONSPECIFIC PROCEDURE      T + A     C NONSPECIFIC PROCEDURE       R+L total knee     CARDIOVERSION  3/26/15     COLONOSCOPY       CYSTOSCOPY       CYSTOSCOPY, FULGURATE BLEEDERS, EVACUATE CLOT(S), COMBINED N/A 2019    Procedure: Exam under anesthesia, video cystopanendoscopy, evacuation of clots, fulguration of prostatic veins.;  Surgeon: Ilir Ramirez MD;  Location: [facility] OR     HERNIA REPAIR       IMPLANT PACEMAKER  3/26/15     RELEASE CARPAL TUNNEL Right 2017    Procedure: RELEASE CARPAL TUNNEL;  Right carpal tunnel release;  Surgeon: Alonso Barboza MD;  Location: [facility] OR             Social History:     Social History     Tobacco Use     Smoking status: Former Smoker     Last attempt to quit: 1977     Years since quittin.7     Smokeless tobacco: Never Used   Substance Use Topics     Alcohol use: No     Alcohol/week: 0.0 oz             Family History:     Family History   Problem Relation Age of Onset     Heart Disease Father          87yo     Coronary Artery Disease  "Father      Heart Disease Mother          74yo     Coronary Artery Disease Mother      Family History Negative Brother      Family history reviewed.             Allergies:     Allergies   Allergen Reactions     Merbromin      Other reaction(s): Other, see comments  Severe reaction as child. Amalgam fillings OK.     Morphine Nausea and Vomiting     Amlodipine      Edema             Medications:     Current Facility-Administered Medications   Medication     acetaminophen (TYLENOL) tablet 650 mg     amoxicillin (AMOXIL) capsule 250 mg     carvedilol (COREG) tablet 37.5 mg     finasteride (PROSCAR) tablet 5 mg     lidocaine (LMX4) cream     lidocaine 1 % 0.1-1 mL     melatonin tablet 1 mg     naloxone (NARCAN) injection 0.1-0.4 mg     ondansetron (ZOFRAN-ODT) ODT tab 4 mg    Or     ondansetron (ZOFRAN) injection 4 mg     polyethylene glycol (MIRALAX/GLYCOLAX) Packet 17 g     senna-docusate (SENOKOT-S/PERICOLACE) 8.6-50 MG per tablet 1 tablet    Or     senna-docusate (SENOKOT-S/PERICOLACE) 8.6-50 MG per tablet 2 tablet     sodium chloride (PF) 0.9% PF flush 3 mL     sodium chloride (PF) 0.9% PF flush 3 mL     traMADol (ULTRAM) tablet 50 mg     triamterene-HCTZ (MAXZIDE-25) 37.5-25 MG per tablet 1 tablet             Review of Systems:   A comprehensive review of systems was performed and found to be negative except as described in this note     /80 (BP Location: Left arm)   Pulse 70   Temp 97.1  F (36.2  C) (Oral)   Resp 16   Ht 1.778 m (5' 10\")   Wt 85.9 kg (189 lb 4.8 oz)   SpO2 95%   BMI 27.16 kg/m    GEN: NAD, lying in bed  EYES: EOMI  MOUTH: MMM  NECK: Supple  RESP: Unlabored breathing  CARDIAC: No LE edema  SKIN: Warm  ABD: soft  NEURO: AAO  : hien' on very slow gtt CBI, no clots.           Data:     Lab Results   Component Value Date    WBC 7.3 2019    HGB 11.1 (L) 2019    HCT 34.3 (L) 2019    MCV 98 2019     2019     Lab Results   Component Value Date    " CR 1.21 06/17/2019

## 2019-06-17 NOTE — PLAN OF CARE
PRIMARY DIAGNOSIS: CBI/HEMATURIA   OUTPATIENT/OBSERVATION GOALS TO BE MET BEFORE DISCHARGE:  ADLs back to baseline: Yes    Activity and level of assistance: Up with standby assistance.    Pain status: Pain free.    Return to near baseline physical activity: Yes    Patient alert and oriented x4. Vitals are Temp: 97  F (36.1  C) Temp src: Oral BP: 141/72 Pulse: 70 Heart Rate: 63 Resp: 16 SpO2: 97 % RA. Denies pain. CBI running at moderate rate with watermelon output. Patient NPO for urology consult. IV saline locked. Plan to continue with CBI and assist with supportive cares.      Discharge Planner Nurse   Safe discharge environment identified: Yes  Barriers to discharge: No       Entered by: Toma Wall 06/17/2019      Please review provider order for any additional goals.   Nurse to notify provider when observation goals have been met and patient is ready for discharge.

## 2019-06-18 ENCOUNTER — ANESTHESIA (OUTPATIENT)
Dept: SURGERY | Facility: CLINIC | Age: 84
DRG: 714 | End: 2019-06-18
Payer: COMMERCIAL

## 2019-06-18 ENCOUNTER — APPOINTMENT (OUTPATIENT)
Dept: GENERAL RADIOLOGY | Facility: CLINIC | Age: 84
DRG: 714 | End: 2019-06-18
Attending: UROLOGY
Payer: COMMERCIAL

## 2019-06-18 ENCOUNTER — ANESTHESIA EVENT (OUTPATIENT)
Dept: SURGERY | Facility: CLINIC | Age: 84
DRG: 714 | End: 2019-06-18
Payer: COMMERCIAL

## 2019-06-18 ENCOUNTER — SURGERY (OUTPATIENT)
Age: 84
End: 2019-06-18
Payer: COMMERCIAL

## 2019-06-18 LAB — HGB BLD-MCNC: 11.3 G/DL (ref 13.3–17.7)

## 2019-06-18 PROCEDURE — 71000012 ZZH RECOVERY PHASE 1 LEVEL 1 FIRST HR: Performed by: UROLOGY

## 2019-06-18 PROCEDURE — 88305 TISSUE EXAM BY PATHOLOGIST: CPT | Mod: 26,76 | Performed by: UROLOGY

## 2019-06-18 PROCEDURE — 25000128 H RX IP 250 OP 636: Performed by: NURSE ANESTHETIST, CERTIFIED REGISTERED

## 2019-06-18 PROCEDURE — 40000277 XR SURGERY CARM FLUORO LESS THAN 5 MIN W STILLS: Mod: TC

## 2019-06-18 PROCEDURE — BT141ZZ FLUOROSCOPY OF KIDNEYS, URETERS AND BLADDER USING LOW OSMOLAR CONTRAST: ICD-10-PCS | Performed by: UROLOGY

## 2019-06-18 PROCEDURE — 88305 TISSUE EXAM BY PATHOLOGIST: CPT | Performed by: UROLOGY

## 2019-06-18 PROCEDURE — 25000132 ZZH RX MED GY IP 250 OP 250 PS 637: Performed by: UROLOGY

## 2019-06-18 PROCEDURE — 99207 ZZC CDG-CHARGE REQUIRED MANUAL ENTRY: CPT | Performed by: PHYSICIAN ASSISTANT

## 2019-06-18 PROCEDURE — 25000128 H RX IP 250 OP 636: Performed by: ANESTHESIOLOGY

## 2019-06-18 PROCEDURE — 71000013 ZZH RECOVERY PHASE 1 LEVEL 1 EA ADDTL HR: Performed by: UROLOGY

## 2019-06-18 PROCEDURE — 25000125 ZZHC RX 250: Performed by: NURSE ANESTHETIST, CERTIFIED REGISTERED

## 2019-06-18 PROCEDURE — 25000132 ZZH RX MED GY IP 250 OP 250 PS 637: Performed by: PHYSICIAN ASSISTANT

## 2019-06-18 PROCEDURE — 74420 UROGRAPHY RTRGR +-KUB: CPT | Mod: 26 | Performed by: UROLOGY

## 2019-06-18 PROCEDURE — 36415 COLL VENOUS BLD VENIPUNCTURE: CPT | Performed by: PHYSICIAN ASSISTANT

## 2019-06-18 PROCEDURE — 52601 PROSTATECTOMY (TURP): CPT | Performed by: UROLOGY

## 2019-06-18 PROCEDURE — 36000058 ZZH SURGERY LEVEL 3 EA 15 ADDTL MIN: Performed by: UROLOGY

## 2019-06-18 PROCEDURE — 0TBB8ZX EXCISION OF BLADDER, VIA NATURAL OR ARTIFICIAL OPENING ENDOSCOPIC, DIAGNOSTIC: ICD-10-PCS | Performed by: UROLOGY

## 2019-06-18 PROCEDURE — 40000936 ZZH STATISTIC OUTPATIENT (NON-OBS) NIGHT

## 2019-06-18 PROCEDURE — 0TCB8ZZ EXTIRPATION OF MATTER FROM BLADDER, VIA NATURAL OR ARTIFICIAL OPENING ENDOSCOPIC: ICD-10-PCS | Performed by: UROLOGY

## 2019-06-18 PROCEDURE — 27210794 ZZH OR GENERAL SUPPLY STERILE: Performed by: UROLOGY

## 2019-06-18 PROCEDURE — G0378 HOSPITAL OBSERVATION PER HR: HCPCS

## 2019-06-18 PROCEDURE — 88341 IMHCHEM/IMCYTCHM EA ADD ANTB: CPT | Mod: 26 | Performed by: UROLOGY

## 2019-06-18 PROCEDURE — 88342 IMHCHEM/IMCYTCHM 1ST ANTB: CPT | Mod: 26 | Performed by: UROLOGY

## 2019-06-18 PROCEDURE — 25800025 ZZH RX 258: Performed by: UROLOGY

## 2019-06-18 PROCEDURE — 25500064 ZZH RX 255 OP 636: Performed by: UROLOGY

## 2019-06-18 PROCEDURE — 88342 IMHCHEM/IMCYTCHM 1ST ANTB: CPT | Performed by: UROLOGY

## 2019-06-18 PROCEDURE — 0V508ZZ DESTRUCTION OF PROSTATE, VIA NATURAL OR ARTIFICIAL OPENING ENDOSCOPIC: ICD-10-PCS | Performed by: UROLOGY

## 2019-06-18 PROCEDURE — 36000060 ZZH SURGERY LEVEL 3 W FLUORO 1ST 30 MIN: Performed by: UROLOGY

## 2019-06-18 PROCEDURE — 88341 IMHCHEM/IMCYTCHM EA ADD ANTB: CPT | Performed by: UROLOGY

## 2019-06-18 PROCEDURE — 88344 IMHCHEM/IMCYTCHM EA MLT ANTB: CPT | Performed by: UROLOGY

## 2019-06-18 PROCEDURE — 99225 ZZC SUBSEQUENT OBSERVATION CARE,LEVEL II: CPT | Performed by: PHYSICIAN ASSISTANT

## 2019-06-18 PROCEDURE — 88344 IMHCHEM/IMCYTCHM EA MLT ANTB: CPT | Mod: 26 | Performed by: UROLOGY

## 2019-06-18 PROCEDURE — 85018 HEMOGLOBIN: CPT | Performed by: PHYSICIAN ASSISTANT

## 2019-06-18 PROCEDURE — 25000125 ZZHC RX 250: Performed by: UROLOGY

## 2019-06-18 PROCEDURE — 37000008 ZZH ANESTHESIA TECHNICAL FEE, 1ST 30 MIN: Performed by: UROLOGY

## 2019-06-18 PROCEDURE — 40000306 ZZH STATISTIC PRE PROC ASSESS II: Performed by: UROLOGY

## 2019-06-18 PROCEDURE — 25000128 H RX IP 250 OP 636: Performed by: UROLOGY

## 2019-06-18 PROCEDURE — 37000009 ZZH ANESTHESIA TECHNICAL FEE, EACH ADDTL 15 MIN: Performed by: UROLOGY

## 2019-06-18 PROCEDURE — 25800030 ZZH RX IP 258 OP 636: Performed by: NURSE ANESTHETIST, CERTIFIED REGISTERED

## 2019-06-18 PROCEDURE — 0VB08ZZ EXCISION OF PROSTATE, VIA NATURAL OR ARTIFICIAL OPENING ENDOSCOPIC: ICD-10-PCS | Performed by: UROLOGY

## 2019-06-18 RX ORDER — SORBITOL 30 G/1000ML
IRRIGANT IRRIGATION PRN
Status: DISCONTINUED | OUTPATIENT
Start: 2019-06-18 | End: 2019-06-18 | Stop reason: HOSPADM

## 2019-06-18 RX ORDER — FENTANYL CITRATE 50 UG/ML
25-50 INJECTION, SOLUTION INTRAMUSCULAR; INTRAVENOUS
Status: DISCONTINUED | OUTPATIENT
Start: 2019-06-18 | End: 2019-06-18 | Stop reason: HOSPADM

## 2019-06-18 RX ORDER — FENTANYL CITRATE 50 UG/ML
INJECTION, SOLUTION INTRAMUSCULAR; INTRAVENOUS PRN
Status: DISCONTINUED | OUTPATIENT
Start: 2019-06-18 | End: 2019-06-18

## 2019-06-18 RX ORDER — HYDROMORPHONE HYDROCHLORIDE 1 MG/ML
.3-.5 INJECTION, SOLUTION INTRAMUSCULAR; INTRAVENOUS; SUBCUTANEOUS EVERY 10 MIN PRN
Status: DISCONTINUED | OUTPATIENT
Start: 2019-06-18 | End: 2019-06-18 | Stop reason: HOSPADM

## 2019-06-18 RX ORDER — SODIUM CHLORIDE, SODIUM LACTATE, POTASSIUM CHLORIDE, CALCIUM CHLORIDE 600; 310; 30; 20 MG/100ML; MG/100ML; MG/100ML; MG/100ML
INJECTION, SOLUTION INTRAVENOUS CONTINUOUS
Status: DISCONTINUED | OUTPATIENT
Start: 2019-06-18 | End: 2019-06-19

## 2019-06-18 RX ORDER — CIPROFLOXACIN 500 MG/1
500 TABLET, FILM COATED ORAL EVERY 12 HOURS SCHEDULED
Status: DISCONTINUED | OUTPATIENT
Start: 2019-06-18 | End: 2019-06-20 | Stop reason: HOSPADM

## 2019-06-18 RX ORDER — EPHEDRINE SULFATE 50 MG/ML
INJECTION, SOLUTION INTRAVENOUS PRN
Status: DISCONTINUED | OUTPATIENT
Start: 2019-06-18 | End: 2019-06-18

## 2019-06-18 RX ORDER — NALOXONE HYDROCHLORIDE 0.4 MG/ML
.1-.4 INJECTION, SOLUTION INTRAMUSCULAR; INTRAVENOUS; SUBCUTANEOUS
Status: DISCONTINUED | OUTPATIENT
Start: 2019-06-18 | End: 2019-06-18 | Stop reason: HOSPADM

## 2019-06-18 RX ORDER — DEXAMETHASONE SODIUM PHOSPHATE 4 MG/ML
INJECTION, SOLUTION INTRA-ARTICULAR; INTRALESIONAL; INTRAMUSCULAR; INTRAVENOUS; SOFT TISSUE PRN
Status: DISCONTINUED | OUTPATIENT
Start: 2019-06-18 | End: 2019-06-18

## 2019-06-18 RX ORDER — SODIUM CHLORIDE, SODIUM LACTATE, POTASSIUM CHLORIDE, CALCIUM CHLORIDE 600; 310; 30; 20 MG/100ML; MG/100ML; MG/100ML; MG/100ML
INJECTION, SOLUTION INTRAVENOUS CONTINUOUS
Status: DISCONTINUED | OUTPATIENT
Start: 2019-06-18 | End: 2019-06-18 | Stop reason: HOSPADM

## 2019-06-18 RX ORDER — LIDOCAINE HYDROCHLORIDE 10 MG/ML
INJECTION, SOLUTION INFILTRATION; PERINEURAL PRN
Status: DISCONTINUED | OUTPATIENT
Start: 2019-06-18 | End: 2019-06-18

## 2019-06-18 RX ORDER — ONDANSETRON 2 MG/ML
4 INJECTION INTRAMUSCULAR; INTRAVENOUS EVERY 30 MIN PRN
Status: DISCONTINUED | OUTPATIENT
Start: 2019-06-18 | End: 2019-06-18 | Stop reason: HOSPADM

## 2019-06-18 RX ORDER — NALOXONE HYDROCHLORIDE 0.4 MG/ML
.1-.4 INJECTION, SOLUTION INTRAMUSCULAR; INTRAVENOUS; SUBCUTANEOUS
Status: DISCONTINUED | OUTPATIENT
Start: 2019-06-18 | End: 2019-06-20 | Stop reason: HOSPADM

## 2019-06-18 RX ORDER — MEPERIDINE HYDROCHLORIDE 50 MG/ML
12.5 INJECTION INTRAMUSCULAR; INTRAVENOUS; SUBCUTANEOUS
Status: DISCONTINUED | OUTPATIENT
Start: 2019-06-18 | End: 2019-06-18 | Stop reason: HOSPADM

## 2019-06-18 RX ORDER — CEFAZOLIN SODIUM 2 G/100ML
2 INJECTION, SOLUTION INTRAVENOUS
Status: COMPLETED | OUTPATIENT
Start: 2019-06-18 | End: 2019-06-18

## 2019-06-18 RX ORDER — ONDANSETRON 2 MG/ML
INJECTION INTRAMUSCULAR; INTRAVENOUS PRN
Status: DISCONTINUED | OUTPATIENT
Start: 2019-06-18 | End: 2019-06-18

## 2019-06-18 RX ORDER — LABETALOL 20 MG/4 ML (5 MG/ML) INTRAVENOUS SYRINGE
10
Status: DISCONTINUED | OUTPATIENT
Start: 2019-06-18 | End: 2019-06-18 | Stop reason: HOSPADM

## 2019-06-18 RX ORDER — CEFAZOLIN SODIUM 1 G/50ML
1 INJECTION, SOLUTION INTRAVENOUS SEE ADMIN INSTRUCTIONS
Status: DISCONTINUED | OUTPATIENT
Start: 2019-06-18 | End: 2019-06-18 | Stop reason: HOSPADM

## 2019-06-18 RX ORDER — ACETAMINOPHEN 325 MG/1
975 TABLET ORAL ONCE
Status: DISCONTINUED | OUTPATIENT
Start: 2019-06-18 | End: 2019-06-18 | Stop reason: HOSPADM

## 2019-06-18 RX ORDER — ONDANSETRON 4 MG/1
4 TABLET, ORALLY DISINTEGRATING ORAL EVERY 30 MIN PRN
Status: DISCONTINUED | OUTPATIENT
Start: 2019-06-18 | End: 2019-06-18 | Stop reason: HOSPADM

## 2019-06-18 RX ORDER — PROPOFOL 10 MG/ML
INJECTION, EMULSION INTRAVENOUS PRN
Status: DISCONTINUED | OUTPATIENT
Start: 2019-06-18 | End: 2019-06-18

## 2019-06-18 RX ORDER — LIDOCAINE 40 MG/G
CREAM TOPICAL
Status: DISCONTINUED | OUTPATIENT
Start: 2019-06-18 | End: 2019-06-20 | Stop reason: HOSPADM

## 2019-06-18 RX ORDER — SODIUM CHLORIDE, SODIUM LACTATE, POTASSIUM CHLORIDE, CALCIUM CHLORIDE 600; 310; 30; 20 MG/100ML; MG/100ML; MG/100ML; MG/100ML
INJECTION, SOLUTION INTRAVENOUS CONTINUOUS PRN
Status: DISCONTINUED | OUTPATIENT
Start: 2019-06-18 | End: 2019-06-18

## 2019-06-18 RX ADMIN — SENNOSIDES AND DOCUSATE SODIUM 2 TABLET: 8.6; 5 TABLET ORAL at 07:59

## 2019-06-18 RX ADMIN — CARVEDILOL 37.5 MG: 25 TABLET, FILM COATED ORAL at 07:59

## 2019-06-18 RX ADMIN — CEFAZOLIN SODIUM 2 G: 2 INJECTION, SOLUTION INTRAVENOUS at 13:58

## 2019-06-18 RX ADMIN — WATER 30 ML GIVEN: 100 IRRIGANT IRRIGATION at 15:23

## 2019-06-18 RX ADMIN — FENTANYL CITRATE 25 MCG: 50 INJECTION, SOLUTION INTRAMUSCULAR; INTRAVENOUS at 14:15

## 2019-06-18 RX ADMIN — EPHEDRINE SULFATE 10 MG: 50 INJECTION, SOLUTION INTRAVENOUS at 15:22

## 2019-06-18 RX ADMIN — LIDOCAINE HYDROCHLORIDE 30 MG: 10 INJECTION, SOLUTION INFILTRATION; PERINEURAL at 13:53

## 2019-06-18 RX ADMIN — EPHEDRINE SULFATE 5 MG: 50 INJECTION, SOLUTION INTRAVENOUS at 14:23

## 2019-06-18 RX ADMIN — ONDANSETRON 4 MG: 2 INJECTION INTRAMUSCULAR; INTRAVENOUS at 15:36

## 2019-06-18 RX ADMIN — TRIAMTERENE AND HYDROCHLOROTHIAZIDE 1 TABLET: 37.5; 25 TABLET ORAL at 07:59

## 2019-06-18 RX ADMIN — FENTANYL CITRATE 25 MCG: 50 INJECTION, SOLUTION INTRAMUSCULAR; INTRAVENOUS at 14:28

## 2019-06-18 RX ADMIN — SODIUM CHLORIDE, POTASSIUM CHLORIDE, SODIUM LACTATE AND CALCIUM CHLORIDE: 600; 310; 30; 20 INJECTION, SOLUTION INTRAVENOUS at 13:35

## 2019-06-18 RX ADMIN — SODIUM CHLORIDE, POTASSIUM CHLORIDE, SODIUM LACTATE AND CALCIUM CHLORIDE: 600; 310; 30; 20 INJECTION, SOLUTION INTRAVENOUS at 15:12

## 2019-06-18 RX ADMIN — FENTANYL CITRATE 50 MCG: 50 INJECTION INTRAMUSCULAR; INTRAVENOUS at 17:00

## 2019-06-18 RX ADMIN — CARVEDILOL 37.5 MG: 25 TABLET, FILM COATED ORAL at 19:17

## 2019-06-18 RX ADMIN — WATER 12000 ML: 100 IRRIGANT IRRIGATION at 15:33

## 2019-06-18 RX ADMIN — FINASTERIDE 5 MG: 5 TABLET, FILM COATED ORAL at 07:59

## 2019-06-18 RX ADMIN — FENTANYL CITRATE 50 MCG: 50 INJECTION, SOLUTION INTRAMUSCULAR; INTRAVENOUS at 13:53

## 2019-06-18 RX ADMIN — EPHEDRINE SULFATE 5 MG: 50 INJECTION, SOLUTION INTRAVENOUS at 14:13

## 2019-06-18 RX ADMIN — CIPROFLOXACIN HYDROCHLORIDE 500 MG: 500 TABLET, FILM COATED ORAL at 19:17

## 2019-06-18 RX ADMIN — PROPOFOL 150 MG: 10 INJECTION, EMULSION INTRAVENOUS at 13:53

## 2019-06-18 RX ADMIN — DEXAMETHASONE SODIUM PHOSPHATE 4 MG: 4 INJECTION, SOLUTION INTRA-ARTICULAR; INTRALESIONAL; INTRAMUSCULAR; INTRAVENOUS; SOFT TISSUE at 13:53

## 2019-06-18 RX ADMIN — SORBITOL 24900 ML: 30 IRRIGANT IRRIGATION at 15:54

## 2019-06-18 ASSESSMENT — ENCOUNTER SYMPTOMS: DYSRHYTHMIAS: 1

## 2019-06-18 NOTE — ANESTHESIA POSTPROCEDURE EVALUATION
Patient: Tucker Slater    Procedure(s):  VIDEO CYSTOSCOPY, EVACUTATION OF CLOTS, BILATERAL RETROGRADE PYELOGRAMS, CUP BIOPSIES OF BLADDER WALL, TRANSURETHRAL RESECTION OF PROSTATE  Cystoscopy, transurethral resection (TUR) prostate, combined    Diagnosis:bleeding  Diagnosis Additional Information: No value filed.    Anesthesia Type:  General, LMA    Note:  Anesthesia Post Evaluation    Patient location during evaluation: PACU  Patient participation: Able to fully participate in evaluation  Level of consciousness: awake  Pain management: adequate  Airway patency: patent  Cardiovascular status: acceptable  Respiratory status: acceptable  Hydration status: acceptable  PONV: controlled     Anesthetic complications: None          Last vitals:  Vitals:    06/18/19 1610 06/18/19 1615 06/18/19 1620   BP: 129/77 152/83 154/87   Pulse: 70 69 69   Resp: 25 9 11   Temp:      SpO2: 100% 100% 99%         Electronically Signed By: Zack Jenkins DO  June 18, 2019  4:36 PM

## 2019-06-18 NOTE — PROGRESS NOTES
Patient does have an order by Dr. Ramirez to notify Dr Perez with cardiology post procedure. Dr. Jacob, on call provider with cardiology paged to clarify order. Dr. Jacob was unsure to why James has requested this. Patient has no significant cardiac event going on currently. However, Patient does have an appointment with Dr. Perez at 4pm tomorrow.

## 2019-06-18 NOTE — PLAN OF CARE
PRIMARY DIAGNOSIS: Hematuria    OUTPATIENT/OBSERVATION GOALS TO BE MET BEFORE DISCHARGE  1. Orthostatic performed: N/A      2. Stable Hgb: Yes  Recent Labs   Lab Test 06/18/19  0614 06/17/19  0533 06/16/19  1021   HGB 11.3* 11.1* 11.2*       3. Resolved or declined bleeding episodes: No,  CBI running @ moderate rate; watermelon colored     4. Appropriate testing complete: No - Cysto scheduled with Dr. Ramirez this afternoon    5. Cleared for discharge by consultants (if involved): No (urology)    6. Safe discharge environment identified: Yes    Discharge Planner Nurse   Safe discharge environment identified: Yes  Barriers to discharge: Yes - cysto this afternoon       Entered by: Jeanne Butler 06/18/2019        VSS. Denies bladder pain or pressure. States hip is a bit sore today, feels it is from bed. Declined intervention, improved after walk in halls and repositioning. Up in chair now. Has been NPO since 0000. CBI running at moderate rate, watermelon colored. Ambulated in riojas, urine remained clear but color darkened. No clots noted. Moving with Ax1, GB, and walker. Plan: cysto this afternoon with Dr. Ramirez. Patient has cardiology appointment at 16:15 tomorrow.    Report called to Magda in SDS.      Please review provider order for any additional goals.   Nurse to notify provider when observation goals have been met and patient is ready for discharge.

## 2019-06-18 NOTE — PLAN OF CARE
PRIMARY DIAGNOSIS: Hematuria    OUTPATIENT/OBSERVATION GOALS TO BE MET BEFORE DISCHARGE  Orthostatic performed: No    Stable Hgb Yes.   Recent Labs   Lab Test 06/17/19  0533 06/16/19  1021 06/10/19  1613   HGB 11.1* 11.2* 11.8*         Resolved or declined bleeding episodes: No, clear, pink red urine. CBI running moderately.     Appropriate testing complete: No    Cleared for discharge by consultants (if involved): No    Safe discharge environment identified: Yes    Discharge Planner Nurse   Safe discharge environment identified: Yes  Barriers to discharge: Yes       Entered by: Gena Ovalle 06/18/2019 12:42 AM     Please review provider order for any additional goals.   Nurse to notify provider when observation goals have been met and patient is ready for discharge.    VSS. Afebrile. Denies pain. CBI running moderately. Clear pink/red urine output. Ax1. Resting comfortably. Will continue to monitor.

## 2019-06-18 NOTE — PLAN OF CARE
PRIMARY DIAGNOSIS: Hematuria    OUTPATIENT/OBSERVATION GOALS TO BE MET BEFORE DISCHARGE  Orthostatic performed: N/A      Stable Hgb: Yes  Recent Labs   Lab Test 06/18/19  0614 06/17/19  0533 06/16/19  1021   HGB 11.3* 11.1* 11.2*       Resolved or declined bleeding episodes: No,  CBI running @ moderate rate; watermelon colored     Appropriate testing complete: No - Cysto scheduled with Dr. Ramirez this afternoon    Cleared for discharge by consultants (if involved): No (urology)    Safe discharge environment identified: Yes    Discharge Planner Nurse   Safe discharge environment identified: Yes  Barriers to discharge: Yes - cysto this afternoon       Entered by: Jeanne Butler 06/18/2019      Please review provider order for any additional goals.   Nurse to notify provider when observation goals have been met and patient is ready for discharge.

## 2019-06-18 NOTE — PROGRESS NOTES
[facility]    Hospitalist Progress Note  Name: Tucker Slater    MRN: [id number]  Provider:  Yelena Burns PA-C  Date of Service: 06/18/2019    Assessment & Plan   Summary of Stay: Tucker Slater is a 88 year old male with a PMH significant for bladder cancer s/p surgical resection (2000), prostate cancer s/p radiation (1515-4458), CKD (baseline Cr 1.3-1.5), permanent A-fib on Warfarin, s/p pacemaker, HTN, DAWSON on CPAP, mild LV dysfunction (EF of 45-50%), OA, and peripheral neuropathy, who was admitted on 6/16/2019 with gross hematuria and urinary retention.    #Recurrent gross hematuria: remote hx of prostate ca, s/p resection in 2000 and radiation therapy, multiple episodes of hematuria since April, had cystoscopy at that time with evacuation of clots and fulguration of prostatic veins. Anticoagulation for a fib was restarted on discharge but stopped again during admission on 6/1 (per pt, he states he's been off coumadin since Easter). On 6/1 admission, gross hematuria resolved with bladder irrigation. Recurrent bleeding the morning of admission, unable to void, and not anticoagulated. Hgb stable at 11.3 today.   - Urology consulted with plans for cystoscopy and pyelogram today, awaiting findings and pending this may be able to discharge later today  - Recheck hemoglobin in AM  - Continue CBI post procedure pending urology recommendations      #Urinary retention: due to hematuria and clots, CBI started without difficulty. Urology following    #A-fib: previously anticoagulated with coumadin, now on hold. Scheduled follow up appointment on 6/19 with Dr. Perez at 16:15 to discuss if patient would be a candidate for the Watchman device. Resume home Carvedilol.     #CKD: Cr stable    #HTN: losartan discontinued during last admission. Maxide was held on discharge but recently restarted by PCP. Coreg and Maxide resumed with stable BP this admission.    #DAWSON: continue nightly CPAP use    #Chronic  anemia: Hgb improved to 11.3 today    DVT Prophylaxis: Ambulate every shift  Code Status: Full Code  Disposition: Expected discharge later today or tomorrow     Interval History   CBI still running with gross hematuria. Urology plans for procedure this afternoon. No other complaints, denies chest pain, shortness of breath, lightheadedness, and dizziness.     -Data reviewed today: I reviewed all new labs and imaging reports over the last 24 hours.     Physical Exam   Temp: 97.1  F (36.2  C) Temp src: Oral BP: 123/67   Heart Rate: 75 Resp: 16 SpO2: 97 % O2 Device: None (Room air)    Vitals:    06/16/19 0924 06/16/19 1628   Weight: 83.9 kg (185 lb) 85.9 kg (189 lb 4.8 oz)     Vital Signs with Ranges  Temp:  [95.5  F (35.3  C)-97.1  F (36.2  C)] 97.1  F (36.2  C)  Heart Rate:  [69-78] 75  Resp:  [12-18] 16  BP: (123-153)/(67-75) 123/67  SpO2:  [94 %-98 %] 97 %  I/O last 3 completed shifts:  In: 360 [P.O.:360]  Out: 32427 [Urine:56826]    GEN:  Alert, oriented x 3, appears comfortable, NAD.  HEENT:  Normocephalic/atraumatic, no scleral icterus, no nasal discharge, mouth moist.  CV:  Regular rate and rhythm, no murmur or JVD.  S1 + S2 noted, no S3 or S4.  LUNGS:  Clear to auscultation bilaterally without rales/rhonchi/wheezing/retractions.  Symmetric chest rise on inhalation noted.  ABD:  Active bowel sounds, soft, non-tender/non-distended.  No rebound/guarding/rigidity.  : crawford in place with watermelon colored urine   EXT:  1+ LE edema.  No cyanosis.  No acute joint synovitis noted.  SKIN:  Dry to touch, no exanthems noted in the visualized areas.    Medications       carvedilol  37.5 mg Oral BID w/meals     finasteride  5 mg Oral Daily     sodium chloride (PF)  3 mL Intracatheter Q8H     triamterene-HCTZ  1 tablet Oral Daily     Data   Results for orders placed or performed during the hospital encounter of 06/16/19   CBC with platelets differential   Result Value Ref Range    WBC 6.3 4.0 - 11.0 10e9/L    RBC Count  3.49 (L) 4.4 - 5.9 10e12/L    Hemoglobin 11.2 (L) 13.3 - 17.7 g/dL    Hematocrit 33.3 (L) 40.0 - 53.0 %    MCV 95 78 - 100 fl    MCH 32.1 26.5 - 33.0 pg    MCHC 33.6 31.5 - 36.5 g/dL    RDW 13.1 10.0 - 15.0 %    Platelet Count 235 150 - 450 10e9/L    Diff Method Automated Method     % Neutrophils 79.5 %    % Lymphocytes 9.5 %    % Monocytes 6.2 %    % Eosinophils 3.6 %    % Basophils 0.9 %    % Immature Granulocytes 0.3 %    Nucleated RBCs 0 0 /100    Absolute Neutrophil 5.0 1.6 - 8.3 10e9/L    Absolute Lymphocytes 0.6 (L) 0.8 - 5.3 10e9/L    Absolute Monocytes 0.4 0.0 - 1.3 10e9/L    Absolute Eosinophils 0.2 0.0 - 0.7 10e9/L    Absolute Basophils 0.1 0.0 - 0.2 10e9/L    Abs Immature Granulocytes 0.0 0 - 0.4 10e9/L    Absolute Nucleated RBC 0.0    Basic metabolic panel   Result Value Ref Range    Sodium 140 133 - 144 mmol/L    Potassium 3.7 3.4 - 5.3 mmol/L    Chloride 108 94 - 109 mmol/L    Carbon Dioxide 23 20 - 32 mmol/L    Anion Gap 9 3 - 14 mmol/L    Glucose 96 70 - 99 mg/dL    Urea Nitrogen 28 7 - 30 mg/dL    Creatinine 1.26 (H) 0.66 - 1.25 mg/dL    GFR Estimate 51 (L) >60 mL/min/[1.73_m2]    GFR Estimate If Black 59 (L) >60 mL/min/[1.73_m2]    Calcium 8.6 8.5 - 10.1 mg/dL   INR   Result Value Ref Range    INR 1.16 (H) 0.86 - 1.14   UA with Microscopic   Result Value Ref Range    Color Urine Red     Appearance Urine Cloudy     Glucose Urine Negative NEG^Negative mg/dL    Bilirubin Urine Negative NEG^Negative    Ketones Urine Negative NEG^Negative mg/dL    Specific Gravity Urine 1.023 1.003 - 1.035    Blood Urine Large (A) NEG^Negative    pH Urine 7.5 (H) 5.0 - 7.0 pH    Protein Albumin Urine 300 (A) NEG^Negative mg/dL    Urobilinogen mg/dL Normal 0.0 - 2.0 mg/dL    Nitrite Urine Negative NEG^Negative    Leukocyte Esterase Urine Negative NEG^Negative    Source Catheterized Urine     WBC Urine 0 0 - 5 /HPF    RBC Urine >182 (H) 0 - 2 /HPF   Basic metabolic panel   Result Value Ref Range    Sodium 140 133 - 144  mmol/L    Potassium 4.0 3.4 - 5.3 mmol/L    Chloride 107 94 - 109 mmol/L    Carbon Dioxide 28 20 - 32 mmol/L    Anion Gap 5 3 - 14 mmol/L    Glucose 96 70 - 99 mg/dL    Urea Nitrogen 25 7 - 30 mg/dL    Creatinine 1.21 0.66 - 1.25 mg/dL    GFR Estimate 53 (L) >60 mL/min/[1.73_m2]    GFR Estimate If Black 61 >60 mL/min/[1.73_m2]    Calcium 8.6 8.5 - 10.1 mg/dL   CBC with platelets differential   Result Value Ref Range    WBC 7.3 4.0 - 11.0 10e9/L    RBC Count 3.51 (L) 4.4 - 5.9 10e12/L    Hemoglobin 11.1 (L) 13.3 - 17.7 g/dL    Hematocrit 34.3 (L) 40.0 - 53.0 %    MCV 98 78 - 100 fl    MCH 31.6 26.5 - 33.0 pg    MCHC 32.4 31.5 - 36.5 g/dL    RDW 13.1 10.0 - 15.0 %    Platelet Count 246 150 - 450 10e9/L    Diff Method Automated Method     % Neutrophils 71.3 %    % Lymphocytes 12.9 %    % Monocytes 9.9 %    % Eosinophils 4.5 %    % Basophils 1.0 %    % Immature Granulocytes 0.4 %    Nucleated RBCs 0 0 /100    Absolute Neutrophil 5.2 1.6 - 8.3 10e9/L    Absolute Lymphocytes 1.0 0.8 - 5.3 10e9/L    Absolute Monocytes 0.7 0.0 - 1.3 10e9/L    Absolute Eosinophils 0.3 0.0 - 0.7 10e9/L    Absolute Basophils 0.1 0.0 - 0.2 10e9/L    Abs Immature Granulocytes 0.0 0 - 0.4 10e9/L    Absolute Nucleated RBC 0.0    Hemoglobin   Result Value Ref Range    Hemoglobin 11.3 (L) 13.3 - 17.7 g/dL   Urology IP Consult: gross hematuria, urinary retention; Requested Clinic/Group: [facility]; Consultant may enter orders: Yes; Patient to be seen: Routine - within 24 hours; Requesting provider? Hospitalist (if different from attending phy...    Narrative    Adrienne Kelley PA-C     6/17/2019  8:29 AM  [facility] Consultation by [facility]    Tucker CECE Slater MRN# [id number]   Age: 88 year old YOB: 1931     Date of Admission:  6/16/2019    Reason for consult: Recurrent gross hematuria       Requesting PA/MD: PERNELL Velazquez       Level of consult: Consult, follow and place orders           Assessment and  Plan:   Assessment:   Recurrent gross hematuria, recent UTI, History of bladder   cancer-with no recurrence.  History of prostate cancer-PSA   stable.  Gross hematuria with clots due to prostatic radiation hx   and Coumadin.          Plan:   New UC pend  CBI, titrate as able. Continue NPO until we reassess later today.  Considering Watchman procedure  INR was 1.16 on arrival  Discussed briefly oxygen chamber therapy. Would defer to Dr. Ramirez.     Dr. Bernabe updated.     Adrienne Kelley PA-C  [facility]  [phone]               Chief Complaint:   Gross hematuria     History is obtained from the patient and EMR.         History of Present Illness:   This patient is a 88 year old male who presents with gross   hematuria. Well known to our service. He follows with Dr. Ramirez.   He underwent radiation therapy for prostate cancer many years   ago.  He has been troubled with gross hematuria and clot urinary   retention this year and is been hospitalized several times.  His   PSA is stable.  Seen on 6/12/19 and UC notable for enterococcus.   He took a course of amoxicillin for this.     Tolerating [place] well. Urine hien' on slow gtt CBI. No fevers or   chills. Considering a Watchman procedure. INR on arrival was 1.15   (has been off Coumadin).                 Past Medical History:     Past Medical History:   Diagnosis Date     Arrhythmia     A Fib     Balance problem     related to neuropathy in feet     Bradycardia      Carcinoma in situ of bladder 2000    bladder cancer ;; follow w Urology w periodic cysto      Essential hypertension, benign      Generalized osteoarthrosis, unspecified site knees    s/p R total knee 8/09 ; will do L 6/10      Hernia, abdominal      Numbness and tingling     toes and fingers     Pacemaker     [employer]      Pain in joint, shoulder region     L shoulder rotater tear; no surgery      Palpitations      Persistent atrial fibrillation (H) 1/30/15     Prostate CA (H) 2008-9    radiation      Prostate cancer (H)     completed RT 3/09     Renal disease     elevated creatinine     Shoulder impingement     R shoulder impingement etc see MRI       Unspecified hereditary and idiopathic peripheral neuropathy   neuropathy     toes both feet              Past Surgical History:     Past Surgical History:   Procedure Laterality Date     ARTHROPLASTY HIP Right 3/27/2017    Procedure: ARTHROPLASTY HIP;  Surgeon: Jigar Wynn MD;    Location: [facility] OR     C NONSPECIFIC PROCEDURE      bilat inguinal hernia repairs ( 3total procedures)      C NONSPECIFIC PROCEDURE      pilonidal cyst     C NONSPECIFIC PROCEDURE      T + A     C NONSPECIFIC PROCEDURE       R+L total knee     CARDIOVERSION  3/26/15     COLONOSCOPY       CYSTOSCOPY       CYSTOSCOPY, FULGURATE BLEEDERS, EVACUATE CLOT(S), COMBINED N/A   2019    Procedure: Exam under anesthesia, video cystopanendoscopy,   evacuation of clots, fulguration of prostatic veins.;  Surgeon:   Ilir Ramirez MD;  Location: [facility] OR     HERNIA REPAIR       IMPLANT PACEMAKER  3/26/15     RELEASE CARPAL TUNNEL Right 2017    Procedure: RELEASE CARPAL TUNNEL;  Right carpal tunnel release;    Surgeon: Alonso Barboza MD;  Location: [facility] OR             Social History:     Social History     Tobacco Use     Smoking status: Former Smoker     Last attempt to quit: 1977     Years since quittin.7     Smokeless tobacco: Never Used   Substance Use Topics     Alcohol use: No     Alcohol/week: 0.0 oz             Family History:     Family History   Problem Relation Age of Onset     Heart Disease Father          89yo     Coronary Artery Disease Father      Heart Disease Mother          74yo     Coronary Artery Disease Mother      Family History Negative Brother      Family history reviewed.             Allergies:     Allergies   Allergen Reactions     Merbromin      Other reaction(s): Other, see comments  Severe reaction as child. Amalgam  "fillings OK.     Morphine Nausea and Vomiting     Amlodipine      Edema             Medications:     Current Facility-Administered Medications   Medication     acetaminophen (TYLENOL) tablet 650 mg     amoxicillin (AMOXIL) capsule 250 mg     carvedilol (COREG) tablet 37.5 mg     finasteride (PROSCAR) tablet 5 mg     lidocaine (LMX4) cream     lidocaine 1 % 0.1-1 mL     melatonin tablet 1 mg     naloxone (NARCAN) injection 0.1-0.4 mg     ondansetron (ZOFRAN-ODT) ODT tab 4 mg    Or     ondansetron (ZOFRAN) injection 4 mg     polyethylene glycol (MIRALAX/GLYCOLAX) Packet 17 g     senna-docusate (SENOKOT-S/PERICOLACE) 8.6-50 MG per tablet 1   tablet    Or     senna-docusate (SENOKOT-S/PERICOLACE) 8.6-50 MG per tablet 2   tablet     sodium chloride (PF) 0.9% PF flush 3 mL     sodium chloride (PF) 0.9% PF flush 3 mL     traMADol (ULTRAM) tablet 50 mg     triamterene-HCTZ (MAXZIDE-25) 37.5-25 MG per tablet 1 tablet             Review of Systems:   A comprehensive review of systems was performed and found to be   negative except as described in this note     /80 (BP Location: Left arm)   Pulse 70   Temp 97.1  F   (36.2  C) (Oral)   Resp 16   Ht 1.778 m (5' 10\")   Wt 85.9 kg   (189 lb 4.8 oz)   SpO2 95%   BMI 27.16 kg/m    GEN: NAD, lying in bed  EYES: EOMI  MOUTH: MMM  NECK: Supple  RESP: Unlabored breathing  CARDIAC: No LE edema  SKIN: Warm  ABD: soft  NEURO: AAO  : hien' on very slow gtt CBI, no clots.           Data:     Lab Results   Component Value Date    WBC 7.3 06/17/2019    HGB 11.1 (L) 06/17/2019    HCT 34.3 (L) 06/17/2019    MCV 98 06/17/2019     06/17/2019     Lab Results   Component Value Date    CR 1.21 06/17/2019                   Care Coordinator IP Consult    Narrative    Nitza Casillas RN     6/17/2019  3:49 PM  CTS identifies pt as high risk due to Elevated FABIO. Patient was   admitted on 6/16/19 for hematuria. Patient was seen in ER earlier   on 6/10/19.Patient has had two previous " hospitalizations for   anemia d/t hematuria requiring a cysto. Patient has a history of   a stroke, Afib on coumadin, pacemaker, HTN, and DAWSON, and   Prostrate/ Bladder cancer. Patient has a CPAP with O2 at home   through [facility]. Patient has a  through [employer].  A follow up PCP appointment was scheduled for the patient with   Dr. Winslow on June 26th at 1:40 PM.      No Handoff needed to PCP clinic at discharge. No gaps in care   identified. Patient followed up with urology and PCP as scheduled   after last hospitalization. Clinic care coordination followed up   with patient and no further need to continue to follow.      CM will continue to follow patient for any additional discharge   needs.     Nitza LYN  Care Transition Services  [phone]     Urine Culture   Result Value Ref Range    Specimen Description Catheterized Urine     Special Requests Specimen received in preservative     Culture Micro No growth      Yelena Burns PA-C

## 2019-06-18 NOTE — ANESTHESIA CARE TRANSFER NOTE
Patient: Tucker Slater    Procedure(s):  VIDEO CYSTOSCOPY, EVACUTATION OF CLOTS, BILATERAL RETROGRADE PYELOGRAMS, CUP BIOPSIES OF BLADDER WALL, TRANSURETHRAL RESECTION OF PROSTATE  Cystoscopy, transurethral resection (TUR) prostate, combined    Diagnosis: bleeding  Diagnosis Additional Information: No value filed.    Anesthesia Type:   General, LMA     Note:  Airway :Face Mask  Patient transferred to:PACU  Handoff Report: Identifed the Patient, Identified the Reponsible Provider, Reviewed the pertinent medical history, Discussed the surgical course, Reviewed Intra-OP anesthesia mangement and issues during anesthesia, Set expectations for post-procedure period and Allowed opportunity for questions and acknowledgement of understanding      Vitals: (Last set prior to Anesthesia Care Transfer)    CRNA VITALS  6/18/2019 1530 - 6/18/2019 1612      6/18/2019             Pulse:  77    SpO2:  90 %    Resp Rate (observed):  18    EKG:  V Paced                Electronically Signed By: Dean Dennis Severson, APRN CRNA  June 18, 2019  4:12 PM

## 2019-06-18 NOTE — PLAN OF CARE
PRIMARY DIAGNOSIS: Hematuria    OUTPATIENT/OBSERVATION GOALS TO BE MET BEFORE DISCHARGE  1. Orthostatic performed: No    2. Stable Hgb Yes.   Recent Labs   Lab Test 06/17/19  0533 06/16/19  1021 06/10/19  1613   HGB 11.1* 11.2* 11.8*         3. Resolved or declined bleeding episodes: No, clear, pink red urine. CBI running moderately. One small clot passed.     4. Appropriate testing complete: No    5. Cleared for discharge by consultants (if involved): No    6. Safe discharge environment identified: Yes    Discharge Planner Nurse   Safe discharge environment identified: Yes  Barriers to discharge: Yes       Entered by: Gena Ovalle 06/18/2019 4:30 AM     Please review provider order for any additional goals.   Nurse to notify provider when observation goals have been met and patient is ready for discharge.    VSS. Afebrile. Denies pain. CBI running moderately. Clear pink/red urine output noted in tube. Urine noted to darken in color when CBI decreased in flow from moderate. Ax1. NPO since midnight. Resting comfortably. Will continue to monitor.

## 2019-06-18 NOTE — ANESTHESIA PREPROCEDURE EVALUATION
Anesthesia Pre-Procedure Evaluation    Patient: Tucker Slater   MRN: [id number] : 3/13/1931          Preoperative Diagnosis: bleeding    Procedure(s):  CYSTOSCOPY, WITH RETROGRADE PYELOGRAM    Past Medical History:   Diagnosis Date     Arrhythmia     A Fib     Balance problem     related to neuropathy in feet     Bradycardia      Carcinoma in situ of bladder     bladder cancer ;; follow w Urology w periodic cysto      Essential hypertension, benign      Generalized osteoarthrosis, unspecified site knees    s/p R total knee  ; will do L 6/10      Hernia, abdominal      Numbness and tingling     toes and fingers     Pacemaker     [employer]      Pain in joint, shoulder region     L shoulder rotater tear; no surgery      Palpitations      Persistent atrial fibrillation (H) 1/30/15     Prostate CA (H) 2008    radiation     Prostate cancer (H)     completed RT 3/09     Renal disease     elevated creatinine     Shoulder impingement     R shoulder impingement etc see MRI       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     toes both feet      Past Surgical History:   Procedure Laterality Date     ARTHROPLASTY HIP Right 3/27/2017    Procedure: ARTHROPLASTY HIP;  Surgeon: Jigar Wynn MD;  Location: [facility] OR     C NONSPECIFIC PROCEDURE      bilat inguinal hernia repairs ( 3total procedures)      C NONSPECIFIC PROCEDURE      pilonidal cyst     C NONSPECIFIC PROCEDURE      T + A     C NONSPECIFIC PROCEDURE       R+L total knee     CARDIOVERSION  3/26/15     COLONOSCOPY       CYSTOSCOPY       CYSTOSCOPY, FULGURATE BLEEDERS, EVACUATE CLOT(S), COMBINED N/A 2019    Procedure: Exam under anesthesia, video cystopanendoscopy, evacuation of clots, fulguration of prostatic veins.;  Surgeon: Ilir Ramirez MD;  Location: [facility] OR     HERNIA REPAIR       IMPLANT PACEMAKER  3/26/15     RELEASE CARPAL TUNNEL Right 2017    Procedure: RELEASE CARPAL TUNNEL;  Right carpal  tunnel release;  Surgeon: Alonso Barboza MD;  Location: [facility] OR     Anesthesia Evaluation     . Pt has had prior anesthetic. Type: General    No history of anesthetic complications          ROS/MED HX    ENT/Pulmonary:  - neg pulmonary ROS     Neurologic:  - neg neurologic ROS     Cardiovascular:     (+) hypertension----. : . . . pacemaker :. dysrhythmias a-fib, .       METS/Exercise Tolerance:     Hematologic:  - neg hematologic  ROS       Musculoskeletal:  - neg musculoskeletal ROS       GI/Hepatic:  - neg GI/hepatic ROS       Renal/Genitourinary:     (+) chronic renal disease, type: CRI,       Endo:  - neg endo ROS       Psychiatric:  - neg psychiatric ROS       Infectious Disease:  - neg infectious disease ROS       Malignancy:         Other:    - neg other ROS                      Physical Exam  Normal systems: cardiovascular, pulmonary and dental    Airway   Mallampati: II  TM distance: >3 FB  Neck ROM: full    Dental     Cardiovascular       Pulmonary             Lab Results   Component Value Date    WBC 7.3 06/17/2019    HGB 11.3 (L) 06/18/2019    HCT 34.3 (L) 06/17/2019     06/17/2019     06/17/2019    POTASSIUM 4.0 06/17/2019    CHLORIDE 107 06/17/2019    CO2 28 06/17/2019    BUN 25 06/17/2019    CR 1.21 06/17/2019    GLC 96 06/17/2019    KARLA 8.6 06/17/2019    ALBUMIN 2.9 (L) 06/10/2019    PROTTOTAL 6.7 (L) 06/10/2019    ALT 35 06/10/2019    AST 36 06/10/2019    ALKPHOS 81 06/10/2019    BILITOTAL 0.4 06/10/2019    PTT 35 03/15/2012    INR 1.16 (H) 06/16/2019    TSH 1.25 12/29/2017       Preop Vitals  BP Readings from Last 3 Encounters:   06/18/19 139/77   06/14/19 150/70   06/12/19 150/80    Pulse Readings from Last 3 Encounters:   06/16/19 70   06/14/19 98   06/12/19 71      Resp Readings from Last 3 Encounters:   06/18/19 16   06/14/19 12   06/10/19 18    SpO2 Readings from Last 3 Encounters:   06/18/19 99%   06/14/19 95%   06/12/19 98%      Temp Readings from Last 1 Encounters:  "  06/18/19 97.9  F (36.6  C) (Temporal)    Ht Readings from Last 1 Encounters:   06/16/19 1.778 m (5' 10\")      Wt Readings from Last 1 Encounters:   06/16/19 85.9 kg (189 lb 4.8 oz)    Estimated body mass index is 27.16 kg/m  as calculated from the following:    Height as of this encounter: 1.778 m (5' 10\").    Weight as of this encounter: 85.9 kg (189 lb 4.8 oz).       Anesthesia Plan      History & Physical Review  History and physical reviewed and following examination; no interval change.    ASA Status:  3 .    NPO Status:  > 8 hours    Plan for General and LMA with Intravenous induction. Maintenance will be Balanced.    PONV prophylaxis:  Dexamethasone or Solumedrol and Ondansetron (or other 5HT-3)       Postoperative Care  Postoperative pain management:  IV analgesics, Oral pain medications and Multi-modal analgesia.      Consents  Anesthetic plan, risks, benefits and alternatives discussed with:  Patient..                 Henry Rob MD                    .  "

## 2019-06-18 NOTE — BRIEF OP NOTE
Diagnosis: Recurrent gross hematuria, history of adenocarcinoma of the prostate and radiation  Procedure: Video cystopanendoscopy, evacuation of clots, bilateral retrograde ureteral pyelograms, TURP  Surgeon: James  Anesthesia: General laryngeal mask  Estimated blood loss: 50 cc of form clot, 30 cc actively  Findings: Bleeding from prostatic fossa, tried to cauterize this area with electro vaporization without success.  TURP performed to remove bleeding tissue

## 2019-06-18 NOTE — PLAN OF CARE
PRIMARY DIAGNOSIS: TURP  OUTPATIENT/OBSERVATION GOALS TO BE MET BEFORE DISCHARGE:  1. Stable vital signs Yes  2. Tolerating diet:tolerating clears, will order regular for dinner.    3. Pain controlled with oral pain medications:  Last pain med was given in PACU. Patient denies pain at this time.    4. Positive bowel sounds:  Yes  5. Voiding without difficulty:  CBI running    6. Able to ambulate:  has not ambulated yet.    7. Provider specific discharge goals met:  No    Discharge Planner Nurse   Safe discharge environment identified: Yes  Barriers to discharge: No       Entered by: Ashleigh Hurst 06/18/2019 6:19 PM     Please review provider order for any additional goals.   Nurse to notify provider when observation goals have been met and patient is ready for discharge.

## 2019-06-18 NOTE — PLAN OF CARE
"PRIMARY DIAGNOSIS: HEMATURIA      OUTPATIENT/OBSERVATION GOALS TO BE MET BEFORE DISCHARGE  Orthostatic performed: N/A     Stable Hgb Yes.         Recent Labs   Lab Test 06/17/19  0533 06/16/19  1021 06/10/19  1613   HGB 11.1* 11.2* 11.8*            Resolved or declined bleeding episodes: No, CBI running at moderate rate.      Appropriate testing complete: Urology consultation.      Cleared for discharge by consultants (if involved): No     Safe discharge environment identified: Yes     Discharge Planner Nurse   Safe discharge environment identified: Yes  Barriers to discharge: Yes       Entered by: Noemí Perez 06/17/2019 8:00 PM    /75 (BP Location: Left arm)   Pulse 70   Temp 95.5  F (35.3  C) (Oral)   Resp 16   Ht 1.778 m (5' 10\")   Wt 85.9 kg (189 lb 4.8 oz)   SpO2 97%   BMI 27.16 kg/m      A&Ox4. Numbness/tingling to BUE and BLE (baseline per pt), CMS otherwise intact. Up w/ SBA, ambulated small distance in room, tolerated well. BS active x4, tolerating regular diet, passing flatus. CBI running at moderate rate, watermelon in color. Urology following. To be NPO at midnight. Will continue to monitor.      Please review provider order for any additional goals.   Nurse to notify provider when observation goals have been met and patient is ready for discharge.      "

## 2019-06-18 NOTE — PROGRESS NOTES
UROLOGY   Pt had phone conversation with Dr. Ramirez this AM and plan is to proceed to OR this afternoon for cystoscopy for continued gross hematuria. Has remained NPO.    Adrienne Kelley PA-C  [facility]  [phone]

## 2019-06-19 LAB
BASOPHILS # BLD AUTO: 0 10E9/L (ref 0–0.2)
BASOPHILS NFR BLD AUTO: 0.2 %
DIFFERENTIAL METHOD BLD: ABNORMAL
EOSINOPHIL # BLD AUTO: 0 10E9/L (ref 0–0.7)
EOSINOPHIL NFR BLD AUTO: 0 %
ERYTHROCYTE [DISTWIDTH] IN BLOOD BY AUTOMATED COUNT: 13.1 % (ref 10–15)
GLUCOSE SERPL-MCNC: 126 MG/DL (ref 70–99)
HCT VFR BLD AUTO: 34.7 % (ref 40–53)
HGB BLD-MCNC: 11.3 G/DL (ref 13.3–17.7)
IMM GRANULOCYTES # BLD: 0.1 10E9/L (ref 0–0.4)
IMM GRANULOCYTES NFR BLD: 0.7 %
LYMPHOCYTES # BLD AUTO: 0.4 10E9/L (ref 0.8–5.3)
LYMPHOCYTES NFR BLD AUTO: 3.9 %
MCH RBC QN AUTO: 31.8 PG (ref 26.5–33)
MCHC RBC AUTO-ENTMCNC: 32.6 G/DL (ref 31.5–36.5)
MCV RBC AUTO: 98 FL (ref 78–100)
MDC_IDC_LEAD_IMPLANT_DT: NORMAL
MDC_IDC_LEAD_IMPLANT_DT: NORMAL
MDC_IDC_LEAD_LOCATION: NORMAL
MDC_IDC_LEAD_LOCATION: NORMAL
MDC_IDC_LEAD_LOCATION_DETAIL_1: NORMAL
MDC_IDC_LEAD_LOCATION_DETAIL_1: NORMAL
MDC_IDC_LEAD_MFG: NORMAL
MDC_IDC_LEAD_MFG: NORMAL
MDC_IDC_LEAD_MODEL: NORMAL
MDC_IDC_LEAD_MODEL: NORMAL
MDC_IDC_LEAD_POLARITY_TYPE: NORMAL
MDC_IDC_LEAD_POLARITY_TYPE: NORMAL
MDC_IDC_LEAD_SERIAL: NORMAL
MDC_IDC_LEAD_SERIAL: NORMAL
MDC_IDC_MSMT_BATTERY_DTM: NORMAL
MDC_IDC_MSMT_BATTERY_REMAINING_LONGEVITY: 122 MO
MDC_IDC_MSMT_BATTERY_REMAINING_PERCENTAGE: 95.5 %
MDC_IDC_MSMT_BATTERY_RRT_TRIGGER: NORMAL
MDC_IDC_MSMT_BATTERY_STATUS: NORMAL
MDC_IDC_MSMT_BATTERY_VOLTAGE: 2.96 V
MDC_IDC_MSMT_LEADCHNL_RV_IMPEDANCE_VALUE: 400 OHM
MDC_IDC_MSMT_LEADCHNL_RV_LEAD_CHANNEL_STATUS: NORMAL
MDC_IDC_MSMT_LEADCHNL_RV_PACING_THRESHOLD_AMPLITUDE: 1.38 V
MDC_IDC_MSMT_LEADCHNL_RV_PACING_THRESHOLD_PULSEWIDTH: 0.5 MS
MDC_IDC_MSMT_LEADCHNL_RV_SENSING_INTR_AMPL: 12 MV
MDC_IDC_PG_IMPLANT_DTM: NORMAL
MDC_IDC_PG_MFG: NORMAL
MDC_IDC_PG_MODEL: NORMAL
MDC_IDC_PG_SERIAL: NORMAL
MDC_IDC_PG_TYPE: NORMAL
MDC_IDC_SESS_CLINIC_NAME: NORMAL
MDC_IDC_SESS_DTM: NORMAL
MDC_IDC_SESS_REPROGRAMMED: NO
MDC_IDC_SESS_TYPE: NORMAL
MDC_IDC_SET_BRADY_HYSTRATE: 60 {BEATS}/MIN
MDC_IDC_SET_BRADY_LOWRATE: 70 {BEATS}/MIN
MDC_IDC_SET_BRADY_MAX_SENSOR_RATE: 120 {BEATS}/MIN
MDC_IDC_SET_BRADY_MODE: NORMAL
MDC_IDC_SET_LEADCHNL_RA_PACING_POLARITY: NORMAL
MDC_IDC_SET_LEADCHNL_RA_SENSING_POLARITY: NORMAL
MDC_IDC_SET_LEADCHNL_RV_PACING_AMPLITUDE: 1.62
MDC_IDC_SET_LEADCHNL_RV_PACING_ANODE_ELECTRODE_1: NORMAL
MDC_IDC_SET_LEADCHNL_RV_PACING_ANODE_LOCATION_1: NORMAL
MDC_IDC_SET_LEADCHNL_RV_PACING_CAPTURE_MODE: NORMAL
MDC_IDC_SET_LEADCHNL_RV_PACING_CATHODE_ELECTRODE_1: NORMAL
MDC_IDC_SET_LEADCHNL_RV_PACING_CATHODE_LOCATION_1: NORMAL
MDC_IDC_SET_LEADCHNL_RV_PACING_POLARITY: NORMAL
MDC_IDC_SET_LEADCHNL_RV_PACING_PULSEWIDTH: 0.5 MS
MDC_IDC_SET_LEADCHNL_RV_SENSING_ADAPTATION_MODE: NORMAL
MDC_IDC_SET_LEADCHNL_RV_SENSING_ANODE_ELECTRODE_1: NORMAL
MDC_IDC_SET_LEADCHNL_RV_SENSING_ANODE_LOCATION_1: NORMAL
MDC_IDC_SET_LEADCHNL_RV_SENSING_CATHODE_ELECTRODE_1: NORMAL
MDC_IDC_SET_LEADCHNL_RV_SENSING_CATHODE_LOCATION_1: NORMAL
MDC_IDC_SET_LEADCHNL_RV_SENSING_POLARITY: NORMAL
MDC_IDC_SET_LEADCHNL_RV_SENSING_SENSITIVITY: 2 MV
MDC_IDC_STAT_AT_DTM_END: NORMAL
MDC_IDC_STAT_AT_DTM_START: NORMAL
MDC_IDC_STAT_BRADY_DTM_END: NORMAL
MDC_IDC_STAT_BRADY_DTM_START: NORMAL
MDC_IDC_STAT_BRADY_RV_PERCENT_PACED: 92 %
MDC_IDC_STAT_CRT_DTM_END: NORMAL
MDC_IDC_STAT_CRT_DTM_START: NORMAL
MDC_IDC_STAT_HEART_RATE_DTM_END: NORMAL
MDC_IDC_STAT_HEART_RATE_DTM_START: NORMAL
MDC_IDC_STAT_HEART_RATE_VENTRICULAR_MAX: 190 {BEATS}/MIN
MDC_IDC_STAT_HEART_RATE_VENTRICULAR_MEAN: 79 {BEATS}/MIN
MDC_IDC_STAT_HEART_RATE_VENTRICULAR_MIN: 60 {BEATS}/MIN
MONOCYTES # BLD AUTO: 0.8 10E9/L (ref 0–1.3)
MONOCYTES NFR BLD AUTO: 8 %
NEUTROPHILS # BLD AUTO: 9.2 10E9/L (ref 1.6–8.3)
NEUTROPHILS NFR BLD AUTO: 87.2 %
NRBC # BLD AUTO: 0 10*3/UL
NRBC BLD AUTO-RTO: 0 /100
PLATELET # BLD AUTO: 283 10E9/L (ref 150–450)
RBC # BLD AUTO: 3.55 10E12/L (ref 4.4–5.9)
WBC # BLD AUTO: 10.5 10E9/L (ref 4–11)

## 2019-06-19 PROCEDURE — 82947 ASSAY GLUCOSE BLOOD QUANT: CPT | Performed by: UROLOGY

## 2019-06-19 PROCEDURE — 99231 SBSQ HOSP IP/OBS SF/LOW 25: CPT | Performed by: UROLOGY

## 2019-06-19 PROCEDURE — 85025 COMPLETE CBC W/AUTO DIFF WBC: CPT | Performed by: UROLOGY

## 2019-06-19 PROCEDURE — 99232 SBSQ HOSP IP/OBS MODERATE 35: CPT | Performed by: PHYSICIAN ASSISTANT

## 2019-06-19 PROCEDURE — G0378 HOSPITAL OBSERVATION PER HR: HCPCS

## 2019-06-19 PROCEDURE — 25000132 ZZH RX MED GY IP 250 OP 250 PS 637: Performed by: UROLOGY

## 2019-06-19 PROCEDURE — 12000011 ZZH R&B MS OVERFLOW

## 2019-06-19 PROCEDURE — 36415 COLL VENOUS BLD VENIPUNCTURE: CPT | Performed by: UROLOGY

## 2019-06-19 RX ADMIN — CIPROFLOXACIN HYDROCHLORIDE 500 MG: 500 TABLET, FILM COATED ORAL at 08:19

## 2019-06-19 RX ADMIN — CARVEDILOL 37.5 MG: 25 TABLET, FILM COATED ORAL at 08:19

## 2019-06-19 RX ADMIN — TRIAMTERENE AND HYDROCHLOROTHIAZIDE 1 TABLET: 37.5; 25 TABLET ORAL at 08:19

## 2019-06-19 RX ADMIN — CIPROFLOXACIN HYDROCHLORIDE 500 MG: 500 TABLET, FILM COATED ORAL at 20:36

## 2019-06-19 RX ADMIN — CARVEDILOL 37.5 MG: 25 TABLET, FILM COATED ORAL at 17:37

## 2019-06-19 RX ADMIN — FINASTERIDE 5 MG: 5 TABLET, FILM COATED ORAL at 08:19

## 2019-06-19 NOTE — PROGRESS NOTES
Notified by provider that patient has an appointment scheduled with cardiology Dr. Perez today at 1615. Called Dr. Perez's nurse to notify that patient is currently inpatient. Appointment cancelled for today 6/19/19 and rescheduled for August 12th at 4:15 PM. Notified patient of change in appointment and updated to AVS.

## 2019-06-19 NOTE — PLAN OF CARE
PRIMARY DIAGNOSIS: TURP  OUTPATIENT/OBSERVATION GOALS TO BE MET BEFORE DISCHARGE:  1. Stable vital signs Yes  2. Tolerating diet:Tolerating Regular diet   3. Pain controlled with oral pain medications:  Last pain med was given in PACU. Patient denies pain at this time.    4. Positive bowel sounds:  Yes  5. Voiding without difficulty:  CBI running  urine straw color   6. Able to ambulate:  has not ambulated yet.    7. Provider specific discharge goals met:  No     Discharge Planner Nurse   Safe discharge environment identified: Yes  Barriers to discharge: No         Please review provider order for any additional goals.   Nurse to notify provider when observation goals have been met and patient is ready for discharge.

## 2019-06-19 NOTE — OP NOTE
Procedure Date: 06/18/2019      PREOPERATIVE DIAGNOSIS:  Recurrent gross hematuria with clots, history of prostate cancer treated with radiation.      POSTOPERATIVE DIAGNOSIS:  Recurrent gross hematuria with clots, history of prostate cancer treated with radiation.      PROCEDURES:  Video cystopanendoscopy, evacuation of clots, cup biopsies of bladder wall, electrovaporization of prostate, TURP, bilateral retrograde ureteropyelogram.      SURGEON:  Ilir Ramirez MD      ANESTHESIA:  General laryngeal mask.      ESTIMATED BLOOD LOSS:  30 mL active bleeding, 50 mL of formed clot.      INDICATIONS:  The patient is an 88-year-old male who was radiated years ago for prostate cancer and has had recurrent bleeding, initially on blood thinners and continued bleeding off his anticoagulants.  Previous procedure included video cystoscopy and evacuation of clots and cautery of prostatic bleeding.  The patient was readmitted yesterday with clot urinary retention and has normal laboratory studies and a normal INR.  He now comes to the surgery for further evaluation.      The procedure, alternatives, risks and follow-up were carefully discussed with the patient.      PROCEDURE:  The patient received 2 grams of IV Ancef and was taken to cystoscopy and placed supine on the operating table.  After adequate general laryngeal mask anesthesia, his Tinoco catheter was removed and he was placed in lithotomy and his genitalia were prepped and draped in a sterile fashion.  A #22 Chinese Storz cystoscope with 30-degree lens and video were used to visualize the urethra and bladder and prostatic fossa using water as an irrigant.  The urethra appeared normal and the prostatic fossa showed previously cauterized tissue with some venous bleeding and an open bladder neck, but considerable size of the lateral lobes.  The bladder revealed 4+ trabeculation with no bleeding but some erythema on the posterior bladder wall, probably consistent with  inflammation from catheterization.  Small cellules and diverticula were examined for tumor and no tumors or stones were found.  Both ureteral orifices were normal in position and configuration.  After evacuation of clot, I performed bilateral retrograde ureteropyelograms, revealing no ureteral obstruction and no filling defects in the ureter or upper tracts.  I then examined the prostatic fossa and decided to turn up cautery to 90 johnson and use a barrel attachment and cauterize the prostate, which I did at length, covering the entire prostatic mucosa and bladder neck.  Despite quite a bit of cautery, the prostate continued to bleed.  I removed the cystoscope and passed the 26 [place] Storz resectoscope sheath with Zack obturator.  I changed the irrigation to 3% sorbitol solution and used the resectoscope loop and performed a TURP by first starting at the right lateral lobe and resecting this from bladder neck back to the verumontanum down to surgical capsule and repeating this on the left side.  Quite a bit of tissue was removed that was vascular.  I then resected the anterior portion of the prostate and the floor of the prostate back to the verumontanum but not beyond the verumontanum in any quadrant.  There were no perforations.  The bleeding estimate was 30 mL.  All tissue was evacuated from the bladder and sent in formalin to Pathology.  The veins and arteries were cauterized at the level of the surgical capsule.  At the end of the procedure, all tissue had been removed from the bladder and the ureteral orifices were intact.  The prostatic fossa showed good hemostasis.  The verumontanum and sphincter were intact.  The resectoscope was removed and a 20-Bur[place]ian three-way Tinoco was inserted in the bladder easily, 40 mL was placed in the Tinoco balloon and the Tinoco was placed to closed gravity drainage and continuous normal saline irrigation after some hand irrigations that were clear.  The Tinoco was left on  traction with a Velcro strap.  The patient received a B and O suppository and went to the recovery room in stable condition.         CLEVE BOUDREAUX JR, MD             D: 2019   T: 2019   MT: PENELOPE      Name:     ROSS LORENZANA   MRN:      [id number]        Account:        [id number]   :      1931           Procedure Date: 2019      Document: [id number]       cc: Aleena Boudreaux Jr, MD

## 2019-06-19 NOTE — DISCHARGE INSTRUCTIONS
1. Your cardiology appointment has been rescheduled for you with Dr. Perez at [facility] on June 25th at 2:45 PM, please arrive for your appointment at 2:30 PM. Please bring a list of your medications and insurance information with you to your appointment. Please call the clinic at [phone] if you need to reschedule.  2. Per Urology recommendations you will need to remain off Coumadin for 10 days (you could resume Coumadin on 6/30).

## 2019-06-19 NOTE — PLAN OF CARE
"/63   Pulse 68   Temp 97  F (36.1  C)   Resp 18   Ht 1.778 m (5' 10\")   Wt 85.9 kg (189 lb 4.8 oz)   SpO2 96%   BMI 27.16 kg/m      Neuro: WDL, forgetful  Cardiac: WDL  Lungs: WDL, clear  GI: WDL  : ex., CBI running  Pain: Denies   IV: Saline locked   Meds: N/A  Diet: Regular  Activity: Assist of one  Plan: Patient is stable and on room air.  Assist of  one, on a regular diet. CBI running at a low rate, color Desiree' in color. Patient denies pain at this time.  Will continue to monitor and provide cares.     "

## 2019-06-19 NOTE — PLAN OF CARE
PRIMARY DIAGNOSIS: TURP  OUTPATIENT/OBSERVATION GOALS TO BE MET BEFORE DISCHARGE:  1. Stable vital signs Yes  2. Tolerating diet:Yes  3. Pain controlled with oral pain medications:  Denied   4. Positive bowel sounds:  Yes  5. Voiding without difficulty:  Tinoco for TURP   6. Able to ambulate:  Yes  7. Provider specific discharge goals met:  No    Discharge Planner Nurse   Safe discharge environment identified: Yes  Barriers to discharge: Yes- TURP, some bloody urine still at times.       Entered by: Harrison Watson 06/19/2019 12:44 AM     Please review provider order for any additional goals.   Nurse to notify provider when observation goals have been met and patient is ready for discharge.

## 2019-06-19 NOTE — PROGRESS NOTES
[facility]    Hospitalist Progress Note  Name: Tucker Slater    MRN: [id number]  Provider:  Yelena Burns PA-C  Date of Service: 06/19/2019    Assessment & Plan   Summary of Stay: Tucker Slater is a 88 year old male with a PMH significant for bladder cancer s/p surgical resection (2000), prostate cancer s/p radiation ([id number]), CKD (baseline Cr 1.3-1.5), permanent A-fib on Warfarin, s/p pacemaker, HTN, DAWSON on CPAP, mild LV dysfunction (EF of 45-50%), OA, and peripheral neuropathy, who was admitted on 6/16/2019 with gross hematuria and urinary retention.    #Recurrent gross hematuria, POD#1 video cystopanendoscopy, evacuation of clots, cups biopsies of bladder wall, electrovaporization of prostate, TURP, and bilateral retrograde ureteropyelogram: remote hx of prostate ca, s/p resection in 2000 and radiation therapy, multiple episodes of hematuria since April, had cystoscopy at that time with evacuation of clots and fulguration of prostatic veins. Anticoagulation for a fib was restarted on discharge but stopped again during admission on 6/1 (per pt, he states he's been off coumadin since Easter). On 6/1 admission, gross hematuria resolved with bladder irrigation. Recurrent bleeding the morning of admission, unable to void, and not anticoagulated. Hgb stable at 11.3 today. Urology took the patient to the OR yesterday for video cystopanendoscopy, evacuation of clots, cups biopsies of bladder wall, electrovaporization of prostate, TURP, and bilateral retrograde ureteropyelogram.   - Urology consulted and following, recommend continuing CBI today and attempt to wean off in AM and possible trial to void     #Urinary retention: due to hematuria and clots, should improve after procedure performed on 6/18, still on CBI per Urology     #A-fib: previously anticoagulated with coumadin, now on hold. Suppose to be seen by Dr. Perez at 16:15 today if patient would be a candidate for the Watchman device, will  be unable to make this appointment due to being admitted, contacted clinic and able to get an appointment for the patient on 6/25/19 at 14:45 in [place]. Resume home Carvedilol.     #CKD: Cr stable    #HTN: losartan discontinued during last admission. Maxide was held on discharge but recently restarted by PCP. Coreg and Maxide resumed with stable BP this admission.    #DAWSON: continue nightly CPAP use    #Chronic anemia: Hgb stable at 11.3 today    DVT Prophylaxis: Ambulate every shift  Code Status: Full Code  Disposition: Expected discharge unclear, likely tomorrow, but due to prolonged stay due to ongoing hematuria will admit to inpatient     Interval History   CBI still running with pink tinged urine. No current pain. No other complaints, denies chest pain, shortness of breath, lightheadedness, and dizziness.     -Data reviewed today: I reviewed all new labs and imaging reports over the last 24 hours.     Physical Exam   Temp: 97.7  F (36.5  C) Temp src: Oral BP: 111/57 Pulse: 68 Heart Rate: 73 Resp: 20 SpO2: 97 % O2 Device: None (Room air) Oxygen Delivery: 2 LPM  Vitals:    06/16/19 0924 06/16/19 1628   Weight: 83.9 kg (185 lb) 85.9 kg (189 lb 4.8 oz)     Vital Signs with Ranges  Temp:  [95.3  F (35.2  C)-97.7  F (36.5  C)] 97.7  F (36.5  C)  Pulse:  [68-75] 68  Heart Rate:  [69-78] 73  Resp:  [8-28] 20  BP: ()/(44-88) 111/57  SpO2:  [91 %-100 %] 97 %  I/O last 3 completed shifts:  In: 1000 [I.V.:1000]  Out: 9280 [Urine:9250; Blood:30]    GEN:  Alert, oriented x 3, appears comfortable, NAD.  HEENT:  Normocephalic/atraumatic, no scleral icterus, no nasal discharge, mouth moist.  CV:  Regular rate and rhythm, no murmur or JVD.  S1 + S2 noted, no S3 or S4.  LUNGS:  Clear to auscultation bilaterally without rales/rhonchi/wheezing/retractions.  Symmetric chest rise on inhalation noted.  ABD:  Active bowel sounds, soft, non-tender/non-distended.  No rebound/guarding/rigidity.  : crawford in place with pink tinged  colored urine   EXT: mild LE edema.  No cyanosis.  No acute joint synovitis noted.  SKIN:  Dry to touch, no exanthems noted in the visualized areas.    Medications       carvedilol  37.5 mg Oral BID w/meals     ciprofloxacin  500 mg Oral Q12H DOUGLAS     finasteride  5 mg Oral Daily     sodium chloride (PF)  3 mL Intracatheter Q8H     sodium chloride (PF)  3 mL Intracatheter Q8H     triamterene-HCTZ  1 tablet Oral Daily     Data   Results for orders placed or performed during the hospital encounter of 06/16/19   XR Surgery ROGER L/T 5 Min Fluoro w Stills    Narrative    This exam was marked as non-reportable because it will not be read by a   radiologist or a [facility] non-radiologist provider.             CBC with platelets differential   Result Value Ref Range    WBC 6.3 4.0 - 11.0 10e9/L    RBC Count 3.49 (L) 4.4 - 5.9 10e12/L    Hemoglobin 11.2 (L) 13.3 - 17.7 g/dL    Hematocrit 33.3 (L) 40.0 - 53.0 %    MCV 95 78 - 100 fl    MCH 32.1 26.5 - 33.0 pg    MCHC 33.6 31.5 - 36.5 g/dL    RDW 13.1 10.0 - 15.0 %    Platelet Count 235 150 - 450 10e9/L    Diff Method Automated Method     % Neutrophils 79.5 %    % Lymphocytes 9.5 %    % Monocytes 6.2 %    % Eosinophils 3.6 %    % Basophils 0.9 %    % Immature Granulocytes 0.3 %    Nucleated RBCs 0 0 /100    Absolute Neutrophil 5.0 1.6 - 8.3 10e9/L    Absolute Lymphocytes 0.6 (L) 0.8 - 5.3 10e9/L    Absolute Monocytes 0.4 0.0 - 1.3 10e9/L    Absolute Eosinophils 0.2 0.0 - 0.7 10e9/L    Absolute Basophils 0.1 0.0 - 0.2 10e9/L    Abs Immature Granulocytes 0.0 0 - 0.4 10e9/L    Absolute Nucleated RBC 0.0    Basic metabolic panel   Result Value Ref Range    Sodium 140 133 - 144 mmol/L    Potassium 3.7 3.4 - 5.3 mmol/L    Chloride 108 94 - 109 mmol/L    Carbon Dioxide 23 20 - 32 mmol/L    Anion Gap 9 3 - 14 mmol/L    Glucose 96 70 - 99 mg/dL    Urea Nitrogen 28 7 - 30 mg/dL    Creatinine 1.26 (H) 0.66 - 1.25 mg/dL    GFR Estimate 51 (L) >60 mL/min/[1.73_m2]    GFR Estimate If Black 59  (L) >60 mL/min/[1.73_m2]    Calcium 8.6 8.5 - 10.1 mg/dL   INR   Result Value Ref Range    INR 1.16 (H) 0.86 - 1.14   UA with Microscopic   Result Value Ref Range    Color Urine Red     Appearance Urine Cloudy     Glucose Urine Negative NEG^Negative mg/dL    Bilirubin Urine Negative NEG^Negative    Ketones Urine Negative NEG^Negative mg/dL    Specific Gravity Urine 1.023 1.003 - 1.035    Blood Urine Large (A) NEG^Negative    pH Urine 7.5 (H) 5.0 - 7.0 pH    Protein Albumin Urine 300 (A) NEG^Negative mg/dL    Urobilinogen mg/dL Normal 0.0 - 2.0 mg/dL    Nitrite Urine Negative NEG^Negative    Leukocyte Esterase Urine Negative NEG^Negative    Source Catheterized Urine     WBC Urine 0 0 - 5 /HPF    RBC Urine >182 (H) 0 - 2 /HPF   Basic metabolic panel   Result Value Ref Range    Sodium 140 133 - 144 mmol/L    Potassium 4.0 3.4 - 5.3 mmol/L    Chloride 107 94 - 109 mmol/L    Carbon Dioxide 28 20 - 32 mmol/L    Anion Gap 5 3 - 14 mmol/L    Glucose 96 70 - 99 mg/dL    Urea Nitrogen 25 7 - 30 mg/dL    Creatinine 1.21 0.66 - 1.25 mg/dL    GFR Estimate 53 (L) >60 mL/min/[1.73_m2]    GFR Estimate If Black 61 >60 mL/min/[1.73_m2]    Calcium 8.6 8.5 - 10.1 mg/dL   CBC with platelets differential   Result Value Ref Range    WBC 7.3 4.0 - 11.0 10e9/L    RBC Count 3.51 (L) 4.4 - 5.9 10e12/L    Hemoglobin 11.1 (L) 13.3 - 17.7 g/dL    Hematocrit 34.3 (L) 40.0 - 53.0 %    MCV 98 78 - 100 fl    MCH 31.6 26.5 - 33.0 pg    MCHC 32.4 31.5 - 36.5 g/dL    RDW 13.1 10.0 - 15.0 %    Platelet Count 246 150 - 450 10e9/L    Diff Method Automated Method     % Neutrophils 71.3 %    % Lymphocytes 12.9 %    % Monocytes 9.9 %    % Eosinophils 4.5 %    % Basophils 1.0 %    % Immature Granulocytes 0.4 %    Nucleated RBCs 0 0 /100    Absolute Neutrophil 5.2 1.6 - 8.3 10e9/L    Absolute Lymphocytes 1.0 0.8 - 5.3 10e9/L    Absolute Monocytes 0.7 0.0 - 1.3 10e9/L    Absolute Eosinophils 0.3 0.0 - 0.7 10e9/L    Absolute Basophils 0.1 0.0 - 0.2 10e9/L    Abs  Immature Granulocytes 0.0 0 - 0.4 10e9/L    Absolute Nucleated RBC 0.0    Hemoglobin   Result Value Ref Range    Hemoglobin 11.3 (L) 13.3 - 17.7 g/dL   CBC with platelets differential   Result Value Ref Range    WBC 10.5 4.0 - 11.0 10e9/L    RBC Count 3.55 (L) 4.4 - 5.9 10e12/L    Hemoglobin 11.3 (L) 13.3 - 17.7 g/dL    Hematocrit 34.7 (L) 40.0 - 53.0 %    MCV 98 78 - 100 fl    MCH 31.8 26.5 - 33.0 pg    MCHC 32.6 31.5 - 36.5 g/dL    RDW 13.1 10.0 - 15.0 %    Platelet Count 283 150 - 450 10e9/L    Diff Method Automated Method     % Neutrophils 87.2 %    % Lymphocytes 3.9 %    % Monocytes 8.0 %    % Eosinophils 0.0 %    % Basophils 0.2 %    % Immature Granulocytes 0.7 %    Nucleated RBCs 0 0 /100    Absolute Neutrophil 9.2 (H) 1.6 - 8.3 10e9/L    Absolute Lymphocytes 0.4 (L) 0.8 - 5.3 10e9/L    Absolute Monocytes 0.8 0.0 - 1.3 10e9/L    Absolute Eosinophils 0.0 0.0 - 0.7 10e9/L    Absolute Basophils 0.0 0.0 - 0.2 10e9/L    Abs Immature Granulocytes 0.1 0 - 0.4 10e9/L    Absolute Nucleated RBC 0.0    Glucose   Result Value Ref Range    Glucose 126 (H) 70 - 99 mg/dL   Urology IP Consult: gross hematuria, urinary retention; Requested Clinic/Group: [facility]; Consultant may enter orders: Yes; Patient to be seen: Routine - within 24 hours; Requesting provider? Hospitalist (if different from attending phy...    Narrative    Adrienne Kelley PA-C     6/17/2019  8:29 AM  [facility] Consultation by [facility]    Tucker Slater MRN# [id number]   Age: 88 year old YOB: 1931     Date of Admission:  6/16/2019    Reason for consult: Recurrent gross hematuria       Requesting PA/MD: PERNELL Velazquez       Level of consult: Consult, follow and place orders           Assessment and Plan:   Assessment:   Recurrent gross hematuria, recent UTI, History of bladder   cancer-with no recurrence.  History of prostate cancer-PSA   stable.  Gross hematuria with clots due to prostatic radiation hx   and  Coumadin.          Plan:   New UC pend  CBI, titrate as able. Continue NPO until we reassess later today.  Considering Watchman procedure  INR was 1.16 on arrival  Discussed briefly oxygen chamber therapy. Would defer to Dr. Ramirez.     Dr. Bernabe updated.     Adrienne Kelley PA-C  [facility]rology  [phone]               Chief Complaint:   Gross hematuria     History is obtained from the patient and EMR.         History of Present Illness:   This patient is a 88 year old male who presents with gross   hematuria. Well known to our service. He follows with Dr. Ramirez.   He underwent radiation therapy for prostate cancer many years   ago.  He has been troubled with gross hematuria and clot urinary   retention this year and is been hospitalized several times.  His   PSA is stable.  Seen on 6/12/19 and UC notable for enterococcus.   He took a course of amoxicillin for this.     Tolerating Tinoco well. Urine hien' on slow gtt CBI. No fevers or   chills. Considering a Watchman procedure. INR on arrival was 1.15   (has been off Coumadin).                 Past Medical History:     Past Medical History:   Diagnosis Date     Arrhythmia     A Fib     Balance problem     related to neuropathy in feet     Bradycardia      Carcinoma in situ of bladder 2000    bladder cancer ;; follow w Urology w periodic cysto      Essential hypertension, benign      Generalized osteoarthrosis, unspecified site knees    s/p R total knee 8/09 ; will do L 6/10      Hernia, abdominal      Numbness and tingling     toes and fingers     Pacemaker     [employer]      Pain in joint, shoulder region     L shoulder rotater tear; no surgery      Palpitations      Persistent atrial fibrillation (H) 1/30/15     Prostate CA (H) 2008-9    radiation     Prostate cancer (H) 11/08    completed RT 3/09     Renal disease     elevated creatinine     Shoulder impingement     R shoulder impingement etc see MRI 4/09      Unspecified hereditary and idiopathic  peripheral neuropathy   neuropathy     toes both feet              Past Surgical History:     Past Surgical History:   Procedure Laterality Date     ARTHROPLASTY HIP Right 3/27/2017    Procedure: ARTHROPLASTY HIP;  Surgeon: Jigar Wynn MD;    Location: [facility] OR     C NONSPECIFIC PROCEDURE      bilat inguinal hernia repairs ( 3total procedures)      C NONSPECIFIC PROCEDURE      pilonidal cyst     C NONSPECIFIC PROCEDURE      T + A     C NONSPECIFIC PROCEDURE       R+L total knee     CARDIOVERSION  3/26/15     COLONOSCOPY       CYSTOSCOPY       CYSTOSCOPY, FULGURATE BLEEDERS, EVACUATE CLOT(S), COMBINED N/A   2019    Procedure: Exam under anesthesia, video cystopanendoscopy,   evacuation of clots, fulguration of prostatic veins.;  Surgeon:   Ilir Ramirez MD;  Location: [facility] OR     HERNIA REPAIR       IMPLANT PACEMAKER  3/26/15     RELEASE CARPAL TUNNEL Right 2017    Procedure: RELEASE CARPAL TUNNEL;  Right carpal tunnel release;    Surgeon: Alonso Barboza MD;  Location: [facility] OR             Social History:     Social History     Tobacco Use     Smoking status: Former Smoker     Last attempt to quit: 1977     Years since quittin.7     Smokeless tobacco: Never Used   Substance Use Topics     Alcohol use: No     Alcohol/week: 0.0 oz             Family History:     Family History   Problem Relation Age of Onset     Heart Disease Father          87yo     Coronary Artery Disease Father      Heart Disease Mother          76yo     Coronary Artery Disease Mother      Family History Negative Brother      Family history reviewed.             Allergies:     Allergies   Allergen Reactions     Merbromin      Other reaction(s): Other, see comments  Severe reaction as child. Amalgam fillings OK.     Morphine Nausea and Vomiting     Amlodipine      Edema             Medications:     Current Facility-Administered Medications   Medication     acetaminophen (TYLENOL) tablet 650 mg      "amoxicillin (AMOXIL) capsule 250 mg     carvedilol (COREG) tablet 37.5 mg     finasteride (PROSCAR) tablet 5 mg     lidocaine (LMX4) cream     lidocaine 1 % 0.1-1 mL     melatonin tablet 1 mg     naloxone (NARCAN) injection 0.1-0.4 mg     ondansetron (ZOFRAN-ODT) ODT tab 4 mg    Or     ondansetron (ZOFRAN) injection 4 mg     polyethylene glycol (MIRALAX/GLYCOLAX) Packet 17 g     senna-docusate (SENOKOT-S/PERICOLACE) 8.6-50 MG per tablet 1   tablet    Or     senna-docusate (SENOKOT-S/PERICOLACE) 8.6-50 MG per tablet 2   tablet     sodium chloride (PF) 0.9% PF flush 3 mL     sodium chloride (PF) 0.9% PF flush 3 mL     traMADol (ULTRAM) tablet 50 mg     triamterene-HCTZ (MAXZIDE-25) 37.5-25 MG per tablet 1 tablet             Review of Systems:   A comprehensive review of systems was performed and found to be   negative except as described in this note     /80 (BP Location: Left arm)   Pulse 70   Temp 97.1  F   (36.2  C) (Oral)   Resp 16   Ht 1.778 m (5' 10\")   Wt 85.9 kg   (189 lb 4.8 oz)   SpO2 95%   BMI 27.16 kg/m    GEN: NAD, lying in bed  EYES: EOMI  MOUTH: MMM  NECK: Supple  RESP: Unlabored breathing  CARDIAC: No LE edema  SKIN: Warm  ABD: soft  NEURO: AAO  : hien' on very slow gtt CBI, no clots.           Data:     Lab Results   Component Value Date    WBC 7.3 06/17/2019    HGB 11.1 (L) 06/17/2019    HCT 34.3 (L) 06/17/2019    MCV 98 06/17/2019     06/17/2019     Lab Results   Component Value Date    CR 1.21 06/17/2019                   Care Coordinator IP Consult    Narrative    Nitza Casillas RN     6/17/2019  3:49 PM  CTS identifies pt as high risk due to Elevated FABIO. Patient was   admitted on 6/16/19 for hematuria. Patient was seen in ER earlier   on 6/10/19.Patient has had two previous hospitalizations for   anemia d/t hematuria requiring a cysto. Patient has a history of   a stroke, Afib on coumadin, pacemaker, HTN, and DAWSON, and   Prostrate/ Bladder cancer. Patient has a CPAP with O2 " at home   through [facility]. Patient has a  through [employer].  A follow up PCP appointment was scheduled for the patient with   Dr. Winslow on June 26th at 1:40 PM.      No Handoff needed to PCP clinic at discharge. No gaps in care   identified. Patient followed up with urology and PCP as scheduled   after last hospitalization. Clinic care coordination followed up   with patient and no further need to continue to follow.      CM will continue to follow patient for any additional discharge   needs.     Nitza Casillas MA-RAND  Care Transition Services  [phone]     Urine Culture   Result Value Ref Range    Specimen Description Catheterized Urine     Special Requests Specimen received in preservative     Culture Micro No growth      Demi Burns PA-C

## 2019-06-19 NOTE — PLAN OF CARE
PRIMARY DIAGNOSIS: TURP  OUTPATIENT/OBSERVATION GOALS TO BE MET BEFORE DISCHARGE:  1. Stable vital signs Yes  2. Tolerating diet:Yes  3. Pain controlled with oral pain medications:  Denied   4. Positive bowel sounds:  Yes  5. Voiding without difficulty:  Tinoco for TURP   6. Able to ambulate:  Yes  7. Provider specific discharge goals met:  No     Discharge Planner Nurse   Safe discharge environment identified: Yes  Barriers to discharge: Yes- TURP, some bloody urine still at times.         No acute events . Pt up to BR- urine turned bright red with some small clots. CBI increased and has since resolved to clear urine. Moderate rate now.        Entered by: Harrison Watson 06/19/2019 3:40 AM  Please review provider order for any additional goals.   Nurse to notify provider when observation goals have been met and patient is ready for discharge.

## 2019-06-19 NOTE — PROGRESS NOTES
Patient has sleep apnea. Capno keeps alarming due to no breaths. Patient normally wears CPAP at night with oxygen. Nurse applied nasal cannula with 2L O2 and turned capno filterline off.

## 2019-06-19 NOTE — PROGRESS NOTES
[facility] Urology Progress Note          Assessment and Plan:   Active Problems:  Recurrent gross hematuria with clots, history of prostate cancer treated with radiation    Urinary retention    Assessment: POD 1, Video cystopanendoscopy, evacuation of clots, cup biopsies of bladder wall, electrovaporization of prostate, TURP, bilateral retrograde ureteropyelogram     Plan: Continue CBI today. Attempt to wean off in AM and possible TOV.  May be able to transport over to cardiology appt today to discuss if he is a candidate for Watchman procedure.   Adrienne Kelley PA-C  [facility] Urology  [phone]               Interval History:   doing well; no cp, sob, n/v/d, or abd pain. Tinoco watermelon.               Review of Systems:   The 5 point Review of Systems is negative other than noted in the HPI             Medications:     Current Facility-Administered Medications Ordered in Epic   Medication Dose Route Frequency Last Rate Last Dose     acetaminophen (TYLENOL) tablet 650 mg  650 mg Oral Q4H PRN         carvedilol (COREG) tablet 37.5 mg  37.5 mg Oral BID w/meals   37.5 mg at 06/19/19 0819     ciprofloxacin (CIPRO) tablet 500 mg  500 mg Oral Q12H DOUGLAS   500 mg at 06/19/19 0819     finasteride (PROSCAR) tablet 5 mg  5 mg Oral Daily   5 mg at 06/19/19 0819     lactated ringers infusion   Intravenous Continuous   Stopped at 06/18/19 2101     lidocaine (LMX4) cream   Topical Q1H PRN         lidocaine (LMX4) cream   Topical Q1H PRN         lidocaine 1 % 0.1-1 mL  0.1-1 mL Other Q1H PRN         lidocaine 1 % 0.1-1 mL  0.1-1 mL Other Q1H PRN         melatonin tablet 1 mg  1 mg Oral At Bedtime PRN         naloxone (NARCAN) injection 0.1-0.4 mg  0.1-0.4 mg Intravenous Q2 Min PRN         ondansetron (ZOFRAN-ODT) ODT tab 4 mg  4 mg Oral Q6H PRN        Or     ondansetron (ZOFRAN) injection 4 mg  4 mg Intravenous Q6H PRN         polyethylene glycol (MIRALAX/GLYCOLAX) Packet 17 g  17 g Oral Daily PRN          senna-docusate (SENOKOT-S/PERICOLACE) 8.6-50 MG per tablet 1 tablet  1 tablet Oral BID PRN   1 tablet at 06/17/19 2015    Or     senna-docusate (SENOKOT-S/PERICOLACE) 8.6-50 MG per tablet 2 tablet  2 tablet Oral BID PRN   2 tablet at 06/18/19 0759     sodium chloride (PF) 0.9% PF flush 3 mL  3 mL Intracatheter q1 min prn         sodium chloride (PF) 0.9% PF flush 3 mL  3 mL Intracatheter Q8H         sodium chloride (PF) 0.9% PF flush 3 mL  3 mL Intracatheter q1 min prn         sodium chloride (PF) 0.9% PF flush 3 mL  3 mL Intracatheter Q8H   3 mL at 06/19/19 0821     traMADol (ULTRAM) tablet 50 mg  50 mg Oral Q6H PRN         triamterene-HCTZ (MAXZIDE-25) 37.5-25 MG per tablet 1 tablet  1 tablet Oral Daily   1 tablet at 06/19/19 0819     No current [facility]-ordered outpatient medications on file.                  Physical Exam:   Vitals were reviewed  Patient Vitals for the past 8 hrs:   BP Temp Temp src Pulse Heart Rate Resp SpO2   06/19/19 0721 124/65 96.7  F (35.9  C) Oral 73 -- 20 96 %   06/19/19 0423 113/60 96.5  F (35.8  C) Oral -- 73 20 98 %   06/19/19 0345 113/60 96.5  F (35.8  C) Oral -- 73 18 98 %     GEN: NAD, lying in bed  EYES: EOMI  MOUTH: MMM  NECK: Supple  RESP: Unlabored breathing  CARDIAC: No LE edema  SKIN: Warm  ABD: soft  NEURO: AAO  : CBI very slow gtt, watermelon.            Data:   No results found for: NTBNPI, NTBNP  Lab Results   Component Value Date    WBC 10.5 06/19/2019    WBC 7.3 06/17/2019    WBC 6.3 06/16/2019    HGB 11.3 (L) 06/19/2019    HGB 11.3 (L) 06/18/2019    HGB 11.1 (L) 06/17/2019    HCT 34.7 (L) 06/19/2019    HCT 34.3 (L) 06/17/2019    HCT 33.3 (L) 06/16/2019    MCV 98 06/19/2019    MCV 98 06/17/2019    MCV 95 06/16/2019     06/19/2019     06/17/2019     06/16/2019     Lab Results   Component Value Date    INR 1.16 (H) 06/16/2019    INR 1.67 (H) 06/05/2019    INR 1.68 (H) 06/04/2019

## 2019-06-20 VITALS
RESPIRATION RATE: 16 BRPM | WEIGHT: 189.3 LBS | OXYGEN SATURATION: 98 % | DIASTOLIC BLOOD PRESSURE: 66 MMHG | BODY MASS INDEX: 27.1 KG/M2 | TEMPERATURE: 97.4 F | HEART RATE: 74 BPM | SYSTOLIC BLOOD PRESSURE: 131 MMHG | HEIGHT: 70 IN

## 2019-06-20 LAB — GLUCOSE BLDC GLUCOMTR-MCNC: 98 MG/DL (ref 70–99)

## 2019-06-20 PROCEDURE — 25000132 ZZH RX MED GY IP 250 OP 250 PS 637: Performed by: UROLOGY

## 2019-06-20 PROCEDURE — 00000146 ZZHCL STATISTIC GLUCOSE BY METER IP

## 2019-06-20 PROCEDURE — 99239 HOSP IP/OBS DSCHRG MGMT >30: CPT | Performed by: INTERNAL MEDICINE

## 2019-06-20 RX ORDER — CIPROFLOXACIN 500 MG/1
500 TABLET, FILM COATED ORAL EVERY 12 HOURS
Qty: 6 TABLET | Refills: 0 | Status: SHIPPED | OUTPATIENT
Start: 2019-06-20 | End: 2019-08-02

## 2019-06-20 RX ADMIN — TRIAMTERENE AND HYDROCHLOROTHIAZIDE 1 TABLET: 37.5; 25 TABLET ORAL at 07:40

## 2019-06-20 RX ADMIN — CARVEDILOL 37.5 MG: 25 TABLET, FILM COATED ORAL at 07:39

## 2019-06-20 RX ADMIN — CIPROFLOXACIN HYDROCHLORIDE 500 MG: 500 TABLET, FILM COATED ORAL at 07:40

## 2019-06-20 RX ADMIN — FINASTERIDE 5 MG: 5 TABLET, FILM COATED ORAL at 07:40

## 2019-06-20 NOTE — PLAN OF CARE
"/77 (BP Location: Left arm)   Pulse 74   Temp 96.7  F (35.9  C) (Oral)   Resp 16   Ht 1.778 m (5' 10\")   Wt 85.9 kg (189 lb 4.8 oz)   SpO2 97%   BMI 27.16 kg/m      Orientation: A&Ox4  Neurological: in tact  Pain status: denies  Activity: ind  Peripheral neurovascular: reporting baseline neuropathy in BLE, otherwise WNL  Resp: WNL  Lungs: clear  Cardiac: WNL  GI: WNL  : crawford was removed and pt voiding frequently with low PVRs, urine is red without clots  Skin: in tact, small amount of bloody drainage coming from tip of penis  IVF: PIV SL  Diet: regular - tolerating  Consults: urology  Plan: continue PVRs, encourage ambulation, promote PO intake. Anticipate discharge later this afternoon, as pt is currently meeting all discharge goals.  "

## 2019-06-20 NOTE — PROGRESS NOTES
VSS  Alert and oriented  Urine clear - will remove crawford   A: prostate cancer, s/p TURP  P: home today       F/U 6 weeks       Restrictions discussed        Restart coumadin in 10 days if urine clear

## 2019-06-20 NOTE — PLAN OF CARE
Patient's After Visit Summary was reviewed with patient and spouse.  Patient verbalized understanding of After Visit Summary, recommended follow up and was given an opportunity to ask questions.   Discharge medications sent home with patient/family: YES, cipro    Discharged with spouse        OBSERVATION patient END time: 1440

## 2019-06-20 NOTE — PLAN OF CARE
Neuro: WDL  Cardiac: WDL  Lungs: WDL  GI: WDL  :CBI clear, watermelon tinged. Slow rate  Pain: Denies   IV: Saline SL  Diet: Regular  Activity: SBA  Plan: Wean CBI, possible void trial- cardiac outpatient august for watchman device, PO abx

## 2019-06-20 NOTE — DISCHARGE SUMMARY
[facility]  Discharge Summary  Name: Tucker Slater    MRN: [id number]  YOB: 1931    Age: 88 year old  Date of Discharge:  6/20/2019  Date of Admission: 6/16/2019  Primary Care Provider: Kwadwo Winslow  Discharge Physician:  Esperanza Arias MD  Discharging Service:  Hospitalist      Discharge Diagnoses:  1. Recurrent Gross Hematuria s/p cystopanendoscopy, evacuation of clots, cups biopsies of bladder wall, electrovaporization of prostate, TURP, and bilateral retrograde ureteropyelogram  2. Urinary Retention  3. Permanent Atrial Fibrillation  4. CKD  5. HTN  6. DAWSON  7. Chronic Anemia     Follow-ups Needed After Discharge   1. Follow up with Cardiology on 6/25 to discuss Watchman device    Unresulted Labs Ordered in the Past 30 Days of this Admission     No orders found from 10/16/2018 to 12/16/2018.        Hospital Course:  Tucker Slater is a 88 year old male with a PMH significant for bladder cancer s/p surgical resection (2000), prostate cancer s/p radiation (3786-1903), CKD (baseline Cr 1.3-1.5), permanent A-fib on Warfarin with prior pacemaker placement, HTN, DAWSON on CPAP, mild LV dysfunction (EF of 45-50%), DAWSON and peripheral neuropathy who was admitted on 6/16/2019 with gross hematuria and urinary retention. Patient was seen by Urology and had CBI with urological procedure (see below).  Will remain off Coumadin for 10 days.      Recurrent gross hematuria with video cystopanendoscopy, evacuation of clots, cups biopsies of bladder wall, electrovaporization of prostate, TURP, and bilateral retrograde ureteropyelogram on 6/18: remote hx of prostate ca, s/p resection in 2000 and radiation therapy, multiple episodes of hematuria since April.  He did have cystoscopy at that time with evacuation of clots and fulguration of prostatic veins. Anticoagulation for a fib was restarted at the time of that discharge but stopped again during admission on 6/1 (per pt, he states he's been off  "coumadin since Easter). On 6/1 admission, gross hematuria resolved with bladder irrigation. Recurrent bleeding the morning of admission, unable to void, and not anticoagulated. He did have CBI and procedure as above.  Tinoco was removed on day of discharge and he was able to void.  He will need to remain off Coumadin for 10 days per Urology recommendations.      Urinary retention - Resolved: due to hematuria and clots. Resolved after procedure.     A-fib with PPM: previously anticoagulated with coumadin, now on hold. Supposed to be seen by Dr. Perez during this admission (missed appointment) to see if patient would be a candidate for the Watchman device.  Contacted clinic and able to get an appointment for the patient on 6/25/19 at 14:45 in [place].  Resumed home Carvedilol.      CKD: Cr stable     HTN: losartan discontinued during last admission. Maxzide was held on discharge but recently restarted by PCP. Coreg and Maxzide resumed with stable BP this admission.     DAWSON: continue nightly CPAP use     Chronic anemia: Hgb stable at 11.3, no blood transfusions during this admission.     Discharge Disposition:  Discharged to home     Allergies:  Allergies   Allergen Reactions     Merbromin      Other reaction(s): Other, see comments  Severe reaction as child. Amalgam fillings OK.     Morphine Nausea and Vomiting     Amlodipine      Edema        Condition on Discharge:  Discharge condition: Stable   Discharge vitals: Blood pressure 149/77, pulse 74, temperature 96.7  F (35.9  C), temperature source Oral, resp. rate 16, height 1.778 m (5' 10\"), weight 85.9 kg (189 lb 4.8 oz), SpO2 97 %.   Code status on discharge: Full Code     History of Illness:  See detailed admission note for full details.    Physical Exam:  Blood pressure 149/77, pulse 74, temperature 96.7  F (35.9  C), temperature source Oral, resp. rate 16, height 1.778 m (5' 10\"), weight 85.9 kg (189 lb 4.8 oz), SpO2 97 %.  Wt Readings from Last 1 Encounters: "   06/16/19 85.9 kg (189 lb 4.8 oz)     General: Alert, awake, no acute distress.  HEENT: Normocephalic, atraumatic, eyes anicteric and without scleral injection, EOMI, MMM.  Cardiac: RRR, normal S1, S2.  No m/g/r. No LE edema.  Pulmonary: Normal chest rise, normal work of breathing.  Lungs CTAB  Abdomen: soft, non-tender, non-distended.  Normoactive BS.  No guarding or rebound tenderness.  Extremities: no deformities.  Warm, well perfused.  Skin: no rashes or lesions noted.  Warm and Dry.  Neuro: No focal deficits noted.  Speech clear.  Coordination and strength grossly normal.  Psych: Appropriate affect. Alert and oriented x3    Procedures other than Imaging:  Video cystopanendoscopy, evacuation of clots, cup biopsies of bladder wall, electrovaporization of prostate, TURP, bilateral retrograde ureteropyelogram.     Imaging:  Results for orders placed or performed during the hospital encounter of 06/16/19   XR Surgery ROGER L/T 5 Min Fluoro w Stills    Narrative    This exam was marked as non-reportable because it will not be read by a   radiologist or a [facility] non-radiologist provider.                    Consultations:  Consultations This Hospital Stay   UROLOGY IP CONSULT  CARE COORDINATOR IP CONSULT     Recent Lab Results:  Recent Labs   Lab 06/19/19  0558 06/18/19  0614 06/17/19  0533 06/16/19  1021   WBC 10.5  --  7.3 6.3   HGB 11.3* 11.3* 11.1* 11.2*   HCT 34.7*  --  34.3* 33.3*   MCV 98  --  98 95     --  246 235     Recent Labs   Lab 06/19/19  0558 06/17/19  0533 06/16/19  1021   NA  --  140 140   POTASSIUM  --  4.0 3.7   CHLORIDE  --  107 108   CO2  --  28 23   ANIONGAP  --  5 9   * 96 96   BUN  --  25 28   CR  --  1.21 1.26*   GFRESTIMATED  --  53* 51*   GFRESTBLACK  --  61 59*   KARLA  --  8.6 8.6          Pending Results:    Unresulted Labs Ordered in the Past 30 Days of this Admission     Date and Time Order Name Status Description    6/18/2019 1437 Surgical pathology exam In process             Discharge Instructions and Follow-Up:   Discharge Orders      Follow-up and recommended labs and tests     Follow up with primary care provider, Kwadwo Winslow, within 7 days for hospital follow- up.  No follow up labs or test are needed.  Follow up with Urology per their recommendations.     Activity    Your activity upon discharge: activity as tolerated     Reason for your hospital stay    You were hospitalized for blood and clots in the urine.  You will need to remain off Coumadin for 10 days.     Full Code     Diet    Follow this diet upon discharge: advance diet as tolerated after procedure     Discharge Medications   Current Discharge Medication List      START taking these medications    Details   ciprofloxacin (CIPRO) 500 MG tablet Take 1 tablet (500 mg) by mouth every 12 hours  Qty: 6 tablet, Refills: 0    Associated Diagnoses: Gross hematuria         CONTINUE these medications which have NOT CHANGED    Details   ACETAMINOPHEN PO Take 650 mg by mouth every 4 hours as needed for pain      Ascorbic Acid (VITAMIN C PO) Take 500 mg by mouth daily       calcium carbonate (OS-KARLA 500 MG [place]. [place]) 500 MG tablet Take 500 mg by mouth daily       carvedilol (COREG) 25 MG tablet Take 1.5 tablets (37.5 mg) by mouth 2 times daily (with meals)  Qty: 270 tablet, Refills: 3    Associated Diagnoses: Essential hypertension with goal blood pressure less than 140/90      diclofenac (VOLTAREN) 1 % topical gel Place 2 g onto the skin 4 times daily Re-label for home use  Qty: 1 Tube, Refills: 0    Associated Diagnoses: Pain      docusate sodium (COLACE) 100 MG capsule Take 100 mg by mouth 2 times daily      finasteride (PROSCAR) 5 MG tablet Take 1 tablet (5 mg) by mouth daily  Qty: 30 tablet, Refills: 1    Associated Diagnoses: Hypertrophy of prostate with urinary obstruction      Multiple Vitamins-Minerals (OCUVITE PO) Take 1 tablet by mouth 2 times daily       triamterene-HCTZ (MAXZIDE-25) 37.5-25 MG tablet Take 1  tablet by mouth daily  Qty: 90 tablet, Refills: 3    Associated Diagnoses: Essential hypertension with goal blood pressure less than 140/90      vitamin B complex with vitamin C (VITAMIN  B COMPLEX) TABS tablet Take 1 tablet by mouth daily      VITAMIN D, CHOLECALCIFEROL, PO Take 2,000 Units by mouth daily.      !! order for DME Oxygen 2 Li/min  at night  Qty: 1 Device, Refills: 0    Associated Diagnoses: Hypoxemia      !! order for DME Oxygen 2 Li/min  at night with CPAP  SPO2 less than or equal to oxygen saturation 89% on effective CPAP in patient with known cardiomyopathy  Qty: 1 Device, Refills: 0    Associated Diagnoses: Hypoxemia; DAWSON (obstructive sleep apnea)       !! - Potential duplicate medications found. Please discuss with provider.      STOP taking these medications       amoxicillin (AMOXIL) 250 MG capsule Comments:   Reason for Stopping:               Time Spent on this Encounter   I, Esperanza Arias, personally saw the patient today and spent greater than 30 minutes discharging this patient.    Esperanza Arias MD

## 2019-06-21 ENCOUNTER — TELEPHONE (OUTPATIENT)
Dept: UROLOGY | Facility: CLINIC | Age: 84
End: 2019-06-21

## 2019-06-21 ENCOUNTER — TELEPHONE (OUTPATIENT)
Dept: INTERNAL MEDICINE | Facility: CLINIC | Age: 84
End: 2019-06-21

## 2019-06-21 NOTE — TELEPHONE ENCOUNTER
IP F/U    Date: 06/20/19  Diagnosis: Urinary Retention  Is patient active in care coordination? No  Was patient in TCU? No    Next 5 appointments (look out 90 days)    Jun 26, 2019  1:40 PM CDT  [facility] [address] [place]  [facility] [address]  [phone]   Aug 02, 2019 11:00 AM CDT  PHYSICAL with Kwadwo Winslow MD  [facility] [address]  [facility] [address]  [phone]

## 2019-06-21 NOTE — TELEPHONE ENCOUNTER
"ED/Discharge Protocol    \"Hi, my name is Nicole Monaco, a registered nurse, and I am calling on behalf of Dr. Winslow's office at [place].  I am calling to follow up and see how things are going for you after your recent visit.\"    \"I see that you were in the (ER/UC/IP) on 6/16/19.    How are you doing now that you are home?\" right wrist pain, stopped antibiotic per urology.      Is patient experiencing symptoms that may require a hospital visit?  no    Discharge Instructions    \"Let's review your discharge instructions.  What is/are the follow-up recommendations?  Pt. Response: Follow up with primary care provider, Kwadwo Winslow, within 7 days for hospital follow- up.  No follow up labs or test are needed.  Follow up with Urology per their recommendations.    \"Were you instructed to make a follow-up appointment?\"  Pt. Response: Yes.  Has appointment been made?   Yes      \"When you see the provider, I would recommend that you bring your discharge instructions with you.    Medications    \"How many new medications are you on since your hospitalization/ED visit?\"    0-1  \"How many of your current medicines changed (dose, timing, name, etc.) while you were in the hospital/ED visit?\"   0-1  \"Do you have questions about your medications?\"   No  \"Were you newly diagnosed with heart failure, COPD, diabetes or did you have a heart attack?\"   No  For patients on insulin: \"Did you start on insulin in the hospital or did you have your insulin dose changed?\"   No    Medication reconciliation completed? Yes    Was MTM referral placed (*Make sure to put transitions as reason for referral)?   No    Call Summary    \"Do you have any questions or concerns about your condition or care plan at the moment?\"    No  Triage nurse advice given: Continue to follow-up as directed by your provider and call clinic with any questions or concerns.    \"If you have questions or things don't continue to improve, we encourage you contact us " "through the main clinic number,  [phone].  Even if the clinic is not open, triage nurses are available 24/7 to help you.     We would like you to know that our clinic has extended hours (provide information).  We also have urgent care (provide details on closest location and hours/contact info)\"      \"Thank you for your time and take care!\"        "

## 2019-06-21 NOTE — TELEPHONE ENCOUNTER
Per Dr. Ramirez, Tucker should stop the Cipro he had been given as prophylaxis. No alternative med at this point. Called Tucker with this response. He expressed understanding.    [facility] Call Center    Phone Message    May a detailed message be left on voicemail: yes    Reason for Call: Symptoms or Concerns     If patient has red-flag symptoms, warm transfer to triage line    Current symptom or concern: Right hand/wrist pain.     Symptoms have been present for: 12-18 hour(s) ago. Symptoms started on 6/21/2019. Started medication in hospital and has 5 pills left.     Has patient previously been seen for this? No    By : Dr Ilir Ramirez    Date: 6/16/19 ED Visit    Are there any new or worsening symptoms? Yes: Pt noticed he had tendonitis and remembered reading in the medication information to stop taking medication if he starts feeling pain/soreness in tendons. He described the pain when he pulls himself such as using the railing to walk up the stairs. He can push just fine like closing the door.      Action Taken: Message routed to:  [facility] ([facility]  Urology

## 2019-06-23 ENCOUNTER — NURSE TRIAGE (OUTPATIENT)
Dept: NURSING | Facility: CLINIC | Age: 84
End: 2019-06-23

## 2019-06-23 NOTE — TELEPHONE ENCOUNTER
"Spouse calling; patient present.  States discharged from hospital 6/20/19 and has been having back pain since discharge.  Pain started in mid back and is now up in neck and into right shoulder blade.  Pain \"wanes\" and rates it at 9 out of 10.  \"Can only take Tylenol for pain\"; doesn't provide any relief.  Spouse asking if can take muscle relaxer and if can apply heat or cold.  FNA advised return to ED for evaluation and to not take any medication that wasn't prescribed.  Spouse stated \"he isn't going to like this\".        "

## 2019-06-24 LAB — COPATH REPORT: NORMAL

## 2019-06-25 ENCOUNTER — OFFICE VISIT (OUTPATIENT)
Dept: CARDIOLOGY | Facility: CLINIC | Age: 84
End: 2019-06-25
Payer: COMMERCIAL

## 2019-06-25 VITALS
DIASTOLIC BLOOD PRESSURE: 79 MMHG | HEART RATE: 84 BPM | WEIGHT: 182.6 LBS | SYSTOLIC BLOOD PRESSURE: 142 MMHG | HEIGHT: 70 IN | BODY MASS INDEX: 26.14 KG/M2

## 2019-06-25 DIAGNOSIS — I48.19 PERSISTENT ATRIAL FIBRILLATION (H): ICD-10-CM

## 2019-06-25 PROCEDURE — 99215 OFFICE O/P EST HI 40 MIN: CPT | Performed by: INTERNAL MEDICINE

## 2019-06-25 RX ORDER — LOSARTAN POTASSIUM 25 MG/1
25 TABLET ORAL DAILY
Qty: 30 TABLET | Refills: 11 | Status: SHIPPED | OUTPATIENT
Start: 2019-06-25 | End: 2019-08-02

## 2019-06-25 ASSESSMENT — MIFFLIN-ST. JEOR: SCORE: 1504.52

## 2019-06-25 NOTE — LETTER
6/25/2019    Kwadwo Winslow MD  [address] [facility] [address]    RE: Tucker Slater       Dear Colleague,    I had the pleasure of seeing Tucker Slater in the [facility].    HPI and Plan:   See dictation    Orders Placed This Encounter   Procedures     Follow-Up with Cardiac Advanced Practice Provider       Orders Placed This Encounter   Medications     losartan (COZAAR) 25 MG tablet     Sig: Take 1 tablet (25 mg) by mouth daily     Dispense:  30 tablet     Refill:  11       There are no discontinued medications.      Encounter Diagnosis   Name Primary?     Persistent atrial fibrillation (H)        CURRENT MEDICATIONS:  Current Outpatient Medications   Medication Sig Dispense Refill     ACETAMINOPHEN PO Take 650 mg by mouth every 4 hours as needed for pain       Ascorbic Acid (VITAMIN C PO) Take 500 mg by mouth daily        calcium carbonate (OS-KARLA 500 MG [place]. [place]) 500 MG tablet Take 500 mg by mouth daily        carvedilol (COREG) 25 MG tablet Take 1.5 tablets (37.5 mg) by mouth 2 times daily (with meals) 270 tablet 3     diclofenac (VOLTAREN) 1 % topical gel Place 2 g onto the skin 4 times daily Re-label for home use (Patient taking differently: Place 2 g onto the skin 4 times daily as needed Re-label for home use) 1 Tube 0     docusate sodium (COLACE) 100 MG capsule Take 100 mg by mouth 2 times daily       finasteride (PROSCAR) 5 MG tablet Take 1 tablet (5 mg) by mouth daily 30 tablet 1     losartan (COZAAR) 25 MG tablet Take 1 tablet (25 mg) by mouth daily 30 tablet 11     Multiple Vitamins-Minerals (OCUVITE PO) Take 1 tablet by mouth 2 times daily        order for DME Oxygen 2 Li/min  at night 1 Device 0     order for DME Oxygen 2 Li/min  at night with CPAP  SPO2 less than or equal to oxygen saturation 89% on effective CPAP in patient with known cardiomyopathy 1 Device 0     triamterene-HCTZ (MAXZIDE-25) 37.5-25 MG tablet Take 1 tablet by mouth daily 90  tablet 3     vitamin B complex with vitamin C (VITAMIN  B COMPLEX) TABS tablet Take 1 tablet by mouth daily       VITAMIN D, CHOLECALCIFEROL, PO Take 2,000 Units by mouth daily.       ciprofloxacin (CIPRO) 500 MG tablet Take 1 tablet (500 mg) by mouth every 12 hours (Patient not taking: Reported on 6/25/2019) 6 tablet 0       ALLERGIES     Allergies   Allergen Reactions     Merbromin      Other reaction(s): Other, see comments  Severe reaction as child. Amalgam fillings OK.     Morphine Nausea and Vomiting     Amlodipine      Edema       PAST MEDICAL HISTORY:  Past Medical History:   Diagnosis Date     Arrhythmia     A Fib     Balance problem     related to neuropathy in feet     Bradycardia      Carcinoma in situ of bladder 2000    bladder cancer ;; follow w Urology w periodic cysto      Essential hypertension, benign      Generalized osteoarthrosis, unspecified site knees    s/p R total knee 8/09 ; will do L 6/10      Hernia, abdominal      Numbness and tingling     toes and fingers     Pacemaker     [employer]      Pain in joint, shoulder region     L shoulder rotater tear; no surgery      Palpitations      Persistent atrial fibrillation (H) 1/30/15     Prostate CA (H) 2008-9    radiation     Prostate cancer (H) 11/08    completed RT 3/09     Renal disease     elevated creatinine     Shoulder impingement     R shoulder impingement etc see MRI 4/09      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     toes both feet        PAST SURGICAL HISTORY:  Past Surgical History:   Procedure Laterality Date     ARTHROPLASTY HIP Right 3/27/2017    Procedure: ARTHROPLASTY HIP;  Surgeon: Jigar Wynn MD;  Location: [facility] OR     C NONSPECIFIC PROCEDURE      bilat inguinal hernia repairs ( 3total procedures)      C NONSPECIFIC PROCEDURE      pilonidal cyst     C NONSPECIFIC PROCEDURE      T + A     C NONSPECIFIC PROCEDURE  8/09     R+L total knee     CARDIOVERSION  3/26/15     COLONOSCOPY       CYSTOSCOPY        CYSTOSCOPY, FULGURATE BLEEDERS, EVACUATE CLOT(S), COMBINED N/A 2019    Procedure: Exam under anesthesia, video cystopanendoscopy, evacuation of clots, fulguration of prostatic veins.;  Surgeon: Ilir Ramirez MD;  Location: [facility] OR     CYSTOSCOPY, RETROGRADES, COMBINED Bilateral 2019    Procedure: Video cystopanendoscopy, evacuation of clots, cup biopsies of bladder wall, electrovaporization of prostate, transurethral resection of prostate, bilateral retrograde ureteropyelogram.;  Surgeon: Ilir Ramirez MD;  Location: [facility] OR     CYSTOSCOPY, TRANSURETHRAL RESECTION (TUR) PROSTATE, COMBINED  2019    Procedure: Cystoscopy, transurethral resection of prostate;  Surgeon: Ilir Ramirez MD;  Location: [facility] OR     HERNIA REPAIR       IMPLANT PACEMAKER  3/26/15     RELEASE CARPAL TUNNEL Right 2017    Procedure: RELEASE CARPAL TUNNEL;  Right carpal tunnel release;  Surgeon: Alonso Barboza MD;  Location: [facility] OR       FAMILY HISTORY:  Family History   Problem Relation Age of Onset     Heart Disease Father          89yo     Coronary Artery Disease Father      Heart Disease Mother          76yo     Coronary Artery Disease Mother      Family History Negative Brother        SOCIAL HISTORY:  Social History     Socioeconomic History     Marital status:      Spouse name: Alba     Number of children: 3     Years of education: None     Highest education level: None   Occupational History     Employer: RETIRED     Comment:  w [employer]   Social Needs     Financial resource strain: None     Food insecurity:     Worry: None     Inability: None     Transportation needs:     Medical: None     Non-medical: None   Tobacco Use     Smoking status: Former Smoker     Last attempt to quit: 1977     Years since quittin.8     Smokeless tobacco: Never Used   Substance and Sexual Activity     Alcohol use: No     Alcohol/week: 0.0 oz     Drug use: No     Sexual activity:  Never     Partners: Female   Lifestyle     Physical activity:     Days per week: None     Minutes per session: None     Stress: None   Relationships     Social connections:     Talks on phone: None     Gets together: None     Attends Evangelical service: None     Active member of club or organization: None     Attends meetings of clubs or organizations: None     Relationship status: None     Intimate partner violence:     Fear of current or ex partner: None     Emotionally abused: None     Physically abused: None     Forced sexual activity: None   Other Topics Concern     Parent/sibling w/ CABG, MI or angioplasty before 65F 55M? Not Asked      Service Not Asked     Blood Transfusions Not Asked     Caffeine Concern No     Comment: 4-5 cups per week      Occupational Exposure Not Asked     Hobby Hazards Not Asked     Sleep Concern No     Stress Concern No     Weight Concern No     Special Diet No     Back Care Not Asked     Exercise No     Comment: yard work and moves around a lot     Bike Helmet Not Asked     Seat Belt Not Asked     Self-Exams Not Asked   Social History Narrative     None       Review of Systems:  Skin:  Negative       Eyes:  Positive for glasses    ENT:  Negative      Respiratory:  Positive for sleep apnea;CPAP O2 withCPAP   Cardiovascular:  Negative;palpitations;chest pain;lightheadedness;dizziness;syncope or near-syncope;cyanosis Positive for;fatigue;edema    Gastroenterology: Negative      Genitourinary:  Positive for urinary frequency;hematuria;prostate problem cloudy urine  Musculoskeletal:  Positive for arthritis balance off  Neurologic:  Positive for   neuropathy  Psychiatric:  Positive for anxiety    Heme/Lymph/Imm:  Positive for weight loss    Endocrine:  Negative        [id number]                Thank you for allowing me to participate in the care of your patient.      Sincerely,     Aleena Perez MD     [facility]    cc:   Nuria Gray MD  [id number]  [place] [id number]

## 2019-06-25 NOTE — LETTER
6/25/2019      Kwadwo Winslow MD  [address] [facility] [address]      RE: Tucker Slater       Dear Colleague,    I had the pleasure of seeing Tucker Slater in the [facility].    Service Date: 06/25/2019      HISTORY OF PRESENT ILLNESS:  It is my pleasure seeing Mr. Tucker Slater, a delightful 88-year-old male with the following medical issues:   1.  Permanent atrial fibrillation/atypical atrial flutter, associated with bradycardia.  Pacemaker implantation in 2015.  He had been on anticoagulation until recently.  This was stopped because of recurrent hematuria.   2.  Mild LV dysfunction by echocardiography in 2018, EF 45%-50%.  Likely related to RV pacing.   3.  Hypertension.   4.  Obstructive sleep apnea.  On CPAP since 2018.   5.  History of prostate and bladder cancer with previous treatment.  Several recent admissions with hematuria.      I was asked to see Tucker again to assess for possible Watchman device.  Unfortunately, he has had significant troubles with hematuria since April.  He has been admitted to the hospital 4 or 5 times.  The reasons for admission were either hematuria or urinary retention due to retained blood clots.  He has had procedures by Dr. Ramirez twice in 06/2019.  Most recently on 6/18/2019, he underwent a video cystoscopy panendoscopy with evacuation of clots, biopsy of bladder wall, electrocauterization of prostate, TURP and bilateral retrograde ureteropyelogram.      Following this most recent procedure, Tucker has been off warfarin.  He just went home only 5 days ago.      His ABQ3DH8-YQIg score is probably 3 (age, hypertension).      He has had no chest pain, syncope, near-syncope.  No dyspnea on exertion.      PHYSICAL EXAMINATION:   VITAL SIGNS:  Blood pressure 142/79, pulse 84 and regular (paced).  His weight is 83 kg.   LUNGS:  Clear.   CARDIOVASCULAR:  Regular paced rhythm, no apparent gallop, murmur, or rub.       DIAGNOSTIC STUDIES:  His most recent 12-lead ECG shows atypical atrial flutter with ventricular pacing and occasional PVCs or intrinsically conducted beats.      IMPRESSION:  Permanent atrial fibrillation/flutter with [id number] score of 3.      Tucker has had serious trouble with recurrent hematuria and repeated hospitalizations in the past 2 months.  Therefore, he cannot be on anticoagulation.  His annual risk of stroke without anticoagulation is about 4%.  Technically, he qualifies for implantation of a Watchman.        I had a very lengthy discussion (45 minutes, greater than 50% of the time  discussion) with Tucker and his wife.  I told him that at age 88, there are multiple competing causes for mortality.  This is unfortunate, but it is the truth.  Placing a Watchman would only reduce his risk of stroke, but cannot address all the competing causes of mortality at his age group.  In addition, following Watchman, he would require anticoagulation for 6 weeks.  I am not sure if he can handle anticoagulation for 6 weeks.  One of the worst things that can happen is to have a device there and within days after device, we have to stop anticoagulation (before complete endothelialization) leaving exposed metal open in his heart.  I believe he should be at very high risk of recurrent hematuria if he was placed back on anticoagulation.      I believe Tucker understood the dilemma that he is facing.  He said he would want to think about it more, but at the moment, he is leaning toward not having the Watchman.  If he changes his mind, he will let me know.  His wife was in agreement as well.  She said that the decision of what he wants to do is mostly his and she will complete the respect it.      I have requested a followup appointment with Tucker in our [facility] in 4 months.        It was my pleasure seeing this delightful gentleman.      cc:      Kwadwo Winslow MD   [facility]   [address] [facility]   [place] [address]         HARVINDER MONROY MD             D: 2019   T: 2019   MT: JACQUIE      Name:     ROSS LORENZANA   MRN:      [id number]        Account:      [id number]   :      1931           Service Date: 2019      Document: [id number]         Outpatient Encounter Medications as of 2019   Medication Sig Dispense Refill     ACETAMINOPHEN PO Take 650 mg by mouth every 4 hours as needed for pain       Ascorbic Acid (VITAMIN C PO) Take 500 mg by mouth daily        calcium carbonate (OS-KARLA 500 MG [place]. [place]) 500 MG tablet Take 500 mg by mouth daily        carvedilol (COREG) 25 MG tablet Take 1.5 tablets (37.5 mg) by mouth 2 times daily (with meals) 270 tablet 3     diclofenac (VOLTAREN) 1 % topical gel Place 2 g onto the skin 4 times daily Re-label for home use (Patient taking differently: Place 2 g onto the skin 4 times daily as needed Re-label for home use) 1 Tube 0     docusate sodium (COLACE) 100 MG capsule Take 100 mg by mouth 2 times daily       finasteride (PROSCAR) 5 MG tablet Take 1 tablet (5 mg) by mouth daily 30 tablet 1     losartan (COZAAR) 25 MG tablet Take 1 tablet (25 mg) by mouth daily (Patient not taking: Reported on 2019) 30 tablet 11     Multiple Vitamins-Minerals (OCUVITE PO) Take 1 tablet by mouth 2 times daily        order for DME Oxygen 2 Li/min  at night 1 Device 0     order for DME Oxygen 2 Li/min  at night with CPAP  SPO2 less than or equal to oxygen saturation 89% on effective CPAP in patient with known cardiomyopathy 1 Device 0     triamterene-HCTZ (MAXZIDE-25) 37.5-25 MG tablet Take 1 tablet by mouth daily 90 tablet 3     vitamin B complex with vitamin C (VITAMIN  B COMPLEX) TABS tablet Take 1 tablet by mouth daily       VITAMIN D, CHOLECALCIFEROL, PO Take 2,000 Units by mouth daily.       ciprofloxacin (CIPRO) 500 MG tablet Take 1 tablet (500 mg) by mouth every 12 hours (Patient not taking: Reported on  6/26/2019) 6 tablet 0     No facility-administered encounter medications on file as of 6/25/2019.        Again, thank you for allowing me to participate in the care of your patient.      Sincerely,    Aleena Perez MD     [facility]

## 2019-06-25 NOTE — PROGRESS NOTES
Service Date: 06/25/2019      HISTORY OF PRESENT ILLNESS:    It is my pleasure seeing Mr. Tucker Slater, a delightful and alert 88-year-old male with the following medical issues:   1.  Permanent atrial fibrillation/atypical atrial flutter, associated with bradycardia.  Pacemaker implantation in 2015.  He had been on anticoagulation until recently.  This was stopped because of recurrent hematuria.   2.  Mild LV dysfunction by echocardiography in 2018, EF 45%-50%.  Likely related to RV pacing.   3.  Hypertension.   4.  Obstructive sleep apnea.  On CPAP since 2018.   5.  History of prostate and bladder cancer with previous treatment.  Several recent admissions with hematuria.      I was asked to see Tucker again to assess eligibility for a Watchman device.  Unfortunately, he has had significant troubles with hematuria since April.  He has been admitted to the hospital 4 or 5 times.  The reason for admission has been either hematuria or urinary retention due to retained blood clots.  He has had procedures by Dr. Ramirez twice in 06/2019.  Most recently on 6/18/2019, he underwent video cystoscopy, panendoscopy with evacuation of clots, biopsy of bladder wall, electrocauterization of prostate, TURP and bilateral retrograde ureteropyelogram.      Following this most recent procedure, Tucker has been off warfarin.  He went home 5 days ago.      His [id number] score is probably 3 (age, hypertension).      He has had no chest pain, syncope, near-syncope.  No dyspnea on exertion.        PHYSICAL EXAMINATION:   VITAL SIGNS:  Blood pressure 142/79, pulse 84 and regular (paced).  His weight is 83 kg.   LUNGS:  Clear.   CARDIOVASCULAR:  Regular paced rhythm, no apparent gallop, murmur, or rub.        DIAGNOSTIC STUDIES:    His most recent 12-lead ECG shows atypical atrial flutter with ventricular pacing and occasional PVCs or intrinsically conducted beats.        IMPRESSION & PLAN:    1.  Permanent atrial  fibrillation/flutter with [id number]-TXGc score of 3.  Ross has had recurrent gross hematuria and repeated hospitalizations in the past 2 months.  He cannot be on anticoagulation.  His annual risk of stroke without anticoagulation is estimated at about 4%.  Technically, he qualifies for a Watchman.          I had a lengthy visit (45 minutes, greater than 50% of the time discussion) with Ross and his wife.  I explained that at age 88, there are multiple competing causes for mortality.  This is unfortunate, but it is the truth.  Placing a Watchman would reduce his risk of stroke, but does not address multiple other causes of serious morbidity and mortality at his age group.         In addition, following Watchman implantation, he would require anticoagulation for at least 6 weeks.  It is not clear he can handle anticoagulation for even 6 weeks.         I believe Ross understood the dilemma he is facing.  He said he wants to think about it more but, at the moment, he is leaning toward not having the Watchman.  If he changes his mind, he will let me know.  His wife was in agreement as well.      I have requested a followup appointment in our [facility] in 4 months.        It was my pleasure seeing this delightful gentleman.         HARVINDER MONROY MD, [facility]         cc:      Kwadwo Winslow MD   [facility]   [address]   [place] [address]            D: 2019   T: 2019   MT: JACQUIE      Name:     ROSS LORENZANA   MRN:      [id number]        Account:      [id number]   :      1931           Service Date: 2019      Document: [id number]

## 2019-06-25 NOTE — PROGRESS NOTES
HPI and Plan:   See dictation    Orders Placed This Encounter   Procedures     Follow-Up with Cardiac Advanced Practice Provider       Orders Placed This Encounter   Medications     losartan (COZAAR) 25 MG tablet     Sig: Take 1 tablet (25 mg) by mouth daily     Dispense:  30 tablet     Refill:  11       There are no discontinued medications.      Encounter Diagnosis   Name Primary?     Persistent atrial fibrillation (H)        CURRENT MEDICATIONS:  Current Outpatient Medications   Medication Sig Dispense Refill     ACETAMINOPHEN PO Take 650 mg by mouth every 4 hours as needed for pain       Ascorbic Acid (VITAMIN C PO) Take 500 mg by mouth daily        calcium carbonate (OS-KARLA 500 MG [place]. [place]) 500 MG tablet Take 500 mg by mouth daily        carvedilol (COREG) 25 MG tablet Take 1.5 tablets (37.5 mg) by mouth 2 times daily (with meals) 270 tablet 3     diclofenac (VOLTAREN) 1 % topical gel Place 2 g onto the skin 4 times daily Re-label for home use (Patient taking differently: Place 2 g onto the skin 4 times daily as needed Re-label for home use) 1 Tube 0     docusate sodium (COLACE) 100 MG capsule Take 100 mg by mouth 2 times daily       finasteride (PROSCAR) 5 MG tablet Take 1 tablet (5 mg) by mouth daily 30 tablet 1     losartan (COZAAR) 25 MG tablet Take 1 tablet (25 mg) by mouth daily 30 tablet 11     Multiple Vitamins-Minerals (OCUVITE PO) Take 1 tablet by mouth 2 times daily        order for DME Oxygen 2 Li/min  at night 1 Device 0     order for DME Oxygen 2 Li/min  at night with CPAP  SPO2 less than or equal to oxygen saturation 89% on effective CPAP in patient with known cardiomyopathy 1 Device 0     triamterene-HCTZ (MAXZIDE-25) 37.5-25 MG tablet Take 1 tablet by mouth daily 90 tablet 3     vitamin B complex with vitamin C (VITAMIN  B COMPLEX) TABS tablet Take 1 tablet by mouth daily       VITAMIN D, CHOLECALCIFEROL, PO Take 2,000 Units by mouth daily.       ciprofloxacin (CIPRO) 500 MG tablet Take 1  tablet (500 mg) by mouth every 12 hours (Patient not taking: Reported on 6/25/2019) 6 tablet 0       ALLERGIES     Allergies   Allergen Reactions     Merbromin      Other reaction(s): Other, see comments  Severe reaction as child. Amalgam fillings OK.     Morphine Nausea and Vomiting     Amlodipine      Edema       PAST MEDICAL HISTORY:  Past Medical History:   Diagnosis Date     Arrhythmia     A Fib     Balance problem     related to neuropathy in feet     Bradycardia      Carcinoma in situ of bladder 2000    bladder cancer ;; follow w Urology w periodic cysto      Essential hypertension, benign      Generalized osteoarthrosis, unspecified site knees    s/p R total knee 8/09 ; will do L 6/10      Hernia, abdominal      Numbness and tingling     toes and fingers     Pacemaker     [employer]      Pain in joint, shoulder region     L shoulder rotater tear; no surgery      Palpitations      Persistent atrial fibrillation (H) 1/30/15     Prostate CA (H) 2008-9    radiation     Prostate cancer (H) 11/08    completed RT 3/09     Renal disease     elevated creatinine     Shoulder impingement     R shoulder impingement etc see MRI 4/09      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     toes both feet        PAST SURGICAL HISTORY:  Past Surgical History:   Procedure Laterality Date     ARTHROPLASTY HIP Right 3/27/2017    Procedure: ARTHROPLASTY HIP;  Surgeon: Jigar Wynn MD;  Location: [facility] OR     C NONSPECIFIC PROCEDURE      bilat inguinal hernia repairs ( 3total procedures)      C NONSPECIFIC PROCEDURE      pilonidal cyst     C NONSPECIFIC PROCEDURE      T + A     C NONSPECIFIC PROCEDURE  8/09     R+L total knee     CARDIOVERSION  3/26/15     COLONOSCOPY       CYSTOSCOPY       CYSTOSCOPY, FULGURATE BLEEDERS, EVACUATE CLOT(S), COMBINED N/A 4/23/2019    Procedure: Exam under anesthesia, video cystopanendoscopy, evacuation of clots, fulguration of prostatic veins.;  Surgeon: Ilir Ramirez MD;   Location: [facility] OR     CYSTOSCOPY, RETROGRADES, COMBINED Bilateral 2019    Procedure: Video cystopanendoscopy, evacuation of clots, cup biopsies of bladder wall, electrovaporization of prostate, transurethral resection of prostate, bilateral retrograde ureteropyelogram.;  Surgeon: Ilir Ramirez MD;  Location: [facility] OR     CYSTOSCOPY, TRANSURETHRAL RESECTION (TUR) PROSTATE, COMBINED  2019    Procedure: Cystoscopy, transurethral resection of prostate;  Surgeon: Ilir Ramirez MD;  Location: [facility] OR     HERNIA REPAIR       IMPLANT PACEMAKER  3/26/15     RELEASE CARPAL TUNNEL Right 2017    Procedure: RELEASE CARPAL TUNNEL;  Right carpal tunnel release;  Surgeon: Alonso Barboza MD;  Location: [facility] OR       FAMILY HISTORY:  Family History   Problem Relation Age of Onset     Heart Disease Father          87yo     Coronary Artery Disease Father      Heart Disease Mother          74yo     Coronary Artery Disease Mother      Family History Negative Brother        SOCIAL HISTORY:  Social History     Socioeconomic History     Marital status:      Spouse name: Alba     Number of children: 3     Years of education: None     Highest education level: None   Occupational History     Employer: RETIRED     Comment:  w [employer]   Social Needs     Financial resource strain: None     Food insecurity:     Worry: None     Inability: None     Transportation needs:     Medical: None     Non-medical: None   Tobacco Use     Smoking status: Former Smoker     Last attempt to quit: 1977     Years since quittin.8     Smokeless tobacco: Never Used   Substance and Sexual Activity     Alcohol use: No     Alcohol/week: 0.0 oz     Drug use: No     Sexual activity: Never     Partners: Female   Lifestyle     Physical activity:     Days per week: None     Minutes per session: None     Stress: None   Relationships     Social connections:     Talks on phone: None     Gets together: None      Attends Anabaptist service: None     Active member of club or organization: None     Attends meetings of clubs or organizations: None     Relationship status: None     Intimate partner violence:     Fear of current or ex partner: None     Emotionally abused: None     Physically abused: None     Forced sexual activity: None   Other Topics Concern     Parent/sibling w/ CABG, MI or angioplasty before 65F 55M? Not Asked      Service Not Asked     Blood Transfusions Not Asked     Caffeine Concern No     Comment: 4-5 cups per week      Occupational Exposure Not Asked     Hobby Hazards Not Asked     Sleep Concern No     Stress Concern No     Weight Concern No     Special Diet No     Back Care Not Asked     Exercise No     Comment: yard work and moves around a lot     Bike Helmet Not Asked     Seat Belt Not Asked     Self-Exams Not Asked   Social History Narrative     None       Review of Systems:  Skin:  Negative       Eyes:  Positive for glasses    ENT:  Negative      Respiratory:  Positive for sleep apnea;CPAP O2 withCPAP   Cardiovascular:  Negative;palpitations;chest pain;lightheadedness;dizziness;syncope or near-syncope;cyanosis Positive for;fatigue;edema    Gastroenterology: Negative      Genitourinary:  Positive for urinary frequency;hematuria;prostate problem cloudy urine  Musculoskeletal:  Positive for arthritis balance off  Neurologic:  Positive for   neuropathy  Psychiatric:  Positive for anxiety    Heme/Lymph/Imm:  Positive for weight loss    Endocrine:  Negative        [id number]

## 2019-06-26 ENCOUNTER — OFFICE VISIT (OUTPATIENT)
Dept: INTERNAL MEDICINE | Facility: CLINIC | Age: 84
End: 2019-06-26
Payer: COMMERCIAL

## 2019-06-26 VITALS
HEIGHT: 70 IN | TEMPERATURE: 97.7 F | WEIGHT: 181 LBS | OXYGEN SATURATION: 100 % | HEART RATE: 75 BPM | BODY MASS INDEX: 25.91 KG/M2 | RESPIRATION RATE: 12 BRPM | DIASTOLIC BLOOD PRESSURE: 68 MMHG | SYSTOLIC BLOOD PRESSURE: 146 MMHG

## 2019-06-26 DIAGNOSIS — Z09 HOSPITAL DISCHARGE FOLLOW-UP: Primary | ICD-10-CM

## 2019-06-26 DIAGNOSIS — I48.19 PERSISTENT ATRIAL FIBRILLATION (H): ICD-10-CM

## 2019-06-26 DIAGNOSIS — I10 ESSENTIAL HYPERTENSION WITH GOAL BLOOD PRESSURE LESS THAN 140/90: ICD-10-CM

## 2019-06-26 DIAGNOSIS — R31.0 GROSS HEMATURIA: ICD-10-CM

## 2019-06-26 PROCEDURE — 99495 TRANSJ CARE MGMT MOD F2F 14D: CPT | Performed by: INTERNAL MEDICINE

## 2019-06-26 ASSESSMENT — MIFFLIN-ST. JEOR: SCORE: 1497.26

## 2019-06-26 NOTE — NURSING NOTE
"Vital signs:  Temp: 97.7  F (36.5  C) Temp src: Oral BP: 146/68 Pulse: 75   Resp: 12 SpO2: 100 %     Height: 177.8 cm (5' 10\") Weight: 82.1 kg (181 lb)  Estimated body mass index is 25.97 kg/m  as calculated from the following:    Height as of this encounter: 1.778 m (5' 10\").    Weight as of this encounter: 82.1 kg (181 lb).          "

## 2019-06-26 NOTE — PROGRESS NOTES
Subjective     Tucker Slater is a 88 year old male who presents to clinic today for the following health issues:    [facility]       Hospital Follow-up Visit:    Hospital/Nursing Home/IP Rehab Facility: [facility]  Date of Admission: 06/16/19  Date of Discharge: 06/20/19  Reason(s) for Admission: Urinary retention, gross hematuria            Problems taking medications regularly:  None       Medication changes since discharge: restarted Losartan from cardiology        Problems adhering to non-medication therapy:  None    Summary of hospitalization:  [facility] discharge summary reviewed  Diagnostic Tests/Treatments reviewed.  Follow up needed: none  Other Healthcare Providers Involved in Patient s Care:cardiology and urology          None  Update since discharge: improved.     Post Discharge Medication Reconciliation: discharge medications reconciled, continue medications without change.  Plan of care communicated with patient     Coding guidelines for this visit:  Type of Medical   Decision Making Face-to-Face Visit       within 7 Days of discharge Face-to-Face Visit        within 14 days of discharge   Moderate Complexity [id number]   High Complexity [id number]            Patient is seen for a follow up visit.  Recently hospitalized for recurrent hematuria and urine retention related to clots/ obstruction. Had cystoscopy, TURP, biopsies. Has h/o of remote prostate cancer.   Hematuria improved with holding of Coumadin , taken for chronic a fib. Occasionally seeing blood now but no clots. Has urine frequency, goes every 2 hours. No dysuria. No fevers.   Has history of atrial fibrillation. On anticoagulation with Coumadin and rate control medications. Asymptonatic - no chest pains , palpitations,  no side effects from medications.  Holding Coumadin in view of hematuria. Seen cardiology. Recommended conservative treatment.   Has h/o HTN. on medical treatment. BP has been controlled. No side  effects from medications. No CP, HA, dizziness. good compliance with medications and low salt diet.          Patient Active Problem List   Diagnosis     Essential hypertension with goal blood pressure less than 140/90     Carcinoma in situ of bladder     Hypertrophy of prostate with urinary obstruction     Generalized osteoarthrosis, unspecified site     Hereditary and idiopathic peripheral neuropathy     CARDIOVASCULAR SCREENING; LDL GOAL LESS THAN 130     Advanced directives, counseling/discussion     Peripheral neuropathy     GI bleed     Anemia     Near syncope     Anemia due to blood loss, acute     Atrial fibrillation (H) with mitral regurgitation and congestive heart failure     Bradycardia     CKD (chronic kidney disease) stage 3, GFR 30-59 ml/min (H)     Cardiac pacemaker in situ     Long-term (current) use of anticoagulants [Z79.01]     Degenerative arthritis of hip     Malignant neoplasm of other specified sites of bladder     Dysplasia of prostate     Long term current use of anticoagulants with INR goal of 2.0-3.0     Hematuria     Hospital discharge follow-up     Urinary retention     Past Surgical History:   Procedure Laterality Date     ARTHROPLASTY HIP Right 3/27/2017    Procedure: ARTHROPLASTY HIP;  Surgeon: Jigar Wynn MD;  Location: [facility] OR     C NONSPECIFIC PROCEDURE      bilat inguinal hernia repairs ( 3total procedures)      C NONSPECIFIC PROCEDURE      pilonidal cyst     C NONSPECIFIC PROCEDURE      T + A     C NONSPECIFIC PROCEDURE  8/09     R+L total knee     CARDIOVERSION  3/26/15     COLONOSCOPY       CYSTOSCOPY       CYSTOSCOPY, FULGURATE BLEEDERS, EVACUATE CLOT(S), COMBINED N/A 4/23/2019    Procedure: Exam under anesthesia, video cystopanendoscopy, evacuation of clots, fulguration of prostatic veins.;  Surgeon: Ilir Ramirez MD;  Location: [facility] OR     CYSTOSCOPY, RETROGRADES, COMBINED Bilateral 6/18/2019    Procedure: Video cystopanendoscopy, evacuation of clots, cup biopsies of  bladder wall, electrovaporization of prostate, transurethral resection of prostate, bilateral retrograde ureteropyelogram.;  Surgeon: Ilir Ramirez MD;  Location: [facility] OR     CYSTOSCOPY, TRANSURETHRAL RESECTION (TUR) PROSTATE, COMBINED  2019    Procedure: Cystoscopy, transurethral resection of prostate;  Surgeon: Ilir Ramirez MD;  Location: [facility] OR     HERNIA REPAIR       IMPLANT PACEMAKER  3/26/15     RELEASE CARPAL TUNNEL Right 2017    Procedure: RELEASE CARPAL TUNNEL;  Right carpal tunnel release;  Surgeon: Alonso Barboza MD;  Location: [facility] OR       Social History     Tobacco Use     Smoking status: Former Smoker     Last attempt to quit: 1977     Years since quittin.8     Smokeless tobacco: Never Used   Substance Use Topics     Alcohol use: No     Alcohol/week: 0.0 oz     Family History   Problem Relation Age of Onset     Heart Disease Father          89yo     Coronary Artery Disease Father      Heart Disease Mother          76yo     Coronary Artery Disease Mother      Family History Negative Brother          Current Outpatient Medications   Medication Sig Dispense Refill     ACETAMINOPHEN PO Take 650 mg by mouth every 4 hours as needed for pain       Ascorbic Acid (VITAMIN C PO) Take 500 mg by mouth daily        calcium carbonate (OS-KARLA 500 MG [place]. [place]) 500 MG tablet Take 500 mg by mouth daily        carvedilol (COREG) 25 MG tablet Take 1.5 tablets (37.5 mg) by mouth 2 times daily (with meals) 270 tablet 3     diclofenac (VOLTAREN) 1 % topical gel Place 2 g onto the skin 4 times daily Re-label for home use (Patient taking differently: Place 2 g onto the skin 4 times daily as needed Re-label for home use) 1 Tube 0     docusate sodium (COLACE) 100 MG capsule Take 100 mg by mouth 2 times daily       finasteride (PROSCAR) 5 MG tablet Take 1 tablet (5 mg) by mouth daily 30 tablet 1     Multiple Vitamins-Minerals (OCUVITE PO) Take 1 tablet by mouth 2 times daily         triamterene-HCTZ (MAXZIDE-25) 37.5-25 MG tablet Take 1 tablet by mouth daily 90 tablet 3     vitamin B complex with vitamin C (VITAMIN  B COMPLEX) TABS tablet Take 1 tablet by mouth daily       VITAMIN D, CHOLECALCIFEROL, PO Take 2,000 Units by mouth daily.       ciprofloxacin (CIPRO) 500 MG tablet Take 1 tablet (500 mg) by mouth every 12 hours (Patient not taking: Reported on 6/26/2019) 6 tablet 0     losartan (COZAAR) 25 MG tablet Take 1 tablet (25 mg) by mouth daily (Patient not taking: Reported on 6/26/2019) 30 tablet 11     order for DME Oxygen 2 Li/min  at night 1 Device 0     order for DME Oxygen 2 Li/min  at night with CPAP  SPO2 less than or equal to oxygen saturation 89% on effective CPAP in patient with known cardiomyopathy 1 Device 0         Reviewed and updated as needed this visit by Provider         Review of Systems   ROS COMP: Constitutional, HEENT, cardiovascular, pulmonary, gi and gu systems are negative, except as otherwise noted.      Objective    There were no vitals taken for this visit.  There is no height or weight on file to calculate BMI.  Physical Exam   GENERAL: elderly, weak, alert and no distress  NECK: no adenopathy, no asymmetry, masses, or scars and thyroid normal to palpation  RESP: lungs clear to auscultation - no rales, rhonchi or wheezes  CV: irregular rate and rhythm, normal S1 S2, no S3 or S4, no murmur, click or rub, no peripheral edema and peripheral pulses strong  ABDOMEN: soft, nontender, no hepatosplenomegaly, no masses and bowel sounds normal  MS: no gross musculoskeletal defects noted, no edema    Diagnostic Test Results:  Labs reviewed in Epic        Assessment & Plan   Problem List Items Addressed This Visit     Essential hypertension with goal blood pressure less than 140/90    Atrial fibrillation (H) with mitral regurgitation and congestive heart failure    Hematuria    Hospital discharge follow-up - Primary         Continue holding Coumadin for one month, then  "can try to restart with narrowed therapeutic target.   Continue BP medications   Follow up with urology.     BMI:   Estimated body mass index is 25.97 kg/m  as calculated from the following:    Height as of this encounter: 1.778 m (5' 10\").    Weight as of this encounter: 82.1 kg (181 lb).           See Patient Instructions  Return in about 5 weeks (around 7/31/2019) for Physical Exam.    Kwadwo Winslow MD  [facility]        "

## 2019-07-12 DIAGNOSIS — I10 ESSENTIAL HYPERTENSION WITH GOAL BLOOD PRESSURE LESS THAN 140/90: ICD-10-CM

## 2019-07-15 RX ORDER — CARVEDILOL 25 MG/1
37.5 TABLET ORAL 2 TIMES DAILY WITH MEALS
Qty: 270 TABLET | Refills: 3 | Status: SHIPPED | OUTPATIENT
Start: 2019-07-15 | End: 2020-06-02

## 2019-07-15 NOTE — TELEPHONE ENCOUNTER
"Requested Prescriptions   Pending Prescriptions Disp Refills     carvedilol (COREG) 25 MG tablet 270 tablet 3     Sig: Take 1.5 tablets (37.5 mg) by mouth 2 times daily (with meals)       Beta-Blockers Protocol Failed - 7/12/2019  3:26 PM        Failed - Blood pressure under 140/90 in past 12 months     BP Readings from Last 3 Encounters:   06/26/19 146/68   06/25/19 142/79   06/20/19 131/66                 Passed - Patient is age 6 or older        Passed - Recent (12 mo) or future (30 days) visit within the authorizing provider's specialty     Patient had office visit in the last 12 months or has a visit in the next 30 days with authorizing provider or within the authorizing provider's specialty.  See \"Patient Info\" tab in inbasket, or \"Choose Columns\" in Meds & Orders section of the refill encounter.              Passed - Medication is active on med list          "

## 2019-07-15 NOTE — TELEPHONE ENCOUNTER
Routing refill request to provider for review/approval because:  Blood pressures out of range    Primary care provider is out of the office, will route to covering provider.

## 2019-08-01 ENCOUNTER — OFFICE VISIT (OUTPATIENT)
Dept: UROLOGY | Facility: CLINIC | Age: 84
End: 2019-08-01
Payer: COMMERCIAL

## 2019-08-01 VITALS — HEART RATE: 68 BPM | HEIGHT: 70 IN | WEIGHT: 180 LBS | BODY MASS INDEX: 25.77 KG/M2 | OXYGEN SATURATION: 98 %

## 2019-08-01 DIAGNOSIS — R31.0 GROSS HEMATURIA: Primary | ICD-10-CM

## 2019-08-01 LAB
ALBUMIN UR-MCNC: 100 MG/DL
APPEARANCE UR: CLEAR
BILIRUB UR QL STRIP: NEGATIVE
COLOR UR AUTO: YELLOW
GLUCOSE UR STRIP-MCNC: NEGATIVE MG/DL
HGB UR QL STRIP: ABNORMAL
KETONES UR STRIP-MCNC: NEGATIVE MG/DL
LEUKOCYTE ESTERASE UR QL STRIP: ABNORMAL
NITRATE UR QL: NEGATIVE
PH UR STRIP: 7 PH (ref 5–7)
SOURCE: ABNORMAL
SP GR UR STRIP: 1.02 (ref 1–1.03)
UROBILINOGEN UR STRIP-ACNC: 0.2 EU/DL (ref 0.2–1)

## 2019-08-01 PROCEDURE — 99024 POSTOP FOLLOW-UP VISIT: CPT | Performed by: UROLOGY

## 2019-08-01 PROCEDURE — 81003 URINALYSIS AUTO W/O SCOPE: CPT | Mod: QW | Performed by: UROLOGY

## 2019-08-01 PROCEDURE — 87086 URINE CULTURE/COLONY COUNT: CPT | Performed by: UROLOGY

## 2019-08-01 RX ORDER — WARFARIN SODIUM 2.5 MG/1
2.5 TABLET ORAL DAILY
COMMUNITY
End: 2020-11-13

## 2019-08-01 ASSESSMENT — PAIN SCALES - GENERAL: PAINLEVEL: NO PAIN (0)

## 2019-08-01 ASSESSMENT — MIFFLIN-ST. JEOR: SCORE: 1492.72

## 2019-08-01 NOTE — LETTER
8/1/2019       RE: Tucker Slater  [address] [facility] [address]     Dear Colleague,    Thank you for referring your patient, Tucker Slater, to the [facility] [place] [facility]. Please see a copy of my visit note below.    Tucker Slater is an 88-year-old gentleman who underwent TURP almost 6 weeks ago because of a hemorrhaging prostate.  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  He underwent several clot evacuations and was taken off anticoagulants.  Cup biopsies of the bladder failed to reveal any tumor or significant radiation damage.  However, he had a bleeding prostate that could not be controlled with coagulation.  He underwent TURP on June 18 and 44 g of tissue was removed with one chip of adenocarcinoma -there was marked radiation effect and no Chloe score could be determined.  He now returns for follow-up and is had some intermittent bleeding.  The urine is clear today.  Urinalysis shows some pyuria and a urine culture will be sent to rule out bacteria.  I assume this is all due to healing.  Assessment: History of prostate cancer, hemorrhaging prostate  Plan: Yearly digital rectal exam with me.  Resume anticoagulants on Monday if urine remains clear.  Call with urine culture result    Again, thank you for allowing me to participate in the care of your patient.      Sincerely,    Ilir Ramirez MD

## 2019-08-01 NOTE — NURSING NOTE
Pt sometimes has gross hem.  Pt first saw blood in April.  Pt denies dysuria.  Pt sometimes lifts heavy objects.  SOMMER MELISSA

## 2019-08-01 NOTE — LETTER
8/1/2019      RE: Tucker Slater  [address] [facility] [address]       Tucker Slater is an 88-year-old gentleman who underwent TURP almost 6 weeks ago because of a hemorrhaging prostate.  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  He underwent several clot evacuations and was taken off anticoagulants.  Cup biopsies of the bladder failed to reveal any tumor or significant radiation damage.  However, he had a bleeding prostate that could not be controlled with coagulation.  He underwent TURP on June 18 and 44 g of tissue was removed with one chip of adenocarcinoma -there was marked radiation effect and no Beverly score could be determined.  He now returns for follow-up and is had some intermittent bleeding.  The urine is clear today.  Urinalysis shows some pyuria and a urine culture will be sent to rule out bacteria.  I assume this is all due to healing.  Assessment: History of prostate cancer, hemorrhaging prostate  Plan: Yearly digital rectal exam with me.  Resume anticoagulants on Monday if urine remains clear.  Call with urine culture result    Ilir Ramirez MD

## 2019-08-01 NOTE — NURSING NOTE
Pt has not been on blood thinners since April.  Pt would like to talk to you about restarting blood thinners.  Pt has not had gross hem for a few days.    SOMMER Troncoso CMA

## 2019-08-01 NOTE — PROGRESS NOTES
Tucker Slater is an 88-year-old gentleman who underwent TURP almost 6 weeks ago because of a hemorrhaging prostate.  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  He underwent several clot evacuations and was taken off anticoagulants.  Cup biopsies of the bladder failed to reveal any tumor or significant radiation damage.  However, he had a bleeding prostate that could not be controlled with coagulation.  He underwent TURP on June 18 and 44 g of tissue was removed with one chip of adenocarcinoma -there was marked radiation effect and no Chloe score could be determined.  He now returns for follow-up and is had some intermittent bleeding.  The urine is clear today.  Urinalysis shows some pyuria and a urine culture will be sent to rule out bacteria.  I assume this is all due to healing.  Assessment: History of prostate cancer, hemorrhaging prostate  Plan: Yearly digital rectal exam with me.  Resume anticoagulants on Monday if urine remains clear.  Call with urine culture result

## 2019-08-02 ENCOUNTER — OFFICE VISIT (OUTPATIENT)
Dept: INTERNAL MEDICINE | Facility: CLINIC | Age: 84
End: 2019-08-02
Payer: COMMERCIAL

## 2019-08-02 VITALS
HEART RATE: 79 BPM | OXYGEN SATURATION: 99 % | RESPIRATION RATE: 16 BRPM | TEMPERATURE: 98.2 F | WEIGHT: 185 LBS | HEIGHT: 70 IN | DIASTOLIC BLOOD PRESSURE: 78 MMHG | BODY MASS INDEX: 26.48 KG/M2 | SYSTOLIC BLOOD PRESSURE: 148 MMHG

## 2019-08-02 DIAGNOSIS — I48.19 PERSISTENT ATRIAL FIBRILLATION (H): ICD-10-CM

## 2019-08-02 DIAGNOSIS — I10 ESSENTIAL HYPERTENSION WITH GOAL BLOOD PRESSURE LESS THAN 140/90: ICD-10-CM

## 2019-08-02 DIAGNOSIS — Z00.00 ENCOUNTER FOR MEDICARE ANNUAL WELLNESS EXAM: ICD-10-CM

## 2019-08-02 DIAGNOSIS — G47.33 OSA (OBSTRUCTIVE SLEEP APNEA): ICD-10-CM

## 2019-08-02 DIAGNOSIS — R31.0 GROSS HEMATURIA: ICD-10-CM

## 2019-08-02 DIAGNOSIS — Z00.00 ENCOUNTER FOR PREVENTATIVE ADULT HEALTH CARE EXAMINATION: Primary | ICD-10-CM

## 2019-08-02 LAB
ALBUMIN SERPL-MCNC: 3.3 G/DL (ref 3.4–5)
ALP SERPL-CCNC: 89 U/L (ref 40–150)
ALT SERPL W P-5'-P-CCNC: 28 U/L (ref 0–70)
AST SERPL W P-5'-P-CCNC: 21 U/L (ref 0–45)
BACTERIA SPEC CULT: NO GROWTH
BILIRUB DIRECT SERPL-MCNC: 0.2 MG/DL (ref 0–0.2)
BILIRUB SERPL-MCNC: 0.6 MG/DL (ref 0.2–1.3)
CHOLEST SERPL-MCNC: 180 MG/DL
HDLC SERPL-MCNC: 43 MG/DL
LDLC SERPL CALC-MCNC: 112 MG/DL
Lab: NORMAL
NONHDLC SERPL-MCNC: 137 MG/DL
PROT SERPL-MCNC: 7.3 G/DL (ref 6.8–8.8)
SPECIMEN SOURCE: NORMAL
TRIGL SERPL-MCNC: 125 MG/DL
TSH SERPL DL<=0.005 MIU/L-ACNC: 1.22 MU/L (ref 0.4–4)

## 2019-08-02 PROCEDURE — 36415 COLL VENOUS BLD VENIPUNCTURE: CPT | Performed by: INTERNAL MEDICINE

## 2019-08-02 PROCEDURE — G0439 PPPS, SUBSEQ VISIT: HCPCS | Performed by: INTERNAL MEDICINE

## 2019-08-02 PROCEDURE — 84443 ASSAY THYROID STIM HORMONE: CPT | Performed by: INTERNAL MEDICINE

## 2019-08-02 PROCEDURE — 80076 HEPATIC FUNCTION PANEL: CPT | Performed by: INTERNAL MEDICINE

## 2019-08-02 PROCEDURE — 80061 LIPID PANEL: CPT | Performed by: INTERNAL MEDICINE

## 2019-08-02 RX ORDER — LOSARTAN POTASSIUM 50 MG/1
50 TABLET ORAL DAILY
Qty: 90 TABLET | Refills: 3 | Status: SHIPPED | OUTPATIENT
Start: 2019-08-02 | End: 2020-07-17

## 2019-08-02 ASSESSMENT — MIFFLIN-ST. JEOR: SCORE: 1515.4

## 2019-08-02 NOTE — PATIENT INSTRUCTIONS
Patient Education   Personalized Prevention Plan  You are due for the preventive services outlined below.  Your care team is available to assist you in scheduling these services.  If you have already completed any of these items, please share that information with your care team to update in your medical record.  Health Maintenance Due   Topic Date Due     Heart Failure Action Plan  03/13/1931     Zoster (Shingles) Vaccine (1 of 2) 03/13/1981     Annual Wellness Visit  12/29/2018     Cholesterol Lab  12/29/2018     INR CLINIC REFERRAL - yearly  05/21/2019

## 2019-08-02 NOTE — PROGRESS NOTES
"  SUBJECTIVE:   Tucker Slater is a 88 year old male who presents for Preventive Visit.      Are you in the first 12 months of your Medicare Part B coverage?  No    Physical Health:    In general, how would you rate your overall physical health? good    Outside of work, how many days during the week do you exercise? none/minimal    Outside of work, approximately how many minutes a day do you exercise?not applicable    If you drink alcohol do you typically have >3 drinks per day or >7 drinks per week? No    Do you usually eat at least 4 servings of fruit and vegetables a day, include whole grains & fiber and avoid regularly eating high fat or \"junk\" foods? NO, about 2-3 servings    Do you have any problems taking medications regularly?  No    Do you have any side effects from medications? none    Needs assistance for the following daily activities: no assistance needed    Which of the following safety concerns are present in your home?  none identified     Hearing impairment: Yes, Difficulty following a conversation in a noisy restaurant or crowded room.    In the past 6 months, have you been bothered by leaking of urine? no    Mental Health:    In general, how would you rate your overall mental or emotional health? excellent  PHQ-2 Score:      Do you feel safe in your environment? Yes    Do you have a Health Care Directive? Yes: Advance Directive has been received and scanned.    Fall risk:  Fallen 2 or more times in the past year?: No  Any fall with injury in the past year?: No    Cognitive Screenin) Repeat 3 items (Leader, Season, Table)    2) Clock draw: Normal   3) 3 item recall: Recalls 3 objects  Results: 3 items recalled: COGNITIVE IMPAIRMENT LESS LIKELY    Mini-CogTM Copyright FATOU Johnson. Licensed by the author for use in [facility]; reprinted with permission (eduardo@.[facility]). All rights reserved.      Do you have sleep apnea, excessive snoring or daytime drowsiness?: yes, sleep Apnea ( " Has C-pap machine)        Reviewed and updated as needed this visit by clinical staff  Tobacco  Allergies  Meds         Reviewed and updated as needed this visit by Provider        Social History     Tobacco Use     Smoking status: Former Smoker     Last attempt to quit: 1977     Years since quittin.9     Smokeless tobacco: Never Used   Substance Use Topics     Alcohol use: No     Alcohol/week: 0.0 oz                           Current providers sharing in care for this patient include:   Patient Care Team:  Kwadwo Winslow MD as PCP - General (Internal Medicine)  Kwadwo Winslow MD as Assigned PCP  Ilir Ramirez MD as MD (Urology)    The following health maintenance items are reviewed in Epic and correct as of today:  Health Maintenance   Topic Date Due     HF ACTION PLAN  1931     ZOSTER IMMUNIZATION (1 of 2) 1981     MEDICARE ANNUAL WELLNESS VISIT  2018     LIPID  2018     OP ANNUAL INR REFERRAL  2019     INFLUENZA VACCINE (1) 2019     BMP  2019     FALL RISK ASSESSMENT  2020     ALT  06/10/2020     CBC  2020     ADVANCE CARE PLANNING  2022     DTAP/TDAP/TD IMMUNIZATION (3 - Td) 2023     PHQ-2  Completed     IPV IMMUNIZATION  Aged Out     MENINGITIS IMMUNIZATION  Aged Out         Has history of atrial fibrillation. On anticoagulation with Coumadin and rate control medications. Asymptonatic - no chest pains , palpitations,  no side effects from medications. Now off AC for hematuria history.     Has h/o HTN. on medical treatment. BP has been controlled. No side effects from medications. No CP, HA, dizziness. good compliance with medications and low salt diet.    Has h/o hematuria. Has been off Coumadin because of it, seen urology, recommend trial of coumadin again if no recurrent bleeding.     Has h/o peripheral neuropathy, affecting LE with paresthesia, numbness, weakness.     Lab work is in process  Labs reviewed in  "EPIC      ROS:  Constitutional, HEENT, cardiovascular, pulmonary, GI, , musculoskeletal, neuro, skin, endocrine and psych systems are negative, except as otherwise noted.    OBJECTIVE:   BP (!) 148/78   Pulse 79   Temp 98.2  F (36.8  C) (Oral)   Resp 16   Ht 1.778 m (5' 10\")   Wt 83.9 kg (185 lb)   SpO2 99%   BMI 26.54 kg/m   Estimated body mass index is 26.54 kg/m  as calculated from the following:    Height as of this encounter: 1.778 m (5' 10\").    Weight as of this encounter: 83.9 kg (185 lb).  EXAM:   GENERAL: healthy, alert and no distress  EYES: Eyes grossly normal to inspection, PERRL and conjunctivae and sclerae normal  HENT: ear canals and TM's normal, nose and mouth without ulcers or lesions  NECK: no adenopathy, no asymmetry, masses, or scars and thyroid normal to palpation  RESP: lungs clear to auscultation - no rales, rhonchi or wheezes  CV: regular rate and rhythm, normal S1 S2, no S3 or S4, no murmur, click or rub, no peripheral edema and peripheral pulses strong  ABDOMEN: soft, nontender, no hepatosplenomegaly, no masses and bowel sounds normal  MS: no gross musculoskeletal defects noted, no edema  SKIN: no suspicious lesions or rashes  NEURO: Normal strength and tone, mentation intact and speech normal, impaired gait, LE weakness, numbness.   PSYCH: mentation appears normal, affect normal/bright    Diagnostic Test Results:  Labs reviewed in Epic    ASSESSMENT / PLAN:       ICD-10-CM    1. Encounter for preventative adult health care examination Z00.00 Hepatic panel     Lipid panel reflex to direct LDL Fasting     TSH with free T4 reflex   2. Encounter for Medicare annual wellness exam Z00.00    3. DAWSON (obstructive sleep apnea) G47.33    4. Essential hypertension with goal blood pressure less than 140/90 I10 losartan (COZAAR) 50 MG tablet     Hepatic panel     Lipid panel reflex to direct LDL Fasting     TSH with free T4 reflex   5. Persistent atrial fibrillation (H) I48.1 INR CLINIC " "REFERRAL   6. Gross hematuria R31.0        End of Life Planning:  Patient currently has an advanced directive: Yes.  Practitioner is supportive of decision.    COUNSELING:  Reviewed preventive health counseling, as reflected in patient instructions       Regular exercise       Healthy diet/nutrition       Vision screening       Hearing screening    Estimated body mass index is 26.54 kg/m  as calculated from the following:    Height as of this encounter: 1.778 m (5' 10\").    Weight as of this encounter: 83.9 kg (185 lb).         reports that he quit smoking about 41 years ago. He has never used smokeless tobacco.      Appropriate preventive services were discussed with this patient, including applicable screening as appropriate for cardiovascular disease, diabetes, osteopenia/osteoporosis, and glaucoma.  As appropriate for age/gender, discussed screening for colorectal cancer, prostate cancer, breast cancer, and cervical cancer. Checklist reviewing preventive services available has been given to the patient.    Reviewed patients plan of care and provided an AVS. The Intermediate Care Plan ( asthma action plan, low back pain action plan, and migraine action plan) for Tucker meets the Care Plan requirement. This Care Plan has been established and reviewed with the Patient.    Counseling Resources:  ATP IV Guidelines  Pooled Cohorts Equation Calculator  Breast Cancer Risk Calculator  FRAX Risk Assessment  ICSI Preventive Guidelines  Dietary Guidelines for Americans, 2010  USDA's MyPlate  ASA Prophylaxis  Lung CA Screening    Kwadwo Winslow MD  [facility]  "

## 2019-08-02 NOTE — LETTER
August 5, 2019      Tucker Slater  [address] [facility] [address]        Dear ,    Your lab results came back within acceptable limits.     Resulted Orders   Hepatic panel   Result Value Ref Range    Bilirubin Direct 0.2 0.0 - 0.2 mg/dL    Bilirubin Total 0.6 0.2 - 1.3 mg/dL    Albumin 3.3 (L) 3.4 - 5.0 g/dL    Protein Total 7.3 6.8 - 8.8 g/dL    Alkaline Phosphatase 89 40 - 150 U/L    ALT 28 0 - 70 U/L    AST 21 0 - 45 U/L   Lipid panel reflex to direct LDL Fasting   Result Value Ref Range    Cholesterol 180 <200 mg/dL    Triglycerides 125 <150 mg/dL      Comment:      Non Fasting    HDL Cholesterol 43 >39 mg/dL    LDL Cholesterol Calculated 112 (H) <100 mg/dL      Comment:      Above desirable:  100-129 mg/dl  Borderline High:  130-159 mg/dL  High:             160-189 mg/dL  Very high:       >189 mg/dl      Non HDL Cholesterol 137 (H) <130 mg/dL      Comment:      Above Desirable:  130-159 mg/dl  Borderline high:  160-189 mg/dl  High:             190-219 mg/dl  Very high:       >219 mg/dl     TSH with free T4 reflex   Result Value Ref Range    TSH 1.22 0.40 - 4.00 mU/L       If you have any questions or concerns, please call the clinic at the number listed above.       Sincerely,        Kwadwo Winslow MD

## 2019-08-02 NOTE — NURSING NOTE
"Vital signs:  Temp: 98.2  F (36.8  C) Temp src: Oral BP: (!) 148/78 Pulse: 79   Resp: 16 SpO2: 99 %     Height: 177.8 cm (5' 10\") Weight: 83.9 kg (185 lb)  Estimated body mass index is 26.54 kg/m  as calculated from the following:    Height as of this encounter: 1.778 m (5' 10\").    Weight as of this encounter: 83.9 kg (185 lb).        "

## 2019-08-12 ENCOUNTER — ANTICOAGULATION THERAPY VISIT (OUTPATIENT)
Dept: ANTICOAGULATION | Facility: CLINIC | Age: 84
End: 2019-08-12
Payer: COMMERCIAL

## 2019-08-12 DIAGNOSIS — Z79.01 LONG TERM CURRENT USE OF ANTICOAGULANTS WITH INR GOAL OF 2.0-3.0: ICD-10-CM

## 2019-08-12 LAB — INR POINT OF CARE: 1.2 (ref 0.86–1.14)

## 2019-08-12 PROCEDURE — 36416 COLLJ CAPILLARY BLOOD SPEC: CPT

## 2019-08-12 PROCEDURE — 85610 PROTHROMBIN TIME: CPT | Mod: QW

## 2019-08-12 NOTE — PROGRESS NOTES
ANTICOAGULATION FOLLOW-UP CLINIC VISIT    Patient Name:  Tucker Slater  Date:  2019  Contact Type:  Face to Face    SUBJECTIVE:  Patient Findings     Positives:   Signs/symptoms of bleeding (Mushtaq has been in the hospital multiple time for hematuria. Patient reports no hematuria for about 1 week. ), Missed doses (Warfarin has been held since about 19, warfarin restarted 19), Hospital admission (Patient has been hospitalized on a couple occasions d/t hematuria, is following with Dr Ramirez, has had cautery of the prostate a few different times. )    Comments:   Last INR was 19, patient has been off warfarin d/t bleeding prostate.   The patient was assessed for diet, medication, and activity level changes, missed or extra doses, bruising or bleeding, with no problem findings.          Clinical Outcomes     Comments:   Last INR was 19, patient has been off warfarin d/t bleeding prostate.   The patient was assessed for diet, medication, and activity level changes, missed or extra doses, bruising or bleeding, with no problem findings.             OBJECTIVE    INR Protime   Date Value Ref Range Status   2019 1.2 (A) 0.86 - 1.14 Final       ASSESSMENT / PLAN  INR assessment SUB    Recheck INR In: 4 DAYS    INR Location Clinic      Anticoagulation Summary  As of 2019    INR goal:   2.0-2.5   TTR:   72.7 % (2.2 y)   Prior goal:   2.0-3.0   INR used for dosin.2! (2019)   Warfarin maintenance plan:   1.25 mg (2.5 mg x 0.5) every Tue, Fri; 2.5 mg (2.5 mg x 1) all other days   Full warfarin instructions:   1.25 mg every Tue, Fri; 2.5 mg all other days   Weekly warfarin total:   15 mg   Plan last modified:   Ivory Machado RN (2019)   Next INR check:   2019   Priority:   INR   Target end date:       Indications    Atrial fibrillation (H) with mitral regurgitation and congestive heart failure [I48.91]  Long term current use of anticoagulants with INR goal of 2.0-3.0  [Z79.01]             Anticoagulation Episode Summary     INR check location:       Preferred lab:       Send INR reminders to:   DEBORAH ACUÑA    Comments:   Patient has a history of hematuria when INR is elevated.  Will try to keep INR closer to 2.0.      Anticoagulation Care Providers     Provider Role Specialty Phone number    Kwadwo Winslow MD [facility] Internal Medicine [phone]            See the Encounter Report to view Anticoagulation Flowsheet and Dosing Calendar (Go to Encounters tab in chart review, and find the Anticoagulation Therapy Visit)    Dosage adjustment made based on physician directed care plan.    Gail Valdovinos RN

## 2019-08-16 ENCOUNTER — TELEPHONE (OUTPATIENT)
Dept: INTERNAL MEDICINE | Facility: CLINIC | Age: 84
End: 2019-08-16

## 2019-08-16 ENCOUNTER — ANTICOAGULATION THERAPY VISIT (OUTPATIENT)
Dept: ANTICOAGULATION | Facility: CLINIC | Age: 84
End: 2019-08-16
Payer: COMMERCIAL

## 2019-08-16 DIAGNOSIS — I48.91 ATRIAL FIBRILLATION (H): ICD-10-CM

## 2019-08-16 DIAGNOSIS — Z79.01 LONG TERM CURRENT USE OF ANTICOAGULANTS WITH INR GOAL OF 2.0-3.0: ICD-10-CM

## 2019-08-16 LAB — INR POINT OF CARE: 1.5 (ref 0.86–1.14)

## 2019-08-16 PROCEDURE — 85610 PROTHROMBIN TIME: CPT | Mod: QW

## 2019-08-16 PROCEDURE — 36416 COLLJ CAPILLARY BLOOD SPEC: CPT

## 2019-08-16 NOTE — PROGRESS NOTES
ANTICOAGULATION FOLLOW-UP CLINIC VISIT    Patient Name:  Tucker Slater  Date:  2019  Contact Type:  Face to Face    SUBJECTIVE:  Patient Findings     Positives:   Signs/symptoms of bleeding (started having some hematuria when he restarted the warfarin.  Huddled with MD and he would like to hold the warfarin until patients stops having hematuria, then hold 1 additional week.  Then restart warfarin again.)    Comments:   The patient was assessed for diet, medication, and activity level changes, missed or extra doses, with no problem findings.          Clinical Outcomes     Negatives:   Major bleeding event, Thromboembolic event, Anticoagulation-related hospital admission, Anticoagulation-related ED visit, Anticoagulation-related fatality    Comments:   The patient was assessed for diet, medication, and activity level changes, missed or extra doses, with no problem findings.             OBJECTIVE    INR Protime   Date Value Ref Range Status   2019 1.5 (A) 0.86 - 1.14 Final       ASSESSMENT / PLAN  INR assessment SUB    Recheck INR In: 2 WEEKS a few days after restarting the warfarin.   INR Location Clinic      Anticoagulation Summary  As of 2019    INR goal:   2.0-2.5   TTR:   72.3 % (2.2 y)   INR used for dosin.5! (2019)   Warfarin maintenance plan:   1.25 mg (2.5 mg x 0.5) every Tue, Fri; 2.5 mg (2.5 mg x 1) all other days   Full warfarin instructions:   : Hold; : Hold; : Hold; : Hold; : Hold; : Hold; : Hold; : Hold; : Hold; : Hold; : Hold; : Hold; : Hold; Otherwise 1.25 mg every Tue, Fri; 2.5 mg all other days   Weekly warfarin total:   15 mg   Plan last modified:   Ivory Machado RN (2019)   Next INR check:   2019   Priority:   INR   Target end date:       Indications    Atrial fibrillation (H) with mitral regurgitation and congestive heart failure [I48.91]  Long term current use of anticoagulants with INR goal of 2.0-3.0  [Z79.01]             Anticoagulation Episode Summary     INR check location:       Preferred lab:       Send INR reminders to:   DEBORAH ACUÑA    Comments:   Patient has a history of hematuria when INR is elevated.  Will try to keep INR closer to 2.0.      Anticoagulation Care Providers     Provider Role Specialty Phone number    Kwadwo Winslow MD [facility] Internal Medicine [phone]            See the Encounter Report to view Anticoagulation Flowsheet and Dosing Calendar (Go to Encounters tab in chart review, and find the Anticoagulation Therapy Visit)    Dosage adjustment made based on physician directed care plan.    Ivory Machado RN

## 2019-08-23 ENCOUNTER — OFFICE VISIT (OUTPATIENT)
Dept: SLEEP MEDICINE | Facility: CLINIC | Age: 84
End: 2019-08-23
Payer: COMMERCIAL

## 2019-08-23 VITALS
HEART RATE: 70 BPM | HEIGHT: 70 IN | BODY MASS INDEX: 26.48 KG/M2 | SYSTOLIC BLOOD PRESSURE: 145 MMHG | DIASTOLIC BLOOD PRESSURE: 78 MMHG | WEIGHT: 185 LBS | OXYGEN SATURATION: 96 %

## 2019-08-23 DIAGNOSIS — R09.02 HYPOXEMIA: ICD-10-CM

## 2019-08-23 DIAGNOSIS — G47.33 OSA (OBSTRUCTIVE SLEEP APNEA): Primary | ICD-10-CM

## 2019-08-23 PROCEDURE — 99214 OFFICE O/P EST MOD 30 MIN: CPT | Performed by: INTERNAL MEDICINE

## 2019-08-23 ASSESSMENT — MIFFLIN-ST. JEOR: SCORE: 1515.4

## 2019-08-23 NOTE — NURSING NOTE
"Chief Complaint   Patient presents with     RECHECK     f/u oygen renewal for medicare, renewal for cpap supplies       Initial BP (!) 145/78   Pulse 70   Ht 1.778 m (5' 10\")   Wt 83.9 kg (185 lb)   SpO2 96%   BMI 26.54 kg/m   Estimated body mass index is 26.54 kg/m  as calculated from the following:    Height as of this encounter: 1.778 m (5' 10\").    Weight as of this encounter: 83.9 kg (185 lb).    Medication Reconciliation: complete        DME: yes [facility] 10    ESS 0  "

## 2019-08-23 NOTE — PATIENT INSTRUCTIONS
Your blood pressure was checked while you were in clinic today.  Please read the guidelines below about what these numbers mean and what you should do about them.  Your systolic blood pressure is the top number.  This is the pressure when the heart is pumping.  Your diastolic blood pressure is the bottom number.  This is the pressure in between beats.  If your systolic blood pressure is less than 120 and your diastolic blood pressure is less than 80, then your blood pressure is normal. There is nothing more that you need to do about it  If your systolic blood pressure is 120-139 or your diastolic blood pressure is 80-89, your blood pressure may be higher than it should be.  You should have your blood pressure re-checked within a year by a primary care provider.  If your systolic blood pressure is 140 or greater or your diastolic blood pressure is 90 or greater, you may have high blood pressure.  High blood pressure is treatable, but if left untreated over time it can put you at risk for heart attack, stroke, or kidney failure.  You should have your blood pressure re-checked by a primary care provider within the next four weeks.  Your BMI is There is no height or weight on file to calculate BMI.  Weight management is a personal decision.  If you are interested in exploring weight loss strategies, the following discussion covers the approaches that may be successful. Body mass index (BMI) is one way to tell whether you are at a healthy weight, overweight, or obese. It measures your weight in relation to your height.  A BMI of 18.5 to 24.9 is in the healthy range. A person with a BMI of 25 to 29.9 is considered overweight, and someone with a BMI of 30 or greater is considered obese. More than two-thirds of [place] adults are considered overweight or obese.  Being overweight or obese increases the risk for further weight gain. Excess weight may lead to heart disease and diabetes.  Creating and following plans for  healthy eating and physical activity may help you improve your health.  Weight control is part of healthy lifestyle and includes exercise, emotional health, and healthy eating habits. Careful eating habits lifelong are the mainstay of weight control. Though there are significant health benefits from weight loss, long-term weight loss with diet alone may be very difficult to achieve- studies show long-term success with dietary management in less than 10% of people. Attaining a healthy weight may be especially difficult to achieve in those with severe obesity. In some cases, medications, devices and surgical management might be considered.  What can you do?  If you are overweight or obese and are interested in methods for weight loss, you should discuss this with your provider.     Consider reducing daily calorie intake by 500 calories.     Keep a food journal.     Avoiding skipping meals, consider cutting portions instead.    Diet combined with exercise helps maintain muscle while optimizing fat loss. Strength training is particularly important for building and maintaining muscle mass. Exercise helps reduce stress, increase energy, and improves fitness. Increasing exercise without diet control, however, may not burn enough calories to loose weight.       Start walking three days a week 10-20 minutes at a time    Work towards walking thirty minutes five days a week     Eventually, increase the speed of your walking for 1-2 minutes at time    In addition, we recommend that you review healthy lifestyles and methods for weight loss available through the [employer] of [employer] patient information sites:  http://win.niddk.nih.gov/publications/index.htm    And look into health and wellness programs that may be available through your health insurance provider, employer, local community center, or [employer].

## 2019-08-23 NOTE — PROGRESS NOTES
[place] [place]  August 23, 2019      Name: Tucker Slater MRN# [id number]   Age: 88 year old YOB: 1931     Date of Consultation: August 23, 2019  Consultation is requested by: No referring provider defined for this encounter.  Primary care provider: Kwadwo Winslow           Assessment and Plan:       Severe complex sleep apnea with hypoxemia currently on fixed CPAP pressure of 5 and oxygen    Cardiomyopathy with ejection fraction 45-50%    Pulmonary hypertension    Summary Recommendations:      CPAP supplies ordered    Oxygen ordered    Summary Counseling:  Instructed in care of mask, adherence requirements               History of Present Illness:     Tucker Slater is a 88 year old male with severe complex sleep apnea and hypoxemia in the setting of atrial fibrillation, mild global LV dysfunction and mild MR with pulmonary hypertension. He is tolerating CPAP and oxygen with increased sleep time 1-2 hours and restorative sleep with no residual sleep apnea and good adherence.     PREVIOUS SLEEP STUDIES:  Date:   [id number]   Referring Provider: SELMA Mcdaniels CNP, Janeth E.   Ordering Provider: MD Ritter Conrad   Indications for Polysomnography: The patient is a 87 y old male who is 5' 10\" and weighs 189.0 lbs. His BMI is 27.4, [place] sleepiness scale 4.0 and neck circumference is 41.0 cm. Relevant medical history includes atrial fibrillation, bradycardia status post permanent pacemaker, hypertension, chronic kidney disease, significant nocturnal episodic hypoxemia noted with overnight oximetry, with 62 minutes <88%.. A diagnostic polysomnogram was performed to evaluate for sleep apnea /hypoventilation/hypoxemia. After 130.0 minutes of sleep time the patient exhibited sufficient respiratory events qualifying him for a CPAP trial which was then initiated.   Polysomnogram Data: A full night polysomnogram recorded the standard physiologic " parameters including EEG, EOG, EMG, ECG, nasal and oral airflow. Respiratory parameters of chest and abdominal movements were recorded with respiratory inductance plethysmography. Oxygen saturation was recorded by pulse oximetry. Hypopnea scoring rule used: 1B 4%   Diagnostic PSG   Sleep Architecture: Sleep fragmentation. No N3 sleep observed.   The total recording time of the polysomnogram was 195.4 minutes. The total sleep time was 130.0 minutes. Sleep latency was normal at 2.4 minutes without the use of a sleep aid. REM latency was 183.5 minutes. Arousal index was increased at 59.5 arousals per hour. Sleep efficiency was decreased at 66.5%. Wake after sleep onset was 58.0 minutes. The patient spent 23.5% of total sleep time in Stage N1, 73.5% in Stage N2, 0.0% in Stage N3, and 3.1% in REM. Time in REM supine was 0 minutes.   Respiration: Tachypnea with severe complex sleep apnea associated with hypoxemia. Nearly 40% of the sleep disordered breathing events were centrals. Prolonged circulation time observed: 48 sec, cycle length was as long as 1 minute.   Obstructive events with paradoxical chest and abdominal movements with prolonged circulation time and cycle length-5 min:     Events ? The polysomnogram revealed a presence of 41 obstructive, 21 central, and 19 mixed apneas resulting in an apnea index of 37.4 events per hour. There were 20 obstructive hypopneas and 30 central hypopneas resulting in an obstructive hypopnea index of 9.2 and central hypopnea index of 13.8 events per hour. The combined apnea/hypopnea index was 60.5 events per hour (central apnea/hypopnea index was 23.5 events per hour). The REM AHI was 45.0 events per hour. The supine AHI was 59.1 events per hour. The RERA index was 1.8 events per hour. The RDI was 62.3 events per hour.     Snoring - was reported as mild to moderate and intermittent.     Respiratory rate and pattern - was notable for Tachypnea with respiratory rate 20-24 and mixed  central and obstructive events with prolonged respiratory cycle length and long circulation time.   Sustained Sleep Associated Hypoventilation Absent - Transcutaneous carbon dioxide monitoring was used, significant hypoventilation was not present with a maximum change from 30.4 to 35.7 mmHg and 0 minutes at or greater than 55 mmHg.     Sleep Associated Hypoxemia Present - (Greater than 5 minutes O2 sat at or below 88%) was present. Baseline oxygen saturation was 92.4%. Lowest oxygen saturation was 76.0%. Time spent less than or equal to 88% was 12.6 minutes. Time spent less than or equal to 89% was 21.7 minutes.     Treatment PSG   Sleep Architecture: Decreased sleep efficiency. No N3 sleep observed.   At 02:49:06 AM the patient was placed on PAP treatment and was titrated at pressures ranging from ASV 4/6/15 cmH2O up to CPAP 6 cmH2O. The total recording time of the treatment portion of the study was 181.5 minutes. The total sleep time was 87.0 minutes. During the treatment portion of the study the sleep latency was 7.5 minutes. REM latency was 158.0 minutes. Arousal index was increased at 37.9 arousals per hour. Sleep efficiency was decreased at 47.9%. Wake after sleep onset was 83.5 minutes. The patient spent 29.9% of total sleep time in Stage N1, 55.7% in Stage N2, 0.0% in Stage N3, and 14.4% in REM. Time in REM supine was 0 minutes.   Respiration: CPAP 5 cm H2O resolved all of the obstructive events, although prolonged treatment emergent central events were seen associated with significant hypoxemia. ASV improved all sleep disordered breathing. Despite elimination of obstructive events with PAP therapy, the patient still had 21 minutes of oxygen saturation <89%     The final pressure was ASV 4/6/15 cmH2O with an AHI of 8.5 events per hour. Time in REM supine on final pressure was 0 minutes.     The patient had 21 minutes of O2 saturation <89% while on positive airway pressure therapy without obstructive events      This titration was considered:   - Adequate (residual AHI with 75% decrease or above constraints without REM?supine sleep at final pressure).     Movement Activity: Increased limb movements in sleep, most of which were not associated with arousals.     Periodic Limb Movements   o During the diagnostic portion of the study, there were 89 PLMs recorded. The PLM index was 41.1 movements per hour. The PLM Arousal Index was 2.3 per hour.   o During the treatment portion of the study, there were 143 PLMs recorded. The PLM index was 98.6 movements per hour. The PLM Arousal Index was 6.2 per hour.     REM EMG Activity - Excessive transient/sustained muscle activity was not present.     Nocturnal Behavior - Abnormal sleep related behaviors were not noted during/arising out of NREM / REM sleep.     Bruxism - None apparent.     Cardiac Summary: Irrgular rhythm, likely slowly conducting atrial flutter with ventricualrly-paced beats. Moderate burden of premature beats. During the diagnostic portion of the study, the average pulse rate was 70.2 bpm. The minimum pulse rate was 35.0 bpm while the maximum pulse rate was 104.1 bpm. During the treatment portion of the study, the average pulse rate was 71.0 bpm. The minimum pulse rate was 59.8 bpm while the maximum pulse rate was 90.9 bpm. rrhythmias were noted.  Assessment:     Diagnostic:     Severe complex sleep apnea associated with hypoxemia. Nearly 40% of the sleep disordered breathing events were centrals. Prolonged circulation time observed: 48 sec, cycle length was as long as 1 minute with evidence for hyperventilation based to PtcCO2 measures ranging from 27-35 mmHg.     Sleep fragmentation. No N3 sleep observed.     Dysrhythmias with probable atrial flutter and paced beats as well as premature beats       Treatment:     CPAP 5 cm H2O resolved all of the obstructive events, although prolonged treatment emergent central events were seen associated with significant  hypoxemia. ASV improved all sleep disordered breathing. Despite his EF being 55%, there is strong evidence of significant circulation delay/dysfunction on this study, and in the presence of significant mitral regurgitation, cardiac index is less strongly associated with ejection fraction. Therefore, a trial of CPAP 5 cm H2O alone (rather than ASV) might be the most appropriate treatment for his obstructive events.     Significant hypoxemia persisted while on PAP therapy despite marked improvement in AHI (21 minutes of less than or equal to 89% SPO2)     Decreased sleep efficiency. No N3 sleep observed.     Increased limb movements in sleep, most of which were not associated with arousals.     Rate-controlled atrial flutter with ventricular pacing and frequent premature beats.     Recommendations:     Recommend a trial of CPAP of 5 cm H2O with oxygen for his complex sleep apnea in the presence of significant heart failure. Given his residual hypoxemia despite relief of obstruction during the therapeutic portion of this study, recommend adding 2LPM of oxygen to his CPAP circuit.     Once he is tolerating this regimen, we recommend an overnight oximtery study and CPAP download to assess burden of central events and resolution of hypoxemia. If ASV considered for symptom control, patient should understand possible increased mortality risk based on previous evidence..     Close follow-up in cardiology clinics to optimize heart failure management.     Advice regarding the risks of drowsy driving.     Suggest optimizing sleep schedule and avoiding sleep deprivation.     Pharmacologic therapy should be used for management of restless legs syndrome only if present and clinically indicated and not based on the presence of periodic limb movements alone.     ECG lead II suggested slowly conducted atrial flutter with a ventricularly paced rhythm. On last interrogation in 5/2018, he had complete heart block and 88% RV pacing  burden. Consider repeat pacer interrogation or 12 lead EKG if further evaluation of his arrhythmia is clinically indicated.       CPAP Compliance:  Dates: July 23 to August 21, 2019         80 % >4hour use       Average Use average use days used is nearly 7 hours with 1 hiatus of 5 days use  Leak 95th percentile 13  Residual AHI 4.3               Medications:     Current Outpatient Medications   Medication Sig     ACETAMINOPHEN PO Take 650 mg by mouth every 4 hours as needed for pain     Ascorbic Acid (VITAMIN C PO) Take 500 mg by mouth daily      calcium carbonate (OS-KARLA 500 MG [place]) 500 MG tablet Take 500 mg by mouth daily      carvedilol (COREG) 25 MG tablet Take 1.5 tablets (37.5 mg) by mouth 2 times daily (with meals)     diclofenac (VOLTAREN) 1 % topical gel Place 2 g onto the skin 4 times daily Re-label for home use (Patient taking differently: Place 2 g onto the skin 4 times daily as needed Re-label for home use)     docusate sodium (COLACE) 100 MG capsule Take 100 mg by mouth 2 times daily     finasteride (PROSCAR) 5 MG tablet Take 1 tablet (5 mg) by mouth daily     losartan (COZAAR) 50 MG tablet Take 1 tablet (50 mg) by mouth daily     Multiple Vitamins-Minerals (OCUVITE PO) Take 1 tablet by mouth 2 times daily      order for DME Oxygen 2 Li/min  at night     order for DME Oxygen 2 Li/min  at night with CPAP  SPO2 less than or equal to oxygen saturation 89% on effective CPAP in patient with known cardiomyopathy     triamterene-HCTZ (MAXZIDE-25) 37.5-25 MG tablet Take 1 tablet by mouth daily     vitamin B complex with vitamin C (VITAMIN  B COMPLEX) TABS tablet Take 1 tablet by mouth daily     VITAMIN D, CHOLECALCIFEROL, PO Take 2,000 Units by mouth daily.     warfarin (JANTOVEN) 2.5 MG tablet Take 2.5 mg by mouth daily     No current facility-administered medications for this visit.         Allergies   Allergen Reactions     Merbromin      Other reaction(s): Other, see comments  Severe reaction as  child. Amalgam fillings OK.     Morphine Nausea and Vomiting     Amlodipine      Edema            Past Medical History:     Does not need 02 supplement at night   Past Medical History:   Diagnosis Date     Arrhythmia     A Fib     Balance problem     related to neuropathy in feet     Bradycardia      Carcinoma in situ of bladder 2000    bladder cancer ;; follow w Urology w periodic cysto      Essential hypertension, benign      Generalized osteoarthrosis, unspecified site knees    s/p R total knee 8/09 ; will do L 6/10      Hernia, abdominal      Numbness and tingling     toes and fingers     DAWSON (obstructive sleep apnea) 8/2/2019     Pacemaker     [employer]      Pain in joint, shoulder region     L shoulder rotater tear; no surgery      Palpitations      Persistent atrial fibrillation (H) 1/30/15     Prostate CA (H) 2008-9    radiation     Prostate cancer (H) 11/08    completed RT 3/09     Renal disease     elevated creatinine     Shoulder impingement     R shoulder impingement etc see MRI 4/09      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     toes both feet              Past Surgical History:      Past Surgical History:   Procedure Laterality Date     ARTHROPLASTY HIP Right 3/27/2017    Procedure: ARTHROPLASTY HIP;  Surgeon: Jigar Wynn MD;  Location: [facility] OR     C NONSPECIFIC PROCEDURE      bilat inguinal hernia repairs ( 3total procedures)      C NONSPECIFIC PROCEDURE      pilonidal cyst     C NONSPECIFIC PROCEDURE      T + A     C NONSPECIFIC PROCEDURE  8/09     R+L total knee     CARDIOVERSION  3/26/15     COLONOSCOPY       CYSTOSCOPY       CYSTOSCOPY, FULGURATE BLEEDERS, EVACUATE CLOT(S), COMBINED N/A 4/23/2019    Procedure: Exam under anesthesia, video cystopanendoscopy, evacuation of clots, fulguration of prostatic veins.;  Surgeon: Ilir Ramirez MD;  Location: [facility] OR     CYSTOSCOPY, RETROGRADES, COMBINED Bilateral 6/18/2019    Procedure: Video cystopanendoscopy,  evacuation of clots, cup biopsies of bladder wall, electrovaporization of prostate, transurethral resection of prostate, bilateral retrograde ureteropyelogram.;  Surgeon: Ilir Ramirez MD;  Location: [facility] OR     CYSTOSCOPY, TRANSURETHRAL RESECTION (TUR) PROSTATE, COMBINED  6/18/2019    Procedure: Cystoscopy, transurethral resection of prostate;  Surgeon: Ilir Ramirez MD;  Location: [facility] OR     HERNIA REPAIR       IMPLANT PACEMAKER  3/26/15     RELEASE CARPAL TUNNEL Right 4/19/2017    Procedure: RELEASE CARPAL TUNNEL;  Right carpal tunnel release;  Surgeon: Alonso Barboza MD;  Location: [facility] OR                      Physical Examination:   There were no vitals taken for this visit.       Interpretation Summary-echocardiogram 2018     The visual ejection fraction is estimated at 45-50%.  There is borderline-mild global hypokinesia of the left ventricle.  There is a catheter/pacemaker lead seen in the right ventricle.  The left atrium is moderately dilated.  The right atrium is mildly dilated.  There is mild (1+) mitral regurgitation.  There is mild (1+) tricuspid regurgitation.  Right ventricular systolic pressure is elevated, consistent with mild  pulmonary hypertension.  There is mild (1+) aortic regurgitation.  Mild aortic root dilatation.  The ascending aorta is Mildly dilated.  LVEF may be mildly reduced compared with study from 2017, otherwise no major  changes.  _____________________________________________________________________________  __        Left Ventricle  The left ventricle is normal in size. Left ventricular hypertrophy: asymmetric  with no LVOT obstruction. The visual ejection fraction is estimated at 45-50%.  Grade II or moderate diastolic dysfunction. Septal wall motion abnormality may  reflect pacemaker activation. There is borderline-mild global hypokinesia of  the left ventricle.     Right Ventricle  There is a catheter/pacemaker lead seen in the right ventricle. The  right  ventricle is normal in structure, function and size.     Atria  The left atrium is moderately dilated. The right atrium is mildly dilated.     Mitral Valve  The mitral valve leaflets are mildly thickened. There is mild mitral annular  calcification. There is mild (1+) mitral regurgitation.        Tricuspid Valve  Tricuspid leaflets are thickened. Right ventricular systolic pressure is  elevated, consistent with mild pulmonary hypertension. There is mild (1+)  tricuspid regurgitation.     Aortic Valve  There is mild trileaflet aortic sclerosis. There is mild (1+) aortic  regurgitation.     Pulmonic Valve  Normal pulmonic valve.      Copy to: Kwadwo Winslow MD 8/23/2019     [facility]   Floor 1, [address]   ?[address] [place] [address]   Appointments: [phone]    [facility]  3rd Floor  [address] , [place] [address]   I certify that this patient, Tucker Slater has been under my care and that I, or a nurse practitioner or physician's assistant working with me, had a face-to-face encounter that meets face-to-face encounter requirements with this patient on August 23, 2019.    Tucker Slater is now in a chronic stable state and continues to require supplemental oxygen due to continued oxygen desaturation.  This patient has been treated in part, or in whole for the following medical condition(s):  Chronic Heart Failure I50  Treatments tried and failed or ruled out to treat hypoxemia include CPAP.  If portability is ordered, is the patient mobile within the home? yes  Total time spent with patient: 25 min >50% counseling

## 2019-08-26 DIAGNOSIS — N13.8 HYPERTROPHY OF PROSTATE WITH URINARY OBSTRUCTION: ICD-10-CM

## 2019-08-26 DIAGNOSIS — N40.1 HYPERTROPHY OF PROSTATE WITH URINARY OBSTRUCTION: ICD-10-CM

## 2019-08-26 NOTE — TELEPHONE ENCOUNTER
"Requested Prescriptions   Pending Prescriptions Disp Refills     finasteride (PROSCAR) 5 MG tablet [Pharmacy Med Name: FINASTERIDE 5MG TABS] 30 tablet 1     Sig: TAKE ONE TABLET BY MOUTH  DAILY   Last Written Prescription Date:  06/14/2019  Last Fill Quantity: 30,  # refills: 01   Last office visit: 8/2/2019 with prescribing provider:     Future Office Visit:   Next 5 appointments (look out 90 days)    Oct 29, 2019  3:10 PM CDT  Return Visit with SELMA Landis CNP  [facility] ([facility]) [address]  [facility] [address] [phone]           Alpha Blockers Failed - 8/26/2019  9:20 AM        Failed - Blood pressure under 140/90 in past 12 months     BP Readings from Last 3 Encounters:   08/23/19 (!) 145/78   08/02/19 (!) 148/78   06/26/19 146/68                 Passed - Recent (12 mo) or future (30 days) visit within the authorizing provider's specialty     Patient had office visit in the last 12 months or has a visit in the next 30 days with authorizing provider or within the authorizing provider's specialty.  See \"Patient Info\" tab in inbasket, or \"Choose Columns\" in Meds & Orders section of the refill encounter.              Passed - Patient does not have Tadalafil, Vardenafil, or Sildenafil on their medication list        Passed - Medication is active on med list        Passed - Patient is 18 years of age or older        "

## 2019-08-28 RX ORDER — FINASTERIDE 5 MG/1
TABLET, FILM COATED ORAL
Qty: 30 TABLET | Refills: 1 | Status: SHIPPED | OUTPATIENT
Start: 2019-08-28 | End: 2019-10-24

## 2019-08-28 NOTE — TELEPHONE ENCOUNTER
Routing refill request to provider for review/approval because:  Blood pressures out of range

## 2019-09-09 ENCOUNTER — TELEPHONE (OUTPATIENT)
Dept: INTERNAL MEDICINE | Facility: CLINIC | Age: 84
End: 2019-09-09

## 2019-09-09 NOTE — TELEPHONE ENCOUNTER
Per the INR Nurse notes on 8/16/19:    Signs/symptoms of bleeding (started having some hematuria when he restarted the warfarin.  Huddled with MD and he would like to hold the warfarin until patients stops having hematuria, then hold 1 additional week.  Then restart warfarin again.)    Will route to the INR nurse to manage the Warfarin.

## 2019-09-09 NOTE — TELEPHONE ENCOUNTER
Patient is calling because he was told to stop his jantoven while he was having some prostrate bleeding. His bleeding has stopped for about a week now so he is wondering if he should go back on this and if so how much should he take.

## 2019-09-09 NOTE — TELEPHONE ENCOUNTER
Per below, patient was holding warfarin d/t hematuria, has not had any bleeding for 1 week. Ok to have the patient restart warfarin at previous warfarin dose and have INR checked in 3 days?  Gail Valdovinos RN

## 2019-09-09 NOTE — TELEPHONE ENCOUNTER
Attempted to call patient, no answer and no answering machine. Will resume warfarin at previous maintenance dose (1.25 mg (2.5 mg x 0.5) every Tue, Fri; 2.5 mg (2.5 mg x 1) all other days and check INR in 3 days.  Gail Valdovinos RN

## 2019-09-10 NOTE — TELEPHONE ENCOUNTER
Called patient, advised of below. Patient agrees with plan, INR appointment scheduled on 9/13/19.  Gail Valdovinos RN

## 2019-09-13 ENCOUNTER — ANTICOAGULATION THERAPY VISIT (OUTPATIENT)
Dept: ANTICOAGULATION | Facility: CLINIC | Age: 84
End: 2019-09-13
Payer: COMMERCIAL

## 2019-09-13 DIAGNOSIS — Z79.01 LONG TERM CURRENT USE OF ANTICOAGULANTS WITH INR GOAL OF 2.0-3.0: ICD-10-CM

## 2019-09-13 DIAGNOSIS — I48.91 ATRIAL FIBRILLATION (H): ICD-10-CM

## 2019-09-13 LAB — INR POINT OF CARE: 1 (ref 0.86–1.14)

## 2019-09-13 PROCEDURE — 85610 PROTHROMBIN TIME: CPT | Mod: QW

## 2019-09-13 PROCEDURE — 36416 COLLJ CAPILLARY BLOOD SPEC: CPT

## 2019-09-13 NOTE — PROGRESS NOTES
ANTICOAGULATION FOLLOW-UP CLINIC VISIT    Patient Name:  Tucker Slater  Date:  2019  Contact Type:  Face to Face    SUBJECTIVE:  Patient Findings     Positives:   Missed doses (Intentionally held warfarin due to hematuria.  No hematuria since restarting warfarin.,)    Comments:   The patient was assessed for diet, medication, and activity level changes, extra doses, bruising or bleeding, with no problem findings.          Clinical Outcomes     Negatives:   Major bleeding event, Thromboembolic event, Anticoagulation-related hospital admission, Anticoagulation-related ED visit, Anticoagulation-related fatality    Comments:   The patient was assessed for diet, medication, and activity level changes, extra doses, bruising or bleeding, with no problem findings.             OBJECTIVE    INR Protime   Date Value Ref Range Status   2019 1.0 0.86 - 1.14 Final       ASSESSMENT / PLAN  INR assessment SUB    Recheck INR In: 1 WEEK    INR Location Clinic      Anticoagulation Summary  As of 2019    INR goal:   2.0-2.5   TTR:   69.9 % (2.3 y)   INR used for dosin.0! (2019)   Warfarin maintenance plan:   1.25 mg (2.5 mg x 0.5) every Tue, Fri; 2.5 mg (2.5 mg x 1) all other days   Full warfarin instructions:   : 2.5 mg; : 3.75 mg; Otherwise 1.25 mg every Tue, Fri; 2.5 mg all other days   Weekly warfarin total:   15 mg   Plan last modified:   Ivory Machado RN (2019)   Next INR check:   2019   Priority:   INR   Target end date:       Indications    Atrial fibrillation (H) with mitral regurgitation and congestive heart failure [I48.91]  Long term current use of anticoagulants with INR goal of 2.0-3.0 [Z79.01]             Anticoagulation Episode Summary     INR check location:       Preferred lab:       Send INR reminders to:   [facility]    Comments:   Patient has a history of hematuria when INR is elevated.  Will try to keep INR closer to 2.0.      Anticoagulation Care  Providers     Provider Role Specialty Phone number    Kwadwo Winslow MD Responsible Internal Medicine [phone]            See the Encounter Report to view Anticoagulation Flowsheet and Dosing Calendar (Go to Encounters tab in chart review, and find the Anticoagulation Therapy Visit)    Dosage adjustment made based on physician directed care plan.    Ivory Machado RN

## 2019-09-16 ENCOUNTER — TELEPHONE (OUTPATIENT)
Dept: INTERNAL MEDICINE | Facility: CLINIC | Age: 84
End: 2019-09-16

## 2019-09-16 NOTE — TELEPHONE ENCOUNTER
Call from [facility], wondering if patient will need any antibiotic treatment before making an appointment to have an extraction and cleaning. Please call [facility] back and patient with answer.     Please advise,     Thank you

## 2019-09-17 ENCOUNTER — ANCILLARY PROCEDURE (OUTPATIENT)
Dept: CARDIOLOGY | Facility: CLINIC | Age: 84
End: 2019-09-17
Attending: INTERNAL MEDICINE
Payer: COMMERCIAL

## 2019-09-17 DIAGNOSIS — Z95.0 CARDIAC PACEMAKER IN SITU: ICD-10-CM

## 2019-09-17 PROCEDURE — 93296 REM INTERROG EVL PM/IDS: CPT | Performed by: INTERNAL MEDICINE

## 2019-09-17 PROCEDURE — 93294 REM INTERROG EVL PM/LDLS PM: CPT | Performed by: INTERNAL MEDICINE

## 2019-09-19 ENCOUNTER — TELEPHONE (OUTPATIENT)
Dept: INTERNAL MEDICINE | Facility: CLINIC | Age: 84
End: 2019-09-19

## 2019-09-19 NOTE — TELEPHONE ENCOUNTER
See message below. Please advise. Needs letter faxed.   Does he needs antibiotics? Also is on Jantoven.

## 2019-09-19 NOTE — LETTER
[facility] [address]  [facility] [address]        Re: Tucker Slater   : 3/13/1931        To Whom it May Concern:    It is OK for Tucker to have teeth cleaning and Xrays done. He does not need antibiotic prophylaxis, unless he has been advised so by his Orthopedic surgeon.       Sincerely,         Kwadwo Winslow MD

## 2019-09-19 NOTE — TELEPHONE ENCOUNTER
Reason for Call:  Other Letter for [facility] for upcoming appointment.     Detailed comments: Magdalena calling from [facility] on [facility] patient has an appointment on 9/26 - needs a letter addressed to Dr. Delmi Cervantes stating he's medically ok to be seen, have an exam, cleaning and xrays due to having a pacemaker, blood pressure issues and artificial joints. They're also wondering if he needs antibiotics perscribed.  Fax [phone]    Phone Number Patient can be reached at: Other phone number:  [phone]    Best Time: any    Can we leave a detailed message on this number? YES    Call taken on 9/19/2019 at 9:53 AM by Jada Cardona  ;

## 2019-09-20 ENCOUNTER — ANTICOAGULATION THERAPY VISIT (OUTPATIENT)
Dept: ANTICOAGULATION | Facility: CLINIC | Age: 84
End: 2019-09-20
Payer: COMMERCIAL

## 2019-09-20 DIAGNOSIS — Z79.01 LONG TERM CURRENT USE OF ANTICOAGULANTS WITH INR GOAL OF 2.0-3.0: ICD-10-CM

## 2019-09-20 LAB — INR POINT OF CARE: 1.6 (ref 0.86–1.14)

## 2019-09-20 PROCEDURE — 85610 PROTHROMBIN TIME: CPT | Mod: QW

## 2019-09-20 PROCEDURE — 36416 COLLJ CAPILLARY BLOOD SPEC: CPT

## 2019-09-20 PROCEDURE — 99207 ZZC NO CHARGE NURSE ONLY: CPT

## 2019-09-20 NOTE — TELEPHONE ENCOUNTER
OK to have cleaning and X rays. Does not need antibiotic prophylaxis, unless he has been advised so by his orthopedic surgeon.

## 2019-09-20 NOTE — PROGRESS NOTES
ANTICOAGULATION FOLLOW-UP CLINIC VISIT    Patient Name:  Tucker Slater  Date:  2019  Contact Type:  Face to Face    SUBJECTIVE:  Patient Findings     Positives:   Missed doses (Patient recently resumed warfarin, was hold for hematuria.)    Comments:   The patient was assessed for diet, medication, and activity level changes, missed or extra doses, bruising or bleeding, with no problem findings.          Clinical Outcomes     Negatives:   Major bleeding event, Thromboembolic event, Anticoagulation-related hospital admission, Anticoagulation-related ED visit, Anticoagulation-related fatality    Comments:   The patient was assessed for diet, medication, and activity level changes, missed or extra doses, bruising or bleeding, with no problem findings.             OBJECTIVE    INR Protime   Date Value Ref Range Status   2019 1.6 (A) 0.86 - 1.14 Final       ASSESSMENT / PLAN  INR assessment SUB    Recheck INR In: 4 DAYS    INR Location Clinic      Anticoagulation Summary  As of 2019    INR goal:   2.0-2.5   TTR:   69.3 % (2.3 y)   INR used for dosin.6! (2019)   Warfarin maintenance plan:   1.25 mg (2.5 mg x 0.5) every Tue, Fri; 2.5 mg (2.5 mg x 1) all other days   Full warfarin instructions:   : 2.5 mg; Otherwise 1.25 mg every Tue, Fri; 2.5 mg all other days   Weekly warfarin total:   15 mg   Plan last modified:   Ivory Machado RN (2019)   Next INR check:   2019   Priority:   INR   Target end date:       Indications    Atrial fibrillation (H) with mitral regurgitation and congestive heart failure [I48.91]  Long term current use of anticoagulants with INR goal of 2.0-3.0 [Z79.01]             Anticoagulation Episode Summary     INR check location:       Preferred lab:       Send INR reminders to:   [facility]    Comments:   Patient has a history of hematuria when INR is elevated.  Will try to keep INR closer to 2.0.      Anticoagulation Care Providers      Provider Role Specialty Phone number    Kwadwo Winslow MD Responsible Internal Medicine [phone]            See the Encounter Report to view Anticoagulation Flowsheet and Dosing Calendar (Go to Encounters tab in chart review, and find the Anticoagulation Therapy Visit)    Dosage adjustment made based on physician directed care plan.    Gail Valdovinos RN

## 2019-09-24 ENCOUNTER — ANTICOAGULATION THERAPY VISIT (OUTPATIENT)
Dept: ANTICOAGULATION | Facility: CLINIC | Age: 84
End: 2019-09-24
Payer: COMMERCIAL

## 2019-09-24 ENCOUNTER — OFFICE VISIT (OUTPATIENT)
Dept: INTERNAL MEDICINE | Facility: CLINIC | Age: 84
End: 2019-09-24
Payer: COMMERCIAL

## 2019-09-24 VITALS
RESPIRATION RATE: 18 BRPM | HEART RATE: 86 BPM | SYSTOLIC BLOOD PRESSURE: 143 MMHG | HEIGHT: 70 IN | TEMPERATURE: 97.8 F | WEIGHT: 183 LBS | OXYGEN SATURATION: 100 % | DIASTOLIC BLOOD PRESSURE: 77 MMHG | BODY MASS INDEX: 26.2 KG/M2

## 2019-09-24 DIAGNOSIS — Z23 NEED FOR PROPHYLACTIC VACCINATION AND INOCULATION AGAINST INFLUENZA: ICD-10-CM

## 2019-09-24 DIAGNOSIS — Z79.01 LONG TERM CURRENT USE OF ANTICOAGULANTS WITH INR GOAL OF 2.0-3.0: ICD-10-CM

## 2019-09-24 DIAGNOSIS — R31.0 GROSS HEMATURIA: ICD-10-CM

## 2019-09-24 DIAGNOSIS — I10 ESSENTIAL HYPERTENSION WITH GOAL BLOOD PRESSURE LESS THAN 140/90: ICD-10-CM

## 2019-09-24 DIAGNOSIS — M54.2 NECK PAIN: Primary | ICD-10-CM

## 2019-09-24 DIAGNOSIS — I48.20 CHRONIC ATRIAL FIBRILLATION (H): ICD-10-CM

## 2019-09-24 DIAGNOSIS — N18.30 CKD (CHRONIC KIDNEY DISEASE) STAGE 3, GFR 30-59 ML/MIN (H): ICD-10-CM

## 2019-09-24 LAB — INR POINT OF CARE: 1.8 (ref 0.86–1.14)

## 2019-09-24 PROCEDURE — 85610 PROTHROMBIN TIME: CPT | Mod: QW

## 2019-09-24 PROCEDURE — 99214 OFFICE O/P EST MOD 30 MIN: CPT | Mod: 25 | Performed by: INTERNAL MEDICINE

## 2019-09-24 PROCEDURE — 36416 COLLJ CAPILLARY BLOOD SPEC: CPT

## 2019-09-24 PROCEDURE — 90662 IIV NO PRSV INCREASED AG IM: CPT | Performed by: INTERNAL MEDICINE

## 2019-09-24 PROCEDURE — 99207 ZZC NO CHARGE NURSE ONLY: CPT

## 2019-09-24 PROCEDURE — G0008 ADMIN INFLUENZA VIRUS VAC: HCPCS | Performed by: INTERNAL MEDICINE

## 2019-09-24 RX ORDER — BACLOFEN 10 MG/1
10 TABLET ORAL 3 TIMES DAILY
Qty: 20 TABLET | Refills: 0 | Status: SHIPPED | OUTPATIENT
Start: 2019-09-24 | End: 2020-04-14

## 2019-09-24 ASSESSMENT — MIFFLIN-ST. JEOR: SCORE: 1506.33

## 2019-09-24 NOTE — PROGRESS NOTES
ANTICOAGULATION FOLLOW-UP CLINIC VISIT    Patient Name:  Tucker Slater  Date:  2019  Contact Type:  Face to Face    SUBJECTIVE:         OBJECTIVE    INR Protime   Date Value Ref Range Status   2019 1.8 (A) 0.86 - 1.14 Final       ASSESSMENT / PLAN  INR assessment SUB    Recheck INR In: 6 DAYS    INR Location Clinic      Anticoagulation Summary  As of 2019    INR goal:   2.0-2.5   TTR:   69.0 % (2.3 y)   INR used for dosin.8! (2019)   Warfarin maintenance plan:   1.25 mg (2.5 mg x 0.5) every Tue, Fri; 2.5 mg (2.5 mg x 1) all other days   Full warfarin instructions:   1.25 mg every Tue, Fri; 2.5 mg all other days   Weekly warfarin total:   15 mg   Plan last modified:   Ivory Machado RN (2019)   Next INR check:   2019   Priority:   INR   Target end date:       Indications    Atrial fibrillation (H) with mitral regurgitation and congestive heart failure [I48.91]  Long term current use of anticoagulants with INR goal of 2.0-3.0 [Z79.01]             Anticoagulation Episode Summary     INR check location:       Preferred lab:       [facility]    Comments:   Patient has a history of hematuria when INR is elevated.  Will try to keep INR closer to 2.0.      Anticoagulation Care Providers     Provider Role Specialty Phone number    Kwadwo Winslow MD [facility] Internal Medicine [phone]            See the Encounter Report to view Anticoagulation Flowsheet and Dosing Calendar (Go to Encounters tab in chart review, and find the Anticoagulation Therapy Visit)    Dosage adjustment made based on physician directed care plan.    Gail Valdovinos RN

## 2019-09-24 NOTE — NURSING NOTE
"BP (!) 143/77 (BP Location: Left arm, Patient Position: Sitting, Cuff Size: Adult Regular)   Pulse 86   Temp 97.8  F (36.6  C) (Oral)   Resp 18   Ht 1.778 m (5' 10\")   Wt 83 kg (183 lb)   SpO2 100%   BMI 26.26 kg/m    Patient is here for medication recheck.  "

## 2019-09-24 NOTE — PROGRESS NOTES
Subjective     Tucker Slater is a 88 year old male who presents to clinic today for the following health issues:  Patient is here for medication recheck.    Hypertension Follow-up   Has h/o HTN. on medical treatment. BP has been controlled. No side effects from medications. No CP, HA, dizziness. good compliance with medications and low salt diet.    Has history of atrial fibrillation. On anticoagulation with Coumadin and rate control medications. Asymptonatic - no chest pains , palpitations,  no side effects from medications.    Has h/o BPH, hematuria, resolved, restarted Coumadin. No recurrence.     Has symptoms of neck pain, strain, for a week, no neurologic deficits, posterior neck pain reported.     Has h/o CRF. Symptoms include none. Monitoring BP, BG, medications, avoiding OTC NSAIDs. Needs periodic recheck of kidney function.            Do you check your blood pressure regularly outside of the clinic? No     Are you following a low salt diet? No    Are your blood pressures ever more than 140 on the top number (systolic) OR more   than 90 on the bottom number (diastolic), for example 140/90? No      How many servings of fruits and vegetables do you eat daily?  0-1    On average, how many sweetened beverages do you drink each day (soda, juice, sweet tea, etc)?   0    How many days per week do you miss taking your medication? 0            Patient Active Problem List   Diagnosis     Essential hypertension with goal blood pressure less than 140/90     Carcinoma in situ of bladder     Hypertrophy of prostate with urinary obstruction     Generalized osteoarthrosis, unspecified site     Hereditary and idiopathic peripheral neuropathy     CARDIOVASCULAR SCREENING; LDL GOAL LESS THAN 130     Advanced directives, counseling/discussion     Peripheral neuropathy     GI bleed     Anemia     Near syncope     Anemia due to blood loss, acute     Atrial fibrillation (H) with mitral regurgitation and congestive heart  failure     Bradycardia     CKD (chronic kidney disease) stage 3, GFR 30-59 ml/min (H)     Cardiac pacemaker in situ     Long-term (current) use of anticoagulants [Z79.01]     Degenerative arthritis of hip     Malignant neoplasm of other specified sites of bladder     Dysplasia of prostate     Long term current use of anticoagulants with INR goal of 2.0-3.0     Hematuria     Hospital discharge follow-up     Urinary retention     DAWSON (obstructive sleep apnea)     Past Surgical History:   Procedure Laterality Date     ARTHROPLASTY HIP Right 3/27/2017    Procedure: ARTHROPLASTY HIP;  Surgeon: Jigar Wynn MD;  Location: [facility] OR     C NONSPECIFIC PROCEDURE      bilat inguinal hernia repairs ( 3total procedures)      C NONSPECIFIC PROCEDURE      pilonidal cyst     C NONSPECIFIC PROCEDURE      T + A     C NONSPECIFIC PROCEDURE  8/09     R+L total knee     CARDIOVERSION  3/26/15     COLONOSCOPY       CYSTOSCOPY       CYSTOSCOPY, FULGURATE BLEEDERS, EVACUATE CLOT(S), COMBINED N/A 4/23/2019    Procedure: Exam under anesthesia, video cystopanendoscopy, evacuation of clots, fulguration of prostatic veins.;  Surgeon: Ilir Ramriez MD;  Location:  OR     CYSTOSCOPY, RETROGRADES, COMBINED Bilateral 6/18/2019    Procedure: Video cystopanendoscopy, evacuation of clots, cup biopsies of bladder wall, electrovaporization of prostate, transurethral resection of prostate, bilateral retrograde ureteropyelogram.;  Surgeon: Ilir Ramirez MD;  Location:  OR     CYSTOSCOPY, TRANSURETHRAL RESECTION (TUR) PROSTATE, COMBINED  6/18/2019    Procedure: Cystoscopy, transurethral resection of prostate;  Surgeon: Ilir Ramirez MD;  Location: [facility] OR     HERNIA REPAIR       IMPLANT PACEMAKER  3/26/15     RELEASE CARPAL TUNNEL Right 4/19/2017    Procedure: RELEASE CARPAL TUNNEL;  Right carpal tunnel release;  Surgeon: Alonso Barboza MD;  Location: [facility] OR       Social History     Tobacco Use     Smoking status: Former Smoker      Last attempt to quit: 1977     Years since quittin.0     Smokeless tobacco: Never Used   Substance Use Topics     Alcohol use: No     Alcohol/week: 0.0 standard drinks     Family History   Problem Relation Age of Onset     Heart Disease Father          89yo     Coronary Artery Disease Father      Heart Disease Mother          74yo     Coronary Artery Disease Mother      Family History Negative Brother          Current Outpatient Medications   Medication Sig Dispense Refill     ACETAMINOPHEN PO Take 650 mg by mouth every 4 hours as needed for pain       Ascorbic Acid (VITAMIN C PO) Take 500 mg by mouth daily        baclofen (LIORESAL) 10 MG tablet Take 1 tablet (10 mg) by mouth 3 times daily 20 tablet 0     calcium carbonate (OS-KARLA 500 MG [place]. [place]) 500 MG tablet Take 500 mg by mouth daily        carvedilol (COREG) 25 MG tablet Take 1.5 tablets (37.5 mg) by mouth 2 times daily (with meals) 270 tablet 3     diclofenac (VOLTAREN) 1 % topical gel Place 2 g onto the skin 4 times daily Re-label for home use (Patient taking differently: Place 2 g onto the skin 4 times daily as needed Re-label for home use) 1 Tube 0     docusate sodium (COLACE) 100 MG capsule Take 100 mg by mouth 2 times daily       finasteride (PROSCAR) 5 MG tablet TAKE ONE TABLET BY MOUTH  DAILY 30 tablet 1     losartan (COZAAR) 50 MG tablet Take 1 tablet (50 mg) by mouth daily 90 tablet 3     Multiple Vitamins-Minerals (OCUVITE PO) Take 1 tablet by mouth 2 times daily        order for DME Oxygen 2 Li/min  at night in CPAP 1 Device 0     order for DME Oxygen 2 Li/min  at night 1 Device 0     order for DME Oxygen 2 Li/min  at night with CPAP  SPO2 less than or equal to oxygen saturation 89% on effective CPAP in patient with known cardiomyopathy 1 Device 0     triamterene-HCTZ (MAXZIDE-25) 37.5-25 MG tablet Take 1 tablet by mouth daily 90 tablet 3     vitamin B complex with vitamin C (VITAMIN  B COMPLEX) TABS tablet Take 1  tablet by mouth daily       VITAMIN D, CHOLECALCIFEROL, PO Take 2,000 Units by mouth daily.       warfarin (JANTOVEN) 2.5 MG tablet Take 2.5 mg by mouth daily           Reviewed and updated as needed this visit by Provider         Review of Systems   ROS COMP: Constitutional, HEENT, cardiovascular, pulmonary, gi and gu systems are negative, except as otherwise noted.      Objective    There were no vitals taken for this visit.  There is no height or weight on file to calculate BMI.  Physical Exam   GENERAL: healthy, alert and no distress  EYES: Eyes grossly normal to inspection, PERRL and conjunctivae and sclerae normal  HENT: ear canals and TM's normal, nose and mouth without ulcers or lesions  NECK: no adenopathy, no asymmetry, masses, or scars and thyroid normal to palpation  RESP: lungs clear to auscultation - no rales, rhonchi or wheezes  CV: irregular rate and rhythm, normal S1 S2, no S3 or S4, no murmur, click or rub, no peripheral edema and peripheral pulses strong  ABDOMEN: soft, nontender, no hepatosplenomegaly, no masses and bowel sounds normal  MS: no gross musculoskeletal defects noted, no edema  SKIN: no suspicious lesions or rashes    Diagnostic Test Results:  Labs reviewed in Epic        Assessment & Plan   Problem List Items Addressed This Visit     Essential hypertension with goal blood pressure less than 140/90    Atrial fibrillation (H) with mitral regurgitation and congestive heart failure    CKD (chronic kidney disease) stage 3, GFR 30-59 ml/min (H)    Hematuria      Other Visit Diagnoses     Neck pain    -  Primary    Relevant Medications    baclofen (LIORESAL) 10 MG tablet    Need for prophylactic vaccination and inoculation against influenza        Relevant Orders    INFLUENZA (HIGH DOSE) 3 VALENT VACCINE [41813] (Completed)         Cont treatment   Monitor BP  Immunized  Baclofen and Tylenol for neck pain     BMI:   Estimated body mass index is 26.26 kg/m  as calculated from the  "following:    Height as of this encounter: 1.778 m (5' 10\").    Weight as of this encounter: 83 kg (183 lb).           See Patient Instructions  Return in about 6 months (around 3/24/2020) for Routine Visit.    Kwadwo Winslow MD  [facility]        "

## 2019-09-30 ENCOUNTER — ANTICOAGULATION THERAPY VISIT (OUTPATIENT)
Dept: ANTICOAGULATION | Facility: CLINIC | Age: 84
End: 2019-09-30
Payer: COMMERCIAL

## 2019-09-30 DIAGNOSIS — Z79.01 LONG TERM CURRENT USE OF ANTICOAGULANTS WITH INR GOAL OF 2.0-3.0: ICD-10-CM

## 2019-09-30 LAB
INR POINT OF CARE: 1.7 (ref 0.86–1.14)
MDC_IDC_LEAD_IMPLANT_DT: NORMAL
MDC_IDC_LEAD_IMPLANT_DT: NORMAL
MDC_IDC_LEAD_LOCATION: NORMAL
MDC_IDC_LEAD_LOCATION: NORMAL
MDC_IDC_LEAD_LOCATION_DETAIL_1: NORMAL
MDC_IDC_LEAD_LOCATION_DETAIL_1: NORMAL
MDC_IDC_LEAD_MFG: NORMAL
MDC_IDC_LEAD_MFG: NORMAL
MDC_IDC_LEAD_MODEL: NORMAL
MDC_IDC_LEAD_MODEL: NORMAL
MDC_IDC_LEAD_POLARITY_TYPE: NORMAL
MDC_IDC_LEAD_POLARITY_TYPE: NORMAL
MDC_IDC_LEAD_SERIAL: NORMAL
MDC_IDC_LEAD_SERIAL: NORMAL
MDC_IDC_MSMT_BATTERY_DTM: NORMAL
MDC_IDC_MSMT_BATTERY_REMAINING_LONGEVITY: 131 MO
MDC_IDC_MSMT_BATTERY_REMAINING_PERCENTAGE: 95.5 %
MDC_IDC_MSMT_BATTERY_RRT_TRIGGER: NORMAL
MDC_IDC_MSMT_BATTERY_STATUS: NORMAL
MDC_IDC_MSMT_BATTERY_VOLTAGE: 2.98 V
MDC_IDC_MSMT_LEADCHNL_RV_IMPEDANCE_VALUE: 400 OHM
MDC_IDC_MSMT_LEADCHNL_RV_LEAD_CHANNEL_STATUS: NORMAL
MDC_IDC_MSMT_LEADCHNL_RV_PACING_THRESHOLD_AMPLITUDE: 0.62 V
MDC_IDC_MSMT_LEADCHNL_RV_PACING_THRESHOLD_PULSEWIDTH: 0.5 MS
MDC_IDC_MSMT_LEADCHNL_RV_SENSING_INTR_AMPL: 12 MV
MDC_IDC_PG_IMPLANT_DTM: NORMAL
MDC_IDC_PG_MFG: NORMAL
MDC_IDC_PG_MODEL: NORMAL
MDC_IDC_PG_SERIAL: NORMAL
MDC_IDC_PG_TYPE: NORMAL
MDC_IDC_SESS_CLINIC_NAME: NORMAL
MDC_IDC_SESS_DTM: NORMAL
MDC_IDC_SESS_REPROGRAMMED: NO
MDC_IDC_SESS_TYPE: NORMAL
MDC_IDC_SET_BRADY_HYSTRATE: 60 {BEATS}/MIN
MDC_IDC_SET_BRADY_LOWRATE: 70 {BEATS}/MIN
MDC_IDC_SET_BRADY_MAX_SENSOR_RATE: 120 {BEATS}/MIN
MDC_IDC_SET_BRADY_MODE: NORMAL
MDC_IDC_SET_LEADCHNL_RA_PACING_POLARITY: NORMAL
MDC_IDC_SET_LEADCHNL_RA_SENSING_POLARITY: NORMAL
MDC_IDC_SET_LEADCHNL_RV_PACING_AMPLITUDE: 0.88
MDC_IDC_SET_LEADCHNL_RV_PACING_ANODE_ELECTRODE_1: NORMAL
MDC_IDC_SET_LEADCHNL_RV_PACING_ANODE_LOCATION_1: NORMAL
MDC_IDC_SET_LEADCHNL_RV_PACING_CAPTURE_MODE: NORMAL
MDC_IDC_SET_LEADCHNL_RV_PACING_CATHODE_ELECTRODE_1: NORMAL
MDC_IDC_SET_LEADCHNL_RV_PACING_CATHODE_LOCATION_1: NORMAL
MDC_IDC_SET_LEADCHNL_RV_PACING_POLARITY: NORMAL
MDC_IDC_SET_LEADCHNL_RV_PACING_PULSEWIDTH: 0.5 MS
MDC_IDC_SET_LEADCHNL_RV_SENSING_ADAPTATION_MODE: NORMAL
MDC_IDC_SET_LEADCHNL_RV_SENSING_ANODE_ELECTRODE_1: NORMAL
MDC_IDC_SET_LEADCHNL_RV_SENSING_ANODE_LOCATION_1: NORMAL
MDC_IDC_SET_LEADCHNL_RV_SENSING_CATHODE_ELECTRODE_1: NORMAL
MDC_IDC_SET_LEADCHNL_RV_SENSING_CATHODE_LOCATION_1: NORMAL
MDC_IDC_SET_LEADCHNL_RV_SENSING_POLARITY: NORMAL
MDC_IDC_SET_LEADCHNL_RV_SENSING_SENSITIVITY: 2 MV
MDC_IDC_STAT_AT_DTM_END: NORMAL
MDC_IDC_STAT_AT_DTM_START: NORMAL
MDC_IDC_STAT_BRADY_DTM_END: NORMAL
MDC_IDC_STAT_BRADY_DTM_START: NORMAL
MDC_IDC_STAT_BRADY_RV_PERCENT_PACED: 96 %
MDC_IDC_STAT_CRT_DTM_END: NORMAL
MDC_IDC_STAT_CRT_DTM_START: NORMAL
MDC_IDC_STAT_HEART_RATE_DTM_END: NORMAL
MDC_IDC_STAT_HEART_RATE_DTM_START: NORMAL
MDC_IDC_STAT_HEART_RATE_VENTRICULAR_MAX: 190 {BEATS}/MIN
MDC_IDC_STAT_HEART_RATE_VENTRICULAR_MEAN: 78 {BEATS}/MIN
MDC_IDC_STAT_HEART_RATE_VENTRICULAR_MIN: 60 {BEATS}/MIN

## 2019-09-30 PROCEDURE — 36416 COLLJ CAPILLARY BLOOD SPEC: CPT

## 2019-09-30 PROCEDURE — 85610 PROTHROMBIN TIME: CPT | Mod: QW

## 2019-09-30 PROCEDURE — 99207 ZZC NO CHARGE NURSE ONLY: CPT

## 2019-09-30 NOTE — PROGRESS NOTES
ANTICOAGULATION FOLLOW-UP CLINIC VISIT    Patient Name:  Tucker Slater  Date:  2019  Contact Type:  Face to Face    SUBJECTIVE:  Patient Findings     Positives:   Signs/symptoms of bleeding (Patient reports began having hematuria last night. Patient states hematuria is decreasing with each void. Patient reports last hematuria was 19, warfarin was held. Warfarin was resumed on 9/10/19.)    Comments:   The patient was assessed for diet, medication, and activity level changes, missed or extra doses, bruising, with no problem findings.  Patient denies any identifiable changes that caused the subtherapeutic INR.           Clinical Outcomes     Comments:   The patient was assessed for diet, medication, and activity level changes, missed or extra doses, bruising, with no problem findings.  Patient denies any identifiable changes that caused the subtherapeutic INR.              OBJECTIVE    INR Protime   Date Value Ref Range Status   2019 1.7 (A) 0.86 - 1.14 Final       ASSESSMENT / PLAN  INR assessment SUB    Recheck INR In: 1 WEEK    INR Location Clinic      Anticoagulation Summary  As of 2019    INR goal:   2.0-2.5   TTR:   68.5 % (2.3 y)   INR used for dosin.7! (2019)   Warfarin maintenance plan:   1.25 mg (2.5 mg x 0.5) every Tue, Fri; 2.5 mg (2.5 mg x 1) all other days   Full warfarin instructions:   10/1: 2.5 mg; Otherwise 1.25 mg every Tue, Fri; 2.5 mg all other days   Weekly warfarin total:   15 mg   Plan last modified:   Ivory Machado RN (2019)   Next INR check:   10/7/2019   Priority:   INR   Target end date:       Indications    Atrial fibrillation (H) with mitral regurgitation and congestive heart failure [I48.91]  Long term current use of anticoagulants with INR goal of 2.0-3.0 [Z79.01]             Anticoagulation Episode Summary     INR check location:       Preferred lab:       Send INR reminders to:   [facility]    Comments:   Patient has a history  of hematuria when INR is elevated.  Will try to keep INR closer to 2.0.      Anticoagulation Care Providers     Provider Role Specialty Phone number    Kwadwo Winslow MD [facility] Internal Medicine [phone]            See the Encounter Report to view Anticoagulation Flowsheet and Dosing Calendar (Go to Encounters tab in chart review, and find the Anticoagulation Therapy Visit)    Dosage adjustment made based on physician directed care plan.    Gail Valdovinos RN

## 2019-10-07 ENCOUNTER — ANTICOAGULATION THERAPY VISIT (OUTPATIENT)
Dept: ANTICOAGULATION | Facility: CLINIC | Age: 84
End: 2019-10-07
Payer: COMMERCIAL

## 2019-10-07 DIAGNOSIS — Z79.01 LONG TERM CURRENT USE OF ANTICOAGULANTS WITH INR GOAL OF 2.0-3.0: ICD-10-CM

## 2019-10-07 LAB — INR POINT OF CARE: 2.1 (ref 0.86–1.14)

## 2019-10-07 PROCEDURE — 36416 COLLJ CAPILLARY BLOOD SPEC: CPT

## 2019-10-07 PROCEDURE — 99207 ZZC NO CHARGE NURSE ONLY: CPT

## 2019-10-07 PROCEDURE — 85610 PROTHROMBIN TIME: CPT | Mod: QW

## 2019-10-07 NOTE — PROGRESS NOTES
ANTICOAGULATION FOLLOW-UP CLINIC VISIT    Patient Name:  Tucker Slater  Date:  10/7/2019  Contact Type:  Face to Face    SUBJECTIVE:  Patient Findings     Positives:   Signs/symptoms of bleeding (Patient reports had a small amount of hematuria 1 week ago and reports a small amount hematuria yesterday, 1 time only in the morning, first part of stream is red then normal looking urine. )    Comments:   The patient was assessed for diet, medication, and activity level changes, missed or extra doses, bruising or bleeding, with no problem findings.  Patient had a biopsy of non healing wound on right cheek.         Clinical Outcomes     Comments:   The patient was assessed for diet, medication, and activity level changes, missed or extra doses, bruising or bleeding, with no problem findings.  Patient had a biopsy of non healing wound on right cheek.            OBJECTIVE    INR Protime   Date Value Ref Range Status   10/07/2019 2.1 (A) 0.86 - 1.14 Final       ASSESSMENT / PLAN  INR assessment THER    Recheck INR In: 1 WEEK    INR Location Clinic      Anticoagulation Summary  As of 10/7/2019    INR goal:   2.0-2.5   TTR:   68.1 % (2.4 y)   INR used for dosin.1 (10/7/2019)   Warfarin maintenance plan:   1.25 mg (2.5 mg x 0.5) every Tue, Fri; 2.5 mg (2.5 mg x 1) all other days   Full warfarin instructions:   10/8: 2.5 mg; Otherwise 1.25 mg every Tue, Fri; 2.5 mg all other days   Weekly warfarin total:   15 mg   Plan last modified:   Ivory Machado RN (2019)   Next INR check:   10/14/2019   Priority:   INR   Target end date:       Indications    Atrial fibrillation (H) with mitral regurgitation and congestive heart failure [I48.91]  Long term current use of anticoagulants with INR goal of 2.0-3.0 [Z79.01]             Anticoagulation Episode Summary     INR check location:       Preferred lab:       Send INR reminders to:   [facility]    Comments:   Patient has a history of hematuria when INR is  elevated.  Will try to keep INR closer to 2.0.      Anticoagulation Care Providers     Provider Role Specialty Phone number    Kwadwo Winslow MD [facility] Internal Medicine [phone]            See the Encounter Report to view Anticoagulation Flowsheet and Dosing Calendar (Go to Encounters tab in chart review, and find the Anticoagulation Therapy Visit)    Dosage adjustment made based on physician directed care plan.    Gail Valdovinos RN

## 2019-10-14 ENCOUNTER — ANTICOAGULATION THERAPY VISIT (OUTPATIENT)
Dept: ANTICOAGULATION | Facility: CLINIC | Age: 84
End: 2019-10-14
Payer: COMMERCIAL

## 2019-10-14 DIAGNOSIS — Z79.01 LONG TERM CURRENT USE OF ANTICOAGULANTS WITH INR GOAL OF 2.0-3.0: ICD-10-CM

## 2019-10-14 LAB — INR POINT OF CARE: 2.3 (ref 0.86–1.14)

## 2019-10-14 PROCEDURE — 85610 PROTHROMBIN TIME: CPT | Mod: QW

## 2019-10-14 PROCEDURE — 99207 ZZC NO CHARGE NURSE ONLY: CPT

## 2019-10-14 PROCEDURE — 36416 COLLJ CAPILLARY BLOOD SPEC: CPT

## 2019-10-14 NOTE — PROGRESS NOTES
ANTICOAGULATION FOLLOW-UP CLINIC VISIT    Patient Name:  Tucker Slater  Date:  10/14/2019  Contact Type:  Face to Face    SUBJECTIVE:  Patient Findings     Positives:   Signs/symptoms of bleeding (Patient reports has no hematuria)    Comments:   The patient was assessed for diet, medication, and activity level changes, missed or extra doses, bruising or bleeding, with no problem findings.  Patient will have a MOHS procedure on right cheek 10/29/19.        Clinical Outcomes     Negatives:   Major bleeding event, Thromboembolic event, Anticoagulation-related hospital admission, Anticoagulation-related ED visit, Anticoagulation-related fatality    Comments:   The patient was assessed for diet, medication, and activity level changes, missed or extra doses, bruising or bleeding, with no problem findings.  Patient will have a MOHS procedure on right cheek 10/29/19.           OBJECTIVE    INR Protime   Date Value Ref Range Status   10/14/2019 2.3 (A) 0.86 - 1.14 Final       ASSESSMENT / PLAN  INR assessment THER    Recheck INR In: 2 WEEKS    INR Location Clinic      Anticoagulation Summary  As of 10/14/2019    INR goal:   2.0-2.5   TTR:   68.4 % (2.4 y)   INR used for dosin.3 (10/14/2019)   Warfarin maintenance plan:   1.25 mg (2.5 mg x 0.5) every Tue, Fri; 2.5 mg (2.5 mg x 1) all other days   Full warfarin instructions:   1.25 mg every Tue, Fri; 2.5 mg all other days   Weekly warfarin total:   15 mg   No change documented:   Gail Valdovinos RN   Plan last modified:   Ivory Machado RN (2019)   Next INR check:   10/21/2019   Priority:   INR   Target end date:       Indications    Atrial fibrillation (H) with mitral regurgitation and congestive heart failure [I48.91]  Long term current use of anticoagulants with INR goal of 2.0-3.0 [Z79.01]             Anticoagulation Episode Summary     INR check location:       Preferred lab:       Send INR reminders to:   [facility]    Comments:   Patient  has a history of hematuria when INR is elevated.  Will try to keep INR closer to 2.0.      Anticoagulation Care Providers     Provider Role Specialty Phone number    Kwadwo Winslow MD [facility] Internal Medicine [phone]            See the Encounter Report to view Anticoagulation Flowsheet and Dosing Calendar (Go to Encounters tab in chart review, and find the Anticoagulation Therapy Visit)    Dosage adjustment made based on physician directed care plan.    Gail Valdovinos RN

## 2019-10-16 ENCOUNTER — OFFICE VISIT (OUTPATIENT)
Dept: CARDIOLOGY | Facility: CLINIC | Age: 84
End: 2019-10-16
Attending: INTERNAL MEDICINE
Payer: COMMERCIAL

## 2019-10-16 VITALS
HEART RATE: 76 BPM | DIASTOLIC BLOOD PRESSURE: 78 MMHG | WEIGHT: 184.7 LBS | BODY MASS INDEX: 26.44 KG/M2 | SYSTOLIC BLOOD PRESSURE: 136 MMHG | HEIGHT: 70 IN

## 2019-10-16 DIAGNOSIS — I48.19 PERSISTENT ATRIAL FIBRILLATION (H): ICD-10-CM

## 2019-10-16 PROCEDURE — 99214 OFFICE O/P EST MOD 30 MIN: CPT | Performed by: NURSE PRACTITIONER

## 2019-10-16 ASSESSMENT — MIFFLIN-ST. JEOR: SCORE: 1514.04

## 2019-10-16 NOTE — LETTER
10/16/2019    Kwadwo Winslow MD  [address] [facility] [address]    RE: Tucker Slater       Dear Colleague,    I had the pleasure of seeing Tucker Slater in the [facility].    HISTORY OF PRESENT ILLNESS:    This 88-year-old male presents to [facility]  today for a followup visit.  He is a patient of Dr. Perez, seen in our clinic for permanent atrial fibrillation/atypical atrial flutter, symptomatic bradycardia, hypertension, chronic kidney disease, sleep apnea.      Tucker has a longstanding history of atrial fibrillation/flutter. Due to symptomatic bradycardia, he underwent permanent pacemaker implantation in 2015.  His heart rates have been well controlled for several years on Carvedilol.  Last year, he had recurrent hematuria and was taken off of Warfarin. In June, he met with Dr. Perez to consider a Watchman device; however, the patient deferred.  He has a remote history of prostate cancer and recently underwent ablation procedures to his prostate.  This in turn stopped his hematuria and he has now been back on Warfarin this past month without recurrent bleeding.  They are keeping his INR 2-2.5.    His device was last interrogated (9/17/19)  He is 96% V-paced with underlying chronic atrial fibrillation with adequate rates per histogram.    An echo done in 2018 showed LVEF 45-50% which was felt to be due to RV pacing. He has not had any heart failure in follow up.      Tucker is on multiple agents to control his blood pressure.  He is on a lower dose of Losartan due to renal insufficiency, being followed closely by Dr. Winslow.  He uses CPAP for sleep apnea.  He returns today for a 4 month reassessment.    Tucker is doing well.  He denies any chest pain, shortness of breath, orthopnea, peripheral edema, or palpitations.  He is using his CPAP faithfully.  He lives at home with his wife and does all the shopping. He does not check his blood  pressure on a regular basis.  He does have some balance issues that he states is related to peripheral neuropathy.    Today his BP is 138/76  HR 76 bpm and is regular  His lungs are clear.  There is no significant peripheral edema.    IMPRESSION AND PLAN:   1.  Hypertension.  Blood pressure is at goal today on carvedilol, Cozaar and Maxzide.   We will continue current medical regimen.   2.  Permanent atrial fibrillation.  He is on chronic warfarin without any recent hematuria.  Recent interrogation of his device showed adequate heart rates.   3.  Status post single-chamber permanent pacemaker for symptomatic bradycardia in 2015.  He is 96% V- paced.   4.  Treated sleep apnea.   5.  Nonischemic cardiomyopathy.  LVEF 45-50%  There is no signs and symptoms of heart failure.    Thanks for allowing me to participate in his care.  He will follow up on a regular basis in device clinic and return next February.    SELMA Adan      No orders of the defined types were placed in this encounter.      No orders of the defined types were placed in this encounter.      There are no discontinued medications.      Encounter Diagnosis   Name Primary?     Persistent atrial fibrillation        CURRENT MEDICATIONS:  Current Outpatient Medications   Medication Sig Dispense Refill     ACETAMINOPHEN PO Take 650 mg by mouth every 4 hours as needed for pain       Ascorbic Acid (VITAMIN C PO) Take 500 mg by mouth daily        baclofen (LIORESAL) 10 MG tablet Take 1 tablet (10 mg) by mouth 3 times daily 20 tablet 0     calcium carbonate (OS-KARLA 500 MG [place]. [place]) 500 MG tablet Take 500 mg by mouth daily        carvedilol (COREG) 25 MG tablet Take 1.5 tablets (37.5 mg) by mouth 2 times daily (with meals) 270 tablet 3     diclofenac (VOLTAREN) 1 % topical gel Place 2 g onto the skin 4 times daily Re-label for home use (Patient taking differently: Place 2 g onto the skin 4 times daily as needed Re-label for home use) 1 Tube 0     docusate  sodium (COLACE) 100 MG capsule Take 100 mg by mouth 2 times daily       finasteride (PROSCAR) 5 MG tablet TAKE ONE TABLET BY MOUTH  DAILY 30 tablet 1     losartan (COZAAR) 50 MG tablet Take 1 tablet (50 mg) by mouth daily 90 tablet 3     Multiple Vitamins-Minerals (OCUVITE PO) Take 1 tablet by mouth 2 times daily        order for DME Oxygen 2 Li/min  at night in CPAP 1 Device 0     order for DME Oxygen 2 Li/min  at night 1 Device 0     order for DME Oxygen 2 Li/min  at night with CPAP  SPO2 less than or equal to oxygen saturation 89% on effective CPAP in patient with known cardiomyopathy 1 Device 0     triamterene-HCTZ (MAXZIDE-25) 37.5-25 MG tablet Take 1 tablet by mouth daily 90 tablet 3     vitamin B complex with vitamin C (VITAMIN  B COMPLEX) TABS tablet Take 1 tablet by mouth daily       VITAMIN D, CHOLECALCIFEROL, PO Take 2,000 Units by mouth daily.       warfarin (JANTOVEN) 2.5 MG tablet Take 2.5 mg by mouth daily         ALLERGIES     Allergies   Allergen Reactions     Merbromin      Other reaction(s): Other, see comments  Severe reaction as child. Amalgam fillings OK.     Morphine Nausea and Vomiting     Amlodipine      Edema       PAST MEDICAL HISTORY:  Past Medical History:   Diagnosis Date     Arrhythmia     A Fib     Balance problem     related to neuropathy in feet     Bradycardia      Carcinoma in situ of bladder 2000    bladder cancer ;; follow w Urology w periodic cysto      Essential hypertension, benign      Generalized osteoarthrosis, unspecified site knees    s/p R total knee 8/09 ; will do L 6/10      Hernia, abdominal      Numbness and tingling     toes and fingers     DAWSON (obstructive sleep apnea) 8/2/2019     Pacemaker     [employer]      Pain in joint, shoulder region     L shoulder rotater tear; no surgery      Palpitations      Persistent atrial fibrillation 1/30/15     Prostate CA (H) 2008-9    radiation     Prostate cancer (H) 11/08    completed RT 3/09     Renal disease     elevated  creatinine     Shoulder impingement     R shoulder impingement etc see MRI       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     toes both feet        PAST SURGICAL HISTORY:  Past Surgical History:   Procedure Laterality Date     ARTHROPLASTY HIP Right 3/27/2017    Procedure: ARTHROPLASTY HIP;  Surgeon: Jigar Wynn MD;  Location: [facility] OR     C NONSPECIFIC PROCEDURE      bilat inguinal hernia repairs ( 3total procedures)      C NONSPECIFIC PROCEDURE      pilonidal cyst     C NONSPECIFIC PROCEDURE      T + A     C NONSPECIFIC PROCEDURE       R+L total knee     CARDIOVERSION  3/26/15     COLONOSCOPY       CYSTOSCOPY       CYSTOSCOPY, FULGURATE BLEEDERS, EVACUATE CLOT(S), COMBINED N/A 2019    Procedure: Exam under anesthesia, video cystopanendoscopy, evacuation of clots, fulguration of prostatic veins.;  Surgeon: Ilir Ramirez MD;  Location: [facility] OR     CYSTOSCOPY, RETROGRADES, COMBINED Bilateral 2019    Procedure: Video cystopanendoscopy, evacuation of clots, cup biopsies of bladder wall, electrovaporization of prostate, transurethral resection of prostate, bilateral retrograde ureteropyelogram.;  Surgeon: Ilir Ramirez MD;  Location: [facility] OR     CYSTOSCOPY, TRANSURETHRAL RESECTION (TUR) PROSTATE, COMBINED  2019    Procedure: Cystoscopy, transurethral resection of prostate;  Surgeon: Ilir Ramirez MD;  Location: [facility] OR     HERNIA REPAIR       IMPLANT PACEMAKER  3/26/15     RELEASE CARPAL TUNNEL Right 2017    Procedure: RELEASE CARPAL TUNNEL;  Right carpal tunnel release;  Surgeon: Alonso Barboza MD;  Location: [facility] OR       FAMILY HISTORY:  Family History   Problem Relation Age of Onset     Heart Disease Father          89yo     Coronary Artery Disease Father      Heart Disease Mother          74yo     Coronary Artery Disease Mother      Family History Negative Brother        SOCIAL HISTORY:  Social History     Socioeconomic  History     Marital status:      Spouse name: Alba     Number of children: 3     Years of education: None     Highest education level: None   Occupational History     Employer: RETIRED     Comment:  w [employer]   Social Needs     Financial resource strain: None     Food insecurity:     Worry: None     Inability: None     Transportation needs:     Medical: None     Non-medical: None   Tobacco Use     Smoking status: Former Smoker     Last attempt to quit: 1977     Years since quittin.1     Smokeless tobacco: Never Used   Substance and Sexual Activity     Alcohol use: No     Alcohol/week: 0.0 standard drinks     Drug use: No     Sexual activity: Never     Partners: Female   Lifestyle     Physical activity:     Days per week: None     Minutes per session: None     Stress: None   Relationships     Social connections:     Talks on phone: None     Gets together: None     Attends Nondenominational service: None     Active member of club or organization: None     Attends meetings of clubs or organizations: None     Relationship status: None     Intimate partner violence:     Fear of current or ex partner: None     Emotionally abused: None     Physically abused: None     Forced sexual activity: None   Other Topics Concern     Parent/sibling w/ CABG, MI or angioplasty before 65F 55M? Not Asked      Service Not Asked     Blood Transfusions Not Asked     Caffeine Concern No     Comment: 4-5 cups per week      Occupational Exposure Not Asked     Hobby Hazards Not Asked     Sleep Concern No     Stress Concern No     Weight Concern No     Special Diet No     Back Care Not Asked     Exercise No     Comment: yard work and moves around a lot     Bike Helmet Not Asked     Seat Belt Not Asked     Self-Exams Not Asked   Social History Narrative     None       Review of Systems:  Skin:  Negative       Eyes:  Positive for glasses    ENT:  Negative      Respiratory:  Positive for sleep apnea;CPAP     Cardiovascular:   "Negative      Gastroenterology: Negative      Genitourinary:  Positive for urinary frequency;prostate problem    Musculoskeletal:  Positive for arthritis    Neurologic:  Positive for numbness or tingling of hands;numbness or tingling of feet neuropathy  Psychiatric:  Negative      Heme/Lymph/Imm:  Negative      Endocrine:  Negative        Physical Exam:  Vitals: /78 (BP Location: Right arm, Patient Position: Sitting, Cuff Size: Adult Regular)   Pulse 76   Ht 1.778 m (5' 10\")   Wt 83.8 kg (184 lb 11.2 oz)   BMI 26.50 kg/m       Constitutional:  cooperative;in no acute distress        Skin:  warm and dry to the touch          Head:  normocephalic        Eyes:  pupils equal and round        Lymph:      ENT:  no pallor or cyanosis        Neck:  JVP normal        Respiratory:  clear to auscultation;normal symmetry;normal respiratory excursion         Cardiac: regular rhythm;normal S1 and S2     no presence of murmur          pulses full and equal                                        GI:  abdomen soft;BS normoactive        Extremities and Muscular Skeletal:  no edema              Neurological:  affect appropriate        Psych:  Alert and Oriented x 3        Thank you for allowing me to participate in the care of your patient.    Sincerely,     SELMA Santos CNP     [facility]    "

## 2019-10-16 NOTE — PROGRESS NOTES
HISTORY OF PRESENT ILLNESS:    This 88-year-old male presents to [facility]  today for a followup visit.  He is a patient of Dr. Perez, seen in our clinic for permanent atrial fibrillation/atypical atrial flutter, symptomatic bradycardia, hypertension, chronic kidney disease, sleep apnea.      Tucker has a longstanding history of atrial fibrillation/flutter. Due to symptomatic bradycardia, he underwent permanent pacemaker implantation in 2015.  His heart rates have been well controlled for several years on Carvedilol.  Last year, he had recurrent hematuria and was taken off of Warfarin. In June, he met with Dr. Perez to consider a Watchman device; however, the patient deferred.  He has a remote history of prostate cancer and recently underwent ablation procedures to his prostate.  This in turn stopped his hematuria and he has now been back on Warfarin this past month without recurrent bleeding.  They are keeping his INR 2-2.5.    His device was last interrogated (9/17/19)  He is 96% V-paced with underlying chronic atrial fibrillation with adequate rates per histogram.    An echo done in 2018 showed LVEF 45-50% which was felt to be due to RV pacing. He has not had any heart failure in follow up.      Tucker is on multiple agents to control his blood pressure.  He is on a lower dose of Losartan due to renal insufficiency, being followed closely by Dr. Winslow.  He uses CPAP for sleep apnea.  He returns today for a 4 month reassessment.    Tucker is doing well.  He denies any chest pain, shortness of breath, orthopnea, peripheral edema, or palpitations.  He is using his CPAP faithfully.  He lives at home with his wife and does all the shopping. He does not check his blood pressure on a regular basis.  He does have some balance issues that he states is related to peripheral neuropathy.    Today his BP is 138/76  HR 76 bpm and is regular  His lungs are clear.  There is no significant peripheral  edema.    IMPRESSION AND PLAN:   1.  Hypertension.  Blood pressure is at goal today on carvedilol, Cozaar and Maxzide.  We will continue current medical regimen.   2.  Permanent atrial fibrillation.  He is on chronic warfarin without any recent hematuria.  Recent interrogation of his device showed adequate heart rates.   3.  Status post single-chamber permanent pacemaker for symptomatic bradycardia in 2015.  He is 96% V- paced.   4.  Treated sleep apnea.   5.  Nonischemic cardiomyopathy.  LVEF 45-50%  There is no signs and symptoms of heart failure.    Thanks for allowing me to participate in his care.  He will follow up on a regular basis in device clinic and return next February.    SELMA Adan      No orders of the defined types were placed in this encounter.      No orders of the defined types were placed in this encounter.      There are no discontinued medications.      Encounter Diagnosis   Name Primary?     Persistent atrial fibrillation        CURRENT MEDICATIONS:  Current Outpatient Medications   Medication Sig Dispense Refill     ACETAMINOPHEN PO Take 650 mg by mouth every 4 hours as needed for pain       Ascorbic Acid (VITAMIN C PO) Take 500 mg by mouth daily        baclofen (LIORESAL) 10 MG tablet Take 1 tablet (10 mg) by mouth 3 times daily 20 tablet 0     calcium carbonate (OS-KARLA 500 MG [place]. [place]) 500 MG tablet Take 500 mg by mouth daily        carvedilol (COREG) 25 MG tablet Take 1.5 tablets (37.5 mg) by mouth 2 times daily (with meals) 270 tablet 3     diclofenac (VOLTAREN) 1 % topical gel Place 2 g onto the skin 4 times daily Re-label for home use (Patient taking differently: Place 2 g onto the skin 4 times daily as needed Re-label for home use) 1 Tube 0     docusate sodium (COLACE) 100 MG capsule Take 100 mg by mouth 2 times daily       finasteride (PROSCAR) 5 MG tablet TAKE ONE TABLET BY MOUTH  DAILY 30 tablet 1     losartan (COZAAR) 50 MG tablet Take 1 tablet (50 mg) by mouth daily 90  tablet 3     Multiple Vitamins-Minerals (OCUVITE PO) Take 1 tablet by mouth 2 times daily        order for DME Oxygen 2 Li/min  at night in CPAP 1 Device 0     order for DME Oxygen 2 Li/min  at night 1 Device 0     order for DME Oxygen 2 Li/min  at night with CPAP  SPO2 less than or equal to oxygen saturation 89% on effective CPAP in patient with known cardiomyopathy 1 Device 0     triamterene-HCTZ (MAXZIDE-25) 37.5-25 MG tablet Take 1 tablet by mouth daily 90 tablet 3     vitamin B complex with vitamin C (VITAMIN  B COMPLEX) TABS tablet Take 1 tablet by mouth daily       VITAMIN D, CHOLECALCIFEROL, PO Take 2,000 Units by mouth daily.       warfarin (JANTOVEN) 2.5 MG tablet Take 2.5 mg by mouth daily         ALLERGIES     Allergies   Allergen Reactions     Merbromin      Other reaction(s): Other, see comments  Severe reaction as child. Amalgam fillings OK.     Morphine Nausea and Vomiting     Amlodipine      Edema       PAST MEDICAL HISTORY:  Past Medical History:   Diagnosis Date     Arrhythmia     A Fib     Balance problem     related to neuropathy in feet     Bradycardia      Carcinoma in situ of bladder 2000    bladder cancer ;; follow w Urology w periodic cysto      Essential hypertension, benign      Generalized osteoarthrosis, unspecified site knees    s/p R total knee 8/09 ; will do L 6/10      Hernia, abdominal      Numbness and tingling     toes and fingers     DAWSON (obstructive sleep apnea) 8/2/2019     Pacemaker     [employer]      Pain in joint, shoulder region     L shoulder rotater tear; no surgery      Palpitations      Persistent atrial fibrillation 1/30/15     Prostate CA (H) 2008-9    radiation     Prostate cancer (H) 11/08    completed RT 3/09     Renal disease     elevated creatinine     Shoulder impingement     R shoulder impingement etc see MRI 4/09      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     toes both feet        PAST SURGICAL HISTORY:  Past Surgical History:    Procedure Laterality Date     ARTHROPLASTY HIP Right 3/27/2017    Procedure: ARTHROPLASTY HIP;  Surgeon: Jigar Wynn MD;  Location: [facility] OR     C NONSPECIFIC PROCEDURE      bilat inguinal hernia repairs ( 3total procedures)      C NONSPECIFIC PROCEDURE      pilonidal cyst     C NONSPECIFIC PROCEDURE      T + A     C NONSPECIFIC PROCEDURE       R+L total knee     CARDIOVERSION  3/26/15     COLONOSCOPY       CYSTOSCOPY       CYSTOSCOPY, FULGURATE BLEEDERS, EVACUATE CLOT(S), COMBINED N/A 2019    Procedure: Exam under anesthesia, video cystopanendoscopy, evacuation of clots, fulguration of prostatic veins.;  Surgeon: Ilir Ramirez MD;  Location: [facility] OR     CYSTOSCOPY, RETROGRADES, COMBINED Bilateral 2019    Procedure: Video cystopanendoscopy, evacuation of clots, cup biopsies of bladder wall, electrovaporization of prostate, transurethral resection of prostate, bilateral retrograde ureteropyelogram.;  Surgeon: Ilir Ramirez MD;  Location: [facility] OR     CYSTOSCOPY, TRANSURETHRAL RESECTION (TUR) PROSTATE, COMBINED  2019    Procedure: Cystoscopy, transurethral resection of prostate;  Surgeon: Ilir Ramirez MD;  Location: [facility]     HERNIA REPAIR       IMPLANT PACEMAKER  3/26/15     RELEASE CARPAL TUNNEL Right 2017    Procedure: RELEASE CARPAL TUNNEL;  Right carpal tunnel release;  Surgeon: Alonso Barboza MD;  Location: [facility] OR       FAMILY HISTORY:  Family History   Problem Relation Age of Onset     Heart Disease Father          89yo     Coronary Artery Disease Father      Heart Disease Mother          74yo     Coronary Artery Disease Mother      Family History Negative Brother        SOCIAL HISTORY:  Social History     Socioeconomic History     Marital status:      Spouse name: Alba     Number of children: 3     Years of education: None     Highest education level: None   Occupational History     Employer: RETIRED     Comment:  [employer]   Social  Needs     Financial resource strain: None     Food insecurity:     Worry: None     Inability: None     Transportation needs:     Medical: None     Non-medical: None   Tobacco Use     Smoking status: Former Smoker     Last attempt to quit: 1977     Years since quittin.1     Smokeless tobacco: Never Used   Substance and Sexual Activity     Alcohol use: No     Alcohol/week: 0.0 standard drinks     Drug use: No     Sexual activity: Never     Partners: Female   Lifestyle     Physical activity:     Days per week: None     Minutes per session: None     Stress: None   Relationships     Social connections:     Talks on phone: None     Gets together: None     Attends Confucianism service: None     Active member of club or organization: None     Attends meetings of clubs or organizations: None     Relationship status: None     Intimate partner violence:     Fear of current or ex partner: None     Emotionally abused: None     Physically abused: None     Forced sexual activity: None   Other Topics Concern     Parent/sibling w/ CABG, MI or angioplasty before 65F 55M? Not Asked      Service Not Asked     Blood Transfusions Not Asked     Caffeine Concern No     Comment: 4-5 cups per week      Occupational Exposure Not Asked     Hobby Hazards Not Asked     Sleep Concern No     Stress Concern No     Weight Concern No     Special Diet No     Back Care Not Asked     Exercise No     Comment: yard work and moves around a lot     Bike Helmet Not Asked     Seat Belt Not Asked     Self-Exams Not Asked   Social History Narrative     None       Review of Systems:  Skin:  Negative       Eyes:  Positive for glasses    ENT:  Negative      Respiratory:  Positive for sleep apnea;CPAP     Cardiovascular:  Negative      Gastroenterology: Negative      Genitourinary:  Positive for urinary frequency;prostate problem    Musculoskeletal:  Positive for arthritis    Neurologic:  Positive for numbness or tingling of hands;numbness or  "tingling of feet neuropathy  Psychiatric:  Negative      Heme/Lymph/Imm:  Negative      Endocrine:  Negative        Physical Exam:  Vitals: /78 (BP Location: Right arm, Patient Position: Sitting, Cuff Size: Adult Regular)   Pulse 76   Ht 1.778 m (5' 10\")   Wt 83.8 kg (184 lb 11.2 oz)   BMI 26.50 kg/m      Constitutional:  cooperative;in no acute distress        Skin:  warm and dry to the touch          Head:  normocephalic        Eyes:  pupils equal and round        Lymph:      ENT:  no pallor or cyanosis        Neck:  JVP normal        Respiratory:  clear to auscultation;normal symmetry;normal respiratory excursion         Cardiac: regular rhythm;normal S1 and S2     no presence of murmur          pulses full and equal                                        GI:  abdomen soft;BS normoactive        Extremities and Muscular Skeletal:  no edema              Neurological:  affect appropriate        Psych:  Alert and Oriented x 3          CC  Aleena Perez MD  [address]  [place] [id number]                  "

## 2019-10-16 NOTE — LETTER
10/16/2019    Kwadwo Winslow MD  [address] [facility] [address]    RE: Tucker Slater       Dear Colleague,    I had the pleasure of seeing Tucker Slater in the [facility].    HISTORY OF PRESENT ILLNESS:    This 88-year-old male presents to [facility]  today for a followup visit.  He is a patient of Dr. Perez, seen in our clinic for permanent atrial fibrillation/atypical atrial flutter, symptomatic bradycardia, hypertension, chronic kidney disease, sleep apnea.      Tucker has a longstanding history of atrial fibrillation/flutter. Due to symptomatic bradycardia, he underwent permanent pacemaker implantation in 2015.  His heart rates have been well controlled for several years on Carvedilol.  Last year, he had recurrent hematuria and was taken off of Warfarin. In June, he met with Dr. Perez to consider a Watchman device; however, the patient deferred.  He has a remote history of prostate cancer and recently underwent ablation procedures to his prostate.  This in turn stopped his hematuria and he has now been back on Warfarin this past month without recurrent bleeding.  They are keeping his INR 2-2.5.    His device was last interrogated (9/17/19)  He is 96% V-paced with underlying chronic atrial fibrillation with adequate rates per histogram.    An echo done in 2018 showed LVEF 45-50% which was felt to be due to RV pacing. He has not had any heart failure in follow up.      Tucker is on multiple agents to control his blood pressure.  He is on a lower dose of Losartan due to renal insufficiency, being followed closely by Dr. Winslow.  He uses CPAP for sleep apnea.  He returns today for a 4 month reassessment.    Tucker is doing well.  He denies any chest pain, shortness of breath, orthopnea, peripheral edema, or palpitations.  He is using his CPAP faithfully.  He lives at home with his wife and does all the shopping. He does not check his blood  pressure on a regular basis.  He does have some balance issues that he states is related to peripheral neuropathy.    Today his BP is 138/76  HR 76 bpm and is regular  His lungs are clear.  There is no significant peripheral edema.    IMPRESSION AND PLAN:   1.  Hypertension.  Blood pressure is at goal today on carvedilol, Cozaar and Maxzide.   We will continue current medical regimen.   2.  Permanent atrial fibrillation.  He is on chronic warfarin without any recent hematuria.  Recent interrogation of his device showed adequate heart rates.   3.  Status post single-chamber permanent pacemaker for symptomatic bradycardia in 2015.  He is 96% V- paced.   4.  Treated sleep apnea.   5.  Nonischemic cardiomyopathy.  LVEF 45-50%  There is no signs and symptoms of heart failure.    Thanks for allowing me to participate in his care.  He will follow up on a regular basis in device clinic and return next February.    SELMA Adan      No orders of the defined types were placed in this encounter.      No orders of the defined types were placed in this encounter.      There are no discontinued medications.      Encounter Diagnosis   Name Primary?     Persistent atrial fibrillation        CURRENT MEDICATIONS:  Current Outpatient Medications   Medication Sig Dispense Refill     ACETAMINOPHEN PO Take 650 mg by mouth every 4 hours as needed for pain       Ascorbic Acid (VITAMIN C PO) Take 500 mg by mouth daily        baclofen (LIORESAL) 10 MG tablet Take 1 tablet (10 mg) by mouth 3 times daily 20 tablet 0     calcium carbonate (OS-KARLA 500 MG [place]. [place]) 500 MG tablet Take 500 mg by mouth daily        carvedilol (COREG) 25 MG tablet Take 1.5 tablets (37.5 mg) by mouth 2 times daily (with meals) 270 tablet 3     diclofenac (VOLTAREN) 1 % topical gel Place 2 g onto the skin 4 times daily Re-label for home use (Patient taking differently: Place 2 g onto the skin 4 times daily as needed Re-label for home use) 1 Tube 0     docusate  sodium (COLACE) 100 MG capsule Take 100 mg by mouth 2 times daily       finasteride (PROSCAR) 5 MG tablet TAKE ONE TABLET BY MOUTH  DAILY 30 tablet 1     losartan (COZAAR) 50 MG tablet Take 1 tablet (50 mg) by mouth daily 90 tablet 3     Multiple Vitamins-Minerals (OCUVITE PO) Take 1 tablet by mouth 2 times daily        order for DME Oxygen 2 Li/min  at night in CPAP 1 Device 0     order for DME Oxygen 2 Li/min  at night 1 Device 0     order for DME Oxygen 2 Li/min  at night with CPAP  SPO2 less than or equal to oxygen saturation 89% on effective CPAP in patient with known cardiomyopathy 1 Device 0     triamterene-HCTZ (MAXZIDE-25) 37.5-25 MG tablet Take 1 tablet by mouth daily 90 tablet 3     vitamin B complex with vitamin C (VITAMIN  B COMPLEX) TABS tablet Take 1 tablet by mouth daily       VITAMIN D, CHOLECALCIFEROL, PO Take 2,000 Units by mouth daily.       warfarin (JANTOVEN) 2.5 MG tablet Take 2.5 mg by mouth daily         ALLERGIES     Allergies   Allergen Reactions     Merbromin      Other reaction(s): Other, see comments  Severe reaction as child. Amalgam fillings OK.     Morphine Nausea and Vomiting     Amlodipine      Edema       PAST MEDICAL HISTORY:  Past Medical History:   Diagnosis Date     Arrhythmia     A Fib     Balance problem     related to neuropathy in feet     Bradycardia      Carcinoma in situ of bladder 2000    bladder cancer ;; follow w Urology w periodic cysto      Essential hypertension, benign      Generalized osteoarthrosis, unspecified site knees    s/p R total knee 8/09 ; will do L 6/10      Hernia, abdominal      Numbness and tingling     toes and fingers     DAWSON (obstructive sleep apnea) 8/2/2019     Pacemaker     [employer]      Pain in joint, shoulder region     L shoulder rotater tear; no surgery      Palpitations      Persistent atrial fibrillation 1/30/15     Prostate CA (H) 2008-9    radiation     Prostate cancer (H) 11/08    completed RT 3/09     Renal disease     elevated  creatinine     Shoulder impingement     R shoulder impingement etc see MRI       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     toes both feet        PAST SURGICAL HISTORY:  Past Surgical History:   Procedure Laterality Date     ARTHROPLASTY HIP Right 3/27/2017    Procedure: ARTHROPLASTY HIP;  Surgeon: Jigar Wynn MD;  Location: [facility] OR     C NONSPECIFIC PROCEDURE      bilat inguinal hernia repairs ( 3total procedures)      C NONSPECIFIC PROCEDURE      pilonidal cyst     C NONSPECIFIC PROCEDURE      T + A     C NONSPECIFIC PROCEDURE       R+L total knee     CARDIOVERSION  3/26/15     COLONOSCOPY       CYSTOSCOPY       CYSTOSCOPY, FULGURATE BLEEDERS, EVACUATE CLOT(S), COMBINED N/A 2019    Procedure: Exam under anesthesia, video cystopanendoscopy, evacuation of clots, fulguration of prostatic veins.;  Surgeon: Ilir Ramirez MD;  Location: [facility] OR     CYSTOSCOPY, RETROGRADES, COMBINED Bilateral 2019    Procedure: Video cystopanendoscopy, evacuation of clots, cup biopsies of bladder wall, electrovaporization of prostate, transurethral resection of prostate, bilateral retrograde ureteropyelogram.;  Surgeon: Ilir Ramirez MD;  Location: [facility] OR     CYSTOSCOPY, TRANSURETHRAL RESECTION (TUR) PROSTATE, COMBINED  2019    Procedure: Cystoscopy, transurethral resection of prostate;  Surgeon: Ilir Ramirez MD;  Location: [facility] OR     HERNIA REPAIR       IMPLANT PACEMAKER  3/26/15     RELEASE CARPAL TUNNEL Right 2017    Procedure: RELEASE CARPAL TUNNEL;  Right carpal tunnel release;  Surgeon: Alonso Barboza MD;  Location: [facility] OR       FAMILY HISTORY:  Family History   Problem Relation Age of Onset     Heart Disease Father          87yo     Coronary Artery Disease Father      Heart Disease Mother          76yo     Coronary Artery Disease Mother      Family History Negative Brother        SOCIAL HISTORY:  Social History     Socioeconomic  History     Marital status:      Spouse name: Alba     Number of children: 3     Years of education: None     Highest education level: None   Occupational History     Employer: RETIRED     Comment:  w [employer]   Social Needs     Financial resource strain: None     Food insecurity:     Worry: None     Inability: None     Transportation needs:     Medical: None     Non-medical: None   Tobacco Use     Smoking status: Former Smoker     Last attempt to quit: 1977     Years since quittin.1     Smokeless tobacco: Never Used   Substance and Sexual Activity     Alcohol use: No     Alcohol/week: 0.0 standard drinks     Drug use: No     Sexual activity: Never     Partners: Female   Lifestyle     Physical activity:     Days per week: None     Minutes per session: None     Stress: None   Relationships     Social connections:     Talks on phone: None     Gets together: None     Attends Tenriism service: None     Active member of club or organization: None     Attends meetings of clubs or organizations: None     Relationship status: None     Intimate partner violence:     Fear of current or ex partner: None     Emotionally abused: None     Physically abused: None     Forced sexual activity: None   Other Topics Concern     Parent/sibling w/ CABG, MI or angioplasty before 65F 55M? Not Asked      Service Not Asked     Blood Transfusions Not Asked     Caffeine Concern No     Comment: 4-5 cups per week      Occupational Exposure Not Asked     Hobby Hazards Not Asked     Sleep Concern No     Stress Concern No     Weight Concern No     Special Diet No     Back Care Not Asked     Exercise No     Comment: yard work and moves around a lot     Bike Helmet Not Asked     Seat Belt Not Asked     Self-Exams Not Asked   Social History Narrative     None       Review of Systems:  Skin:  Negative       Eyes:  Positive for glasses    ENT:  Negative      Respiratory:  Positive for sleep apnea;CPAP     Cardiovascular:   "Negative      Gastroenterology: Negative      Genitourinary:  Positive for urinary frequency;prostate problem    Musculoskeletal:  Positive for arthritis    Neurologic:  Positive for numbness or tingling of hands;numbness or tingling of feet neuropathy  Psychiatric:  Negative      Heme/Lymph/Imm:  Negative      Endocrine:  Negative        Physical Exam:  Vitals: /78 (BP Location: Right arm, Patient Position: Sitting, Cuff Size: Adult Regular)   Pulse 76   Ht 1.778 m (5' 10\")   Wt 83.8 kg (184 lb 11.2 oz)   BMI 26.50 kg/m       Constitutional:  cooperative;in no acute distress        Skin:  warm and dry to the touch          Head:  normocephalic        Eyes:  pupils equal and round        Lymph:      ENT:  no pallor or cyanosis        Neck:  JVP normal        Respiratory:  clear to auscultation;normal symmetry;normal respiratory excursion         Cardiac: regular rhythm;normal S1 and S2     no presence of murmur          pulses full and equal                                        GI:  abdomen soft;BS normoactive        Extremities and Muscular Skeletal:  no edema              Neurological:  affect appropriate        Psych:  Alert and Oriented x 3          CC  Aleena Perez MD  [address]  [place] [address]                    Thank you for allowing me to participate in the care of your patient.      Sincerely,     SELMA Santos [facility]    cc:   Aleena Perez MD  [address]  [place] [address]        "

## 2019-10-21 ENCOUNTER — ANTICOAGULATION THERAPY VISIT (OUTPATIENT)
Dept: ANTICOAGULATION | Facility: CLINIC | Age: 84
End: 2019-10-21
Payer: COMMERCIAL

## 2019-10-21 DIAGNOSIS — Z79.01 LONG TERM CURRENT USE OF ANTICOAGULANTS WITH INR GOAL OF 2.0-3.0: ICD-10-CM

## 2019-10-21 LAB — INR POINT OF CARE: 2.1 (ref 0.86–1.14)

## 2019-10-21 PROCEDURE — 85610 PROTHROMBIN TIME: CPT | Mod: QW

## 2019-10-21 PROCEDURE — 36416 COLLJ CAPILLARY BLOOD SPEC: CPT

## 2019-10-21 PROCEDURE — 99207 ZZC NO CHARGE NURSE ONLY: CPT

## 2019-10-21 NOTE — PROGRESS NOTES
ANTICOAGULATION FOLLOW-UP CLINIC VISIT    Patient Name:  Tucker Slater  Date:  10/21/2019  Contact Type:  Face to Face    SUBJECTIVE:  Patient Findings     Positives:   Signs/symptoms of bleeding (Patient reports had blood in the first urine of the morning on 10/18, 10/19 abd 10/20/19. )    Comments:   The patient was assessed for diet, medication, and activity level changes, missed or extra doses, bruising, with no problem findings.  Patient has a MOHS procedure on his face 10/29/19.        Clinical Outcomes     Comments:   The patient was assessed for diet, medication, and activity level changes, missed or extra doses, bruising, with no problem findings.  Patient has a MOHS procedure on his face 10/29/19.           OBJECTIVE    INR Protime   Date Value Ref Range Status   10/21/2019 2.1 (A) 0.86 - 1.14 Final       ASSESSMENT / PLAN  INR assessment THER    Recheck INR In: 1 WEEK    INR Location Clinic      Anticoagulation Summary  As of 10/21/2019    INR goal:   2.0-2.5   TTR:   68.7 % (2.4 y)   INR used for dosin.1 (10/21/2019)   Warfarin maintenance plan:   1.25 mg (2.5 mg x 0.5) every Tue, Fri; 2.5 mg (2.5 mg x 1) all other days   Full warfarin instructions:   1.25 mg every Tue, Fri; 2.5 mg all other days   Weekly warfarin total:   15 mg   No change documented:   Gail Valdovinos RN   Plan last modified:   Ivory Machado RN (2019)   Next INR check:   10/28/2019   Priority:   INR   Target end date:       Indications    Atrial fibrillation (H) with mitral regurgitation and congestive heart failure [I48.91]  Long term current use of anticoagulants with INR goal of 2.0-3.0 [Z79.01]             Anticoagulation Episode Summary     INR check location:       Preferred lab:       Send INR reminders to:   [facility]    Comments:   Patient has a history of hematuria when INR is elevated.  Will try to keep INR closer to 2.0.      Anticoagulation Care Providers     Provider Role Specialty Phone  number    Kwadwo Winslow MD [facility] Internal Medicine [phone]            See the Encounter Report to view Anticoagulation Flowsheet and Dosing Calendar (Go to Encounters tab in chart review, and find the Anticoagulation Therapy Visit)    Dosage adjustment made based on physician directed care plan.    Gail Valdovinos RN

## 2019-10-24 DIAGNOSIS — N13.8 HYPERTROPHY OF PROSTATE WITH URINARY OBSTRUCTION: ICD-10-CM

## 2019-10-24 DIAGNOSIS — N40.1 HYPERTROPHY OF PROSTATE WITH URINARY OBSTRUCTION: ICD-10-CM

## 2019-10-24 NOTE — TELEPHONE ENCOUNTER
"Requested Prescriptions   Pending Prescriptions Disp Refills     finasteride (PROSCAR) 5 MG tablet [Pharmacy Med Name: FINASTERIDE 5MG TABS] 30 tablet 1     Sig: TAKE ONE TABLET BY MOUTH  DAILY   Last Written Prescription Date:  08/28/2019  Last Fill Quantity: 30,  # refills: 01   Last office visit: 9/24/2019 with prescribing provider:     Future Office Visit:      Alpha Blockers Passed - 10/24/2019  9:34 AM        Passed - Blood pressure under 140/90 in past 12 months     BP Readings from Last 3 Encounters:   10/16/19 136/78   09/24/19 (!) 143/77   08/23/19 (!) 145/78                 Passed - Recent (12 mo) or future (30 days) visit within the authorizing provider's specialty     Patient has had an office visit with the authorizing provider or a provider within the authorizing providers department within the previous 12 mos or has a future within next 30 days. See \"Patient Info\" tab in inbasket, or \"Choose Columns\" in Meds & Orders section of the refill encounter.              Passed - Patient does not have Tadalafil, Vardenafil, or Sildenafil on their medication list        Passed - Medication is active on med list        Passed - Patient is 18 years of age or older        "

## 2019-10-25 RX ORDER — FINASTERIDE 5 MG/1
TABLET, FILM COATED ORAL
Qty: 90 TABLET | Refills: 1 | Status: SHIPPED | OUTPATIENT
Start: 2019-10-25 | End: 2020-04-22

## 2019-10-25 NOTE — TELEPHONE ENCOUNTER
Pt due in March 2020.   Prescription approved per [facility] Refill Protocol.  '

## 2019-10-28 ENCOUNTER — ANTICOAGULATION THERAPY VISIT (OUTPATIENT)
Dept: ANTICOAGULATION | Facility: CLINIC | Age: 84
End: 2019-10-28
Payer: COMMERCIAL

## 2019-10-28 DIAGNOSIS — Z79.01 LONG TERM CURRENT USE OF ANTICOAGULANTS WITH INR GOAL OF 2.0-3.0: ICD-10-CM

## 2019-10-28 LAB — INR POINT OF CARE: 1.9 (ref 0.86–1.14)

## 2019-10-28 PROCEDURE — 36416 COLLJ CAPILLARY BLOOD SPEC: CPT

## 2019-10-28 PROCEDURE — 99207 ZZC NO CHARGE NURSE ONLY: CPT

## 2019-10-28 PROCEDURE — 85610 PROTHROMBIN TIME: CPT | Mod: QW

## 2019-10-28 NOTE — PROGRESS NOTES
ANTICOAGULATION FOLLOW-UP CLINIC VISIT    Patient Name:  Tucker Slater  Date:  10/28/2019  Contact Type:  Face to Face    SUBJECTIVE:  Patient Findings     Positives:   Signs/symptoms of bleeding (Patient reports had a slight amount of blood in the first urine of the morning 10/26/19 and 10/27/19. )    Comments:   The patient was assessed for diet, medication, and activity level changes, missed or extra doses, bruising, with no problem findings.  Patient denies any identifiable changes that caused the subtherapeutic INR.           Clinical Outcomes     Comments:   The patient was assessed for diet, medication, and activity level changes, missed or extra doses, bruising, with no problem findings.  Patient denies any identifiable changes that caused the subtherapeutic INR.              OBJECTIVE    INR Protime   Date Value Ref Range Status   10/28/2019 1.9 (A) 0.86 - 1.14 Final       ASSESSMENT / PLAN  INR assessment SUB    Recheck INR In: 1 WEEK    INR Location Clinic      Anticoagulation Summary  As of 10/28/2019    INR goal:   2.0-2.5   TTR:   68.5 % (2.4 y)   INR used for dosin.9! (10/28/2019)   Warfarin maintenance plan:   1.25 mg (2.5 mg x 0.5) every Tue, Fri; 2.5 mg (2.5 mg x 1) all other days   Full warfarin instructions:   1.25 mg every Tue, Fri; 2.5 mg all other days   Weekly warfarin total:   15 mg   No change documented:   Gail Valdovinos RN   Plan last modified:   Ivory Machado RN (2019)   Next INR check:   2019   Priority:   INR   Target end date:       Indications    Atrial fibrillation (H) with mitral regurgitation and congestive heart failure [I48.91]  Long term current use of anticoagulants with INR goal of 2.0-3.0 [Z79.01]             Anticoagulation Episode Summary     INR check location:       Preferred lab:       Send INR reminders to:   [facility]    Comments:   Patient has a history of hematuria when INR is elevated.  Will try to keep INR closer to 2.0.       Anticoagulation Care Providers     Provider Role Specialty Phone number    Kwadwo Winslow MD Responsible Internal Medicine [phone]            See the Encounter Report to view Anticoagulation Flowsheet and Dosing Calendar (Go to Encounters tab in chart review, and find the Anticoagulation Therapy Visit)    Dosage adjustment made based on physician directed care plan.    Gail Valdovinos RN

## 2019-10-31 DIAGNOSIS — I48.91 ATRIAL FIBRILLATION (H): ICD-10-CM

## 2019-11-01 RX ORDER — WARFARIN SODIUM 2.5 MG/1
TABLET ORAL
Qty: 30 TABLET | Refills: 1 | Status: SHIPPED | OUTPATIENT
Start: 2019-11-01 | End: 2020-01-10

## 2019-11-01 NOTE — TELEPHONE ENCOUNTER
"Requested Prescriptions   Pending Prescriptions Disp Refills     warfarin ANTICOAGULANT (JANTOVEN ANTICOAGULANT) 2.5 MG tablet [Pharmacy Med Name: JANTOVEN 2.5MG TABS] 30 tablet 1     Sig: TAKE ONE TABLET (2.5 MG) BY MOUTH ONCE DAILY EXCEPT TAKE ONE-HALF TABLET (1.25 MG) ON TUESDAYS AND FRIDAYS OR AS DIRECTED BY INR CLINIC       Vitamin K Antagonists Failed - 10/31/2019  9:21 PM        Failed - INR is within goal in the past 6 weeks     Confirm INR is within goal in the past 6 weeks.     Recent Labs   Lab Test 10/28/19   INR 1.9*                       Failed - Medication is active on med list        Passed - Recent (12 mo) or future (30 days) visit within the authorizing provider's specialty     Patient has had an office visit with the authorizing provider or a provider within the authorizing providers department within the previous 12 mos or has a future within next 30 days. See \"Patient Info\" tab in inbasket, or \"Choose Columns\" in Meds & Orders section of the refill encounter.              Passed - Patient is 18 years of age or older        Last office visit 9/24/19.  Warfarin dosing is managed by INR Clinic.  Prescription approved per [facility] Refill Protocol.  Ivory Machado RN    "

## 2019-11-04 ENCOUNTER — TELEPHONE (OUTPATIENT)
Dept: ANTICOAGULATION | Facility: CLINIC | Age: 84
End: 2019-11-04

## 2019-11-04 ENCOUNTER — ANTICOAGULATION THERAPY VISIT (OUTPATIENT)
Dept: ANTICOAGULATION | Facility: CLINIC | Age: 84
End: 2019-11-04
Payer: COMMERCIAL

## 2019-11-04 DIAGNOSIS — Z79.01 LONG TERM CURRENT USE OF ANTICOAGULANTS WITH INR GOAL OF 2.0-3.0: ICD-10-CM

## 2019-11-04 LAB — INR POINT OF CARE: 1.9 (ref 0.86–1.14)

## 2019-11-04 PROCEDURE — 36416 COLLJ CAPILLARY BLOOD SPEC: CPT

## 2019-11-04 PROCEDURE — 99207 ZZC NO CHARGE NURSE ONLY: CPT

## 2019-11-04 PROCEDURE — 85610 PROTHROMBIN TIME: CPT | Mod: QW

## 2019-11-04 NOTE — TELEPHONE ENCOUNTER
Patient in for INR today. Patient reports continues to have  light bleeding with first void of the morning 2-3 times a week. Patient reports did have a medium amount of blood with an afternoon void on 10/31/19.   Patient's INR goal has been decreased to 2.0-2.5.    INR today was 1.9    Please advise if patient should continue to monitor of if there should be any changes.      Component      Latest Ref Rng & Units 8/12/2019 8/16/2019 9/13/2019 9/20/2019   INR      0.86 - 1.14 1.2 (A) 1.5 (A) 1.0 1.6 (A)     Component      Latest Ref Rng & Units 9/24/2019 9/30/2019 10/7/2019 10/14/2019   INR      0.86 - 1.14 1.8 (A) 1.7 (A) 2.1 (A) 2.3 (A)     Component      Latest Ref Rng & Units 10/21/2019 10/28/2019   INR      0.86 - 1.14 2.1 (A) 1.9 (A)     Gail Valdovinos RN

## 2019-11-04 NOTE — PROGRESS NOTES
ANTICOAGULATION FOLLOW-UP CLINIC VISIT    Patient Name:  Tucker Slater  Date:  2019  Contact Type:  Face to Face    SUBJECTIVE:  Patient Findings     Positives:   Change in medications (Patient reports is taking an antibiotic since MOHS procedure on 10/29/19, patient does not remember the name. ), Bruising (Patient does have healing bruising to right side of face and down his neck from MOHS procedure.)    Comments:   The patient was assessed for diet and activity level changes, missed or extra doses, bleeding, with no problem findings.          Clinical Outcomes     Comments:   The patient was assessed for diet and activity level changes, missed or extra doses, bleeding, with no problem findings.             OBJECTIVE    INR Protime   Date Value Ref Range Status   2019 1.9 (A) 0.86 - 1.14 Final       ASSESSMENT / PLAN  INR assessment SUB    Recheck INR In: 2 WEEKS    INR Location Clinic      Anticoagulation Summary  As of 2019    INR goal:   2.0-2.5   TTR:   68.0 % (2.4 y)   INR used for dosin.9! (2019)   Warfarin maintenance plan:   1.25 mg (2.5 mg x 0.5) every Tue, Fri; 2.5 mg (2.5 mg x 1) all other days   Full warfarin instructions:   1.25 mg every Tue, Fri; 2.5 mg all other days   Weekly warfarin total:   15 mg   No change documented:   Gail Valdovinos RN   Plan last modified:   Ivory Machado RN (2019)   Next INR check:   2019   Priority:   INR   Target end date:       Indications    Atrial fibrillation (H) with mitral regurgitation and congestive heart failure [I48.91]  Long term current use of anticoagulants with INR goal of 2.0-3.0 [Z79.01]             Anticoagulation Episode Summary     INR check location:       Preferred lab:       Send INR reminders to:   [facility]    Comments:   Patient has a history of hematuria when INR is elevated.  Will try to keep INR closer to 2.0.      Anticoagulation Care Providers     Provider Role Specialty Phone  number    Kwadwo Winslow MD [facility] Internal Medicine [phone]            See the Encounter Report to view Anticoagulation Flowsheet and Dosing Calendar (Go to Encounters tab in chart review, and find the Anticoagulation Therapy Visit)    Dosage adjustment made based on physician directed care plan.    Gail Valdovinos RN

## 2019-11-04 NOTE — TELEPHONE ENCOUNTER
Let's keep the same management.   If increase bleeding, mark blood in the urine will stop the coumadin.

## 2019-11-18 ENCOUNTER — ANTICOAGULATION THERAPY VISIT (OUTPATIENT)
Dept: ANTICOAGULATION | Facility: CLINIC | Age: 84
End: 2019-11-18
Payer: COMMERCIAL

## 2019-11-18 DIAGNOSIS — Z79.01 LONG TERM CURRENT USE OF ANTICOAGULANTS WITH INR GOAL OF 2.0-3.0: ICD-10-CM

## 2019-11-18 LAB — INR POINT OF CARE: 2.1 (ref 0.86–1.14)

## 2019-11-18 PROCEDURE — 36416 COLLJ CAPILLARY BLOOD SPEC: CPT

## 2019-11-18 PROCEDURE — 85610 PROTHROMBIN TIME: CPT | Mod: QW

## 2019-11-18 PROCEDURE — 99207 ZZC NO CHARGE NURSE ONLY: CPT

## 2019-11-18 NOTE — PROGRESS NOTES
ANTICOAGULATION FOLLOW-UP CLINIC VISIT    Patient Name:  Tucker Slater  Date:  2019  Contact Type:  Face to Face    SUBJECTIVE:  Patient Findings     Positives:   Signs/symptoms of bleeding (Patient reports had small amount of blood in urine 19, 11/10/19, 19.)    Comments:   The patient was assessed for diet, medication, and activity level changes, missed or extra doses, bruising, with no problem findings.          Clinical Outcomes     Comments:   The patient was assessed for diet, medication, and activity level changes, missed or extra doses, bruising, with no problem findings.             OBJECTIVE    INR Protime   Date Value Ref Range Status   2019 2.1 (A) 0.86 - 1.14 Final       ASSESSMENT / PLAN  INR assessment THER    Recheck INR In: 3 WEEKS    INR Location Clinic      Anticoagulation Summary  As of 2019    INR goal:   2.0-2.5   TTR:   67.7 % (2.5 y)   INR used for dosin.1 (2019)   Warfarin maintenance plan:   1.25 mg (2.5 mg x 0.5) every Tue, Fri; 2.5 mg (2.5 mg x 1) all other days   Full warfarin instructions:   1.25 mg every Tue, Fri; 2.5 mg all other days   Weekly warfarin total:   15 mg   No change documented:   Gail Valdovinos RN   Plan last modified:   Ivory Machado RN (2019)   Next INR check:   2019   Priority:   Maintenance   Target end date:       Indications    Atrial fibrillation (H) with mitral regurgitation and congestive heart failure [I48.91]  Long term current use of anticoagulants with INR goal of 2.0-3.0 [Z79.01]             Anticoagulation Episode Summary     INR check location:       Preferred lab:       Send INR reminders to:   [facility]    Comments:   Patient has a history of hematuria when INR is elevated.  Will try to keep INR closer to 2.0.      Anticoagulation Care Providers     Provider Role Specialty Phone number    Kwadwo Winslow MD [facility] Internal Medicine [phone]            See the  Encounter Report to view Anticoagulation Flowsheet and Dosing Calendar (Go to Encounters tab in chart review, and find the Anticoagulation Therapy Visit)    Dosage adjustment made based on physician directed care plan.    Gail Valdovinos RN

## 2019-12-09 ENCOUNTER — ANTICOAGULATION THERAPY VISIT (OUTPATIENT)
Dept: ANTICOAGULATION | Facility: CLINIC | Age: 84
End: 2019-12-09
Payer: COMMERCIAL

## 2019-12-09 DIAGNOSIS — Z79.01 LONG TERM CURRENT USE OF ANTICOAGULANTS WITH INR GOAL OF 2.0-3.0: ICD-10-CM

## 2019-12-09 LAB — INR POINT OF CARE: 2.1 (ref 0.86–1.14)

## 2019-12-09 PROCEDURE — 99207 ZZC NO CHARGE NURSE ONLY: CPT

## 2019-12-09 PROCEDURE — 85610 PROTHROMBIN TIME: CPT | Mod: QW

## 2019-12-09 PROCEDURE — 36416 COLLJ CAPILLARY BLOOD SPEC: CPT

## 2019-12-09 NOTE — PROGRESS NOTES
ANTICOAGULATION FOLLOW-UP CLINIC VISIT    Patient Name:  Tucker Slater  Date:  2019  Contact Type:  Face to Face    SUBJECTIVE:  Patient Findings     Positives:   Signs/symptoms of bleeding (Patient reports has had some blood in his urine 10 out of the last 21 days. Patient reports not always in the morning, has seen a couple small clots. Patient reports he will be making an appointment with Dr Winslow. )    Comments:   The patient was assessed for diet, medication, and activity level changes, missed or extra doses, bruising, with no problem findings.          Clinical Outcomes     Comments:   The patient was assessed for diet, medication, and activity level changes, missed or extra doses, bruising, with no problem findings.             OBJECTIVE    INR Protime   Date Value Ref Range Status   2019 2.1 (A) 0.86 - 1.14 Final       ASSESSMENT / PLAN  INR assessment THER    Recheck INR In: 4 WEEKS    INR Location Clinic      Anticoagulation Summary  As of 2019    INR goal:   2.0-2.5   TTR:   62.8 % (9.5 mo)   INR used for dosin.1 (2019)   Warfarin maintenance plan:   1.25 mg (2.5 mg x 0.5) every Tue, Fri; 2.5 mg (2.5 mg x 1) all other days   Full warfarin instructions:   1.25 mg every Tue, Fri; 2.5 mg all other days   Weekly warfarin total:   15 mg   No change documented:   Gail Valdovinos RN   Plan last modified:   Ivory Machado RN (2019)   Next INR check:   2020   Priority:   Maintenance   Target end date:       Indications    Atrial fibrillation (H) with mitral regurgitation and congestive heart failure [I48.91]  Long term current use of anticoagulants with INR goal of 2.0-3.0 [Z79.01]             Anticoagulation Episode Summary     INR check location:       Preferred lab:       Send INR reminders to:   [facility]    Comments:   Patient has a history of hematuria when INR is elevated.  Will try to keep INR closer to 2.0.      Anticoagulation Care Providers      Provider Role Specialty Phone number    Kwadwo Winslow MD Responsible Internal Medicine [phone]            See the Encounter Report to view Anticoagulation Flowsheet and Dosing Calendar (Go to Encounters tab in chart review, and find the Anticoagulation Therapy Visit)    Dosage adjustment made based on physician directed care plan.    Gail Valdovinos RN

## 2019-12-23 ENCOUNTER — ANCILLARY PROCEDURE (OUTPATIENT)
Dept: CARDIOLOGY | Facility: CLINIC | Age: 84
End: 2019-12-23
Attending: INTERNAL MEDICINE
Payer: COMMERCIAL

## 2019-12-23 ENCOUNTER — TELEPHONE (OUTPATIENT)
Dept: CARDIOLOGY | Facility: CLINIC | Age: 84
End: 2019-12-23

## 2019-12-23 DIAGNOSIS — Z95.0 CARDIAC PACEMAKER IN SITU: ICD-10-CM

## 2019-12-23 PROCEDURE — 93294 REM INTERROG EVL PM/LDLS PM: CPT | Performed by: INTERNAL MEDICINE

## 2019-12-23 PROCEDURE — 93296 REM INTERROG EVL PM/IDS: CPT | Performed by: INTERNAL MEDICINE

## 2019-12-23 NOTE — TELEPHONE ENCOUNTER
Received a call from patient. Patient was wondering he needs to return for f/u in Feb 2020. Patient saw Janeth Mcdaniels NP in clinic in Oct. 2019.     Janeth Mcdaniels NP's recommendations.     ------------------------------------------------------------------------------------------------  Janeth Mcdaniels APRN CNP Garbers, Trang, RN June is fine  thanks, JAIRO    -------------------------------------------------------------------------------------------------    Called patient. Spoke with Alba, patient's spouse, we have consent to communicate. Alba will let patient know that Feb appt is not needed (appt cancelled) and that he should follow-up in June 2020 instead (order extended). Alba had no questions.     TGajavier RN, BSN  [facility]

## 2019-12-26 LAB
MDC_IDC_LEAD_IMPLANT_DT: NORMAL
MDC_IDC_LEAD_IMPLANT_DT: NORMAL
MDC_IDC_LEAD_LOCATION: NORMAL
MDC_IDC_LEAD_LOCATION: NORMAL
MDC_IDC_LEAD_LOCATION_DETAIL_1: NORMAL
MDC_IDC_LEAD_LOCATION_DETAIL_1: NORMAL
MDC_IDC_LEAD_MFG: NORMAL
MDC_IDC_LEAD_MFG: NORMAL
MDC_IDC_LEAD_MODEL: NORMAL
MDC_IDC_LEAD_MODEL: NORMAL
MDC_IDC_LEAD_POLARITY_TYPE: NORMAL
MDC_IDC_LEAD_POLARITY_TYPE: NORMAL
MDC_IDC_LEAD_SERIAL: NORMAL
MDC_IDC_LEAD_SERIAL: NORMAL
MDC_IDC_MSMT_BATTERY_DTM: NORMAL
MDC_IDC_MSMT_BATTERY_REMAINING_LONGEVITY: 131 MO
MDC_IDC_MSMT_BATTERY_REMAINING_PERCENTAGE: 95.5 %
MDC_IDC_MSMT_BATTERY_RRT_TRIGGER: NORMAL
MDC_IDC_MSMT_BATTERY_STATUS: NORMAL
MDC_IDC_MSMT_BATTERY_VOLTAGE: 2.98 V
MDC_IDC_MSMT_LEADCHNL_RV_IMPEDANCE_VALUE: 430 OHM
MDC_IDC_MSMT_LEADCHNL_RV_LEAD_CHANNEL_STATUS: NORMAL
MDC_IDC_MSMT_LEADCHNL_RV_PACING_THRESHOLD_AMPLITUDE: 0.75 V
MDC_IDC_MSMT_LEADCHNL_RV_PACING_THRESHOLD_PULSEWIDTH: 0.5 MS
MDC_IDC_MSMT_LEADCHNL_RV_SENSING_INTR_AMPL: 12 MV
MDC_IDC_PG_IMPLANT_DTM: NORMAL
MDC_IDC_PG_MFG: NORMAL
MDC_IDC_PG_MODEL: NORMAL
MDC_IDC_PG_SERIAL: NORMAL
MDC_IDC_PG_TYPE: NORMAL
MDC_IDC_SESS_CLINIC_NAME: NORMAL
MDC_IDC_SESS_DTM: NORMAL
MDC_IDC_SESS_REPROGRAMMED: NO
MDC_IDC_SESS_TYPE: NORMAL
MDC_IDC_SET_BRADY_HYSTRATE: 60 {BEATS}/MIN
MDC_IDC_SET_BRADY_LOWRATE: 70 {BEATS}/MIN
MDC_IDC_SET_BRADY_MAX_SENSOR_RATE: 120 {BEATS}/MIN
MDC_IDC_SET_BRADY_MODE: NORMAL
MDC_IDC_SET_LEADCHNL_RA_PACING_POLARITY: NORMAL
MDC_IDC_SET_LEADCHNL_RA_SENSING_POLARITY: NORMAL
MDC_IDC_SET_LEADCHNL_RV_PACING_AMPLITUDE: 1 V
MDC_IDC_SET_LEADCHNL_RV_PACING_ANODE_ELECTRODE_1: NORMAL
MDC_IDC_SET_LEADCHNL_RV_PACING_ANODE_LOCATION_1: NORMAL
MDC_IDC_SET_LEADCHNL_RV_PACING_CAPTURE_MODE: NORMAL
MDC_IDC_SET_LEADCHNL_RV_PACING_CATHODE_ELECTRODE_1: NORMAL
MDC_IDC_SET_LEADCHNL_RV_PACING_CATHODE_LOCATION_1: NORMAL
MDC_IDC_SET_LEADCHNL_RV_PACING_POLARITY: NORMAL
MDC_IDC_SET_LEADCHNL_RV_PACING_PULSEWIDTH: 0.5 MS
MDC_IDC_SET_LEADCHNL_RV_SENSING_ADAPTATION_MODE: NORMAL
MDC_IDC_SET_LEADCHNL_RV_SENSING_ANODE_ELECTRODE_1: NORMAL
MDC_IDC_SET_LEADCHNL_RV_SENSING_ANODE_LOCATION_1: NORMAL
MDC_IDC_SET_LEADCHNL_RV_SENSING_CATHODE_ELECTRODE_1: NORMAL
MDC_IDC_SET_LEADCHNL_RV_SENSING_CATHODE_LOCATION_1: NORMAL
MDC_IDC_SET_LEADCHNL_RV_SENSING_POLARITY: NORMAL
MDC_IDC_SET_LEADCHNL_RV_SENSING_SENSITIVITY: 2 MV
MDC_IDC_STAT_AT_DTM_END: NORMAL
MDC_IDC_STAT_AT_DTM_START: NORMAL
MDC_IDC_STAT_BRADY_DTM_END: NORMAL
MDC_IDC_STAT_BRADY_DTM_START: NORMAL
MDC_IDC_STAT_BRADY_RV_PERCENT_PACED: 97 %
MDC_IDC_STAT_CRT_DTM_END: NORMAL
MDC_IDC_STAT_CRT_DTM_START: NORMAL
MDC_IDC_STAT_HEART_RATE_DTM_END: NORMAL
MDC_IDC_STAT_HEART_RATE_DTM_START: NORMAL
MDC_IDC_STAT_HEART_RATE_VENTRICULAR_MAX: 180 {BEATS}/MIN
MDC_IDC_STAT_HEART_RATE_VENTRICULAR_MEAN: 77 {BEATS}/MIN
MDC_IDC_STAT_HEART_RATE_VENTRICULAR_MIN: 60 {BEATS}/MIN

## 2020-01-06 ENCOUNTER — ANTICOAGULATION THERAPY VISIT (OUTPATIENT)
Dept: ANTICOAGULATION | Facility: CLINIC | Age: 85
End: 2020-01-06
Payer: COMMERCIAL

## 2020-01-06 DIAGNOSIS — Z79.01 LONG TERM CURRENT USE OF ANTICOAGULANTS WITH INR GOAL OF 2.0-3.0: ICD-10-CM

## 2020-01-06 LAB — INR POINT OF CARE: 2.4 (ref 0.86–1.14)

## 2020-01-06 PROCEDURE — 36416 COLLJ CAPILLARY BLOOD SPEC: CPT

## 2020-01-06 PROCEDURE — 85610 PROTHROMBIN TIME: CPT | Mod: QW

## 2020-01-06 PROCEDURE — 99207 ZZC NO CHARGE NURSE ONLY: CPT

## 2020-01-06 NOTE — PROGRESS NOTES
ANTICOAGULATION FOLLOW-UP CLINIC VISIT    Patient Name:  Tucker Slater  Date:  2020  Contact Type:  Face to Face    SUBJECTIVE:  Patient Findings     Positives:   Signs/symptoms of bleeding (Patient reports no change to blood in urine. )    Comments:   The patient was assessed for diet, medication, and activity level changes, missed or extra doses, bruising or bleeding, with no problem findings.          Clinical Outcomes     Negatives:   Major bleeding event, Thromboembolic event, Anticoagulation-related hospital admission, Anticoagulation-related ED visit, Anticoagulation-related fatality    Comments:   The patient was assessed for diet, medication, and activity level changes, missed or extra doses, bruising or bleeding, with no problem findings.             OBJECTIVE    INR Protime   Date Value Ref Range Status   2020 2.4 (A) 0.86 - 1.14 Final       ASSESSMENT / PLAN  INR assessment THER    Recheck INR In: 4 WEEKS    INR Location Clinic      Anticoagulation Summary  As of 2020    INR goal:   2.0-2.5   TTR:   62.8 % (9.5 mo)   INR used for dosin.4 (2020)   Warfarin maintenance plan:   1.25 mg (2.5 mg x 0.5) every Tue, Fri; 2.5 mg (2.5 mg x 1) all other days   Full warfarin instructions:   1.25 mg every Tue, Fri; 2.5 mg all other days   Weekly warfarin total:   15 mg   No change documented:   Gail Valdovinos RN   Plan last modified:   Ivory Machado RN (2019)   Next INR check:   2/3/2020   Priority:   Maintenance   Target end date:       Indications    Atrial fibrillation (H) with mitral regurgitation and congestive heart failure [I48.91]  Long term current use of anticoagulants with INR goal of 2.0-3.0 [Z79.01]             Anticoagulation Episode Summary     INR check location:       Preferred lab:       Send INR reminders to:   [facility]    Comments:   Patient has a history of hematuria when INR is elevated.  Will try to keep INR closer to 2.0.       Anticoagulation Care Providers     Provider Role Specialty Phone number    Kwadwo Winslow MD Responsible Internal Medicine [phone]            See the Encounter Report to view Anticoagulation Flowsheet and Dosing Calendar (Go to Encounters tab in chart review, and find the Anticoagulation Therapy Visit)    Dosage adjustment made based on physician directed care plan.    Gail Valdovinos RN

## 2020-01-09 DIAGNOSIS — I48.91 ATRIAL FIBRILLATION (H): ICD-10-CM

## 2020-01-10 RX ORDER — WARFARIN SODIUM 2.5 MG/1
TABLET ORAL
Qty: 30 TABLET | Refills: 1 | Status: SHIPPED | OUTPATIENT
Start: 2020-01-10 | End: 2020-06-02

## 2020-01-10 NOTE — TELEPHONE ENCOUNTER
Anticoagulation Monitoring Return Date Recheck   Latest Ref Rng & Units     1/6/2020 2/3/2020      Anticoagulation Monitoring Instructions   Latest Ref Rng & Units    1/6/2020 1.25 mg every Tue, Fri; 2.5 mg all other days   Prescription approved per [facility] Refill Protocol.  Viky Blanco, RN  Anticoagulation Nurse - [facility]

## 2020-01-10 NOTE — TELEPHONE ENCOUNTER
"Requested Prescriptions   Pending Prescriptions Disp Refills     warfarin ANTICOAGULANT (JANTOVEN ANTICOAGULANT) 2.5 MG tablet [Pharmacy Med Name: JANTOVEN 2.5MG TABS] 30 tablet 1     Sig: TAKE ONE TABLET BY MOUTH ONCE DAILY EXCEPT TAKE ONE-HALF TABLET (1.25MG) ON TUESDAYS AND FRIDAYS OR AS DIRECTED BY INR CLINIC   Last Written Prescription Date:  11/01/2019  Last Fill Quantity: 30,  # refills: 01   Last office visit: 9/24/2019 with prescribing provider:     Future Office Visit:   Next 5 appointments (look out 90 days)    Jan 14, 2020  1:20 PM CST  SHORT with Kwad[facility]low [facility] [address]  [facility] [address]  [phone]           Vitamin K Antagonists Failed - 1/9/2020  9:14 PM        Failed - INR is within goal in the past 6 weeks     Confirm INR is within goal in the past 6 weeks.     Recent Labs   Lab Test 01/06/20   INR 2.4*                       Passed - Recent (12 mo) or future (30 days) visit within the authorizing provider's specialty     Patient has had an office visit with the authorizing provider or a provider within the authorizing providers department within the previous 12 mos or has a future within next 30 days. See \"Patient Info\" tab in inbasket, or \"Choose Columns\" in Meds & Orders section of the refill encounter.              Passed - Medication is active on med list        Passed - Patient is 18 years of age or older        "

## 2020-01-14 ENCOUNTER — OFFICE VISIT (OUTPATIENT)
Dept: INTERNAL MEDICINE | Facility: CLINIC | Age: 85
End: 2020-01-14
Payer: COMMERCIAL

## 2020-01-14 VITALS
SYSTOLIC BLOOD PRESSURE: 140 MMHG | BODY MASS INDEX: 26.92 KG/M2 | WEIGHT: 188 LBS | TEMPERATURE: 98 F | HEIGHT: 70 IN | RESPIRATION RATE: 12 BRPM | HEART RATE: 75 BPM | OXYGEN SATURATION: 98 % | DIASTOLIC BLOOD PRESSURE: 70 MMHG

## 2020-01-14 DIAGNOSIS — R31.0 GROSS HEMATURIA: Primary | ICD-10-CM

## 2020-01-14 DIAGNOSIS — G56.02 CARPAL TUNNEL SYNDROME OF LEFT WRIST: ICD-10-CM

## 2020-01-14 DIAGNOSIS — I48.21 PERMANENT ATRIAL FIBRILLATION (H): ICD-10-CM

## 2020-01-14 LAB — HGB BLD-MCNC: 14.3 G/DL (ref 13.3–17.7)

## 2020-01-14 PROCEDURE — 85018 HEMOGLOBIN: CPT | Performed by: INTERNAL MEDICINE

## 2020-01-14 PROCEDURE — 99214 OFFICE O/P EST MOD 30 MIN: CPT | Performed by: INTERNAL MEDICINE

## 2020-01-14 PROCEDURE — 36415 COLL VENOUS BLD VENIPUNCTURE: CPT | Performed by: INTERNAL MEDICINE

## 2020-01-14 ASSESSMENT — MIFFLIN-ST. JEOR: SCORE: 1529.01

## 2020-01-14 NOTE — PROGRESS NOTES
Subjective     Tucker Slater is a 88 year old male who presents to clinic today for the following health issues:    HPI   Ongoing urinary bleeding:    Patient is seen for a follow up visit.  Concern for recurrent blood with urination. Pink colored urine, occasional small clots. On and off every other day. Has h/o BPH , had cystoscopy , source of bleeding is the prostate.   On chronic AC for h/o AF. On Coumadin. INR has been therapeutic. In the low 2 range.   Concern for left hand fingers numbness - 1st, 2d and 3d . Change with position of the hand. Does not completely resolve.         Patient Active Problem List   Diagnosis     Essential hypertension with goal blood pressure less than 140/90     Carcinoma in situ of bladder     Hypertrophy of prostate with urinary obstruction     Generalized osteoarthrosis, unspecified site     Hereditary and idiopathic peripheral neuropathy     CARDIOVASCULAR SCREENING; LDL GOAL LESS THAN 130     Advanced directives, counseling/discussion     Peripheral neuropathy     GI bleed     Anemia     Near syncope     Anemia due to blood loss, acute     Atrial fibrillation (H) with mitral regurgitation and congestive heart failure     Bradycardia     CKD (chronic kidney disease) stage 3, GFR 30-59 ml/min (H)     Cardiac pacemaker in situ     Long-term (current) use of anticoagulants [Z79.01]     Degenerative arthritis of hip     Malignant neoplasm of other specified sites of bladder     Dysplasia of prostate     Long term current use of anticoagulants with INR goal of 2.0-3.0     Hematuria     Hospital discharge follow-up     Urinary retention     DAWSON (obstructive sleep apnea)     Past Surgical History:   Procedure Laterality Date     ARTHROPLASTY HIP Right 3/27/2017    Procedure: ARTHROPLASTY HIP;  Surgeon: Jigar Wynn MD;  Location: [facility] OR     C NONSPECIFIC PROCEDURE      bilat inguinal hernia repairs ( 3total procedures)      C NONSPECIFIC PROCEDURE      pilonidal cyst     C  NONSPECIFIC PROCEDURE      T + A     C NONSPECIFIC PROCEDURE       R+L total knee     CARDIOVERSION  3/26/15     COLONOSCOPY       CYSTOSCOPY       CYSTOSCOPY, FULGURATE BLEEDERS, EVACUATE CLOT(S), COMBINED N/A 2019    Procedure: Exam under anesthesia, video cystopanendoscopy, evacuation of clots, fulguration of prostatic veins.;  Surgeon: Ilir Ramirez MD;  Location: [facility] OR     CYSTOSCOPY, RETROGRADES, COMBINED Bilateral 2019    Procedure: Video cystopanendoscopy, evacuation of clots, cup biopsies of bladder wall, electrovaporization of prostate, transurethral resection of prostate, bilateral retrograde ureteropyelogram.;  Surgeon: Ilir Ramirez MD;  Location: [facility] OR     CYSTOSCOPY, TRANSURETHRAL RESECTION (TUR) PROSTATE, COMBINED  2019    Procedure: Cystoscopy, transurethral resection of prostate;  Surgeon: Ilir Ramirez MD;  Location: [facility] OR     HERNIA REPAIR       IMPLANT PACEMAKER  3/26/15     RELEASE CARPAL TUNNEL Right 2017    Procedure: RELEASE CARPAL TUNNEL;  Right carpal tunnel release;  Surgeon: Alonso Barboza MD;  Location: [facility] OR       Social History     Tobacco Use     Smoking status: Former Smoker     Last attempt to quit: 1977     Years since quittin.3     Smokeless tobacco: Never Used   Substance Use Topics     Alcohol use: No     Alcohol/week: 0.0 standard drinks     Family History   Problem Relation Age of Onset     Heart Disease Father          89yo     Coronary Artery Disease Father      Heart Disease Mother          74yo     Coronary Artery Disease Mother      Family History Negative Brother          Current Outpatient Medications   Medication Sig Dispense Refill     ACETAMINOPHEN PO Take 650 mg by mouth every 4 hours as needed for pain       Ascorbic Acid (VITAMIN C PO) Take 500 mg by mouth daily        baclofen (LIORESAL) 10 MG tablet Take 1 tablet (10 mg) by mouth 3 times daily 20 tablet 0     calcium carbonate (OS-KARLA 500  "MG [place]. [place]) 500 MG tablet Take 500 mg by mouth daily        carvedilol (COREG) 25 MG tablet Take 1.5 tablets (37.5 mg) by mouth 2 times daily (with meals) 270 tablet 3     diclofenac (VOLTAREN) 1 % topical gel Place 2 g onto the skin 4 times daily Re-label for home use (Patient taking differently: Place 2 g onto the skin 4 times daily as needed Re-label for home use) 1 Tube 0     docusate sodium (COLACE) 100 MG capsule Take 100 mg by mouth 2 times daily       finasteride (PROSCAR) 5 MG tablet TAKE ONE TABLET BY MOUTH  DAILY 90 tablet 1     losartan (COZAAR) 50 MG tablet Take 1 tablet (50 mg) by mouth daily 90 tablet 3     Multiple Vitamins-Minerals (OCUVITE PO) Take 1 tablet by mouth 2 times daily        triamterene-HCTZ (MAXZIDE-25) 37.5-25 MG tablet Take 1 tablet by mouth daily 90 tablet 3     vitamin B complex with vitamin C (VITAMIN  B COMPLEX) TABS tablet Take 1 tablet by mouth daily       VITAMIN D, CHOLECALCIFEROL, PO Take 2,000 Units by mouth daily.       warfarin (JANTOVEN) 2.5 MG tablet Take 2.5 mg by mouth daily       warfarin ANTICOAGULANT (JANTOVEN ANTICOAGULANT) 2.5 MG tablet TAKE ONE TABLET BY MOUTH ONCE DAILY EXCEPT TAKE ONE-HALF TABLET (1.25MG) ON TUESDAYS AND FRIDAYS OR AS DIRECTED BY INR CLINIC 30 tablet 1     order for DME Oxygen 2 Li/min  at night in CPAP 1 Device 0     order for DME Oxygen 2 Li/min  at night 1 Device 0     order for DME Oxygen 2 Li/min  at night with CPAP  SPO2 less than or equal to oxygen saturation 89% on effective CPAP in patient with known cardiomyopathy 1 Device 0         Reviewed and updated as needed this visit by Provider         Review of Systems   ROS COMP: Constitutional, HEENT, cardiovascular, pulmonary, gi and gu systems are negative, except as otherwise noted.      Objective    BP (!) 140/70   Pulse 75   Temp 98  F (36.7  C) (Oral)   Resp 12   Ht 1.778 m (5' 10\")   Wt 85.3 kg (188 lb)   SpO2 98%   BMI 26.98 kg/m    Body mass index is 26.98 kg/m .  Physical " Exam   GENERAL: healthy, alert and no distress  NECK: no adenopathy, no asymmetry, masses, or scars and thyroid normal to palpation  RESP: lungs clear to auscultation - no rales, rhonchi or wheezes  CV: irregular rate and rhythm, normal S1 S2, no S3 or S4, no murmur, click or rub, no peripheral edema and peripheral pulses strong  ABDOMEN: soft, nontender, no hepatosplenomegaly, no masses and bowel sounds normal  MS: no gross musculoskeletal defects noted, no edema    Diagnostic Test Results:  Labs reviewed in Epic        Assessment & Plan   Problem List Items Addressed This Visit     Atrial fibrillation (H) with mitral regurgitation and congestive heart failure    Hematuria - Primary    Relevant Orders    Hemoglobin      Other Visit Diagnoses     Carpal tunnel syndrome of left wrist        Relevant Orders    ORTHO  REFERRAL (Completed)         Continue treatment   Will monitor for development of anemia, check every 3 months   Follow up with urology   Refer to ortho for left CTS       See Patient Instructions  Return in about 3 months (around 4/14/2020) for Routine Visit.    Kwadwo Winslow MD  [facility]

## 2020-01-14 NOTE — LETTER
January 15, 2020      Tucker Slater  [address] [facility] [address]        Dear ,    We are writing to inform you of your test results.    Normal result reviewed    Resulted Orders   Hemoglobin   Result Value Ref Range    Hemoglobin 14.3 13.3 - 17.7 g/dL       If you have any questions or concerns, please call the clinic at the number listed above.       Sincerely,        Kwadwo Winslow MD

## 2020-01-14 NOTE — NURSING NOTE
"Vital signs:  Temp: 98  F (36.7  C) Temp src: Oral BP: (!) 140/70 Pulse: 75   Resp: 12 SpO2: 98 %     Height: 177.8 cm (5' 10\") Weight: 85.3 kg (188 lb)  Estimated body mass index is 26.98 kg/m  as calculated from the following:    Height as of this encounter: 1.778 m (5' 10\").    Weight as of this encounter: 85.3 kg (188 lb).        "

## 2020-02-03 ENCOUNTER — ANTICOAGULATION THERAPY VISIT (OUTPATIENT)
Dept: ANTICOAGULATION | Facility: CLINIC | Age: 85
End: 2020-02-03
Payer: COMMERCIAL

## 2020-02-03 DIAGNOSIS — Z79.01 LONG TERM CURRENT USE OF ANTICOAGULANTS WITH INR GOAL OF 2.0-3.0: ICD-10-CM

## 2020-02-03 LAB — INR POINT OF CARE: 1.9 (ref 0.86–1.14)

## 2020-02-03 PROCEDURE — 99207 ZZC NO CHARGE NURSE ONLY: CPT

## 2020-02-03 PROCEDURE — 36416 COLLJ CAPILLARY BLOOD SPEC: CPT

## 2020-02-03 PROCEDURE — 85610 PROTHROMBIN TIME: CPT | Mod: QW

## 2020-02-03 NOTE — PROGRESS NOTES
ANTICOAGULATION FOLLOW-UP CLINIC VISIT    Patient Name:  Tucker Slater  Date:  2/3/2020  Contact Type:  Face to Face    SUBJECTIVE:  Patient Findings     Positives:   Change in diet/appetite (Patient reports increased green intake, will resume usual diet. )    Comments:   The patient was assessed for medication and activity level changes, missed or extra doses, bruising or bleeding, with no problem findings.          Clinical Outcomes     Comments:   The patient was assessed for medication and activity level changes, missed or extra doses, bruising or bleeding, with no problem findings.             OBJECTIVE    INR Protime   Date Value Ref Range Status   2020 1.9 (A) 0.86 - 1.14 Final       ASSESSMENT / PLAN  INR assessment SUB    Recheck INR In: 4 WEEKS    INR Location Clinic      Anticoagulation Summary  As of 2/3/2020    INR goal:   2.0-2.5   TTR:   60.8 % (9.5 mo)   INR used for dosin.9! (2/3/2020)   Warfarin maintenance plan:   1.25 mg (2.5 mg x 0.5) every Tue, Fri; 2.5 mg (2.5 mg x 1) all other days   Full warfarin instructions:   1.25 mg every Tue, Fri; 2.5 mg all other days   Weekly warfarin total:   15 mg   Plan last modified:   Ivory Machado RN (2019)   Next INR check:   3/2/2020   Priority:   Maintenance   Target end date:       Indications    Atrial fibrillation (H) with mitral regurgitation and congestive heart failure [I48.91]  Long term current use of anticoagulants with INR goal of 2.0-3.0 [Z79.01]             Anticoagulation Episode Summary     INR check location:       Preferred lab:       Send INR reminders to:   [facility]    Comments:   Patient has a history of hematuria when INR is elevated.  Will try to keep INR closer to 2.0.      Anticoagulation Care Providers     Provider Role Specialty Phone number    Kwadwo Winslow MD [facility] Internal Medicine [phone]            See the Encounter Report to view Anticoagulation Flowsheet and Dosing  Calendar (Go to Encounters tab in chart review, and find the Anticoagulation Therapy Visit)    Dosage adjustment made based on physician directed care plan.    Gail Valdovinos RN

## 2020-03-02 ENCOUNTER — ANTICOAGULATION THERAPY VISIT (OUTPATIENT)
Dept: ANTICOAGULATION | Facility: CLINIC | Age: 85
End: 2020-03-02
Payer: COMMERCIAL

## 2020-03-02 DIAGNOSIS — Z79.01 LONG TERM CURRENT USE OF ANTICOAGULANTS WITH INR GOAL OF 2.0-3.0: ICD-10-CM

## 2020-03-02 LAB — INR POINT OF CARE: 1.7 (ref 0.86–1.14)

## 2020-03-02 PROCEDURE — 85610 PROTHROMBIN TIME: CPT | Mod: QW

## 2020-03-02 PROCEDURE — 99207 ZZC NO CHARGE NURSE ONLY: CPT

## 2020-03-02 PROCEDURE — 36416 COLLJ CAPILLARY BLOOD SPEC: CPT

## 2020-03-02 NOTE — PROGRESS NOTES
ANTICOAGULATION FOLLOW-UP CLINIC VISIT    Patient Name:  Tucker Slater  Date:  3/2/2020  Contact Type:  Face to Face    SUBJECTIVE:  Patient Findings     Positives:   Missed doses (Patient reports he missed 2.5 mg warfarin on 20.)    Comments:   The patient was assessed for diet, medication, and activity level changes, extra doses, bruising or bleeding, with no problem findings.          Clinical Outcomes     Comments:   The patient was assessed for diet, medication, and activity level changes, extra doses, bruising or bleeding, with no problem findings.             OBJECTIVE    INR Protime   Date Value Ref Range Status   2020 1.7 (A) 0.86 - 1.14 Final       ASSESSMENT / PLAN  INR assessment SUB    Recheck INR In: 2 WEEKS    INR Location Clinic      Anticoagulation Summary  As of 3/2/2020    INR goal:   2.0-2.5   TTR:   51.0 % (9.5 mo)   INR used for dosin.7! (3/2/2020)   Warfarin maintenance plan:   1.25 mg (2.5 mg x 0.5) every Tue, Fri; 2.5 mg (2.5 mg x 1) all other days   Full warfarin instructions:   3/: 3.75 mg; 3/3: 2.5 mg; Otherwise 1.25 mg every Tue, Fri; 2.5 mg all other days   Weekly warfarin total:   15 mg   Plan last modified:   Ivory Machado RN (2019)   Next INR check:   3/16/2020   Priority:   Maintenance   Target end date:       Indications    Atrial fibrillation (H) with mitral regurgitation and congestive heart failure [I48.91]  Long term current use of anticoagulants with INR goal of 2.0-3.0 [Z79.01]             Anticoagulation Episode Summary     INR check location:       Preferred lab:       Send INR reminders to:   [facility]    Comments:   Patient has a history of hematuria when INR is elevated.  Will try to keep INR closer to 2.0.      Anticoagulation Care Providers     Provider Role Specialty Phone number    Kwadwo Winslow MD [facility] Internal Medicine [phone]            See the Encounter Report to view Anticoagulation Flowsheet and  Dosing Calendar (Go to Encounters tab in chart review, and find the Anticoagulation Therapy Visit)    Dosage adjustment made based on physician directed care plan.    Gail Valdovinos RN

## 2020-03-05 ENCOUNTER — OFFICE VISIT (OUTPATIENT)
Dept: ORTHOPEDICS | Facility: CLINIC | Age: 85
End: 2020-03-05
Payer: COMMERCIAL

## 2020-03-05 VITALS
DIASTOLIC BLOOD PRESSURE: 80 MMHG | WEIGHT: 188 LBS | HEIGHT: 70 IN | BODY MASS INDEX: 26.92 KG/M2 | SYSTOLIC BLOOD PRESSURE: 150 MMHG

## 2020-03-05 DIAGNOSIS — S46.219A TEAR OF BICEPS TENDON: ICD-10-CM

## 2020-03-05 DIAGNOSIS — M19.011 PRIMARY OSTEOARTHRITIS OF RIGHT SHOULDER: Primary | ICD-10-CM

## 2020-03-05 DIAGNOSIS — M75.121 NONTRAUMATIC COMPLETE TEAR OF RIGHT ROTATOR CUFF: ICD-10-CM

## 2020-03-05 PROCEDURE — 99213 OFFICE O/P EST LOW 20 MIN: CPT | Performed by: FAMILY MEDICINE

## 2020-03-05 ASSESSMENT — MIFFLIN-ST. JEOR: SCORE: 1529.01

## 2020-03-05 NOTE — PATIENT INSTRUCTIONS
1. Primary osteoarthritis of right shoulder    2. Tear of biceps tendon    3. 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 Take with food.  If pain is not well controlled after 2-3 weeks then would recommend restarting formal therapy to make sure your home exercise is the best / most appropriate  You will need to to continue your home exercise forever    Follow-up if pain is not improved with Tylenol

## 2020-03-05 NOTE — LETTER
"    3/5/2020         RE: Tucker Slater  [address] [facility] [address]        Dear Colleague,    Thank you for referring your patient, Tucker Slater, to the [facility]. Please see a copy of my visit note below.    ASSESSMENT & PLAN    1. Primary osteoarthritis of right shoulder    2. Tear of biceps tendon    3. 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 Take with food.  If pain is not well controlled after 2-3 weeks then would recommend restarting formal therapy to make sure your home exercise is the best / most appropriate  You will need to to continue your home exercise forever    Follow-up if pain is not improved with Tylenol    -----    SUBJECTIVE:  Tucker Slater is a 88 year old male who is seen in follow-up for right shoulder pain. They were last seen 4/10/19.     Since their last visit reports that pain gradually improved with physical therapy and home exercises. Although he notes that pain began to return about 2 weeks ago. He reports pain comes and goes. He notes pain with increases with pushing, pulling, reaching overhead and behind back. Pain is located over lateral aspect of upper right arm. They indicate that their current pain level is 0/10 at rest and 8/10 at worst. They have tried rest/activity avoidance, Tylenol (prn), home exercises and physical therapy (1 visits).      The patient is seen by themselves.    Patient's past medical, surgical, social, and family histories were reviewed today and no pertinent history related to patient's presenting problem.    REVIEW OF SYSTEMS:  Constitutional: NEGATIVE for fever, chills, change in weight  Skin: NEGATIVE for worrisome rashes, moles or lesions  GI/: NEGATIVE for bowel or bladder changes  Neuro: NEGATIVE for weakness, dizziness or paresthesias    OBJECTIVE:  BP (!) 150/80   Ht 1.778 m (5' 10\")   Wt 85.3 kg (188 lb)   BMI 26.98 kg/m      General: " healthy, alert and in no distress  HEENT: no scleral icterus or conjunctival erythema  Skin: no suspicious lesions or rash. No jaundice.  CV: regular rhythm by palpation, no pedal edema  Resp: normal respiratory effort without conversational dyspnea   Psych: normal mood and affect  Gait: normal steady gait with appropriate coordination and balance  Neuro: normal light touch sensory exam of the extremities.    MSK:  Deferred    Independent visualization of the below image:  SHOULDER RIGHT THREE OR MORE VIEWS    4/3/2019 4:26 PM      HISTORY: Acute pain of right shoulder     COMPARISON: None.                                                                  IMPRESSION: Small subacromial spur. Moderate acromioclavicular  degenerative changes. Glenohumeral joint is unremarkable. No acute  fracture or subluxation.     MD Kian RANDLE,  [facility]          Again, thank you for allowing me to participate in the care of your patient.        Sincerely,        Kian Hinojosa DO

## 2020-03-05 NOTE — PROGRESS NOTES
"ASSESSMENT & PLAN    1. Primary osteoarthritis of right shoulder    2. Tear of biceps tendon    3. 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 Take with food.  If pain is not well controlled after 2-3 weeks then would recommend restarting formal therapy to make sure your home exercise is the best / most appropriate  You will need to to continue your home exercise forever    Follow-up if pain is not improved with Tylenol    -----    SUBJECTIVE:  Tucker Slater is a 88 year old male who is seen in follow-up for right shoulder pain. They were last seen 4/10/19.     Since their last visit reports that pain gradually improved with physical therapy and home exercises. Although he notes that pain began to return about 2 weeks ago. He reports pain comes and goes. He notes pain with increases with pushing, pulling, reaching overhead and behind back. Pain is located over lateral aspect of upper right arm. They indicate that their current pain level is 0/10 at rest and 8/10 at worst. They have tried rest/activity avoidance, Tylenol (prn), home exercises and physical therapy (1 visits).      The patient is seen by themselves.    Patient's past medical, surgical, social, and family histories were reviewed today and no pertinent history related to patient's presenting problem.    REVIEW OF SYSTEMS:  Constitutional: NEGATIVE for fever, chills, change in weight  Skin: NEGATIVE for worrisome rashes, moles or lesions  GI/: NEGATIVE for bowel or bladder changes  Neuro: NEGATIVE for weakness, dizziness or paresthesias    OBJECTIVE:  BP (!) 150/80   Ht 1.778 m (5' 10\")   Wt 85.3 kg (188 lb)   BMI 26.98 kg/m     General: healthy, alert and in no distress  HEENT: no scleral icterus or conjunctival erythema  Skin: no suspicious lesions or rash. No jaundice.  CV: regular rhythm by palpation, no pedal edema  Resp: normal respiratory effort without conversational dyspnea   Psych: " normal mood and affect  Gait: normal steady gait with appropriate coordination and balance  Neuro: normal light touch sensory exam of the extremities.    MSK:  Deferred    Independent visualization of the below image:  SHOULDER RIGHT THREE OR MORE VIEWS    4/3/2019 4:26 PM      HISTORY: Acute pain of right shoulder     COMPARISON: None.                                                                  IMPRESSION: Small subacromial spur. Moderate acromioclavicular  degenerative changes. Glenohumeral joint is unremarkable. No acute  fracture or subluxation.     MD Kian RANDLE,  [facility]

## 2020-03-08 DIAGNOSIS — I48.91 ATRIAL FIBRILLATION (H): Primary | ICD-10-CM

## 2020-03-08 DIAGNOSIS — Z79.01 LONG TERM CURRENT USE OF ANTICOAGULANT THERAPY: ICD-10-CM

## 2020-03-10 RX ORDER — WARFARIN SODIUM 2.5 MG/1
TABLET ORAL
Qty: 30 TABLET | Refills: 1 | Status: SHIPPED | OUTPATIENT
Start: 2020-03-10 | End: 2020-05-27

## 2020-03-10 NOTE — TELEPHONE ENCOUNTER
Warfarin dosing is managed by [facility]R Clinic.  Prescription approved per [facility] Refill Protocol.  Ivory Machado RN

## 2020-03-10 NOTE — TELEPHONE ENCOUNTER
"Requested Prescriptions   Pending Prescriptions Disp Refills     JANTOVEN ANTICOAGULANT 2.5 MG tablet [Pharmacy Med Name: JANTOVEN 2.5MG TABS]  Last Written Prescription Date:  historical  Last Fill Quantity: ,  # refills:    Last office visit: 1/14/2020 with prescribing provider:     Future Office Visit:   Next 5 appointments (look out 90 days)    Apr 14, 2020  1:00 PM CDT  SHORT [facility] [address]  [facility] [id number] [phone]        30 tablet 1     Sig: TAKE ONE-HALF TABLET (1.25MG) BY MOUTH ON TUESDAY AND FRIDAY AND TAKE ONE TABLET (2.5MG) BY MOUTH ONCE DAILY ALL OTHER DAYS       Vitamin K Antagonists Failed - 3/8/2020  9:22 PM        Failed - INR is within goal in the past 6 weeks     Confirm INR is within goal in the past 6 weeks.     Recent Labs   Lab Test 03/02/20   INR 1.7*                       Passed - Recent (12 mo) or future (30 days) visit within the authorizing provider's specialty     Patient has had an office visit with the authorizing provider or a provider within the authorizing providers department within the previous 12 mos or has a future within next 30 days. See \"Patient Info\" tab in inbasket, or \"Choose Columns\" in Meds & Orders section of the refill encounter.              Passed - Medication is active on med list        Passed - Patient is 18 years of age or older           "

## 2020-03-16 ENCOUNTER — ANTICOAGULATION THERAPY VISIT (OUTPATIENT)
Dept: ANTICOAGULATION | Facility: CLINIC | Age: 85
End: 2020-03-16
Payer: COMMERCIAL

## 2020-03-16 DIAGNOSIS — Z79.01 LONG TERM CURRENT USE OF ANTICOAGULANTS WITH INR GOAL OF 2.0-3.0: ICD-10-CM

## 2020-03-16 LAB — INR POINT OF CARE: 2.2 (ref 0.86–1.14)

## 2020-03-16 PROCEDURE — 85610 PROTHROMBIN TIME: CPT | Mod: QW

## 2020-03-16 PROCEDURE — 36416 COLLJ CAPILLARY BLOOD SPEC: CPT

## 2020-03-16 PROCEDURE — 99207 ZZC NO CHARGE NURSE ONLY: CPT

## 2020-03-16 NOTE — PROGRESS NOTES
ANTICOAGULATION FOLLOW-UP CLINIC VISIT    Patient Name:  Tucker Slater  Date:  3/16/2020  Contact Type:  Face to Face    SUBJECTIVE:  Patient Findings     Comments:   The patient was assessed for diet, medication, and activity level changes, missed or extra doses, bruising or bleeding, with no problem findings.          Clinical Outcomes     Negatives:   Major bleeding event, Thromboembolic event, Anticoagulation-related hospital admission, Anticoagulation-related ED visit, Anticoagulation-related fatality    Comments:   The patient was assessed for diet, medication, and activity level changes, missed or extra doses, bruising or bleeding, with no problem findings.             OBJECTIVE    INR Protime   Date Value Ref Range Status   2020 2.2 (A) 0.86 - 1.14 Final       ASSESSMENT / PLAN  INR assessment THER    Recheck INR In: 4 WEEKS    INR Location Clinic      Anticoagulation Summary  As of 3/16/2020    INR goal:   2.0-2.5   TTR:   48.9 % (9.5 mo)   INR used for dosin.2 (3/16/2020)   Warfarin maintenance plan:   1.25 mg (2.5 mg x 0.5) every Tue, Fri; 2.5 mg (2.5 mg x 1) all other days   Full warfarin instructions:   1.25 mg every Tue, Fri; 2.5 mg all other days   Weekly warfarin total:   15 mg   Plan last modified:   Ivory Machado RN (2019)   Next INR check:   2020   Priority:   Maintenance   Target end date:       Indications    Atrial fibrillation (H) with mitral regurgitation and congestive heart failure [I48.91]  Long term current use of anticoagulants with INR goal of 2.0-3.0 [Z79.01]             Anticoagulation Episode Summary     INR check location:       Preferred lab:       Send INR reminders to:   [facility]    Comments:   Patient has a history of hematuria when INR is elevated.  Will try to keep INR closer to 2.0.      Anticoagulation Care Providers     Provider Role Specialty Phone number    Kwawdo Winslow MD [facility] Internal Medicine [phone]             See the Encounter Report to view Anticoagulation Flowsheet and Dosing Calendar (Go to Encounters tab in chart review, and find the Anticoagulation Therapy Visit)    Dosage adjustment made based on physician directed care plan.    Gail Valdovinos RN

## 2020-03-20 ENCOUNTER — TELEPHONE (OUTPATIENT)
Dept: CARDIOLOGY | Facility: CLINIC | Age: 85
End: 2020-03-20

## 2020-03-20 DIAGNOSIS — R00.1 BRADYCARDIA: ICD-10-CM

## 2020-03-20 DIAGNOSIS — Z95.0 CARDIAC PACEMAKER IN SITU: Primary | ICD-10-CM

## 2020-03-25 ENCOUNTER — ANCILLARY PROCEDURE (OUTPATIENT)
Dept: CARDIOLOGY | Facility: CLINIC | Age: 85
End: 2020-03-25
Attending: INTERNAL MEDICINE
Payer: COMMERCIAL

## 2020-03-25 DIAGNOSIS — R00.1 BRADYCARDIA: ICD-10-CM

## 2020-03-25 DIAGNOSIS — Z95.0 CARDIAC PACEMAKER IN SITU: ICD-10-CM

## 2020-03-25 PROCEDURE — 93294 REM INTERROG EVL PM/LDLS PM: CPT | Performed by: INTERNAL MEDICINE

## 2020-03-25 PROCEDURE — 93296 REM INTERROG EVL PM/IDS: CPT | Performed by: INTERNAL MEDICINE

## 2020-03-26 LAB
MDC_IDC_LEAD_IMPLANT_DT: NORMAL
MDC_IDC_LEAD_IMPLANT_DT: NORMAL
MDC_IDC_LEAD_LOCATION: NORMAL
MDC_IDC_LEAD_LOCATION: NORMAL
MDC_IDC_LEAD_LOCATION_DETAIL_1: NORMAL
MDC_IDC_LEAD_LOCATION_DETAIL_1: NORMAL
MDC_IDC_LEAD_MFG: NORMAL
MDC_IDC_LEAD_MFG: NORMAL
MDC_IDC_LEAD_MODEL: NORMAL
MDC_IDC_LEAD_MODEL: NORMAL
MDC_IDC_LEAD_POLARITY_TYPE: NORMAL
MDC_IDC_LEAD_POLARITY_TYPE: NORMAL
MDC_IDC_LEAD_SERIAL: NORMAL
MDC_IDC_LEAD_SERIAL: NORMAL
MDC_IDC_MSMT_BATTERY_DTM: NORMAL
MDC_IDC_MSMT_BATTERY_REMAINING_LONGEVITY: 124 MO
MDC_IDC_MSMT_BATTERY_REMAINING_PERCENTAGE: 95.5 %
MDC_IDC_MSMT_BATTERY_RRT_TRIGGER: NORMAL
MDC_IDC_MSMT_BATTERY_STATUS: NORMAL
MDC_IDC_MSMT_BATTERY_VOLTAGE: 2.96 V
MDC_IDC_MSMT_LEADCHNL_RV_IMPEDANCE_VALUE: 430 OHM
MDC_IDC_MSMT_LEADCHNL_RV_LEAD_CHANNEL_STATUS: NORMAL
MDC_IDC_MSMT_LEADCHNL_RV_PACING_THRESHOLD_AMPLITUDE: 0.75 V
MDC_IDC_MSMT_LEADCHNL_RV_PACING_THRESHOLD_PULSEWIDTH: 0.5 MS
MDC_IDC_MSMT_LEADCHNL_RV_SENSING_INTR_AMPL: 12 MV
MDC_IDC_PG_IMPLANT_DTM: NORMAL
MDC_IDC_PG_MFG: NORMAL
MDC_IDC_PG_MODEL: NORMAL
MDC_IDC_PG_SERIAL: NORMAL
MDC_IDC_PG_TYPE: NORMAL
MDC_IDC_SESS_CLINIC_NAME: NORMAL
MDC_IDC_SESS_DTM: NORMAL
MDC_IDC_SESS_REPROGRAMMED: NO
MDC_IDC_SESS_TYPE: NORMAL
MDC_IDC_SET_BRADY_HYSTRATE: 60 {BEATS}/MIN
MDC_IDC_SET_BRADY_LOWRATE: 70 {BEATS}/MIN
MDC_IDC_SET_BRADY_MAX_SENSOR_RATE: 120 {BEATS}/MIN
MDC_IDC_SET_BRADY_MODE: NORMAL
MDC_IDC_SET_LEADCHNL_RA_PACING_POLARITY: NORMAL
MDC_IDC_SET_LEADCHNL_RA_SENSING_POLARITY: NORMAL
MDC_IDC_SET_LEADCHNL_RV_PACING_AMPLITUDE: 1 V
MDC_IDC_SET_LEADCHNL_RV_PACING_ANODE_ELECTRODE_1: NORMAL
MDC_IDC_SET_LEADCHNL_RV_PACING_ANODE_LOCATION_1: NORMAL
MDC_IDC_SET_LEADCHNL_RV_PACING_CAPTURE_MODE: NORMAL
MDC_IDC_SET_LEADCHNL_RV_PACING_CATHODE_ELECTRODE_1: NORMAL
MDC_IDC_SET_LEADCHNL_RV_PACING_CATHODE_LOCATION_1: NORMAL
MDC_IDC_SET_LEADCHNL_RV_PACING_POLARITY: NORMAL
MDC_IDC_SET_LEADCHNL_RV_PACING_PULSEWIDTH: 0.5 MS
MDC_IDC_SET_LEADCHNL_RV_SENSING_ADAPTATION_MODE: NORMAL
MDC_IDC_SET_LEADCHNL_RV_SENSING_ANODE_ELECTRODE_1: NORMAL
MDC_IDC_SET_LEADCHNL_RV_SENSING_ANODE_LOCATION_1: NORMAL
MDC_IDC_SET_LEADCHNL_RV_SENSING_CATHODE_ELECTRODE_1: NORMAL
MDC_IDC_SET_LEADCHNL_RV_SENSING_CATHODE_LOCATION_1: NORMAL
MDC_IDC_SET_LEADCHNL_RV_SENSING_POLARITY: NORMAL
MDC_IDC_SET_LEADCHNL_RV_SENSING_SENSITIVITY: 2 MV
MDC_IDC_STAT_AT_DTM_END: NORMAL
MDC_IDC_STAT_AT_DTM_START: NORMAL
MDC_IDC_STAT_BRADY_DTM_END: NORMAL
MDC_IDC_STAT_BRADY_DTM_START: NORMAL
MDC_IDC_STAT_BRADY_RV_PERCENT_PACED: 98 %
MDC_IDC_STAT_CRT_DTM_END: NORMAL
MDC_IDC_STAT_CRT_DTM_START: NORMAL
MDC_IDC_STAT_HEART_RATE_DTM_END: NORMAL
MDC_IDC_STAT_HEART_RATE_DTM_START: NORMAL
MDC_IDC_STAT_HEART_RATE_VENTRICULAR_MAX: 230 {BEATS}/MIN
MDC_IDC_STAT_HEART_RATE_VENTRICULAR_MEAN: 77 {BEATS}/MIN
MDC_IDC_STAT_HEART_RATE_VENTRICULAR_MIN: 60 {BEATS}/MIN

## 2020-04-13 ENCOUNTER — ANTICOAGULATION THERAPY VISIT (OUTPATIENT)
Dept: ANTICOAGULATION | Facility: CLINIC | Age: 85
End: 2020-04-13

## 2020-04-13 DIAGNOSIS — I48.91 ATRIAL FIBRILLATION (H): ICD-10-CM

## 2020-04-13 DIAGNOSIS — Z79.01 LONG TERM CURRENT USE OF ANTICOAGULANTS WITH INR GOAL OF 2.0-3.0: ICD-10-CM

## 2020-04-13 DIAGNOSIS — Z79.01 LONG TERM CURRENT USE OF ANTICOAGULANTS WITH INR GOAL OF 2.0-3.0: Primary | ICD-10-CM

## 2020-04-13 LAB
CAPILLARY BLOOD COLLECTION: NORMAL
INR PPP: 2.1 (ref 0.86–1.14)

## 2020-04-13 PROCEDURE — 99207 ZZC NO CHARGE NURSE ONLY: CPT | Performed by: INTERNAL MEDICINE

## 2020-04-13 PROCEDURE — 36416 COLLJ CAPILLARY BLOOD SPEC: CPT | Performed by: INTERNAL MEDICINE

## 2020-04-13 PROCEDURE — 85610 PROTHROMBIN TIME: CPT | Performed by: INTERNAL MEDICINE

## 2020-04-13 NOTE — PROGRESS NOTES
ANTICOAGULATION FOLLOW-UP CLINIC VISIT    Patient Name:  Tucker Slater  Date:  2020  Contact Type:  Telephone/ Called patient, denies any changs. Orders discussed with with patient.     SUBJECTIVE:  Patient Findings     Comments:   The patient was assessed for diet, medication, and activity level changes, missed or extra doses, bruising or bleeding, with no problem findings.          Clinical Outcomes     Negatives:   Major bleeding event, Thromboembolic event, Anticoagulation-related hospital admission, Anticoagulation-related ED visit, Anticoagulation-related fatality    Comments:   The patient was assessed for diet, medication, and activity level changes, missed or extra doses, bruising or bleeding, with no problem findings.             OBJECTIVE    INR   Date Value Ref Range Status   2020 2.10 (H) 0.86 - 1.14 Final     Comment:     This test is intended for monitoring Coumadin therapy.  Results are not   accurate in patients with prolonged INR due to factor deficiency.         ASSESSMENT / PLAN  INR assessment THER    Recheck INR In: 5 WEEKS    INR Location Outside lab      Anticoagulation Summary  As of 2020    INR goal:   2.0-2.5   TTR:   53.7 % (9.5 mo)   INR used for dosin.10 (2020)   Warfarin maintenance plan:   1.25 mg (2.5 mg x 0.5) every Tue, Fri; 2.5 mg (2.5 mg x 1) all other days   Full warfarin instructions:   1.25 mg every Tue, Fri; 2.5 mg all other days   Weekly warfarin total:   15 mg   No change documented:   Gail Valdovinos RN   Plan last modified:   Ivory Machado RN (2019)   Next INR check:   2020   Priority:   Maintenance   Target end date:       Indications    Atrial fibrillation (H) with mitral regurgitation and congestive heart failure [I48.91]  Long term current use of anticoagulants with INR goal of 2.0-3.0 [Z79.01]             Anticoagulation Episode Summary     INR check location:       Preferred lab:       Send INR reminders to:   DEBORAH  MARCI ACUÑA    Comments:   Patient has a history of hematuria when INR is elevated.  Will try to keep INR closer to 2.0.      Anticoagulation Care Providers     Provider Role Specialty Phone number    Kwadwo Winslow MD [facility] Internal Medicine [phone]            See the Encounter Report to view Anticoagulation Flowsheet and Dosing Calendar (Go to Encounters tab in chart review, and find the Anticoagulation Therapy Visit)    Dosage adjustment made based on physician directed care plan.    Gail Valdovinos RN

## 2020-04-14 ENCOUNTER — VIRTUAL VISIT (OUTPATIENT)
Dept: INTERNAL MEDICINE | Facility: CLINIC | Age: 85
End: 2020-04-14
Payer: COMMERCIAL

## 2020-04-14 DIAGNOSIS — I10 ESSENTIAL HYPERTENSION WITH GOAL BLOOD PRESSURE LESS THAN 140/90: ICD-10-CM

## 2020-04-14 DIAGNOSIS — R31.0 GROSS HEMATURIA: ICD-10-CM

## 2020-04-14 DIAGNOSIS — I48.21 PERMANENT ATRIAL FIBRILLATION (H): ICD-10-CM

## 2020-04-14 DIAGNOSIS — G60.3 IDIOPATHIC PROGRESSIVE NEUROPATHY: Primary | ICD-10-CM

## 2020-04-14 PROCEDURE — 99214 OFFICE O/P EST MOD 30 MIN: CPT | Mod: 95 | Performed by: INTERNAL MEDICINE

## 2020-04-14 NOTE — PROGRESS NOTES
"Tucker Slater is a 89 year old male who is being evaluated via a billable telephone visit.      The patient has been notified of following:     \"This telephone visit will be conducted via a call between you and your physician/provider. We have found that certain health care needs can be provided without the need for a physical exam.  This service lets us provide the care you need with a short phone conversation.  If a prescription is necessary we can send it directly to your pharmacy.  If lab work is needed we can place an order for that and you can then stop by our lab to have the test done at a later time.    Telephone visits are billed at different rates depending on your insurance coverage. During this emergency period, for some insurers they may be billed the same as an in-person visit.  Please reach out to your insurance provider with any questions.    If during the course of the call the physician/provider feels a telephone visit is not appropriate, you will not be charged for this service.\"    Patient has given verbal consent for Telephone visit?  Yes, Edna Olmos MA  How would you like to obtain your AVS? Mail a copy    Subjective     Tucker Slater is a 89 year old male who presents to clinic today for the following health issues:    Hypertension Follow-up  Has h/o HTN. on medical treatment. BP has been controlled. No side effects from medications. No CP, HA, dizziness. good compliance with medications and low salt diet.      Do you check your blood pressure regularly outside of the clinic? No     Are you following a low salt diet? No    Are your blood pressures ever more than 140 on the top number (systolic) OR more   than 90 on the bottom number (diastolic), for example 140/90? Yes      How many servings of fruits and vegetables do you eat daily?  0-1    On average, how many sweetened beverages do you drink each day (Examples: soda, juice, sweet tea, etc.  Do NOT count diet or artificially " sweetened beverages)?   1    How many days per week do you exercise enough to make your heart beat faster? 3 or less    How many minutes a day do you exercise enough to make your heart beat faster? 9 or less    How many days per week do you miss taking your medication? 0    Has history of atrial fibrillation. On anticoagulation with Coumadin and rate control medications. Asymptonatic - no chest pains , palpitations,  no side effects from medications.  Has H/O BPH. On treatment with Proscar . Has chronic symptoms of frequency, decreased urinary stream , no  dysuria. No side effects from medications.   Has had hematuria on and off, related to prostate friability, no recent bleeding.        PROBLEMS TO ADD ON...  Has h/o peripheral neuropathy, affecting his balance. Feels off balance in the morning. No falls. No vertigo. No focal arm of leg weakness, no speech changes.     Patient Active Problem List   Diagnosis     Essential hypertension with goal blood pressure less than 140/90     Carcinoma in situ of bladder     Hypertrophy of prostate with urinary obstruction     Generalized osteoarthrosis, unspecified site     Hereditary and idiopathic peripheral neuropathy     CARDIOVASCULAR SCREENING; LDL GOAL LESS THAN 130     Advanced directives, counseling/discussion     Peripheral neuropathy     GI bleed     Anemia     Near syncope     Anemia due to blood loss, acute     Atrial fibrillation (H) with mitral regurgitation and congestive heart failure     Bradycardia     CKD (chronic kidney disease) stage 3, GFR 30-59 ml/min (H)     Cardiac pacemaker in situ     Long-term (current) use of anticoagulants [Z79.01]     Degenerative arthritis of hip     Malignant neoplasm of other specified sites of bladder     Dysplasia of prostate     Long term current use of anticoagulants with INR goal of 2.0-3.0     Hematuria     Hospital discharge follow-up     Urinary retention     DAWSON (obstructive sleep apnea)     Past Surgical History:    Procedure Laterality Date     ARTHROPLASTY HIP Right 3/27/2017    Procedure: ARTHROPLASTY HIP;  Surgeon: Jigar Wynn MD;  Location: [facility] OR     C NONSPECIFIC PROCEDURE      bilat inguinal hernia repairs ( 3total procedures)      C NONSPECIFIC PROCEDURE      pilonidal cyst     C NONSPECIFIC PROCEDURE      T + A     C NONSPECIFIC PROCEDURE       R+L total knee     CARDIOVERSION  3/26/15     COLONOSCOPY       CYSTOSCOPY       CYSTOSCOPY, FULGURATE BLEEDERS, EVACUATE CLOT(S), COMBINED N/A 2019    Procedure: Exam under anesthesia, video cystopanendoscopy, evacuation of clots, fulguration of prostatic veins.;  Surgeon: Ilir Ramirez MD;  Location: [facility] OR     CYSTOSCOPY, RETROGRADES, COMBINED Bilateral 2019    Procedure: Video cystopanendoscopy, evacuation of clots, cup biopsies of bladder wall, electrovaporization of prostate, transurethral resection of prostate, bilateral retrograde ureteropyelogram.;  Surgeon: Ilir Ramirez MD;  Location: [facility] OR     CYSTOSCOPY, TRANSURETHRAL RESECTION (TUR) PROSTATE, COMBINED  2019    Procedure: Cystoscopy, transurethral resection of prostate;  Surgeon: Ilir Ramirez MD;  Location: [facility] OR     HERNIA REPAIR       IMPLANT PACEMAKER  3/26/15     RELEASE CARPAL TUNNEL Right 2017    Procedure: RELEASE CARPAL TUNNEL;  Right carpal tunnel release;  Surgeon: Alonso Barboza MD;  Location: [facility] OR       Social History     Tobacco Use     Smoking status: Former Smoker     Last attempt to quit: 1977     Years since quittin.6     Smokeless tobacco: Never Used   Substance Use Topics     Alcohol use: No     Alcohol/week: 0.0 standard drinks     Family History   Problem Relation Age of Onset     Heart Disease Father          87yo     Coronary Artery Disease Father      Heart Disease Mother          76yo     Coronary Artery Disease Mother      Family History Negative Brother          Current Outpatient Medications   Medication Sig  Dispense Refill     ACETAMINOPHEN PO Take 650 mg by mouth every 4 hours as needed for pain       Ascorbic Acid (VITAMIN C PO) Take 500 mg by mouth daily        calcium carbonate (OS-KARLA 500 MG [place]. [place]) 500 MG tablet Take 500 mg by mouth daily        carvedilol (COREG) 25 MG tablet Take 1.5 tablets (37.5 mg) by mouth 2 times daily (with meals) 270 tablet 3     docusate sodium (COLACE) 100 MG capsule Take 100 mg by mouth 2 times daily       finasteride (PROSCAR) 5 MG tablet TAKE ONE TABLET BY MOUTH  DAILY 90 tablet 1     JANTOVEN ANTICOAGULANT 2.5 MG tablet TAKE ONE-HALF TABLET (1.25MG) BY MOUTH ON TUESDAY AND FRIDAY AND TAKE ONE TABLET (2.5MG) BY MOUTH ONCE DAILY ALL OTHER DAYS 30 tablet 1     losartan (COZAAR) 50 MG tablet Take 1 tablet (50 mg) by mouth daily 90 tablet 3     Multiple Vitamins-Minerals (OCUVITE PO) Take 1 tablet by mouth 2 times daily        order for DME Oxygen 2 Li/min  at night in CPAP 1 Device 0     order for DME Oxygen 2 Li/min  at night 1 Device 0     order for DME Oxygen 2 Li/min  at night with CPAP  SPO2 less than or equal to oxygen saturation 89% on effective CPAP in patient with known cardiomyopathy 1 Device 0     triamterene-HCTZ (MAXZIDE-25) 37.5-25 MG tablet Take 1 tablet by mouth daily 90 tablet 3     vitamin B complex with vitamin C (VITAMIN  B COMPLEX) TABS tablet Take 1 tablet by mouth daily       VITAMIN D, CHOLECALCIFEROL, PO Take 2,000 Units by mouth daily.       warfarin (JANTOVEN) 2.5 MG tablet Take 2.5 mg by mouth daily       warfarin ANTICOAGULANT (JANTOVEN ANTICOAGULANT) 2.5 MG tablet TAKE ONE TABLET BY MOUTH ONCE DAILY EXCEPT TAKE ONE-HALF TABLET (1.25MG) ON TUESDAYS AND FRIDAYS OR AS DIRECTED BY INR CLINIC 30 tablet 1       Reviewed and updated as needed this visit by Provider         Review of Systems   ROS COMP: Constitutional, HEENT, cardiovascular, pulmonary, gi and gu systems are negative, except as otherwise noted.       Objective     Normal speech and affect      Remainder of exam unable to be completed due to telephone visits    Diagnostic Test Results:  Labs reviewed in Epic        Assessment/Plan:  1. Idiopathic progressive neuropathy  Falls precautions advised     2. Essential hypertension with goal blood pressure less than 140/90  Monitor BP, continue treatment     3. Permanent atrial fibrillation  Continue AC     4. Gross hematuria  No recurrence, normal HGB, monitor       No follow-ups on file.      Phone call duration:  12 minutes    Kwadwo Winslow MD

## 2020-04-21 DIAGNOSIS — N40.1 HYPERTROPHY OF PROSTATE WITH URINARY OBSTRUCTION: ICD-10-CM

## 2020-04-21 DIAGNOSIS — N13.8 HYPERTROPHY OF PROSTATE WITH URINARY OBSTRUCTION: ICD-10-CM

## 2020-04-22 RX ORDER — FINASTERIDE 5 MG/1
TABLET, FILM COATED ORAL
Qty: 90 TABLET | Refills: 3 | Status: SHIPPED | OUTPATIENT
Start: 2020-04-22 | End: 2021-03-30

## 2020-04-22 NOTE — TELEPHONE ENCOUNTER
Prescription approved per [facility] Refill Protocol.  Savannah Ochoa RN.

## 2020-05-19 ENCOUNTER — ANTICOAGULATION THERAPY VISIT (OUTPATIENT)
Dept: ANTICOAGULATION | Facility: CLINIC | Age: 85
End: 2020-05-19

## 2020-05-19 DIAGNOSIS — I48.91 ATRIAL FIBRILLATION (H): ICD-10-CM

## 2020-05-19 DIAGNOSIS — I48.21 PERMANENT ATRIAL FIBRILLATION (H): ICD-10-CM

## 2020-05-19 DIAGNOSIS — Z79.01 LONG TERM CURRENT USE OF ANTICOAGULANTS WITH INR GOAL OF 2.0-3.0: ICD-10-CM

## 2020-05-19 LAB
CAPILLARY BLOOD COLLECTION: NORMAL
INR PPP: 2 (ref 0.86–1.14)

## 2020-05-19 PROCEDURE — 36416 COLLJ CAPILLARY BLOOD SPEC: CPT | Performed by: INTERNAL MEDICINE

## 2020-05-19 PROCEDURE — 85610 PROTHROMBIN TIME: CPT | Performed by: INTERNAL MEDICINE

## 2020-05-19 PROCEDURE — 99207 ZZC NO CHARGE NURSE ONLY: CPT | Performed by: INTERNAL MEDICINE

## 2020-05-22 ENCOUNTER — TELEPHONE (OUTPATIENT)
Dept: SLEEP MEDICINE | Facility: CLINIC | Age: 85
End: 2020-05-22

## 2020-05-22 ENCOUNTER — MEDICAL CORRESPONDENCE (OUTPATIENT)
Dept: HEALTH INFORMATION MANAGEMENT | Facility: CLINIC | Age: 85
End: 2020-05-22

## 2020-05-22 NOTE — TELEPHONE ENCOUNTER
CNM for oxygen received from [facility]. Orders  signed, faxed and sent to scanning.     Ivory Zapata [facility]

## 2020-05-23 DIAGNOSIS — Z79.01 LONG TERM CURRENT USE OF ANTICOAGULANT THERAPY: ICD-10-CM

## 2020-05-23 DIAGNOSIS — I48.91 ATRIAL FIBRILLATION (H): ICD-10-CM

## 2020-05-26 ENCOUNTER — TELEPHONE (OUTPATIENT)
Dept: UROLOGY | Facility: CLINIC | Age: 85
End: 2020-05-26

## 2020-05-26 NOTE — TELEPHONE ENCOUNTER
Called to screen for Covid-19 symptoms prior to tomorrow's visit.  He does not have symptoms at this time.    Daphne Yost, EMT

## 2020-05-27 ENCOUNTER — OFFICE VISIT (OUTPATIENT)
Dept: UROLOGY | Facility: CLINIC | Age: 85
End: 2020-05-27
Payer: COMMERCIAL

## 2020-05-27 VITALS
WEIGHT: 183 LBS | SYSTOLIC BLOOD PRESSURE: 112 MMHG | BODY MASS INDEX: 26.2 KG/M2 | DIASTOLIC BLOOD PRESSURE: 64 MMHG | HEIGHT: 70 IN

## 2020-05-27 DIAGNOSIS — R31.0 GROSS HEMATURIA: Primary | ICD-10-CM

## 2020-05-27 DIAGNOSIS — C61 PROSTATE CANCER (H): ICD-10-CM

## 2020-05-27 LAB
ALBUMIN UR-MCNC: 100 MG/DL
APPEARANCE UR: ABNORMAL
BILIRUB UR QL STRIP: NEGATIVE
COLOR UR AUTO: ABNORMAL
GLUCOSE UR STRIP-MCNC: NEGATIVE MG/DL
HGB UR QL STRIP: ABNORMAL
KETONES UR STRIP-MCNC: NEGATIVE MG/DL
LEUKOCYTE ESTERASE UR QL STRIP: NEGATIVE
NITRATE UR QL: NEGATIVE
PH UR STRIP: 5 PH (ref 5–7)
RESIDUAL VOLUME (RV) (EXTERNAL): 44
SOURCE: ABNORMAL
SP GR UR STRIP: 1.02 (ref 1–1.03)
UROBILINOGEN UR STRIP-ACNC: 0.2 EU/DL (ref 0.2–1)

## 2020-05-27 PROCEDURE — 36415 COLL VENOUS BLD VENIPUNCTURE: CPT | Performed by: UROLOGY

## 2020-05-27 PROCEDURE — 81003 URINALYSIS AUTO W/O SCOPE: CPT | Mod: QW | Performed by: UROLOGY

## 2020-05-27 PROCEDURE — 99213 OFFICE O/P EST LOW 20 MIN: CPT | Mod: 25 | Performed by: UROLOGY

## 2020-05-27 PROCEDURE — 84153 ASSAY OF PSA TOTAL: CPT | Performed by: UROLOGY

## 2020-05-27 PROCEDURE — 88120 CYTP URNE 3-5 PROBES EA SPEC: CPT | Performed by: UROLOGY

## 2020-05-27 PROCEDURE — 51798 US URINE CAPACITY MEASURE: CPT | Performed by: UROLOGY

## 2020-05-27 RX ORDER — BACLOFEN 10 MG/1
10 TABLET ORAL 3 TIMES DAILY
COMMUNITY
End: 2021-11-03

## 2020-05-27 RX ORDER — WARFARIN SODIUM 2.5 MG/1
TABLET ORAL
Qty: 30 TABLET | Refills: 4 | Status: SHIPPED | OUTPATIENT
Start: 2020-05-27 | End: 2020-06-02

## 2020-05-27 ASSESSMENT — MIFFLIN-ST. JEOR: SCORE: 1501.33

## 2020-05-27 ASSESSMENT — PAIN SCALES - GENERAL: PAINLEVEL: NO PAIN (0)

## 2020-05-27 NOTE — TELEPHONE ENCOUNTER
Pending Prescriptions:                       Disp   Refills    JANTOVEN ANTICOAGULANT 2.5 MG tablet [Phar*30 tab*1        Sig: TAKE ONE-HALF TABLET (1.25MG) BY MOUTH EVERY DAY ON           TUESDAY AND FRIDAY AND TAKE ONE TABLET (2.5MG) BY           MOUTH ONCE DAILY ON ALL OTHER DAYS

## 2020-05-27 NOTE — PROGRESS NOTES
Mr. Slater is an 89-year-old gentleman whose had chronic hematuria because of Coumadin.  He had prostate cancer treated with radiation years ago and last year underwent cystoscopy with evacuation of clots, bladder biopsies and a TURP.  The specimen showed no evidence for bladder cancer and 1 out of several 100 chips involving a low-grade adenocarcinoma.  He has been seen some initial painless gross hematuria off and on.  He is voiding well and has only a 24 cc postvoid residual today.  His urine is grossly clear but concentrated.  Urine sample is sent for FISH test  Other past medical history: Hypertension, osteoarthrosis, knee replacements, hip replacement, peripheral neuropathy, GI bleed, anemia, near syncope, atrial fibrillation, bradycardia, pacemaker, arthritis, sleep apnea, former smoker  No family history of prostate cancer  Medications: Tylenol, vitamin C, baclofen, calcium carbonate, Coreg, Voltaren gel, Colace, finasteride, losartan, multivitamin, Maxide, vitamin B/vitamin C, vitamin D, low warfarin  Allergies: Morphine, amlodipine, merbroman  Review of systems: As above  Exam: Alert and oriented, normal vital signs, normal appearance.  Normal respirations, neuro grossly intact  Assessment: Painless, initial gross hematuria probably due to radiation and warfarin-source would be from urethra and prostate.  Plan: Renal ultrasound, urine FISH test, office cystoscopy-need to rule out renal mass, transitional cell carcinoma of the bladder.  Alternatives, follow-up discussed.  Update PSA

## 2020-05-27 NOTE — LETTER
5/27/2020        RE: Tucker Slater  [address] [facility] [address]     Dear Colleague,    Thank you for referring your patient, Tucker Slater, to the [facility] [place] at [facility]. Please see a copy of my visit note below.    Mr. Slater is an 89-year-old gentleman whose had chronic hematuria because of Coumadin.  He had prostate cancer treated with radiation years ago and last year underwent cystoscopy with evacuation of clots, bladder biopsies and a TURP.  The specimen showed no evidence for bladder cancer in 1 out of several 100 chips involving a low-grade adenocarcinoma.  He has been seen some initial painless gross hematuria off and on.  He is voiding well and has only a 24 cc postvoid residual today.  His urine is grossly clear but concentrated.  Urine sample is sent for FISH test  Other past medical history: Hypertension, osteoarthrosis, knee replacements, hip replacement, peripheral neuropathy, GI bleed, anemia, near syncope, atrial fibrillation, bradycardia, pacemaker, arthritis, sleep apnea, former smoker  No family history of prostate cancer  Medications: Tylenol, vitamin C, baclofen, calcium carbonate, Coreg, Voltaren gel, Colace, finasteride, losartan, multivitamin, Maxide, vitamin B/vitamin C, vitamin D, low warfarin  Allergies: Morphine, amlodipine, merbroman  Review of systems: As above  Exam: Alert and oriented, normal vital signs, normal appearance.  Normal respirations, neuro grossly intact  Assessment: Painless, initial gross hematuria probably due to radiation and warfarin-source would be from urethra and prostate.  Plan: Renal ultrasound, urine FISH test, office cystoscopy-need to rule out renal mass, transitional cell carcinoma of the bladder.  Alternatives, follow-up discussed.  Update PSA      Again, thank you for allowing me to participate in the care of your patient.       Sincerely,    Ilir Ramirez MD

## 2020-05-27 NOTE — TELEPHONE ENCOUNTER
"Requested Prescriptions   Pending Prescriptions Disp Refills     JANTOVEN ANTICOAGULANT 2.5 MG tablet [Pharmacy Med Name: JANTOVEN 2.5MG TABS] 30 tablet 1     Sig: TAKE ONE-HALF TABLET (1.25MG) BY MOUTH EVERY DAY ON TUESDAY AND FRIDAY AND TAKE ONE TABLET (2.5MG) BY MOUTH ONCE DAILY ON ALL OTHER DAYS       Vitamin K Antagonists Failed - 5/23/2020  8:44 PM        Failed - INR is within goal in the past 6 weeks     Confirm INR is within goal in the past 6 weeks.     Recent Labs   Lab Test 05/19/20  1249   INR 2.00*                       Passed - Recent (12 mo) or future (30 days) visit within the authorizing provider's specialty     Patient has had an office visit with the authorizing provider or a provider within the authorizing providers department within the previous 12 mos or has a future within next 30 days. See \"Patient Info\" tab in inbasket, or \"Choose Columns\" in Meds & Orders section of the refill encounter.              Passed - Medication is active on med list        Passed - Patient is 18 years of age or older           Last office visit 4/14/20.  Warfarin dosing is managed by INR Clinic.  Prescription approved per [facility] Refill Protocol.  Ivory Machado RN    "

## 2020-05-28 LAB — PSA SERPL-MCNC: 0.33 UG/L (ref 0–4)

## 2020-06-01 NOTE — PROGRESS NOTES
"TELEPHONE VISIT    Tucker Slater is a 89 year old male who is being evaluated via a billable telephone visit.      The patient has been notified of following:     \"This telephone visit will be conducted via a call between you and your physician/provider. Given concern for spread of COVID 19 we are minimizing in person clinic visits when possible. We have found that certain health care needs can be provided without the need for a physical exam.  This service lets us provide the care you need with a short phone conversation.  If a prescription is necessary we can send it directly to your pharmacy.  If lab work is needed we can place an order for that and you can then stop by our lab to have the test done at a later time. If during the course of the call the physician/provider feels a telephone visit is not appropriate, you will not be charged for this service.\"       I have reviewed and updated the patient's Past Medical History, Social History, Family History and Medication List.    MEDICATIONS:  Current Outpatient Medications   Medication Sig Dispense Refill     ACETAMINOPHEN PO Take 650 mg by mouth every 4 hours as needed for pain       Ascorbic Acid (VITAMIN C PO) Take 500 mg by mouth daily        baclofen (LIORESAL) 10 MG tablet Take 10 mg by mouth 3 times daily       calcium carbonate (OS-KARLA 500 MG [place]. [place]) 500 MG tablet Take 500 mg by mouth daily        carvedilol (COREG) 25 MG tablet Take 1.5 tablets (37.5 mg) by mouth 2 times daily (with meals) 270 tablet 3     diclofenac (VOLTAREN) 1 % topical gel Place 2 g onto the skin 4 times daily       docusate sodium (COLACE) 100 MG capsule Take 100 mg by mouth 2 times daily       finasteride (PROSCAR) 5 MG tablet TAKE ONE TABLET BY MOUTH ONCE DAILY 90 tablet 3     losartan (COZAAR) 50 MG tablet Take 1 tablet (50 mg) by mouth daily 90 tablet 3     Multiple Vitamins-Minerals (OCUVITE PO) Take 1 tablet by mouth 2 times daily        order for DME Oxygen 2 " Li/min  at night in CPAP 1 Device 0     order for DME Oxygen 2 Li/min  at night 1 Device 0     order for DME Oxygen 2 Li/min  at night with CPAP  SPO2 less than or equal to oxygen saturation 89% on effective CPAP in patient with known cardiomyopathy 1 Device 0     triamterene-HCTZ (MAXZIDE-25) 37.5-25 MG tablet Take 1 tablet by mouth daily 90 tablet 3     vitamin B complex with vitamin C (VITAMIN  B COMPLEX) TABS tablet Take 1 tablet by mouth daily       VITAMIN D, CHOLECALCIFEROL, PO Take 2,000 Units by mouth daily.       warfarin (JANTOVEN) 2.5 MG tablet Take 2.5 mg by mouth daily       warfarin ANTICOAGULANT (JANTOVEN ANTICOAGULANT) 2.5 MG tablet Take one-half tablet (1.25 mg) on Tuesday and Friday and take one tablet (2.5 mg) Sun, Mon, Wed, Th, Sat 30 tablet 4     warfarin ANTICOAGULANT (JANTOVEN ANTICOAGULANT) 2.5 MG tablet TAKE ONE TABLET BY MOUTH ONCE DAILY EXCEPT TAKE ONE-HALF TABLET (1.25MG) ON TUESDAYS AND FRIDAYS OR AS DIRECTED BY INR CLINIC 30 tablet 1       ALLERGIES  Merbromin; Morphine; and Amlodipine    Review Of Systems  Skin: negative  Eyes: glasses  Ears/Nose/Throat: negative  Respiratory: No shortness of breath, dyspnea on exertion, cough, or hemoptysis  Cardiovascular: negative  Gastrointestinal: negative  Genitourinary: frequency and prostate  Musculoskeletal: arthritis  Neurologic: numbness and tingling of the hands and feet   Psychiatric: negative  Hematologic/Lymphatic/Immunologic: negative  Endocrine: diabetes   Patient reported vitals:  BP: na  Heart rate: na  Weight: 183 lbs    (ROS TAKEN BY Pattie Dickens CMA   PRIOR TO VISIT)    Patient agreeable and consented for telephone visit:  Yes    HISTORY OF PRESENT ILLNESS  This is a 89 year old male who follows with [facility] [place].  He is a patient of Dr. orlando seen in our clinic for atrial arrhythmias, s/p PPM,  HTN, CKD, cardiomyopathy, and sleep apnea.    Mr. Slater has a long history of atrial fibrillation and flutter.   Due to symptomatic bradycardia he underwent a single-chamber PPM (2015)  He intermittently has been off Warfarin due to hematuria and follows with dr. Ramirez.  He has deferred Watchman in the past  Currently, his INR goal is 2-2.5    An ECHO (8/2018) showed LVEF 45-50% which is felt to be due to RV pacing. Mild valvular disease and mild ascending aorta enlargement.  He historically has not had HF    Last interrogation (3/2020) showed 98% V-paced with underlying Afib  Our visit today if for a 6 month review    Mr Slater is still able to do all his activities of daily living without difficulty.  He denies any CP, SOB, or palpitations.  He still gets intermittent hematuria, but this has been stable for the past year.  He requires intermittent prostate ablation procedures to help.  His hemoglobin is stable at 14.  He denies any orthopnea or peripheral edema. He does not check his BP at home  Weights stable at home around 183 lbs            ASSESSMENT AND PLAN    Permanent Atrial Fibrillation  -HRs controlled with Coreg  -chronic warfarin  (INR goal 2-2.5)  -ongoing minimal hematuria.  Has intermittent prostate ablation procedures   -hgb 14 (1/2020)    S/p PPM   -98% V-paced    HTN:  -On Coreg 37.5, Losartan 50 mg, maxzide 37.5/25 mg  -does not check BP at home    Nonischemic CM  -LVEF 45-50%  -denies symptoms of HF    Sleep apnea  -uses CPAP      Thank you for allowing me to participate in his care.  We will continue current medical regimen for now  He will get a BMP and hemoglobin drawn next week at his PMD and we review results with him over the phone. Otherwise, we will have him return in 9 months with Dr. Perez        I have reviewed the note as documented above.  This accurately captures the substance of my conversation with the patient.      Phone call contact time  Call Started at 11:08 am  Call Ended at 11:22 am    SELMA Landis, JEN

## 2020-06-02 ENCOUNTER — VIRTUAL VISIT (OUTPATIENT)
Dept: CARDIOLOGY | Facility: CLINIC | Age: 85
End: 2020-06-02
Attending: INTERNAL MEDICINE
Payer: COMMERCIAL

## 2020-06-02 DIAGNOSIS — I50.42 CHRONIC COMBINED SYSTOLIC AND DIASTOLIC CONGESTIVE HEART FAILURE (H): ICD-10-CM

## 2020-06-02 DIAGNOSIS — I10 ESSENTIAL HYPERTENSION WITH GOAL BLOOD PRESSURE LESS THAN 140/90: ICD-10-CM

## 2020-06-02 DIAGNOSIS — Z95.0 CARDIAC PACEMAKER IN SITU: Primary | ICD-10-CM

## 2020-06-02 PROCEDURE — 99213 OFFICE O/P EST LOW 20 MIN: CPT | Mod: 95 | Performed by: NURSE PRACTITIONER

## 2020-06-02 RX ORDER — CARVEDILOL 25 MG/1
37.5 TABLET ORAL 2 TIMES DAILY WITH MEALS
Qty: 270 TABLET | Refills: 3 | Status: SHIPPED | OUTPATIENT
Start: 2020-06-02 | End: 2021-04-05

## 2020-06-02 NOTE — LETTER
6/2/2020    Kwadwo Winslow MD  [address] [facility] [address]    RE: Tucker Slater       Dear Colleague,    I had the pleasure of seeing Tucker Slater in the [facility].    HISTORY OF PRESENT ILLNESS  This is a 89 year old male who follows with [facility].  He is a patient of Dr. orlando seen in our clinic for atrial arrhythmias, s/p PPM,  HTN, CKD, cardiomyopathy, and sleep apnea.    Mr. Slater has a long history of atrial fibrillation and flutter.  Due to symptomatic bradycardia he underwent a single-chamber PPM (2015)  He intermittently has been off Warfarin due to hematuria and follows with dr. Ramirez.  He has deferred Watchman in the past  Currently, his INR goal is 2-2.5    An ECHO (8/2018) showed LVEF 45-50% which is felt to be due to RV pacing. Mild valvular disease and mild ascending aorta enlargement.  He historically has not had HF    Last interrogation (3/2020) showed 98% V-paced with underlying Afib  Our visit today if for a 6 month review    Mr Slater is still able to do all his activities of daily living without difficulty.  He denies any CP, SOB, or palpitations.  He still gets intermittent hematuria, but this has been stable for the past year.  He requires intermittent prostate ablation procedures to help.  His hemoglobin is stable at 14.  He denies any orthopnea or peripheral edema. He does not check his BP at home  Weights stable at home around 183 lbs        ASSESSMENT AND PLAN    Permanent Atrial Fibrillation  -HRs controlled with Coreg  -chronic warfarin  (INR goal 2-2.5)  -ongoing minimal hematuria.  Has intermittent prostate ablation procedures   -hgb 14 (1/2020)    S/p PPM   -98% V-paced    HTN:  -On Coreg 37.5, Losartan 50 mg, maxzide 37.5/25 mg  -does not check BP at home    Nonischemic CM  -LVEF 45-50%  -denies symptoms of HF    Sleep apnea  -uses CPAP      Thank you for allowing me to participate in his care.  We  will continue current medical regimen for now  He will get a BMP and hemoglobin drawn next week at his PMD and we review results with him over the phone. Otherwise, we will have him return in 9 months with Dr. Perez        I have reviewed the note as documented above.  This accurately captures the substance of my conversation with the patient.      Phone call contact time  Call Started at 11:08 am  Call Ended at 11:22 am      Thank you for allowing me to participate in the care of your patient.    Sincerely,     SELMA Santos [facility]

## 2020-06-03 ENCOUNTER — HOSPITAL ENCOUNTER (OUTPATIENT)
Dept: ULTRASOUND IMAGING | Facility: CLINIC | Age: 85
Discharge: HOME | End: 2020-06-03
Attending: UROLOGY | Admitting: UROLOGY
Payer: COMMERCIAL

## 2020-06-03 DIAGNOSIS — R31.0 GROSS HEMATURIA: ICD-10-CM

## 2020-06-03 PROCEDURE — 76770 US EXAM ABDO BACK WALL COMP: CPT

## 2020-06-08 LAB — COPATH REPORT: NORMAL

## 2020-06-09 DIAGNOSIS — I10 ESSENTIAL HYPERTENSION WITH GOAL BLOOD PRESSURE LESS THAN 140/90: ICD-10-CM

## 2020-06-09 LAB — HGB BLD-MCNC: 13.4 G/DL (ref 13.3–17.7)

## 2020-06-09 PROCEDURE — 85018 HEMOGLOBIN: CPT | Performed by: NURSE PRACTITIONER

## 2020-06-09 PROCEDURE — 80048 BASIC METABOLIC PNL TOTAL CA: CPT | Performed by: NURSE PRACTITIONER

## 2020-06-09 PROCEDURE — 36415 COLL VENOUS BLD VENIPUNCTURE: CPT | Performed by: NURSE PRACTITIONER

## 2020-06-10 LAB
ANION GAP SERPL CALCULATED.3IONS-SCNC: 7 MMOL/L (ref 3–14)
BUN SERPL-MCNC: 30 MG/DL (ref 7–30)
CALCIUM SERPL-MCNC: 8.9 MG/DL (ref 8.5–10.1)
CHLORIDE SERPL-SCNC: 111 MMOL/L (ref 94–109)
CO2 SERPL-SCNC: 22 MMOL/L (ref 20–32)
CREAT SERPL-MCNC: 1.2 MG/DL (ref 0.66–1.25)
GFR SERPL CREATININE-BSD FRML MDRD: 53 ML/MIN/{1.73_M2}
GLUCOSE SERPL-MCNC: 105 MG/DL (ref 70–99)
POTASSIUM SERPL-SCNC: 4 MMOL/L (ref 3.4–5.3)
SODIUM SERPL-SCNC: 140 MMOL/L (ref 133–144)

## 2020-06-23 ENCOUNTER — ANTICOAGULATION THERAPY VISIT (OUTPATIENT)
Dept: ANTICOAGULATION | Facility: CLINIC | Age: 85
End: 2020-06-23

## 2020-06-23 DIAGNOSIS — Z79.01 LONG TERM CURRENT USE OF ANTICOAGULANTS WITH INR GOAL OF 2.0-3.0: ICD-10-CM

## 2020-06-23 DIAGNOSIS — I48.91 ATRIAL FIBRILLATION (H): ICD-10-CM

## 2020-06-23 LAB
CAPILLARY BLOOD COLLECTION: NORMAL
INR PPP: 1.8 (ref 0.86–1.14)

## 2020-06-23 PROCEDURE — 36416 COLLJ CAPILLARY BLOOD SPEC: CPT | Performed by: INTERNAL MEDICINE

## 2020-06-23 PROCEDURE — 99207 ZZC NO CHARGE NURSE ONLY: CPT | Performed by: INTERNAL MEDICINE

## 2020-06-23 PROCEDURE — 85610 PROTHROMBIN TIME: CPT | Performed by: INTERNAL MEDICINE

## 2020-06-23 NOTE — PROGRESS NOTES
ANTICOAGULATION FOLLOW-UP CLINIC VISIT    Patient Name:  Tucker Slater  Date:  2020  Contact Type:  Telephone/ Called patient, he denies any changes or missed warfarin doses. Orders discussed with patient.     SUBJECTIVE:  Patient Findings     Positives:   Upcoming invasive procedure (Patient has a cystoscopy scheduled 20.. Patient will call Urology to see if he needs to hold warfarin. )    Comments:   The patient was assessed for diet, medication, and activity level changes, missed or extra doses, bruising or bleeding, with no problem findings.  Patient denies any identifiable changes that caused the subtherapeutic INR.            Clinical Outcomes     Comments:   The patient was assessed for diet, medication, and activity level changes, missed or extra doses, bruising or bleeding, with no problem findings.  Patient denies any identifiable changes that caused the subtherapeutic INR.               OBJECTIVE    Recent labs: (last 7 days)     20  1315   INR 1.80*       ASSESSMENT / PLAN  INR assessment SUB    Recheck INR In: 1 WEEK    INR Location Outside lab      Anticoagulation Summary  As of 2020    INR goal:   2.0-2.5   TTR:   53.0 % (10.5 mo)   INR used for dosin.80! (2020)   Warfarin maintenance plan:   1.25 mg (2.5 mg x 0.5) every Tue, Fri; 2.5 mg (2.5 mg x 1) all other days   Full warfarin instructions:   : 2.5 mg; Otherwise 1.25 mg every Tue, Fri; 2.5 mg all other days   Weekly warfarin total:   15 mg   Plan last modified:   Ivory Machado RN (2019)   Next INR check:   2020   Priority:   Maintenance   Target end date:       Indications    Atrial fibrillation (H) with mitral regurgitation and congestive heart failure [I48.91]  Long term current use of anticoagulants with INR goal of 2.0-3.0 [Z79.01]             Anticoagulation Episode Summary     INR check location:       Preferred lab:       [facility]    Comments:    Patient has a history of hematuria when INR is elevated.  Will try to keep INR closer to 2.0.      Anticoagulation Care Providers     Provider Role Specialty Phone number    Kwadwo Winslow MD [facility] Internal Medicine [phone]            See the Encounter Report to view Anticoagulation Flowsheet and Dosing Calendar (Go to Encounters tab in chart review, and find the Anticoagulation Therapy Visit)    Dosage adjustment made based on physician directed care plan.    Gail Valdovinos RN

## 2020-06-24 NOTE — PROGRESS NOTES
Patient reports that urology does not want patient to hold prior to cystoscopy on 6/25/20 and patient will continue as instructed.  Dodie Jones RN

## 2020-06-25 ENCOUNTER — ANCILLARY PROCEDURE (OUTPATIENT)
Dept: CARDIOLOGY | Facility: CLINIC | Age: 85
End: 2020-06-25
Attending: INTERNAL MEDICINE
Payer: COMMERCIAL

## 2020-06-25 DIAGNOSIS — Z95.0 CARDIAC PACEMAKER IN SITU: Primary | ICD-10-CM

## 2020-06-25 DIAGNOSIS — R00.1 BRADYCARDIA: ICD-10-CM

## 2020-06-25 DIAGNOSIS — Z95.0 PACEMAKER: ICD-10-CM

## 2020-06-25 PROCEDURE — 93294 REM INTERROG EVL PM/LDLS PM: CPT | Performed by: INTERNAL MEDICINE

## 2020-06-25 PROCEDURE — 93296 REM INTERROG EVL PM/IDS: CPT | Performed by: INTERNAL MEDICINE

## 2020-06-25 NOTE — PROGRESS NOTES
[employer] (D) Remote PPM Device Check    : 98 %    Mode: VVIR 70    Presenting Rhythm: Afib with regular . On ___    Heart Rate: Stable with adequate variability    Sensing: wnl    Pacing Threshold: stable    Impedance: stable    Battery Status: 10-10.7 years    Ventricular Arrhythmia: 0    Care Plan: Follow-up in three months with remote device check on 10/01/20. Will continue doing remote device checks as patient has auto on for all lead measurements.       I have reviewed and interpreted the device interrogation, settings, programming and nurse's summary. The device is functioning within normal device parameters. I agree with the current findings, assessment and plan.

## 2020-06-30 ENCOUNTER — TELEPHONE (OUTPATIENT)
Dept: INTERNAL MEDICINE | Facility: CLINIC | Age: 85
End: 2020-06-30

## 2020-06-30 DIAGNOSIS — Z79.01 LONG TERM CURRENT USE OF ANTICOAGULANTS WITH INR GOAL OF 2.0-3.0: ICD-10-CM

## 2020-06-30 DIAGNOSIS — I48.91 ATRIAL FIBRILLATION (H): ICD-10-CM

## 2020-06-30 DIAGNOSIS — I48.21 PERMANENT ATRIAL FIBRILLATION (H): ICD-10-CM

## 2020-06-30 LAB
CAPILLARY BLOOD COLLECTION: NORMAL
INR PPP: 1.8 (ref 0.86–1.14)
INR PPP: 1.8 (ref 0.9–1.1)

## 2020-06-30 PROCEDURE — 36416 COLLJ CAPILLARY BLOOD SPEC: CPT | Performed by: INTERNAL MEDICINE

## 2020-06-30 PROCEDURE — 85610 PROTHROMBIN TIME: CPT | Performed by: INTERNAL MEDICINE

## 2020-06-30 NOTE — TELEPHONE ENCOUNTER
ANTICOAGULATION MANAGEMENT     Patient Name:  Tucker Slater  Date:  2020    ASSESSMENT /SUBJECTIVE:    Today's INR result of 1.8 is subtherapeutic. Goal INR of 2.0-3.0      Warfarin dose taken: Warfarin taken as previously instructed    Diet: No new diet changes affecting INR    Medication changes/ interactions: No new medications/supplements affecting INR    Previous INR: Subtherapeutic     S/S of bleeding or thromboembolism: No    New injury or illness: No    Upcoming surgery, procedure or cardioversion: No    Additional findings: None      PLAN:    Spoke with Tucker regarding INR result and instructed:     Warfarin Dosing Instructions: 2.5 mg tonight then continue your current warfarin dose of 1.25 mg on T/F; 2.5 mg ROW  Patient reports that the cardiologist wants to keep his INR closer to 2.0 than 2.5, so will give the current dosing another week before considering changing to 2.5 mg daily    Instructed patient to follow up no later than: 1 week  Lab visit scheduled    Education provided: None required      Del verbalizes understanding and agrees to warfarin dosing plan.    Instructed to call the Anticoagulation Clinic for any changes, questions or concerns. (#[phone])        OBJECTIVE:  INR   Date Value Ref Range Status   2020 1.80 (H) 0.86 - 1.14 Final     Comment:     This test is intended for monitoring Coumadin therapy.  Results are not   accurate in patients with prolonged INR due to factor deficiency.               Anticoagulation Summary  As of 2020    INR goal:   2.0-2.5   TTR:   51.9 % (10.8 mo)   INR used for dosin.8! (2020)   Warfarin maintenance plan:   1.25 mg (2.5 mg x 0.5) every Tue, Fri; 2.5 mg (2.5 mg x 1) all other days   Full warfarin instructions:   : 2.5 mg; Otherwise 1.25 mg every Tue, Fri; 2.5 mg all other days   Weekly warfarin total:   15 mg   Plan last modified:   Olinda Emerson RN (2020)   Next INR check:   2020   Priority:    Maintenance   Target end date:       Indications    Atrial fibrillation (H) with mitral regurgitation and congestive heart failure [I48.91]  Long term current use of anticoagulants with INR goal of 2.0-3.0 [Z79.01]             Anticoagulation Episode Summary     INR check location:       Preferred lab:       Send INR reminders to:   DEBORAH MCARTHURS    Comments:   Patient has a history of hematuria when INR is elevated.  Will try to keep INR closer to 2.0.      Anticoagulation Care Providers     Provider Role Specialty Phone number    Kwadwo Winslow MD [facility] Internal Medicine [phone]

## 2020-06-30 NOTE — TELEPHONE ENCOUNTER
ANTICOAGULATION MANAGEMENT      Tucker Slater due for annual renewal of referral to anticoagulation monitoring. Order pended for your review and signature.      ANTICOAGULATION SUMMARY      Warfarin indication(s)     Atrial fibrillation    Heart valve present?  NO       Current goal range   INR 2.0-2.5     Goal appropriate for indication? Goal of 2.0-2.5 verified with Dr. Winslow , on 8/2/19 due to hematuria     Current duration of therapy Indefinite/long term therapy   Time in Therapeutic Range (TTR)  (Goal > 60%) 51.9 %     Office visit with referring provider's group within last year yes on 4/14/20       Ivory Machado RN

## 2020-07-01 ENCOUNTER — TELEPHONE (OUTPATIENT)
Dept: UROLOGY | Facility: CLINIC | Age: 85
End: 2020-07-01

## 2020-07-01 NOTE — TELEPHONE ENCOUNTER
Called to screen for CODVID-19 symptoms prior to tomorrow's visit.  Patient does not have any symptoms at this time.    Daphne Yost, EMT

## 2020-07-02 ENCOUNTER — OFFICE VISIT (OUTPATIENT)
Dept: UROLOGY | Facility: CLINIC | Age: 85
End: 2020-07-02
Payer: COMMERCIAL

## 2020-07-02 VITALS
WEIGHT: 188 LBS | BODY MASS INDEX: 26.92 KG/M2 | SYSTOLIC BLOOD PRESSURE: 126 MMHG | DIASTOLIC BLOOD PRESSURE: 80 MMHG | HEIGHT: 70 IN

## 2020-07-02 DIAGNOSIS — R31.0 GROSS HEMATURIA: Primary | ICD-10-CM

## 2020-07-02 DIAGNOSIS — Z79.2 PROPHYLACTIC ANTIBIOTIC: ICD-10-CM

## 2020-07-02 DIAGNOSIS — Z85.46 PERSONAL HISTORY OF MALIGNANT NEOPLASM OF PROSTATE: ICD-10-CM

## 2020-07-02 LAB
ALBUMIN UR-MCNC: 30 MG/DL
APPEARANCE UR: CLEAR
BILIRUB UR QL STRIP: NEGATIVE
COLOR UR AUTO: YELLOW
GLUCOSE UR STRIP-MCNC: NEGATIVE MG/DL
HGB UR QL STRIP: ABNORMAL
KETONES UR STRIP-MCNC: NEGATIVE MG/DL
LEUKOCYTE ESTERASE UR QL STRIP: NEGATIVE
NITRATE UR QL: NEGATIVE
PH UR STRIP: 6 PH (ref 5–7)
SOURCE: ABNORMAL
SP GR UR STRIP: 1.02 (ref 1–1.03)
UROBILINOGEN UR STRIP-ACNC: 0.2 EU/DL (ref 0.2–1)

## 2020-07-02 PROCEDURE — 81003 URINALYSIS AUTO W/O SCOPE: CPT | Mod: QW | Performed by: UROLOGY

## 2020-07-02 PROCEDURE — 52000 CYSTOURETHROSCOPY: CPT | Performed by: UROLOGY

## 2020-07-02 PROCEDURE — 99212 OFFICE O/P EST SF 10 MIN: CPT | Mod: 25 | Performed by: UROLOGY

## 2020-07-02 RX ORDER — CIPROFLOXACIN 500 MG/1
500 TABLET, FILM COATED ORAL ONCE
Qty: 1 TABLET | Refills: 0 | Status: SHIPPED | OUTPATIENT
Start: 2020-07-02 | End: 2020-07-02

## 2020-07-02 RX ORDER — LIDOCAINE HYDROCHLORIDE 20 MG/ML
JELLY TOPICAL ONCE
Status: COMPLETED | OUTPATIENT
Start: 2020-07-02 | End: 2020-07-02

## 2020-07-02 RX ADMIN — LIDOCAINE HYDROCHLORIDE: 20 JELLY TOPICAL at 09:25

## 2020-07-02 ASSESSMENT — PAIN SCALES - GENERAL: PAINLEVEL: NO PAIN (0)

## 2020-07-02 ASSESSMENT — MIFFLIN-ST. JEOR: SCORE: 1524.01

## 2020-07-02 NOTE — PROGRESS NOTES
Mr. Slater returns for cystoscopy.  His renal ultrasound shows simple renal cysts, no obstruction, stones or solid mass.  His urine FISH test is negative and his PSA is down to 0.33.  Other past medical history, medications and allergies reviewed.                                                                  Exam: Alert and oriented, normal external genitalia  Flexible cystoscopy- normal urethra, radiation changes and prostatic fossa and one area of previous hemorrhage noted on right lateral lobe.  No stones.  No bladder tumors, raised erythema, 3-4+ trabeculation and some radiation changes.  Normal ureteral orifices  Assessment: No bladder cancer recurrence after 20+ years.  Prostate cancer treated with radiation-stable PSA.. Bleeding from prostate and radiation changes only.  Plan: Antibiotic x1 today.  Follow-up with PSA and for urinalysis in 1 year.  Antibiotic x1 today

## 2020-07-02 NOTE — LETTER
7/2/2020       RE: Tucker Slater  [address] [facility] [address]     Dear Colleague,    Thank you for referring your patient, Tucker Slater, to the [facility] [place] [facility]. Please see a copy of my visit note below.    Mr. Slater returns for cystoscopy.  His renal ultrasound shows simple renal cysts, no obstruction, stones or solid mass.  His urine FISH test is negative and his PSA is down to 0.33.  Other past medical history, medications and allergies reviewed.                                                                  Exam: Alert and oriented, normal external genitalia  Flexible cystoscopy- normal urethra, radiation changes and prostatic fossa and one area of previous hemorrhage noted on right lateral lobe.  No stones.  No bladder tumors, raised erythema, 3-4+ trabeculation and some radiation changes.  Normal ureteral orifices  Assessment: No bladder cancer recurrence after 20+ years.  Prostate cancer treated with radiation-stable PSA.. Bleeding from prostate and radiation changes only.  Plan: Antibiotic x1 today.  Follow-up with PSA and for urinalysis in 1 year.  Antibiotic x1 today    Again, thank you for allowing me to participate in the care of your patient.      Sincerely,    Ilir Ramirez MD

## 2020-07-02 NOTE — PATIENT INSTRUCTIONS

## 2020-07-02 NOTE — NURSING NOTE
Chief Complaint   Patient presents with     Hematuria     Here for cystoscopy     Prior to the start of the procedure and with procedural staff participation, I verbally confirmed the patient s identity using two indicators, relevant allergies, that the procedure was appropriate and matched the consent or emergent situation, and that the correct equipment/implants were available. Immediately prior to starting the procedure I conducted the Time Out with the procedural staff and re-confirmed the patient s name, procedure, and site/side. I have wiped the patient off with the povidone-Iodine solution, draped them, and used Lidocaine hydrochloride jelly. (The Joint Commission universal protocol was followed.)  Yes    Sedation (Moderate or Deep): None    5mL 2% lidocaine hydrochloride Urojet instilled into urethra.    NDC# [address]-39  Lot #: [id number]  Expiration Date:  01/22    Daphne Yost, EMT

## 2020-07-07 ENCOUNTER — ANTICOAGULATION THERAPY VISIT (OUTPATIENT)
Dept: ANTICOAGULATION | Facility: CLINIC | Age: 85
End: 2020-07-07

## 2020-07-07 DIAGNOSIS — Z79.01 LONG TERM CURRENT USE OF ANTICOAGULANTS WITH INR GOAL OF 2.0-3.0: ICD-10-CM

## 2020-07-07 DIAGNOSIS — I48.91 ATRIAL FIBRILLATION (H): ICD-10-CM

## 2020-07-07 DIAGNOSIS — I48.21 PERMANENT ATRIAL FIBRILLATION (H): ICD-10-CM

## 2020-07-07 LAB
CAPILLARY BLOOD COLLECTION: NORMAL
INR PPP: 1.9 (ref 0.86–1.14)

## 2020-07-07 PROCEDURE — 99207 ZZC NO CHARGE NURSE ONLY: CPT | Performed by: INTERNAL MEDICINE

## 2020-07-07 PROCEDURE — 85610 PROTHROMBIN TIME: CPT | Performed by: INTERNAL MEDICINE

## 2020-07-07 PROCEDURE — 36416 COLLJ CAPILLARY BLOOD SPEC: CPT | Performed by: INTERNAL MEDICINE

## 2020-07-07 NOTE — PROGRESS NOTES
ANTICOAGULATION FOLLOW-UP CLINIC VISIT    Patient Name:  Tucker Slater  Date:  2020  Contact Type:  Telephone/ discussed with patient    SUBJECTIVE:  Patient Findings     Comments:   cystoscopy on .  Procedure went well.  Denies any problems with bleeding or bruising.  Patient states that he has been taking 1.25 mg on Tuesday and 2.5 mg  for a while (we had the 1.25 mg dose on ).  Recommended that he increased his dose to 2.5 mg daily (maintenance dose increase by 7.7%).  The patient was assessed for diet, medication, and activity level changes, missed or extra doses, bruising or bleeding, with no problem findings.  Patient denies any identifiable changes that caused the sub therapeutic INR.              Clinical Outcomes     Negatives:   Major bleeding event, Thromboembolic event, Anticoagulation-related hospital admission, Anticoagulation-related ED visit, Anticoagulation-related fatality    Comments:   cystoscopy on .  Procedure went well.  Denies any problems with bleeding or bruising.  Patient states that he has been taking 1.25 mg on Tuesday and 2.5 mg  for a while (we had the 1.25 mg dose on ).  Recommended that he increased his dose to 2.5 mg daily (maintenance dose increase by 7.7%).  The patient was assessed for diet, medication, and activity level changes, missed or extra doses, bruising or bleeding, with no problem findings.  Patient denies any identifiable changes that caused the sub therapeutic INR.                 OBJECTIVE    Recent labs: (last 7 days)     20  1255   INR 1.90*       ASSESSMENT / PLAN  INR assessment SUB    Recheck INR In: 2 WEEKS    INR Location Clinic      Anticoagulation Summary  As of 2020    INR goal:   2.0-2.5   TTR:   50.8 % (11 mo)   INR used for dosin.90! (2020)   Warfarin maintenance plan:   2.5 mg (2.5 mg x 1) every day   Full warfarin instructions:   2.5 mg every day   Weekly warfarin total:   17.5 mg   Plan  last modified:   Ivory Machado RN (7/7/2020)   Next INR check:   7/21/2020   Priority:   Maintenance   Target end date:   Indefinite    Indications    Atrial fibrillation (H) with mitral regurgitation and congestive heart failure [I48.91]  Long term current use of anticoagulants with INR goal of 2.0-3.0 [Z79.01]  Permanent atrial fibrillation (H) [I48.21]             Anticoagulation Episode Summary     INR check location:       Preferred lab:       Send INR reminders to:   [facility]    Comments:   Patient has a history of hematuria when INR is elevated.  Will try to keep INR closer to 2.0.      Anticoagulation Care Providers     Provider Role Specialty Phone number    Kwadwo Winslow MD Referring Internal Medicine [phone]            See the Encounter Report to view Anticoagulation Flowsheet and Dosing Calendar (Go to Encounters tab in chart review, and find the Anticoagulation Therapy Visit)    Dosage adjustment made based on physician directed care plan.    Ivory Machado RN

## 2020-07-21 ENCOUNTER — ANTICOAGULATION THERAPY VISIT (OUTPATIENT)
Dept: ANTICOAGULATION | Facility: CLINIC | Age: 85
End: 2020-07-21

## 2020-07-21 DIAGNOSIS — I48.91 ATRIAL FIBRILLATION (H): ICD-10-CM

## 2020-07-21 DIAGNOSIS — Z79.01 LONG TERM CURRENT USE OF ANTICOAGULANTS WITH INR GOAL OF 2.0-3.0: ICD-10-CM

## 2020-07-21 DIAGNOSIS — I48.21 PERMANENT ATRIAL FIBRILLATION (H): ICD-10-CM

## 2020-07-21 LAB
CAPILLARY BLOOD COLLECTION: NORMAL
INR PPP: 2.1 (ref 0.86–1.14)

## 2020-07-21 PROCEDURE — 85610 PROTHROMBIN TIME: CPT | Performed by: INTERNAL MEDICINE

## 2020-07-21 PROCEDURE — 99207 ZZC NO CHARGE NURSE ONLY: CPT | Performed by: INTERNAL MEDICINE

## 2020-07-21 PROCEDURE — 36416 COLLJ CAPILLARY BLOOD SPEC: CPT | Performed by: INTERNAL MEDICINE

## 2020-07-21 NOTE — PROGRESS NOTES
ANTICOAGULATION FOLLOW-UP CLINIC VISIT    Patient Name:  Tucker Slater  Date:  2020  Contact Type:  Telephone/ Called patient, he reports bleeding with urination, denies any other changes. Orders discussed with patient, he agrees with plan. Lab INR scheduled on 20.    SUBJECTIVE:  Patient Findings     Positives:   Signs/symptoms of bleeding (Patient had cystoscopy 20, patient has noticed a little more bleeding with urination, mainly at the beginning of stream, does not happen every day, since then, has seen small clots. Patient states Urologist is aware. )    Comments:   The patient was assessed for diet, medication, and activity level changes, missed or extra doses, bruising, with no problem findings. Maintenance warfarin dosing was reviewed with patient and will remain on the same dose until next INR check. Patient did not have any questions or concerns.             Clinical Outcomes     Comments:   The patient was assessed for diet, medication, and activity level changes, missed or extra doses, bruising, with no problem findings. Maintenance warfarin dosing was reviewed with patient and will remain on the same dose until next INR check. Patient did not have any questions or concerns.                OBJECTIVE    Recent labs: (last 7 days)     20  1320   INR 2.10*       ASSESSMENT / PLAN  INR assessment THER    Recheck INR In: 2 WEEKS    INR Location Outside lab      Anticoagulation Summary  As of 2020    INR goal:   2.0-2.5   TTR:   50.7 % (11.5 mo)   INR used for dosin.10 (2020)   Warfarin maintenance plan:   2.5 mg (2.5 mg x 1) every day   Full warfarin instructions:   2.5 mg every day   Weekly warfarin total:   17.5 mg   No change documented:   Gail Valdovinos RN   Plan last modified:   Ivory Machado RN (2020)   Next INR check:   2020   Priority:   Maintenance   Target end date:   Indefinite    Indications    Atrial fibrillation (H) with mitral regurgitation and  congestive heart failure [I48.91]  Long term current use of anticoagulants with INR goal of 2.0-3.0 [Z79.01]  Permanent atrial fibrillation (H) [I48.21]             Anticoagulation Episode Summary     INR check location:       Preferred lab:       Send INR reminders to:   DEBORAH ACUÑA    Comments:   Patient has a history of hematuria when INR is elevated.  Will try to keep INR closer to 2.0.      Anticoagulation Care Providers     Provider Role Specialty Phone number    Kwadwo Winslow MD Referring Internal Medicine [phone]            See the Encounter Report to view Anticoagulation Flowsheet and Dosing Calendar (Go to Encounters tab in chart review, and find the Anticoagulation Therapy Visit)    Dosage adjustment made based on physician directed care plan.    Gail Valdovinos RN

## 2020-07-22 ENCOUNTER — VIRTUAL VISIT (OUTPATIENT)
Dept: SLEEP MEDICINE | Facility: CLINIC | Age: 85
End: 2020-07-22
Payer: COMMERCIAL

## 2020-07-22 VITALS — BODY MASS INDEX: 26.48 KG/M2 | HEIGHT: 70 IN | WEIGHT: 185 LBS

## 2020-07-22 DIAGNOSIS — G47.33 OSA (OBSTRUCTIVE SLEEP APNEA): Primary | ICD-10-CM

## 2020-07-22 PROCEDURE — 99215 OFFICE O/P EST HI 40 MIN: CPT | Mod: 95 | Performed by: INTERNAL MEDICINE

## 2020-07-22 ASSESSMENT — MIFFLIN-ST. JEOR: SCORE: 1510.4

## 2020-07-22 NOTE — PROGRESS NOTES
"Tucker Slater is a 89 year old male who is being evaluated via a billable telephone visit.      The patient has been notified of following:     \"This telephone visit will be conducted via a call between you and your physician/provider. We have found that certain health care needs can be provided without the need for a physical exam.  This service lets us provide the care you need with a short phone conversation.  If a prescription is necessary we can send it directly to your pharmacy.  If lab work is needed we can place an order for that and you can then stop by our lab to have the test done at a later time.    Telephone visits are billed at different rates depending on your insurance coverage. During this emergency period, for some insurers they may be billed the same as an in-person visit.  Please reach out to your insurance provider with any questions.    If during the course of the call the physician/provider feels a telephone visit is not appropriate, you will not be charged for this service.\"    Patient has given verbal consent for Telephone visit?  Yes    What phone number would you like to be contacted at? [phone]    How would you like to obtain your AVS? Mail a copy    Phone call duration: 45 minutes      [facility]   Outpatient Sleep Medicine Consultation  July 22, 2020    Name: Tucker Slater MRN# [id number]   Age: 89 year old YOB: 1931     Date of Consultation: July 22, 2020  Consultation is requested by: No referring provider defined for this encounter.  Primary care provider: Kwadwo Winslow  Tucker Slater is a 89 year old male who is being evaluated via a billable telephone visit.      The patient has been notified of following:     \"This telephone visit will be conducted via a call between you and your physician/provider. We have found that certain health care needs can be provided without the need for a physical exam.  This service lets us " "provide the care you need with a short phone conversation.  If a prescription is necessary we can send it directly to your pharmacy.  If lab work is needed we can place an order for that and you can then stop by our lab to have the test done at a later time.    Telephone visits are billed at different rates depending on your insurance coverage. During this emergency period, for some insurers they may be billed the same as an in-person visit.  Please reach out to your insurance provider with any questions.    If during the course of the call the physician/provider feels a telephone visit is not appropriate, you will not be charged for this service.\"    Patient has given verbal consent for Telephone visit?  Yes    How would you like to obtain your AVS? Mail a copy    I have reviewed and updated the patient's Past Medical History, Social History, Family History and Medication List.    ALLERGIES  Merbromin; Morphine; and Amlodipine    Mr. Slater is Dr. Ardon how are you doing good           Assessment and Plan:     Severe complex sleep apnea with hypoxemia currently effectively treated with CPAP and oxygen-see verification for oxygen use in sleep study below and red.    Comorbid Diagnoses:      Congestive heart failure with mitral regurgitation and atrial fibrillation    Chronic anticoagulation    Peripheral neuropathy    Hypertension    Carcinoma in situ of the bladder    Summary Recommendations:      Continue oxygen and CPAP    Nasal steroids once daily for chronic rhinitis             History of Present Illness:     Tucker Slaetr is a 89 year old male with severe complex sleep apnea associated with sleep disruption and hypoxemia.   his hypoxemia persisted after correction of the obstructive events and he has required oxygen with CPAP.  Currently he is not having snoring or sleep disruption and CPAP download shows no significant persistent events.  He is no longer having the central events or Cheyne-Tam " "that were evident on his original study based on the download.  He does have a history of allergic rhinitis and is bothered by chronic nasal drainage with some seasonality and was advised to use nasal steroids for this.      PREVIOUS SLEEP STUDIES:  Study Date: 7/16/2018   MRN: [id number]   Referring Provider: SELMA Mcdaniels CNP, June E.   Ordering Provider: MD Ritter Conrad   Indications for Polysomnography: The patient is a 87 y old male who is 5' 10\" and weighs 189.0 lbs. His BMI is 27.4, Geneva sleepiness scale 4.0 and neck circumference is 41.0 cm. Relevant medical history includes atrial fibrillation, bradycardia status post permanent pacemaker, hypertension, chronic kidney disease, significant nocturnal episodic hypoxemia noted with overnight oximetry, with 62 minutes <88%.. A diagnostic polysomnogram was performed to evaluate for sleep apnea /hypoventilation/hypoxemia. After 130.0 minutes of sleep time the patient exhibited sufficient respiratory events qualifying him for a CPAP trial which was then initiated.   Polysomnogram Data: A full night polysomnogram recorded the standard physiologic parameters including EEG, EOG, EMG, ECG, nasal and oral airflow. Respiratory parameters of chest and abdominal movements were recorded with respiratory inductance plethysmography. Oxygen saturation was recorded by pulse oximetry. Hypopnea scoring rule used: 1B 4%   Diagnostic PSG   Sleep Architecture: Sleep fragmentation. No N3 sleep observed.   The total recording time of the polysomnogram was 195.4 minutes. The total sleep time was 130.0 minutes. Sleep latency was normal at 2.4 minutes without the use of a sleep aid. REM latency was 183.5 minutes. Arousal index was increased at 59.5 arousals per hour. Sleep efficiency was decreased at 66.5%. Wake after sleep onset was 58.0 minutes. The patient spent 23.5% of total sleep time in Stage N1, 73.5% in Stage N2, 0.0% in Stage N3, and 3.1% in REM. Time in REM supine was 0 " minutes.   Respiration: Tachypnea with severe complex sleep apnea associated with hypoxemia. Nearly 40% of the sleep disordered breathing events were centrals. Prolonged circulation time observed: 48 sec, cycle length was as long as 1 minute.   Obstructive events with paradoxical chest and abdominal movements with prolonged circulation time and cycle length-5 min:     Events ? The polysomnogram revealed a presence of 41 obstructive, 21 central, and 19 mixed apneas resulting in an apnea index of 37.4 events per hour. There were 20 obstructive hypopneas and 30 central hypopneas resulting in an obstructive hypopnea index of 9.2 and central hypopnea index of 13.8 events per hour. The combined apnea/hypopnea index was 60.5 events per hour (central apnea/hypopnea index was 23.5 events per hour). The REM AHI was 45.0 events per hour. The supine AHI was 59.1 events per hour. The RERA index was 1.8 events per hour. The RDI was 62.3 events per hour.     Snoring - was reported as mild to moderate and intermittent.     Respiratory rate and pattern - was notable for Tachypnea with respiratory rate 20-24 and mixed central and obstructive events with prolonged respiratory cycle length and long circulation time.     Sustained Sleep Associated Hypoventilation Absent - Transcutaneous carbon dioxide monitoring was used, significant hypoventilation was not present with a maximum change from 30.4 to 35.7 mmHg and 0 minutes at or greater than 55 mmHg.     Sleep Associated Hypoxemia Present - (Greater than 5 minutes O2 sat at or below 88%) was present. Baseline oxygen saturation was 92.4%. Lowest oxygen saturation was 76.0%. Time spent less than or equal to 88% was 12.6 minutes. Time spent less than or equal to 89% was 21.7 minutes.     Treatment PSG   Sleep Architecture: Decreased sleep efficiency. No N3 sleep observed.   At 02:49:06 AM the patient was placed on PAP treatment and was titrated at pressures ranging from ASV 4/6/15 cmH2O up  to CPAP 6 cmH2O. The total recording time of the treatment portion of the study was 181.5 minutes. The total sleep time was 87.0 minutes. During the treatment portion of the study the sleep latency was 7.5 minutes. REM latency was 158.0 minutes. Arousal index was increased at 37.9 arousals per hour. Sleep efficiency was decreased at 47.9%. Wake after sleep onset was 83.5 minutes. The patient spent 29.9%   Respiration: CPAP 5 cm H2O resolved all of the obstructive events, although prolonged treatment emergent central events were seen associated with significant hypoxemia. ASV improved all sleep disordered breathing. Despite elimination of obstructive events with PAP therapy, the patient still had 21 minutes of oxygen saturation <89%     The final pressure was ASV 4/6/15 cmH2O with an AHI of 8.5 events per hour. Time in REM supine on final pressure was 0 minutes.     The patient had 21 minutes of O2 saturation <89% while on positive airway pressure therapy without obstructive events     This titration was considered:   - Adequate (residual AHI with 75% decrease or above constraints without REM?supine sleep at final pressure).       CURRENT THERAPY  Subjective:      Objective:  CPAP Compliance Targets:   >70% days > 4 hours AHI < 5   30 days ending July 22, 2020     [facility]   CPAP 5.0 cmH2O 30 day usage data:  100% of days with > 4 hours of use. 0/30 days with no use.   Average use 419 minutes per day.   95%ile Leak 11.54 L/min.   AHI 4.15 events per hour.                    Medications:     Current Outpatient Medications   Medication Sig     ACETAMINOPHEN PO Take 650 mg by mouth 2 times daily      Ascorbic Acid (VITAMIN C PO) Take 500 mg by mouth daily      calcium carbonate (OS-KARLA 500 MG [place]. CA) 500 MG tablet Take 500 mg by mouth daily      carvedilol (COREG) 25 MG tablet Take 1.5 tablets (37.5 mg) by mouth 2 times daily (with meals)     finasteride (PROSCAR) 5 MG tablet TAKE ONE TABLET BY MOUTH ONCE DAILY      losartan (COZAAR) 50 MG tablet TAKE 1 TABLET BY MOUTH DAILY     Multiple Vitamins-Minerals (OCUVITE PO) Take 1 tablet by mouth 2 times daily      order for DME Oxygen 2 Li/min  at night     triamterene-HCTZ (MAXZIDE-25) 37.5-25 MG tablet TAKE ONE TABLET BY MOUTH ONCE DAILY     vitamin B complex with vitamin C (VITAMIN  B COMPLEX) TABS tablet Take 1 tablet by mouth daily     VITAMIN D, CHOLECALCIFEROL, PO Take 2,000 Units by mouth daily.     warfarin (JANTOVEN) 2.5 MG tablet Take 2.5 mg by mouth daily     baclofen (LIORESAL) 10 MG tablet Take 10 mg by mouth 3 times daily     diclofenac (VOLTAREN) 1 % topical gel Place 2 g onto the skin 4 times daily     docusate sodium (COLACE) 100 MG capsule Take 100 mg by mouth 2 times daily     No current facility-administered medications for this visit.         Allergies   Allergen Reactions     Merbromin      Other reaction(s): Other, see comments  Severe reaction as child. Amalgam fillings OK.     Morphine Nausea and Vomiting     Amlodipine      Edema            Past Medical History:     Does not need 02 supplement at night   Past Medical History:   Diagnosis Date     Arrhythmia     A Fib     Balance problem     related to neuropathy in feet     Bradycardia      Carcinoma in situ of bladder 2000    bladder cancer ;; follow w Urology w periodic cysto      Essential hypertension, benign      Generalized osteoarthrosis, unspecified site knees    s/p R total knee 8/09 ; will do L 6/10      Hernia, abdominal      Numbness and tingling     toes and fingers     DAWSON (obstructive sleep apnea) 8/2/2019     Pacemaker     [employer]      Pain in joint, shoulder region     L shoulder rotater tear; no surgery      Palpitations      Persistent atrial fibrillation (H) 1/30/15     Prostate CA (H) 2008-9    radiation     Prostate cancer (H) 11/08    completed RT 3/09     Renal disease     elevated creatinine     Shoulder impingement     R shoulder impingement etc see MRI 4/09      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     toes both feet              Past Surgical History:    No h/o  upper airway surgery  Past Surgical History:   Procedure Laterality Date     ARTHROPLASTY HIP Right 3/27/2017    Procedure: ARTHROPLASTY HIP;  Surgeon: Jigar Wynn MD;  Location: [facility] OR     C NONSPECIFIC PROCEDURE      bilat inguinal hernia repairs ( 3total procedures)      C NONSPECIFIC PROCEDURE      pilonidal cyst     C NONSPECIFIC PROCEDURE      T + A     C NONSPECIFIC PROCEDURE  8/09     R+L total knee     CARDIOVERSION  3/26/15     COLONOSCOPY       CYSTOSCOPY       CYSTOSCOPY, FULGURATE BLEEDERS, EVACUATE CLOT(S), COMBINED N/A 4/23/2019    Procedure: Exam under anesthesia, video cystopanendoscopy, evacuation of clots, fulguration of prostatic veins.;  Surgeon: Ilir Ramirez MD;  Location: [facility] OR     CYSTOSCOPY, RETROGRADES, COMBINED Bilateral 6/18/2019    Procedure: Video cystopanendoscopy, evacuation of clots, cup biopsies of bladder wall, electrovaporization of prostate, transurethral resection of prostate, bilateral retrograde ureteropyelogram.;  Surgeon: Ilir Ramirez MD;  Location: [facility] OR     CYSTOSCOPY, TRANSURETHRAL RESECTION (TUR) PROSTATE, COMBINED  6/18/2019    Procedure: Cystoscopy, transurethral resection of prostate;  Surgeon: Ilir Ramirez MD;  Location: [facility] OR     HERNIA REPAIR       IMPLANT PACEMAKER  3/26/15     RELEASE CARPAL TUNNEL Right 4/19/2017    Procedure: RELEASE CARPAL TUNNEL;  Right carpal tunnel release;  Surgeon: Alonso Barboza MD;  Location: [facility] OR                      Physical Examination:     Objective   Reported vitals:  There were no vitals taken for this visit.   healthy, alert and no distress  Psych: Alert and oriented times 3; coherent speech, normal   rate and volume, able to articulate logical thoughts, able   to abstract reason, no tangential thoughts, no hallucinations   or delusions  His affect is normal.         Copy to:  Kwadwo Winslow MD 7/22/2020     [facility]   Floor 1, [address]   [address] [place] [address]   Appointments: [phone]    [facility]  3rd Floor  [address] , [place] [address]     Total time spent with patient: 45 min >50% counseling

## 2020-07-22 NOTE — NURSING NOTE
"Chief Complaint   Patient presents with     RECHECK     annual cpap, and oxygen uses 2LPM every night bled into pap.  DL in synopsis        Initial There were no vitals taken for this visit. Estimated body mass index is 26.98 kg/m  as calculated from the following:    Height as of 7/2/20: 1.778 m (5' 10\").    Weight as of 7/2/20: 85.3 kg (188 lb).    Medication Reconciliation: complete        DME:yes, d/l in synopsis  "

## 2020-07-30 LAB
MDC_IDC_LEAD_IMPLANT_DT: NORMAL
MDC_IDC_LEAD_IMPLANT_DT: NORMAL
MDC_IDC_LEAD_LOCATION: NORMAL
MDC_IDC_LEAD_LOCATION: NORMAL
MDC_IDC_LEAD_LOCATION_DETAIL_1: NORMAL
MDC_IDC_LEAD_LOCATION_DETAIL_1: NORMAL
MDC_IDC_LEAD_MFG: NORMAL
MDC_IDC_LEAD_MFG: NORMAL
MDC_IDC_LEAD_MODEL: NORMAL
MDC_IDC_LEAD_MODEL: NORMAL
MDC_IDC_LEAD_POLARITY_TYPE: NORMAL
MDC_IDC_LEAD_POLARITY_TYPE: NORMAL
MDC_IDC_LEAD_SERIAL: NORMAL
MDC_IDC_LEAD_SERIAL: NORMAL
MDC_IDC_MSMT_BATTERY_DTM: NORMAL
MDC_IDC_MSMT_BATTERY_REMAINING_LONGEVITY: 124 MO
MDC_IDC_MSMT_BATTERY_REMAINING_PERCENTAGE: 95.5 %
MDC_IDC_MSMT_BATTERY_RRT_TRIGGER: NORMAL
MDC_IDC_MSMT_BATTERY_STATUS: NORMAL
MDC_IDC_MSMT_BATTERY_VOLTAGE: 2.96 V
MDC_IDC_MSMT_LEADCHNL_RV_IMPEDANCE_VALUE: 440 OHM
MDC_IDC_MSMT_LEADCHNL_RV_LEAD_CHANNEL_STATUS: NORMAL
MDC_IDC_MSMT_LEADCHNL_RV_PACING_THRESHOLD_AMPLITUDE: 0.75 V
MDC_IDC_MSMT_LEADCHNL_RV_PACING_THRESHOLD_PULSEWIDTH: 0.5 MS
MDC_IDC_MSMT_LEADCHNL_RV_SENSING_INTR_AMPL: 12 MV
MDC_IDC_PG_IMPLANT_DTM: NORMAL
MDC_IDC_PG_MFG: NORMAL
MDC_IDC_PG_MODEL: NORMAL
MDC_IDC_PG_SERIAL: NORMAL
MDC_IDC_PG_TYPE: NORMAL
MDC_IDC_SESS_CLINIC_NAME: NORMAL
MDC_IDC_SESS_DTM: NORMAL
MDC_IDC_SESS_REPROGRAMMED: NO
MDC_IDC_SESS_TYPE: NORMAL
MDC_IDC_SET_BRADY_HYSTRATE: 60 {BEATS}/MIN
MDC_IDC_SET_BRADY_LOWRATE: 70 {BEATS}/MIN
MDC_IDC_SET_BRADY_MAX_SENSOR_RATE: 120 {BEATS}/MIN
MDC_IDC_SET_BRADY_MODE: NORMAL
MDC_IDC_SET_LEADCHNL_RA_PACING_POLARITY: NORMAL
MDC_IDC_SET_LEADCHNL_RA_SENSING_POLARITY: NORMAL
MDC_IDC_SET_LEADCHNL_RV_PACING_AMPLITUDE: 1 V
MDC_IDC_SET_LEADCHNL_RV_PACING_ANODE_ELECTRODE_1: NORMAL
MDC_IDC_SET_LEADCHNL_RV_PACING_ANODE_LOCATION_1: NORMAL
MDC_IDC_SET_LEADCHNL_RV_PACING_CAPTURE_MODE: NORMAL
MDC_IDC_SET_LEADCHNL_RV_PACING_CATHODE_ELECTRODE_1: NORMAL
MDC_IDC_SET_LEADCHNL_RV_PACING_CATHODE_LOCATION_1: NORMAL
MDC_IDC_SET_LEADCHNL_RV_PACING_POLARITY: NORMAL
MDC_IDC_SET_LEADCHNL_RV_PACING_PULSEWIDTH: 0.5 MS
MDC_IDC_SET_LEADCHNL_RV_SENSING_ADAPTATION_MODE: NORMAL
MDC_IDC_SET_LEADCHNL_RV_SENSING_ANODE_ELECTRODE_1: NORMAL
MDC_IDC_SET_LEADCHNL_RV_SENSING_ANODE_LOCATION_1: NORMAL
MDC_IDC_SET_LEADCHNL_RV_SENSING_CATHODE_ELECTRODE_1: NORMAL
MDC_IDC_SET_LEADCHNL_RV_SENSING_CATHODE_LOCATION_1: NORMAL
MDC_IDC_SET_LEADCHNL_RV_SENSING_POLARITY: NORMAL
MDC_IDC_SET_LEADCHNL_RV_SENSING_SENSITIVITY: 2 MV
MDC_IDC_STAT_AT_DTM_END: NORMAL
MDC_IDC_STAT_AT_DTM_START: NORMAL
MDC_IDC_STAT_BRADY_DTM_END: NORMAL
MDC_IDC_STAT_BRADY_DTM_START: NORMAL
MDC_IDC_STAT_BRADY_RV_PERCENT_PACED: 98 %
MDC_IDC_STAT_CRT_DTM_END: NORMAL
MDC_IDC_STAT_CRT_DTM_START: NORMAL
MDC_IDC_STAT_HEART_RATE_DTM_END: NORMAL
MDC_IDC_STAT_HEART_RATE_DTM_START: NORMAL
MDC_IDC_STAT_HEART_RATE_VENTRICULAR_MAX: 180 {BEATS}/MIN
MDC_IDC_STAT_HEART_RATE_VENTRICULAR_MEAN: 77 {BEATS}/MIN
MDC_IDC_STAT_HEART_RATE_VENTRICULAR_MIN: 60 {BEATS}/MIN

## 2020-08-04 ENCOUNTER — ANTICOAGULATION THERAPY VISIT (OUTPATIENT)
Dept: ANTICOAGULATION | Facility: CLINIC | Age: 85
End: 2020-08-04

## 2020-08-04 DIAGNOSIS — I48.91 ATRIAL FIBRILLATION (H): ICD-10-CM

## 2020-08-04 DIAGNOSIS — Z79.01 LONG TERM CURRENT USE OF ANTICOAGULANTS WITH INR GOAL OF 2.0-3.0: ICD-10-CM

## 2020-08-04 DIAGNOSIS — I48.21 PERMANENT ATRIAL FIBRILLATION (H): ICD-10-CM

## 2020-08-04 LAB
CAPILLARY BLOOD COLLECTION: NORMAL
INR PPP: 2.5 (ref 0.86–1.14)

## 2020-08-04 PROCEDURE — 36416 COLLJ CAPILLARY BLOOD SPEC: CPT | Performed by: INTERNAL MEDICINE

## 2020-08-04 PROCEDURE — 85610 PROTHROMBIN TIME: CPT | Performed by: INTERNAL MEDICINE

## 2020-08-04 PROCEDURE — 99207 ZZC NO CHARGE NURSE ONLY: CPT | Performed by: INTERNAL MEDICINE

## 2020-08-04 NOTE — PROGRESS NOTES
Anticoagulation Management    Unable to reach Tucker today.    Today's INR result of 2.5 is therapeutic (goal INR of 2.0-2.5).  Result received from: Clinic Lab    Follow up required to confirm warfarin dose taken and assess for changes    Left message to continue current dose of warfarin 2.5 mg tonight.      Anticoagulation clinic to follow up    Gail Valdovinos RN

## 2020-08-04 NOTE — PROGRESS NOTES
ANTICOAGULATION FOLLOW-UP CLINIC VISIT    Patient Name:  Tucker Slater  Date:  2020  Contact Type:  Telephone/ Patient returned call, he report blood in urine, he denies any other changes. Orders discussed with patient, he agrees with plan. Lab INR appointment scheduled on .    SUBJECTIVE:  Patient Findings     Positives:   Signs/symptoms of bleeding (Patient reports continues to have blood in urnine, not continous, but mostly in the morning, seem more frequent since taking 2.5 mg warfarin daily. )    Comments:   The patient was assessed for diet, medication, and activity level changes, missed or extra doses, bruising or bleeding, with no problem findings. Patient would like to decrease weekly warfarin dose by 7.1 % (1.25 mg).          Clinical Outcomes     Comments:   The patient was assessed for diet, medication, and activity level changes, missed or extra doses, bruising or bleeding, with no problem findings. Patient would like to decrease weekly warfarin dose by 7.1 % (1.25 mg).             OBJECTIVE    Recent labs: (last 7 days)     20  1325   INR 2.50*       ASSESSMENT / PLAN  INR assessment THER    Recheck INR In: 2 WEEKS    INR Location Outside lab      Anticoagulation Summary  As of 2020    INR goal:   2.0-2.5   TTR:   52.7 % (11.9 mo)   INR used for dosin.50 (2020)   Warfarin maintenance plan:   1.25 mg (2.5 mg x 0.5) every Tue; 2.5 mg (2.5 mg x 1) all other days   Full warfarin instructions:   1.25 mg every Tue; 2.5 mg all other days   Weekly warfarin total:   16.25 mg   Plan last modified:   Gail Valdovinos RN (2020)   Next INR check:   2020   Priority:   Maintenance   Target end date:   Indefinite    Indications    Atrial fibrillation (H) with mitral regurgitation and congestive heart failure [I48.91]  Long term current use of anticoagulants with INR goal of 2.0-3.0 [Z79.01]  Permanent atrial fibrillation (H) [I48.21]             Anticoagulation Episode  Summary     INR check location:       Preferred lab:       Send INR reminders to:   DEBORAH ACUÑA    Comments:   Patient has a history of hematuria when INR is elevated.  Will try to keep INR closer to 2.0.      Anticoagulation Care Providers     Provider Role Specialty Phone number    Kwadwo Winslow MD Referring Internal Medicine [phone]            See the Encounter Report to view Anticoagulation Flowsheet and Dosing Calendar (Go to Encounters tab in chart review, and find the Anticoagulation Therapy Visit)    Dosage adjustment made based on physician directed care plan.    Gail Valdovinos RN

## 2020-08-04 NOTE — PROGRESS NOTES
Left message to call [phone]. Please transfer call to Gail at [phone].  Gail Valdovinos RN

## 2020-08-04 NOTE — PROGRESS NOTES
Left message to call [phone]. Please transfer call to Gail at [phone].  Gail Valdovinos RN

## 2020-08-18 ENCOUNTER — ANTICOAGULATION THERAPY VISIT (OUTPATIENT)
Dept: ANTICOAGULATION | Facility: CLINIC | Age: 85
End: 2020-08-18

## 2020-08-18 DIAGNOSIS — Z79.01 LONG TERM CURRENT USE OF ANTICOAGULANTS WITH INR GOAL OF 2.0-3.0: ICD-10-CM

## 2020-08-18 DIAGNOSIS — I48.91 ATRIAL FIBRILLATION (H): ICD-10-CM

## 2020-08-18 DIAGNOSIS — I48.21 PERMANENT ATRIAL FIBRILLATION (H): ICD-10-CM

## 2020-08-18 LAB
CAPILLARY BLOOD COLLECTION: NORMAL
INR PPP: 2.9 (ref 0.86–1.14)

## 2020-08-18 PROCEDURE — 85610 PROTHROMBIN TIME: CPT | Performed by: INTERNAL MEDICINE

## 2020-08-18 PROCEDURE — 99207 ZZC NO CHARGE NURSE ONLY: CPT | Performed by: INTERNAL MEDICINE

## 2020-08-18 PROCEDURE — 36416 COLLJ CAPILLARY BLOOD SPEC: CPT | Performed by: INTERNAL MEDICINE

## 2020-08-18 NOTE — PROGRESS NOTES
ANTICOAGULATION FOLLOW-UP CLINIC VISIT    Patient Name:  Tucker Slater  Date:  2020  Contact Type:  Telephone/ Patient stopped in the clinic after his INR lab appointment, his phone is not working. Patient denies any changes. Plan discussed with patient, he agrees with the plan. AVS printed and given to the patient. Lab INR appointment scheduled on 20.    SUBJECTIVE:  Patient Findings     Comments:   The patient was assessed for diet, medication, and activity level changes, missed or extra doses, bruising or bleeding, with no problem findings. Patient denies any identifiable changes that caused the supratherapeutic INR. Will decrease warfarin maintenance dose by 7.7% (1.25 mg), INR has been elevated at the top of patient's goal range of 2.0-2.5. May need to change patient to 1 mg tablets        Clinical Outcomes     Negatives:   Major bleeding event, Thromboembolic event, Anticoagulation-related hospital admission, Anticoagulation-related ED visit, Anticoagulation-related fatality    Comments:   The patient was assessed for diet, medication, and activity level changes, missed or extra doses, bruising or bleeding, with no problem findings. Patient denies any identifiable changes that caused the supratherapeutic INR. Will decrease warfarin maintenance dose by 7.7% (1.25 mg), INR has been elevated at the top of patient's goal range of 2.0-2.5. May need to change patient to 1 mg tablets           OBJECTIVE    Recent labs: (last 7 days)     20  1257   INR 2.90*       ASSESSMENT / PLAN  INR assessment SUPRA    Recheck INR In: 2 WEEKS    INR Location Outside lab      Anticoagulation Summary  As of 2020    INR goal:   2.0-2.5   TTR:   51.6 % (1 y)   INR used for dosin.90! (2020)   Warfarin maintenance plan:   1.25 mg (2.5 mg x 0.5) every Tue, Fri; 2.5 mg (2.5 mg x 1) all other days   Full warfarin instructions:   1.25 mg every Tue, Fri; 2.5 mg all other days   Weekly warfarin total:   15  mg   Plan last modified:   Gail Valdovinos RN (8/18/2020)   Next INR check:   9/1/2020   Priority:   Maintenance   Target end date:   Indefinite    Indications    Atrial fibrillation (H) with mitral regurgitation and congestive heart failure [I48.91]  Long term current use of anticoagulants with INR goal of 2.0-3.0 [Z79.01]  Permanent atrial fibrillation (H) [I48.21]             Anticoagulation Episode Summary     INR check location:       Preferred lab:       Send INR reminders to:   [facility]    Comments:   Patient has a history of hematuria when INR is elevated.  Will try to keep INR closer to 2.0.      Anticoagulation Care Providers     Provider Role Specialty Phone number    Kwadwo Winslow MD Referring Internal Medicine [phone]            See the Encounter Report to view Anticoagulation Flowsheet and Dosing Calendar (Go to Encounters tab in chart review, and find the Anticoagulation Therapy Visit)    Dosage adjustment made based on physician directed care plan.    Gail Valdovinos RN

## 2020-09-01 ENCOUNTER — ANTICOAGULATION THERAPY VISIT (OUTPATIENT)
Dept: ANTICOAGULATION | Facility: CLINIC | Age: 85
End: 2020-09-01

## 2020-09-01 DIAGNOSIS — Z79.01 LONG TERM CURRENT USE OF ANTICOAGULANTS WITH INR GOAL OF 2.0-3.0: ICD-10-CM

## 2020-09-01 DIAGNOSIS — I48.91 ATRIAL FIBRILLATION (H): ICD-10-CM

## 2020-09-01 DIAGNOSIS — I48.21 PERMANENT ATRIAL FIBRILLATION (H): ICD-10-CM

## 2020-09-01 LAB
CAPILLARY BLOOD COLLECTION: NORMAL
INR PPP: 2.9 (ref 0.86–1.14)

## 2020-09-01 PROCEDURE — 36416 COLLJ CAPILLARY BLOOD SPEC: CPT | Performed by: INTERNAL MEDICINE

## 2020-09-01 PROCEDURE — 85610 PROTHROMBIN TIME: CPT | Performed by: INTERNAL MEDICINE

## 2020-09-01 PROCEDURE — 99207 ZZC NO CHARGE NURSE ONLY: CPT | Performed by: INTERNAL MEDICINE

## 2020-09-01 NOTE — PROGRESS NOTES
ANTICOAGULATION FOLLOW-UP CLINIC VISIT    Patient Name:  Tucker Slater  Date:  2020  Contact Type:  Telephone/ discussed with patient    SUBJECTIVE:  Patient Findings     Comments:   The patient was assessed for diet, medication, and activity level changes, missed or extra doses, bruising or bleeding, with no problem findings.  Patient denies any identifiable changes that caused the supra therapeutic INR. INR continues to be supra therapeutic, so maintenance dose decreased by 8% today.           Clinical Outcomes     Negatives:   Major bleeding event, Thromboembolic event, Anticoagulation-related hospital admission, Anticoagulation-related ED visit, Anticoagulation-related fatality    Comments:   The patient was assessed for diet, medication, and activity level changes, missed or extra doses, bruising or bleeding, with no problem findings.  Patient denies any identifiable changes that caused the supra therapeutic INR. INR continues to be supra therapeutic, so maintenance dose decreased by 8% today.              OBJECTIVE    Recent labs: (last 7 days)     20  1255   INR 2.90*       ASSESSMENT / PLAN  INR assessment SUPRA    Recheck INR In: 2 WEEKS    INR Location Clinic      Anticoagulation Summary  As of 2020    INR goal:   2.0-2.5   TTR:   51.6 % (1 y)   INR used for dosin.90! (2020)   Warfarin maintenance plan:   1.25 mg (2.5 mg x 0.5) every Sun, Tue, Fri; 2.5 mg (2.5 mg x 1) all other days   Full warfarin instructions:   : Hold; Otherwise 1.25 mg every Sun, Tue, Fri; 2.5 mg all other days   Weekly warfarin total:   13.75 mg   Plan last modified:   Ivory Machado RN (2020)   Next INR check:   9/15/2020   Priority:   Maintenance   Target end date:   Indefinite    Indications    Atrial fibrillation (H) with mitral regurgitation and congestive heart failure [I48.91]  Long term current use of anticoagulants with INR goal of 2.0-3.0 [Z79.01]  Permanent atrial fibrillation (H)  [I48.21]             Anticoagulation Episode Summary     INR check location:       Preferred lab:       Send INR reminders to:   DEBORAH ACU[facility]A    Comments:   Patient has a history of hematuria when INR is elevated.  Will try to keep INR closer to 2.0.      Anticoagulation Care Providers     Provider Role Specialty Phone number    Kwadwo Winslow MD Referring Internal Medicine [phone]            See the Encounter Report to view Anticoagulation Flowsheet and Dosing Calendar (Go to Encounters tab in chart review, and find the Anticoagulation Therapy Visit)    Dosage adjustment made based on physician directed care plan.    Ivory Machado RN

## 2020-09-15 ENCOUNTER — ANTICOAGULATION THERAPY VISIT (OUTPATIENT)
Dept: ANTICOAGULATION | Facility: CLINIC | Age: 85
End: 2020-09-15

## 2020-09-15 DIAGNOSIS — I48.91 ATRIAL FIBRILLATION (H): ICD-10-CM

## 2020-09-15 DIAGNOSIS — Z79.01 LONG TERM CURRENT USE OF ANTICOAGULANTS WITH INR GOAL OF 2.0-3.0: ICD-10-CM

## 2020-09-15 DIAGNOSIS — I48.21 PERMANENT ATRIAL FIBRILLATION (H): ICD-10-CM

## 2020-09-15 LAB
CAPILLARY BLOOD COLLECTION: NORMAL
INR PPP: 2 (ref 0.86–1.14)

## 2020-09-15 PROCEDURE — 99207 ZZC NO CHARGE NURSE ONLY: CPT | Performed by: INTERNAL MEDICINE

## 2020-09-15 PROCEDURE — 36416 COLLJ CAPILLARY BLOOD SPEC: CPT | Performed by: INTERNAL MEDICINE

## 2020-09-15 PROCEDURE — 85610 PROTHROMBIN TIME: CPT | Performed by: INTERNAL MEDICINE

## 2020-09-15 NOTE — PROGRESS NOTES
ANTICOAGULATION FOLLOW-UP CLINIC VISIT    Patient Name:  Tucker Slater  Date:  9/15/2020  Contact Type:  Telephone/ discussed with patient    SUBJECTIVE:  Patient Findings     Comments:   The patient was assessed for diet, medication, and activity level changes, missed or extra doses, bruising or bleeding, with no problem findings.          Clinical Outcomes     Negatives:   Major bleeding event, Thromboembolic event, Anticoagulation-related hospital admission, Anticoagulation-related ED visit, Anticoagulation-related fatality    Comments:   The patient was assessed for diet, medication, and activity level changes, missed or extra doses, bruising or bleeding, with no problem findings.             OBJECTIVE    Recent labs: (last 7 days)     09/15/20  1306   INR 2.00*       ASSESSMENT / PLAN  INR assessment THER    Recheck INR In: 3 WEEKS    INR Location Clinic      Anticoagulation Summary  As of 9/15/2020    INR goal:   2.0-2.5   TTR:   53.8 % (1 y)   INR used for dosin.00 (9/15/2020)   Warfarin maintenance plan:   1.25 mg (2.5 mg x 0.5) every Sun, Tue, Fri; 2.5 mg (2.5 mg x 1) all other days   Full warfarin instructions:   1.25 mg every Sun, Tue, Fri; 2.5 mg all other days   Weekly warfarin total:   13.75 mg   No change documented:   Ivory Machado RN   Plan last modified:   Ivory Machado RN (2020)   Next INR check:   10/6/2020   Priority:   Maintenance   Target end date:   Indefinite    Indications    Atrial fibrillation (H) with mitral regurgitation and congestive heart failure [I48.91]  Long term current use of anticoagulants with INR goal of 2.0-3.0 [Z79.01]  Permanent atrial fibrillation (H) [I48.21]             Anticoagulation Episode Summary     INR check location:       Preferred lab:       Send INR reminders to:   [facility]    Comments:   Patient has a history of hematuria when INR is elevated.  Will try to keep INR closer to 2.0.      Anticoagulation Care Providers      Provider Role Specialty Phone number    Kwadwo Winslow MD Referring Internal Medicine [phone]            See the Encounter Report to view Anticoagulation Flowsheet and Dosing Calendar (Go to Encounters tab in chart review, and find the Anticoagulation Therapy Visit)    Dosage adjustment made based on physician directed care plan.    Ivory Machado RN

## 2020-10-01 ENCOUNTER — ANCILLARY PROCEDURE (OUTPATIENT)
Dept: CARDIOLOGY | Facility: CLINIC | Age: 85
End: 2020-10-01
Attending: INTERNAL MEDICINE
Payer: COMMERCIAL

## 2020-10-01 DIAGNOSIS — Z95.0 CARDIAC PACEMAKER IN SITU: ICD-10-CM

## 2020-10-01 DIAGNOSIS — R00.1 BRADYCARDIA: ICD-10-CM

## 2020-10-01 PROCEDURE — 93296 REM INTERROG EVL PM/IDS: CPT | Performed by: INTERNAL MEDICINE

## 2020-10-01 PROCEDURE — 93294 REM INTERROG EVL PM/LDLS PM: CPT | Performed by: INTERNAL MEDICINE

## 2020-10-06 ENCOUNTER — ANTICOAGULATION THERAPY VISIT (OUTPATIENT)
Dept: ANTICOAGULATION | Facility: CLINIC | Age: 85
End: 2020-10-06

## 2020-10-06 DIAGNOSIS — Z79.01 LONG TERM CURRENT USE OF ANTICOAGULANTS WITH INR GOAL OF 2.0-3.0: ICD-10-CM

## 2020-10-06 DIAGNOSIS — I48.21 PERMANENT ATRIAL FIBRILLATION (H): ICD-10-CM

## 2020-10-06 DIAGNOSIS — I48.91 ATRIAL FIBRILLATION (H): ICD-10-CM

## 2020-10-06 LAB
CAPILLARY BLOOD COLLECTION: NORMAL
INR PPP: 2.1 (ref 0.86–1.14)

## 2020-10-06 PROCEDURE — 85610 PROTHROMBIN TIME: CPT | Performed by: INTERNAL MEDICINE

## 2020-10-06 PROCEDURE — 36416 COLLJ CAPILLARY BLOOD SPEC: CPT | Performed by: INTERNAL MEDICINE

## 2020-10-06 PROCEDURE — 99207 PR NO CHARGE NURSE ONLY: CPT | Performed by: INTERNAL MEDICINE

## 2020-10-06 NOTE — PROGRESS NOTES
ANTICOAGULATION FOLLOW-UP CLINIC VISIT    Patient Name:  uTcker Slater  Date:  10/6/2020  Contact Type:  Telephone/ Called patient, he reports medication change, reports he has NOT had any hematuria since last INR. Orders discussed with the patient, he agrees with the plan. Lab INR appointment scheduled on 11/3/20.    SUBJECTIVE:  Patient Findings     Positives:  Signs/symptoms of bleeding (Patient reports since INR on 9/15/20, he has had no hematuria. ), Change in medications (Patient report received topical creams, desonide and ketoconazole from dermatology.  Concurrent use of KETOCONAZOLE and WARFARIN may result in an increased risk of bleeding.)    Comments:  The patient was assessed for diet and activity level changes, missed or extra doses, bruising or bleeding, with no problem findings. Maintenance warfarin dosing was reviewed with patient and will remain on the same dose until next INR check. Patient did not have any questions or concerns.           Clinical Outcomes     Comments:  The patient was assessed for diet and activity level changes, missed or extra doses, bruising or bleeding, with no problem findings. Maintenance warfarin dosing was reviewed with patient and will remain on the same dose until next INR check. Patient did not have any questions or concerns.              OBJECTIVE    Recent labs: (last 7 days)     10/06/20  1323   INR 2.10*       ASSESSMENT / PLAN  INR assessment THER    Recheck INR In: 4 WEEKS    INR Location Outside lab      Anticoagulation Summary  As of 10/6/2020    INR goal:  2.0-2.5   TTR:  59.0 % (1 y)   INR used for dosin.10 (10/6/2020)   Warfarin maintenance plan:  1.25 mg (2.5 mg x 0.5) every Sun, Tue, Fri; 2.5 mg (2.5 mg x 1) all other days   Full warfarin instructions:  1.25 mg every Sun, Tue, Fri; 2.5 mg all other days   Weekly warfarin total:  13.75 mg   No change documented:  Gail Valdovinos RN   Plan last modified:  Ivory Machado RN (2020)   Next INR  check:  11/3/2020   Priority:  Maintenance   Target end date:  Indefinite    Indications    Atrial fibrillation (H) with mitral regurgitation and congestive heart failure [I48.91]  Long term current use of anticoagulants with INR goal of 2.0-3.0 [Z79.01]  Permanent atrial fibrillation (H) [I48.21]             Anticoagulation Episode Summary     INR check location:      Preferred lab:      [facility]    Comments:  Patient has a history of hematuria when INR is elevated.  Will try to keep INR closer to 2.0.      Anticoagulation Care Providers     Provider Role Specialty Phone number    Kwadwo Winslow MD Referring Internal Medicine [phone]            See the Encounter Report to view Anticoagulation Flowsheet and Dosing Calendar (Go to Encounters tab in chart review, and find the Anticoagulation Therapy Visit)    Dosage adjustment made based on physician directed care plan.    Gail Valdovinos RN

## 2020-10-15 DIAGNOSIS — I10 ESSENTIAL HYPERTENSION WITH GOAL BLOOD PRESSURE LESS THAN 140/90: ICD-10-CM

## 2020-10-19 RX ORDER — LOSARTAN POTASSIUM 50 MG/1
TABLET ORAL
Qty: 90 TABLET | Refills: 2 | Status: SHIPPED | OUTPATIENT
Start: 2020-10-19 | End: 2021-04-05

## 2020-10-19 NOTE — TELEPHONE ENCOUNTER
[place] through [facility] [place].  Layne PRUITT RN  [facility]  [phone]

## 2020-10-26 LAB
MDC_IDC_LEAD_IMPLANT_DT: NORMAL
MDC_IDC_LEAD_IMPLANT_DT: NORMAL
MDC_IDC_LEAD_LOCATION: NORMAL
MDC_IDC_LEAD_LOCATION: NORMAL
MDC_IDC_LEAD_LOCATION_DETAIL_1: NORMAL
MDC_IDC_LEAD_LOCATION_DETAIL_1: NORMAL
MDC_IDC_LEAD_MFG: NORMAL
MDC_IDC_LEAD_MFG: NORMAL
MDC_IDC_LEAD_MODEL: NORMAL
MDC_IDC_LEAD_MODEL: NORMAL
MDC_IDC_LEAD_POLARITY_TYPE: NORMAL
MDC_IDC_LEAD_POLARITY_TYPE: NORMAL
MDC_IDC_LEAD_SERIAL: NORMAL
MDC_IDC_LEAD_SERIAL: NORMAL
MDC_IDC_MSMT_BATTERY_DTM: NORMAL
MDC_IDC_MSMT_BATTERY_REMAINING_LONGEVITY: 123 MO
MDC_IDC_MSMT_BATTERY_REMAINING_PERCENTAGE: 95.5 %
MDC_IDC_MSMT_BATTERY_RRT_TRIGGER: NORMAL
MDC_IDC_MSMT_BATTERY_STATUS: NORMAL
MDC_IDC_MSMT_BATTERY_VOLTAGE: 2.96 V
MDC_IDC_MSMT_LEADCHNL_RV_IMPEDANCE_VALUE: 440 OHM
MDC_IDC_MSMT_LEADCHNL_RV_LEAD_CHANNEL_STATUS: NORMAL
MDC_IDC_MSMT_LEADCHNL_RV_PACING_THRESHOLD_AMPLITUDE: 0.88 V
MDC_IDC_MSMT_LEADCHNL_RV_PACING_THRESHOLD_PULSEWIDTH: 0.5 MS
MDC_IDC_MSMT_LEADCHNL_RV_SENSING_INTR_AMPL: 12 MV
MDC_IDC_PG_IMPLANT_DTM: NORMAL
MDC_IDC_PG_MFG: NORMAL
MDC_IDC_PG_MODEL: NORMAL
MDC_IDC_PG_SERIAL: NORMAL
MDC_IDC_PG_TYPE: NORMAL
MDC_IDC_SESS_CLINIC_NAME: NORMAL
MDC_IDC_SESS_DTM: NORMAL
MDC_IDC_SESS_REPROGRAMMED: NO
MDC_IDC_SESS_TYPE: NORMAL
MDC_IDC_SET_BRADY_HYSTRATE: 60 {BEATS}/MIN
MDC_IDC_SET_BRADY_LOWRATE: 70 {BEATS}/MIN
MDC_IDC_SET_BRADY_MAX_SENSOR_RATE: 120 {BEATS}/MIN
MDC_IDC_SET_BRADY_MODE: NORMAL
MDC_IDC_SET_LEADCHNL_RA_PACING_POLARITY: NORMAL
MDC_IDC_SET_LEADCHNL_RA_SENSING_POLARITY: NORMAL
MDC_IDC_SET_LEADCHNL_RV_PACING_AMPLITUDE: 1.12
MDC_IDC_SET_LEADCHNL_RV_PACING_ANODE_ELECTRODE_1: NORMAL
MDC_IDC_SET_LEADCHNL_RV_PACING_ANODE_LOCATION_1: NORMAL
MDC_IDC_SET_LEADCHNL_RV_PACING_CAPTURE_MODE: NORMAL
MDC_IDC_SET_LEADCHNL_RV_PACING_CATHODE_ELECTRODE_1: NORMAL
MDC_IDC_SET_LEADCHNL_RV_PACING_CATHODE_LOCATION_1: NORMAL
MDC_IDC_SET_LEADCHNL_RV_PACING_POLARITY: NORMAL
MDC_IDC_SET_LEADCHNL_RV_PACING_PULSEWIDTH: 0.5 MS
MDC_IDC_SET_LEADCHNL_RV_SENSING_ADAPTATION_MODE: NORMAL
MDC_IDC_SET_LEADCHNL_RV_SENSING_ANODE_ELECTRODE_1: NORMAL
MDC_IDC_SET_LEADCHNL_RV_SENSING_ANODE_LOCATION_1: NORMAL
MDC_IDC_SET_LEADCHNL_RV_SENSING_CATHODE_ELECTRODE_1: NORMAL
MDC_IDC_SET_LEADCHNL_RV_SENSING_CATHODE_LOCATION_1: NORMAL
MDC_IDC_SET_LEADCHNL_RV_SENSING_POLARITY: NORMAL
MDC_IDC_SET_LEADCHNL_RV_SENSING_SENSITIVITY: 2 MV
MDC_IDC_STAT_AT_DTM_END: NORMAL
MDC_IDC_STAT_AT_DTM_START: NORMAL
MDC_IDC_STAT_BRADY_DTM_END: NORMAL
MDC_IDC_STAT_BRADY_DTM_START: NORMAL
MDC_IDC_STAT_BRADY_RV_PERCENT_PACED: 99 %
MDC_IDC_STAT_CRT_DTM_END: NORMAL
MDC_IDC_STAT_CRT_DTM_START: NORMAL
MDC_IDC_STAT_HEART_RATE_DTM_END: NORMAL
MDC_IDC_STAT_HEART_RATE_DTM_START: NORMAL
MDC_IDC_STAT_HEART_RATE_VENTRICULAR_MAX: 190 {BEATS}/MIN
MDC_IDC_STAT_HEART_RATE_VENTRICULAR_MEAN: 77 {BEATS}/MIN
MDC_IDC_STAT_HEART_RATE_VENTRICULAR_MIN: 60 {BEATS}/MIN

## 2020-10-28 ENCOUNTER — TRANSFERRED RECORDS (OUTPATIENT)
Dept: HEALTH INFORMATION MANAGEMENT | Facility: CLINIC | Age: 85
End: 2020-10-28

## 2020-11-10 ENCOUNTER — OFFICE VISIT (OUTPATIENT)
Dept: INTERNAL MEDICINE | Facility: CLINIC | Age: 85
End: 2020-11-10
Payer: COMMERCIAL

## 2020-11-10 VITALS
TEMPERATURE: 97.6 F | SYSTOLIC BLOOD PRESSURE: 161 MMHG | WEIGHT: 194.5 LBS | DIASTOLIC BLOOD PRESSURE: 85 MMHG | BODY MASS INDEX: 27.84 KG/M2 | HEART RATE: 82 BPM | HEIGHT: 70 IN | RESPIRATION RATE: 18 BRPM | OXYGEN SATURATION: 96 %

## 2020-11-10 DIAGNOSIS — G47.33 OSA (OBSTRUCTIVE SLEEP APNEA): ICD-10-CM

## 2020-11-10 DIAGNOSIS — Z23 NEED FOR PROPHYLACTIC VACCINATION AND INOCULATION AGAINST INFLUENZA: ICD-10-CM

## 2020-11-10 DIAGNOSIS — I48.21 PERMANENT ATRIAL FIBRILLATION (H): ICD-10-CM

## 2020-11-10 DIAGNOSIS — G60.9 HEREDITARY AND IDIOPATHIC PERIPHERAL NEUROPATHY: Primary | ICD-10-CM

## 2020-11-10 DIAGNOSIS — I10 ESSENTIAL HYPERTENSION WITH GOAL BLOOD PRESSURE LESS THAN 140/90: ICD-10-CM

## 2020-11-10 PROCEDURE — G0008 ADMIN INFLUENZA VIRUS VAC: HCPCS | Performed by: INTERNAL MEDICINE

## 2020-11-10 PROCEDURE — 90662 IIV NO PRSV INCREASED AG IM: CPT | Performed by: INTERNAL MEDICINE

## 2020-11-10 PROCEDURE — 99214 OFFICE O/P EST MOD 30 MIN: CPT | Mod: 25 | Performed by: INTERNAL MEDICINE

## 2020-11-10 ASSESSMENT — MIFFLIN-ST. JEOR: SCORE: 1553.5

## 2020-11-10 NOTE — PROGRESS NOTES
"Subjective     Tucker Slater is a 89 year old male who presents to clinic today for the following health issues:  Patient is being seen for an follow up.  HPI        Patient is seen for a follow up visit.  Has history of atrial fibrillation. On anticoagulation with Coumadin and rate control medications. Asymptonatic - no chest pains , palpitations,  no side effects from medications.  Has h/o HTN. on medical treatment. BP has been controlled. No side effects from medications. No CP, HA, dizziness. good compliance with medications and low salt diet.  Has had hematuria. Resolved past 5 month. Follows with urology.     Concern for right SI pain, with ambulation. No falls. No leg weakness, numbness.   Has h/o peripheral neuropathy, affecting legs, feet, has numbness, paresthesias.     Hypertension Follow-up      Do you check your blood pressure regularly outside of the clinic? No     Are you following a low salt diet? No    Are your blood pressures ever more than 140 on the top number (systolic) OR more   than 90 on the bottom number (diastolic), for example 140/90? Yes      How many servings of fruits and vegetables do you eat daily?  1-2    On average, how many sweetened beverages do you drink each day (Examples: soda, juice, sweet tea, etc.  Do NOT count diet or artificially sweetened beverages)?   Coffee and water    How many days per week do you exercise enough to make your heart beat faster? 3 or less    How many minutes a day do you exercise enough to make your heart beat faster? 9 or less    How many days per week do you miss taking your medication? 0        Review of Systems   Constitutional, HEENT, cardiovascular, pulmonary, gi and gu systems are negative, except as otherwise noted.      Objective    Ht 1.778 m (5' 10\")   BMI 26.54 kg/m    Body mass index is 26.54 kg/m .  Physical Exam   GENERAL: elderly, frail, alert and no distress  NECK: no adenopathy, no asymmetry, masses, or scars and thyroid normal " "to palpation  RESP: lungs clear to auscultation - no rales, rhonchi or wheezes  CV: irregular rate and rhythm, normal S1 S2, no S3 or S4, no murmur, click or rub, no peripheral edema and peripheral pulses strong  ABDOMEN: soft, nontender, no hepatosplenomegaly, no masses and bowel sounds normal  MS: no gross musculoskeletal defects noted, no edema            Assessment & Plan   Problem List Items Addressed This Visit     Essential hypertension with goal blood pressure less than 140/90    Hereditary and idiopathic peripheral neuropathy - Primary    Atrial fibrillation (H) with mitral regurgitation and congestive heart failure    DAWSON (obstructive sleep apnea)             BMI:   Estimated body mass index is 27.91 kg/m  as calculated from the following:    Height as of this encounter: 1.778 m (5' 10\").    Weight as of this encounter: 88.2 kg (194 lb 8 oz).     Continue treatment  Monitor BP  Use cane advised   Use Tylenol and topica cream for joint pain   Monitor INR            See Patient Instructions  Return in about 3 months (around 2/10/2021) for Physical Exam.    Kwadwo Winslow MD  [facility]    "

## 2020-11-11 ENCOUNTER — ANTICOAGULATION THERAPY VISIT (OUTPATIENT)
Dept: ANTICOAGULATION | Facility: CLINIC | Age: 85
End: 2020-11-11

## 2020-11-11 DIAGNOSIS — Z79.01 LONG TERM CURRENT USE OF ANTICOAGULANTS WITH INR GOAL OF 2.0-3.0: ICD-10-CM

## 2020-11-11 DIAGNOSIS — I48.21 PERMANENT ATRIAL FIBRILLATION (H): ICD-10-CM

## 2020-11-11 DIAGNOSIS — I48.91 ATRIAL FIBRILLATION (H): ICD-10-CM

## 2020-11-11 LAB
CAPILLARY BLOOD COLLECTION: NORMAL
INR PPP: 1.9 (ref 0.86–1.14)

## 2020-11-11 PROCEDURE — 85610 PROTHROMBIN TIME: CPT | Performed by: INTERNAL MEDICINE

## 2020-11-11 PROCEDURE — 99207 PR NO CHARGE NURSE ONLY: CPT

## 2020-11-11 PROCEDURE — 36416 COLLJ CAPILLARY BLOOD SPEC: CPT | Performed by: INTERNAL MEDICINE

## 2020-11-11 NOTE — PROGRESS NOTES
ANTICOAGULATION FOLLOW-UP CLINIC VISIT    Patient Name:  Tucker Slater  Date:  2020  Contact Type:  Telephone/ disussed with patient    SUBJECTIVE:  Patient Findings     Comments:  The patient was assessed for diet, medication, and activity level changes, missed or extra doses, bruising or bleeding, with no problem findings.  Patient denies any identifiable changes that caused the sub therapeutic INR. May consider maintenance dose adjustment with next INR check if INR continues to be sub therapeutic.  Patient reports that he has not had any bleeding problems since May.  INR since May has been consistently around 2.0 (couple times at 2.9).          Clinical Outcomes     Negatives:  Major bleeding event, Thromboembolic event, Anticoagulation-related hospital admission, Anticoagulation-related ED visit, Anticoagulation-related fatality    Comments:  The patient was assessed for diet, medication, and activity level changes, missed or extra doses, bruising or bleeding, with no problem findings.  Patient denies any identifiable changes that caused the sub therapeutic INR. May consider maintenance dose adjustment with next INR check if INR continues to be sub therapeutic.  Patient reports that he has not had any bleeding problems since May.  INR since May has been consistently around 2.0 (couple times at 2.9).             OBJECTIVE    Recent labs: (last 7 days)     20  1527   INR 1.90*       ASSESSMENT / PLAN  INR assessment SUB    Recheck INR In: 2 WEEKS    INR Location Clinic      Anticoagulation Summary  As of 2020    INR goal:  2.0-2.5   TTR:  58.9 % (1 y)   INR used for dosin.90 (2020)   Warfarin maintenance plan:  1.25 mg (2.5 mg x 0.5) every Sun, Tue, Fri; 2.5 mg (2.5 mg x 1) all other days   Full warfarin instructions:  1.25 mg every Sun, Tue, Fri; 2.5 mg all other days   Weekly warfarin total:  13.75 mg   Plan last modified:  Ivory Machado RN (2020)   Next INR check:   11/25/2020   Priority:  Maintenance   Target end date:  Indefinite    Indications    Atrial fibrillation (H) with mitral regurgitation and congestive heart failure [I48.91]  Long term current use of anticoagulants with INR goal of 2.0-3.0 [Z79.01]  Permanent atrial fibrillation (H) [I48.21]             Anticoagulation Episode Summary     INR check location:      Preferred lab:      Send INR reminders to:  [facility]    Comments:  Patient has a history of hematuria when INR is elevated.  Will try to keep INR closer to 2.0.      Anticoagulation Care Providers     Provider Role Specialty Phone number    Kwadwo Winslow MD Referring Internal Medicine [phone]            See the Encounter Report to view Anticoagulation Flowsheet and Dosing Calendar (Go to Encounters tab in chart review, and find the Anticoagulation Therapy Visit)    Dosage adjustment made based on physician directed care plan.    Ivory Machado RN

## 2020-11-12 DIAGNOSIS — Z79.01 LONG TERM CURRENT USE OF ANTICOAGULANT THERAPY: ICD-10-CM

## 2020-11-12 DIAGNOSIS — I48.21 PERMANENT ATRIAL FIBRILLATION (H): Primary | ICD-10-CM

## 2020-11-13 RX ORDER — WARFARIN SODIUM 2.5 MG/1
TABLET ORAL
Qty: 30 TABLET | Refills: 4 | Status: SHIPPED | OUTPATIENT
Start: 2020-11-13 | End: 2021-04-05

## 2020-11-13 NOTE — TELEPHONE ENCOUNTER
"Requested Prescriptions   Pending Prescriptions Disp Refills     JANTOVEN ANTICOAGULANT 2.5 MG tablet [Pharmacy Med Name: JANTOVEN 2.5MG TABS] 30 tablet 4     Sig: TAKE 1/2 TABLET BY MOUTH ON TUESDAYS AND FRIDAYS, AND 1 TABLET ON SUNDAYS, MONDAYS, WEDNESDAYS, THURSDAYS, AND SATURDAYS       Vitamin K Antagonists Failed - 11/12/2020  8:02 PM        Failed - INR is within goal in the past 6 weeks     Confirm INR is within goal in the past 6 weeks.     Recent Labs   Lab Test 11/11/20  1527   INR 1.90*                       Passed - Recent (12 mo) or future (30 days) visit within the authorizing provider's specialty     Patient has had an office visit with the authorizing provider or a provider within the authorizing providers department within the previous 12 mos or has a future within next 30 days. See \"Patient Info\" tab in inbasket, or \"Choose Columns\" in Meds & Orders section of the refill encounter.              Passed - Medication is active on med list        Passed - Patient is 18 years of age or older           Last office visit 11/10/20.  Warfarin dosing is managed by INR Clinic.  Prescription approved per [facility] Refill Protocol.  Ivory Machado RN    "

## 2020-11-25 ENCOUNTER — ANTICOAGULATION THERAPY VISIT (OUTPATIENT)
Dept: ANTICOAGULATION | Facility: CLINIC | Age: 85
End: 2020-11-25

## 2020-11-25 DIAGNOSIS — Z79.01 LONG TERM CURRENT USE OF ANTICOAGULANTS WITH INR GOAL OF 2.0-3.0: ICD-10-CM

## 2020-11-25 DIAGNOSIS — I48.21 PERMANENT ATRIAL FIBRILLATION (H): ICD-10-CM

## 2020-11-25 DIAGNOSIS — I48.91 ATRIAL FIBRILLATION (H): ICD-10-CM

## 2020-11-25 LAB
CAPILLARY BLOOD COLLECTION: NORMAL
INR PPP: 1.9 (ref 0.86–1.14)

## 2020-11-25 PROCEDURE — 85610 PROTHROMBIN TIME: CPT | Performed by: INTERNAL MEDICINE

## 2020-11-25 PROCEDURE — 99207 PR NO CHARGE NURSE ONLY: CPT | Performed by: INTERNAL MEDICINE

## 2020-11-25 PROCEDURE — 36416 COLLJ CAPILLARY BLOOD SPEC: CPT | Performed by: INTERNAL MEDICINE

## 2020-11-25 NOTE — PROGRESS NOTES
ANTICOAGULATION FOLLOW-UP CLINIC VISIT    Patient Name:  Tucker Slater  Date:  2020  Contact Type:  Telephone/ Called the patient, he denies any changes or missed warfarin doses. Orders discussed with the patient, he agrees with the plan. Lab INR appointment scheduled on 20.    SUBJECTIVE:  Patient Findings     Comments:  The patient was assessed for diet, medication, and activity level changes, missed or extra doses, bruising or bleeding, with no problem findings. Patient denies any identifiable changes that caused the subtherapeutic INR. INR has been subtherapeutic the last couple checks, will increase warfarin maintenance dose by 9.1 % (1.25 mg).        Clinical Outcomes     Negatives:  Major bleeding event, Thromboembolic event, Anticoagulation-related hospital admission, Anticoagulation-related ED visit, Anticoagulation-related fatality    Comments:  The patient was assessed for diet, medication, and activity level changes, missed or extra doses, bruising or bleeding, with no problem findings. Patient denies any identifiable changes that caused the subtherapeutic INR. INR has been subtherapeutic the last couple checks, will increase warfarin maintenance dose by 9.1 % (1.25 mg).           OBJECTIVE    Recent labs: (last 7 days)     20  1311   INR 1.90*       ASSESSMENT / PLAN  INR assessment SUB    Recheck INR In: 2 WEEKS    INR Location Outside lab      Anticoagulation Summary  As of 2020    INR goal:  2.0-2.5   TTR:  55.1 % (1 y)   INR used for dosin.90 (2020)   Warfarin maintenance plan:  1.25 mg (2.5 mg x 0.5) every Tue, Fri; 2.5 mg (2.5 mg x 1) all other days   Full warfarin instructions:  1.25 mg every Tue, Fri; 2.5 mg all other days   Weekly warfarin total:  15 mg   Plan last modified:  Gail Valdovinos RN (2020)   Next INR check:  2020   Priority:  Maintenance   Target end date:  Indefinite    Indications    Atrial fibrillation (H) with mitral  regurgitation and congestive heart failure [I48.91]  Long term current use of anticoagulants with INR goal of 2.0-3.0 [Z79.01]  Permanent atrial fibrillation (H) [I48.21]             Anticoagulation Episode Summary     INR check location:      Preferred lab:      Send INR reminders to:  [facility]    Comments:  Patient has a history of hematuria when INR is elevated.  Will try to keep INR closer to 2.0.      Anticoagulation Care Providers     Provider Role Specialty Phone number    Kwadwo Winslow MD Referring Internal Medicine [phone]            See the Encounter Report to view Anticoagulation Flowsheet and Dosing Calendar (Go to Encounters tab in chart review, and find the Anticoagulation Therapy Visit)    Dosage adjustment made based on physician directed care plan.    Gail Valdovinos RN

## 2020-12-09 ENCOUNTER — ANTICOAGULATION THERAPY VISIT (OUTPATIENT)
Dept: INTERNAL MEDICINE | Facility: CLINIC | Age: 85
End: 2020-12-09

## 2020-12-09 DIAGNOSIS — Z79.01 LONG TERM CURRENT USE OF ANTICOAGULANTS WITH INR GOAL OF 2.0-3.0: ICD-10-CM

## 2020-12-09 DIAGNOSIS — I48.91 ATRIAL FIBRILLATION (H): ICD-10-CM

## 2020-12-09 DIAGNOSIS — I48.21 PERMANENT ATRIAL FIBRILLATION (H): ICD-10-CM

## 2020-12-09 LAB — INR PPP: 2.3 (ref 0.86–1.14)

## 2020-12-09 PROCEDURE — 85610 PROTHROMBIN TIME: CPT | Performed by: INTERNAL MEDICINE

## 2020-12-09 PROCEDURE — 99207 PR NO CHARGE NURSE ONLY: CPT | Performed by: INTERNAL MEDICINE

## 2020-12-09 PROCEDURE — 36416 COLLJ CAPILLARY BLOOD SPEC: CPT | Performed by: INTERNAL MEDICINE

## 2020-12-09 NOTE — PROGRESS NOTES
Patient called back RN and left VM.    Please call back patient when able.     Christina Lee, RN, BSN, PHN

## 2020-12-09 NOTE — PROGRESS NOTES
Anticoagulation Management    Unable to reach Tucker today.    Today's INR result of 2.3 is therapeutic (goal INR of 2.0-2.5).  Result received from: Clinic Lab    Follow up required to confirm warfarin dose taken and assess for changes    No answer. Left vm to call [phone]. Can transfer to [facility] at [phone]. Left message to continue current dose of warfarin 2.5 mg tonight.    Anticoagulation clinic to follow up    Adeola Bermudez RN    ANTICOAGULATION FOLLOW-UP CLINIC VISIT    Patient Name:  Tucker Slater  Date:  12/9/2020  Contact Type:  Telephone    SUBJECTIVE:  Patient Findings     Comments:  Called patient to discuss today's INR results: The patient was assessed for diet, medication, and activity level changes, missed or extra doses, bruising or bleeding, with no problem findings. Reviewed maintenance warfarin dosing with patient. Patient will remain on the same dose until next INR check. No other questions or concerns. Scheduled next lab-only INR in 3 weeks.  Adeola ARITA RN  Anticoagulation Team          Clinical Outcomes     Negatives:  Major bleeding event, Thromboembolic event, Anticoagulation-related hospital admission, Anticoagulation-related ED visit, Anticoagulation-related fatality    Comments:  Called patient to discuss today's INR results: The patient was assessed for diet, medication, and activity level changes, missed or extra doses, bruising or bleeding, with no problem findings. Reviewed maintenance warfarin dosing with patient. Patient will remain on the same dose until next INR check. No other questions or concerns. Scheduled next lab-only INR in 3 weeks.  Adeola ARITA RN  Anticoagulation Team             OBJECTIVE    Recent labs: (last 7 days)     12/09/20  1329   INR 2.30*       ASSESSMENT / PLAN  INR assessment THER    Recheck INR In: 3 WEEKS    INR Location Clinic      Anticoagulation Summary  As of 12/9/2020    INR goal:  2.0-2.5   TTR:  54.1 % (1 y)   INR used for dosing:   2.30 (12/9/2020)   Warfarin maintenance plan:  1.25 mg (2.5 mg x 0.5) every Tue, Fri; 2.5 mg (2.5 mg x 1) all other days   Full warfarin instructions:  1.25 mg every Tue, Fri; 2.5 mg all other days   Weekly warfarin total:  15 mg   No change documented:  Adeola Bermudez RN   Plan last modified:  Gail Valdovinos RN (11/25/2020)   Next INR check:  12/30/2020   Priority:  Maintenance   Target end date:  Indefinite    Indications    Atrial fibrillation (H) with mitral regurgitation and congestive heart failure [I48.91]  Long term current use of anticoagulants with INR goal of 2.0-3.0 [Z79.01]  Permanent atrial fibrillation (H) [I48.21]             Anticoagulation Episode Summary     INR check location:      Preferred lab:      Send INR reminders to:  [facility]    Comments:  Patient has a history of hematuria when INR is elevated.  Will try to keep INR closer to 2.0.      Anticoagulation Care Providers     Provider Role Specialty Phone number    Kwadwo Winslow MD Referring Internal Medicine [phone]            See the Encounter Report to view Anticoagulation Flowsheet and Dosing Calendar (Go to Encounters tab in chart review, and find the Anticoagulation Therapy Visit)    Adeola Bermudez, RAND

## 2020-12-10 DIAGNOSIS — I10 ESSENTIAL HYPERTENSION WITH GOAL BLOOD PRESSURE LESS THAN 140/90: ICD-10-CM

## 2020-12-11 RX ORDER — TRIAMTERENE/HYDROCHLOROTHIAZID 37.5-25 MG
TABLET ORAL
Qty: 90 TABLET | Refills: 1 | Status: SHIPPED | OUTPATIENT
Start: 2020-12-11 | End: 2021-04-05

## 2020-12-11 NOTE — TELEPHONE ENCOUNTER
Routing refill request to provider for review/approval because:  Labs out of range:    BP Readings from Last 3 Encounters:   11/10/20 (!) 161/85   07/02/20 126/80   05/27/20 112/64     Dilip Kincaid RN, BSN

## 2020-12-30 ENCOUNTER — TELEPHONE (OUTPATIENT)
Dept: INTERNAL MEDICINE | Facility: CLINIC | Age: 85
End: 2020-12-30

## 2020-12-30 ENCOUNTER — ANTICOAGULATION THERAPY VISIT (OUTPATIENT)
Dept: ANTICOAGULATION | Facility: CLINIC | Age: 85
End: 2020-12-30

## 2020-12-30 DIAGNOSIS — I48.21 PERMANENT ATRIAL FIBRILLATION (H): ICD-10-CM

## 2020-12-30 DIAGNOSIS — Z79.01 LONG TERM CURRENT USE OF ANTICOAGULANTS WITH INR GOAL OF 2.0-3.0: ICD-10-CM

## 2020-12-30 DIAGNOSIS — I48.91 ATRIAL FIBRILLATION (H): ICD-10-CM

## 2020-12-30 LAB
CAPILLARY BLOOD COLLECTION: NORMAL
INR PPP: 3.2 (ref 0.86–1.14)

## 2020-12-30 PROCEDURE — 99207 PR NO CHARGE NURSE ONLY: CPT

## 2020-12-30 PROCEDURE — 36416 COLLJ CAPILLARY BLOOD SPEC: CPT | Performed by: INTERNAL MEDICINE

## 2020-12-30 PROCEDURE — 85610 PROTHROMBIN TIME: CPT | Performed by: INTERNAL MEDICINE

## 2020-12-30 NOTE — PROGRESS NOTES
ANTICOAGULATION FOLLOW-UP CLINIC VISIT    Patient Name:  Tucker Slater  Date:  12/30/2020  Contact Type:  Telephone/ discussed with patient    SUBJECTIVE:  Patient Findings     Positives:  Change in health (increased back pain), Change in medications (Plans to take more Tylenol for his back pain. Currenly taking 1000 mg of Tylenol daily and plans to take 3000 mg daily as this dose has helped with his back pain in the past)    Comments:  The patient was assessed for diet, and activity level changes, missed or extra doses, bruising or bleeding, with no problem findings.  Advised patient that since he is planning on taking more than 2000 mg of Tylenol per day, this can increase INR.  Maintenance dose lowered by 8.3% today as patient does plan to take more Tylenol.  INR may be increased today due to the inflammation that he has been having in his back.          Clinical Outcomes     Negatives:  Major bleeding event, Thromboembolic event, Anticoagulation-related hospital admission, Anticoagulation-related ED visit, Anticoagulation-related fatality    Comments:  The patient was assessed for diet, and activity level changes, missed or extra doses, bruising or bleeding, with no problem findings.  Advised patient that since he is planning on taking more than 2000 mg of Tylenol per day, this can increase INR.  Maintenance dose lowered by 8.3% today as patient does plan to take more Tylenol.  INR may be increased today due to the inflammation that he has been having in his back.             OBJECTIVE    Recent labs: (last 7 days)     12/30/20  1323   INR 3.20*       ASSESSMENT / PLAN  INR assessment SUPRA    Recheck INR In: 2 WEEKS    INR Location Clinic      Anticoagulation Summary  As of 12/30/2020    INR goal:  2.0-2.5   TTR:  49.7 % (1 y)   INR used for dosing:  3.20 (12/30/2020)   Warfarin maintenance plan:  1.25 mg (2.5 mg x 0.5) every Sun, Tue, Fri; 2.5 mg (2.5 mg x 1) all other days   Full warfarin instructions:   12/30: Hold; Otherwise 1.25 mg every Sun, Tue, Fri; 2.5 mg all other days   Weekly warfarin total:  13.75 mg   Plan last modified:  Ivory Machado RN (12/30/2020)   Next INR check:  1/13/2021   Priority:  Maintenance   Target end date:  Indefinite    Indications    Atrial fibrillation (H) with mitral regurgitation and congestive heart failure [I48.91]  Long term current use of anticoagulants with INR goal of 2.0-3.0 [Z79.01]  Permanent atrial fibrillation (H) [I48.21]             Anticoagulation Episode Summary     INR check location:      Preferred lab:      Send INR reminders to:  [facility]    Comments:  Patient has a history of hematuria when INR is elevated.  Will try to keep INR closer to 2.0.      Anticoagulation Care Providers     Provider Role Specialty Phone number    Kwadwo Winslow MD Referring Internal Medicine [phone]            See the Encounter Report to view Anticoagulation Flowsheet and Dosing Calendar (Go to Encounters tab in chart review, and find the Anticoagulation Therapy Visit)    Dosage adjustment made based on physician directed care plan.    Ivory Machado RN

## 2021-01-07 ENCOUNTER — ANCILLARY PROCEDURE (OUTPATIENT)
Dept: CARDIOLOGY | Facility: CLINIC | Age: 86
End: 2021-01-07
Attending: INTERNAL MEDICINE
Payer: COMMERCIAL

## 2021-01-07 DIAGNOSIS — Z95.0 CARDIAC PACEMAKER IN SITU: ICD-10-CM

## 2021-01-07 PROCEDURE — 93296 REM INTERROG EVL PM/IDS: CPT | Performed by: INTERNAL MEDICINE

## 2021-01-07 PROCEDURE — 93294 REM INTERROG EVL PM/LDLS PM: CPT | Performed by: INTERNAL MEDICINE

## 2021-01-11 LAB
MDC_IDC_LEAD_IMPLANT_DT: NORMAL
MDC_IDC_LEAD_IMPLANT_DT: NORMAL
MDC_IDC_LEAD_LOCATION: NORMAL
MDC_IDC_LEAD_LOCATION: NORMAL
MDC_IDC_LEAD_LOCATION_DETAIL_1: NORMAL
MDC_IDC_LEAD_LOCATION_DETAIL_1: NORMAL
MDC_IDC_LEAD_MFG: NORMAL
MDC_IDC_LEAD_MFG: NORMAL
MDC_IDC_LEAD_MODEL: NORMAL
MDC_IDC_LEAD_MODEL: NORMAL
MDC_IDC_LEAD_POLARITY_TYPE: NORMAL
MDC_IDC_LEAD_POLARITY_TYPE: NORMAL
MDC_IDC_LEAD_SERIAL: NORMAL
MDC_IDC_LEAD_SERIAL: NORMAL
MDC_IDC_MSMT_BATTERY_DTM: NORMAL
MDC_IDC_MSMT_BATTERY_REMAINING_LONGEVITY: 114 MO
MDC_IDC_MSMT_BATTERY_REMAINING_PERCENTAGE: 89 %
MDC_IDC_MSMT_BATTERY_RRT_TRIGGER: NORMAL
MDC_IDC_MSMT_BATTERY_STATUS: NORMAL
MDC_IDC_MSMT_BATTERY_VOLTAGE: 2.95 V
MDC_IDC_MSMT_LEADCHNL_RV_IMPEDANCE_VALUE: 400 OHM
MDC_IDC_MSMT_LEADCHNL_RV_LEAD_CHANNEL_STATUS: NORMAL
MDC_IDC_MSMT_LEADCHNL_RV_PACING_THRESHOLD_AMPLITUDE: 0.75 V
MDC_IDC_MSMT_LEADCHNL_RV_PACING_THRESHOLD_PULSEWIDTH: 0.5 MS
MDC_IDC_MSMT_LEADCHNL_RV_SENSING_INTR_AMPL: 12 MV
MDC_IDC_PG_IMPLANT_DTM: NORMAL
MDC_IDC_PG_MFG: NORMAL
MDC_IDC_PG_MODEL: NORMAL
MDC_IDC_PG_SERIAL: NORMAL
MDC_IDC_PG_TYPE: NORMAL
MDC_IDC_SESS_CLINIC_NAME: NORMAL
MDC_IDC_SESS_DTM: NORMAL
MDC_IDC_SESS_REPROGRAMMED: NO
MDC_IDC_SESS_TYPE: NORMAL
MDC_IDC_SET_BRADY_HYSTRATE: 60 {BEATS}/MIN
MDC_IDC_SET_BRADY_LOWRATE: 70 {BEATS}/MIN
MDC_IDC_SET_BRADY_MAX_SENSOR_RATE: 120 {BEATS}/MIN
MDC_IDC_SET_BRADY_MODE: NORMAL
MDC_IDC_SET_LEADCHNL_RA_PACING_POLARITY: NORMAL
MDC_IDC_SET_LEADCHNL_RA_SENSING_POLARITY: NORMAL
MDC_IDC_SET_LEADCHNL_RV_PACING_AMPLITUDE: 1 V
MDC_IDC_SET_LEADCHNL_RV_PACING_ANODE_ELECTRODE_1: NORMAL
MDC_IDC_SET_LEADCHNL_RV_PACING_ANODE_LOCATION_1: NORMAL
MDC_IDC_SET_LEADCHNL_RV_PACING_CAPTURE_MODE: NORMAL
MDC_IDC_SET_LEADCHNL_RV_PACING_CATHODE_ELECTRODE_1: NORMAL
MDC_IDC_SET_LEADCHNL_RV_PACING_CATHODE_LOCATION_1: NORMAL
MDC_IDC_SET_LEADCHNL_RV_PACING_POLARITY: NORMAL
MDC_IDC_SET_LEADCHNL_RV_PACING_PULSEWIDTH: 0.5 MS
MDC_IDC_SET_LEADCHNL_RV_SENSING_ADAPTATION_MODE: NORMAL
MDC_IDC_SET_LEADCHNL_RV_SENSING_ANODE_ELECTRODE_1: NORMAL
MDC_IDC_SET_LEADCHNL_RV_SENSING_ANODE_LOCATION_1: NORMAL
MDC_IDC_SET_LEADCHNL_RV_SENSING_CATHODE_ELECTRODE_1: NORMAL
MDC_IDC_SET_LEADCHNL_RV_SENSING_CATHODE_LOCATION_1: NORMAL
MDC_IDC_SET_LEADCHNL_RV_SENSING_POLARITY: NORMAL
MDC_IDC_SET_LEADCHNL_RV_SENSING_SENSITIVITY: 2 MV
MDC_IDC_STAT_AT_DTM_END: NORMAL
MDC_IDC_STAT_AT_DTM_START: NORMAL
MDC_IDC_STAT_BRADY_DTM_END: NORMAL
MDC_IDC_STAT_BRADY_DTM_START: NORMAL
MDC_IDC_STAT_BRADY_RV_PERCENT_PACED: 99 %
MDC_IDC_STAT_CRT_DTM_END: NORMAL
MDC_IDC_STAT_CRT_DTM_START: NORMAL
MDC_IDC_STAT_HEART_RATE_DTM_END: NORMAL
MDC_IDC_STAT_HEART_RATE_DTM_START: NORMAL
MDC_IDC_STAT_HEART_RATE_VENTRICULAR_MAX: 190 {BEATS}/MIN
MDC_IDC_STAT_HEART_RATE_VENTRICULAR_MEAN: 77 {BEATS}/MIN
MDC_IDC_STAT_HEART_RATE_VENTRICULAR_MIN: 60 {BEATS}/MIN

## 2021-01-13 ENCOUNTER — ANTICOAGULATION THERAPY VISIT (OUTPATIENT)
Dept: ANTICOAGULATION | Facility: CLINIC | Age: 86
End: 2021-01-13

## 2021-01-13 DIAGNOSIS — Z79.01 LONG TERM CURRENT USE OF ANTICOAGULANTS WITH INR GOAL OF 2.0-3.0: ICD-10-CM

## 2021-01-13 DIAGNOSIS — I48.21 PERMANENT ATRIAL FIBRILLATION (H): ICD-10-CM

## 2021-01-13 DIAGNOSIS — I48.91 ATRIAL FIBRILLATION (H): ICD-10-CM

## 2021-01-13 LAB
CAPILLARY BLOOD COLLECTION: NORMAL
INR PPP: 2 (ref 0.86–1.14)

## 2021-01-13 PROCEDURE — 85610 PROTHROMBIN TIME: CPT | Performed by: INTERNAL MEDICINE

## 2021-01-13 PROCEDURE — 99207 PR NO CHARGE NURSE ONLY: CPT

## 2021-01-13 PROCEDURE — 36416 COLLJ CAPILLARY BLOOD SPEC: CPT | Performed by: INTERNAL MEDICINE

## 2021-01-13 NOTE — PROGRESS NOTES
ANTICOAGULATION FOLLOW-UP CLINIC VISIT    Patient Name:  Tucker Slater  Date:  2021  Contact Type:  Telephone/ Called patient, he denies any changes. With Tylenol and pain, will keep the warfarin dose the same and recheck in 2 weeks. Orders discussed with the patient, he agrees with the plan. Lab INR appointment scheduled on 21.    SUBJECTIVE:  Patient Findings     Positives:  Change in health (Patient reports his back is not improving, he is going to schedule an appointment with his PCP. ), Change in medications (Patient continues to take between 2984-6161 mg Tylenol daily)    Comments:  The patient was assessed for diet, medication, and activity level changes, missed or extra doses, bruising or bleeding, with no problem findings. Maintenance warfarin dosing was reviewed with patient and will remain on the same dose until next INR check. Patient did not have any questions or concerns.           Clinical Outcomes     Comments:  The patient was assessed for diet, medication, and activity level changes, missed or extra doses, bruising or bleeding, with no problem findings. Maintenance warfarin dosing was reviewed with patient and will remain on the same dose until next INR check. Patient did not have any questions or concerns.              OBJECTIVE    Recent labs: (last 7 days)     21  1333   INR 2.00*       ASSESSMENT / PLAN  INR assessment THER    Recheck INR In: 2 WEEKS    INR Location Outside lab      Anticoagulation Summary  As of 2021    INR goal:  2.0-2.5   TTR:  47.4 % (1 y)   INR used for dosin.00 (2021)   Warfarin maintenance plan:  1.25 mg (2.5 mg x 0.5) every Sun, Tue, Fri; 2.5 mg (2.5 mg x 1) all other days   Full warfarin instructions:  1.25 mg every Sun, Tue, Fri; 2.5 mg all other days   Weekly warfarin total:  13.75 mg   No change documented:  Gail Valdovinos RN   Plan last modified:  Ivory Machado RN (2020)   Next INR check:  2021   Priority:   Maintenance   Target end date:  Indefinite    Indications    Atrial fibrillation (H) with mitral regurgitation and congestive heart failure [I48.91]  Long term current use of anticoagulants with INR goal of 2.0-3.0 [Z79.01]  Permanent atrial fibrillation (H) [I48.21]             Anticoagulation Episode Summary     INR check location:      Preferred lab:      Send INR reminders to:  DEBORAH [facility]OVARRUBIAS [facility]    Comments:  Patient has a history of hematuria when INR is elevated.  Will try to keep INR closer to 2.0.      Anticoagulation Care Providers     Provider Role Specialty Phone number    Kwadwo Winslow MD Referring Internal Medicine [phone]            See the Encounter Report to view Anticoagulation Flowsheet and Dosing Calendar (Go to Encounters tab in chart review, and find the Anticoagulation Therapy Visit)    Dosage adjustment made based on physician directed care plan.    Gail Valdovinos RN

## 2021-01-27 ENCOUNTER — ANTICOAGULATION THERAPY VISIT (OUTPATIENT)
Dept: ANTICOAGULATION | Facility: CLINIC | Age: 86
End: 2021-01-27

## 2021-01-27 DIAGNOSIS — I48.21 PERMANENT ATRIAL FIBRILLATION (H): ICD-10-CM

## 2021-01-27 DIAGNOSIS — Z79.01 LONG TERM CURRENT USE OF ANTICOAGULANTS WITH INR GOAL OF 2.0-3.0: ICD-10-CM

## 2021-01-27 DIAGNOSIS — I48.91 ATRIAL FIBRILLATION (H): ICD-10-CM

## 2021-01-27 LAB
CAPILLARY BLOOD COLLECTION: NORMAL
INR PPP: 2.8 (ref 0.86–1.14)

## 2021-01-27 PROCEDURE — 85610 PROTHROMBIN TIME: CPT | Performed by: INTERNAL MEDICINE

## 2021-01-27 PROCEDURE — 36416 COLLJ CAPILLARY BLOOD SPEC: CPT | Performed by: INTERNAL MEDICINE

## 2021-01-27 PROCEDURE — 99207 PR NO CHARGE NURSE ONLY: CPT

## 2021-01-27 NOTE — PROGRESS NOTES
ANTICOAGULATION FOLLOW-UP CLINIC VISIT    Patient Name:  Tucker Slater  Date:  2021  Contact Type:  Telephone/ discussed with patient    SUBJECTIVE:  Patient Findings     Positives:  Change in medications (Has been taking 2000 mg of Tylenol daily for back pain.  He was taking 3000 mg previously.)    Comments:  Continues to have some low back pain, but it is slowly improving.  Will lower dose for today and consider maintenance dose adjustment with next INR check if it continues to be elevated.  The patient was assessed for diet, and activity level changes, missed or extra doses, bruising or bleeding, with no problem findings.          Clinical Outcomes     Negatives:  Major bleeding event, Thromboembolic event, Anticoagulation-related hospital admission, Anticoagulation-related ED visit, Anticoagulation-related fatality    Comments:  Continues to have some low back pain, but it is slowly improving.  Will lower dose for today and consider maintenance dose adjustment with next INR check if it continues to be elevated.  The patient was assessed for diet, and activity level changes, missed or extra doses, bruising or bleeding, with no problem findings.             OBJECTIVE    Recent labs: (last 7 days)     21  1327   INR 2.80*       ASSESSMENT / PLAN  INR assessment SUPRA    Recheck INR In: 2 WEEKS    INR Location Clinic      Anticoagulation Summary  As of 2021    INR goal:  2.0-2.5   TTR:  46.0 % (1 y)   INR used for dosin.80 (2021)   Warfarin maintenance plan:  1.25 mg (2.5 mg x 0.5) every Sun, Tue, Fri; 2.5 mg (2.5 mg x 1) all other days   Full warfarin instructions:  : 1.25 mg; Otherwise 1.25 mg every Sun, Tue, Fri; 2.5 mg all other days   Weekly warfarin total:  13.75 mg   Plan last modified:  Ivory Machado RN (2020)   Next INR check:  2/10/2021   Priority:  Maintenance   Target end date:  Indefinite    Indications    Atrial fibrillation (H) with mitral regurgitation and  congestive heart failure [I48.91]  Long term current use of anticoagulants with INR goal of 2.0-3.0 [Z79.01]  Permanent atrial fibrillation (H) [I48.21]             Anticoagulation Episode Summary     INR check location:      Preferred lab:      Send INR reminders to:  DEBORAH MCARTHURS    Comments:  Patient has a history of hematuria when INR is elevated.  Will try to keep INR closer to 2.0.      Anticoagulation Care Providers     Provider Role Specialty Phone number    Kwadwo Winslow MD Referring Internal Medicine [phone]            See the Encounter Report to view Anticoagulation Flowsheet and Dosing Calendar (Go to Encounters tab in chart review, and find the Anticoagulation Therapy Visit)    Dosage adjustment made based on physician directed care plan.    Ivory Machado RN

## 2021-02-09 ENCOUNTER — TRANSFERRED RECORDS (OUTPATIENT)
Dept: HEALTH INFORMATION MANAGEMENT | Facility: CLINIC | Age: 86
End: 2021-02-09

## 2021-02-10 ENCOUNTER — ANTICOAGULATION THERAPY VISIT (OUTPATIENT)
Dept: ANTICOAGULATION | Facility: CLINIC | Age: 86
End: 2021-02-10

## 2021-02-10 DIAGNOSIS — I48.91 ATRIAL FIBRILLATION (H): ICD-10-CM

## 2021-02-10 DIAGNOSIS — Z79.01 LONG TERM CURRENT USE OF ANTICOAGULANTS WITH INR GOAL OF 2.0-3.0: ICD-10-CM

## 2021-02-10 DIAGNOSIS — I48.21 PERMANENT ATRIAL FIBRILLATION (H): ICD-10-CM

## 2021-02-10 LAB
CAPILLARY BLOOD COLLECTION: NORMAL
INR PPP: 2.3 (ref 0.86–1.14)

## 2021-02-10 PROCEDURE — 36416 COLLJ CAPILLARY BLOOD SPEC: CPT | Performed by: INTERNAL MEDICINE

## 2021-02-10 PROCEDURE — 99207 PR NO CHARGE NURSE ONLY: CPT

## 2021-02-10 PROCEDURE — 85610 PROTHROMBIN TIME: CPT | Performed by: INTERNAL MEDICINE

## 2021-02-10 NOTE — PROGRESS NOTES
ANTICOAGULATION FOLLOW-UP CLINIC VISIT    Patient Name:  Tucker Slater  Date:  2/10/2021  Contact Type:  Telephone/ discussed with patient    SUBJECTIVE:  Patient Findings     Positives:  Bruising (bruising on left knee and arm s/p fall.  Bruising is starting to improve)    Comments:  Continues to take Tylenol 2000 mg daily for general aches and pain.  Back pain has improved significantly since last INR check.  The patient was assessed for diet, medication, and activity level changes, missed or extra doses, or bleeding, with no problem findings.            Clinical Outcomes     Negatives:  Major bleeding event, Thromboembolic event, Anticoagulation-related hospital admission, Anticoagulation-related ED visit, Anticoagulation-related fatality    Comments:  Continues to take Tylenol 2000 mg daily for general aches and pain.  Back pain has improved significantly since last INR check.  The patient was assessed for diet, medication, and activity level changes, missed or extra doses, or bleeding, with no problem findings.               OBJECTIVE    Recent labs: (last 7 days)     02/10/21  1319   INR 2.30*       ASSESSMENT / PLAN  INR assessment THER    Recheck INR In: 3 WEEKS    INR Location Clinic      Anticoagulation Summary  As of 2/10/2021    INR goal:  2.0-2.5   TTR:  47.4 % (1 y)   INR used for dosin.30 (2/10/2021)   Warfarin maintenance plan:  1.25 mg (2.5 mg x 0.5) every Sun, Tue, Fri; 2.5 mg (2.5 mg x 1) all other days   Full warfarin instructions:  1.25 mg every Sun, Tue, Fri; 2.5 mg all other days   Weekly warfarin total:  13.75 mg   No change documented:  Ivory Machado RN   Plan last modified:  Ivory Machado RN (2020)   Next INR check:  3/3/2021   Priority:  Maintenance   Target end date:  Indefinite    Indications    Atrial fibrillation (H) with mitral regurgitation and congestive heart failure [I48.91]  Long term current use of anticoagulants with INR goal of 2.0-3.0 [Z79.01]  Permanent  atrial fibrillation (H) [I48.21]             Anticoagulation Episode Summary     INR check location:      Preferred lab:      Send INR reminders to:  DEBOR[facility]    Comments:  Patient has a history of hematuria when INR is elevated.  Will try to keep INR closer to 2.0.      Anticoagulation Care Providers     Provider Role Specialty Phone number    Kwadwo Winslow MD Referring Internal Medicine [phone]            See the Encounter Report to view Anticoagulation Flowsheet and Dosing Calendar (Go to Encounters tab in chart review, and find the Anticoagulation Therapy Visit)    Dosage adjustment made based on physician directed care plan.    Ivory Machado RN

## 2021-03-03 ENCOUNTER — ANTICOAGULATION THERAPY VISIT (OUTPATIENT)
Dept: ANTICOAGULATION | Facility: CLINIC | Age: 86
End: 2021-03-03

## 2021-03-03 DIAGNOSIS — Z79.01 LONG TERM CURRENT USE OF ANTICOAGULANTS WITH INR GOAL OF 2.0-3.0: ICD-10-CM

## 2021-03-03 DIAGNOSIS — I48.21 PERMANENT ATRIAL FIBRILLATION (H): ICD-10-CM

## 2021-03-03 DIAGNOSIS — I48.91 ATRIAL FIBRILLATION (H): ICD-10-CM

## 2021-03-03 LAB
CAPILLARY BLOOD COLLECTION: NORMAL
INR PPP: 2.6 (ref 0.86–1.14)

## 2021-03-03 PROCEDURE — 36416 COLLJ CAPILLARY BLOOD SPEC: CPT | Performed by: INTERNAL MEDICINE

## 2021-03-03 PROCEDURE — 85610 PROTHROMBIN TIME: CPT | Performed by: INTERNAL MEDICINE

## 2021-03-03 PROCEDURE — 99207 PR NO CHARGE NURSE ONLY: CPT

## 2021-03-03 NOTE — PROGRESS NOTES
ANTICOAGULATION FOLLOW-UP CLINIC VISIT    Patient Name:  Tucker Slater  Date:  3/3/2021  Contact Type:  Telephone/ discussed with patient    SUBJECTIVE:  Patient Findings     Comments:  The patient was assessed for diet, medication, and activity level changes, missed or extra doses, bruising or bleeding, with no problem findings.  Continues to have back pain and taking Tylenol 2000 mg daily (no change).  Patient denies any identifiable changes that caused the supra therapeutic INR.           Clinical Outcomes     Negatives:  Major bleeding event, Thromboembolic event, Anticoagulation-related hospital admission, Anticoagulation-related ED visit, Anticoagulation-related fatality    Comments:  The patient was assessed for diet, medication, and activity level changes, missed or extra doses, bruising or bleeding, with no problem findings.  Continues to have back pain and taking Tylenol 2000 mg daily (no change).  Patient denies any identifiable changes that caused the supra therapeutic INR.              OBJECTIVE    Recent labs: (last 7 days)     21  1321   INR 2.60*       ASSESSMENT / PLAN  INR assessment THER    Recheck INR In: 3 WEEKS    INR Location Clinic      Anticoagulation Summary  As of 3/3/2021    INR goal:  2.0-2.5   TTR:  51.2 % (1 y)   INR used for dosin.60 (3/3/2021)   Warfarin maintenance plan:  1.25 mg (2.5 mg x 0.5) every Sun, Tue, Fri; 2.5 mg (2.5 mg x 1) all other days   Full warfarin instructions:  1.25 mg every Sun, Tue, Fri; 2.5 mg all other days   Weekly warfarin total:  13.75 mg   No change documented:  Ivory Machado RN   Plan last modified:  Ivory Machado RN (2020)   Next INR check:  3/24/2021   Priority:  Maintenance   Target end date:  Indefinite    Indications    Atrial fibrillation (H) with mitral regurgitation and congestive heart failure [I48.91]  Long term current use of anticoagulants with INR goal of 2.0-3.0 [Z79.01]  Permanent atrial fibrillation (H) [I48.21]              Anticoagulation Episode Summary     INR check location:      Preferred lab:      Send INR reminders to:  DBEORAH [facility]UR[facility]    Comments:  Patient has a history of hematuria when INR is elevated.  Will try to keep INR closer to 2.0.      Anticoagulation Care Providers     Provider Role Specialty Phone number    Kwadwo Winslow MD Referring Internal Medicine [phone]            See the Encounter Report to view Anticoagulation Flowsheet and Dosing Calendar (Go to Encounters tab in chart review, and find the Anticoagulation Therapy Visit)    Dosage adjustment made based on physician directed care plan.    Ivory Machado RN

## 2021-03-24 ENCOUNTER — ANTICOAGULATION THERAPY VISIT (OUTPATIENT)
Dept: ANTICOAGULATION | Facility: CLINIC | Age: 86
End: 2021-03-24

## 2021-03-24 DIAGNOSIS — Z79.01 LONG TERM CURRENT USE OF ANTICOAGULANTS WITH INR GOAL OF 2.0-3.0: ICD-10-CM

## 2021-03-24 DIAGNOSIS — I48.91 ATRIAL FIBRILLATION (H): ICD-10-CM

## 2021-03-24 DIAGNOSIS — I48.21 PERMANENT ATRIAL FIBRILLATION (H): ICD-10-CM

## 2021-03-24 LAB
CAPILLARY BLOOD COLLECTION: NORMAL
INR PPP: 2.2 (ref 0.86–1.14)

## 2021-03-24 PROCEDURE — 85610 PROTHROMBIN TIME: CPT | Performed by: INTERNAL MEDICINE

## 2021-03-24 PROCEDURE — 99207 PR NO CHARGE NURSE ONLY: CPT

## 2021-03-24 PROCEDURE — 36416 COLLJ CAPILLARY BLOOD SPEC: CPT | Performed by: INTERNAL MEDICINE

## 2021-03-24 NOTE — PROGRESS NOTES
ANTICOAGULATION FOLLOW-UP CLINIC VISIT    Patient Name:  Tucker Slater  Date:  3/24/2021  Contact Type:  Telephone/ Patient returned call, he denies any changes. Orders discussed with the patient, he agrees with the plan.Lab INR appointment scheduled on 21    SUBJECTIVE:  Patient Findings     Comments:  The patient was assessed for diet, medication, and activity level changes, missed or extra doses, bruising or bleeding, with no problem findings. Maintenance warfarin dosing was reviewed with patient and will remain on the same dose until next INR check. Patient did not have any questions or concerns.             Clinical Outcomes     Negatives:  Major bleeding event, Thromboembolic event, Anticoagulation-related hospital admission, Anticoagulation-related ED visit, Anticoagulation-related fatality    Comments:  The patient was assessed for diet, medication, and activity level changes, missed or extra doses, bruising or bleeding, with no problem findings. Maintenance warfarin dosing was reviewed with patient and will remain on the same dose until next INR check. Patient did not have any questions or concerns.                OBJECTIVE    Recent labs: (last 7 days)     21  1313   INR 2.20*       ASSESSMENT / PLAN  INR assessment THER    Recheck INR In: 4 WEEKS    INR Location Outside lab      Anticoagulation Summary  As of 3/24/2021    INR goal:  2.0-2.5   TTR:  51.8 % (1 y)   INR used for dosin.20 (3/24/2021)   Warfarin maintenance plan:  1.25 mg (2.5 mg x 0.5) every Sun, Tue, Fri; 2.5 mg (2.5 mg x 1) all other days   Full warfarin instructions:  1.25 mg every Sun, Tue, Fri; 2.5 mg all other days   Weekly warfarin total:  13.75 mg   No change documented:  Gail Valdovinos RN   Plan last modified:  Ivory Machado RN (2020)   Next INR check:  2021   Priority:  Maintenance   Target end date:  Indefinite    Indications    Atrial fibrillation (H) with mitral regurgitation and congestive  heart failure [I48.91]  Long term current use of anticoagulants with INR goal of 2.0-3.0 [Z79.01]  Permanent atrial fibrillation (H) [I48.21]             Anticoagulation Episode Summary     INR check location:      Preferred lab:      Send INR reminders to:  DEBORAH MCARTHURS    Comments:  Patient has a history of hematuria when INR is elevated.  Will try to keep INR closer to 2.0.      Anticoagulation Care Providers     Provider Role Specialty Phone number    Kwadwo Winslow MD Referring Internal Medicine [phone]            See the Encounter Report to view Anticoagulation Flowsheet and Dosing Calendar (Go to Encounters tab in chart review, and find the Anticoagulation Therapy Visit)    Dosage adjustment made based on physician directed care plan.    Gail Valdovinos RN

## 2021-03-24 NOTE — PROGRESS NOTES
Left message to call [phone]. Please transfer call to Gail at [phone].       Anticoagulation Management    Unable to reach Tucker today.    Today's INR result of 2.2 is therapeutic (goal INR of 2.0-3.0).  Result received from: Clinic Lab    Follow up required to confirm warfarin dose taken and assess for changes    Left message to continue current dose of warfarin 2.5 mg tonight.      Anticoagulation clinic to follow up    Gail Valdovinos RN

## 2021-04-05 ENCOUNTER — OFFICE VISIT (OUTPATIENT)
Dept: INTERNAL MEDICINE | Facility: CLINIC | Age: 86
End: 2021-04-05
Payer: COMMERCIAL

## 2021-04-05 VITALS
WEIGHT: 191 LBS | BODY MASS INDEX: 27.35 KG/M2 | OXYGEN SATURATION: 99 % | HEART RATE: 59 BPM | DIASTOLIC BLOOD PRESSURE: 83 MMHG | TEMPERATURE: 98 F | HEIGHT: 70 IN | SYSTOLIC BLOOD PRESSURE: 140 MMHG

## 2021-04-05 DIAGNOSIS — R20.2 PARESTHESIA: ICD-10-CM

## 2021-04-05 DIAGNOSIS — E55.9 VITAMIN D DEFICIENCY: ICD-10-CM

## 2021-04-05 DIAGNOSIS — N13.8 HYPERTROPHY OF PROSTATE WITH URINARY OBSTRUCTION: ICD-10-CM

## 2021-04-05 DIAGNOSIS — Z12.5 SCREENING FOR PROSTATE CANCER: ICD-10-CM

## 2021-04-05 DIAGNOSIS — Z79.01 LONG TERM CURRENT USE OF ANTICOAGULANT THERAPY: ICD-10-CM

## 2021-04-05 DIAGNOSIS — I48.21 PERMANENT ATRIAL FIBRILLATION (H): ICD-10-CM

## 2021-04-05 DIAGNOSIS — I10 ESSENTIAL HYPERTENSION WITH GOAL BLOOD PRESSURE LESS THAN 140/90: ICD-10-CM

## 2021-04-05 DIAGNOSIS — M51.369 DDD (DEGENERATIVE DISC DISEASE), LUMBAR: ICD-10-CM

## 2021-04-05 DIAGNOSIS — Z00.00 ENCOUNTER FOR PREVENTATIVE ADULT HEALTH CARE EXAMINATION: Primary | ICD-10-CM

## 2021-04-05 DIAGNOSIS — N40.1 HYPERTROPHY OF PROSTATE WITH URINARY OBSTRUCTION: ICD-10-CM

## 2021-04-05 LAB
ALBUMIN SERPL-MCNC: 3.5 G/DL (ref 3.4–5)
ALBUMIN UR-MCNC: 100 MG/DL
ALP SERPL-CCNC: 57 U/L (ref 40–150)
ALT SERPL W P-5'-P-CCNC: 27 U/L (ref 0–70)
ANION GAP SERPL CALCULATED.3IONS-SCNC: 3 MMOL/L (ref 3–14)
APPEARANCE UR: CLEAR
AST SERPL W P-5'-P-CCNC: 24 U/L (ref 0–45)
BILIRUB SERPL-MCNC: 0.7 MG/DL (ref 0.2–1.3)
BILIRUB UR QL STRIP: NEGATIVE
BUN SERPL-MCNC: 34 MG/DL (ref 7–30)
CALCIUM SERPL-MCNC: 9 MG/DL (ref 8.5–10.1)
CHLORIDE SERPL-SCNC: 110 MMOL/L (ref 94–109)
CHOLEST SERPL-MCNC: 193 MG/DL
CO2 SERPL-SCNC: 27 MMOL/L (ref 20–32)
COLOR UR AUTO: YELLOW
CREAT SERPL-MCNC: 1.64 MG/DL (ref 0.66–1.25)
DEPRECATED CALCIDIOL+CALCIFEROL SERPL-MC: 86 UG/L (ref 20–75)
ERYTHROCYTE [DISTWIDTH] IN BLOOD BY AUTOMATED COUNT: 13.2 % (ref 10–15)
GFR SERPL CREATININE-BSD FRML MDRD: 36 ML/MIN/{1.73_M2}
GLUCOSE SERPL-MCNC: 89 MG/DL (ref 70–99)
GLUCOSE UR STRIP-MCNC: NEGATIVE MG/DL
HCT VFR BLD AUTO: 42.9 % (ref 40–53)
HDLC SERPL-MCNC: 42 MG/DL
HGB BLD-MCNC: 14 G/DL (ref 13.3–17.7)
HGB UR QL STRIP: NEGATIVE
KETONES UR STRIP-MCNC: NEGATIVE MG/DL
LDLC SERPL CALC-MCNC: 125 MG/DL
LEUKOCYTE ESTERASE UR QL STRIP: NEGATIVE
MCH RBC QN AUTO: 32.3 PG (ref 26.5–33)
MCHC RBC AUTO-ENTMCNC: 32.6 G/DL (ref 31.5–36.5)
MCV RBC AUTO: 99 FL (ref 78–100)
MUCOUS THREADS #/AREA URNS LPF: PRESENT /LPF
NITRATE UR QL: NEGATIVE
NON-SQ EPI CELLS #/AREA URNS LPF: ABNORMAL /LPF
NONHDLC SERPL-MCNC: 151 MG/DL
PH UR STRIP: 7 PH (ref 5–7)
PLATELET # BLD AUTO: 179 10E9/L (ref 150–450)
POTASSIUM SERPL-SCNC: 4.3 MMOL/L (ref 3.4–5.3)
PROT SERPL-MCNC: 7.2 G/DL (ref 6.8–8.8)
RBC # BLD AUTO: 4.33 10E12/L (ref 4.4–5.9)
RBC #/AREA URNS AUTO: ABNORMAL /HPF
SODIUM SERPL-SCNC: 140 MMOL/L (ref 133–144)
SOURCE: ABNORMAL
SP GR UR STRIP: 1.02 (ref 1–1.03)
TRIGL SERPL-MCNC: 130 MG/DL
TSH SERPL DL<=0.005 MIU/L-ACNC: 1.23 MU/L (ref 0.4–4)
UROBILINOGEN UR STRIP-ACNC: 0.2 EU/DL (ref 0.2–1)
VIT B12 SERPL-MCNC: 558 PG/ML (ref 193–986)
WBC # BLD AUTO: 5 10E9/L (ref 4–11)
WBC #/AREA URNS AUTO: ABNORMAL /HPF

## 2021-04-05 PROCEDURE — 36415 COLL VENOUS BLD VENIPUNCTURE: CPT | Performed by: INTERNAL MEDICINE

## 2021-04-05 PROCEDURE — 80053 COMPREHEN METABOLIC PANEL: CPT | Performed by: INTERNAL MEDICINE

## 2021-04-05 PROCEDURE — 81001 URINALYSIS AUTO W/SCOPE: CPT | Performed by: INTERNAL MEDICINE

## 2021-04-05 PROCEDURE — 99397 PER PM REEVAL EST PAT 65+ YR: CPT | Performed by: INTERNAL MEDICINE

## 2021-04-05 PROCEDURE — 82607 VITAMIN B-12: CPT | Performed by: INTERNAL MEDICINE

## 2021-04-05 PROCEDURE — 84443 ASSAY THYROID STIM HORMONE: CPT | Performed by: INTERNAL MEDICINE

## 2021-04-05 PROCEDURE — 82306 VITAMIN D 25 HYDROXY: CPT | Performed by: INTERNAL MEDICINE

## 2021-04-05 PROCEDURE — 85027 COMPLETE CBC AUTOMATED: CPT | Performed by: INTERNAL MEDICINE

## 2021-04-05 PROCEDURE — 80061 LIPID PANEL: CPT | Performed by: INTERNAL MEDICINE

## 2021-04-05 PROCEDURE — G0103 PSA SCREENING: HCPCS | Performed by: INTERNAL MEDICINE

## 2021-04-05 RX ORDER — CARVEDILOL 25 MG/1
37.5 TABLET ORAL 2 TIMES DAILY WITH MEALS
Qty: 270 TABLET | Refills: 3 | Status: SHIPPED | OUTPATIENT
Start: 2021-04-05 | End: 2022-07-05

## 2021-04-05 RX ORDER — LOSARTAN POTASSIUM 50 MG/1
50 TABLET ORAL DAILY
Qty: 90 TABLET | Refills: 3 | Status: SHIPPED | OUTPATIENT
Start: 2021-04-05 | End: 2022-04-08

## 2021-04-05 RX ORDER — FINASTERIDE 5 MG/1
1 TABLET, FILM COATED ORAL DAILY
Qty: 90 TABLET | Refills: 3 | Status: SHIPPED | OUTPATIENT
Start: 2021-04-05 | End: 2022-04-08

## 2021-04-05 RX ORDER — TRIAMTERENE/HYDROCHLOROTHIAZID 37.5-25 MG
1 TABLET ORAL DAILY
Qty: 90 TABLET | Refills: 3 | Status: SHIPPED | OUTPATIENT
Start: 2021-04-05 | End: 2022-06-05

## 2021-04-05 RX ORDER — WARFARIN SODIUM 2.5 MG/1
TABLET ORAL
Qty: 30 TABLET | Refills: 4 | Status: SHIPPED | OUTPATIENT
Start: 2021-04-05 | End: 2021-10-06

## 2021-04-05 ASSESSMENT — MIFFLIN-ST. JEOR: SCORE: 1532.62

## 2021-04-05 ASSESSMENT — ACTIVITIES OF DAILY LIVING (ADL): CURRENT_FUNCTION: NO ASSISTANCE NEEDED

## 2021-04-05 NOTE — LETTER
April 6, 2021      Tucker L Slater  [address] [facility] [address]              Dear Tucker,    Slightly decreased kidney function , recommend to recheck BMP in 2 weeks. Keep hydration, avoid any NSAIDs.   Elevated vitamin  D . Change to lower dose of vit D - 1000 units instead of 2000.   Rest of the results are normal.       Sincerely,      Kwadwo Winslow MD        Results for orders placed or performed in visit on 04/05/21   CBC with platelets     Status: Abnormal   Result Value Ref Range    WBC 5.0 4.0 - 11.0 10e9/L    RBC Count 4.33 (L) 4.4 - 5.9 10e12/L    Hemoglobin 14.0 13.3 - 17.7 g/dL    Hematocrit 42.9 40.0 - 53.0 %    MCV 99 78 - 100 fl    MCH 32.3 26.5 - 33.0 pg    MCHC 32.6 31.5 - 36.5 g/dL    RDW 13.2 10.0 - 15.0 %    Platelet Count 179 150 - 450 10e9/L   Comprehensive metabolic panel     Status: Abnormal   Result Value Ref Range    Sodium 140 133 - 144 mmol/L    Potassium 4.3 3.4 - 5.3 mmol/L    Chloride 110 (H) 94 - 109 mmol/L    Carbon Dioxide 27 20 - 32 mmol/L    Anion Gap 3 3 - 14 mmol/L    Glucose 89 70 - 99 mg/dL    Urea Nitrogen 34 (H) 7 - 30 mg/dL    Creatinine 1.64 (H) 0.66 - 1.25 mg/dL    GFR Estimate 36 (L) >60 mL/min/[1.73_m2]    GFR Estimate If Black 42 (L) >60 mL/min/[1.73_m2]    Calcium 9.0 8.5 - 10.1 mg/dL    Bilirubin Total 0.7 0.2 - 1.3 mg/dL    Albumin 3.5 3.4 - 5.0 g/dL    Protein Total 7.2 6.8 - 8.8 g/dL    Alkaline Phosphatase 57 40 - 150 U/L    ALT 27 0 - 70 U/L    AST 24 0 - 45 U/L   Lipid panel reflex to direct LDL Fasting     Status: Abnormal   Result Value Ref Range    Cholesterol 193 <200 mg/dL    Triglycerides 130 <150 mg/dL    HDL Cholesterol 42 >39 mg/dL    LDL Cholesterol Calculated 125 (H) <100 mg/dL    Non HDL Cholesterol 151 (H) <130 mg/dL   TSH with free T4 reflex     Status: None   Result Value Ref Range    TSH 1.23 0.40 - 4.00 mU/L   Prostate spec antigen screen     Status: None   Result Value Ref Range    PSA 0.33 0 - 4 ug/L   *UA reflex to  Microscopic     Status: Abnormal   Result Value Ref Range    Color Urine Yellow     Appearance Urine Clear     Glucose Urine Negative NEG^Negative mg/dL    Bilirubin Urine Negative NEG^Negative    Ketones Urine Negative NEG^Negative mg/dL    Specific Gravity Urine 1.020 1.003 - 1.035    Blood Urine Negative NEG^Negative    pH Urine 7.0 5.0 - 7.0 pH    Protein Albumin Urine 100 (A) NEG^Negative mg/dL    Urobilinogen Urine 0.2 0.2 - 1.0 EU/dL    Nitrite Urine Negative NEG^Negative    Leukocyte Esterase Urine Negative NEG^Negative    Source Midstream Urine    Vitamin B12     Status: None   Result Value Ref Range    Vitamin B12 558 193 - 986 pg/mL   Vitamin D Deficiency     Status: Abnormal   Result Value Ref Range    Vitamin D Deficiency screening 86 (H) 20 - 75 ug/L   Urine Microscopic     Status: Abnormal   Result Value Ref Range    WBC Urine 0 - 5 OTO5^0 - 5 /HPF    RBC Urine O - 2 OTO2^O - 2 /HPF    Squamous Epithelial /LPF Urine Few FEW^Few /LPF    Mucous Urine Present (A) NEG^Negative /LPF

## 2021-04-05 NOTE — PROGRESS NOTES
"SUBJECTIVE:   Tucker Slater is a 89 year old male who presents for Preventive Visit.      Patient has been advised of split billing requirements and indicates understanding: Yes   Are you in the first 12 months of your Medicare coverage?  No    Healthy Habits:     In general, how would you rate your overall health?  Fair    Frequency of exercise:  None    Do you usually eat at least 4 servings of fruit and vegetables a day, include whole grains    & fiber and avoid regularly eating high fat or \"junk\" foods?  No    Taking medications regularly:  Yes    Medication side effects:  None    Ability to successfully perform activities of daily living:  No assistance needed    Home Safety:  No safety concerns identified    Hearing Impairment:  No hearing concerns    In the past 6 months, have you been bothered by leaking of urine?  No    In general, how would you rate your overall mental or emotional health?  Fair      PHQ-2 Total Score: 0    Additional concerns today:  No    Do you feel safe in your environment? Yes    Have you ever done Advance Care Planning? (For example, a Health Directive, POLST, or a discussion with a medical provider or your loved ones about your wishes): Yes, patient states has an Advance Care Planning document and will bring a copy to the clinic.       Fall risk  Fallen 2 or more times in the past year?: No  Any fall with injury in the past year?: Yes(Knee and arm injury/abrasions from fall)    Cognitive Screening   1) Repeat 3 items (Leader, Season, Table)    2) Clock draw: NORMAL  3) 3 item recall: Recalls 2 objects   Results: NORMAL clock, 2 items recalled: COGNITIVE IMPAIRMENT LESS LIKELY    Mini-CogTM Copyright FATOU Johnson. Licensed by the author for use in [facility]; reprinted with permission (eduardo@.[facility]). All rights reserved.      Do you have sleep apnea, excessive snoring or daytime drowsiness?: no    Reviewed and updated as needed this visit by clinical staff  Tobacco  " Allergies  Meds   Med Hx  Surg Hx  Fam Hx  Soc Hx        Reviewed and updated as needed this visit by Provider                Social History     Tobacco Use     Smoking status: Former Smoker     Quit date: 1977     Years since quittin.5     Smokeless tobacco: Never Used   Substance Use Topics     Alcohol use: Yes     Alcohol/week: 0.0 standard drinks     Comment: 1 beer daily     If you drink alcohol do you typically have >3 drinks per day or >7 drinks per week? No    Alcohol Use 2021   Prescreen: >3 drinks/day or >7 drinks/week? No   Prescreen: >3 drinks/day or >7 drinks/week? -           PROBLEMS TO ADD ON...  Patient is seen for a follow up visit.  Has h/o HTN. on medical treatment. BP has been controlled. No side effects from medications. No CP, HA, dizziness. good compliance with medications and low salt diet.  Has history of atrial fibrillation. On anticoagulation with Coumadin and rate control medications. Asymptonatic - no chest pains , palpitations,  no side effects from medications.  Concern for poor balance, uses a cane. No falls.   Has had LBP, worse with getting up in the morning. Later feels better with ambulation.     Current providers sharing in care for this patient include:   Patient Care Team:  Kwadwo Winslow MD as PCP - General (Internal Medicine)  Kwadwo Winslow MD as Assigned PCP  Ilir Ramirez MD as MD (Urology)  Hari Ritter MD as Assigned Sleep Provider  Ilir Ramirez MD as Assigned Surgical Provider    The following health maintenance items are reviewed in Epic and correct as of today:  Health Maintenance Due   Topic Date Due     HF ACTION PLAN  Never done     ZOSTER IMMUNIZATION (1 of 2) Never done     CBC  2020     ALT  2020     LIPID  2020     BMP  2020     COVID-19 Vaccine (2 - Moderna 2-dose series) 2021     FALL RISK ASSESSMENT  2021     Lab work is in process  Labs reviewed in EPIC        Pertinent mammograms  "are reviewed under the imaging tab.    Review of Systems  Constitutional, HEENT, cardiovascular, pulmonary, GI, , musculoskeletal, neuro, skin, endocrine and psych systems are negative, except as otherwise noted.    OBJECTIVE:   BP (!) 140/83 (BP Location: Left arm, Patient Position: Sitting, Cuff Size: Adult Regular)   Pulse 59   Temp 98  F (36.7  C) (Oral)   Ht 1.778 m (5' 10\")   Wt 86.6 kg (191 lb)   SpO2 99%   BMI 27.41 kg/m   Estimated body mass index is 27.41 kg/m  as calculated from the following:    Height as of this encounter: 1.778 m (5' 10\").    Weight as of this encounter: 86.6 kg (191 lb).  Physical Exam  GENERAL: healthy, alert and no distress  EYES: Eyes grossly normal to inspection, PERRL and conjunctivae and sclerae normal  HENT: ear canals and TM's normal, nose and mouth without ulcers or lesions  NECK: no adenopathy, no asymmetry, masses, or scars and thyroid normal to palpation  RESP: lungs clear to auscultation - no rales, rhonchi or wheezes  CV: regular rate and rhythm, normal S1 S2, no S3 or S4, no murmur, click or rub, no peripheral edema and peripheral pulses strong  ABDOMEN: soft, nontender, no hepatosplenomegaly, no masses and bowel sounds normal  MS: no gross musculoskeletal defects noted, no edema  SKIN: no suspicious lesions or rashes  NEURO: Normal strength and tone, mentation intact and speech normal  PSYCH: mentation appears normal, affect normal/bright    Diagnostic Test Results:  Labs reviewed in Epic    ASSESSMENT / PLAN:       ICD-10-CM    1. Encounter for preventative adult health care examination  Z00.00 CBC with platelets     Comprehensive metabolic panel     Lipid panel reflex to direct LDL Fasting     TSH with free T4 reflex     Prostate spec antigen screen     *UA reflex to Microscopic     Vitamin B12     Vitamin D Deficiency   2. Essential hypertension with goal blood pressure less than 140/90  I10 carvedilol (COREG) 25 MG tablet     losartan (COZAAR) 50 MG tablet " "    triamterene-HCTZ (MAXZIDE-25) 37.5-25 MG tablet     CBC with platelets     Comprehensive metabolic panel     Lipid panel reflex to direct LDL Fasting     TSH with free T4 reflex     *UA reflex to Microscopic   3. Hypertrophy of prostate with urinary obstruction  N40.1 finasteride (PROSCAR) 5 MG tablet    N13.8    4. Permanent atrial fibrillation (H)  I48.21 warfarin ANTICOAGULANT (JANTOVEN ANTICOAGULANT) 2.5 MG tablet   5. Long-term (current) use of anticoagulants [Z79.01]  Z79.01 warfarin ANTICOAGULANT (JANTOVEN ANTICOAGULANT) 2.5 MG tablet   6. DDD (degenerative disc disease), lumbar  M51.36 [facility] RE[facility]ERR[facility]   7. Screening for prostate cancer  Z12.5 Prostate spec antigen screen   8. Vitamin D deficiency  E55.9 Vitamin D Deficiency   9. Paresthesia  R20.2 Vitamin B12       Patient has been advised of split billing requirements and indicates understanding: Yes  COUNSELING:  Reviewed preventive health counseling, as reflected in patient instructions       Regular exercise       Healthy diet/nutrition       Vision screening       Hearing screening       Colon cancer screening       Prostate cancer screening    Estimated body mass index is 27.41 kg/m  as calculated from the following:    Height as of this encounter: 1.778 m (5' 10\").    Weight as of this encounter: 86.6 kg (191 lb).        He reports that he quit smoking about 43 years ago. He has never used smokeless tobacco.      Appropriate preventive services were discussed with this patient, including applicable screening as appropriate for cardiovascular disease, diabetes, osteopenia/osteoporosis, and glaucoma.  As appropriate for age/gender, discussed screening for colorectal cancer, prostate cancer, breast cancer, and cervical cancer. Checklist reviewing preventive services available has been given to the patient.    Reviewed patients plan of care and provided an AVS. The Intermediate Care Plan ( asthma action plan, low back pain action plan, and " migraine action plan) for Tucker meets the Care Plan requirement. This Care Plan has been established and reviewed with the Patient.    Counseling Resources:  ATP IV Guidelines  Pooled Cohorts Equation Calculator  Breast Cancer Risk Calculator  Breast Cancer: Medication to Reduce Risk  FRAX Risk Assessment  ICSI Preventive Guidelines  Dietary Guidelines for Americans, 2010  USDA's MyPlate  ASA Prophylaxis  Lung CA Screening    Kwadwo Winslow MD  [facility]    Identified Health Risks:

## 2021-04-06 LAB — PSA SERPL-ACNC: 0.33 UG/L (ref 0–4)

## 2021-04-15 ENCOUNTER — TELEPHONE (OUTPATIENT)
Dept: INTERNAL MEDICINE | Facility: CLINIC | Age: 86
End: 2021-04-15

## 2021-04-15 DIAGNOSIS — N28.9 DECREASED RENAL FUNCTION: Primary | ICD-10-CM

## 2021-04-15 NOTE — TELEPHONE ENCOUNTER
Patient calling.  Asking to place an order for a bmp in chart.  Coming in 4-21-21 for INR check.    See letter from primary care provider 4-6-21.    Future bmp order placed in chart.

## 2021-04-21 ENCOUNTER — ANTICOAGULATION THERAPY VISIT (OUTPATIENT)
Dept: ANTICOAGULATION | Facility: CLINIC | Age: 86
End: 2021-04-21

## 2021-04-21 DIAGNOSIS — I48.21 PERMANENT ATRIAL FIBRILLATION (H): ICD-10-CM

## 2021-04-21 DIAGNOSIS — Z79.01 LONG TERM CURRENT USE OF ANTICOAGULANTS WITH INR GOAL OF 2.0-3.0: ICD-10-CM

## 2021-04-21 DIAGNOSIS — N28.9 DECREASED RENAL FUNCTION: ICD-10-CM

## 2021-04-21 DIAGNOSIS — I48.91 ATRIAL FIBRILLATION (H): ICD-10-CM

## 2021-04-21 LAB
ANION GAP SERPL CALCULATED.3IONS-SCNC: 5 MMOL/L (ref 3–14)
BUN SERPL-MCNC: 34 MG/DL (ref 7–30)
CALCIUM SERPL-MCNC: 9.3 MG/DL (ref 8.5–10.1)
CHLORIDE SERPL-SCNC: 109 MMOL/L (ref 94–109)
CO2 SERPL-SCNC: 26 MMOL/L (ref 20–32)
CREAT SERPL-MCNC: 1.61 MG/DL (ref 0.66–1.25)
GFR SERPL CREATININE-BSD FRML MDRD: 37 ML/MIN/{1.73_M2}
GLUCOSE SERPL-MCNC: 82 MG/DL (ref 70–99)
INR PPP: 2 (ref 0.86–1.14)
POTASSIUM SERPL-SCNC: 4.2 MMOL/L (ref 3.4–5.3)
SODIUM SERPL-SCNC: 140 MMOL/L (ref 133–144)

## 2021-04-21 PROCEDURE — 99207 PR NO CHARGE NURSE ONLY: CPT

## 2021-04-21 PROCEDURE — 80048 BASIC METABOLIC PNL TOTAL CA: CPT | Performed by: INTERNAL MEDICINE

## 2021-04-21 PROCEDURE — 85610 PROTHROMBIN TIME: CPT | Performed by: INTERNAL MEDICINE

## 2021-04-21 PROCEDURE — 36415 COLL VENOUS BLD VENIPUNCTURE: CPT | Performed by: INTERNAL MEDICINE

## 2021-04-21 NOTE — PROGRESS NOTES
ANTICOAGULATION FOLLOW-UP CLINIC VISIT    Patient Name:  Tucker Slater  Date:  2021  Contact Type:  Telephone/ Called patient, he denies any changes. Orders discussed with the patient, he agrees with the plan .Lab INR appointment scheduled on 21.    SUBJECTIVE:  Patient Findings     Comments:  The patient was assessed for diet, medication, and activity level changes, missed or extra doses, bruising or bleeding, with no problem findings. Maintenance warfarin dosing was reviewed with patient and will remain on the same dose until next INR check. Patient did not have any questions or concerns.           Clinical Outcomes     Negatives:  Major bleeding event, Thromboembolic event, Anticoagulation-related hospital admission, Anticoagulation-related ED visit, Anticoagulation-related fatality    Comments:  The patient was assessed for diet, medication, and activity level changes, missed or extra doses, bruising or bleeding, with no problem findings. Maintenance warfarin dosing was reviewed with patient and will remain on the same dose until next INR check. Patient did not have any questions or concerns.              OBJECTIVE    Recent labs: (last 7 days)     21  1315   INR 2.00*       ASSESSMENT / PLAN  INR assessment THER    Recheck INR In: 4 WEEKS    INR Location Outside lab      Anticoagulation Summary  As of 2021    INR goal:  2.0-2.5   TTR:  51.8 % (1 y)   INR used for dosin.00 (2021)   Warfarin maintenance plan:  1.25 mg (2.5 mg x 0.5) every Sun, Tue, Fri; 2.5 mg (2.5 mg x 1) all other days   Full warfarin instructions:  1.25 mg every Sun, Tue, Fri; 2.5 mg all other days   Weekly warfarin total:  13.75 mg   No change documented:  Gail Valdovinos RN   Plan last modified:  Ivory Machado RN (2020)   Next INR check:  2021   Priority:  Maintenance   Target end date:  Indefinite    Indications    Atrial fibrillation (H) with mitral regurgitation and congestive heart failure  [I48.91]  Long term current use of anticoagulants with INR goal of 2.0-3.0 [Z79.01]  Permanent atrial fibrillation (H) [I48.21]             Anticoagulation Episode Summary     INR check location:      Preferred lab:      Send INR reminders to:  [facility]    Comments:  Patient has a history of hematuria when INR is elevated.  Will try to keep INR closer to 2.0.      Anticoagulation Care Providers     Provider Role Specialty Phone number    Kwadwo Winslow MD Referring Internal Medicine [phone]            See the Encounter Report to view Anticoagulation Flowsheet and Dosing Calendar (Go to Encounters tab in chart review, and find the Anticoagulation Therapy Visit)    Dosage adjustment made based on physician directed care plan.      Gail Valdovinos RN

## 2021-04-29 ENCOUNTER — ANCILLARY PROCEDURE (OUTPATIENT)
Dept: CARDIOLOGY | Facility: CLINIC | Age: 86
End: 2021-04-29
Attending: INTERNAL MEDICINE
Payer: COMMERCIAL

## 2021-04-29 DIAGNOSIS — Z95.0 CARDIAC PACEMAKER IN SITU: ICD-10-CM

## 2021-04-29 PROCEDURE — 93294 REM INTERROG EVL PM/LDLS PM: CPT | Performed by: INTERNAL MEDICINE

## 2021-04-29 PROCEDURE — 93296 REM INTERROG EVL PM/IDS: CPT | Performed by: INTERNAL MEDICINE

## 2021-04-30 LAB
MDC_IDC_LEAD_IMPLANT_DT: NORMAL
MDC_IDC_LEAD_IMPLANT_DT: NORMAL
MDC_IDC_LEAD_LOCATION: NORMAL
MDC_IDC_LEAD_LOCATION: NORMAL
MDC_IDC_LEAD_LOCATION_DETAIL_1: NORMAL
MDC_IDC_LEAD_LOCATION_DETAIL_1: NORMAL
MDC_IDC_LEAD_MFG: NORMAL
MDC_IDC_LEAD_MFG: NORMAL
MDC_IDC_LEAD_MODEL: NORMAL
MDC_IDC_LEAD_MODEL: NORMAL
MDC_IDC_LEAD_POLARITY_TYPE: NORMAL
MDC_IDC_LEAD_POLARITY_TYPE: NORMAL
MDC_IDC_LEAD_SERIAL: NORMAL
MDC_IDC_LEAD_SERIAL: NORMAL
MDC_IDC_MSMT_BATTERY_DTM: NORMAL
MDC_IDC_MSMT_BATTERY_REMAINING_LONGEVITY: 114 MO
MDC_IDC_MSMT_BATTERY_REMAINING_PERCENTAGE: 89 %
MDC_IDC_MSMT_BATTERY_RRT_TRIGGER: NORMAL
MDC_IDC_MSMT_BATTERY_STATUS: NORMAL
MDC_IDC_MSMT_BATTERY_VOLTAGE: 2.95 V
MDC_IDC_MSMT_LEADCHNL_RV_IMPEDANCE_VALUE: 410 OHM
MDC_IDC_MSMT_LEADCHNL_RV_LEAD_CHANNEL_STATUS: NORMAL
MDC_IDC_MSMT_LEADCHNL_RV_PACING_THRESHOLD_AMPLITUDE: 0.75 V
MDC_IDC_MSMT_LEADCHNL_RV_PACING_THRESHOLD_PULSEWIDTH: 0.5 MS
MDC_IDC_MSMT_LEADCHNL_RV_SENSING_INTR_AMPL: 12 MV
MDC_IDC_PG_IMPLANT_DTM: NORMAL
MDC_IDC_PG_MFG: NORMAL
MDC_IDC_PG_MODEL: NORMAL
MDC_IDC_PG_SERIAL: NORMAL
MDC_IDC_PG_TYPE: NORMAL
MDC_IDC_SESS_CLINIC_NAME: NORMAL
MDC_IDC_SESS_DTM: NORMAL
MDC_IDC_SESS_REPROGRAMMED: NO
MDC_IDC_SESS_TYPE: NORMAL
MDC_IDC_SET_BRADY_HYSTRATE: 60 {BEATS}/MIN
MDC_IDC_SET_BRADY_LOWRATE: 70 {BEATS}/MIN
MDC_IDC_SET_BRADY_MAX_SENSOR_RATE: 120 {BEATS}/MIN
MDC_IDC_SET_BRADY_MODE: NORMAL
MDC_IDC_SET_LEADCHNL_RA_PACING_POLARITY: NORMAL
MDC_IDC_SET_LEADCHNL_RA_SENSING_POLARITY: NORMAL
MDC_IDC_SET_LEADCHNL_RV_PACING_AMPLITUDE: 1 V
MDC_IDC_SET_LEADCHNL_RV_PACING_ANODE_ELECTRODE_1: NORMAL
MDC_IDC_SET_LEADCHNL_RV_PACING_ANODE_LOCATION_1: NORMAL
MDC_IDC_SET_LEADCHNL_RV_PACING_CAPTURE_MODE: NORMAL
MDC_IDC_SET_LEADCHNL_RV_PACING_CATHODE_ELECTRODE_1: NORMAL
MDC_IDC_SET_LEADCHNL_RV_PACING_CATHODE_LOCATION_1: NORMAL
MDC_IDC_SET_LEADCHNL_RV_PACING_POLARITY: NORMAL
MDC_IDC_SET_LEADCHNL_RV_PACING_PULSEWIDTH: 0.5 MS
MDC_IDC_SET_LEADCHNL_RV_SENSING_ADAPTATION_MODE: NORMAL
MDC_IDC_SET_LEADCHNL_RV_SENSING_ANODE_ELECTRODE_1: NORMAL
MDC_IDC_SET_LEADCHNL_RV_SENSING_ANODE_LOCATION_1: NORMAL
MDC_IDC_SET_LEADCHNL_RV_SENSING_CATHODE_ELECTRODE_1: NORMAL
MDC_IDC_SET_LEADCHNL_RV_SENSING_CATHODE_LOCATION_1: NORMAL
MDC_IDC_SET_LEADCHNL_RV_SENSING_POLARITY: NORMAL
MDC_IDC_SET_LEADCHNL_RV_SENSING_SENSITIVITY: 2 MV
MDC_IDC_STAT_AT_DTM_END: NORMAL
MDC_IDC_STAT_AT_DTM_START: NORMAL
MDC_IDC_STAT_BRADY_DTM_END: NORMAL
MDC_IDC_STAT_BRADY_DTM_START: NORMAL
MDC_IDC_STAT_BRADY_RV_PERCENT_PACED: 97 %
MDC_IDC_STAT_CRT_DTM_END: NORMAL
MDC_IDC_STAT_CRT_DTM_START: NORMAL
MDC_IDC_STAT_HEART_RATE_DTM_END: NORMAL
MDC_IDC_STAT_HEART_RATE_DTM_START: NORMAL
MDC_IDC_STAT_HEART_RATE_VENTRICULAR_MAX: 190 {BEATS}/MIN
MDC_IDC_STAT_HEART_RATE_VENTRICULAR_MEAN: 77 {BEATS}/MIN
MDC_IDC_STAT_HEART_RATE_VENTRICULAR_MIN: 60 {BEATS}/MIN

## 2021-05-14 ENCOUNTER — HOSPITAL ENCOUNTER (OUTPATIENT)
Dept: ULTRASOUND IMAGING | Facility: CLINIC | Age: 86
Discharge: HOME | End: 2021-05-14
Attending: INTERNAL MEDICINE | Admitting: INTERNAL MEDICINE
Payer: COMMERCIAL

## 2021-05-14 DIAGNOSIS — N28.9 DECREASED RENAL FUNCTION: ICD-10-CM

## 2021-05-14 PROCEDURE — 76770 US EXAM ABDO BACK WALL COMP: CPT

## 2021-05-19 ENCOUNTER — ANTICOAGULATION THERAPY VISIT (OUTPATIENT)
Dept: ANTICOAGULATION | Facility: CLINIC | Age: 86
End: 2021-05-19

## 2021-05-19 ENCOUNTER — DOCUMENTATION ONLY (OUTPATIENT)
Dept: ANTICOAGULATION | Facility: CLINIC | Age: 86
End: 2021-05-19

## 2021-05-19 DIAGNOSIS — I48.21 PERMANENT ATRIAL FIBRILLATION (H): ICD-10-CM

## 2021-05-19 DIAGNOSIS — I48.91 ATRIAL FIBRILLATION (H): ICD-10-CM

## 2021-05-19 DIAGNOSIS — Z79.01 LONG TERM CURRENT USE OF ANTICOAGULANTS WITH INR GOAL OF 2.0-3.0: ICD-10-CM

## 2021-05-19 DIAGNOSIS — Z79.01 LONG TERM CURRENT USE OF ANTICOAGULANTS WITH INR GOAL OF 2.0-3.0: Primary | ICD-10-CM

## 2021-05-19 LAB
CAPILLARY BLOOD COLLECTION: NORMAL
INR PPP: 2 (ref 0.86–1.14)

## 2021-05-19 PROCEDURE — 99207 PR NO CHARGE NURSE ONLY: CPT

## 2021-05-19 PROCEDURE — 36416 COLLJ CAPILLARY BLOOD SPEC: CPT | Performed by: INTERNAL MEDICINE

## 2021-05-19 PROCEDURE — 85610 PROTHROMBIN TIME: CPT | Performed by: INTERNAL MEDICINE

## 2021-05-19 NOTE — PROGRESS NOTES
No changes were made to the INR Referral  New INR lab order placed to align with INR Referral date.   Gail Valdovinos RN, BSN  Anticoagulation Clinic

## 2021-05-19 NOTE — PROGRESS NOTES
ANTICOAGULATION MANAGEMENT      Tucker Slater due for annual renewal of referral to anticoagulation monitoring. Order pended for your review and signature.      ANTICOAGULATION SUMMARY      Warfarin indication(s)     Atrial fibrillation    Heart valve present?  NO       Current goal range   INR: 2.0-3.0     Goal appropriate for indication? Yes, INR 2-3 appropriate for hx of DVT, PE, hypercoagulable state, Afib, LVAD, or bileaflet AVR without risk factors     Current duration of therapy Indefinite/long term therapy   Time in Therapeutic Range (TTR)  (Goal > 60%) 51.8 %       Office visit with referring provider's group within last year yes on 4/5/21       Gail Valdovinos RN

## 2021-05-19 NOTE — PROGRESS NOTES
ANTICOAGULATION FOLLOW-UP CLINIC VISIT    Patient Name:  Tucker Slater  Date:  2021  Contact Type:  Telephone/ Called patient, he denies any changes. Orders discussed with the patient, he agrees with the plan -.Lab INR appointment scheduled on 21.    SUBJECTIVE:  Patient Findings     Comments:  The patient was assessed for diet, medication, and activity level changes, missed or extra doses, bruising or bleeding, with no problem findings. Maintenance warfarin dosing was reviewed with patient and will remain on the same dose until next INR check. Patient did not have any questions or concerns.           Clinical Outcomes     Negatives:  Major bleeding event, Thromboembolic event, Anticoagulation-related hospital admission, Anticoagulation-related ED visit, Anticoagulation-related fatality    Comments:  The patient was assessed for diet, medication, and activity level changes, missed or extra doses, bruising or bleeding, with no problem findings. Maintenance warfarin dosing was reviewed with patient and will remain on the same dose until next INR check. Patient did not have any questions or concerns.              OBJECTIVE    Recent labs: (last 7 days)     21  1314   INR 2.00*       ASSESSMENT / PLAN  INR assessment THER    Recheck INR In: 4 WEEKS    INR Location Outside lab      Anticoagulation Summary  As of 2021    INR goal:  2.0-2.5   TTR:  51.8 % (1 y)   INR used for dosin.00 (2021)   Warfarin maintenance plan:  1.25 mg (2.5 mg x 0.5) every Sun, Tue, Fri; 2.5 mg (2.5 mg x 1) all other days   Full warfarin instructions:  1.25 mg every Sun, Tue, Fri; 2.5 mg all other days   Weekly warfarin total:  13.75 mg   No change documented:  Gail Valdovinos RN   Plan last modified:  Ivory Machado RN (2020)   Next INR check:  2021   Priority:  Maintenance   Target end date:  Indefinite    Indications    Atrial fibrillation (H) with mitral regurgitation and congestive heart  failure [I48.91]  Long term current use of anticoagulants with INR goal of 2.0-3.0 [Z79.01]  Permanent atrial fibrillation (H) [I48.21]             Anticoagulation Episode Summary     INR check location:      Preferred lab:      Send INR reminders to:  [facility]    Comments:  Patient has a history of hematuria when INR is elevated.  Will try to keep INR closer to 2.0.      Anticoagulation Care Providers     Provider Role Specialty Phone number    Kwadwo Winslow MD Referring Internal Medicine [phone]            See the Encounter Report to view Anticoagulation Flowsheet and Dosing Calendar (Go to Encounters tab in chart review, and find the Anticoagulation Therapy Visit)    Dosage adjustment made based on physician directed care plan.    Gail Valdovinos RN

## 2021-05-26 NOTE — PHARMACY-ADMISSION MEDICATION HISTORY
Admission medication history interview status for this patient is complete. See [facility] admission navigator for allergy information, prior to admission medications and immunization status.     Medication history interview source(s):Patient and Family  Medication history resources (including written lists, pill bottles, clinic record):None  Primary pharmacy:[facility]    Changes made to PTA medication list:  Added: -  Deleted: -  Changed: warfarin dose    Actions taken by pharmacist (provider contacted, etc):None     Additional medication history information:None    Medication reconciliation/reorder completed by provider prior to medication history? No    Do you take OTC medications (eg tylenol, ibuprofen, fish oil, eye/ear drops, etc)? yes(Y/N)    For patients on insulin therapy: no (Y/N)  Lantus/levemir/NPH/Mix 70/30 dose:   (Y/N) (see Med list for doses)   Sliding scale Novolog Y/N  If Yes, do you have a baseline novolog pre-meal dose:  units with meals  Patients eat three meals a day:   Y/N    How many episodes of hypoglycemia do you have per week: _______  How many missed doses do you have per week: ______  How many times do you check your blood glucose per day: _______  Do you have a Continuous glucose monitor (CGM)   Y/N (remind pt that not approved for hospital use)   Any Barriers to therapy - Be specific :  cost of medications, comfortable with giving injections (if applicable), comfortable and confident with current diabetes regimen: Y/N ______________      Prior to Admission medications    Medication Sig Last Dose Taking? Auth Provider   ACETAMINOPHEN PO Take 650 mg by mouth every 4 hours as needed for pain  Yes Reported, Patient   Ascorbic Acid (VITAMIN C PO) Take 500 mg by mouth daily  4/19/2019 at Unknown time Yes Reported, Patient   calcium carbonate (OS-KARLA 500 MG [place]. [place]) 500 MG tablet Take 500 mg by mouth daily  4/19/2019 at Unknown time Yes Reported, Patient   carvedilol (COREG) 25 MG tablet Take 1.5  tablets (37.5 mg) by mouth 2 times daily (with meals) 4/19/2019 at x1 Yes Arslan, Janeth E, APRN CNP   losartan (COZAAR) 100 MG tablet Take 1 tablet (100 mg) by mouth daily 4/19/2019 at Unknown time Yes Aleena Perez MD   Multiple Vitamins-Minerals (OCUVITE PO) Take 1 tablet by mouth daily  4/19/2019 at Unknown time Yes Reported, Patient   triamterene-HCTZ (MAXZIDE-25) 37.5-25 MG tablet Take 1 tablet by mouth daily 4/19/2019 at Unknown time Yes Aleena Perez MD   vitamin B complex with vitamin C (VITAMIN  B COMPLEX) TABS tablet Take 1 tablet by mouth daily 4/19/2019 at Unknown time Yes Reported, Patient   VITAMIN D, CHOLECALCIFEROL, PO Take 2,000 Units by mouth daily. 4/19/2019 at Unknown time Yes Unknown, Entered By History   warfarin (COUMADIN) 2.5 MG tablet Take 1.25 mg by mouth On Tue, Fri Past Week at Unknown time Yes Unknown, Entered By History   warfarin (COUMADIN) 2.5 MG tablet Take 2.5 mg by mouth Sun, Mon, Wed, Thur, Sat 4/18/2019 at Unknown time Yes Unknown, Entered By History   order for DME Oxygen 2 Li/min  at night   Hari Ritter MD   order for DME Oxygen 2 Li/min  at night with CPAP  SPO2 less than or equal to oxygen saturation 89% on effective CPAP in patient with known cardiomyopathy   Hari Ritter MD          98

## 2021-06-23 ENCOUNTER — ANTICOAGULATION THERAPY VISIT (OUTPATIENT)
Dept: ANTICOAGULATION | Facility: CLINIC | Age: 86
End: 2021-06-23

## 2021-06-23 DIAGNOSIS — I48.21 PERMANENT ATRIAL FIBRILLATION (H): ICD-10-CM

## 2021-06-23 DIAGNOSIS — Z79.01 LONG TERM CURRENT USE OF ANTICOAGULANTS WITH INR GOAL OF 2.0-3.0: ICD-10-CM

## 2021-06-23 LAB
CAPILLARY BLOOD COLLECTION: NORMAL
INR PPP: 2.7 (ref 0.86–1.14)

## 2021-06-23 PROCEDURE — 85610 PROTHROMBIN TIME: CPT | Performed by: INTERNAL MEDICINE

## 2021-06-23 PROCEDURE — 36416 COLLJ CAPILLARY BLOOD SPEC: CPT | Performed by: INTERNAL MEDICINE

## 2021-06-23 NOTE — PROGRESS NOTES
Erroneous encounter, please see other anticoagulation encounter dated 6/23/21.  Gail Valdovinos, RN, BSN  Anticoagulation Clinic

## 2021-06-23 NOTE — PROGRESS NOTES
ANTICOAGULATION MANAGEMENT     Tucker Slater 89 year old male is on warfarin with supratherapeutic INR result. (Goal INR 2.0-2.5)    Recent labs: (last 7 days)     06/23/21  1211   INR 2.70*       ASSESSMENT     Source(s): Patient/Caregiver Call       Warfarin doses taken: Warfarin taken as instructed    Diet: No new diet changes identified    New illness, injury, or hospitalization: No    Medication/supplement changes: None noted    Signs or symptoms of bleeding or clotting: No    Previous INR: Therapeutic last 2(+) visits    Additional findings: None     PLAN     Recommended plan for no diet, medication or health factor changes affecting INR     Dosing Instructions: Partial hold then continue your current warfarin dose with next INR in 2 weeks       Summary  As of 6/23/2021    Full warfarin instructions:  6/23: 1.25 mg; Otherwise 1.25 mg every Sun, Tue, Fri; 2.5 mg all other days   Next INR check:  7/7/2021             Telephone call with Tucker whom verbalizes understanding and agrees to plan    Lab visit scheduled    Education provided: Please call back if any changes to your diet, medications or how you've been taking warfarin, Importance of notifying clinic for changes in medications; a sooner lab recheck maybe needed., Contact [phone]  with any changes, questions or concerns.  and Will send message to PCP to ask if PCP wants to put INR goal range back to 2.0-3.0 since patient has not had blood in his urine greater than 3 months.    Plan made per [facility] anticoagulation protocol    Gail Valdovinos RN  Anticoagulation Clinic  6/23/2021    _______________________________________________________________________     Anticoagulation Episode Summary     Current INR goal:  2.0-2.5   TTR:  58.5 % (1 y)   Target end date:  Indefinite   Send INR reminders to:  [facility]    Indications    Atrial fibrillation (H) with mitral regurgitation and congestive heart failure [I48.91]  Long term current use  of anticoagulants with INR goal of 2.0-3.0 [Z79.01]  Permanent atrial fibrillation (H) [I48.21]           Comments:  Patient has a history of hematuria when INR is elevated.  Will try to keep INR closer to 2.0. INR Referral renewed, see 5/19/21 documentation encounter         Anticoagulation Care Providers     Provider Role Specialty Phone number    Kwadwo Winslow MD Referring Internal Medicine [phone]

## 2021-07-07 ENCOUNTER — ANTICOAGULATION THERAPY VISIT (OUTPATIENT)
Dept: ANTICOAGULATION | Facility: CLINIC | Age: 86
End: 2021-07-07

## 2021-07-07 DIAGNOSIS — Z79.01 LONG TERM CURRENT USE OF ANTICOAGULANTS WITH INR GOAL OF 2.0-3.0: ICD-10-CM

## 2021-07-07 DIAGNOSIS — I48.91 ATRIAL FIBRILLATION (H): ICD-10-CM

## 2021-07-07 DIAGNOSIS — I48.21 PERMANENT ATRIAL FIBRILLATION (H): ICD-10-CM

## 2021-07-07 LAB
CAPILLARY BLOOD COLLECTION: NORMAL
INR PPP: 2.1 (ref 0.86–1.14)

## 2021-07-07 PROCEDURE — 85610 PROTHROMBIN TIME: CPT | Performed by: INTERNAL MEDICINE

## 2021-07-07 PROCEDURE — 36416 COLLJ CAPILLARY BLOOD SPEC: CPT | Performed by: INTERNAL MEDICINE

## 2021-07-07 NOTE — PROGRESS NOTES
ANTICOAGULATION MANAGEMENT     Tucker Slater 89 year old male is on warfarin with therapeutic INR result. (Goal INR 2.0-2.5)    Recent labs: (last 7 days)     07/07/21  1213   INR 2.10*       ASSESSMENT     Source(s): Chart Review and Patient/Caregiver Call       Warfarin doses taken: Warfarin taken as instructed    Diet: No new diet changes identified    New illness, injury, or hospitalization: No    Medication/supplement changes: None noted    Signs or symptoms of bleeding or clotting: No    Previous INR: Supratherapeutic    Additional findings: None     PLAN     Recommended plan for no diet, medication or health factor changes affecting INR     Dosing Instructions: Continue your current warfarin dose with next INR in 3 weeks       Summary  As of 7/7/2021    Full warfarin instructions:  1.25 mg every Sun, Tue, Fri; 2.5 mg all other days   Next INR check:  7/28/2021             Telephone call with Tucker who verbalizes understanding and agrees to plan    Lab visit scheduled    Education provided: Target INR goal and significance of current INR result    Plan made per [facility] anticoagulation protocol    Lynn Centeno RN  Anticoagulation Clinic  7/7/2021    _______________________________________________________________________     Anticoagulation Episode Summary     Current INR goal:  2.0-2.5   TTR:  61.1 % (1 y)   Target end date:  Indefinite   Send INR reminders to:  [facility]    Indications    Atrial fibrillation (H) with mitral regurgitation and congestive heart failure [I48.91]  Long term current use of anticoagulants with INR goal of 2.0-3.0 [Z79.01]  Permanent atrial fibrillation (H) [I48.21]           Comments:  Patient has a history of hematuria when INR is elevated.  Will try to keep INR closer to 2.0. INR Referral renewed, see 5/19/21 documentation encounter         Anticoagulation Care Providers     Provider Role Specialty Phone number    Kwadwo Winslow MD Referring Internal  Medicine [phone]

## 2021-07-07 NOTE — PROGRESS NOTES
Anticoagulation Management    Unable to reach Tucker today x2.    Today's INR result of 2.1 is therapeutic (goal INR of 2.0-3.0).  Result received from: Clinic Lab    Follow up required to confirm warfarin dose taken and assess for changes    Left message to continue current dose of warfarin 2.5 mg tonight.     Message was given to patient's wife since patient was not home from his appointment.      Anticoagulation clinic to follow up    Ivory Machado RN

## 2021-07-20 ENCOUNTER — TELEPHONE (OUTPATIENT)
Dept: SLEEP MEDICINE | Facility: CLINIC | Age: 86
End: 2021-07-20

## 2021-07-20 NOTE — TELEPHONE ENCOUNTER
Attempted to reach Tucker today to schedule in person visit with either Dr. Ritter or Candy Amin. His wife said he is not available. [facility] for scheduling.       Norma MURRELL CMA, SLEEP MEDICINE, 7/20/2021 3:20 PM

## 2021-07-21 ENCOUNTER — TELEPHONE (OUTPATIENT)
Dept: SLEEP MEDICINE | Facility: CLINIC | Age: 86
End: 2021-07-21

## 2021-07-21 NOTE — TELEPHONE ENCOUNTER
Reason for call:  Other   Patient called regarding (reason for call): appointment  Additional comments: Patient needs soonest available in clinic appointment with [facility]. Unable to do video visit. Appointment is a return follow up, only available appt slots were for new/con. Please call patient to schedule if possible to fit in in a CON slot    Phone number to reach patient:  Home number on file [phone] (home)    Best Time:  any    Can we leave a detailed message on this number?  YES    Travel screening: Not Applicable

## 2021-07-23 ENCOUNTER — TELEPHONE (OUTPATIENT)
Dept: SLEEP MEDICINE | Facility: CLINIC | Age: 86
End: 2021-07-23

## 2021-07-23 NOTE — TELEPHONE ENCOUNTER
CMN Orders have been received from [facility] and given to DOD 7/26 to review and sign.    MINOR Reyna

## 2021-07-28 ENCOUNTER — ANTICOAGULATION THERAPY VISIT (OUTPATIENT)
Dept: ANTICOAGULATION | Facility: CLINIC | Age: 86
End: 2021-07-28

## 2021-07-28 ENCOUNTER — LAB (OUTPATIENT)
Dept: LAB | Facility: CLINIC | Age: 86
End: 2021-07-28
Payer: COMMERCIAL

## 2021-07-28 DIAGNOSIS — Z79.01 LONG TERM CURRENT USE OF ANTICOAGULANTS WITH INR GOAL OF 2.0-3.0: Primary | ICD-10-CM

## 2021-07-28 DIAGNOSIS — I48.21 PERMANENT ATRIAL FIBRILLATION (H): ICD-10-CM

## 2021-07-28 DIAGNOSIS — I48.91 ATRIAL FIBRILLATION (H): ICD-10-CM

## 2021-07-28 DIAGNOSIS — Z79.01 LONG TERM CURRENT USE OF ANTICOAGULANTS WITH INR GOAL OF 2.0-3.0: ICD-10-CM

## 2021-07-28 LAB — INR BLD: 2.8 (ref 0.9–1.1)

## 2021-07-28 PROCEDURE — 85610 PROTHROMBIN TIME: CPT

## 2021-07-28 PROCEDURE — 36416 COLLJ CAPILLARY BLOOD SPEC: CPT

## 2021-07-28 NOTE — PROGRESS NOTES
ANTICOAGULATION MANAGEMENT     Tucker Slater 89 year old male is on warfarin with supratherapeutic INR result. (Goal INR 2.0-2.5)    Recent labs: (last 7 days)     07/28/21  1359   INR 2.8*       ASSESSMENT     Source(s): Patient/Caregiver Call       Warfarin doses taken: Warfarin taken as instructed    Diet: No new diet changes identified    New illness, injury, or hospitalization: No    Medication/supplement changes: None noted    Signs or symptoms of bleeding or clotting: No    Previous INR: Therapeutic last visit; previously outside of goal range    Additional findings: None     PLAN     Recommended plan for no diet, medication or health factor changes affecting INR     Dosing Instructions: Partial hold then continue your current warfarin dose with next INR in 2 weeks       Summary  As of 7/28/2021    Full warfarin instructions:  7/28: 1.25 mg; Otherwise 1.25 mg every Sun, Tue, Fri; 2.5 mg all other days   Next INR check:  8/11/2021             Telephone call with Tucker who verbalizes understanding and agrees to plan    Lab visit scheduled    Education provided: Please call back if any changes to your diet, medications or how you've been taking warfarin and Contact [phone]  with any changes, questions or concerns.     Plan made per [facility] anticoagulation protocol    Gail Valdovinos RN  Anticoagulation Clinic  7/28/2021    _______________________________________________________________________     Anticoagulation Episode Summary     Current INR goal:  2.0-2.5   TTR:  60.7 % (1 y)   Target end date:  Indefinite   Send INR reminders to:  [facility]    Indications    Atrial fibrillation (H) with mitral regurgitation and congestive heart failure [I48.91]  Long term current use of anticoagulants with INR goal of 2.0-3.0 [Z79.01]  Permanent atrial fibrillation (H) [I48.21]           Comments:  Patient has a history of hematuria when INR is elevated.  Will try to keep INR closer to 2.0. INR  Referral renewed, see 5/19/21 documentation encounter         Anticoagulation Care Providers     Provider Role Specialty Phone number    Kwadwo Winslow MD Referring Internal Medicine [phone]

## 2021-08-04 ENCOUNTER — OFFICE VISIT (OUTPATIENT)
Dept: CARDIOLOGY | Facility: CLINIC | Age: 86
End: 2021-08-04
Attending: NURSE PRACTITIONER
Payer: COMMERCIAL

## 2021-08-04 VITALS
BODY MASS INDEX: 27.06 KG/M2 | HEIGHT: 70 IN | DIASTOLIC BLOOD PRESSURE: 76 MMHG | HEART RATE: 74 BPM | WEIGHT: 189 LBS | SYSTOLIC BLOOD PRESSURE: 132 MMHG | OXYGEN SATURATION: 96 %

## 2021-08-04 DIAGNOSIS — I10 ESSENTIAL HYPERTENSION WITH GOAL BLOOD PRESSURE LESS THAN 140/90: ICD-10-CM

## 2021-08-04 DIAGNOSIS — Z95.0 CARDIAC PACEMAKER IN SITU: ICD-10-CM

## 2021-08-04 DIAGNOSIS — I48.21 PERMANENT ATRIAL FIBRILLATION (H): Primary | ICD-10-CM

## 2021-08-04 PROCEDURE — 99213 OFFICE O/P EST LOW 20 MIN: CPT | Performed by: INTERNAL MEDICINE

## 2021-08-04 ASSESSMENT — MIFFLIN-ST. JEOR: SCORE: 1523.55

## 2021-08-04 NOTE — LETTER
8/4/2021    Kwadwo Winslow MD  [address] [facility] [address]    RE: Tucker Slater       Dear Colleague,    I had the pleasure of seeing Tucker Slater in the [facility].    HPI and Plan:   See dictation [id number]    Orders Placed This Encounter   Procedures     Follow-Up with Cardiac Advanced Practice Provider       No orders of the defined types were placed in this encounter.      There are no discontinued medications.      Encounter Diagnoses   Name Primary?     Permanent atrial fibrillation (H) Yes     Essential hypertension with goal blood pressure less than 140/90      Cardiac pacemaker in situ        CURRENT MEDICATIONS:  Current Outpatient Medications   Medication Sig Dispense Refill     ACETAMINOPHEN PO Take 650 mg by mouth 2 times daily        Ascorbic Acid (VITAMIN C PO) Take 500 mg by mouth daily        baclofen (LIORESAL) 10 MG tablet Take 10 mg by mouth 3 times daily       calcium carbonate (OS-KARLA 500 MG [place]. CA) 500 MG tablet Take 500 mg by mouth daily        carvedilol (COREG) 25 MG tablet Take 1.5 tablets (37.5 mg) by mouth 2 times daily (with meals) 270 tablet 3     diclofenac (VOLTAREN) 1 % topical gel Place 2 g onto the skin 4 times daily       docusate sodium (COLACE) 100 MG capsule Take 100 mg by mouth 2 times daily       finasteride (PROSCAR) 5 MG tablet Take 1 tablet (5 mg) by mouth daily 90 tablet 3     losartan (COZAAR) 50 MG tablet Take 1 tablet (50 mg) by mouth daily 90 tablet 3     Multiple Vitamins-Minerals (OCUVITE PO) Take 1 tablet by mouth 2 times daily        order for DME Oxygen 2 Li/min  at night 1 Device 0     triamterene-HCTZ (MAXZIDE-25) 37.5-25 MG tablet Take 1 tablet by mouth daily 90 tablet 3     vitamin B complex with vitamin C (VITAMIN  B COMPLEX) TABS tablet Take 1 tablet by mouth daily       VITAMIN D, CHOLECALCIFEROL, PO Take 2,000 Units by mouth daily.       warfarin ANTICOAGULANT  (JANTOVEN ANTICOAGULANT) 2.5 MG tablet Take a half tablet (1.25 mg) Sun, Tu, Fri and take one tablet (2.5 mg) Mon, Wed, Th, Sat or as directed by INR Clinic 30 tablet 4       ALLERGIES     Allergies   Allergen Reactions     Merbromin      Other reaction(s): Other, see comments  Severe reaction as child. Amalgam fillings OK.     Morphine Nausea and Vomiting     Amlodipine      Edema       PAST MEDICAL HISTORY:  Past Medical History:   Diagnosis Date     Arrhythmia     A Fib     Balance problem     related to neuropathy in feet     Bradycardia      Carcinoma in situ of bladder 2000    bladder cancer ;; follow w Urology w periodic cysto      Essential hypertension, benign      Generalized osteoarthrosis, unspecified site knees    s/p R total knee 8/09 ; will do L 6/10      Hernia, abdominal      Numbness and tingling     toes and fingers     DAWSON (obstructive sleep apnea) 8/2/2019     Pacemaker     [employer]      Pain in joint, shoulder region     L shoulder rotater tear; no surgery      Palpitations      Persistent atrial fibrillation (H) 1/30/15     Prostate CA (H) 2008-9    radiation     Prostate cancer (H) 11/08    completed RT 3/09     Renal disease     elevated creatinine     Shoulder impingement     R shoulder impingement etc see MRI 4/09      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     toes both feet        PAST SURGICAL HISTORY:  Past Surgical History:   Procedure Laterality Date     ARTHROPLASTY HIP Right 3/27/2017    Procedure: ARTHROPLASTY HIP;  Surgeon: Jigar Wynn MD;  Location:  OR     CARDIOVERSION  3/26/15     COLONOSCOPY       CYSTOSCOPY       CYSTOSCOPY, FULGURATE BLEEDERS, EVACUATE CLOT(S), COMBINED N/A 4/23/2019    Procedure: Exam under anesthesia, video cystopanendoscopy, evacuation of clots, fulguration of prostatic veins.;  Surgeon: Ilir Ramirez MD;  Location:  OR     CYSTOSCOPY, RETROGRADES, COMBINED Bilateral 6/18/2019    Procedure: Video  cystopanendoscopy, evacuation of clots, cup biopsies of bladder wall, electrovaporization of prostate, transurethral resection of prostate, bilateral retrograde ureteropyelogram.;  Surgeon: Ilir Ramirez MD;  Location: [facility] OR     CYSTOSCOPY, TRANSURETHRAL RESECTION (TUR) PROSTATE, COMBINED  2019    Procedure: Cystoscopy, transurethral resection of prostate;  Surgeon: Ilir Ramirez MD;  Location: [facility] OR     HERNIA REPAIR       IMPLANT PACEMAKER  3/26/15     RELEASE CARPAL TUNNEL Right 2017    Procedure: RELEASE CARPAL TUNNEL;  Right carpal tunnel release;  Surgeon: Alonso Barboza MD;  Location: [facility] OR     ZZC NONSPECIFIC PROCEDURE      bilat inguinal hernia repairs ( 3total procedures)      ZZC NONSPECIFIC PROCEDURE      pilonidal cyst     ZZC NONSPECIFIC PROCEDURE      T + A     ZZC NONSPECIFIC PROCEDURE       R+L total knee       FAMILY HISTORY:  Family History   Problem Relation Age of Onset     Heart Disease Father          87yo     Coronary Artery Disease Father      Heart Disease Mother          76yo     Coronary Artery Disease Mother      Family History Negative Brother        SOCIAL HISTORY:  Social History     Socioeconomic History     Marital status:      Spouse name: Alba     Number of children: 3     Years of education: None     Highest education level: None   Occupational History     Employer: RETIRED     Comment:  w [employer]   Tobacco Use     Smoking status: Former Smoker     Quit date: 1977     Years since quittin.9     Smokeless tobacco: Never Used   Substance and Sexual Activity     Alcohol use: Yes     Alcohol/week: 0.0 standard drinks     Comment: 1 beer daily     Drug use: No     Sexual activity: Never     Partners: Female   Other Topics Concern     Parent/sibling w/ CABG, MI or angioplasty before 65F 55M? Not Asked      Service Not Asked     Blood Transfusions Not Asked     Caffeine Concern No     Comment: 4-5 cups per  week      Occupational Exposure Not Asked     Hobby Hazards Not Asked     Sleep Concern No     Stress Concern No     Weight Concern No     Special Diet No     Back Care Not Asked     Exercise No     Comment: yard work and moves around a lot     Bike Helmet Not Asked     Seat Belt Not Asked     Self-Exams Not Asked   Social History Narrative     None     Social Determinants of Health     Financial Resource Strain:      Difficulty of Paying Living Expenses:    Food Insecurity:      Worried About Running Out of Food in the Last Year:      Ran Out of Food in the Last Year:    Transportation Needs:      Lack of Transportation (Medical):      Lack of Transportation (Non-Medical):    Physical Activity:      Days of Exercise per Week:      Minutes of Exercise per Session:    Stress:      Feeling of Stress :    Social Connections:      Frequency of Communication with Friends and Family:      Frequency of Social Gatherings with Friends and Family:      Attends Advent Services:      Active Member of Clubs or Organizations:      Attends Club or Organization Meetings:      Marital Status:    Intimate Partner Violence:      Fear of Current or Ex-Partner:      Emotionally Abused:      Physically Abused:      Sexually Abused:        Review of Systems:  Skin:  Negative   hx skin cancer -  hx of MOHS surgery    Eyes:  Negative      ENT:  Positive for sinus trouble;postnasal drainage    Respiratory:  Positive for sleep apnea;CPAP O2 withCPAP   Cardiovascular:  Negative   inbalance  Gastroenterology: Negative      Genitourinary:  Positive for urinary frequency;prostate problem cloudy urine  Musculoskeletal:  Positive for arthritis balance off  Neurologic:  Positive for numbness or tingling of hands;numbness or tingling of feet neuropathy  Psychiatric:  Negative anxiety    Heme/Lymph/Imm:  Negative weight loss RX allergies ,  hx of hay fever   Endocrine:  Negative diabetes                  Thank you for allowing me to participate in  the care of your patient.      Sincerely,     Aleena Perez MD     [facility]  cc:   SELMA Landis CNP  [address]  [place]  [place] [address]

## 2021-08-04 NOTE — PROGRESS NOTES
Service Date: 08/04/2021    HISTORY OF PRESENT ILLNESS:    I had the pleasure of seeing . Tucker Slater, a delightful and mentally alert 89-year-old male with the following chronic medical issues:    A.  Permanent atrial fibrillation, associated with bradycardia.  Pacemaker implantation 2015 ([employer]).  On warfarin.  Transiently interrupted in 2019 due to recurrent hematuria.  B.  Mild LV dysfunction, EF 45%-50% (echo-, 2018).  Possibly related to RV pacing.  C.  Hypertension.  D.  Obstructive sleep apnea.  On CPAP since 2018.    E.  History of prostate and bladder cancer with recurrent hematuria.  Thankfully, no significant issues in the past year.  F.  CKD stage III, baseline creatinine around 1.6.    Tucker has been feeling reasonably well.  He has had no recent hematuria.  For the past year, he has been able to be back on warfarin with target INR in the low 2s.    He has no awareness of heart rate irregularity.  He has not had chest pain, syncope, near syncope.  He has peripheral neuropathy, which causes discomfort in his feet.      PHYSICAL EXAMINATION:    VITAL SIGNS:  Blood pressure 132/76, heart rate 74 and regular (paced), weight 85 kg, height 170 cm.  GENERAL:  Truly delightful elderly gentleman in no distress.  NECK:  Supple without carotid bruits.  Normal JVP.  LUNGS:  Clear.  CARDIOVASCULAR:  Nicely healed left pectoral incision, regular (paced) rhythm.  No apparent gallop or murmur.  ABDOMEN:  Mildly obese, soft, nontender.  EXTREMITIES:  Trace edema.      DIAGNOSTIC STUDIES:    - Recent laboratory tests:  INR 2.8, sodium 140, potassium 4.2, creatinine 1.61.      IMPRESSION:    1.  Permanent atrial fibrillation.  He is back on warfarin without recurrent hematuria.  He is asymptomatic.  2.  Single chamber pacemaker.  Functioning well.  He maintains routine followup in the [facility] with next visit scheduled in mid August.  Estimated battery life is between 9 and 10 years.    IMPRESSION:    1.   Permanent atrial fibrillation with bradycardia.  Well functioning permanent pacemaker.  Stable on warfarin.  No changes are necessary.  2.  Hypertension, under control.      It was my pleasure seeing Ross today.  I have requested a followup with Janeth Mcdaniels in 2 years.  We will see him sooner if he has issues in the interim.      It has been a pleasure being involved in his care.      Aleena Perez MD      cc:  Kwadwo Winslow MD  [facility] [address] [place]  [address]      D: 2021   T: 2021   MT: prem    Name:     ROSS LORENZANA  MRN:      [id number]        Account:      [id number]   :      1931           Service Date: 2021       Document: [id number]

## 2021-08-04 NOTE — PROGRESS NOTES
HPI and Plan:   See dictation [id number]    Orders Placed This Encounter   Procedures     Follow-Up with Cardiac Advanced Practice Provider       No orders of the defined types were placed in this encounter.      There are no discontinued medications.      Encounter Diagnoses   Name Primary?     Permanent atrial fibrillation (H) Yes     Essential hypertension with goal blood pressure less than 140/90      Cardiac pacemaker in situ        CURRENT MEDICATIONS:  Current Outpatient Medications   Medication Sig Dispense Refill     ACETAMINOPHEN PO Take 650 mg by mouth 2 times daily        Ascorbic Acid (VITAMIN C PO) Take 500 mg by mouth daily        baclofen (LIORESAL) 10 MG tablet Take 10 mg by mouth 3 times daily       calcium carbonate (OS-KARLA 500 MG [place]. CA) 500 MG tablet Take 500 mg by mouth daily        carvedilol (COREG) 25 MG tablet Take 1.5 tablets (37.5 mg) by mouth 2 times daily (with meals) 270 tablet 3     diclofenac (VOLTAREN) 1 % topical gel Place 2 g onto the skin 4 times daily       docusate sodium (COLACE) 100 MG capsule Take 100 mg by mouth 2 times daily       finasteride (PROSCAR) 5 MG tablet Take 1 tablet (5 mg) by mouth daily 90 tablet 3     losartan (COZAAR) 50 MG tablet Take 1 tablet (50 mg) by mouth daily 90 tablet 3     Multiple Vitamins-Minerals (OCUVITE PO) Take 1 tablet by mouth 2 times daily        order for DME Oxygen 2 Li/min  at night 1 Device 0     triamterene-HCTZ (MAXZIDE-25) 37.5-25 MG tablet Take 1 tablet by mouth daily 90 tablet 3     vitamin B complex with vitamin C (VITAMIN  B COMPLEX) TABS tablet Take 1 tablet by mouth daily       VITAMIN D, CHOLECALCIFEROL, PO Take 2,000 Units by mouth daily.       warfarin ANTICOAGULANT (JANTOVEN ANTICOAGULANT) 2.5 MG tablet Take a half tablet (1.25 mg) Sun, Tu, Fri and take one tablet (2.5 mg) Mon, Wed, Th, Sat or as directed by INR Clinic 30 tablet 4       ALLERGIES     Allergies   Allergen Reactions     Merbromin      Other reaction(s):  Other, see comments  Severe reaction as child. Amalgam fillings OK.     Morphine Nausea and Vomiting     Amlodipine      Edema       PAST MEDICAL HISTORY:  Past Medical History:   Diagnosis Date     Arrhythmia     A Fib     Balance problem     related to neuropathy in feet     Bradycardia      Carcinoma in situ of bladder 2000    bladder cancer ;; follow w Urology w periodic cysto      Essential hypertension, benign      Generalized osteoarthrosis, unspecified site knees    s/p R total knee 8/09 ; will do L 6/10      Hernia, abdominal      Numbness and tingling     toes and fingers     DAWSON (obstructive sleep apnea) 8/2/2019     Pacemaker     [employer]      Pain in joint, shoulder region     L shoulder rotater tear; no surgery      Palpitations      Persistent atrial fibrillation (H) 1/30/15     Prostate CA (H) 2008-9    radiation     Prostate cancer (H) 11/08    completed RT 3/09     Renal disease     elevated creatinine     Shoulder impingement     R shoulder impingement etc see MRI 4/09      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     toes both feet        PAST SURGICAL HISTORY:  Past Surgical History:   Procedure Laterality Date     ARTHROPLASTY HIP Right 3/27/2017    Procedure: ARTHROPLASTY HIP;  Surgeon: Jigar Wynn MD;  Location:  OR     CARDIOVERSION  3/26/15     COLONOSCOPY       CYSTOSCOPY       CYSTOSCOPY, FULGURATE BLEEDERS, EVACUATE CLOT(S), COMBINED N/A 4/23/2019    Procedure: Exam under anesthesia, video cystopanendoscopy, evacuation of clots, fulguration of prostatic veins.;  Surgeon: Ilir Ramirez MD;  Location:  OR     CYSTOSCOPY, RETROGRADES, COMBINED Bilateral 6/18/2019    Procedure: Video cystopanendoscopy, evacuation of clots, cup biopsies of bladder wall, electrovaporization of prostate, transurethral resection of prostate, bilateral retrograde ureteropyelogram.;  Surgeon: Ilir Ramirez MD;  Location:  OR     CYSTOSCOPY, TRANSURETHRAL RESECTION  (TUR) PROSTATE, COMBINED  2019    Procedure: Cystoscopy, transurethral resection of prostate;  Surgeon: Ilir Ramirez MD;  Location: [facility] OR     HERNIA REPAIR       IMPLANT PACEMAKER  3/26/15     RELEASE CARPAL TUNNEL Right 2017    Procedure: RELEASE CARPAL TUNNEL;  Right carpal tunnel release;  Surgeon: Alonso Barboza MD;  Location: [facility] OR     ZZC NONSPECIFIC PROCEDURE      bilat inguinal hernia repairs ( 3total procedures)      ZZC NONSPECIFIC PROCEDURE      pilonidal cyst     ZZC NONSPECIFIC PROCEDURE      T + A     ZZC NONSPECIFIC PROCEDURE       R+L total knee       FAMILY HISTORY:  Family History   Problem Relation Age of Onset     Heart Disease Father          89yo     Coronary Artery Disease Father      Heart Disease Mother          76yo     Coronary Artery Disease Mother      Family History Negative Brother        SOCIAL HISTORY:  Social History     Socioeconomic History     Marital status:      Spouse name: Alba     Number of children: 3     Years of education: None     Highest education level: None   Occupational History     Employer: RETIRED     Comment:  w [employer]   Tobacco Use     Smoking status: Former Smoker     Quit date: 1977     Years since quittin.9     Smokeless tobacco: Never Used   Substance and Sexual Activity     Alcohol use: Yes     Alcohol/week: 0.0 standard drinks     Comment: 1 beer daily     Drug use: No     Sexual activity: Never     Partners: Female   Other Topics Concern     Parent/sibling w/ CABG, MI or angioplasty before 65F 55M? Not Asked      Service Not Asked     Blood Transfusions Not Asked     Caffeine Concern No     Comment: 4-5 cups per week      Occupational Exposure Not Asked     Hobby Hazards Not Asked     Sleep Concern No     Stress Concern No     Weight Concern No     Special Diet No     Back Care Not Asked     Exercise No     Comment: yard work and moves around a lot     Bike Helmet Not Asked      Seat Belt Not Asked     Self-Exams Not Asked   Social History Narrative     None     Social Determinants of Health     Financial Resource Strain:      Difficulty of Paying Living Expenses:    Food Insecurity:      Worried About Running Out of Food in the Last Year:      Ran Out of Food in the Last Year:    Transportation Needs:      Lack of Transportation (Medical):      Lack of Transportation (Non-Medical):    Physical Activity:      Days of Exercise per Week:      Minutes of Exercise per Session:    Stress:      Feeling of Stress :    Social Connections:      Frequency of Communication with Friends and Family:      Frequency of Social Gatherings with Friends and Family:      Attends Yarsani Services:      Active Member of Clubs or Organizations:      Attends Club or Organization Meetings:      Marital Status:    Intimate Partner Violence:      Fear of Current or Ex-Partner:      Emotionally Abused:      Physically Abused:      Sexually Abused:        Review of Systems:  Skin:  Negative   hx skin cancer -  hx of MOHS surgery    Eyes:  Negative      ENT:  Positive for sinus trouble;postnasal drainage    Respiratory:  Positive for sleep apnea;CPAP O2 withCPAP   Cardiovascular:  Negative   inbalance  Gastroenterology: Negative      Genitourinary:  Positive for urinary frequency;prostate problem cloudy urine  Musculoskeletal:  Positive for arthritis balance off  Neurologic:  Positive for numbness or tingling of hands;numbness or tingling of feet neuropathy  Psychiatric:  Negative anxiety    Heme/Lymph/Imm:  Negative weight loss RX allergies ,  hx of hay fever   Endocrine:  Negative diabetes

## 2021-08-11 ENCOUNTER — LAB (OUTPATIENT)
Dept: LAB | Facility: CLINIC | Age: 86
End: 2021-08-11
Payer: COMMERCIAL

## 2021-08-11 ENCOUNTER — ANTICOAGULATION THERAPY VISIT (OUTPATIENT)
Dept: ANTICOAGULATION | Facility: CLINIC | Age: 86
End: 2021-08-11

## 2021-08-11 DIAGNOSIS — I48.21 PERMANENT ATRIAL FIBRILLATION (H): ICD-10-CM

## 2021-08-11 DIAGNOSIS — I48.91 ATRIAL FIBRILLATION (H): Primary | ICD-10-CM

## 2021-08-11 DIAGNOSIS — Z79.01 LONG TERM CURRENT USE OF ANTICOAGULANTS WITH INR GOAL OF 2.0-3.0: ICD-10-CM

## 2021-08-11 DIAGNOSIS — I48.91 ATRIAL FIBRILLATION (H): ICD-10-CM

## 2021-08-11 LAB — INR BLD: 2.4 (ref 0.9–1.1)

## 2021-08-11 PROCEDURE — 36416 COLLJ CAPILLARY BLOOD SPEC: CPT

## 2021-08-11 PROCEDURE — 85610 PROTHROMBIN TIME: CPT

## 2021-08-11 NOTE — PROGRESS NOTES
ANTICOAGULATION MANAGEMENT     Tucker Slater 89 year old male is on warfarin with therapeutic INR result. (Goal INR 2.0-2.5)    Recent labs: (last 7 days)     08/11/21  1154   INR 2.4*       ASSESSMENT     Source(s): Chart Review and Patient/Caregiver Call       Warfarin doses taken: Warfarin taken as instructed    Diet: No new diet changes identified    New illness, injury, or hospitalization: No    Medication/supplement changes: None noted    Signs or symptoms of bleeding or clotting: No    Previous INR: Supratherapeutic    Additional findings: None     PLAN     Recommended plan for no diet, medication or health factor changes affecting INR     Dosing Instructions: Continue your current warfarin dose with next INR in 3 weeks       Summary  As of 8/11/2021    Full warfarin instructions:  1.25 mg every Sun, Tue, Fri; 2.5 mg all other days   Next INR check:  9/1/2021             Telephone call with Tucker who verbalizes understanding and agrees to plan    Lab visit scheduled    Education provided: Please call back if any changes to your diet, medications or how you've been taking warfarin    Plan made per [facility] anticoagulation protocol    Ivory Machado RN  Anticoagulation Clinic  8/11/2021    _______________________________________________________________________     Anticoagulation Episode Summary     Current INR goal:  2.0-2.5   TTR:  59.6 % (1 y)   Target end date:  Indefinite   Send INR reminders to:  [facility]    Indications    Atrial fibrillation (H) with mitral regurgitation and congestive heart failure [I48.91]  Long term current use of anticoagulants with INR goal of 2.0-3.0 [Z79.01]  Permanent atrial fibrillation (H) [I48.21]           Comments:  Patient has a history of hematuria when INR is elevated.  Will try to keep INR closer to 2.0. INR Referral renewed, see 5/19/21 documentation encounter         Anticoagulation Care Providers     Provider Role Specialty Phone number    Goldy,  Kwadwo PADILLA MD [facility] [phone]

## 2021-08-17 ENCOUNTER — TELEPHONE (OUTPATIENT)
Dept: INTERNAL MEDICINE | Facility: CLINIC | Age: 86
End: 2021-08-17

## 2021-08-17 DIAGNOSIS — N28.9 RENAL LESION: Primary | ICD-10-CM

## 2021-08-18 ENCOUNTER — ANCILLARY PROCEDURE (OUTPATIENT)
Dept: CARDIOLOGY | Facility: CLINIC | Age: 86
End: 2021-08-18
Attending: INTERNAL MEDICINE
Payer: COMMERCIAL

## 2021-08-18 DIAGNOSIS — Z95.0 CARDIAC PACEMAKER IN SITU: ICD-10-CM

## 2021-08-18 DIAGNOSIS — I49.5 SICK SINUS SYNDROME (H): Primary | ICD-10-CM

## 2021-08-18 PROCEDURE — 93280 PM DEVICE PROGR EVAL DUAL: CPT | Performed by: INTERNAL MEDICINE

## 2021-08-24 ENCOUNTER — TELEPHONE (OUTPATIENT)
Dept: INTERNAL MEDICINE | Facility: CLINIC | Age: 86
End: 2021-08-24

## 2021-08-24 ENCOUNTER — HOSPITAL ENCOUNTER (OUTPATIENT)
Dept: ULTRASOUND IMAGING | Facility: CLINIC | Age: 86
Discharge: HOME | End: 2021-08-24
Attending: NURSE PRACTITIONER | Admitting: NURSE PRACTITIONER
Payer: COMMERCIAL

## 2021-08-24 DIAGNOSIS — N28.9 RENAL LESION: ICD-10-CM

## 2021-08-24 PROCEDURE — 76770 US EXAM ABDO BACK WALL COMP: CPT

## 2021-08-26 LAB
MDC_IDC_LEAD_IMPLANT_DT: NORMAL
MDC_IDC_LEAD_IMPLANT_DT: NORMAL
MDC_IDC_LEAD_LOCATION: NORMAL
MDC_IDC_LEAD_LOCATION: NORMAL
MDC_IDC_LEAD_LOCATION_DETAIL_1: NORMAL
MDC_IDC_LEAD_LOCATION_DETAIL_1: NORMAL
MDC_IDC_LEAD_MFG: NORMAL
MDC_IDC_LEAD_MFG: NORMAL
MDC_IDC_LEAD_MODEL: NORMAL
MDC_IDC_LEAD_MODEL: NORMAL
MDC_IDC_LEAD_POLARITY_TYPE: NORMAL
MDC_IDC_LEAD_POLARITY_TYPE: NORMAL
MDC_IDC_LEAD_SERIAL: NORMAL
MDC_IDC_LEAD_SERIAL: NORMAL
MDC_IDC_MSMT_BATTERY_REMAINING_LONGEVITY: 99 MO
MDC_IDC_MSMT_BATTERY_STATUS: NORMAL
MDC_IDC_MSMT_BATTERY_VOLTAGE: 2.93 V
MDC_IDC_MSMT_LEADCHNL_RA_PACING_THRESHOLD_AMPLITUDE: 0.75 V
MDC_IDC_MSMT_LEADCHNL_RA_PACING_THRESHOLD_PULSEWIDTH: 0.5 MS
MDC_IDC_MSMT_LEADCHNL_RA_SENSING_INTR_AMPL: 3.7 MV
MDC_IDC_MSMT_LEADCHNL_RV_IMPEDANCE_VALUE: 412.5 OHM
MDC_IDC_MSMT_LEADCHNL_RV_PACING_THRESHOLD_AMPLITUDE: 0.75 V
MDC_IDC_MSMT_LEADCHNL_RV_PACING_THRESHOLD_AMPLITUDE: 0.75 V
MDC_IDC_MSMT_LEADCHNL_RV_PACING_THRESHOLD_PULSEWIDTH: 0.5 MS
MDC_IDC_MSMT_LEADCHNL_RV_PACING_THRESHOLD_PULSEWIDTH: 0.5 MS
MDC_IDC_MSMT_LEADCHNL_RV_SENSING_INTR_AMPL: 12 MV
MDC_IDC_PG_IMPLANT_DTM: NORMAL
MDC_IDC_PG_MFG: NORMAL
MDC_IDC_PG_MODEL: NORMAL
MDC_IDC_PG_SERIAL: NORMAL
MDC_IDC_PG_TYPE: NORMAL
MDC_IDC_SESS_CLINIC_NAME: NORMAL
MDC_IDC_SESS_DTM: NORMAL
MDC_IDC_SESS_TYPE: NORMAL
MDC_IDC_SET_BRADY_HYSTRATE: 60 {BEATS}/MIN
MDC_IDC_SET_BRADY_LOWRATE: 70 {BEATS}/MIN
MDC_IDC_SET_BRADY_MAX_SENSOR_RATE: 120 {BEATS}/MIN
MDC_IDC_SET_BRADY_MODE: NORMAL
MDC_IDC_SET_BRADY_NIGHT_RATE: NORMAL
MDC_IDC_SET_LEADCHNL_RA_PACING_POLARITY: NORMAL
MDC_IDC_SET_LEADCHNL_RA_SENSING_POLARITY: NORMAL
MDC_IDC_SET_LEADCHNL_RV_PACING_AMPLITUDE: 1 V
MDC_IDC_SET_LEADCHNL_RV_PACING_CAPTURE_MODE: NORMAL
MDC_IDC_SET_LEADCHNL_RV_PACING_POLARITY: NORMAL
MDC_IDC_SET_LEADCHNL_RV_PACING_PULSEWIDTH: 0.5 MS
MDC_IDC_SET_LEADCHNL_RV_SENSING_POLARITY: NORMAL
MDC_IDC_SET_LEADCHNL_RV_SENSING_SENSITIVITY: 2 MV
MDC_IDC_STAT_AT_MODE_SW_COUNT: 0
MDC_IDC_STAT_BRADY_RA_PERCENT_PACED: 0 %
MDC_IDC_STAT_BRADY_RV_PERCENT_PACED: 98 %

## 2021-09-01 ENCOUNTER — ANTICOAGULATION THERAPY VISIT (OUTPATIENT)
Dept: ANTICOAGULATION | Facility: CLINIC | Age: 86
End: 2021-09-01

## 2021-09-01 ENCOUNTER — LAB (OUTPATIENT)
Dept: LAB | Facility: CLINIC | Age: 86
End: 2021-09-01
Payer: COMMERCIAL

## 2021-09-01 ENCOUNTER — TELEPHONE (OUTPATIENT)
Dept: SLEEP MEDICINE | Facility: CLINIC | Age: 86
End: 2021-09-01

## 2021-09-01 DIAGNOSIS — I48.91 ATRIAL FIBRILLATION (H): ICD-10-CM

## 2021-09-01 DIAGNOSIS — I48.21 PERMANENT ATRIAL FIBRILLATION (H): ICD-10-CM

## 2021-09-01 DIAGNOSIS — Z79.01 LONG TERM CURRENT USE OF ANTICOAGULANTS WITH INR GOAL OF 2.0-3.0: Primary | ICD-10-CM

## 2021-09-01 DIAGNOSIS — Z79.01 LONG TERM CURRENT USE OF ANTICOAGULANTS WITH INR GOAL OF 2.0-3.0: ICD-10-CM

## 2021-09-01 LAB — INR BLD: 2.9 (ref 0.9–1.1)

## 2021-09-01 PROCEDURE — 85610 PROTHROMBIN TIME: CPT

## 2021-09-01 PROCEDURE — 36415 COLL VENOUS BLD VENIPUNCTURE: CPT

## 2021-09-01 NOTE — TELEPHONE ENCOUNTER
Reason for call:  Other   Patient called regarding (reason for call): call back  Additional comments: Patient is requesting a call back to discuss some questions    Phone number to reach patient:  Cell number on file:    Telephone Information:   Mobile [phone]       Best Time:  Anytime    Can we leave a detailed message on this number?  YES    Travel screening: Not Applicable

## 2021-09-01 NOTE — PROGRESS NOTES
ANTICOAGULATION MANAGEMENT     Tucker Slater 89 year old male is on warfarin with supratherapeutic INR result. (Goal INR 2.0-2.5)    Recent labs: (last 7 days)     09/01/21  1341   INR 2.9*       ASSESSMENT     Source(s): Chart Review and Patient/Caregiver Call       Warfarin doses taken: Warfarin taken as instructed    Diet: No new diet changes identified    New illness, injury, or hospitalization: No    Medication/supplement changes: None noted    Signs or symptoms of bleeding or clotting: No    Previous INR: Therapeutic last visit; previously outside of goal range    Additional findings: None     PLAN     Recommended plan for no diet, medication or health factor changes affecting INR     Dosing Instructions: Hold dose then continue your current warfarin dose with next INR in 2 weeks       Summary  As of 9/1/2021    Full warfarin instructions:  9/1: Hold; Otherwise 1.25 mg every Sun, Tue, Fri; 2.5 mg all other days   Next INR check:  9/15/2021             Telephone call with Tucker who verbalizes understanding and agrees to plan    Lab visit scheduled    Education provided: Please call back if any changes to your diet, medications or how you've been taking warfarin and Contact [phone]  with any changes, questions or concerns.     Plan made per [facility] anticoagulation protocol    Gail Valdovinos RN  Anticoagulation Clinic  9/1/2021    _______________________________________________________________________     Anticoagulation Episode Summary     Current INR goal:  2.0-2.5   TTR:  60.8 % (1 y)   Target end date:  Indefinite   Send INR reminders to:  [facility]    Indications    Atrial fibrillation (H) with mitral regurgitation and congestive heart failure [I48.91]  Long term current use of anticoagulants with INR goal of 2.0-3.0 [Z79.01]  Permanent atrial fibrillation (H) [I48.21]           Comments:  Patient has a history of hematuria when INR is elevated.  Will try to keep INR closer to 2.0.  INR Referral renewed, see 5/19/21 documentation encounter         Anticoagulation Care Providers     Provider Role Specialty Phone number    Kwadwo Winslow MD Referring Internal Medicine [phone]

## 2021-09-01 NOTE — PROGRESS NOTES
Left message to call [phone].     Anticoagulation Management    Unable to reach Tucker today.    Today's INR result of 2.9 is supratherapeutic (goal INR of 2.0-2.5).  Result received from: Clinic Lab    Follow up required to confirm warfarin dose taken and assess for changes    Left message to hold warfarin tonight.      Anticoagulation clinic to follow up    Gail Valdovinos RN

## 2021-09-15 ENCOUNTER — LAB (OUTPATIENT)
Dept: LAB | Facility: CLINIC | Age: 86
End: 2021-09-15
Payer: COMMERCIAL

## 2021-09-15 ENCOUNTER — ANTICOAGULATION THERAPY VISIT (OUTPATIENT)
Dept: ANTICOAGULATION | Facility: CLINIC | Age: 86
End: 2021-09-15

## 2021-09-15 DIAGNOSIS — Z79.01 LONG TERM CURRENT USE OF ANTICOAGULANTS WITH INR GOAL OF 2.0-3.0: ICD-10-CM

## 2021-09-15 DIAGNOSIS — I48.91 ATRIAL FIBRILLATION (H): Primary | ICD-10-CM

## 2021-09-15 DIAGNOSIS — I48.21 PERMANENT ATRIAL FIBRILLATION (H): ICD-10-CM

## 2021-09-15 DIAGNOSIS — I48.91 ATRIAL FIBRILLATION (H): ICD-10-CM

## 2021-09-15 LAB — INR BLD: 2.2 (ref 0.9–1.1)

## 2021-09-15 PROCEDURE — 36416 COLLJ CAPILLARY BLOOD SPEC: CPT

## 2021-09-15 PROCEDURE — 85610 PROTHROMBIN TIME: CPT

## 2021-09-15 NOTE — PROGRESS NOTES
Anticoagulation Management    Unable to reach Tucker today.    Today's INR result of 2.2 is therapeutic (goal INR of 2.0-2.5).  Result received from: Clinic Lab    Follow up required to confirm warfarin dose taken and assess for changes    Left message to continue current dose of warfarin 2.5 mg tonight.      Anticoagulation clinic to follow up    Ivory Machado RN

## 2021-09-15 NOTE — PROGRESS NOTES
ANTICOAGULATION MANAGEMENT     Tucker Slater 89 year old male is on warfarin with therapeutic INR result. (Goal INR 2.0-2.5)    Recent labs: (last 7 days)     09/15/21  1348   INR 2.2*       ASSESSMENT     Source(s): Chart Review and Patient/Caregiver Call       Warfarin doses taken: Warfarin taken as instructed    Diet: No new diet changes identified    New illness, injury, or hospitalization: No    Medication/supplement changes: None noted    Signs or symptoms of bleeding or clotting: No    Previous INR: Supratherapeutic    Additional findings: None     PLAN     Recommended plan for no diet, medication or health factor changes affecting INR     Dosing Instructions: Continue your current warfarin dose with next INR in 3 weeks       Summary  As of 9/15/2021    Full warfarin instructions:  1.25 mg every Sun, Tue, Fri; 2.5 mg all other days   Next INR check:  10/6/2021             Telephone call with Tucker who verbalizes understanding and agrees to plan    Lab visit scheduled    Education provided: Please call back if any changes to your diet, medications or how you've been taking warfarin    Plan made per [facility] anticoagulation protocol    Ivory Machado RN  Anticoagulation Clinic  9/15/2021    _______________________________________________________________________     Anticoagulation Episode Summary     Current INR goal:  2.0-2.5   TTR:  60.4 % (1 y)   Target end date:  Indefinite   Send INR reminders to:  [facility]    Indications    Atrial fibrillation (H) with mitral regurgitation and congestive heart failure [I48.91]  Long term current use of anticoagulants with INR goal of 2.0-3.0 [Z79.01]  Permanent atrial fibrillation (H) [I48.21]           Comments:  Patient has a history of hematuria when INR is elevated.  Will try to keep INR closer to 2.0. INR Referral renewed, see 5/19/21 documentation encounter         Anticoagulation Care Providers     Provider Role Specialty Phone number     Kwadwo Winslow MD Referring Internal Medicine [phone]

## 2021-09-17 NOTE — PROGRESS NOTES
"Tucker Slater is a 89 year old male being evaluated via a billable telephone visit.     \"This telephone visit will be conducted via a call between you and your physician/provider. We have found that certain health care needs can be provided without the need for an in-person visit or physical exam.  This service lets us provide the care you need with a telephone conversation.  If a prescription is necessary we can send it directly to your pharmacy.  If lab work is needed we can place an order for that and you can then stop by our lab to have the test done at a later time.\"    Telephone visits are billed at different rates depending on your insurance coverage.  Please reach out to your insurance provider with any questions.    Patient has given verbal consent for a Telephone visit? Yes    What telephone number would you like your provider to contact at at:  [phone]    How would you like to obtain your AVS? Mail a copy    Mariela Avery    Telephone Visit Details:     Telephone Visit Start Time: 4:03PM    Telephone Visit End Time:4:21 PM    [facility]   Outpatient Sleep Medicine  Sep 20, 2021       Name: Tucker Slater MRN# [id number]   Age: 89 year old YOB: 1931            Assessment and Plan:   1. DAWSON (obstructive sleep apnea)    Patient's sleep apnea is adequately managed with current PAP settings 5cmH2O with residual AHI of 5 events per hour. Compliance is excellent. Tolerating PAP well. No indication to change pressure setting today. Will continue with nightly CPAP and 2L supplemental O2 bled into machine. Prescription renewed for new mask/supplies. O2 order was renewed July 2021.   - Comprehensive DME    Tucker Slater will follow up in about 1 year, or sooner as needed.        Chief Complaint      Chief Complaint   Patient presents with     RECHECK     DAWSON            History of Present Illness:     Tucker Slater is a 89 year old male who presents to the " "clinic for follow-up of their severe complex sleep apnea with hypoxemia treated with CPAP and oxygen. Other comorbid diagnoses include congestive heart failure with mitral regurgitation and atrial fibrillation, chronic anitcoagulation, peripheral neuropathy, hypertension, bladder cancer.    Overall, the patient rates their experience with PAP as 10 (0 poor, 10 great). Patient is using a nasal pillow mask. The mask is comfortable. The mask is not leaking. They are not snoring with the mask on. They are not having gasp arousals. They are not having significant oral/nasal dryness or epistaxis.  They are not having pain/skin breakdown. The pressure settings are comfortable.     Bedtime is typically 11:30-12:30AM. Usually it takes about 10 minutes to fall asleep with the mask on. Wake time is typically 8-10:00AM.     ResMed CPAP 5.0 cmH2O 30 day usage data:  96% of days with > 4 hours of use. 0/30 days with no use.   Average use 405 minutes per day.   95%ile Leak 12.85 L/min.   AHI 5.06 events per hour.     SCALES:   INSOMNIA: Insomnia Severity Score: 0   SLEEPINESS: [place] Sleepiness Score: 4    PREVIOUS SLEEP STUDIES:  Study Date: 7/16/2018   MRN: [id number]   Referring Provider: SELMA Mcdaniels CNP, June E.   Ordering Provider: MD Ritter Conrad   Indications for Polysomnography: The patient is a 87 y old male who is 5' 10\" and weighs 189.0 lbs. His BMI is 27.4, [place] sleepiness scale 4.0 and neck circumference is 41.0 cm. Relevant medical history includes atrial fibrillation, bradycardia status post permanent pacemaker, hypertension, chronic kidney disease, significant nocturnal episodic hypoxemia noted with overnight oximetry, with 62 minutes <88%. A diagnostic polysomnogram was performed to evaluate for sleep apnea /hypoventilation/hypoxemia. After 130.0 minutes of sleep time the patient exhibited sufficient respiratory events qualifying him for a CPAP trial which was then initiated.   Polysomnogram Data: A full " night polysomnogram recorded the standard physiologic parameters including EEG, EOG, EMG, ECG, nasal and oral airflow. Respiratory parameters of chest and abdominal movements were recorded with respiratory inductance plethysmography. Oxygen saturation was recorded by pulse oximetry. Hypopnea scoring rule used: 1B 4%   Diagnostic PSG   Sleep Architecture: Sleep fragmentation. No N3 sleep observed.   The total recording time of the polysomnogram was 195.4 minutes. The total sleep time was 130.0 minutes. Sleep latency was normal at 2.4 minutes without the use of a sleep aid. REM latency was 183.5 minutes. Arousal index was increased at 59.5 arousals per hour. Sleep efficiency was decreased at 66.5%. Wake after sleep onset was 58.0 minutes. The patient spent 23.5% of total sleep time in Stage N1, 73.5% in Stage N2, 0.0% in Stage N3, and 3.1% in REM. Time in REM supine was 0 minutes.   Respiration: Tachypnea with severe complex sleep apnea associated with hypoxemia. Nearly 40% of the sleep disordered breathing events were centrals. Prolonged circulation time observed: 48 sec, cycle length was as long as 1 minute.   Obstructive events with paradoxical chest and abdominal movements with prolonged circulation time and cycle length-5 min:     Events ? The polysomnogram revealed a presence of 41 obstructive, 21 central, and 19 mixed apneas resulting in an apnea index of 37.4 events per hour. There were 20 obstructive hypopneas and 30 central hypopneas resulting in an obstructive hypopnea index of 9.2 and central hypopnea index of 13.8 events per hour. The combined apnea/hypopnea index was 60.5 events per hour (central apnea/hypopnea index was 23.5 events per hour). The REM AHI was 45.0 events per hour. The supine AHI was 59.1 events per hour. The RERA index was 1.8 events per hour. The RDI was 62.3 events per hour.     Snoring - was reported as mild to moderate and intermittent.     Respiratory rate and pattern - was notable  for Tachypnea with respiratory rate 20-24 and mixed central and obstructive events with prolonged respiratory cycle length and long circulation time.     Sustained Sleep Associated Hypoventilation Absent - Transcutaneous carbon dioxide monitoring was used, significant hypoventilation was not present with a maximum change from 30.4 to 35.7 mmHg and 0 minutes at or greater than 55 mmHg.     Sleep Associated Hypoxemia Present - (Greater than 5 minutes O2 sat at or below 88%) was present. Baseline oxygen saturation was 92.4%. Lowest oxygen saturation was 76.0%. Time spent less than or equal to 88% was 12.6 minutes. Time spent less than or equal to 89% was 21.7 minutes.      Treatment PSG   Sleep Architecture: Decreased sleep efficiency. No N3 sleep observed.   At 02:49:06 AM the patient was placed on PAP treatment and was titrated at pressures ranging from ASV 4/6/15 cmH2O up to CPAP 6 cmH2O. The total recording time of the treatment portion of the study was 181.5 minutes. The total sleep time was 87.0 minutes. During the treatment portion of the study the sleep latency was 7.5 minutes. REM latency was 158.0 minutes. Arousal index was increased at 37.9 arousals per hour. Sleep efficiency was decreased at 47.9%. Wake after sleep onset was 83.5 minutes. The patient spent 29.9%   Respiration: CPAP 5 cm H2O resolved all of the obstructive events, although prolonged treatment emergent central events were seen associated with significant hypoxemia. ASV improved all sleep disordered breathing. Despite elimination of obstructive events with PAP therapy, the patient still had 21 minutes of oxygen saturation <89%     The final pressure was ASV 4/6/15 cmH2O with an AHI of 8.5 events per hour. Time in REM supine on final pressure was 0 minutes.     The patient had 21 minutes of O2 saturation <89% while on positive airway pressure therapy without obstructive events     This titration was considered:   - Adequate (residual AHI with  75% decrease or above constraints without REM?supine sleep at final pressure).     Past medical/surgical history, family history, social history, medications and allergies were reviewed.           Physical Examination:   There were no vitals taken for this visit.  General: Cooperative and pleasant. In no apparent distress.  Pulmonary:  Able to speak easily in full sentences. No cough or wheeze.   Neurologic: Alert, oriented x3.   Psychiatric: Mood euthymic. Affect congruent with full range and intensity.      CC:  Kwadwo Winslow PA-C  Sep 20, 2021     [facility]  [address] [place] [address]     [facility]  [address] [address]    Chart documentation was completed, in part, with [employer] voice-recognition software. Even though reviewed, some grammatical, spelling, and word errors may remain.      25 minutes spent on day of encounter doing chart review, history and exam, documentation, and further activities as noted above

## 2021-09-20 ENCOUNTER — VIRTUAL VISIT (OUTPATIENT)
Dept: SLEEP MEDICINE | Facility: CLINIC | Age: 86
End: 2021-09-20
Payer: COMMERCIAL

## 2021-09-20 DIAGNOSIS — G47.33 OSA (OBSTRUCTIVE SLEEP APNEA): Primary | ICD-10-CM

## 2021-09-20 PROCEDURE — 99443 PR PHYSICIAN TELEPHONE EVALUATION 21-30 MIN: CPT | Mod: 95 | Performed by: PHYSICIAN ASSISTANT

## 2021-09-20 NOTE — PATIENT INSTRUCTIONS

## 2021-10-05 DIAGNOSIS — I48.21 PERMANENT ATRIAL FIBRILLATION (H): ICD-10-CM

## 2021-10-05 DIAGNOSIS — Z79.01 LONG TERM CURRENT USE OF ANTICOAGULANT THERAPY: ICD-10-CM

## 2021-10-06 ENCOUNTER — ANTICOAGULATION THERAPY VISIT (OUTPATIENT)
Dept: ANTICOAGULATION | Facility: CLINIC | Age: 86
End: 2021-10-06

## 2021-10-06 ENCOUNTER — LAB (OUTPATIENT)
Dept: LAB | Facility: CLINIC | Age: 86
End: 2021-10-06
Payer: COMMERCIAL

## 2021-10-06 DIAGNOSIS — I48.21 PERMANENT ATRIAL FIBRILLATION (H): ICD-10-CM

## 2021-10-06 DIAGNOSIS — Z79.01 LONG TERM CURRENT USE OF ANTICOAGULANTS WITH INR GOAL OF 2.0-3.0: ICD-10-CM

## 2021-10-06 DIAGNOSIS — I48.91 ATRIAL FIBRILLATION (H): ICD-10-CM

## 2021-10-06 DIAGNOSIS — I48.91 ATRIAL FIBRILLATION (H): Primary | ICD-10-CM

## 2021-10-06 LAB — INR BLD: 2.4 (ref 0.9–1.1)

## 2021-10-06 PROCEDURE — 85610 PROTHROMBIN TIME: CPT

## 2021-10-06 PROCEDURE — 36416 COLLJ CAPILLARY BLOOD SPEC: CPT

## 2021-10-06 RX ORDER — WARFARIN SODIUM 2.5 MG/1
TABLET ORAL
Qty: 30 TABLET | Refills: 0 | Status: SHIPPED | OUTPATIENT
Start: 2021-10-06 | End: 2021-11-23

## 2021-10-06 NOTE — TELEPHONE ENCOUNTER
"Requested Prescriptions   Pending Prescriptions Disp Refills     warfarin ANTICOAGULANT (COUMADIN) 2.5 MG tablet [Pharmacy Med Name: Warfarin Sodium 2.5 MG Oral Tablet] 30 tablet 0     Sig: TAKE A HALF TABLET (1.25MG) BY MOUTH ON SUN, TUE, FRI AND TAKE 1 TABLET (2.5MG) MON, WED, THUR, SAT OR AS DIRECTED BY INR CLINIC       Vitamin K Antagonists Failed - 10/5/2021 11:28 AM        Failed - INR is within goal in the past 6 weeks     Confirm INR is within goal in the past 6 weeks.     Recent Labs   Lab Test 10/06/21  1338   INR 2.4*                       Passed - Recent (12 mo) or future (30 days) visit within the authorizing provider's specialty     Patient has had an office visit with the authorizing provider or a provider within the authorizing providers department within the previous 12 mos or has a future within next 30 days. See \"Patient Info\" tab in inbasket, or \"Choose Columns\" in Meds & Orders section of the refill encounter.              Passed - Medication is active on med list        Passed - Patient is 18 years of age or older           Last office visit 4/5/21.  Warfarin dosing is managed by INR Clinic.  Prescription approved per [facility] Refill Protocol.  Ivory Machado RN    "

## 2021-10-06 NOTE — TELEPHONE ENCOUNTER
Pending Prescriptions:                       Disp   Refills    warfarin ANTICOAGULANT (COUMADIN) 2.5 MG t*30 tab*0        Sig: TAKE A HALF TABLET (1.25MG) BY MOUTH ON SUN, TUE, FRI           AND TAKE 1 TABLET (2.5MG) MON, WED, THUR, SAT OR           AS DIRECTED BY INR CLINIC

## 2021-10-06 NOTE — PROGRESS NOTES
Anticoagulation Management    Unable to reach Tucker today.    Today's INR result of 2.4 is therapeutic (goal INR of 2.0-3.0).  Result received from: Clinic Lab    Follow up required to confirm warfarin dose taken and assess for changes    Left message to continue current dose of warfarin 2.5 mg tonight.      Anticoagulation clinic to follow up    Ivory Machado RN

## 2021-10-07 NOTE — PROGRESS NOTES
ANTICOAGULATION MANAGEMENT     Tucker Slater 89 year old male is on warfarin with therapeutic INR result. (Goal INR 2.0-2.5)    Recent labs: (last 7 days)     10/06/21  1338   INR 2.4*       ASSESSMENT     Source(s): Chart Review and Patient/Caregiver Call       Warfarin doses taken: Warfarin taken as instructed    Diet: No new diet changes identified    New illness, injury, or hospitalization: No    Medication/supplement changes: None noted    Signs or symptoms of bleeding or clotting: No    Previous INR: Therapeutic last visit; previously outside of goal range    Additional findings: None     PLAN     Recommended plan for no diet, medication or health factor changes affecting INR     Dosing Instructions: Continue your current warfarin dose with next INR in 4 weeks       Summary  As of 10/6/2021    Full warfarin instructions:  1.25 mg every Sun, Tue, Fri; 2.5 mg all other days   Next INR check:  11/3/2021             Telephone call with Tucker who verbalizes understanding and agrees to plan    Lab visit scheduled    Education provided: Please call back if any changes to your diet, medications or how you've been taking warfarin and Contact [phone]  with any changes, questions or concerns.     Plan made per [facility] anticoagulation protocol    Gail Valdovinos RN  Anticoagulation Clinic  10/7/2021    _______________________________________________________________________     Anticoagulation Episode Summary     Current INR goal:  2.0-2.5   TTR:  60.3 % (1 y)   Target end date:  Indefinite   Send INR reminders to:  [facility]    Indications    Atrial fibrillation (H) with mitral regurgitation and congestive heart failure [I48.91]  Long term current use of anticoagulants with INR goal of 2.0-3.0 [Z79.01]  Permanent atrial fibrillation (H) [I48.21]           Comments:  Patient has a history of hematuria when INR is elevated.  Will try to keep INR closer to 2.0. INR Referral renewed, see 5/19/21  documentation encounter         Anticoagulation Care Providers     Provider Role Specialty Phone number    Kwadwo Winslow MD Referring Internal Medicine [phone]

## 2021-11-03 ENCOUNTER — ANTICOAGULATION THERAPY VISIT (OUTPATIENT)
Dept: ANTICOAGULATION | Facility: CLINIC | Age: 86
End: 2021-11-03

## 2021-11-03 ENCOUNTER — OFFICE VISIT (OUTPATIENT)
Dept: INTERNAL MEDICINE | Facility: CLINIC | Age: 86
End: 2021-11-03
Payer: COMMERCIAL

## 2021-11-03 VITALS
HEIGHT: 70 IN | TEMPERATURE: 97.8 F | DIASTOLIC BLOOD PRESSURE: 70 MMHG | WEIGHT: 182 LBS | BODY MASS INDEX: 26.05 KG/M2 | SYSTOLIC BLOOD PRESSURE: 136 MMHG | HEART RATE: 78 BPM | OXYGEN SATURATION: 96 %

## 2021-11-03 DIAGNOSIS — I48.91 ATRIAL FIBRILLATION (H): Primary | ICD-10-CM

## 2021-11-03 DIAGNOSIS — Z79.01 LONG TERM CURRENT USE OF ANTICOAGULANTS WITH INR GOAL OF 2.0-3.0: ICD-10-CM

## 2021-11-03 DIAGNOSIS — I50.42 CHRONIC COMBINED SYSTOLIC AND DIASTOLIC CONGESTIVE HEART FAILURE (H): ICD-10-CM

## 2021-11-03 DIAGNOSIS — I48.21 PERMANENT ATRIAL FIBRILLATION (H): ICD-10-CM

## 2021-11-03 DIAGNOSIS — I10 ESSENTIAL HYPERTENSION WITH GOAL BLOOD PRESSURE LESS THAN 140/90: Primary | ICD-10-CM

## 2021-11-03 DIAGNOSIS — N18.31 STAGE 3A CHRONIC KIDNEY DISEASE (H): ICD-10-CM

## 2021-11-03 DIAGNOSIS — Z23 NEED FOR PROPHYLACTIC VACCINATION AND INOCULATION AGAINST INFLUENZA: ICD-10-CM

## 2021-11-03 LAB — INR BLD: 2.2 (ref 0.9–1.1)

## 2021-11-03 PROCEDURE — 90662 IIV NO PRSV INCREASED AG IM: CPT | Performed by: INTERNAL MEDICINE

## 2021-11-03 PROCEDURE — 99214 OFFICE O/P EST MOD 30 MIN: CPT | Mod: 25 | Performed by: INTERNAL MEDICINE

## 2021-11-03 PROCEDURE — 80048 BASIC METABOLIC PNL TOTAL CA: CPT | Performed by: INTERNAL MEDICINE

## 2021-11-03 PROCEDURE — G0008 ADMIN INFLUENZA VIRUS VAC: HCPCS | Performed by: INTERNAL MEDICINE

## 2021-11-03 PROCEDURE — 85610 PROTHROMBIN TIME: CPT | Performed by: INTERNAL MEDICINE

## 2021-11-03 PROCEDURE — 36415 COLL VENOUS BLD VENIPUNCTURE: CPT | Performed by: INTERNAL MEDICINE

## 2021-11-03 ASSESSMENT — MIFFLIN-ST. JEOR: SCORE: 1491.8

## 2021-11-03 NOTE — PROGRESS NOTES
Assessment & Plan     Chronic combined systolic and diastolic congestive heart failure (H)  Assess lab   - Basic metabolic panel  (Ca, Cl, CO2, Creat, Gluc, K, Na, BUN)    Stage 3a chronic kidney disease (H)    - Basic metabolic panel  (Ca, Cl, CO2, Creat, Gluc, K, Na, BUN)    Essential hypertension with goal blood pressure less than 140/90  Controlled on treatment   - Basic metabolic panel  (Ca, Cl, CO2, Creat, Gluc, K, Na, BUN)    Permanent atrial fibrillation (H)  Cont treatment, rate controlled   - INR point of care    Need for prophylactic vaccination and inoculation against influenza    - INFLUENZA, QUAD, HIGH DOSE, PF, 65YR + (FLUZONE HD)  - ADMIN INFLUENZA (For MEDICARE Patients ONLY) []    Long term current use of anticoagulants with INR goal of 2.0-3.0    - INR point of care             See Patient Instructions    Return in about 6 months (around 5/3/2022) for Physical Exam.    Kwadwo Winslow MD  [facility] MARCI Ceballos is a 89 year old who presents for the following health issues     HPI     Hypertension Follow-up  Has h/o HTN. on medical treatment. BP has been controlled. No side effects from medications. No CP, HA, dizziness. good compliance with medications and low salt diet.      Do you check your blood pressure regularly outside of the clinic? No     Are you following a low salt diet? Trying to follow, fail sometimes    Are your blood pressures ever more than 140 on the top number (systolic) OR more   than 90 on the bottom number (diastolic), for example 140/90? N/A    Has history of atrial fibrillation. On anticoagulation with Coumadin and rate control medications. Asymptonatic - no chest pains , palpitations,  no side effects from medications.    Has H/O BPH. On treatment with Proscar . Controlled  symptoms of frequency, decreased urinary stream, no dysuria. No side effects from medications.    Has h/o peripheral neuropathy. Has numbness and tingling  "in the legs, has affected his balance, uses a cane , no falls.     Has DAWSON, on O2 and CPAP. Feels well.         Review of Systems   Constitutional, HEENT, cardiovascular, pulmonary, gi and gu systems are negative, except as otherwise noted.      Objective    BP (!) 143/75 (BP Location: Left arm, Patient Position: Sitting, Cuff Size: Adult Regular)   Pulse 78   Temp 97.8  F (36.6  C) (Oral)   Ht 1.778 m (5' 10\")   Wt 82.6 kg (182 lb)   SpO2 96%   BMI 26.11 kg/m    Body mass index is 26.11 kg/m .  Physical Exam   GENERAL: frail, alert and no distress  EYES: Eyes grossly normal to inspection, PERRL and conjunctivae and sclerae normal  HENT: ear canals and TM's normal, nose and mouth without ulcers or lesions  NECK: no adenopathy, no asymmetry, masses, or scars and thyroid normal to palpation  RESP: lungs clear to auscultation - no rales, rhonchi or wheezes  CV: irregular rate and rhythm, normal S1 S2, no S3 or S4, no murmur, click or rub, no peripheral edema and peripheral pulses strong  ABDOMEN: soft, nontender, no hepatosplenomegaly, no masses and bowel sounds normal  MS: no gross musculoskeletal defects noted, no edema  SKIN: no suspicious lesions or rashes  NEURO: LE weakness, unstable gait, uses cane, mentation intact and speech normal    Lab on 10/06/2021   Component Date Value Ref Range Status     INR 10/06/2021 2.4* 0.9 - 1.1 Final               "

## 2021-11-03 NOTE — PROGRESS NOTES
ANTICOAGULATION MANAGEMENT     Tucker Slater 89 year old male is on warfarin with therapeutic INR result. (Goal INR 2.0-2.5)    Recent labs: (last 7 days)     11/03/21  1342   INR 2.2*       ASSESSMENT     Source(s): Chart Review and Patient/Caregiver Call       Warfarin doses taken: Warfarin taken as instructed    Diet: No new diet changes identified    New illness, injury, or hospitalization: No    Medication/supplement changes: None noted    Signs or symptoms of bleeding or clotting: No    Previous INR: Therapeutic last 2(+) visits    Additional findings: None     PLAN     Recommended plan for no diet, medication or health factor changes affecting INR     Dosing Instructions: Continue your current warfarin dose with next INR in 4 weeks       Summary  As of 11/3/2021    Full warfarin instructions:  1.25 mg every Sun, Tue, Fri; 2.5 mg all other days   Next INR check:  12/1/2021             Telephone call with Tucker who agrees to plan and repeated back plan correctly    Lab visit scheduled    Education provided: Please call back if any changes to your diet, medications or how you've been taking warfarin    Plan made per [facility] anticoagulation protocol    Ivory Machado RN  Anticoagulation Clinic  11/3/2021    _______________________________________________________________________     Anticoagulation Episode Summary     Current INR goal:  2.0-2.5   TTR:  63.0 % (1 y)   Target end date:  Indefinite   Send INR reminders to:  [facility]    Indications    Atrial fibrillation (H) with mitral regurgitation and congestive heart failure [I48.91]  Long term current use of anticoagulants with INR goal of 2.0-3.0 [Z79.01]  Permanent atrial fibrillation (H) [I48.21]           Comments:           Anticoagulation Care Providers     Provider Role Specialty Phone number    Kwadwo Winslow MD Referring Internal Medicine [phone]

## 2021-11-03 NOTE — LETTER
November 5, 2021      Tucker Slater  [address] [facility] [address]        Dear ,    We are writing to inform you of your test results.    Slightly decreased kidney function is stable.   Follow up in 6 months        Resulted Orders   Basic metabolic panel  (Ca, Cl, CO2, Creat, Gluc, K, Na, BUN)   Result Value Ref Range    Sodium 139 133 - 144 mmol/L    Potassium 4.4 3.4 - 5.3 mmol/L    Chloride 110 (H) 94 - 109 mmol/L    Carbon Dioxide (CO2) 22 20 - 32 mmol/L    Anion Gap 7 3 - 14 mmol/L    Urea Nitrogen 37 (H) 7 - 30 mg/dL    Creatinine 1.41 (H) 0.66 - 1.25 mg/dL    Calcium 9.4 8.5 - 10.1 mg/dL    Glucose 93 70 - 99 mg/dL    GFR Estimate 44 (L) >60 mL/min/1.73m2      Comment:      As of July 11, 2021, eGFR is calculated by the CKD-EPI creatinine equation, without race adjustment. eGFR can be influenced by muscle mass, exercise, and diet. The reported eGFR is an estimation only and is only applicable if the renal function is stable.       If you have any questions or concerns, please call the clinic at the number listed above.       Sincerely,      Kwadwo Winslow MD

## 2021-11-04 LAB
ANION GAP SERPL CALCULATED.3IONS-SCNC: 7 MMOL/L (ref 3–14)
BUN SERPL-MCNC: 37 MG/DL (ref 7–30)
CALCIUM SERPL-MCNC: 9.4 MG/DL (ref 8.5–10.1)
CHLORIDE BLD-SCNC: 110 MMOL/L (ref 94–109)
CO2 SERPL-SCNC: 22 MMOL/L (ref 20–32)
CREAT SERPL-MCNC: 1.41 MG/DL (ref 0.66–1.25)
GFR SERPL CREATININE-BSD FRML MDRD: 44 ML/MIN/1.73M2
GLUCOSE BLD-MCNC: 93 MG/DL (ref 70–99)
POTASSIUM BLD-SCNC: 4.4 MMOL/L (ref 3.4–5.3)
SODIUM SERPL-SCNC: 139 MMOL/L (ref 133–144)

## 2021-11-05 NOTE — TELEPHONE ENCOUNTER
Phone call to wife, Alba and informed of Jovany's recommendations. She was appreciative of return call and verbalized understanding.     SHAINA Greenfield RN     TSH - print and mail per 's request

## 2021-11-23 ENCOUNTER — ANCILLARY PROCEDURE (OUTPATIENT)
Dept: CARDIOLOGY | Facility: CLINIC | Age: 86
End: 2021-11-23
Attending: INTERNAL MEDICINE
Payer: COMMERCIAL

## 2021-11-23 DIAGNOSIS — I48.21 PERMANENT ATRIAL FIBRILLATION (H): ICD-10-CM

## 2021-11-23 DIAGNOSIS — Z95.0 CARDIAC PACEMAKER IN SITU: ICD-10-CM

## 2021-11-23 DIAGNOSIS — I49.5 SICK SINUS SYNDROME (H): ICD-10-CM

## 2021-11-23 DIAGNOSIS — Z79.01 LONG TERM CURRENT USE OF ANTICOAGULANT THERAPY: ICD-10-CM

## 2021-11-23 PROCEDURE — 93296 REM INTERROG EVL PM/IDS: CPT | Performed by: INTERNAL MEDICINE

## 2021-11-23 PROCEDURE — 93294 REM INTERROG EVL PM/LDLS PM: CPT | Performed by: INTERNAL MEDICINE

## 2021-11-23 RX ORDER — WARFARIN SODIUM 2.5 MG/1
TABLET ORAL
Qty: 90 TABLET | Refills: 0 | Status: SHIPPED | OUTPATIENT
Start: 2021-11-23 | End: 2022-03-22

## 2021-12-01 ENCOUNTER — ANTICOAGULATION THERAPY VISIT (OUTPATIENT)
Dept: ANTICOAGULATION | Facility: CLINIC | Age: 86
End: 2021-12-01

## 2021-12-01 ENCOUNTER — LAB (OUTPATIENT)
Dept: LAB | Facility: CLINIC | Age: 86
End: 2021-12-01
Payer: COMMERCIAL

## 2021-12-01 DIAGNOSIS — I48.21 PERMANENT ATRIAL FIBRILLATION (H): ICD-10-CM

## 2021-12-01 DIAGNOSIS — Z79.01 LONG TERM CURRENT USE OF ANTICOAGULANTS WITH INR GOAL OF 2.0-3.0: ICD-10-CM

## 2021-12-01 DIAGNOSIS — I48.91 ATRIAL FIBRILLATION (H): Primary | ICD-10-CM

## 2021-12-01 LAB — INR BLD: 2.3 (ref 0.9–1.1)

## 2021-12-01 PROCEDURE — 85610 PROTHROMBIN TIME: CPT

## 2021-12-01 PROCEDURE — 36416 COLLJ CAPILLARY BLOOD SPEC: CPT

## 2021-12-01 NOTE — PROGRESS NOTES
ANTICOAGULATION MANAGEMENT     Tucker Slater 89 year old male is on warfarin with therapeutic INR result. (Goal INR 2.0-2.5)    Recent labs: (last 7 days)     12/01/21  1247   INR 2.3*       ASSESSMENT     Source(s): Chart Review and Patient/Caregiver Call       Warfarin doses taken: Warfarin taken as instructed    Diet: No new diet changes identified    New illness, injury, or hospitalization: No    Medication/supplement changes: None noted    Signs or symptoms of bleeding or clotting: No    Previous INR: Therapeutic last 2(+) visits    Additional findings: None     PLAN     Recommended plan for no diet, medication or health factor changes affecting INR     Dosing Instructions: Continue your current warfarin dose with next INR in 4 weeks       Summary  As of 12/1/2021    Full warfarin instructions:  1.25 mg every Sun, Tue, Fri; 2.5 mg all other days   Next INR check:  12/29/2021             Telephone call with Tucker who agrees to plan and repeated back plan correctly    Lab visit scheduled    Education provided: Please call back if any changes to your diet, medications or how you've been taking warfarin    Plan made per [facility] anticoagulation protocol    Ivory Machado RN  Anticoagulation Clinic  12/1/2021    _______________________________________________________________________     Anticoagulation Episode Summary     Current INR goal:  2.0-2.5   TTR:  70.1 % (1 y)   Target end date:  Indefinite   Send INR reminders to:  [facility]    Indications    Atrial fibrillation (H) with mitral regurgitation and congestive heart failure [I48.91]  Long term current use of anticoagulants with INR goal of 2.0-3.0 [Z79.01]  Permanent atrial fibrillation (H) [I48.21]           Comments:           Anticoagulation Care Providers     Provider Role Specialty Phone number    Kwadwo Winslow MD Referring Internal Medicine [phone]

## 2021-12-07 LAB
MDC_IDC_LEAD_IMPLANT_DT: NORMAL
MDC_IDC_LEAD_IMPLANT_DT: NORMAL
MDC_IDC_LEAD_LOCATION: NORMAL
MDC_IDC_LEAD_LOCATION: NORMAL
MDC_IDC_LEAD_LOCATION_DETAIL_1: NORMAL
MDC_IDC_LEAD_LOCATION_DETAIL_1: NORMAL
MDC_IDC_LEAD_MFG: NORMAL
MDC_IDC_LEAD_MFG: NORMAL
MDC_IDC_LEAD_MODEL: NORMAL
MDC_IDC_LEAD_MODEL: NORMAL
MDC_IDC_LEAD_POLARITY_TYPE: NORMAL
MDC_IDC_LEAD_POLARITY_TYPE: NORMAL
MDC_IDC_LEAD_SERIAL: NORMAL
MDC_IDC_LEAD_SERIAL: NORMAL
MDC_IDC_MSMT_BATTERY_DTM: NORMAL
MDC_IDC_MSMT_BATTERY_REMAINING_LONGEVITY: 103 MO
MDC_IDC_MSMT_BATTERY_REMAINING_PERCENTAGE: 80 %
MDC_IDC_MSMT_BATTERY_RRT_TRIGGER: NORMAL
MDC_IDC_MSMT_BATTERY_STATUS: NORMAL
MDC_IDC_MSMT_BATTERY_VOLTAGE: 2.93 V
MDC_IDC_MSMT_LEADCHNL_RV_IMPEDANCE_VALUE: 430 OHM
MDC_IDC_MSMT_LEADCHNL_RV_LEAD_CHANNEL_STATUS: NORMAL
MDC_IDC_MSMT_LEADCHNL_RV_PACING_THRESHOLD_AMPLITUDE: 1 V
MDC_IDC_MSMT_LEADCHNL_RV_PACING_THRESHOLD_PULSEWIDTH: 0.5 MS
MDC_IDC_MSMT_LEADCHNL_RV_SENSING_INTR_AMPL: 12 MV
MDC_IDC_PG_IMPLANT_DTM: NORMAL
MDC_IDC_PG_MFG: NORMAL
MDC_IDC_PG_MODEL: NORMAL
MDC_IDC_PG_SERIAL: NORMAL
MDC_IDC_PG_TYPE: NORMAL
MDC_IDC_SESS_CLINIC_NAME: NORMAL
MDC_IDC_SESS_DTM: NORMAL
MDC_IDC_SESS_REPROGRAMMED: NO
MDC_IDC_SESS_TYPE: NORMAL
MDC_IDC_SET_BRADY_HYSTRATE: 60 {BEATS}/MIN
MDC_IDC_SET_BRADY_LOWRATE: 70 {BEATS}/MIN
MDC_IDC_SET_BRADY_MAX_SENSOR_RATE: 120 {BEATS}/MIN
MDC_IDC_SET_BRADY_MODE: NORMAL
MDC_IDC_SET_LEADCHNL_RA_PACING_POLARITY: NORMAL
MDC_IDC_SET_LEADCHNL_RA_SENSING_POLARITY: NORMAL
MDC_IDC_SET_LEADCHNL_RV_PACING_AMPLITUDE: 1.25 V
MDC_IDC_SET_LEADCHNL_RV_PACING_ANODE_ELECTRODE_1: NORMAL
MDC_IDC_SET_LEADCHNL_RV_PACING_ANODE_LOCATION_1: NORMAL
MDC_IDC_SET_LEADCHNL_RV_PACING_CAPTURE_MODE: NORMAL
MDC_IDC_SET_LEADCHNL_RV_PACING_CATHODE_ELECTRODE_1: NORMAL
MDC_IDC_SET_LEADCHNL_RV_PACING_CATHODE_LOCATION_1: NORMAL
MDC_IDC_SET_LEADCHNL_RV_PACING_POLARITY: NORMAL
MDC_IDC_SET_LEADCHNL_RV_PACING_PULSEWIDTH: 0.5 MS
MDC_IDC_SET_LEADCHNL_RV_SENSING_ADAPTATION_MODE: NORMAL
MDC_IDC_SET_LEADCHNL_RV_SENSING_ANODE_ELECTRODE_1: NORMAL
MDC_IDC_SET_LEADCHNL_RV_SENSING_ANODE_LOCATION_1: NORMAL
MDC_IDC_SET_LEADCHNL_RV_SENSING_CATHODE_ELECTRODE_1: NORMAL
MDC_IDC_SET_LEADCHNL_RV_SENSING_CATHODE_LOCATION_1: NORMAL
MDC_IDC_SET_LEADCHNL_RV_SENSING_POLARITY: NORMAL
MDC_IDC_SET_LEADCHNL_RV_SENSING_SENSITIVITY: 2 MV
MDC_IDC_STAT_AT_DTM_END: NORMAL
MDC_IDC_STAT_AT_DTM_START: NORMAL
MDC_IDC_STAT_BRADY_DTM_END: NORMAL
MDC_IDC_STAT_BRADY_DTM_START: NORMAL
MDC_IDC_STAT_BRADY_RV_PERCENT_PACED: 97 %
MDC_IDC_STAT_CRT_DTM_END: NORMAL
MDC_IDC_STAT_CRT_DTM_START: NORMAL
MDC_IDC_STAT_HEART_RATE_DTM_END: NORMAL
MDC_IDC_STAT_HEART_RATE_DTM_START: NORMAL
MDC_IDC_STAT_HEART_RATE_VENTRICULAR_MAX: 180 {BEATS}/MIN
MDC_IDC_STAT_HEART_RATE_VENTRICULAR_MEAN: 77 {BEATS}/MIN
MDC_IDC_STAT_HEART_RATE_VENTRICULAR_MIN: 60 {BEATS}/MIN

## 2021-12-29 ENCOUNTER — ANTICOAGULATION THERAPY VISIT (OUTPATIENT)
Dept: ANTICOAGULATION | Facility: CLINIC | Age: 86
End: 2021-12-29

## 2021-12-29 ENCOUNTER — LAB (OUTPATIENT)
Dept: LAB | Facility: CLINIC | Age: 86
End: 2021-12-29
Payer: COMMERCIAL

## 2021-12-29 DIAGNOSIS — Z79.01 LONG TERM CURRENT USE OF ANTICOAGULANTS WITH INR GOAL OF 2.0-3.0: ICD-10-CM

## 2021-12-29 DIAGNOSIS — I48.21 PERMANENT ATRIAL FIBRILLATION (H): ICD-10-CM

## 2021-12-29 DIAGNOSIS — I48.91 ATRIAL FIBRILLATION (H): Primary | ICD-10-CM

## 2021-12-29 LAB — INR BLD: 2.5 (ref 0.9–1.1)

## 2021-12-29 PROCEDURE — 36416 COLLJ CAPILLARY BLOOD SPEC: CPT

## 2021-12-29 PROCEDURE — 85610 PROTHROMBIN TIME: CPT

## 2021-12-29 NOTE — PROGRESS NOTES
ANTICOAGULATION MANAGEMENT     Tucker Slater 89 year old male is on warfarin with therapeutic INR result. (Goal INR 2.0-2.5)    Recent labs: (last 7 days)     12/29/21  1333   INR 2.5*       ASSESSMENT     Source(s): Chart Review and Patient/Caregiver Call       Warfarin doses taken: Warfarin taken as instructed    Diet: No new diet changes identified    New illness, injury, or hospitalization: No    Medication/supplement changes: None noted    Signs or symptoms of bleeding or clotting: No    Previous INR: Therapeutic last 2(+) visits    Additional findings: None     PLAN     Recommended plan for no diet, medication or health factor changes affecting INR     Dosing Instructions: Continue your current warfarin dose with next INR in 4 weeks       Summary  As of 12/29/2021    Full warfarin instructions:  1.25 mg every Sun, Tue, Fri; 2.5 mg all other days   Next INR check:  1/26/2022             Telephone call with Tucker who agrees to plan and repeated back plan correctly    Lab visit scheduled    Education provided: Please call back if any changes to your diet, medications or how you've been taking warfarin    Plan made per [facility] anticoagulation protocol    Ivory Machado RN  Anticoagulation Clinic  12/29/2021    _______________________________________________________________________     Anticoagulation Episode Summary     Current INR goal:  2.0-2.5   TTR:  74.1 % (1 y)   Target end date:  Indefinite   Send INR reminders to:  [facility]    Indications    Atrial fibrillation (H) with mitral regurgitation and congestive heart failure [I48.91]  Long term current use of anticoagulants with INR goal of 2.0-3.0 [Z79.01]  Permanent atrial fibrillation (H) [I48.21]           Comments:           Anticoagulation Care Providers     Provider Role Specialty Phone number    Kwadwo Winslow MD Referring Internal Medicine [phone]

## 2021-12-29 NOTE — PROGRESS NOTES
Anticoagulation Management    Unable to reach Tucker today.    Today's INR result of 2.5 is therapeutic (goal INR of 2.0-2.5).  Result received from: Clinic Lab    Follow up required to confirm warfarin dose taken and assess for changes    Left message to continue current dose of warfarin 2.5 mg tonight.      Anticoagulation clinic to follow up    Ivory Machado RN

## 2022-01-17 ENCOUNTER — HOSPITAL ENCOUNTER (EMERGENCY)
Facility: CLINIC | Age: 87
Discharge: HOME | End: 2022-01-17
Attending: EMERGENCY MEDICINE | Admitting: EMERGENCY MEDICINE
Payer: COMMERCIAL

## 2022-01-17 ENCOUNTER — APPOINTMENT (OUTPATIENT)
Dept: CT IMAGING | Facility: CLINIC | Age: 87
End: 2022-01-17
Attending: EMERGENCY MEDICINE
Payer: COMMERCIAL

## 2022-01-17 VITALS
SYSTOLIC BLOOD PRESSURE: 151 MMHG | HEART RATE: 73 BPM | OXYGEN SATURATION: 97 % | RESPIRATION RATE: 20 BRPM | DIASTOLIC BLOOD PRESSURE: 106 MMHG | TEMPERATURE: 97.5 F

## 2022-01-17 DIAGNOSIS — S09.90XA CLOSED HEAD INJURY, INITIAL ENCOUNTER: ICD-10-CM

## 2022-01-17 DIAGNOSIS — S01.01XA LACERATION OF SCALP, INITIAL ENCOUNTER: ICD-10-CM

## 2022-01-17 PROCEDURE — 99284 EMERGENCY DEPT VISIT MOD MDM: CPT | Mod: 25

## 2022-01-17 PROCEDURE — 70450 CT HEAD/BRAIN W/O DYE: CPT

## 2022-01-17 PROCEDURE — 12002 RPR S/N/AX/GEN/TRNK2.6-7.5CM: CPT

## 2022-01-17 RX ORDER — LIDOCAINE HYDROCHLORIDE AND EPINEPHRINE 10; 10 MG/ML; UG/ML
INJECTION, SOLUTION INFILTRATION; PERINEURAL
Status: DISCONTINUED
Start: 2022-01-17 | End: 2022-01-17 | Stop reason: HOSPADM

## 2022-01-17 ASSESSMENT — ENCOUNTER SYMPTOMS
HEADACHES: 0
WOUND: 1
NECK PAIN: 0
ABDOMINAL PAIN: 0

## 2022-01-17 NOTE — ED TRIAGE NOTES
Patient presents to the ED following a fall. States fell backwards and hit head on a rack of books. Denies LOC, is on coumadin. Laceration to posterior head.

## 2022-01-17 NOTE — ED PROVIDER NOTES
History   Chief Complaint:  Fall     HPI   Tucker Slater is a 89 year old male with history of A-fib, hypertension and CKD who presents following a fall. Earlier today, the patient was in a book store when he tripped and fell backwards. He struck the back of his head on a rack of books and sustained a V shaped laceration. He did not experience any loss of consciousness. He denies any pain in his arms or legs, headache, neck pain, chest pain, abdominal pain or visual disturbances. Of note, he is on coumadin for A-fib. He has a pacemaker in place.     Review of Systems   Eyes: Negative for visual disturbance.   Cardiovascular: Negative for chest pain.   Gastrointestinal: Negative for abdominal pain.   Musculoskeletal: Negative for neck pain.        No arm or leg pain   Skin: Positive for wound.   Neurological: Negative for headaches.        No loss of consciousness   All other systems reviewed and are negative.    Allergies:  Merbromin  Morphine  Amlodipine    Medications:  Calcium carbonate  Coreg  Colace  Proscar  Cozaar  Maxzide  Coumadin     Past Medical History:    Unspecified hereditary and idiopathic peripheral neuropathy  Shoulder impingement  Renal disease  Prostate cancer  Persistent A-fib   Pacemaker in place  DAWSON  Abdominal hernia  Generalized osteoarthrosis  Hypertension  Carcinoma in situ of bladder  Chronic combined systolic and diastolic congestive heart failure  Anemia due to blood loss  GI bleed  CKD  Arthritis    Past Surgical History:    Total knee, bilateral  Tonsillectomy  Adenoidectomy   Pilonidal cyst  Inguinal hernia repair, bilateral  Release carpal tunnel, right  Implant pacemaker  Cystoscopy, transurethral resection prostate, combined  Cystoscopy, retrogrades, combined  Cystoscopy, fulgurate bleeders, evacuate clots, combined  Colonoscopy  Cardioversion  Arthroplasty hip, right  Cataract removal, bilateral    Family History:    Father: CAD  Mother: CAD    Social History:  The patient  was accompanied to the ED by EMS.    Physical Exam     Patient Vitals for the past 24 hrs:   BP Temp Pulse Resp SpO2   01/17/22 1636 (!) 154/96 97.5  F (36.4  C) 70 18 98 %       Physical Exam  VS: Reviewed per above  HENT: Mucous membranes moist, no nuchal rigidity.  4 cm V-shaped laceration to the occipital region.  No midline vertebral tenderness in the C/T/L-spine  EYES: sclera anicteric  CV: Rate as noted, regular rhythm.   RESP: Effort normal. Breath sounds are normal bilaterally.  GI: no tenderness/rebound/guarding, not distended.  NEURO: GCS 15, cranial nerves II through XII are intact, 5 out of 5 strength in all 4 extremities, sensation is intact light touch in all 4 extremities  MSK: No deformity of the extremities  SKIN: Warm and dry    Emergency Department Course   Imaging:  Head CT w/o contrast  1.  No acute intracranial process.  Reading per radiology    Procedures  Laceration Repair Procedure Note:    Performed by: Hudson Triplett MD  Consent given by: Patient who states understanding of the procedure being performed after discussing the risks, benefits and alternatives.    Preparation: Patient was prepped and draped in usual sterile fashion.  Irrigation solution: Saline  Body area: Occipital scalp  Laceration length: 4cm  Contamination: The wound is not contaminated.  Foreign bodies:none  Tendon involvement: none  Anesthesia: Local  Local anesthetic: Lidocaine     1%, with epinephrine  Anesthetic total: 4ml  Debridement: none  Skin closure: Closed with 8 Staples    Emergency Department Course:  Reviewed:  I reviewed nursing notes, vitals, past medical history and care everywhere    Assessments:  1641 I obtained history and examined the patient as noted above.     1749 I performed a laceration repair as noted above.     1810 I rechecked and updated the patient regarding the imaging results and the plan for care.     Disposition:  The patient was discharged to home.     Impression & Plan   Medical  Decision Making:  Patient presents to the ER for evaluation of scalp laceration after falling backwards and striking his head on a bookshelf.   On arrival vital signs are reassuring. On exam, traumatic injuries appear to be limited to the posterior scalp.  No signs of vertebral injury.  He is anticoagulated but neuro intact.  CT of the head does not show acute intracranial pathology.  We discussed the small and unlikely risk of delayed intracranial hemorrhage and reasons to return to the ER.  His laceration was stapled per procedure note above with plans for removal in 10 to 14 days.  Patient discharged in stable condition.    Diagnosis:    ICD-10-CM    1. Laceration of scalp, initial encounter  S01.01XA    2. Closed head injury, initial encounter  S09.90XA        Discharge Medications:  New Prescriptions    No medications on file       Scribe Disclosure:  I, Ilir Mcneal, am serving as a scribe at 4:45 PM on 1/17/2022 to document services personally performed by Hudson Triplett MD based on my observations and the provider's statements to me.      Hudson Triplett MD  01/17/22 0039

## 2022-01-18 ENCOUNTER — DOCUMENTATION ONLY (OUTPATIENT)
Dept: ANTICOAGULATION | Facility: CLINIC | Age: 87
End: 2022-01-18
Payer: COMMERCIAL

## 2022-01-18 NOTE — PROGRESS NOTES
ANTICOAGULATION  MANAGEMENT: Discharge Review    Tucker Slater chart reviewed for anticoagulation continuity of care    Emergency room visit on 1/17/22 for laceration of the scalp (s/p fall).    Discharge disposition: Home    Results:    No results for input(s): INR, [id number], F2, ALMWH, AAUFH in the last 168 hours.  Anticoagulation inpatient management:     not applicable     Anticoagulation discharge instructions:     Warfarin dosing: home regimen continued   Bridging: No   INR goal change: No      Medication changes affecting anticoagulation: Laceration required staples.      Additional factors affecting anticoagulation: No    Plan     No adjustment to anticoagulation plan needed    Recommended follow up is scheduled    No adjustment to Anticoagulation Calendar was required    Ivory Machado RN

## 2022-01-26 ENCOUNTER — LAB (OUTPATIENT)
Dept: LAB | Facility: CLINIC | Age: 87
End: 2022-01-26
Payer: COMMERCIAL

## 2022-01-26 ENCOUNTER — ANTICOAGULATION THERAPY VISIT (OUTPATIENT)
Dept: ANTICOAGULATION | Facility: CLINIC | Age: 87
End: 2022-01-26

## 2022-01-26 DIAGNOSIS — Z79.01 LONG TERM CURRENT USE OF ANTICOAGULANTS WITH INR GOAL OF 2.0-3.0: ICD-10-CM

## 2022-01-26 DIAGNOSIS — I48.91 ATRIAL FIBRILLATION (H): Primary | ICD-10-CM

## 2022-01-26 DIAGNOSIS — I48.21 PERMANENT ATRIAL FIBRILLATION (H): ICD-10-CM

## 2022-01-26 LAB — INR BLD: 2.6 (ref 0.9–1.1)

## 2022-01-26 PROCEDURE — 85610 PROTHROMBIN TIME: CPT

## 2022-01-26 PROCEDURE — 36416 COLLJ CAPILLARY BLOOD SPEC: CPT

## 2022-01-26 NOTE — PROGRESS NOTES
ANTICOAGULATION MANAGEMENT     Tucker Slater 89 year old male is on warfarin with supratherapeutic INR result. (Goal INR 2.0-2.5)    Recent labs: (last 7 days)     01/26/22  1345   INR 2.6*       ASSESSMENT     Source(s): Chart Review and Patient/Caregiver Call       Warfarin doses taken: Warfarin taken as instructed    Diet: No new diet changes identified    New illness, injury, or hospitalization: ER visit s/p fall.  Laceration to the head.  Will see PCP on Friday to have staples removed    Medication/supplement changes: None noted    Signs or symptoms of bleeding or clotting: Yes: bruising on buttox from fall    Previous INR: Therapeutic last 2(+) visits    Additional findings: None     PLAN     Recommended plan for no diet, medication or health factor changes affecting INR     Dosing Instructions: Continue your current warfarin dose with next INR in 4 weeks       Summary  As of 1/26/2022    Full warfarin instructions:  1.25 mg every Sun, Tue, Fri; 2.5 mg all other days   Next INR check:  2/23/2022             Telephone call with Tucker who agrees to plan and repeated back plan correctly    Lab visit scheduled    Education provided: Please call back if any changes to your diet, medications or how you've been taking warfarin    Plan made per [facility] anticoagulation protocol    Ivory Machado RN  Anticoagulation Clinic  1/26/2022    _______________________________________________________________________     Anticoagulation Episode Summary     Current INR goal:  2.0-2.5   TTR:  70.1 % (1 y)   Target end date:  Indefinite   Send INR reminders to:  [facility]    Indications    Atrial fibrillation (H) with mitral regurgitation and congestive heart failure [I48.91]  Long term current use of anticoagulants with INR goal of 2.0-3.0 [Z79.01]  Permanent atrial fibrillation (H) [I48.21]           Comments:           Anticoagulation Care Providers     Provider Role Specialty Phone number    Kwadwo Winslow  MD [facility] Internal Medicine [phone]

## 2022-01-28 ENCOUNTER — ANCILLARY PROCEDURE (OUTPATIENT)
Dept: GENERAL RADIOLOGY | Facility: CLINIC | Age: 87
End: 2022-01-28
Attending: INTERNAL MEDICINE
Payer: COMMERCIAL

## 2022-01-28 ENCOUNTER — OFFICE VISIT (OUTPATIENT)
Dept: INTERNAL MEDICINE | Facility: CLINIC | Age: 87
End: 2022-01-28
Payer: COMMERCIAL

## 2022-01-28 VITALS
HEART RATE: 76 BPM | WEIGHT: 186 LBS | BODY MASS INDEX: 26.63 KG/M2 | OXYGEN SATURATION: 96 % | SYSTOLIC BLOOD PRESSURE: 131 MMHG | DIASTOLIC BLOOD PRESSURE: 70 MMHG | TEMPERATURE: 97.8 F | HEIGHT: 70 IN

## 2022-01-28 DIAGNOSIS — S01.01XD LACERATION OF SCALP, SUBSEQUENT ENCOUNTER: ICD-10-CM

## 2022-01-28 DIAGNOSIS — M25.552 HIP PAIN, LEFT: ICD-10-CM

## 2022-01-28 DIAGNOSIS — Z91.81 STATUS POST FALL: Primary | ICD-10-CM

## 2022-01-28 PROCEDURE — 99213 OFFICE O/P EST LOW 20 MIN: CPT | Performed by: INTERNAL MEDICINE

## 2022-01-28 PROCEDURE — 73502 X-RAY EXAM HIP UNI 2-3 VIEWS: CPT | Mod: LT | Performed by: RADIOLOGY

## 2022-01-28 ASSESSMENT — MIFFLIN-ST. JEOR: SCORE: 1509.94

## 2022-01-28 NOTE — PROGRESS NOTES
"  Assessment & Plan     Status post fall  No neurologic symptoms, falls precautions, use cane     Laceration of scalp, subsequent encounter  Staples removed, healed satisfactory, some crusting no bleeding     Hip pain, left  Assess hip X rays ,no fracture on preliminary reading, use PRN Tylenol   - XR Hip Left 2-3 Views;              See Patient Instructions    Return in about 6 months (around 7/28/2022) for Physical Exam.    Kwadwo Winslow MD  [facility] MARCI Ceballos is a 89 year old who presents for the following health issues     HPI     ED/UC Followup:    [facility]ility[facility]  [facility]  Date of visit: 1/17/22  Reason for visit: Laceration of scalp, closed head injury  Current Status: improved      Patient is seen for a follow up visit.  Seen in ED after a fall , fell back work, injured the the left hip and back of the head.   Had scalp laceration. Has staples. Bleeding has stopped, healed. No significant pain, no HA. Had negative CT brain, he is on COumadin AC.   Has developed left hip hematoma and is painful with walking, bruised, able to walk with a cane.   Has history of atrial fibrillation. On anticoagulation with Coumadin and rate control medications. Asymptonatic - no chest pains , palpitations,  no side effects from medications.  Has h/o HTN. on medical treatment. BP has been controlled. No side effects from medications. No CP, HA, dizziness. good compliance with medications and low salt diet.    Review of Systems   Constitutional, HEENT, cardiovascular, pulmonary, gi and gu systems are negative, except as otherwise noted.      Objective    /70 (BP Location: Right arm, Patient Position: Sitting, Cuff Size: Adult Regular)   Pulse 76   Temp 97.8  F (36.6  C) (Tympanic)   Ht 1.778 m (5' 10\")   Wt 84.4 kg (186 lb)   SpO2 96%   BMI 26.69 kg/m    Body mass index is 26.69 kg/m .  Physical Exam   GENERAL: healthy, alert and no distress  NECK: no adenopathy, no asymmetry, " masses, or scars and thyroid normal to palpation  RESP: lungs clear to auscultation - no rales, rhonchi or wheezes  CV: regular rate and rhythm, normal S1 S2, no S3 or S4, no murmur, click or rub, no peripheral edema and peripheral pulses strong  ABDOMEN: soft, nontender, no hepatosplenomegaly, no masses and bowel sounds normal  MS: no gross musculoskeletal defects noted, no edema  Left buttock hematoma, descending to the lateral hip and posterior thigh   Normal hip ROM, pain in the area of the hematoma   Scalp laceration , posterior - parietal , healed, staple present     Lab on 01/26/2022   Component Date Value Ref Range Status     INR 01/26/2022 2.6* 0.9 - 1.1 Final

## 2022-02-23 ENCOUNTER — ANCILLARY PROCEDURE (OUTPATIENT)
Dept: CARDIOLOGY | Facility: CLINIC | Age: 87
End: 2022-02-23
Attending: INTERNAL MEDICINE
Payer: COMMERCIAL

## 2022-02-23 ENCOUNTER — ANTICOAGULATION THERAPY VISIT (OUTPATIENT)
Dept: ANTICOAGULATION | Facility: CLINIC | Age: 87
End: 2022-02-23

## 2022-02-23 ENCOUNTER — LAB (OUTPATIENT)
Dept: LAB | Facility: CLINIC | Age: 87
End: 2022-02-23

## 2022-02-23 DIAGNOSIS — Z79.01 LONG TERM CURRENT USE OF ANTICOAGULANTS WITH INR GOAL OF 2.0-3.0: ICD-10-CM

## 2022-02-23 DIAGNOSIS — I48.21 PERMANENT ATRIAL FIBRILLATION (H): ICD-10-CM

## 2022-02-23 DIAGNOSIS — I48.91 ATRIAL FIBRILLATION (H): Primary | ICD-10-CM

## 2022-02-23 DIAGNOSIS — Z95.0 CARDIAC PACEMAKER IN SITU: ICD-10-CM

## 2022-02-23 LAB — INR BLD: 2.6 (ref 0.9–1.1)

## 2022-02-23 PROCEDURE — 93294 REM INTERROG EVL PM/LDLS PM: CPT | Performed by: INTERNAL MEDICINE

## 2022-02-23 PROCEDURE — 36416 COLLJ CAPILLARY BLOOD SPEC: CPT

## 2022-02-23 PROCEDURE — 93296 REM INTERROG EVL PM/IDS: CPT | Performed by: INTERNAL MEDICINE

## 2022-02-23 PROCEDURE — 85610 PROTHROMBIN TIME: CPT

## 2022-02-23 NOTE — PROGRESS NOTES
ANTICOAGULATION MANAGEMENT     Tucker Slater 89 year old male is on warfarin with supratherapeutic INR result. (Goal INR 2.0-2.5)    Recent labs: (last 7 days)     02/23/22  1349   INR 2.6*       ASSESSMENT     Source(s): Chart Review and Patient/Caregiver Call       Warfarin doses taken: Warfarin taken as instructed    Diet: No new diet changes identified.  Plans to increase green veggies in his diet to help lower INR.    New illness, injury, or hospitalization: No    Medication/supplement changes: None noted    Signs or symptoms of bleeding or clotting: No    Previous INR: Supratherapeutic    Additional findings: will consider lowering maintenance dose with next INR check if diet changes to not lower INR     PLAN     Recommended plan for no diet, medication or health factor changes affecting INR     Dosing Instructions: Continue your current warfarin dose with next INR in 2 weeks       Summary  As of 2/23/2022    Full warfarin instructions:  1.25 mg every Sun, Tue, Fri; 2.5 mg all other days   Next INR check:  3/9/2022             Telephone call with Tucker's wife, Alba (consent to communicate on file) who agrees to plan and repeated back plan correctly    Lab visit scheduled    Education provided: Please call back if any changes to your diet, medications or how you've been taking warfarin and Monitoring for bleeding signs and symptoms    Plan made per [facility] anticoagulation protocol    Ivory Machado RN  Anticoagulation Clinic  2/23/2022    _______________________________________________________________________     Anticoagulation Episode Summary     Current INR goal:  2.0-2.5   TTR:  65.2 % (1 y)   Target end date:  Indefinite   Send INR reminders to:  [facility]    Indications    Atrial fibrillation (H) with mitral regurgitation and congestive heart failure [I48.91]  Long term current use of anticoagulants with INR goal of 2.0-3.0 [Z79.01]  Permanent atrial fibrillation (H) [I48.21]            Comments:           Anticoagulation Care Providers     Provider Role Specialty Phone number    Kwadwo Winslow MD Referring Internal Medicine [phone]

## 2022-02-24 LAB
MDC_IDC_LEAD_IMPLANT_DT: NORMAL
MDC_IDC_LEAD_IMPLANT_DT: NORMAL
MDC_IDC_LEAD_LOCATION: NORMAL
MDC_IDC_LEAD_LOCATION: NORMAL
MDC_IDC_LEAD_LOCATION_DETAIL_1: NORMAL
MDC_IDC_LEAD_LOCATION_DETAIL_1: NORMAL
MDC_IDC_LEAD_MFG: NORMAL
MDC_IDC_LEAD_MFG: NORMAL
MDC_IDC_LEAD_MODEL: NORMAL
MDC_IDC_LEAD_MODEL: NORMAL
MDC_IDC_LEAD_POLARITY_TYPE: NORMAL
MDC_IDC_LEAD_POLARITY_TYPE: NORMAL
MDC_IDC_LEAD_SERIAL: NORMAL
MDC_IDC_LEAD_SERIAL: NORMAL
MDC_IDC_MSMT_BATTERY_DTM: NORMAL
MDC_IDC_MSMT_BATTERY_REMAINING_LONGEVITY: 101 MO
MDC_IDC_MSMT_BATTERY_REMAINING_PERCENTAGE: 80 %
MDC_IDC_MSMT_BATTERY_RRT_TRIGGER: NORMAL
MDC_IDC_MSMT_BATTERY_STATUS: NORMAL
MDC_IDC_MSMT_BATTERY_VOLTAGE: 2.93 V
MDC_IDC_MSMT_LEADCHNL_RV_IMPEDANCE_VALUE: 400 OHM
MDC_IDC_MSMT_LEADCHNL_RV_LEAD_CHANNEL_STATUS: NORMAL
MDC_IDC_MSMT_LEADCHNL_RV_PACING_THRESHOLD_AMPLITUDE: 0.75 V
MDC_IDC_MSMT_LEADCHNL_RV_PACING_THRESHOLD_PULSEWIDTH: 0.5 MS
MDC_IDC_MSMT_LEADCHNL_RV_SENSING_INTR_AMPL: 12 MV
MDC_IDC_PG_IMPLANT_DTM: NORMAL
MDC_IDC_PG_MFG: NORMAL
MDC_IDC_PG_MODEL: NORMAL
MDC_IDC_PG_SERIAL: NORMAL
MDC_IDC_PG_TYPE: NORMAL
MDC_IDC_SESS_CLINIC_NAME: NORMAL
MDC_IDC_SESS_DTM: NORMAL
MDC_IDC_SESS_REPROGRAMMED: NO
MDC_IDC_SESS_TYPE: NORMAL
MDC_IDC_SET_BRADY_HYSTRATE: 60 {BEATS}/MIN
MDC_IDC_SET_BRADY_LOWRATE: 70 {BEATS}/MIN
MDC_IDC_SET_BRADY_MAX_SENSOR_RATE: 120 {BEATS}/MIN
MDC_IDC_SET_BRADY_MODE: NORMAL
MDC_IDC_SET_LEADCHNL_RA_PACING_POLARITY: NORMAL
MDC_IDC_SET_LEADCHNL_RA_SENSING_POLARITY: NORMAL
MDC_IDC_SET_LEADCHNL_RV_PACING_AMPLITUDE: 1 V
MDC_IDC_SET_LEADCHNL_RV_PACING_ANODE_ELECTRODE_1: NORMAL
MDC_IDC_SET_LEADCHNL_RV_PACING_ANODE_LOCATION_1: NORMAL
MDC_IDC_SET_LEADCHNL_RV_PACING_CAPTURE_MODE: NORMAL
MDC_IDC_SET_LEADCHNL_RV_PACING_CATHODE_ELECTRODE_1: NORMAL
MDC_IDC_SET_LEADCHNL_RV_PACING_CATHODE_LOCATION_1: NORMAL
MDC_IDC_SET_LEADCHNL_RV_PACING_POLARITY: NORMAL
MDC_IDC_SET_LEADCHNL_RV_PACING_PULSEWIDTH: 0.5 MS
MDC_IDC_SET_LEADCHNL_RV_SENSING_ADAPTATION_MODE: NORMAL
MDC_IDC_SET_LEADCHNL_RV_SENSING_ANODE_ELECTRODE_1: NORMAL
MDC_IDC_SET_LEADCHNL_RV_SENSING_ANODE_LOCATION_1: NORMAL
MDC_IDC_SET_LEADCHNL_RV_SENSING_CATHODE_ELECTRODE_1: NORMAL
MDC_IDC_SET_LEADCHNL_RV_SENSING_CATHODE_LOCATION_1: NORMAL
MDC_IDC_SET_LEADCHNL_RV_SENSING_POLARITY: NORMAL
MDC_IDC_SET_LEADCHNL_RV_SENSING_SENSITIVITY: 2 MV
MDC_IDC_STAT_AT_DTM_END: NORMAL
MDC_IDC_STAT_AT_DTM_START: NORMAL
MDC_IDC_STAT_BRADY_DTM_END: NORMAL
MDC_IDC_STAT_BRADY_DTM_START: NORMAL
MDC_IDC_STAT_BRADY_RV_PERCENT_PACED: 99 %
MDC_IDC_STAT_CRT_DTM_END: NORMAL
MDC_IDC_STAT_CRT_DTM_START: NORMAL
MDC_IDC_STAT_HEART_RATE_DTM_END: NORMAL
MDC_IDC_STAT_HEART_RATE_DTM_START: NORMAL
MDC_IDC_STAT_HEART_RATE_VENTRICULAR_MAX: 180 {BEATS}/MIN
MDC_IDC_STAT_HEART_RATE_VENTRICULAR_MEAN: 77 {BEATS}/MIN
MDC_IDC_STAT_HEART_RATE_VENTRICULAR_MIN: 60 {BEATS}/MIN

## 2022-03-09 ENCOUNTER — LAB (OUTPATIENT)
Dept: LAB | Facility: CLINIC | Age: 87
End: 2022-03-09
Payer: COMMERCIAL

## 2022-03-09 ENCOUNTER — ANTICOAGULATION THERAPY VISIT (OUTPATIENT)
Dept: ANTICOAGULATION | Facility: CLINIC | Age: 87
End: 2022-03-09

## 2022-03-09 DIAGNOSIS — I48.21 PERMANENT ATRIAL FIBRILLATION (H): ICD-10-CM

## 2022-03-09 DIAGNOSIS — Z79.01 LONG TERM CURRENT USE OF ANTICOAGULANTS WITH INR GOAL OF 2.0-3.0: ICD-10-CM

## 2022-03-09 DIAGNOSIS — Z79.01 LONG TERM CURRENT USE OF ANTICOAGULANTS WITH INR GOAL OF 2.0-3.0: Primary | ICD-10-CM

## 2022-03-09 LAB — INR BLD: 2.3 (ref 0.9–1.1)

## 2022-03-09 PROCEDURE — 85610 PROTHROMBIN TIME: CPT

## 2022-03-09 PROCEDURE — 36416 COLLJ CAPILLARY BLOOD SPEC: CPT

## 2022-03-09 NOTE — PROGRESS NOTES
Left message to call [phone].     ANTICOAGULATION MANAGEMENT     Tucker Slater 89 year old male is on warfarin with therapeutic INR result. (Goal INR 2.0-2.5)    Recent labs: (last 7 days)     03/09/22  1340   INR 2.3*       ASSESSMENT       Source(s): Chart Review    Previous INR was Supratherapeutic    Medication, diet, health changes since last INR chart reviewed; none identified           PLAN     Unable to reach Tucker today.    Left message to continue current dose of warfarin 2.5 mg tonight. Request call back for assessment.    Follow up required to confirm warfarin dose taken and assess for changes    Gail Valdovinos RN  Anticoagulation Clinic  3/9/2022

## 2022-03-10 NOTE — PROGRESS NOTES
ANTICOAGULATION MANAGEMENT     Tucker Slater 89 year old male is on warfarin with therapeutic INR result. (Goal INR 2.0-2.5)    Recent labs: (last 7 days)     03/09/22  1340   INR 2.3*       ASSESSMENT       Source(s): Chart Review and Patient/Caregiver Call       Warfarin doses taken: Warfarin taken as instructed    Diet: Increased greens/vitamin K in diet; ongoing change    New illness, injury, or hospitalization: No    Medication/supplement changes: None noted    Signs or symptoms of bleeding or clotting: No    Previous INR: Supratherapeutic    Additional findings: None       PLAN     Recommended plan for no diet, medication or health factor changes affecting INR     Dosing Instructions: Continue your current warfarin dose with next INR in 3 weeks       Summary  As of 3/9/2022    Full warfarin instructions:  1.25 mg every Sun, Tue, Fri; 2.5 mg all other days   Next INR check:  3/30/2022             Telephone call with Tucker who verbalizes understanding and agrees to plan    Lab visit scheduled    Education provided: Please call back if any changes to your diet, medications or how you've been taking warfarin and Contact [phone]  with any changes, questions or concerns.     Plan made per [facility] anticoagulation protocol    Gail Valdovinos RN  Anticoagulation Clinic  3/10/2022    _______________________________________________________________________     Anticoagulation Episode Summary     Current INR goal:  2.0-2.5   TTR:  67.2 % (1 y)   Target end date:  Indefinite   [facility] reminders [facility]:  [facility]    Indications    Atrial fibrillation (H) with mitral regurgitation and congestive heart failure [I48.91]  Long term current use of anticoagulants with INR goal of 2.0-3.0 [Z79.01]  Permanent atrial fibrillation (H) [I48.21]           Comments:           Anticoagulation Care Providers     Provider Role Specialty Phone number    Kwadwo Winslow MD Referring Internal Medicine [phone]

## 2022-03-30 ENCOUNTER — LAB (OUTPATIENT)
Dept: LAB | Facility: CLINIC | Age: 87
End: 2022-03-30
Payer: COMMERCIAL

## 2022-03-30 ENCOUNTER — ANTICOAGULATION THERAPY VISIT (OUTPATIENT)
Dept: ANTICOAGULATION | Facility: CLINIC | Age: 87
End: 2022-03-30

## 2022-03-30 DIAGNOSIS — Z79.01 LONG TERM CURRENT USE OF ANTICOAGULANTS WITH INR GOAL OF 2.0-3.0: Primary | ICD-10-CM

## 2022-03-30 DIAGNOSIS — Z79.01 LONG TERM CURRENT USE OF ANTICOAGULANTS WITH INR GOAL OF 2.0-3.0: ICD-10-CM

## 2022-03-30 DIAGNOSIS — I48.21 PERMANENT ATRIAL FIBRILLATION (H): ICD-10-CM

## 2022-03-30 LAB — INR BLD: 3 (ref 0.9–1.1)

## 2022-03-30 PROCEDURE — 85610 PROTHROMBIN TIME: CPT

## 2022-03-30 PROCEDURE — 36416 COLLJ CAPILLARY BLOOD SPEC: CPT

## 2022-03-30 NOTE — PROGRESS NOTES
ANTICOAGULATION MANAGEMENT     Tucker Slater 89 year old male is on warfarin with supratherapeutic INR result. (Goal INR 2.0-2.5)    Recent labs: (last 7 days)     03/30/22  1348   INR 3.0*       ASSESSMENT       Source(s): Chart Review    Previous INR was Therapeutic last 2(+) visits    Medication, diet, health changes since last INR chart reviewed; none identified           PLAN     Unable to reach Tucker today. Left message for patient to take 1.25 mg warfarin  3/30/22 and to return call to [facility] nurse. Left message to call [phone].       Gail Valdovinos RN  Anticoagulation Clinic  3/30/2022    _______________________________________________________________________     Anticoagulation Episode Summary     Current INR goal:  2.0-2.5   TTR:  63.2 % (1 y)   Target end date:  Indefinite   Send INR reminders to:  [facility]    Indications    Atrial fibrillation (H) with mitral regurgitation and congestive heart failure [I48.91]  Long term current use of anticoagulants with INR goal of 2.0-3.0 [Z79.01]  Permanent atrial fibrillation (H) [I48.21]           Comments:           Anticoagulation Care Providers     Provider Role Specialty Phone number    Kwadwo Winslow MD Referring Internal Medicine [phone]

## 2022-03-30 NOTE — PROGRESS NOTES
ANTICOAGULATION MANAGEMENT     Tucker Slater 89 year old male is on warfarin with supratherapeutic INR result. (Goal INR 2.0-2.5)    Recent labs: (last 7 days)     03/30/22  1348   INR 3.0*       ASSESSMENT       Source(s): Chart Review and Patient/Caregiver Call       Warfarin doses taken: Warfarin taken as instructed    Diet: Decreased greens/vitamin K in diet; ongoing change patient reports has not had any green veggies but also reports he didn't really eat many green veggies anyway    New illness, injury, or hospitalization: No    Medication/supplement changes: None noted    Signs or symptoms of bleeding or clotting: No    Previous INR: Therapeutic last visit; previously outside of goal range    Additional findings: None       PLAN     Recommended plan for ongoing change(s) affecting INR     Dosing Instructions: decrease your warfarin dose (9.1% change) with next INR in 2 weeks       Summary  As of 3/30/2022    Full warfarin instructions:  3/30: 1.25 mg; Otherwise 2.5 mg every Mon, Thu, Sat; 1.25 mg all other days   Next INR check:  4/13/2022             Telephone call with Tucker who agrees to plan and repeated back plan correctly    Lab visit scheduled    Education provided: Please call back if any changes to your diet, medications or how you've been taking warfarin and Contact [phone]  with any changes, questions or concerns.     Plan made per [facility] anticoagulation protocol    Gail Valdovinos RN  Anticoagulation Clinic  3/30/2022    _______________________________________________________________________     Anticoagulation Episode Summary     Current INR goal:  2.0-2.5   TTR:  63.2 % (1 y)   Target end date:  Indefinite   Send INR reminders to:  Pending sale to [facility]    Indications    Atrial fibrillation (H) with mitral regurgitation and congestive heart failure [I48.91]  Long term current use of anticoagulants with INR goal of 2.0-3.0 [Z79.01]  Permanent atrial fibrillation (H) [I48.21]            Comments:           Anticoagulation Care Providers     Provider Role Specialty Phone number    Kwadwo Winslow MD Referring Internal Medicine [phone]

## 2022-04-07 DIAGNOSIS — I10 ESSENTIAL HYPERTENSION WITH GOAL BLOOD PRESSURE LESS THAN 140/90: ICD-10-CM

## 2022-04-07 DIAGNOSIS — N40.1 HYPERTROPHY OF PROSTATE WITH URINARY OBSTRUCTION: ICD-10-CM

## 2022-04-07 DIAGNOSIS — N13.8 HYPERTROPHY OF PROSTATE WITH URINARY OBSTRUCTION: ICD-10-CM

## 2022-04-08 RX ORDER — LOSARTAN POTASSIUM 50 MG/1
TABLET ORAL
Qty: 90 TABLET | Refills: 0 | Status: SHIPPED | OUTPATIENT
Start: 2022-04-08 | End: 2022-07-07

## 2022-04-08 RX ORDER — FINASTERIDE 5 MG/1
TABLET, FILM COATED ORAL
Qty: 90 TABLET | Refills: 2 | Status: SHIPPED | OUTPATIENT
Start: 2022-04-08 | End: 2023-01-05

## 2022-04-08 NOTE — TELEPHONE ENCOUNTER
Routing refill request to provider for review/approval because:  Creatinine   Date Value Ref Range Status   11/03/2021 1.41 (H) 0.66 - 1.25 mg/dL Final   04/21/2021 1.61 (H) 0.66 - 1.25 mg/dL Final      Nely Burkett RN

## 2022-04-13 ENCOUNTER — DOCUMENTATION ONLY (OUTPATIENT)
Dept: ANTICOAGULATION | Facility: CLINIC | Age: 87
End: 2022-04-13

## 2022-04-13 ENCOUNTER — LAB (OUTPATIENT)
Dept: LAB | Facility: CLINIC | Age: 87
End: 2022-04-13
Payer: COMMERCIAL

## 2022-04-13 ENCOUNTER — ANTICOAGULATION THERAPY VISIT (OUTPATIENT)
Dept: ANTICOAGULATION | Facility: CLINIC | Age: 87
End: 2022-04-13

## 2022-04-13 DIAGNOSIS — Z79.01 LONG TERM CURRENT USE OF ANTICOAGULANTS WITH INR GOAL OF 2.0-3.0: ICD-10-CM

## 2022-04-13 DIAGNOSIS — I48.91 ATRIAL FIBRILLATION (H): Primary | ICD-10-CM

## 2022-04-13 DIAGNOSIS — I48.21 PERMANENT ATRIAL FIBRILLATION (H): ICD-10-CM

## 2022-04-13 DIAGNOSIS — I48.21 PERMANENT ATRIAL FIBRILLATION (H): Primary | ICD-10-CM

## 2022-04-13 LAB — INR BLD: 2 (ref 0.9–1.1)

## 2022-04-13 PROCEDURE — 85610 PROTHROMBIN TIME: CPT

## 2022-04-13 PROCEDURE — 36416 COLLJ CAPILLARY BLOOD SPEC: CPT

## 2022-04-13 NOTE — PROGRESS NOTES
ANTICOAGULATION MANAGEMENT      Tucker CECE Slater due for annual renewal of referral to anticoagulation monitoring. Order pended for your review and signature.      ANTICOAGULATION SUMMARY      Warfarin indication(s)     Atrial fibrillation    Heart valve present?  NO       Current goal range   INR: 2.0-2.5     Goal appropriate for indication? Goal of 2.0-2.5 verified with Dr. Winslow , on 8/2/19. due to hematuria     Current duration of therapy Indefinite/long term therapy   Time in Therapeutic Range (TTR)  (Goal > 60%) 61.5%       Office visit with referring provider's group within last year yes on 1/28/22       Ivory Machado RN

## 2022-04-13 NOTE — PROGRESS NOTES
ANTICOAGULATION MANAGEMENT     Tucker Slater 89 year old male is on warfarin with therapeutic INR result. (Goal INR 2.0-2.5)    Recent labs: (last 7 days)     04/13/22  1325   INR 2.0*       ASSESSMENT       Source(s): Chart Review    Previous INR was Supratherapeutic.  Maintenance dose was lowered at last check.    Medication, diet, health changes since last INR chart reviewed; none identified           PLAN     Recommended plan for no diet, medication or health factor changes affecting INR     Dosing Instructions: continue your current warfarin dose with next INR in 3 weeks       Summary  As of 4/13/2022    Full warfarin instructions:  2.5 mg every Mon, Thu, Sat; 1.25 mg all other days   Next INR check:  5/4/2022             Detailed voice message left for Tucker with dosing instructions and follow up date.     Contact [phone]  to schedule and with any changes, questions or concerns.     Education provided: Please call back if any changes to your diet, medications or how you've been taking warfarin and Contact [phone]  with any changes, questions or concerns.     Plan made per [facility] anticoagulation protocol    Ivory Machado RN  Anticoagulation Clinic  4/13/2022    _______________________________________________________________________     Anticoagulation Episode Summary     Current INR goal:  2.0-2.5   TTR:  61.2 % (1 y)   Target end date:  Indefinite   Send INR reminders to:  [facility]    Indications    Atrial fibrillation (H) with mitral regurgitation and congestive heart failure [I48.91]  Long term current use of anticoagulants with INR goal of 2.0-3.0 [Z79.01]  Permanent atrial fibrillation (H) [I48.21]           Comments:           Anticoagulation Care Providers     Provider Role Specialty Phone number    Kwadwo Winslow MD Referring Internal Medicine [phone]

## 2022-04-18 NOTE — PROGRESS NOTES
14 DAY STM VISIT    Diagnostic AHI:  60.5  PSG    Message left for patient to return call     Assessment: Pt meeting objective benchmarks.     Action plan: waiting for patient to return call  and pt to have 30 day STM visit.    Device type: Auto-CPAP  PAP settings:CPAP fixed 5.0 cm  H20  Mask type: Nasal Pillows  Objective measures: 14 day rolling measures      Compliance  100 %      Leak  25.6 lpm  last  upload      AHI 6.83   last  upload      Average number of minutes 448     Average hours of usage 7.5          Objective measure goal  Compliance   Goal >70%  Leak   Goal < 24 lpm  AHI  Goal < 5  Usage  Goal >240   Additional Notes: He has not received COVID vaccinations Quality 226: Preventive Care And Screening: Tobacco Use: Screening And Cessation Intervention: Patient screened for tobacco use and is an ex/non-smoker Detail Level: Detailed Quality 111:Pneumonia Vaccination Status For Older Adults: Pneumococcal Vaccination not Administered or Previously Received, Reason not Otherwise Specified Quality 402: Tobacco Use And Help With Quitting Among Adolescents: Patient screened for tobacco and never smoked Quality 431: Preventive Care And Screening: Unhealthy Alcohol Use - Screening: Patient not identified as an unhealthy alcohol user when screened for unhealthy alcohol use using a systematic screening method Quality 110: Preventive Care And Screening: Influenza Immunization: Influenza Immunization not Administered because Patient Refused.

## 2022-05-04 ENCOUNTER — ANTICOAGULATION THERAPY VISIT (OUTPATIENT)
Dept: ANTICOAGULATION | Facility: CLINIC | Age: 87
End: 2022-05-04

## 2022-05-04 ENCOUNTER — LAB (OUTPATIENT)
Dept: LAB | Facility: CLINIC | Age: 87
End: 2022-05-04
Payer: COMMERCIAL

## 2022-05-04 DIAGNOSIS — I48.21 PERMANENT ATRIAL FIBRILLATION (H): ICD-10-CM

## 2022-05-04 DIAGNOSIS — Z79.01 LONG TERM CURRENT USE OF ANTICOAGULANTS WITH INR GOAL OF 2.0-3.0: ICD-10-CM

## 2022-05-04 DIAGNOSIS — I48.91 ATRIAL FIBRILLATION (H): Primary | ICD-10-CM

## 2022-05-04 LAB — INR BLD: 2.9 (ref 0.9–1.1)

## 2022-05-04 PROCEDURE — 85610 PROTHROMBIN TIME: CPT

## 2022-05-04 PROCEDURE — 36416 COLLJ CAPILLARY BLOOD SPEC: CPT

## 2022-05-04 NOTE — PROGRESS NOTES
ANTICOAGULATION MANAGEMENT     Tucker Slater 89 year old male is on warfarin with supratherapeutic INR result. (Goal INR 2.0-2.5)    Recent labs: (last 7 days)     05/04/22  1305   INR 2.9*       ASSESSMENT       Source(s): Chart Review    Previous INR was Therapeutic last visit; previously outside of goal range    Medication, diet, health changes since last INR chart reviewed; none identified           PLAN     Unable to reach Tukcer today.    Left message to hold warfarin tonight. Request call back for assessment.    Follow up required to confirm warfarin dose taken and assess for changes    Ivory Machado RN  Anticoagulation Clinic  5/4/2022

## 2022-05-05 NOTE — PROGRESS NOTES
ANTICOAGULATION MANAGEMENT     Tucker Slater 89 year old male is on warfarin with supratherapeutic INR result. (Goal INR 2.0-2.5)    Recent labs: (last 7 days)     05/04/22  1305   INR 2.9*       ASSESSMENT       Source(s): Chart Review and Patient/Caregiver Call       Warfarin doses taken: More warfarin taken than planned which may be affecting INR.  Patient thinks that he may have accidentally doubled up on his dose on 4/29.  Held last evening as instructed.    Diet: No new diet changes identified    New illness, injury, or hospitalization: No    Medication/supplement changes: None noted    Signs or symptoms of bleeding or clotting: No    Previous INR: Therapeutic last visit; previously outside of goal range    Additional findings: None       PLAN     Recommended plan for temporary change(s) affecting INR     Dosing Instructions: hold dose then continue your current warfarin dose with next INR in 2 weeks       Summary  As of 5/4/2022    Full warfarin instructions:  5/4: Hold; Otherwise 2.5 mg every Mon, Thu, Sat; 1.25 mg all other days   Next INR check:  5/17/2022             Telephone call with uTcker who agrees to plan and repeated back plan correctly    INR to be checked at provider visit on 5/17/22    Education provided: Please call back if any changes to your diet, medications or how you've been taking warfarin and Monitoring for bleeding signs and symptoms    Plan made per [facility] anticoagulation protocol    Ivory Machado RN  Anticoagulation Clinic  5/5/2022    _______________________________________________________________________     Anticoagulation Episode Summary     Current INR goal:  2.0-2.5   TTR:  58.6 % (1 y)   Target end date:  Indefinite   Send INR reminders to:  [facility]    Indications    Atrial fibrillation (H) with mitral regurgitation and congestive heart failure [I48.91]  Long term current use of anticoagulants with INR goal of 2.0-3.0 [Z79.01]  Permanent atrial  fibrillation (H) [I48.21]           Comments:           Anticoagulation Care Providers     Provider Role Specialty Phone number    Kwadwo Winslow MD Referring Internal Medicine [phone]

## 2022-05-17 ENCOUNTER — ANTICOAGULATION THERAPY VISIT (OUTPATIENT)
Dept: ANTICOAGULATION | Facility: CLINIC | Age: 87
End: 2022-05-17

## 2022-05-17 ENCOUNTER — OFFICE VISIT (OUTPATIENT)
Dept: INTERNAL MEDICINE | Facility: CLINIC | Age: 87
End: 2022-05-17
Payer: COMMERCIAL

## 2022-05-17 VITALS
BODY MASS INDEX: 25.88 KG/M2 | SYSTOLIC BLOOD PRESSURE: 120 MMHG | HEIGHT: 70 IN | WEIGHT: 180.8 LBS | TEMPERATURE: 97.6 F | HEART RATE: 70 BPM | RESPIRATION RATE: 14 BRPM | DIASTOLIC BLOOD PRESSURE: 60 MMHG | OXYGEN SATURATION: 95 %

## 2022-05-17 DIAGNOSIS — R41.3 MEMORY DEFICIT: ICD-10-CM

## 2022-05-17 DIAGNOSIS — N18.31 STAGE 3A CHRONIC KIDNEY DISEASE (H): ICD-10-CM

## 2022-05-17 DIAGNOSIS — I48.21 PERMANENT ATRIAL FIBRILLATION (H): ICD-10-CM

## 2022-05-17 DIAGNOSIS — Z12.5 SCREENING FOR PROSTATE CANCER: ICD-10-CM

## 2022-05-17 DIAGNOSIS — I48.91 ATRIAL FIBRILLATION (H): Primary | ICD-10-CM

## 2022-05-17 DIAGNOSIS — E55.9 VITAMIN D DEFICIENCY: ICD-10-CM

## 2022-05-17 DIAGNOSIS — I10 ESSENTIAL HYPERTENSION WITH GOAL BLOOD PRESSURE LESS THAN 140/90: ICD-10-CM

## 2022-05-17 DIAGNOSIS — Z00.00 ENCOUNTER FOR PREVENTATIVE ADULT HEALTH CARE EXAMINATION: Primary | ICD-10-CM

## 2022-05-17 DIAGNOSIS — Z13.220 SCREENING FOR HYPERLIPIDEMIA: ICD-10-CM

## 2022-05-17 DIAGNOSIS — Z79.01 LONG TERM CURRENT USE OF ANTICOAGULANTS WITH INR GOAL OF 2.0-3.0: ICD-10-CM

## 2022-05-17 PROBLEM — I50.42 CHRONIC COMBINED SYSTOLIC AND DIASTOLIC CONGESTIVE HEART FAILURE (H): Status: RESOLVED | Noted: 2021-11-03 | Resolved: 2022-05-17

## 2022-05-17 LAB
ALBUMIN UR-MCNC: 30 MG/DL
APPEARANCE UR: CLEAR
BACTERIA #/AREA URNS HPF: ABNORMAL /HPF
BILIRUB UR QL STRIP: NEGATIVE
COLOR UR AUTO: ABNORMAL
ERYTHROCYTE [DISTWIDTH] IN BLOOD BY AUTOMATED COUNT: 14 % (ref 10–15)
GLUCOSE UR STRIP-MCNC: NEGATIVE MG/DL
HCT VFR BLD AUTO: 40.9 % (ref 40–53)
HGB BLD-MCNC: 13.2 G/DL (ref 13.3–17.7)
HGB UR QL STRIP: NEGATIVE
HYALINE CASTS #/AREA URNS LPF: ABNORMAL /LPF
INR BLD: 2.5 (ref 0.9–1.1)
KETONES UR STRIP-MCNC: ABNORMAL MG/DL
LEUKOCYTE ESTERASE UR QL STRIP: NEGATIVE
MCH RBC QN AUTO: 31.4 PG (ref 26.5–33)
MCHC RBC AUTO-ENTMCNC: 32.3 G/DL (ref 31.5–36.5)
MCV RBC AUTO: 97 FL (ref 78–100)
NITRATE UR QL: NEGATIVE
PH UR STRIP: 5.5 [PH] (ref 5–7)
PLATELET # BLD AUTO: 179 10E3/UL (ref 150–450)
RBC # BLD AUTO: 4.21 10E6/UL (ref 4.4–5.9)
RBC #/AREA URNS AUTO: ABNORMAL /HPF
SP GR UR STRIP: 1.02 (ref 1–1.03)
UROBILINOGEN UR STRIP-ACNC: 0.2 E.U./DL
VIT B12 SERPL-MCNC: 486 PG/ML (ref 193–986)
WBC # BLD AUTO: 5.6 10E3/UL (ref 4–11)
WBC #/AREA URNS AUTO: ABNORMAL /HPF

## 2022-05-17 PROCEDURE — 0013A PR COVID VAC MODERNA 100 MCG/0.5 ML IM: CPT | Performed by: INTERNAL MEDICINE

## 2022-05-17 PROCEDURE — 80061 LIPID PANEL: CPT | Performed by: INTERNAL MEDICINE

## 2022-05-17 PROCEDURE — 91301 PR COVID VAC MODERNA 100 MCG/0.5 ML IM: CPT | Performed by: INTERNAL MEDICINE

## 2022-05-17 PROCEDURE — 84443 ASSAY THYROID STIM HORMONE: CPT | Performed by: INTERNAL MEDICINE

## 2022-05-17 PROCEDURE — G0103 PSA SCREENING: HCPCS | Performed by: INTERNAL MEDICINE

## 2022-05-17 PROCEDURE — 82607 VITAMIN B-12: CPT | Performed by: INTERNAL MEDICINE

## 2022-05-17 PROCEDURE — 99397 PER PM REEVAL EST PAT 65+ YR: CPT | Mod: 25 | Performed by: INTERNAL MEDICINE

## 2022-05-17 PROCEDURE — 80053 COMPREHEN METABOLIC PANEL: CPT | Performed by: INTERNAL MEDICINE

## 2022-05-17 PROCEDURE — 85027 COMPLETE CBC AUTOMATED: CPT | Performed by: INTERNAL MEDICINE

## 2022-05-17 PROCEDURE — 85610 PROTHROMBIN TIME: CPT | Performed by: INTERNAL MEDICINE

## 2022-05-17 PROCEDURE — 36415 COLL VENOUS BLD VENIPUNCTURE: CPT | Performed by: INTERNAL MEDICINE

## 2022-05-17 PROCEDURE — 81001 URINALYSIS AUTO W/SCOPE: CPT | Performed by: INTERNAL MEDICINE

## 2022-05-17 PROCEDURE — 82306 VITAMIN D 25 HYDROXY: CPT | Performed by: INTERNAL MEDICINE

## 2022-05-17 ASSESSMENT — ENCOUNTER SYMPTOMS
PALPITATIONS: 0
HEMATURIA: 0
DIZZINESS: 0
SHORTNESS OF BREATH: 0
ARTHRALGIAS: 0
MYALGIAS: 0
CONSTIPATION: 0
DIARRHEA: 0
SORE THROAT: 0
COUGH: 0
FEVER: 0
HEARTBURN: 0
DYSURIA: 0
PARESTHESIAS: 0
FREQUENCY: 0
EYE PAIN: 0
HEADACHES: 0
JOINT SWELLING: 0
CHILLS: 0
ABDOMINAL PAIN: 0
WEAKNESS: 1
NAUSEA: 0
HEMATOCHEZIA: 0
NERVOUS/ANXIOUS: 0

## 2022-05-17 ASSESSMENT — ACTIVITIES OF DAILY LIVING (ADL): CURRENT_FUNCTION: NO ASSISTANCE NEEDED

## 2022-05-17 NOTE — PROGRESS NOTES
ANTICOAGULATION MANAGEMENT     Tucker Slater 89 year old male is on warfarin with therapeutic INR result. (Goal INR 2.0-2.5)    Recent labs: (last 7 days)     05/17/22  1535   INR 2.5*       ASSESSMENT       Source(s): Chart Review    Previous INR was Supratherapeutic    Medication, diet, health changes since last INR     Patient saw PCP today for annual wellness check.           PLAN     Unable to reach Tucker today.    Left message to continue current dose of warfarin 1.25 mg tonight. Request call back for assessment.    Follow up required to confirm warfarin dose taken and assess for changes    Chichi Wang RN  Anticoagulation Clinic  5/17/2022

## 2022-05-17 NOTE — LETTER
May 19, 2022      Tucker Slater  [address] [facility] [address]        Dear Tucker,    Slightly decreased kidney function and borderline anemia, stable.   Rest of the results are normal.         Sincerely,      Kwadwo Winslow MD      Results for orders placed or performed in visit on 05/17/22   Lipid panel reflex to direct LDL Non-fasting     Status: Abnormal   Result Value Ref Range    Cholesterol 198 <200 mg/dL    Triglycerides 170 (H) <150 mg/dL    Direct Measure HDL 42 >=40 mg/dL    LDL Cholesterol Calculated 122 (H) <=100 mg/dL    Non HDL Cholesterol 156 (H) <130 mg/dL    Patient Fasting > 8hrs? No     Narrative    Cholesterol  Desirable:  <200 mg/dL    Triglycerides  Normal:  Less than 150 mg/dL  Borderline High:  150-199 mg/dL  High:  200-499 mg/dL  Very High:  Greater than or equal to 500 mg/dL    Direct Measure HDL  Female:  Greater than or equal to 50 mg/dL   Male:  Greater than or equal to 40 mg/dL    LDL Cholesterol  Desirable:  <100mg/dL  Above Desirable:  100-129 mg/dL   Borderline High:  130-159 mg/dL   High:  160-189 mg/dL   Very High:  >= 190 mg/dL    Non HDL Cholesterol  Desirable:  130 mg/dL  Above Desirable:  130-159 mg/dL  Borderline High:  160-189 mg/dL  High:  190-219 mg/dL  Very High:  Greater than or equal to 220 mg/dL   CBC with Platelets     Status: Abnormal   Result Value Ref Range    WBC Count 5.6 4.0 - 11.0 10e3/uL    RBC Count 4.21 (L) 4.40 - 5.90 10e6/uL    Hemoglobin 13.2 (L) 13.3 - 17.7 g/dL    Hematocrit 40.9 40.0 - 53.0 %    MCV 97 78 - 100 fL    MCH 31.4 26.5 - 33.0 pg    MCHC 32.3 31.5 - 36.5 g/dL    RDW 14.0 10.0 - 15.0 %    Platelet Count 179 150 - 450 10e3/uL   Vitamin B12     Status: Normal   Result Value Ref Range    Vitamin B12 486 193 - 986 pg/mL   Vitamin D Deficiency     Status: Normal   Result Value Ref Range    Vitamin D, Total (25-Hydroxy) 54 20 - 75 ug/L    Narrative    Season, race, dietary intake, and treatment affect the concentration of  25-hydroxy-Vitamin D. Values may decrease during winter months and increase during summer months. Values 20-29 ug/L may indicate Vitamin D insufficiency and values <20 ug/L may indicate Vitamin D deficiency.    Vitamin D determination is routinely performed by an immunoassay specific for 25 hydroxyvitamin D3.  If an individual is on vitamin D2(ergocalciferol) supplementation, please specify 25 OH vitamin D2 and D3 level determination by LCMSMS test [id number].     Comprehensive metabolic panel (BMP + Alb, Alk Phos, ALT, AST, Total. Bili, TP)     Status: Abnormal   Result Value Ref Range    Sodium 140 133 - 144 mmol/L    Potassium 4.4 3.4 - 5.3 mmol/L    Chloride 107 94 - 109 mmol/L    Carbon Dioxide (CO2) 26 20 - 32 mmol/L    Anion Gap 7 3 - 14 mmol/L    Urea Nitrogen 38 (H) 7 - 30 mg/dL    Creatinine 1.44 (H) 0.66 - 1.25 mg/dL    Calcium 9.5 8.5 - 10.1 mg/dL    Glucose 89 70 - 99 mg/dL    Alkaline Phosphatase 62 40 - 150 U/L    AST 28 0 - 45 U/L    ALT 31 0 - 70 U/L    Protein Total 7.4 6.8 - 8.8 g/dL    Albumin 3.5 3.4 - 5.0 g/dL    Bilirubin Total 0.6 0.2 - 1.3 mg/dL    GFR Estimate 46 (L) >60 mL/min/1.73m2   TSH with free T4 reflex     Status: Normal   Result Value Ref Range    TSH 1.34 0.40 - 4.00 mU/L   PSA, screen     Status: Normal   Result Value Ref Range    Prostate Specific Antigen Screen 0.28 0.00 - 4.00 ug/L    Narrative    Assay Method:  Chemiluminescence using Siemens   Vista analyzer.   UA with Microscopic reflex to Culture - lab collect     Status: Abnormal    Specimen: Urine, Midstream   Result Value Ref Range    Color Urine Dark Yellow (A) Colorless, Straw, Light Yellow, Yellow    Appearance Urine Clear Clear    Glucose Urine Negative Negative mg/dL    Bilirubin Urine Negative Negative    Ketones Urine Trace (A) Negative mg/dL    Specific Gravity Urine 1.020 1.003 - 1.035    Blood Urine Negative Negative    pH Urine 5.5 5.0 - 7.0    Protein Albumin Urine 30  (A) Negative mg/dL    Urobilinogen Urine  0.2 0.2, 1.0 E.U./dL    Nitrite Urine Negative Negative    Leukocyte Esterase Urine Negative Negative   INR point of care     Status: Abnormal   Result Value Ref Range    INR 2.5 (H) 0.9 - 1.1    Narrative    This test is intended for monitoring Coumadin therapy. Results are not accurate in patients with prolonged INR due to factor deficiency.   Urine Microscopic     Status: Abnormal   Result Value Ref Range    Bacteria Urine Few (A) None Seen /HPF    RBC Urine 0-2 0-2 /HPF /HPF    WBC Urine 0-5 0-5 /HPF /HPF    Hyaline Casts Urine 2-5 (A) None Seen /LPF    Narrative    Urine Culture not indicated

## 2022-05-17 NOTE — PROGRESS NOTES
"SUBJECTIVE:   Tucker Slater is a 89 year old male who presents for Preventive Visit.      Patient has been advised of split billing requirements and indicates understanding: Yes  Are you in the first 12 months of your Medicare coverage?  No    Healthy Habits:     In general, how would you rate your overall health?  Good    Frequency of exercise:  1 day/week    Duration of exercise:  Less than 15 minutes    Do you usually eat at least 4 servings of fruit and vegetables a day, include whole grains    & fiber and avoid regularly eating high fat or \"junk\" foods?  No    Taking medications regularly:  Yes    Medication side effects:  None    Ability to successfully perform activities of daily living:  No assistance needed    Home Safety:  No safety concerns identified    Hearing Impairment:  No hearing concerns    In the past 6 months, have you been bothered by leaking of urine? Yes    In general, how would you rate your overall mental or emotional health?  Excellent      PHQ-2 Total Score: 0    Additional concerns today:  No    Do you feel safe in your environment? Yes    Have you ever done Advance Care Planning? (For example, a Health Directive, POLST, or a discussion with a medical provider or your loved ones about your wishes): Yes, patient states has an Advance Care Planning document and will bring a copy to the clinic.       Fall risk  Fallen 2 or more times in the past year?: No  Any fall with injury in the past year?: No  click delete button to remove this line now  Cognitive Screening   1) Repeat 3 items (Leader, Season, Table)    2) Clock draw: NORMAL  3) 3 item recall: Recalls 3 objects  Results: 3 items recalled: COGNITIVE IMPAIRMENT LESS LIKELY    Mini-CogTM Copyright FATOU Johnson. Licensed by the author for use in [facility]; reprinted with permission (eduardo@.[facility]). All rights reserved.      Do you have sleep apnea, excessive snoring or daytime drowsiness?: yes    Reviewed and updated as " needed this visit by clinical staff                    Reviewed and updated as needed this visit by Provider                   Social History     Tobacco Use     Smoking status: Former Smoker     Quit date: 1977     Years since quittin.7     Smokeless tobacco: Never Used   Substance Use Topics     Alcohol use: Yes     Alcohol/week: 0.0 standard drinks     Comment: 1 beer daily     If you drink alcohol do you typically have >3 drinks per day or >7 drinks per week? Yes      Alcohol Use 2021   Prescreen: >3 drinks/day or >7 drinks/week? No   Prescreen: >3 drinks/day or >7 drinks/week? -     AUDIT - Alcohol Use Disorders Identification Test - Reproduced from the World Health Organization Audit 2001 (Second Edition) 2022   1.  How often do you have a drink containing alcohol? 4 or more times a week   2.  How many drinks containing alcohol do you have on a typical day when you are drinking? 7 to 9   3.  How often do you have five or more drinks on one occasion? Never   4.  How often during the last year have you found that you were not able to stop drinking once you had started? Never   5.  How often during the last year have you failed to do what was normally expected of you because of drinking? Never   6.  How often during the last year have you needed a first drink in the morning to get yourself going after a heavy drinking session? Never   7.  How often during the last year have you had a feeling of guilt or remorse after drinking? Never   8.  How often during the last year have you been unable to remember what happened the night before because of your drinking? Never   9.  Have you or someone else been injured because of your drinking? No   10. Has a relative, friend, doctor or other health care worker been concerned about your drinking or suggested you cut down? No   TOTAL SCORE 7           PROBLEMS TO ADD ON...  Has h/o HTN. on medical treatment. BP has been controlled. No side effects from  medications. No CP, HA, dizziness. good compliance with medications and low salt diet.  Has history of atrial fibrillation. On anticoagulation with Coumadin and rate control medications. Asymptonatic - no chest pains , palpitations,  no side effects from medications.  Has H/O BPH. On treatment with Proscar . Has chronic symptoms of frequency, decreased urinary stream , occasional leaking of urine. No side effects from medications.      Current providers sharing in care for this patient include:   Patient Care Team:  Kwadwo Winslow MD as PCP - General (Internal Medicine)  Kwadwo Winslow MD as Assigned PCP  Ilir Ramirez MD as MD (Urology)  Aleena Perez MD as Assigned Heart and Vascular Provider  Candy Amin PA-C as Assigned Sleep Provider    The following health maintenance items are reviewed in Epic and correct as of today:  Health Maintenance Due   Topic Date Due     HF ACTION PLAN  Never done     ANNUAL REVIEW OF HM ORDERS  Never done     ZOSTER IMMUNIZATION (1 of 2) Never done     MICROALBUMIN  09/12/2018     COVID-19 Vaccine (3 - Booster for Moderna series) 09/08/2021     PHQ-2 (once per calendar year)  01/01/2022     MEDICARE ANNUAL WELLNESS VISIT  04/05/2022     ALT  04/05/2022     LIPID  04/05/2022     FALL RISK ASSESSMENT  04/05/2022     CBC  04/05/2022     BMP  05/03/2022     HEMOGLOBIN  04/05/2022     Lab work is in process  Labs reviewed in EPIC          Review of Systems   Constitutional: Negative for chills and fever.   HENT: Negative for congestion, ear pain, hearing loss and sore throat.    Eyes: Negative for pain and visual disturbance.   Respiratory: Negative for cough and shortness of breath.    Cardiovascular: Negative for chest pain, palpitations and peripheral edema.   Gastrointestinal: Negative for abdominal pain, constipation, diarrhea, heartburn, hematochezia and nausea.   Genitourinary: Positive for impotence. Negative for dysuria, frequency,  "genital sores, hematuria and penile discharge.   Musculoskeletal: Negative for arthralgias, joint swelling and myalgias.   Skin: Positive for rash.   Neurological: Positive for weakness. Negative for dizziness, headaches and paresthesias.   Psychiatric/Behavioral: Negative for mood changes. The patient is not nervous/anxious.          OBJECTIVE:   There were no vitals taken for this visit. Estimated body mass index is 26.69 kg/m  as calculated from the following:    Height as of 1/28/22: 1.778 m (5' 10\").    Weight as of 1/28/22: 84.4 kg (186 lb).  Physical Exam  GENERAL:frail, alert and no distress  EYES: Eyes grossly normal to inspection, PERRL and conjunctivae and sclerae normal  HENT: ear canals and TM's normal, nose and mouth without ulcers or lesions  NECK: no adenopathy, no asymmetry, masses, or scars and thyroid normal to palpation  RESP: lungs clear to auscultation - no rales, rhonchi or wheezes  CV: regular rate and rhythm, normal S1 S2, no S3 or S4, no murmur, click or rub, no peripheral edema and peripheral pulses strong  ABDOMEN: soft, nontender, no hepatosplenomegaly, no masses and bowel sounds normal  MS: no gross musculoskeletal defects noted, no edema  SKIN: no suspicious lesions or rashes, AK lesions on the face and scalp   NEURO: Normal strength and tone, mentation intact and speech normal  PSYCH: mentation appears normal, affect normal/bright    Diagnostic Test Results:  Labs reviewed in Epic    ASSESSMENT / PLAN:       ICD-10-CM    1. Encounter for preventative adult health care examination  Z00.00 CBC with Platelets     Vitamin B12     Vitamin D Deficiency     INR point of care     CBC with platelets     Comprehensive metabolic panel (BMP + Alb, Alk Phos, ALT, AST, Total. Bili, TP)     TSH with free T4 reflex     PSA, screen     UA with Microscopic reflex to Culture - lab collect   2. Screening for hyperlipidemia  Z13.220 Lipid panel reflex to direct LDL Non-fasting   3. Essential hypertension " "with goal blood pressure less than 140/90  I10 CBC with Platelets     CBC with platelets     Comprehensive metabolic panel (BMP + Alb, Alk Phos, ALT, AST, Total. Bili, TP)     TSH with free T4 reflex     UA with Microscopic reflex to Culture - lab collect   4. Permanent atrial fibrillation (H)  I48.21 INR point of care   5. Screening for prostate cancer  Z12.5 PSA, screen   6. Vitamin D deficiency  E55.9 Vitamin D Deficiency   7. Memory deficit  R41.3 Vitamin B12   8. Stage 3a chronic kidney disease (H)  N18.31        Patient has been advised of split billing requirements and indicates understanding: Yes    COUNSELING:  Reviewed preventive health counseling, as reflected in patient instructions       Regular exercise       Healthy diet/nutrition       Vision screening       Hearing screening       Prostate cancer screening    Estimated body mass index is 26.69 kg/m  as calculated from the following:    Height as of 1/28/22: 1.778 m (5' 10\").    Weight as of 1/28/22: 84.4 kg (186 lb).        He reports that he quit smoking about 44 years ago. He has never used smokeless tobacco.      Appropriate preventive services were discussed with this patient, including applicable screening as appropriate for cardiovascular disease, diabetes, osteopenia/osteoporosis, and glaucoma.  As appropriate for age/gender, discussed screening for colorectal cancer, prostate cancer, breast cancer, and cervical cancer. Checklist reviewing preventive services available has been given to the patient.    Reviewed patients plan of care and provided an AVS. The Intermediate Care Plan ( asthma action plan, low back pain action plan, and migraine action plan) for Tucker meets the Care Plan requirement. This Care Plan has been established and reviewed with the Patient.    Counseling Resources:  ATP IV Guidelines  Pooled Cohorts Equation Calculator  Breast Cancer Risk Calculator  Breast Cancer: Medication to Reduce Risk  FRAX Risk Assessment  ICSI " Preventive Guidelines  Dietary Guidelines for Americans, 2010  USDA's MyPlate  ASA Prophylaxis  Lung CA Screening    Kwadwo Winslow MD  [facility]    Identified Health Risks:

## 2022-05-18 LAB
ALBUMIN SERPL-MCNC: 3.5 G/DL (ref 3.4–5)
ALP SERPL-CCNC: 62 U/L (ref 40–150)
ALT SERPL W P-5'-P-CCNC: 31 U/L (ref 0–70)
ANION GAP SERPL CALCULATED.3IONS-SCNC: 7 MMOL/L (ref 3–14)
AST SERPL W P-5'-P-CCNC: 28 U/L (ref 0–45)
BILIRUB SERPL-MCNC: 0.6 MG/DL (ref 0.2–1.3)
BUN SERPL-MCNC: 38 MG/DL (ref 7–30)
CALCIUM SERPL-MCNC: 9.5 MG/DL (ref 8.5–10.1)
CHLORIDE BLD-SCNC: 107 MMOL/L (ref 94–109)
CHOLEST SERPL-MCNC: 198 MG/DL
CO2 SERPL-SCNC: 26 MMOL/L (ref 20–32)
CREAT SERPL-MCNC: 1.44 MG/DL (ref 0.66–1.25)
DEPRECATED CALCIDIOL+CALCIFEROL SERPL-MC: 54 UG/L (ref 20–75)
FASTING STATUS PATIENT QL REPORTED: NO
GFR SERPL CREATININE-BSD FRML MDRD: 46 ML/MIN/1.73M2
GLUCOSE BLD-MCNC: 89 MG/DL (ref 70–99)
HDLC SERPL-MCNC: 42 MG/DL
LDLC SERPL CALC-MCNC: 122 MG/DL
NONHDLC SERPL-MCNC: 156 MG/DL
POTASSIUM BLD-SCNC: 4.4 MMOL/L (ref 3.4–5.3)
PROT SERPL-MCNC: 7.4 G/DL (ref 6.8–8.8)
PSA SERPL-MCNC: 0.28 UG/L (ref 0–4)
SODIUM SERPL-SCNC: 140 MMOL/L (ref 133–144)
TRIGL SERPL-MCNC: 170 MG/DL
TSH SERPL DL<=0.005 MIU/L-ACNC: 1.34 MU/L (ref 0.4–4)

## 2022-05-18 NOTE — PROGRESS NOTES
Patient is returning the call, he is heading out for some appointments. Please try to call him this afternoon anytime after 3:15pm.  Marleni Nettles   [facility]  Central Scheduler

## 2022-05-18 NOTE — PROGRESS NOTES
ANTICOAGULATION MANAGEMENT     Tucker Slater 89 year old male is on warfarin with therapeutic INR result. (Goal INR 2.0-2.5)    Recent labs: (last 7 days)     05/17/22  1535   INR 2.5*       ASSESSMENT       Source(s): Chart Review       Warfarin doses taken: Warfarin taken as instructed    Diet: No new diet changes identified    New illness, injury, or hospitalization: No    Medication/supplement changes: None noted    Signs or symptoms of bleeding or clotting: No    Previous INR: Supratherapeutic    Additional findings: None       PLAN     Recommended plan for no diet, medication or health factor changes affecting INR     Dosing Instructions: continue your current warfarin dose with next INR in 3 weeks       Summary  As of 5/17/2022    Full warfarin instructions:  2.5 mg every Mon, Thu, Sat; 1.25 mg all other days   Next INR check:  6/7/2022             Telephone call with Tucker who verbalizes understanding and agrees to plan    Lab visit scheduled    Education provided: Please call back if any changes to your diet, medications or how you've been taking warfarin and Contact [phone]  with any changes, questions or concerns.     Plan made per [facility] anticoagulation protocol    Gail Valdovinos RN  Anticoagulation Clinic  5/18/2022    _______________________________________________________________________     Anticoagulation Episode Summary     Current INR goal:  2.0-2.5   TTR:  55.0 % (1 y)   Target end date:  Indefinite   Send INR reminders to:  [facility]    Indications    Atrial fibrillation (H) with mitral regurgitation and congestive heart failure [I48.91]  Long term current use of anticoagulants with INR goal of 2.0-3.0 [Z79.01]  Permanent atrial fibrillation (H) [I48.21]           Comments:           Anticoagulation Care Providers     Provider Role Specialty Phone number    Kwadwo Winslow MD Referring Internal Medicine [phone]

## 2022-05-25 ENCOUNTER — ANCILLARY PROCEDURE (OUTPATIENT)
Dept: CARDIOLOGY | Facility: CLINIC | Age: 87
End: 2022-05-25
Attending: INTERNAL MEDICINE
Payer: COMMERCIAL

## 2022-05-25 DIAGNOSIS — I49.5 SICK SINUS SYNDROME (H): ICD-10-CM

## 2022-05-25 DIAGNOSIS — Z95.0 CARDIAC PACEMAKER IN SITU: Primary | ICD-10-CM

## 2022-05-25 DIAGNOSIS — Z95.0 CARDIAC PACEMAKER IN SITU: ICD-10-CM

## 2022-05-25 PROCEDURE — 93296 REM INTERROG EVL PM/IDS: CPT | Performed by: INTERNAL MEDICINE

## 2022-05-25 PROCEDURE — 93294 REM INTERROG EVL PM/LDLS PM: CPT | Performed by: INTERNAL MEDICINE

## 2022-05-26 LAB
MDC_IDC_LEAD_IMPLANT_DT: NORMAL
MDC_IDC_LEAD_IMPLANT_DT: NORMAL
MDC_IDC_LEAD_LOCATION: NORMAL
MDC_IDC_LEAD_LOCATION: NORMAL
MDC_IDC_LEAD_LOCATION_DETAIL_1: NORMAL
MDC_IDC_LEAD_LOCATION_DETAIL_1: NORMAL
MDC_IDC_LEAD_MFG: NORMAL
MDC_IDC_LEAD_MFG: NORMAL
MDC_IDC_LEAD_MODEL: NORMAL
MDC_IDC_LEAD_MODEL: NORMAL
MDC_IDC_LEAD_POLARITY_TYPE: NORMAL
MDC_IDC_LEAD_POLARITY_TYPE: NORMAL
MDC_IDC_LEAD_SERIAL: NORMAL
MDC_IDC_LEAD_SERIAL: NORMAL
MDC_IDC_MSMT_BATTERY_DTM: NORMAL
MDC_IDC_MSMT_BATTERY_REMAINING_LONGEVITY: 92 MO
MDC_IDC_MSMT_BATTERY_REMAINING_PERCENTAGE: 72 %
MDC_IDC_MSMT_BATTERY_RRT_TRIGGER: NORMAL
MDC_IDC_MSMT_BATTERY_STATUS: NORMAL
MDC_IDC_MSMT_BATTERY_VOLTAGE: 2.92 V
MDC_IDC_MSMT_LEADCHNL_RV_IMPEDANCE_VALUE: 430 OHM
MDC_IDC_MSMT_LEADCHNL_RV_LEAD_CHANNEL_STATUS: NORMAL
MDC_IDC_MSMT_LEADCHNL_RV_PACING_THRESHOLD_AMPLITUDE: 0.75 V
MDC_IDC_MSMT_LEADCHNL_RV_PACING_THRESHOLD_PULSEWIDTH: 0.5 MS
MDC_IDC_MSMT_LEADCHNL_RV_SENSING_INTR_AMPL: 12 MV
MDC_IDC_PG_IMPLANT_DTM: NORMAL
MDC_IDC_PG_MFG: NORMAL
MDC_IDC_PG_MODEL: NORMAL
MDC_IDC_PG_SERIAL: NORMAL
MDC_IDC_PG_TYPE: NORMAL
MDC_IDC_SESS_CLINIC_NAME: NORMAL
MDC_IDC_SESS_DTM: NORMAL
MDC_IDC_SESS_REPROGRAMMED: NO
MDC_IDC_SESS_TYPE: NORMAL
MDC_IDC_SET_BRADY_HYSTRATE: 60 {BEATS}/MIN
MDC_IDC_SET_BRADY_LOWRATE: 70 {BEATS}/MIN
MDC_IDC_SET_BRADY_MAX_SENSOR_RATE: 120 {BEATS}/MIN
MDC_IDC_SET_BRADY_MODE: NORMAL
MDC_IDC_SET_LEADCHNL_RA_PACING_POLARITY: NORMAL
MDC_IDC_SET_LEADCHNL_RA_SENSING_POLARITY: NORMAL
MDC_IDC_SET_LEADCHNL_RV_PACING_AMPLITUDE: 1 V
MDC_IDC_SET_LEADCHNL_RV_PACING_ANODE_ELECTRODE_1: NORMAL
MDC_IDC_SET_LEADCHNL_RV_PACING_ANODE_LOCATION_1: NORMAL
MDC_IDC_SET_LEADCHNL_RV_PACING_CAPTURE_MODE: NORMAL
MDC_IDC_SET_LEADCHNL_RV_PACING_CATHODE_ELECTRODE_1: NORMAL
MDC_IDC_SET_LEADCHNL_RV_PACING_CATHODE_LOCATION_1: NORMAL
MDC_IDC_SET_LEADCHNL_RV_PACING_POLARITY: NORMAL
MDC_IDC_SET_LEADCHNL_RV_PACING_PULSEWIDTH: 0.5 MS
MDC_IDC_SET_LEADCHNL_RV_SENSING_ADAPTATION_MODE: NORMAL
MDC_IDC_SET_LEADCHNL_RV_SENSING_ANODE_ELECTRODE_1: NORMAL
MDC_IDC_SET_LEADCHNL_RV_SENSING_ANODE_LOCATION_1: NORMAL
MDC_IDC_SET_LEADCHNL_RV_SENSING_CATHODE_ELECTRODE_1: NORMAL
MDC_IDC_SET_LEADCHNL_RV_SENSING_CATHODE_LOCATION_1: NORMAL
MDC_IDC_SET_LEADCHNL_RV_SENSING_POLARITY: NORMAL
MDC_IDC_SET_LEADCHNL_RV_SENSING_SENSITIVITY: 2 MV
MDC_IDC_STAT_AT_DTM_END: NORMAL
MDC_IDC_STAT_AT_DTM_START: NORMAL
MDC_IDC_STAT_BRADY_DTM_END: NORMAL
MDC_IDC_STAT_BRADY_DTM_START: NORMAL
MDC_IDC_STAT_BRADY_RV_PERCENT_PACED: 99 %
MDC_IDC_STAT_CRT_DTM_END: NORMAL
MDC_IDC_STAT_CRT_DTM_START: NORMAL
MDC_IDC_STAT_HEART_RATE_DTM_END: NORMAL
MDC_IDC_STAT_HEART_RATE_DTM_START: NORMAL
MDC_IDC_STAT_HEART_RATE_VENTRICULAR_MAX: 200 {BEATS}/MIN
MDC_IDC_STAT_HEART_RATE_VENTRICULAR_MEAN: 77 {BEATS}/MIN
MDC_IDC_STAT_HEART_RATE_VENTRICULAR_MIN: 60 {BEATS}/MIN

## 2022-06-01 DIAGNOSIS — I10 ESSENTIAL HYPERTENSION WITH GOAL BLOOD PRESSURE LESS THAN 140/90: ICD-10-CM

## 2022-06-03 DIAGNOSIS — I10 ESSENTIAL HYPERTENSION WITH GOAL BLOOD PRESSURE LESS THAN 140/90: ICD-10-CM

## 2022-06-03 RX ORDER — LOSARTAN POTASSIUM 50 MG/1
TABLET ORAL
Qty: 90 TABLET | Refills: 0 | OUTPATIENT
Start: 2022-06-03

## 2022-06-03 NOTE — TELEPHONE ENCOUNTER
Routing refill request to provider for review/approval because:  Labs out of range:        Medication refused, losartan filled on 4/8/22 for 90 day supply.

## 2022-06-05 RX ORDER — TRIAMTERENE/HYDROCHLOROTHIAZID 37.5-25 MG
TABLET ORAL
Qty: 90 TABLET | Refills: 0 | Status: SHIPPED | OUTPATIENT
Start: 2022-06-05 | End: 2022-08-30

## 2022-06-07 ENCOUNTER — ANTICOAGULATION THERAPY VISIT (OUTPATIENT)
Dept: ANTICOAGULATION | Facility: CLINIC | Age: 87
End: 2022-06-07

## 2022-06-07 ENCOUNTER — LAB (OUTPATIENT)
Dept: LAB | Facility: CLINIC | Age: 87
End: 2022-06-07
Payer: COMMERCIAL

## 2022-06-07 DIAGNOSIS — I48.21 PERMANENT ATRIAL FIBRILLATION (H): ICD-10-CM

## 2022-06-07 DIAGNOSIS — Z79.01 LONG TERM CURRENT USE OF ANTICOAGULANTS WITH INR GOAL OF 2.0-3.0: Primary | ICD-10-CM

## 2022-06-07 LAB — INR BLD: 2 (ref 0.9–1.1)

## 2022-06-07 PROCEDURE — 85610 PROTHROMBIN TIME: CPT

## 2022-06-07 PROCEDURE — 36416 COLLJ CAPILLARY BLOOD SPEC: CPT

## 2022-06-07 RX ORDER — LOSARTAN POTASSIUM 50 MG/1
TABLET ORAL
Qty: 90 TABLET | Refills: 0 | OUTPATIENT
Start: 2022-06-07

## 2022-06-07 NOTE — TELEPHONE ENCOUNTER
Losartan (Cozaar) 50 mg tablet  Medication filled for 90 days on 4/8/22 to same pharmacy. Refill requested too soon.

## 2022-06-07 NOTE — PROGRESS NOTES
ANTICOAGULATION MANAGEMENT     Tucker Slater 89 year old male is on warfarin with therapeutic INR result. (Goal INR 2.0-2.5)    Recent labs: (last 7 days)     06/07/22  1335   INR 2.0*       ASSESSMENT       Source(s): Chart Review and Patient/Caregiver Call       Warfarin doses taken: Warfarin taken as instructed    Diet: No new diet changes identified    New illness, injury, or hospitalization: No    Medication/supplement changes: None noted    Signs or symptoms of bleeding or clotting: No    Previous INR: Therapeutic last visit; previously outside of goal range    Additional findings: None       PLAN     Recommended plan for no diet, medication or health factor changes affecting INR     Dosing Instructions: continue your current warfarin dose with next INR in 3 weeks       Summary  As of 6/7/2022    Full warfarin instructions:  2.5 mg every Mon, Thu, Sat; 1.25 mg all other days   Next INR check:  6/28/2022             Telephone call with Tucker who agrees to plan and repeated back plan correctly    Lab visit scheduled    Education provided: Please call back if any changes to your diet, medications or how you've been taking warfarin and Contact [phone]  with any changes, questions or concerns.     Plan made per [facility] anticoagulation protocol    Gail Valdovinos RN  Anticoagulation Clinic  6/7/2022    _______________________________________________________________________     Anticoagulation Episode Summary     Current INR goal:  2.0-2.5   TTR:  55.1 % (1 y)   Target end date:  Indefinite   Send INR reminders to:  [facility]    Indications    Atrial fibrillation (H) with mitral regurgitation and congestive heart failure [I48.91]  Long term current use of anticoagulants with INR goal of 2.0-3.0 [Z79.01]  Permanent atrial fibrillation (H) [I48.21]           Comments:           Anticoagulation Care Providers     Provider Role Specialty Phone number    Kwadwo Winslow MD Referring Internal  Medicine [phone]

## 2022-06-08 ENCOUNTER — CARE COORDINATION (OUTPATIENT)
Dept: SLEEP MEDICINE | Facility: CLINIC | Age: 87
End: 2022-06-08
Payer: COMMERCIAL

## 2022-06-08 NOTE — PROGRESS NOTES
Faxed signed CMN for oxygen therapy to [facility] FAX:  [phone].   Copy to HIM for scan into [facility].

## 2022-06-22 DIAGNOSIS — Z79.01 LONG TERM CURRENT USE OF ANTICOAGULANT THERAPY: ICD-10-CM

## 2022-06-22 DIAGNOSIS — I48.21 PERMANENT ATRIAL FIBRILLATION (H): ICD-10-CM

## 2022-06-22 RX ORDER — WARFARIN SODIUM 2.5 MG/1
TABLET ORAL
Qty: 90 TABLET | Refills: 1 | Status: SHIPPED | OUTPATIENT
Start: 2022-06-22 | End: 2023-01-23

## 2022-06-22 RX ORDER — WARFARIN SODIUM 2.5 MG/1
TABLET ORAL
Qty: 90 TABLET | Refills: 0 | Status: CANCELLED | OUTPATIENT
Start: 2022-06-22

## 2022-06-22 NOTE — TELEPHONE ENCOUNTER
Pending Prescriptions:                       Disp   Refills    warfarin ANTICOAGULANT (COUMADIN) 2.5 MG t*90 tab*0        Sig: TAKE 1/2 (ONE-HALF) TABLET BY MOUTH ON SUN, TUE, FRI           AND 1 TABLET ON MON, WED, THUR, SAT OR AS           DIRECTED BY INR CLINIC

## 2022-06-22 NOTE — TELEPHONE ENCOUNTER
ANTICOAGULATION MANAGEMENT:  Medication Refill    Anticoagulation Summary  As of 6/7/2022    Warfarin maintenance plan:  2.5 mg (2.5 mg x 1) every Mon, Thu, Sat; 1.25 mg (2.5 mg x 0.5) all other days   Next INR check:  6/28/2022   Target end date:  Indefinite    Indications    Atrial fibrillation (H) with mitral regurgitation and congestive heart failure [I48.91]  Long term current use of anticoagulants with INR goal of 2.0-3.0 [Z79.01]  Permanent atrial fibrillation (H) [I48.21]             Anticoagulation Care Providers     Provider Role Specialty Phone number    Kwadwo Winslow MD Referring Internal Medicine [phone]          Visit with referring provider/group within last year: Yes    [facility] referral signed within last year: Yes    Tucker meets all criteria for refill (current [facility] referral, office visit with referring provider/group in last year, lab monitoring up to date or not exceeding 2 weeks overdue). Rx instructions and quantity supplied updated to match patient's current dosing plan. Warfarin 90 day supply with 1 refill granted per [facility] protocol     Ivory Machado RN  Anticoagulation Clinic

## 2022-06-28 ENCOUNTER — ANTICOAGULATION THERAPY VISIT (OUTPATIENT)
Dept: ANTICOAGULATION | Facility: CLINIC | Age: 87
End: 2022-06-28

## 2022-06-28 ENCOUNTER — LAB (OUTPATIENT)
Dept: LAB | Facility: CLINIC | Age: 87
End: 2022-06-28
Payer: COMMERCIAL

## 2022-06-28 DIAGNOSIS — I48.21 PERMANENT ATRIAL FIBRILLATION (H): ICD-10-CM

## 2022-06-28 DIAGNOSIS — I48.91 ATRIAL FIBRILLATION (H): Primary | ICD-10-CM

## 2022-06-28 DIAGNOSIS — Z79.01 LONG TERM CURRENT USE OF ANTICOAGULANTS WITH INR GOAL OF 2.0-3.0: ICD-10-CM

## 2022-06-28 LAB — INR BLD: 2.3 (ref 0.9–1.1)

## 2022-06-28 PROCEDURE — 36415 COLL VENOUS BLD VENIPUNCTURE: CPT

## 2022-06-28 PROCEDURE — 85610 PROTHROMBIN TIME: CPT

## 2022-06-28 NOTE — PROGRESS NOTES
ANTICOAGULATION MANAGEMENT     Tucker Slater 89 year old male is on warfarin with therapeutic INR result. (Goal INR 2.0-2.5)    Recent labs: (last 7 days)     06/28/22  1332   INR 2.3*       ASSESSMENT       Source(s): Chart Review    Previous INR was Therapeutic last 2(+) visits    Medication, diet, health changes since last INR chart reviewed; none identified           PLAN     Recommended plan for no diet, medication or health factor changes affecting INR     Dosing Instructions: continue your current warfarin dose with next INR in 4 weeks       Summary  As of 6/28/2022    Full warfarin instructions:  2.5 mg every Mon, Thu, Sat; 1.25 mg all other days   Next INR check:  7/26/2022             Detailed voice message left for Tucker with dosing instructions and follow up date.     Contact [phone]  to schedule and with any changes, questions or concerns.     Education provided: None required    Plan made per [facility] anticoagulation protocol    Ivory Machado RN  Anticoagulation Clinic  6/28/2022    _______________________________________________________________________     Anticoagulation Episode Summary     Current INR goal:  2.0-2.5   TTR:  59.1 % (1 y)   Target end date:  Indefinite   Send INR reminders to:  [facility]    Indications    Atrial fibrillation (H) with mitral regurgitation and congestive heart failure [I48.91]  Long term current use of anticoagulants with INR goal of 2.0-3.0 [Z79.01]  Permanent atrial fibrillation (H) [I48.21]           Comments:           Anticoagulation Care Providers     Provider Role Specialty Phone number    Kwadwo Winslow MD Referring Internal Medicine [phone]

## 2022-07-01 DIAGNOSIS — I10 ESSENTIAL HYPERTENSION WITH GOAL BLOOD PRESSURE LESS THAN 140/90: ICD-10-CM

## 2022-07-05 DIAGNOSIS — I10 ESSENTIAL HYPERTENSION WITH GOAL BLOOD PRESSURE LESS THAN 140/90: ICD-10-CM

## 2022-07-05 RX ORDER — CARVEDILOL 25 MG/1
TABLET ORAL
Qty: 270 TABLET | Refills: 3 | Status: SHIPPED | OUTPATIENT
Start: 2022-07-05 | End: 2023-06-12

## 2022-07-06 NOTE — TELEPHONE ENCOUNTER
Pending Prescriptions:                       Disp   Refills    losartan (COZAAR) 50 MG tablet [Pharmacy M*90 tab*0        Sig: Take 1 tablet by mouth once daily  Routing refill request to provider for review/approval because:  Fails protocol last ov 5/17/2022  Lab appointment 7/26/2022

## 2022-07-07 RX ORDER — LOSARTAN POTASSIUM 50 MG/1
TABLET ORAL
Qty: 90 TABLET | Refills: 0 | Status: SHIPPED | OUTPATIENT
Start: 2022-07-07 | End: 2022-10-11

## 2022-07-09 DIAGNOSIS — I10 ESSENTIAL HYPERTENSION WITH GOAL BLOOD PRESSURE LESS THAN 140/90: ICD-10-CM

## 2022-07-11 RX ORDER — LOSARTAN POTASSIUM 50 MG/1
TABLET ORAL
Qty: 90 TABLET | Refills: 0 | OUTPATIENT
Start: 2022-07-11

## 2022-07-26 ENCOUNTER — ANTICOAGULATION THERAPY VISIT (OUTPATIENT)
Dept: ANTICOAGULATION | Facility: CLINIC | Age: 87
End: 2022-07-26

## 2022-07-26 ENCOUNTER — LAB (OUTPATIENT)
Dept: LAB | Facility: CLINIC | Age: 87
End: 2022-07-26
Payer: COMMERCIAL

## 2022-07-26 DIAGNOSIS — I48.21 PERMANENT ATRIAL FIBRILLATION (H): ICD-10-CM

## 2022-07-26 DIAGNOSIS — Z79.01 LONG TERM CURRENT USE OF ANTICOAGULANTS WITH INR GOAL OF 2.0-3.0: ICD-10-CM

## 2022-07-26 DIAGNOSIS — I48.91 ATRIAL FIBRILLATION (H): Primary | ICD-10-CM

## 2022-07-26 LAB — INR BLD: 2.2 (ref 0.9–1.1)

## 2022-07-26 PROCEDURE — 85610 PROTHROMBIN TIME: CPT

## 2022-07-26 PROCEDURE — 36415 COLL VENOUS BLD VENIPUNCTURE: CPT

## 2022-07-26 NOTE — PROGRESS NOTES
ANTICOAGULATION MANAGEMENT     Tucker Slater 89 year old male is on warfarin with therapeutic INR result. (Goal INR 2.0-2.5)    Recent labs: (last 7 days)     07/26/22  1329   INR 2.2*       ASSESSMENT       Source(s): Chart Review and Patient/Caregiver Call       Warfarin doses taken: Warfarin taken as instructed    Diet: No new diet changes identified    New illness, injury, or hospitalization: No    Medication/supplement changes: None noted    Signs or symptoms of bleeding or clotting: No    Previous INR: Therapeutic last 2(+) visits    Additional findings: None       PLAN     Recommended plan for no diet, medication or health factor changes affecting INR     Dosing Instructions: Continue your current warfarin dose with next INR in 5 weeks       Summary  As of 7/26/2022    Full warfarin instructions:  2.5 mg every Mon, Thu, Sat; 1.25 mg all other days   Next INR check:  8/30/2022             Telephone call with Tucker who verbalizes understanding and agrees to plan    Lab visit scheduled    Education provided: Contact [phone]  with any changes, questions or concerns.     Plan made per [facility] anticoagulation protocol    Chichi Wang RN  Anticoagulation Clinic  7/26/2022    _______________________________________________________________________     Anticoagulation Episode Summary     Current INR goal:  2.0-2.5   TTR:  60.9 % (1 y)   Target end date:  Indefinite   [facility]    Indications    Atrial fibrillation (H) with mitral regurgitation and congestive heart failure [I48.91]  Long term current use of anticoagulants with INR goal of 2.0-3.0 [Z79.01]  Permanent atrial fibrillation (H) [I48.21]           Comments:           Anticoagulation Care Providers     Provider Role Specialty Phone number    Kwadwo Winslow MD Referring Internal Medicine [phone]

## 2022-07-26 NOTE — PROGRESS NOTES
ANTICOAGULATION MANAGEMENT     Tucker Slater 89 year old male is on warfarin with therapeutic INR result. (Goal INR 2.0-2.5)    Recent labs: (last 7 days)     07/26/22  1329   INR 2.2*       ASSESSMENT       Source(s): Chart Review    Previous INR was Therapeutic last 2(+) visits    Medication, diet, health changes since last INR chart reviewed; none identified           PLAN     Unable to reach Tucker today.    Left  to continue SAME DOSE and to call [phone] with transfer to [facility]      Follow up required to confirm warfarin dose taken and assess for changes    Chichi Wang, RN  Anticoagulation Clinic  7/26/2022

## 2022-08-09 ENCOUNTER — HOSPITAL ENCOUNTER (INPATIENT)
Facility: CLINIC | Age: 87
LOS: 2 days | Discharge: HOME | DRG: 177 | End: 2022-08-11
Attending: EMERGENCY MEDICINE | Admitting: INTERNAL MEDICINE
Payer: COMMERCIAL

## 2022-08-09 ENCOUNTER — APPOINTMENT (OUTPATIENT)
Dept: CARDIOLOGY | Facility: CLINIC | Age: 87
DRG: 177 | End: 2022-08-09
Attending: INTERNAL MEDICINE
Payer: COMMERCIAL

## 2022-08-09 ENCOUNTER — OFFICE VISIT (OUTPATIENT)
Dept: INTERNAL MEDICINE | Facility: CLINIC | Age: 87
End: 2022-08-09
Payer: COMMERCIAL

## 2022-08-09 ENCOUNTER — APPOINTMENT (OUTPATIENT)
Dept: GENERAL RADIOLOGY | Facility: CLINIC | Age: 87
DRG: 177 | End: 2022-08-09
Attending: EMERGENCY MEDICINE
Payer: COMMERCIAL

## 2022-08-09 VITALS
OXYGEN SATURATION: 95 % | HEART RATE: 73 BPM | SYSTOLIC BLOOD PRESSURE: 98 MMHG | BODY MASS INDEX: 25.33 KG/M2 | RESPIRATION RATE: 15 BRPM | WEIGHT: 176.5 LBS | DIASTOLIC BLOOD PRESSURE: 54 MMHG | TEMPERATURE: 102.2 F

## 2022-08-09 DIAGNOSIS — U07.1 INFECTION DUE TO 2019 NOVEL CORONAVIRUS: ICD-10-CM

## 2022-08-09 DIAGNOSIS — B34.9 VIRAL ILLNESS: Primary | ICD-10-CM

## 2022-08-09 DIAGNOSIS — M62.81 GENERALIZED MUSCLE WEAKNESS: ICD-10-CM

## 2022-08-09 DIAGNOSIS — J18.9 PNEUMONIA OF LEFT LOWER LOBE DUE TO INFECTIOUS ORGANISM: Primary | ICD-10-CM

## 2022-08-09 DIAGNOSIS — Z79.01 LONG TERM CURRENT USE OF ANTICOAGULANT THERAPY: ICD-10-CM

## 2022-08-09 DIAGNOSIS — R79.89 ELEVATED BRAIN NATRIURETIC PEPTIDE (BNP) LEVEL: ICD-10-CM

## 2022-08-09 DIAGNOSIS — Z09 HOSPITAL DISCHARGE FOLLOW-UP: ICD-10-CM

## 2022-08-09 LAB
ALBUMIN SERPL BCG-MCNC: 3.3 G/DL (ref 3.5–5.2)
ALBUMIN SERPL BCG-MCNC: 3.9 G/DL (ref 3.5–5.2)
ALBUMIN UR-MCNC: 30 MG/DL
ALP SERPL-CCNC: 51 U/L (ref 40–129)
ALP SERPL-CCNC: 57 U/L (ref 40–129)
ALT SERPL W P-5'-P-CCNC: 19 U/L (ref 10–50)
ALT SERPL W P-5'-P-CCNC: 24 U/L (ref 10–50)
ANION GAP SERPL CALCULATED.3IONS-SCNC: 10 MMOL/L (ref 7–15)
ANION GAP SERPL CALCULATED.3IONS-SCNC: 15 MMOL/L (ref 7–15)
APPEARANCE UR: ABNORMAL
AST SERPL W P-5'-P-CCNC: 34 U/L (ref 10–50)
AST SERPL W P-5'-P-CCNC: 34 U/L (ref 10–50)
ATRIAL RATE - MUSE: 55 BPM
BASE EXCESS BLDV CALC-SCNC: -2.9 MMOL/L (ref -7.7–1.9)
BASOPHILS # BLD AUTO: 0 10E3/UL (ref 0–0.2)
BASOPHILS # BLD AUTO: 0 10E3/UL (ref 0–0.2)
BASOPHILS NFR BLD AUTO: 0 %
BASOPHILS NFR BLD AUTO: 0 %
BILIRUB SERPL-MCNC: 0.6 MG/DL
BILIRUB SERPL-MCNC: 0.8 MG/DL
BILIRUB UR QL STRIP: NEGATIVE
BUN SERPL-MCNC: 37.4 MG/DL (ref 8–23)
BUN SERPL-MCNC: 44.1 MG/DL (ref 8–23)
CALCIUM SERPL-MCNC: 8.9 MG/DL (ref 8.2–9.6)
CALCIUM SERPL-MCNC: 9 MG/DL (ref 8.2–9.6)
CHLORIDE SERPL-SCNC: 102 MMOL/L (ref 98–107)
CHLORIDE SERPL-SCNC: 105 MMOL/L (ref 98–107)
COLOR UR AUTO: YELLOW
CREAT BLD-MCNC: 1.8 MG/DL (ref 0.7–1.3)
CREAT SERPL-MCNC: 1.55 MG/DL (ref 0.67–1.17)
CREAT SERPL-MCNC: 1.66 MG/DL (ref 0.67–1.17)
CRP SERPL-MCNC: 100.83 MG/L
D DIMER PPP FEU-MCNC: 0.78 UG/ML FEU (ref 0–0.5)
DEPRECATED HCO3 PLAS-SCNC: 19 MMOL/L (ref 22–29)
DEPRECATED HCO3 PLAS-SCNC: 20 MMOL/L (ref 22–29)
DIASTOLIC BLOOD PRESSURE - MUSE: NORMAL MMHG
EOSINOPHIL # BLD AUTO: 0 10E3/UL (ref 0–0.7)
EOSINOPHIL # BLD AUTO: 0 10E3/UL (ref 0–0.7)
EOSINOPHIL NFR BLD AUTO: 0 %
EOSINOPHIL NFR BLD AUTO: 0 %
ERYTHROCYTE [DISTWIDTH] IN BLOOD BY AUTOMATED COUNT: 13.9 % (ref 10–15)
ERYTHROCYTE [DISTWIDTH] IN BLOOD BY AUTOMATED COUNT: 13.9 % (ref 10–15)
FLUAV RNA SPEC QL NAA+PROBE: NEGATIVE
FLUBV RNA RESP QL NAA+PROBE: NEGATIVE
GFR SERPL CREATININE-BSD FRML MDRD: 35 ML/MIN/1.73M2
GFR SERPL CREATININE-BSD FRML MDRD: 39 ML/MIN/1.73M2
GFR SERPL CREATININE-BSD FRML MDRD: 42 ML/MIN/1.73M2
GLUCOSE SERPL-MCNC: 119 MG/DL (ref 70–99)
GLUCOSE SERPL-MCNC: 89 MG/DL (ref 70–99)
GLUCOSE UR STRIP-MCNC: NEGATIVE MG/DL
HCO3 BLDV-SCNC: 21 MMOL/L (ref 21–28)
HCT VFR BLD AUTO: 37.9 % (ref 40–53)
HCT VFR BLD AUTO: 40.9 % (ref 40–53)
HGB BLD-MCNC: 12 G/DL (ref 13.3–17.7)
HGB BLD-MCNC: 13.2 G/DL (ref 13.3–17.7)
HGB UR QL STRIP: NEGATIVE
IMM GRANULOCYTES # BLD: 0 10E3/UL
IMM GRANULOCYTES # BLD: 0 10E3/UL
IMM GRANULOCYTES NFR BLD: 0 %
IMM GRANULOCYTES NFR BLD: 1 %
INR PPP: 2.09 (ref 0.85–1.15)
INR PPP: 2.12 (ref 0.85–1.15)
INTERPRETATION ECG - MUSE: NORMAL
KETONES UR STRIP-MCNC: NEGATIVE MG/DL
LACTATE SERPL-SCNC: 1.2 MMOL/L (ref 0.7–2)
LEUKOCYTE ESTERASE UR QL STRIP: NEGATIVE
LVEF ECHO: NORMAL
LYMPHOCYTES # BLD AUTO: 0.4 10E3/UL (ref 0.8–5.3)
LYMPHOCYTES # BLD AUTO: 0.5 10E3/UL (ref 0.8–5.3)
LYMPHOCYTES NFR BLD AUTO: 6 %
LYMPHOCYTES NFR BLD AUTO: 6 %
MAGNESIUM SERPL-MCNC: 1.7 MG/DL (ref 1.7–2.3)
MCH RBC QN AUTO: 31.2 PG (ref 26.5–33)
MCH RBC QN AUTO: 31.9 PG (ref 26.5–33)
MCHC RBC AUTO-ENTMCNC: 31.7 G/DL (ref 31.5–36.5)
MCHC RBC AUTO-ENTMCNC: 32.3 G/DL (ref 31.5–36.5)
MCV RBC AUTO: 98 FL (ref 78–100)
MCV RBC AUTO: 99 FL (ref 78–100)
MONOCYTES # BLD AUTO: 0.5 10E3/UL (ref 0–1.3)
MONOCYTES # BLD AUTO: 0.5 10E3/UL (ref 0–1.3)
MONOCYTES NFR BLD AUTO: 7 %
MONOCYTES NFR BLD AUTO: 9 %
MUCOUS THREADS #/AREA URNS LPF: PRESENT /LPF
NEUTROPHILS # BLD AUTO: 5.2 10E3/UL (ref 1.6–8.3)
NEUTROPHILS # BLD AUTO: 6.9 10E3/UL (ref 1.6–8.3)
NEUTROPHILS NFR BLD AUTO: 85 %
NEUTROPHILS NFR BLD AUTO: 86 %
NITRATE UR QL: NEGATIVE
NRBC # BLD AUTO: 0 10E3/UL
NRBC # BLD AUTO: 0 10E3/UL
NRBC BLD AUTO-RTO: 0 /100
NRBC BLD AUTO-RTO: 0 /100
NT-PROBNP SERPL-MCNC: 4583 PG/ML (ref 0–1800)
O2/TOTAL GAS SETTING VFR VENT: 0 %
OXYHGB MFR BLDV: 69 % (ref 70–75)
P AXIS - MUSE: NORMAL DEGREES
PCO2 BLDV: 34 MM HG (ref 40–50)
PH BLDV: 7.4 [PH] (ref 7.32–7.43)
PH UR STRIP: 5 [PH] (ref 5–7)
PLATELET # BLD AUTO: 135 10E3/UL (ref 150–450)
PLATELET # BLD AUTO: 140 10E3/UL (ref 150–450)
PO2 BLDV: 36 MM HG (ref 25–47)
POTASSIUM SERPL-SCNC: 4.3 MMOL/L (ref 3.4–5.3)
POTASSIUM SERPL-SCNC: 4.5 MMOL/L (ref 3.4–5.3)
PR INTERVAL - MUSE: NORMAL MS
PROCALCITONIN SERPL IA-MCNC: 0.57 NG/ML
PROT SERPL-MCNC: 6.5 G/DL (ref 6.4–8.3)
PROT SERPL-MCNC: 7.1 G/DL (ref 6.4–8.3)
QRS DURATION - MUSE: 182 MS
QT - MUSE: 448 MS
QTC - MUSE: 476 MS
R AXIS - MUSE: -58 DEGREES
RBC # BLD AUTO: 3.85 10E6/UL (ref 4.4–5.9)
RBC # BLD AUTO: 4.14 10E6/UL (ref 4.4–5.9)
RBC URINE: 7 /HPF
RSV RNA SPEC NAA+PROBE: NEGATIVE
SARS-COV-2 RNA RESP QL NAA+PROBE: POSITIVE
SODIUM SERPL-SCNC: 135 MMOL/L (ref 136–145)
SODIUM SERPL-SCNC: 136 MMOL/L (ref 136–145)
SP GR UR STRIP: 1.02 (ref 1–1.03)
SQUAMOUS EPITHELIAL: 1 /HPF
SYSTOLIC BLOOD PRESSURE - MUSE: NORMAL MMHG
T AXIS - MUSE: 92 DEGREES
UROBILINOGEN UR STRIP-MCNC: NORMAL MG/DL
VENTRICULAR RATE- MUSE: 68 BPM
WBC # BLD AUTO: 6.1 10E3/UL (ref 4–11)
WBC # BLD AUTO: 8 10E3/UL (ref 4–11)
WBC URINE: 1 /HPF

## 2022-08-09 PROCEDURE — 85025 COMPLETE CBC W/AUTO DIFF WBC: CPT | Performed by: EMERGENCY MEDICINE

## 2022-08-09 PROCEDURE — 258N000003 HC RX IP 258 OP 636: Performed by: EMERGENCY MEDICINE

## 2022-08-09 PROCEDURE — 82040 ASSAY OF SERUM ALBUMIN: CPT | Performed by: EMERGENCY MEDICINE

## 2022-08-09 PROCEDURE — 84450 TRANSFERASE (AST) (SGOT): CPT | Performed by: INTERNAL MEDICINE

## 2022-08-09 PROCEDURE — 84145 PROCALCITONIN (PCT): CPT | Performed by: EMERGENCY MEDICINE

## 2022-08-09 PROCEDURE — 36415 COLL VENOUS BLD VENIPUNCTURE: CPT | Performed by: EMERGENCY MEDICINE

## 2022-08-09 PROCEDURE — 93325 DOPPLER ECHO COLOR FLOW MAPG: CPT | Mod: 26 | Performed by: INTERNAL MEDICINE

## 2022-08-09 PROCEDURE — 82565 ASSAY OF CREATININE: CPT

## 2022-08-09 PROCEDURE — 86140 C-REACTIVE PROTEIN: CPT | Performed by: INTERNAL MEDICINE

## 2022-08-09 PROCEDURE — 83605 ASSAY OF LACTIC ACID: CPT | Performed by: EMERGENCY MEDICINE

## 2022-08-09 PROCEDURE — 85610 PROTHROMBIN TIME: CPT | Performed by: INTERNAL MEDICINE

## 2022-08-09 PROCEDURE — 93308 TTE F-UP OR LMTD: CPT

## 2022-08-09 PROCEDURE — 93321 DOPPLER ECHO F-UP/LMTD STD: CPT | Mod: 26 | Performed by: INTERNAL MEDICINE

## 2022-08-09 PROCEDURE — 99223 1ST HOSP IP/OBS HIGH 75: CPT | Mod: AI | Performed by: INTERNAL MEDICINE

## 2022-08-09 PROCEDURE — 120N000001 HC R&B MED SURG/OB

## 2022-08-09 PROCEDURE — 93005 ELECTROCARDIOGRAM TRACING: CPT

## 2022-08-09 PROCEDURE — 71046 X-RAY EXAM CHEST 2 VIEWS: CPT

## 2022-08-09 PROCEDURE — 83880 ASSAY OF NATRIURETIC PEPTIDE: CPT | Performed by: EMERGENCY MEDICINE

## 2022-08-09 PROCEDURE — 81001 URINALYSIS AUTO W/SCOPE: CPT | Performed by: EMERGENCY MEDICINE

## 2022-08-09 PROCEDURE — 99285 EMERGENCY DEPT VISIT HI MDM: CPT | Mod: CS,25

## 2022-08-09 PROCEDURE — 250N000011 HC RX IP 250 OP 636: Performed by: INTERNAL MEDICINE

## 2022-08-09 PROCEDURE — 93308 TTE F-UP OR LMTD: CPT | Mod: 26 | Performed by: INTERNAL MEDICINE

## 2022-08-09 PROCEDURE — 250N000013 HC RX MED GY IP 250 OP 250 PS 637: Performed by: INTERNAL MEDICINE

## 2022-08-09 PROCEDURE — 99213 OFFICE O/P EST LOW 20 MIN: CPT | Performed by: INTERNAL MEDICINE

## 2022-08-09 PROCEDURE — 85610 PROTHROMBIN TIME: CPT | Performed by: EMERGENCY MEDICINE

## 2022-08-09 PROCEDURE — 85379 FIBRIN DEGRADATION QUANT: CPT | Performed by: INTERNAL MEDICINE

## 2022-08-09 PROCEDURE — 82805 BLOOD GASES W/O2 SATURATION: CPT | Performed by: EMERGENCY MEDICINE

## 2022-08-09 PROCEDURE — C9803 HOPD COVID-19 SPEC COLLECT: HCPCS

## 2022-08-09 PROCEDURE — 96360 HYDRATION IV INFUSION INIT: CPT

## 2022-08-09 PROCEDURE — 87637 SARSCOV2&INF A&B&RSV AMP PRB: CPT | Performed by: EMERGENCY MEDICINE

## 2022-08-09 PROCEDURE — 87040 BLOOD CULTURE FOR BACTERIA: CPT | Performed by: EMERGENCY MEDICINE

## 2022-08-09 PROCEDURE — 83735 ASSAY OF MAGNESIUM: CPT | Performed by: INTERNAL MEDICINE

## 2022-08-09 PROCEDURE — 258N000003 HC RX IP 258 OP 636: Performed by: INTERNAL MEDICINE

## 2022-08-09 PROCEDURE — 85025 COMPLETE CBC W/AUTO DIFF WBC: CPT | Performed by: INTERNAL MEDICINE

## 2022-08-09 PROCEDURE — 80053 COMPREHEN METABOLIC PANEL: CPT | Performed by: EMERGENCY MEDICINE

## 2022-08-09 PROCEDURE — 93325 DOPPLER ECHO COLOR FLOW MAPG: CPT

## 2022-08-09 PROCEDURE — 36415 COLL VENOUS BLD VENIPUNCTURE: CPT | Performed by: INTERNAL MEDICINE

## 2022-08-09 RX ORDER — PROCHLORPERAZINE 25 MG
12.5 SUPPOSITORY, RECTAL RECTAL EVERY 12 HOURS PRN
Status: DISCONTINUED | OUTPATIENT
Start: 2022-08-09 | End: 2022-08-11 | Stop reason: HOSPADM

## 2022-08-09 RX ORDER — SODIUM CHLORIDE 9 MG/ML
INJECTION, SOLUTION INTRAVENOUS CONTINUOUS
Status: DISCONTINUED | OUTPATIENT
Start: 2022-08-09 | End: 2022-08-11

## 2022-08-09 RX ORDER — DOXYCYCLINE 100 MG/10ML
100 INJECTION, POWDER, LYOPHILIZED, FOR SOLUTION INTRAVENOUS EVERY 12 HOURS
Status: DISCONTINUED | OUTPATIENT
Start: 2022-08-09 | End: 2022-08-11 | Stop reason: HOSPADM

## 2022-08-09 RX ORDER — DOCUSATE SODIUM 100 MG/1
100 CAPSULE, LIQUID FILLED ORAL 2 TIMES DAILY
Status: DISCONTINUED | OUTPATIENT
Start: 2022-08-09 | End: 2022-08-11 | Stop reason: HOSPADM

## 2022-08-09 RX ORDER — LIDOCAINE 40 MG/G
CREAM TOPICAL
Status: DISCONTINUED | OUTPATIENT
Start: 2022-08-09 | End: 2022-08-11 | Stop reason: HOSPADM

## 2022-08-09 RX ORDER — ACETAMINOPHEN 325 MG/1
650 TABLET ORAL 2 TIMES DAILY
Status: DISCONTINUED | OUTPATIENT
Start: 2022-08-09 | End: 2022-08-11 | Stop reason: HOSPADM

## 2022-08-09 RX ORDER — CARVEDILOL 25 MG/1
25 TABLET ORAL 2 TIMES DAILY WITH MEALS
Status: DISCONTINUED | OUTPATIENT
Start: 2022-08-09 | End: 2022-08-11 | Stop reason: HOSPADM

## 2022-08-09 RX ORDER — ONDANSETRON 4 MG/1
4 TABLET, ORALLY DISINTEGRATING ORAL EVERY 6 HOURS PRN
Status: DISCONTINUED | OUTPATIENT
Start: 2022-08-09 | End: 2022-08-11 | Stop reason: HOSPADM

## 2022-08-09 RX ORDER — CHOLECALCIFEROL (VITAMIN D3) 50 MCG
50 TABLET ORAL DAILY
Status: ON HOLD | COMMUNITY
End: 2022-08-09

## 2022-08-09 RX ORDER — ACETAMINOPHEN 500 MG
1000 TABLET ORAL 2 TIMES DAILY PRN
COMMUNITY

## 2022-08-09 RX ORDER — FINASTERIDE 5 MG/1
5 TABLET, FILM COATED ORAL DAILY
Status: DISCONTINUED | OUTPATIENT
Start: 2022-08-09 | End: 2022-08-11 | Stop reason: HOSPADM

## 2022-08-09 RX ORDER — CEFTRIAXONE 2 G/1
2 INJECTION, POWDER, FOR SOLUTION INTRAMUSCULAR; INTRAVENOUS EVERY 24 HOURS
Status: DISCONTINUED | OUTPATIENT
Start: 2022-08-09 | End: 2022-08-11 | Stop reason: HOSPADM

## 2022-08-09 RX ORDER — AZITHROMYCIN 500 MG/5ML
500 INJECTION, POWDER, LYOPHILIZED, FOR SOLUTION INTRAVENOUS EVERY 24 HOURS
Status: DISCONTINUED | OUTPATIENT
Start: 2022-08-09 | End: 2022-08-09

## 2022-08-09 RX ORDER — PROCHLORPERAZINE MALEATE 5 MG
5 TABLET ORAL EVERY 6 HOURS PRN
Status: DISCONTINUED | OUTPATIENT
Start: 2022-08-09 | End: 2022-08-11 | Stop reason: HOSPADM

## 2022-08-09 RX ORDER — ONDANSETRON 2 MG/ML
4 INJECTION INTRAMUSCULAR; INTRAVENOUS EVERY 6 HOURS PRN
Status: DISCONTINUED | OUTPATIENT
Start: 2022-08-09 | End: 2022-08-11 | Stop reason: HOSPADM

## 2022-08-09 RX ORDER — MULTIPLE VITAMINS W/ MINERALS TAB 9MG-400MCG
1 TAB ORAL DAILY
COMMUNITY

## 2022-08-09 RX ADMIN — SODIUM CHLORIDE: 9 INJECTION, SOLUTION INTRAVENOUS at 19:23

## 2022-08-09 RX ADMIN — DOXYCYCLINE 100 MG: 100 INJECTION, POWDER, LYOPHILIZED, FOR SOLUTION INTRAVENOUS at 19:24

## 2022-08-09 RX ADMIN — DOCUSATE SODIUM 100 MG: 100 CAPSULE, LIQUID FILLED ORAL at 19:24

## 2022-08-09 RX ADMIN — ACETAMINOPHEN 650 MG: 325 TABLET, FILM COATED ORAL at 19:24

## 2022-08-09 RX ADMIN — CARVEDILOL 25 MG: 25 TABLET, FILM COATED ORAL at 17:38

## 2022-08-09 RX ADMIN — WARFARIN SODIUM 1.25 MG: 2.5 TABLET ORAL at 19:24

## 2022-08-09 RX ADMIN — SODIUM CHLORIDE 1000 ML: 9 INJECTION, SOLUTION INTRAVENOUS at 10:25

## 2022-08-09 RX ADMIN — CEFTRIAXONE 2 G: 2 INJECTION, POWDER, FOR SOLUTION INTRAMUSCULAR; INTRAVENOUS at 17:38

## 2022-08-09 ASSESSMENT — ACTIVITIES OF DAILY LIVING (ADL)
ADLS_ACUITY_SCORE: 22
ADLS_ACUITY_SCORE: 37
ADLS_ACUITY_SCORE: 22
VISION_MANAGEMENT: WEARS GLASSES
DRESSING/BATHING_DIFFICULTY: NO
HEARING_DIFFICULTY_OR_DEAF: NO
TOILETING_ISSUES: NO
WALKING_OR_CLIMBING_STAIRS_DIFFICULTY: NO
FALL_HISTORY_WITHIN_LAST_SIX_MONTHS: NO
ADLS_ACUITY_SCORE: 22
ADLS_ACUITY_SCORE: 22
CONCENTRATING,_REMEMBERING_OR_MAKING_DECISIONS_DIFFICULTY: NO
WEAR_GLASSES_OR_BLIND: YES
ADLS_ACUITY_SCORE: 37
DIFFICULTY_EATING/SWALLOWING: NO
ADLS_ACUITY_SCORE: 37
EQUIPMENT_CURRENTLY_USED_AT_HOME: CANE, STRAIGHT
DIFFICULTY_COMMUNICATING: NO
CHANGE_IN_FUNCTIONAL_STATUS_SINCE_ONSET_OF_CURRENT_ILLNESS/INJURY: NO
DOING_ERRANDS_INDEPENDENTLY_DIFFICULTY: NO

## 2022-08-09 ASSESSMENT — ENCOUNTER SYMPTOMS
SHORTNESS OF BREATH: 0
COUGH: 1
NAUSEA: 0
DIARRHEA: 0
WEAKNESS: 1
ABDOMINAL PAIN: 0
VOMITING: 0
FEVER: 1

## 2022-08-09 ASSESSMENT — PAIN SCALES - GENERAL: PAINLEVEL: MODERATE PAIN (5)

## 2022-08-09 NOTE — ED PROVIDER NOTES
"  History   Chief Complaint:  Cough and Fever       The history is provided by the patient.      Tucker Slater is a 89 year old male on Coumadin and nighttime O2 with history of hypertension, a-fib, peripheral neuropathy, CKD, and DAWSON who presents from the clinic for concern cough and fever (T-max: 102) with associated generalized weakness.  He reports symptoms onset 4 days ago.  He initially \"felt a lump at the bottom of my ribs,\" followed by a coughing in his sleep that caused him to wake up.  Today, he was seen in the clinic where they noted low blood pressure, hypoxia, and fever.  Here in the ED, the patient notes weakness and ataxia at baseline due to history of neuropathy, which have increased since onset of symptoms. He does use a cane and denies any recent falls. He currently feels improvement of cough since onset.  He denies nausea, emesis, diarrhea, abdominal pain, chest pain, and shortness of breath.     Review of Systems   Constitutional: Positive for fever (102).   Respiratory: Positive for cough. Negative for shortness of breath.    Cardiovascular: Negative for chest pain.   Gastrointestinal: Negative for abdominal pain, diarrhea, nausea and vomiting.   Neurological: Positive for weakness (with ataxia).   All other systems reviewed and are negative.      Allergies:  Merbromin  Morphine  Amlodipine    Medications:  Carvedilol  Finasteride   Losartan   Coumadin   Maxzide     Past Medical History:     Hypertension  Carcinoma   Hypertrophy of prostate  Osteoarthrosis  Peripheral neuropathy  GI bleed  Anemia  Atrial fibrillation  Bradycardia  CKD   Pacemaker  DAWSON  Dyplasia of prostate      Past Surgical History:    Arthroplasty hip  Cardioversion  Colonoscopy  Cystoscopy x4  Fulgurate bleeders  Evacuate clot  Retrogrades  Transurethral resection  Hernia repair x2  Implant pacemakers  Release carpal tunnel  Pilonidal cyst  Tonsillectomy  Adenoidectomy  Total knee replacement, bilateral   Cataract " removal, bilateral    Family History:    CAD  MI  CHF    Social History:  Living Situation: lives with his daughter at home.  The patient presents alone.  The patient presents in a private vehicle.  PCP: Kwadwo Winslow MD       Physical Exam     Patient Vitals for the past 24 hrs:   BP Temp Temp src Pulse Resp SpO2   08/09/22 1023 -- -- -- 70 13 93 %   08/09/22 1022 117/64 -- -- 68 15 91 %   08/09/22 1015 117/58 -- -- 68 8 94 %   08/09/22 0954 -- -- -- -- -- 96 %   08/09/22 0952 118/61 -- -- 71 -- --   08/09/22 0941 107/67 98.8  F (37.1  C) Oral 74 18 95 %       Physical Exam  Constitutional: Alert, attentive, GCS 15  HENT:    Nose: Nose normal.    Mouth/Throat: Oropharynx is clear, mucous membranes are moist  Eyes: EOM are normal, anicteric, conjugate gaze  CV: regular rate and rhythm; no murmurs  Chest: Effort normal and breath sounds clear without wheezing or rales, symmetric bilaterally. Left upper chest pacemaker.  GI:  non tender. No distension. No guarding or rebound.    MSK: No LE edema, no tenderness to palpation of BLE.  Neurological: Alert, attentive, moving all extremities equally.   Skin: Skin is warm and dry.    Emergency Department Course   ECG  ECG taken at 1008, ECG read.  Ventricular-paced rhythm.  Abnormal ECG.  No significant change as compared to prior EKG dated 06/01/19   Rate 68 bpm. MO interval * ms. QRS duration 182 ms. QT/QTc 448/476 ms. P-R-T axis * -58 92.     Imaging:  Chest XR,  PA & LAT   Preliminary Result   IMPRESSION: Stable left chest pacer. Stable cardiac silhouette.   Interval increased patchy opacities at the left lung base. Correlate   with evidence of developing pneumonia in this region.         Report per radiology    Laboratory:  Labs Ordered and Resulted from Time of ED Arrival to Time of ED Departure   COMPREHENSIVE METABOLIC PANEL - Abnormal       Result Value    Sodium 136      Potassium 4.5      Creatinine 1.66 (*)     Urea Nitrogen 44.1 (*)     Chloride 102       Carbon Dioxide (CO2) 19 (*)     Anion Gap 15      Glucose 89      Calcium 9.0      Protein Total 7.1      Albumin 3.9      Bilirubin Total 0.8      Alkaline Phosphatase 57      AST 34      ALT 24      GFR Estimate 39 (*)    BLOOD GAS VENOUS WITH OXYHEMOGLOBIN - Abnormal    pH Venous 7.40      pCO2 Venous 34 (*)     pO2 Venous 36      Bicarbonate Venous 21      FIO2 0      Oxyhemoglobin Venous 69 (*)     Base Excess/Deficit (+/-) -2.9     PROCALCITONIN - Abnormal    Procalcitonin 0.57 (*)    INFLUENZA A/B & SARS-COV2 PCR MULTIPLEX - Abnormal    Influenza A PCR Negative      Influenza B PCR Negative      RSV PCR Negative      SARS CoV2 PCR Positive (*)    ROUTINE UA WITH MICROSCOPIC - Abnormal    Color Urine Yellow      Appearance Urine Slightly Cloudy (*)     Glucose Urine Negative      Bilirubin Urine Negative      Ketones Urine Negative      Specific Gravity Urine 1.022      Blood Urine Negative      pH Urine 5.0      Protein Albumin Urine 30  (*)     Urobilinogen Urine Normal      Nitrite Urine Negative      Leukocyte Esterase Urine Negative      Mucus Urine Present (*)     RBC Urine 7 (*)     WBC Urine 1      Squamous Epithelials Urine 1     NT PROBNP INPATIENT - Abnormal    N terminal Pro BNP Inpatient 4,583 (*)    CBC WITH PLATELETS AND DIFFERENTIAL - Abnormal    WBC Count 6.1      RBC Count 4.14 (*)     Hemoglobin 13.2 (*)     Hematocrit 40.9      MCV 99      MCH 31.9      MCHC 32.3      RDW 13.9      Platelet Count 140 (*)     % Neutrophils 85      % Lymphocytes 6      % Monocytes 9      % Eosinophils 0      % Basophils 0      % Immature Granulocytes 0      NRBCs per 100 WBC 0      Absolute Neutrophils 5.2      Absolute Lymphocytes 0.4 (*)     Absolute Monocytes 0.5      Absolute Eosinophils 0.0      Absolute Basophils 0.0      Absolute Immature Granulocytes 0.0      Absolute NRBCs 0.0     ISTAT CREATININE POCT - Abnormal    Creatinine POCT 1.8 (*)     GFR, ESTIMATED POCT 35 (*)    INR - Abnormal    INR 2.12  (*)    LACTIC ACID WHOLE BLOOD - Normal    Lactic Acid 1.2     BLOOD CULTURE   BLOOD CULTURE      Emergency Department Course:    Reviewed:  I reviewed nursing notes, vitals, past medical history and Care Everywhere    Assessments:  0951 I obtained history and examined the patient as noted above.   1130 I rechecked the patient and explained findings.     Consults:  1159 I consulted with Dr. Blanca, hospitalist, regarding the patient's history and presentation here in the emergency department who accepted the patient for admission.    Interventions:  1025 NS 1 L IV    Disposition:  The patient was admitted to the hospital under the care of Dr. Blanca.     Impression & Plan   Medical Decision Makin-year-old male past medical history seen for hypertension, A. fib status post pacemaker, on Coumadin presenting for 5 days of fever, cough, mild shortness of breath and profound generalized weakness.  He was initially tempted to be seen in clinic where he was noted to have sats of 88 to 89% after walking in, centimeters to the emergency department.  Here he is satting in the mid 90s, blood pressure is stable, comprehensive sepsis eval was undertaken given the reported low blood pressure at clinic as well, lactate was within normal limits, he is afebrile here without leukocytosis.  Chest x-ray shows patchy opacities in the left lung base in the setting of positive COVID PCR here.  His procalcitonin is mildly elevated, I suspect pneumonia on xray is secondary to COVID - abx deferred, given his profound weakness will admit to medicine for further evaluation.  Low suspicion for PE given his anticoagulated status.  BNP is also elevated, additional IV fluids were stopped after initial bolus, appeared to have mild systolic dysfunction on echo in 2018.  Diuresis deferred given initial hypotension in clinic, he is stable here.    Diagnosis:    ICD-10-CM    1. Infection due to 2019 novel coronavirus  U07.1    2. Generalized  muscle weakness  M62.81    3. Elevated brain natriuretic peptide (BNP) level  R79.89        Isreal Hinojosa MD  [employer]  12:30 PM 08/09/22     Scribe Disclosure:  I, Reabryan Byrd, am serving as a scribe at 9:54 AM on 8/9/2022 to document services personally performed by Isreal Hinojosa MD based on my observations and the provider's statements to me.          Isreal Hinojosa MD  08/09/22 1320

## 2022-08-09 NOTE — PROGRESS NOTES
Assessment & Plan     Viral illness  Patient with acute illness, viral or bacterial. Possible pneumonia, COVID.  Because of inability to eat,  fever, low BP, O2 desaturation , frailty, will be sent to ER for assessment, lab, CXR, treatment, hydration.                See Patient Instructions      Kwadwo Winslow MD  [facility] MARCI Ceblalos is a 89 year old accompanied by his spouse, presenting for the following health issues:  Suspected Covid      HPI       COVID-19 Symptom Review  How many days ago did these symptoms start? 5 days    Are any of the following symptoms significant for you?    New or worsening difficulty breathing? No    Worsening cough? Yes, it's a dry cough. Today the cough is a little better    Fever or chills? Yes, the highest temperature was 102.2    Headache: No    Sore throat: No    Chest pain: YES- at bottom of rib cage    Diarrhea: No    Body aches? YES    What treatments has patient tried? Acetaminophen   Does patient live in a nursing home, group home, or shelter? No  Does patient have a way to get food/medications during quarantined? Yes, I have a friend or family member who can help me.    presents with 5 days symptoms of cough, fatigue, nausea, fever, poor appetite.   Feels unstable to walk, SOB on exertion, had one episode of chest pain in the lower sternum. Lasted for several minutes, resolved.   Temperature was up to 99.8  Cough is non productive. Has headache and muscle aches.   Has had COVID immunizations and 1 booster 3 months ago.   No sick contacts.             Review of Systems   Constitutional, HEENT, cardiovascular, pulmonary, gi and gu systems are negative, except as otherwise noted.      Objective    BP 98/54 (BP Location: Left arm, Cuff Size: Adult Regular)   Pulse 73   Temp (!) 102.2  F (39  C) (Tympanic)   Resp 15   Wt 80.1 kg (176 lb 8 oz)   SpO2 95%   BMI 25.33 kg/m    Body mass index is 25.33 kg/m .  Physical Exam    Rechecked O2 sat 93% at rest and 89 % after short walk  GENERAL: frail, weak in a wheelchair, alert and no distress  EYES: Eyes grossly normal to inspection, PERRL and conjunctivae and sclerae normal  NECK: no adenopathy, no asymmetry, masses, or scars and thyroid normal to palpation  RESP: lungs clear to auscultation - no rales, rhonchi or wheezes  CV: regular rate and rhythm, normal S1 S2, no S3 or S4, no murmur, click or rub, no peripheral edema and peripheral pulses strong  ABDOMEN: soft, nontender, no hepatosplenomegaly, no masses and bowel sounds normal  MS: no gross musculoskeletal defects noted, no edema  SKIN: no suspicious lesions or rashes  NEURO: generalized weakness, unable to walk without help, unstable gait, mentation intact and speech normal                    .  ..

## 2022-08-09 NOTE — PHARMACY-ANTICOAGULATION SERVICE
Clinical Pharmacy - Warfarin Dosing Consult     Pharmacy has been consulted to manage this patient s warfarin therapy.  Indication: Atrial Fibrillation  Therapy Goal: INR 2-3  OP Anticoag Clinic: [facility]  Warfarin Prior to Admission: Yes  Warfarin PTA Regimen: 2.5 mg M/Th/Sa, 1.25 mg all other days  Significant drug interactions: Antibiotics  Recent documented change in oral intake/nutrition: Unknown  Dose Comments: 1.25 mg    INR   Date Value Ref Range Status   08/09/2022 2.12 (H) 0.85 - 1.15 Final   07/26/2022 2.2 (H) 0.9 - 1.1 Final       Recommend warfarin 1.25 mg today.  Pharmacy will monitor Tucker Slater daily and order warfarin doses to achieve specified goal.      Please contact pharmacy as soon as possible if the warfarin needs to be held for a procedure or if the warfarin goals change.    Iza Kraft, PharmD, [facility]  Emergency Medicine Clinical Pharmacist  [phone]

## 2022-08-09 NOTE — ED NOTES
".  [facility]  ED Nurse Handoff Report    Tucker Slater is a 89 year old male   ED Chief complaint: Cough and Fever  . ED Diagnosis:   Final diagnoses:   Infection due to 2019 novel coronavirus   Generalized muscle weakness   Elevated brain natriuretic peptide (BNP) level     Allergies:   Allergies   Allergen Reactions     Merbromin      Other reaction(s): Other, see comments  Severe reaction as child. Amalgam fillings OK.     Morphine Nausea and Vomiting     Amlodipine      Edema       Code Status: Full Code  Activity level - Baseline/Home:  Independent. Activity Level - Current:   Assist X 1. Lift room needed: No. Bariatric: No   Needed: No   Isolation: Yes. Infection: Not Applicable  COVID r/o and special precautions.     Vital Signs:   Vitals:    08/09/22 1030 08/09/22 1045 08/09/22 1100 08/09/22 1130   BP: 114/58 118/64 116/65 120/60   Pulse: 68 70 80 70   Resp: 16 15 26 17   Temp:       TempSrc:       SpO2: 93% 94% 94% 96%       Cardiac Rhythm:  ,      Pain level:    Patient confused: No. Patient Falls Risk: Yes.   Elimination Status: Has voided   Patient Report - Initial Complaint: 89 year old male on Coumadin and nighttime O2 with history of hypertension, a-fib, peripheral neuropathy, CKD, and DAWSON who presents from the clinic for concern cough and fever (T-max: 102) with associated generalized weakness.  He reports symptoms onset 4 days ago.  He initially \"felt a lump at the bottom of my ribs,\" followed by a coughing in his sleep that caused him to wake up.  Today, he was seen in the clinic where they noted low blood pressure, hypoxia, and fever.  Here in the ED, the patient notes weakness and ataxia at baseline due to history of neuropathy, which have increased since onset of symptoms. He does use a cane and denies any recent falls. He currently feels improvement of cough since onset.  He denies nausea, emesis, diarrhea, abdominal pain, chest pain, and shortness of breath. "   Focused Assessment:    Cardiac (Adult) - Cardiac WDL: .WDL except; rhythm (denies pain) Additional Documentation: ECG (Group)   ECG - Rhythm: paced rhythm (v-paced) LA       10:33 Musculoskeletal Musculoskeletal (Adult) - Musculoskeletal WDL: .WDL except; all  General Mobility: generalized weakness  LA     10:33 Neuro Cognitive Neuro Cognitive (Adult) - Cognitive/Neuro/Behavioral WDL: WDL   Ted Coma Scale - Best Eye Response: 4-->(E4) spontaneous  Best Motor Response: 6-->(M6) obeys commands  Best Verbal Response: 5-->(V5) oriented  [place] Coma Scale Score: 15  LA     10:32 Respiratory Respiratory (Adult) - Airway WDL: WDL   Respiratory WDL - Respiratory WDL: .WDL except; all  Rhythm/Pattern, Respiratory: depth regular; pattern regular; unlabored; dyspnea on exertion  Cough Frequency: infrequent  Cough Type: good           Tests Performed: EKG, labs, BC x 2, VBG, covid.   Abnormal Results: .  Labs Ordered and Resulted from Time of ED Arrival to Time of ED Departure   COMPREHENSIVE METABOLIC PANEL - Abnormal       Result Value    Sodium 136      Potassium 4.5      Creatinine 1.66 (*)     Urea Nitrogen 44.1 (*)     Chloride 102      Carbon Dioxide (CO2) 19 (*)     Anion Gap 15      Glucose 89      Calcium 9.0      Protein Total 7.1      Albumin 3.9      Bilirubin Total 0.8      Alkaline Phosphatase 57      AST 34      ALT 24      GFR Estimate 39 (*)    BLOOD GAS VENOUS WITH OXYHEMOGLOBIN - Abnormal    pH Venous 7.40      pCO2 Venous 34 (*)     pO2 Venous 36      Bicarbonate Venous 21      FIO2 0      Oxyhemoglobin Venous 69 (*)     Base Excess/Deficit (+/-) -2.9     PROCALCITONIN - Abnormal    Procalcitonin 0.57 (*)    INFLUENZA A/B & SARS-COV2 PCR MULTIPLEX - Abnormal    Influenza A PCR Negative      Influenza B PCR Negative      RSV PCR Negative      SARS CoV2 PCR Positive (*)    ROUTINE UA WITH MICROSCOPIC - Abnormal    Color Urine Yellow      Appearance Urine Slightly Cloudy (*)     Glucose Urine Negative       Bilirubin Urine Negative      Ketones Urine Negative      Specific Gravity Urine 1.022      Blood Urine Negative      pH Urine 5.0      Protein Albumin Urine 30  (*)     Urobilinogen Urine Normal      Nitrite Urine Negative      Leukocyte Esterase Urine Negative      Mucus Urine Present (*)     RBC Urine 7 (*)     WBC Urine 1      Squamous Epithelials Urine 1     NT PROBNP INPATIENT - Abnormal    N terminal Pro BNP Inpatient 4,583 (*)    CBC WITH PLATELETS AND DIFFERENTIAL - Abnormal    WBC Count 6.1      RBC Count 4.14 (*)     Hemoglobin 13.2 (*)     Hematocrit 40.9      MCV 99      MCH 31.9      MCHC 32.3      RDW 13.9      Platelet Count 140 (*)     % Neutrophils 85      % Lymphocytes 6      % Monocytes 9      % Eosinophils 0      % Basophils 0      % Immature Granulocytes 0      NRBCs per 100 WBC 0      Absolute Neutrophils 5.2      Absolute Lymphocytes 0.4 (*)     Absolute Monocytes 0.5      Absolute Eosinophils 0.0      Absolute Basophils 0.0      Absolute Immature Granulocytes 0.0      Absolute NRBCs 0.0     ISTAT CREATININE POCT - Abnormal    Creatinine POCT 1.8 (*)     GFR, ESTIMATED POCT 35 (*)    INR - Abnormal    INR 2.12 (*)    LACTIC ACID WHOLE BLOOD - Normal    Lactic Acid 1.2     BLOOD CULTURE   BLOOD CULTURE     .  Chest XR,  PA & LAT   Preliminary Result   IMPRESSION: Stable left chest pacer. Stable cardiac silhouette.   Interval increased patchy opacities at the left lung base. Correlate   with evidence of developing pneumonia in this region.          Treatments provided: see ED meds below  Family Comments: NA  OBS brochure/video discussed/provided to patient:  N/A  ED Medications:   Medications   0.9% sodium chloride BOLUS (0 mLs Intravenous Stopped 8/9/22 1139)     Drips infusing:  No  For the majority of the shift, the patient's behavior Green. Interventions performed were NA.    Sepsis treatment initiated: No     Patient tested for COVID 19 prior to admission: YES, POSITIVE    ED Nurse  Name/Phone Number: Liberty Montoya RN,   12:09 PM RECEIVING UNIT ED HANDOFF REVIEW    Above ED Nurse Handoff Report was reviewed: Yes  Reviewed by: Nitza Hurtado RN on August 9, 2022 at 3:33 PM

## 2022-08-09 NOTE — ED TRIAGE NOTES
A&O x4, ABCs intact. Pt presents from clinic for concern for low BP, oxygen and fever. Pt states that he's had a cough since this past Friday evening. Clinic reported temp of 102. Pt states they took temporal. I noted the patient to be wearing a hat and checked his temp temporally which is elevated from the hat. Oral temp pt is afebrile.        Triage Assessment     Row Name 08/09/22 0942       Triage Assessment (Adult)    Airway WDL WDL       Respiratory WDL    Respiratory WDL WDL       Cardiac WDL    Cardiac WDL WDL

## 2022-08-09 NOTE — ED NOTES
Spoke with wife, Alba, at [phone], with update of status and planning for admission.  Wife was thankful for call and had expected that he'd be admitted.

## 2022-08-09 NOTE — H&P
[facility]    Hospitalist Admission Note    Name: Tucker Slater    MRN: [id number]  YOB: 1931    Age: 89 year old  Date of admission: 8/9/2022  Primary care provider: Kwadwo Winslow  Admitting physician : Juan Pablo Blanca M.D. ,M.B.A.       Brief summary of admission assessment/MDM:    Tucker Slater is a 89 year old  male with a significant past medical history of chronic atrial fibrillation on anticoagulation with Coumadin, peripheral neuropathy, chronic kidney disease, obstructive sleep apnea at home who presents with fever cough and generalized weakness.    Patient was initially seen by Kwadwo Winslow MD in the clinic and evaluated there for his symptoms.  He was febrile with temperature 102.2, and blood pressure was soft 98/54.  Patient was referred to emergency department for evaluation.      He tested positive for COVID-19 and further studies reveal stable cardiac silhouette, interval increased patchy opacities at the left lung base.  WBC count was 6.1 but he has a relative lymphopenia at 0.4.  INR was 2.12.  Creatinine 1.8    Hospitalist service was consulted and patient was admitted to our service for further care.        Assessment and Plan       #1.Acute COVID-19   -- Likely  symptomatic from acute viral infection with SARS-CoV-2 virus with significant fatigue, fever decreased appetite.  Fortunately he is not hypoxic and there is no evidence of COVID type infiltrate on his chest x-ray.  -- Patient was vaccinated with Pfizer and Moderna, boosted    Plan :   -- Admit to medical floor, continue supportive care.  COVID isolation.  No specific COVID-19 therapies recommended as he is not hypoxic.      #2.Generalized weakness  -- We will get PT and OT assessment for discharge needs      #3. Left lung base infiltrate: Chest x-ray showed interval increased patchy opacities at left lung base which is not impressive.  I doubt this is  COVID-pneumonia but bacterial pneumonia cannot be excluded.  Procalcitonin 0.57.  -- We will treat for cardiac or pneumonia with ceftriaxone and azithromycin, continue to monitor cultures in progress.      #4.  Elevated BNP: BNP is elevated 4583.  -- No previous BNP for comparison.  I doubt he has congestive heart failure.  Echocardiogram in 2018 showed EF of 45 to 50% with grade 2 or moderate diastolic dysfunction.  -- Appears to be volume depleted from decreased intake and acute infection.  I will avoid diuretic therapy as his creatinine is 1.8 and he may need fluid restriction and diuresis.  I will gently give him 500 cc IV fluid and monitor intake and output and daily weight.  -- We will get echocardiogram for further evaluation and further steps.  -- We will keep him on remote telemetry      #5.  Acute kidney injury on chronic kidney disease  -- Serum creatinine  is slightly up compared to his baseline of 1.4-1.6.  -- We will administer gentle IV fluid for now, hold losartan, Maxide and monitor closely.      Chronic  comorbid medical conditions:    Permanent A. fib: Home anticoagulants:.  INR therapeutic, pharmacy to assist with dosing.    Essential hypertension: On losartan and Maxide at home, serum creatinine elevated, will hold both medication for now.  Continue his beta-blocker              # Admission Status: Will admit patient to hospitalist service as inpatient as patient likely need over two mid night stay  in the hospital.    # Diet:Diet advanced    # Activity:Advance activity as tolerated    # Education/Counseling :Discussed treatment plan with the patient    # Consults: consulted to assist with  Disposition planning    # VTE prophylactic measures:prophylaxis against venous thromboembolism with already on warfarin      # Code Status:Full Code    # Disposition:anticipate discharge to home with home care services and Anticipate discharge in 3 days        Disclaimer: This note consists of  symbols derived from keyboarding, dictation and/or voice recognition software. As a result, there may be errors in the script that have gone undetected. Please consider this when interpreting information found in this chart.             Chief Complaint:     Fever, generalized weakness and loss of appetite  History is obtained from the patient, electronic health record, emergency department physician and patient's family          History of Present Illness:      This patient is a 89 year old  male with a significant past medical history of hypertension, permanent atrial fibrillation on anticoagulation with Coumadin, chronic kidney disease who presents with the following condition requiring a hospital admission:      Acute viral infection with SARS-CoV-2 and concern for superimposed bacterial infection    Mr. Slater is a 89-year-old male who was seen by Kwadwo Winslow MD today for fever cough and weakness.  He was found to have soft blood pressure and fever and was sent to the emergency department for evaluation.  His symptoms started around 4 days ago and has been progressive.  He has been having generalized weakness, fever, nonproductive cough and decreased intake.  Evaluation in the emergency department revealed COVID-19 infection and evidence of pulmonary filtrate not consistent with COVID.  Patient was then admitted to our service for further care.  I called and spoke with patient's daughter Genna who stated that her father has been very weak and unable to ambulate due to dizziness.  Otherwise he is living at home and  was vaccinated with Pfizer [place]  weeks 3 doses.               Past Medical History:     Past Medical History:   Diagnosis Date     Arrhythmia     A Fib     Balance problem     related to neuropathy in feet     Bradycardia      Carcinoma in situ of bladder 2000    bladder cancer ;; follow w Urology w periodic cysto      Essential hypertension, benign      Generalized osteoarthrosis,  unspecified site knees    s/p R total knee 8/09 ; will do L 6/10      Hernia, abdominal      Numbness and tingling     toes and fingers     DAWSON (obstructive sleep apnea) 8/2/2019     DAWSON (obstructive sleep apnea)      Pacemaker     [employer]      Pain in joint, shoulder region     L shoulder rotater tear; no surgery      Palpitations      Persistent atrial fibrillation (H) 1/30/15     Prostate CA (H) 2008-9    radiation     Prostate cancer (H) 11/08    completed RT 3/09     Renal disease     elevated creatinine     Shoulder impingement     R shoulder impingement etc see MRI 4/09      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     toes both feet             Past Surgical History:     Past Surgical History:   Procedure Laterality Date     ARTHROPLASTY HIP Right 3/27/2017    Procedure: ARTHROPLASTY HIP;  Surgeon: Jigar Wynn MD;  Location:  OR     CARDIOVERSION  3/26/15     COLONOSCOPY       CYSTOSCOPY       CYSTOSCOPY, FULGURATE BLEEDERS, EVACUATE CLOT(S), COMBINED N/A 4/23/2019    Procedure: Exam under anesthesia, video cystopanendoscopy, evacuation of clots, fulguration of prostatic veins.;  Surgeon: Ilir Ramirez MD;  Location:  OR     CYSTOSCOPY, RETROGRADES, COMBINED Bilateral 6/18/2019    Procedure: Video cystopanendoscopy, evacuation of clots, cup biopsies of bladder wall, electrovaporization of prostate, transurethral resection of prostate, bilateral retrograde ureteropyelogram.;  Surgeon: Ilir Ramirez MD;  Location:  OR     CYSTOSCOPY, TRANSURETHRAL RESECTION (TUR) PROSTATE, COMBINED  6/18/2019    Procedure: Cystoscopy, transurethral resection of prostate;  Surgeon: Ilir Ramirez MD;  Location:  OR     HERNIA REPAIR       IMPLANT PACEMAKER  3/26/15     RELEASE CARPAL TUNNEL Right 4/19/2017    Procedure: RELEASE CARPAL TUNNEL;  Right carpal tunnel release;  Surgeon: Alonso Barboza MD;  Location:  OR     ZZC NONSPECIFIC PROCEDURE      bilat  inguinal hernia repairs ( 3total procedures)      [facility] NONSPECIFIC PROCEDURE      pilonidal cyst     Z NONSPECIFIC PROCEDURE      T + A     [facility] NONSPECIFIC PROCEDURE       R+L total knee             Social History:     Social History     Tobacco Use     Smoking status: Former Smoker     Quit date: 1977     Years since quittin.9     Smokeless tobacco: Never Used   Substance Use Topics     Alcohol use: Yes     Alcohol/week: 0.0 standard drinks     Comment: 1 beer daily             Family History:     Family History   Problem Relation Age of Onset     Heart Disease Father          89yo     Coronary Artery Disease Father      Heart Disease Mother          74yo     Coronary Artery Disease Mother      Family History Negative Brother           Allergies:     Allergies   Allergen Reactions     Merbromin      Other reaction(s): Other, see comments  Severe reaction as child. Amalgam fillings OK.     Morphine Nausea and Vomiting     Amlodipine      Edema             Medications:        Prior to Admission medications    Medication Sig Last Dose Taking? Auth Provider Long Term End Date   ACETAMINOPHEN PO Take 650 mg by mouth 2 times daily   Reported, Patient     Ascorbic Acid (VITAMIN C PO) Take 500 mg by mouth daily    Reported, Patient     calcium carbonate (OS-KARLA 500 MG [place]. [place]) 500 MG tablet Take 500 mg by mouth daily    Reported, Patient     carvedilol (COREG) 25 MG tablet TAKE 1 & 1/2 (ONE & ONE-HALF) TABLETS BY MOUTH TWICE DAILY WITH MEALS   Kwadwo Winslow MD Yes    diclofenac (VOLTAREN) 1 % topical gel Place 2 g onto the skin 4 times daily   Reported, Patient     docusate sodium (COLACE) 100 MG capsule Take 100 mg by mouth 2 times daily   Unknown, Entered By History     finasteride (PROSCAR) 5 MG tablet Take 1 tablet by mouth once daily   Kwadwo Winslow MD     losartan (COZAAR) 50 MG tablet Take 1 tablet by mouth once daily   Kwadwo Winslow MD Yes    Multiple  Vitamins-Minerals (OCUVITE PO) Take 1 tablet by mouth 2 times daily    Reported, Patient     order for DME Oxygen 2 Li/min  at night   Hari Ritter MD     triamterene-HCTZ (MAXZIDE-25) 37.5-25 MG tablet Take 1 tablet by mouth once daily   Kwadwo Winslow MD Yes    vitamin B complex with vitamin C (STRESS TAB) tablet Take 1 tablet by mouth daily   Reported, Patient     VITAMIN D, CHOLECALCIFEROL, PO Take 2,000 Units by mouth daily.   Unknown, Entered By History     warfarin ANTICOAGULANT (COUMADIN) 2.5 MG tablet TAKE 1/2 (ONE-HALF) TABLET BY MOUTH ON SUN, TUE, WED, FRI AND 1 TABLET ON MON, THUR, SAT OR AS DIRECTED BY INR CLINIC   Kwadwo Winslow MD            Review of Systems:     A Comprehensive greater than 10 system review of systems was carried out.  Pertinent positives and negatives are noted above in HPI.  Otherwise negative for contributory information.           Physical Exam:     Vital signs were reviewed    Temp:  [98.8  F (37.1  C)-102.2  F (39  C)] 98.8  F (37.1  C)  Pulse:  [68-80] 70  Resp:  [8-26] 17  BP: ()/(54-67) 120/60  SpO2:  [91 %-96 %] 96 %        GEN: awake, alert, cooperative, no apparent distress, oriented x 3    NECK:Supple ,no mass or thyromegaly     HEENT:  Normocephalic/atraumatic, no scleral icterus, no nasal discharge, mouth moist.    CV:  Regular rate and rhythm, no murmur or JVD.  S1 + S2 noted, no S3 or S4.    LUNGS:  Clear to auscultation bilaterally without rales/rhonchi/wheezing/retractions.  Symmetric chest rise on inhalation noted.    ABD:  Active bowel sounds, soft, non-tender/non-distended.  No rebound/guarding/rigidity.    EXT:  No edema.  No cyanosis.  No joint synovitis noted.Lower extremity pulses are normal bilaterally and     LGS: No cervical or axillary lymphadenopathy     SKIN:  Dry to touch, warm ,no exanthems noted in the visualized areas.    Neurologic:Grossly intact,non focal . No acute focal neurologic deficit     Psychaitric exam: Mood and affect  normal     Additional significant  Findings:             Data:       All laboratory and imaging data in the past 24 hours reviewed     Results for orders placed or performed during the hospital encounter of 08/09/22   Chest XR,  PA & LAT     Status: None    Narrative    CHEST TWO VIEWS     8/9/2022 11:13 AM     HISTORY: Cough, fever.    COMPARISON: Chest x-ray 6/10/2019.      Impression    IMPRESSION: Stable left chest pacer. Stable cardiac silhouette.  Interval increased patchy opacities at the left lung base. Correlate  with evidence of developing pneumonia in this region.     MAINE NGUYEN MD         SYSTEM ID:  [id number]   Comprehensive metabolic panel     Status: Abnormal   Result Value Ref Range    Sodium 136 136 - 145 mmol/L    Potassium 4.5 3.4 - 5.3 mmol/L    Creatinine 1.66 (H) 0.67 - 1.17 mg/dL    Urea Nitrogen 44.1 (H) 8.0 - 23.0 mg/dL    Chloride 102 98 - 107 mmol/L    Carbon Dioxide (CO2) 19 (L) 22 - 29 mmol/L    Anion Gap 15 7 - 15 mmol/L    Glucose 89 70 - 99 mg/dL    Calcium 9.0 8.2 - 9.6 mg/dL    Protein Total 7.1 6.4 - 8.3 g/dL    Albumin 3.9 3.5 - 5.2 g/dL    Bilirubin Total 0.8 <=1.2 mg/dL    Alkaline Phosphatase 57 40 - 129 U/L    AST 34 10 - 50 U/L    ALT 24 10 - 50 U/L    GFR Estimate 39 (L) >60 mL/min/1.73m2   Blood gas venous and oxyhgb     Status: Abnormal   Result Value Ref Range    pH Venous 7.40 7.32 - 7.43    pCO2 Venous 34 (L) 40 - 50 mm Hg    pO2 Venous 36 25 - 47 mm Hg    Bicarbonate Venous 21 21 - 28 mmol/L    FIO2 0     Oxyhemoglobin Venous 69 (L) 70 - 75 %    Base Excess/Deficit (+/-) -2.9 -7.7 - 1.9 mmol/L   Procalcitonin     Status: Abnormal   Result Value Ref Range    Procalcitonin 0.57 (H) <0.05 ng/mL   Symptomatic; Auto-generated order Influenza A/B & SARS-CoV2 (COVID-19) Virus PCR Multiplex Nasopharyngeal     Status: Abnormal    Specimen: Nasopharyngeal; Swab   Result Value Ref Range    Influenza A PCR Negative Negative    Influenza B PCR Negative Negative    RSV PCR Negative  Negative    SARS CoV2 PCR Positive (A) Negative    Narrative    Testing was performed using the Xpert Xpress CoV2/Flu/RSV Assay on the [employer] GeneXpert Instrument. This test should be ordered for the detection of SARS-CoV-2 and influenza viruses in individuals who meet clinical and/or epidemiological criteria. Test performance is unknown in asymptomatic patients. This test is for in vitro diagnostic use under the FDA EUA for laboratories certified under CLIA to perform high or moderate complexity testing. This test has not been FDA cleared or approved. A negative result does not rule out the presence of PCR inhibitors in the specimen or target RNA in concentration below the limit of detection for the assay. If only one viral target is positive but coinfection with multiple targets is suspected, the sample should be re-tested with another FDA cleared, approved, or authorized test, if coinfection would change clinical management. This test was validated by the [facility]. These laboratories are certified under the Clinical  Laboratory Improvement Amendments of 1988 (CLIA-88) as qualified to perform high complexity laboratory testing.   Lactic acid whole blood     Status: Normal   Result Value Ref Range    Lactic Acid 1.2 0.7 - 2.0 mmol/L   UA with Microscopic     Status: Abnormal   Result Value Ref Range    Color Urine Yellow Colorless, Straw, Light Yellow, Yellow    Appearance Urine Slightly Cloudy (A) Clear    Glucose Urine Negative Negative mg/dL    Bilirubin Urine Negative Negative    Ketones Urine Negative Negative mg/dL    Specific Gravity Urine 1.022 1.003 - 1.035    Blood Urine Negative Negative    pH Urine 5.0 5.0 - 7.0    Protein Albumin Urine 30  (A) Negative mg/dL    Urobilinogen Urine Normal Normal, 2.0 mg/dL    Nitrite Urine Negative Negative    Leukocyte Esterase Urine Negative Negative    Mucus Urine Present (A) None Seen /LPF    RBC Urine 7 (H) <=2 /HPF    WBC Urine 1 <=5 /HPF     Squamous Epithelials Urine 1 <=1 /HPF   BNP     Status: Abnormal   Result Value Ref Range    N terminal Pro BNP Inpatient 4,583 (H) 0 - 1,800 pg/mL   CBC with platelets and differential     Status: Abnormal   Result Value Ref Range    WBC Count 6.1 4.0 - 11.0 10e3/uL    RBC Count 4.14 (L) 4.40 - 5.90 10e6/uL    Hemoglobin 13.2 (L) 13.3 - 17.7 g/dL    Hematocrit 40.9 40.0 - 53.0 %    MCV 99 78 - 100 fL    MCH 31.9 26.5 - 33.0 pg    MCHC 32.3 31.5 - 36.5 g/dL    RDW 13.9 10.0 - 15.0 %    Platelet Count 140 (L) 150 - 450 10e3/uL    % Neutrophils 85 %    % Lymphocytes 6 %    % Monocytes 9 %    % Eosinophils 0 %    % Basophils 0 %    % Immature Granulocytes 0 %    NRBCs per 100 WBC 0 <1 /100    Absolute Neutrophils 5.2 1.6 - 8.3 10e3/uL    Absolute Lymphocytes 0.4 (L) 0.8 - 5.3 10e3/uL    Absolute Monocytes 0.5 0.0 - 1.3 10e3/uL    Absolute Eosinophils 0.0 0.0 - 0.7 10e3/uL    Absolute Basophils 0.0 0.0 - 0.2 10e3/uL    Absolute Immature Granulocytes 0.0 <=0.4 10e3/uL    Absolute NRBCs 0.0 10e3/uL   Creatinine POCT     Status: Abnormal   Result Value Ref Range    Creatinine POCT 1.8 (H) 0.7 - 1.3 mg/dL    GFR, ESTIMATED POCT 35 (L) >60 mL/min/1.73m2   INR     Status: Abnormal   Result Value Ref Range    INR 2.12 (H) 0.85 - 1.15   EKG 12 lead     Status: None   Result Value Ref Range    Systolic Blood Pressure  mmHg    Diastolic Blood Pressure  mmHg    Ventricular Rate 68 BPM    Atrial Rate 55 BPM    WV Interval  ms    QRS Duration 182 ms     ms    QTc 476 ms    P Axis  degrees    R AXIS -58 degrees    T Axis 92 degrees    Interpretation ECG       Ventricular-paced rhythm  Abnormal ECG  When compared with ECG of 04-JUN-2019 17:54,  Previous ECG has undetermined rhythm, needs review  Confirmed by - EMERGENCY ROOM, PHYSICIAN ([id number]),  VASILIY ANGELES ([id number]) on 8/9/2022 11:55:27 AM     CBC + differential     Status: Abnormal    Narrative    The following orders were created for panel order CBC +  differential.  Procedure                               Abnormality         Status                     ---------                               -----------         ------                     CBC with platelets and d...[[id number]]  Abnormal            Final result                 Please view results for these tests on the individual orders.          Recent Results (from the past 48 hour(s))   Chest XR,  PA & LAT    Narrative    CHEST TWO VIEWS     8/9/2022 11:13 AM     HISTORY: Cough, fever.    COMPARISON: Chest x-ray 6/10/2019.      Impression    IMPRESSION: Stable left chest pacer. Stable cardiac silhouette.  Interval increased patchy opacities at the left lung base. Correlate  with evidence of developing pneumonia in this region.        EKG results: Ventricular paced rhythm         All imaging studies reviewed by me.         Patient`s old medical records reviewed and case discussed with the ED physician.    ED course-Reviewed  and care plan discussed with Isreal Hinojosa MD

## 2022-08-10 ENCOUNTER — DOCUMENTATION ONLY (OUTPATIENT)
Dept: OTHER | Facility: CLINIC | Age: 87
End: 2022-08-10

## 2022-08-10 ENCOUNTER — APPOINTMENT (OUTPATIENT)
Dept: PHYSICAL THERAPY | Facility: CLINIC | Age: 87
DRG: 177 | End: 2022-08-10
Attending: INTERNAL MEDICINE
Payer: COMMERCIAL

## 2022-08-10 LAB
ANION GAP SERPL CALCULATED.3IONS-SCNC: 11 MMOL/L (ref 7–15)
BUN SERPL-MCNC: 40.7 MG/DL (ref 8–23)
CALCIUM SERPL-MCNC: 8.7 MG/DL (ref 8.2–9.6)
CHLORIDE SERPL-SCNC: 104 MMOL/L (ref 98–107)
CREAT SERPL-MCNC: 1.42 MG/DL (ref 0.67–1.17)
CRP SERPL-MCNC: 130.28 MG/L
D DIMER PPP FEU-MCNC: 0.56 UG/ML FEU (ref 0–0.5)
DEPRECATED HCO3 PLAS-SCNC: 18 MMOL/L (ref 22–29)
ERYTHROCYTE [DISTWIDTH] IN BLOOD BY AUTOMATED COUNT: 13.8 % (ref 10–15)
GFR SERPL CREATININE-BSD FRML MDRD: 47 ML/MIN/1.73M2
GLUCOSE SERPL-MCNC: 84 MG/DL (ref 70–99)
HCT VFR BLD AUTO: 37.7 % (ref 40–53)
HGB BLD-MCNC: 12 G/DL (ref 13.3–17.7)
INR PPP: 2.15 (ref 0.85–1.15)
MAGNESIUM SERPL-MCNC: 1.6 MG/DL (ref 1.7–2.3)
MCH RBC QN AUTO: 31.4 PG (ref 26.5–33)
MCHC RBC AUTO-ENTMCNC: 31.8 G/DL (ref 31.5–36.5)
MCV RBC AUTO: 99 FL (ref 78–100)
PLATELET # BLD AUTO: 125 10E3/UL (ref 150–450)
POTASSIUM SERPL-SCNC: 4.2 MMOL/L (ref 3.4–5.3)
RBC # BLD AUTO: 3.82 10E6/UL (ref 4.4–5.9)
SODIUM SERPL-SCNC: 133 MMOL/L (ref 136–145)
WBC # BLD AUTO: 5.9 10E3/UL (ref 4–11)

## 2022-08-10 PROCEDURE — 250N000013 HC RX MED GY IP 250 OP 250 PS 637: Performed by: INTERNAL MEDICINE

## 2022-08-10 PROCEDURE — 83735 ASSAY OF MAGNESIUM: CPT | Performed by: INTERNAL MEDICINE

## 2022-08-10 PROCEDURE — 85610 PROTHROMBIN TIME: CPT | Performed by: INTERNAL MEDICINE

## 2022-08-10 PROCEDURE — 97530 THERAPEUTIC ACTIVITIES: CPT | Mod: GP | Performed by: PHYSICAL THERAPIST

## 2022-08-10 PROCEDURE — 258N000003 HC RX IP 258 OP 636: Performed by: INTERNAL MEDICINE

## 2022-08-10 PROCEDURE — 120N000001 HC R&B MED SURG/OB

## 2022-08-10 PROCEDURE — 82310 ASSAY OF CALCIUM: CPT | Performed by: INTERNAL MEDICINE

## 2022-08-10 PROCEDURE — 250N000011 HC RX IP 250 OP 636: Performed by: INTERNAL MEDICINE

## 2022-08-10 PROCEDURE — 97161 PT EVAL LOW COMPLEX 20 MIN: CPT | Mod: GP | Performed by: PHYSICAL THERAPIST

## 2022-08-10 PROCEDURE — 36415 COLL VENOUS BLD VENIPUNCTURE: CPT | Performed by: INTERNAL MEDICINE

## 2022-08-10 PROCEDURE — 85014 HEMATOCRIT: CPT | Performed by: INTERNAL MEDICINE

## 2022-08-10 PROCEDURE — 86140 C-REACTIVE PROTEIN: CPT | Performed by: INTERNAL MEDICINE

## 2022-08-10 PROCEDURE — 99232 SBSQ HOSP IP/OBS MODERATE 35: CPT | Performed by: INTERNAL MEDICINE

## 2022-08-10 PROCEDURE — 97116 GAIT TRAINING THERAPY: CPT | Mod: GP | Performed by: PHYSICAL THERAPIST

## 2022-08-10 PROCEDURE — 85379 FIBRIN DEGRADATION QUANT: CPT | Performed by: INTERNAL MEDICINE

## 2022-08-10 RX ADMIN — ACETAMINOPHEN 650 MG: 325 TABLET, FILM COATED ORAL at 20:31

## 2022-08-10 RX ADMIN — DOXYCYCLINE 100 MG: 100 INJECTION, POWDER, LYOPHILIZED, FOR SOLUTION INTRAVENOUS at 20:31

## 2022-08-10 RX ADMIN — DOCUSATE SODIUM 100 MG: 100 CAPSULE, LIQUID FILLED ORAL at 08:08

## 2022-08-10 RX ADMIN — WARFARIN SODIUM 1.25 MG: 2.5 TABLET ORAL at 18:07

## 2022-08-10 RX ADMIN — SODIUM CHLORIDE: 9 INJECTION, SOLUTION INTRAVENOUS at 05:41

## 2022-08-10 RX ADMIN — CARVEDILOL 25 MG: 25 TABLET, FILM COATED ORAL at 08:08

## 2022-08-10 RX ADMIN — CEFTRIAXONE 2 G: 2 INJECTION, POWDER, FOR SOLUTION INTRAMUSCULAR; INTRAVENOUS at 18:07

## 2022-08-10 RX ADMIN — FINASTERIDE 5 MG: 5 TABLET, FILM COATED ORAL at 10:23

## 2022-08-10 RX ADMIN — CARVEDILOL 25 MG: 25 TABLET, FILM COATED ORAL at 18:07

## 2022-08-10 RX ADMIN — DOXYCYCLINE 100 MG: 100 INJECTION, POWDER, LYOPHILIZED, FOR SOLUTION INTRAVENOUS at 08:08

## 2022-08-10 RX ADMIN — ACETAMINOPHEN 650 MG: 325 TABLET, FILM COATED ORAL at 08:08

## 2022-08-10 ASSESSMENT — ACTIVITIES OF DAILY LIVING (ADL)
ADLS_ACUITY_SCORE: 26
ADLS_ACUITY_SCORE: 26
ADLS_ACUITY_SCORE: 22
ADLS_ACUITY_SCORE: 28
ADLS_ACUITY_SCORE: 22
ADLS_ACUITY_SCORE: 26
ADLS_ACUITY_SCORE: 28
ADLS_ACUITY_SCORE: 22

## 2022-08-10 NOTE — PROGRESS NOTES
[facility]  Hospitalist Progress Note  Zia Gomez MD 08/10/2022    Reason for Stay (Diagnosis): Acute COVID-19 infection, possible bacterial pneumonia         Assessment and Plan:      Summary of Stay: Tucker Slater is a 89 year old male with a past medical history of chronic atrial fibrillation on Coumadin, peripheral neuropathy, chronic kidney disease, obstructive sleep apnea, on CPAP at home who presented with fever, cough and generalized weakness.and admitted on 8/9/2022  Problem List:      1.  Acute COVID-19  infection  -Patient with symptoms for acute COVID-19 infection.  -He has fatigue, fever decreased appetite.    -He is not hypoxic saturating mid 90s on room air.  -There was no evidence of COVID type infiltrate on his chest x-ray.  - Patient was vaccinated with Pfizer and Moderna, boosted.  -He stated his wife is also has COVID and being admitted to the hospital.  -Continue supportive care.  -Continue full anticoagulation.  -No indication for steroids or COVID-19 treatment as patient is not hypoxic.     2.  Generalized weakness  - We will get PT and OT assessment for discharge needs        3.   Possible bacterial left lower lobe pneumonia.  -Patient has left lung base infiltrate on chest x-ray.,  -Procalcitonin 0.57.  -Started on IV antibiotics on admission which will be continued.  -Monitor cultures..        4.  Acute kidney injury on chronic kidney disease  -Creatinine baseline of 1.4-1.6.  -Creatinine is down to 1.42 today,  -He was gently hydrated  -Continue to hold Losartan, Maxide and monitor closely.    5.  Mild hyponatremia.  Continue to monitor.  Continue to hold diuretics        5.  Chronic  comorbid medical conditions:    Permanent A. fib: On warfarin for anticoagulant:.  INR therapeutic, pharmacy to assist with dosing.    Essential hypertension: On losartan and Maxide at home, serum creatinine elevated, currently on hold will resume as appropriate.  Continue  beta-blocker.           DVT Prophylaxis: Warfarin  Code Status: Full Code  Discharge Dispo: Home  Estimated Disch Date / # of Days until Disch: 2 days more.  I discussed with patient the plan of care.  All his question and concerns addressed      Interval History (Subjective):      Patient seen and examined, assumed care today, feels [place].                  Physical Exam:      Last Vital Signs:  /64 (BP Location: Right arm)   Pulse 72   Temp 98.2  F (36.8  C) (Oral)   Resp 16   Ht 1.829 m (6')   Wt 81.4 kg (179 lb 8 oz)   SpO2 95%   BMI 24.34 kg/m      I/O last 3 completed shifts:  In: 2104 [I.V.:1204; IV Piggyback:900]  Out: 400 [Urine:400]  Vitals:    08/09/22 1622 08/10/22 0641   Weight: 80.2 kg (176 lb 14.4 oz) 81.4 kg (179 lb 8 oz)     Current Facility-Administered Medications   Medication     acetaminophen (TYLENOL) tablet 650 mg     carvedilol (COREG) tablet 25 mg     cefTRIAXone (ROCEPHIN) 2 g vial to attach to  ml bag for ADULTS or NS 50 ml bag for PEDS     docusate sodium (COLACE) capsule 100 mg     doxycycline (VIBRAMYCIN) 100 mg vial to attach to  mL bag     finasteride (PROSCAR) tablet 5 mg     lidocaine (LMX4) cream     lidocaine (LMX4) cream     lidocaine 1 % 0.1-1 mL     lidocaine 1 % 0.1-1 mL     Medication instructions: Do NOT use nebulized medications     melatonin tablet 1 mg     ondansetron (ZOFRAN ODT) ODT tab 4 mg    Or     ondansetron (ZOFRAN) injection 4 mg     Patient is already receiving anticoagulation with heparin, enoxaparin (LOVENOX), warfarin (COUMADIN)  or other anticoagulant medication     prochlorperazine (COMPAZINE) injection 5 mg    Or     prochlorperazine (COMPAZINE) tablet 5 mg    Or     prochlorperazine (COMPAZINE) suppository 12.5 mg     sodium chloride (PF) 0.9% PF flush 3 mL     sodium chloride (PF) 0.9% PF flush 3 mL     sodium chloride (PF) 0.9% PF flush 3 mL     sodium chloride (PF) 0.9% PF flush 3 mL     sodium chloride 0.9% infusion      warfarin ANTICOAGULANT (COUMADIN) half-tab 1.25 mg     Warfarin Dose Required Daily - Pharmacist Managed       Constitutional: Awake, alert, cooperative, no apparent distress   Respiratory: Clear to auscultation bilaterally, no crackles or wheezing   Cardiovascular: Regular rate and rhythm, normal S1 and S2, and no murmur noted   Abdomen: Normal bowel sounds, soft, non-distended, non-tender   Skin: No rashes, no cyanosis, dry to touch   Neuro: Alert and oriented x3, no weakness, numbness, memory loss   Extremities: No edema, normal range of motion   Other(s):HEENT        All other systems: Negative          Medications:      All current medications were reviewed with changes reflected in problem list.         Data:      All new lab and imaging data was reviewed.   Labs:  Recent Labs   Lab 08/10/22  0732 08/09/22  1608 08/09/22  1018 08/09/22  1015   * 135*  --  136   POTASSIUM 4.2 4.3  --  4.5   CHLORIDE 104 105  --  102   CO2 18* 20*  --  19*   ANIONGAP 11 10  --  15   GLC 84 119*  --  89   BUN 40.7* 37.4*  --  44.1*   CR 1.42* 1.55* 1.8* 1.66*   GFRESTIMATED 47* 42* 35* 39*   KARLA 8.7 8.9  --  9.0     Recent Labs   Lab 08/10/22  0732 08/09/22  1608 08/09/22  1015   WBC 5.9 8.0 6.1   HGB 12.0* 12.0* 13.2*   HCT 37.7* 37.9* 40.9   MCV 99 98 99   * 135* 140*     Recent Labs   Lab 08/10/22  0732 08/09/22  1608 08/09/22  1015   GLC 84 119* 89      Imaging:   Results for orders placed or performed during the hospital encounter of 08/09/22   Chest XR,  PA & LAT    Narrative    CHEST TWO VIEWS     8/9/2022 11:13 AM     HISTORY: Cough, fever.    COMPARISON: Chest x-ray 6/10/2019.      Impression    IMPRESSION: Stable left chest pacer. Stable cardiac silhouette.  Interval increased patchy opacities at the left lung base. Correlate  with evidence of developing pneumonia in this region.     MAINE NGUYEN MD         SYSTEM ID:  [id number]   Echocardiogram Limited     Value    LVEF  50-55%    Narrative     [id number]  Formerly [facility]  [id number]  [id number]^MARIA TERESA^MARGARET^E     [facility]  Echocardiography Laboratory  [address] [place] [address]     Name: ROSS LORENZANA  MRN: [id number]  : 1931  Study Date: 2022 03:38 PM  Age: 89 yrs  Gender: Male  Patient Location[place] [facility]  [place] [facility] [place]  Ordering Physician: MARGARET MOREL  Referring Physician: Kwadwo Winslow  Performed By: Jo Mcdaniels RDCS     BSA: 2.0 m2  Height: 71 in  Weight: 185 lb  HR: 71  BP: 118/61 mmHg  ______________________________________________________________________________  Procedure  Limited Portable Echo Adult.  ______________________________________________________________________________  Interpretation Summary     The visual ejection fraction is 50-55%.  There are regional wall motion abnormalities as specified.  There is mild to moderate (1-2+) mitral regurgitation.  There is mild to moderate (1-2+) tricuspid regurgitation.  Right ventricular systolic pressure is elevated, consistent with mild to  moderate pulmonary hypertension.  There is trace aortic regurgitation.  The rhythm was atrial fibrillation with paced rhythm.  The study was technically difficult.  ______________________________________________________________________________  Left Ventricle  Diastolic function not assessed due to atrial fibrillation. The visual  ejection fraction is 50-55%. Septal wall motion abnormality may reflect  pacemaker activation. Relative to the other walls, the basal inferior wall  appears moderately hypokinetic. There are regional wall motion abnormalities  as specified. A false chord is noted (normal variant).     Right Ventricle  There is a catheter/pacemaker lead seen in the right ventricle. The right  ventricle is normal in size and function.     Atria  The left atrium is moderately dilated. The right atrium is moderately dilated.  There is no color Doppler evidence of an atrial shunt.     Mitral  Valve  There is mild to moderate (1-2+) mitral regurgitation.     Tricuspid Valve  There is mild to moderate (1-2+) tricuspid regurgitation. The right  ventricular systolic pressure is approximated at 30.3mmHg plus the right  atrial pressure. Right ventricular systolic pressure could be underestimated  due to incomplete tricuspid regurgitation velocity envelope. IVC diameter >2.1  cm collapsing <50% with sniff suggests a high RA pressure estimated at 15 mmHg  or greater. Right ventricular systolic pressure is elevated, consistent with  mild to moderate pulmonary hypertension.     Aortic Valve  The aortic valve is trileaflet. There is trace aortic regurgitation. No  hemodynamically significant valvular aortic stenosis.     Vessels  IVC diameter >2.1 cm collapsing <50% with sniff suggests a high RA pressure  estimated at 15 mmHg or greater. Dilation of the inferior vena cava is present  with abnormal respiratory variation in diameter.     Rhythm  The rhythm was atrial fibrillation with paced rhythm.     ______________________________________________________________________________  MMode/2D Measurements & Calculations  TAPSE: 2.5 cm     Doppler Measurements & Calculations  MV E max amilcar: 94.1 cm/sec  MV dec time: 0.22 sec  AI P1/2t: 574.1 msec  E/E' av.2     Lateral E/e': 8.7  Medial E/e': 9.7     ______________________________________________________________________________  Report approved by: César Monterroso 2022 04:36 PM

## 2022-08-10 NOTE — PROGRESS NOTES
"   08/10/22 1108   Quick Adds   Type of Visit Initial PT Evaluation   Living Environment   People in Home spouse;child(aurelio), adult   Current Living Arrangements house   Home Accessibility stairs within home   Number of Stairs, Within Home, Primary seven   Stair Railings, Within Home, Primary railings on both sides of stairs   Transportation Anticipated family or friend will provide   Living Environment Comments Pt lives in a split level home, reports that there are \"robust\" railings on both sides of all stairs in the home   Self-Care   Usual Activity Tolerance good   Current Activity Tolerance moderate   Regular Exercise No   Equipment Currently Used at Home cane, straight   Fall history within last six months no   Activity/Exercise/Self-Care Comment Daughter support pt and spouse by completing most of the IADLs   General Information   Onset of Illness/Injury or Date of Surgery 08/09/22   Referring Physician Juan Pablo Blanca MD   Patient/Family Therapy Goals Statement (PT) Discharge to home once feeling better   Pertinent History of Current Problem (include personal factors and/or comorbidities that impact the POC) Tucker Slater is a 89 year old  male with a significant past medical history of chronic atrial fibrillation on anticoagulation with Coumadin, peripheral neuropathy, chronic kidney disease, obstructive sleep apnea at home who presents with fever cough and generalized weakness.   Existing Precautions/Restrictions fall   Cognition   Affect/Mental Status (Cognition) WNL   Orientation Status (Cognition) oriented x 4   Follows Commands (Cognition) WNL   Pain Assessment   Patient Currently in Pain No   Integumentary/Edema   Integumentary/Edema no deficits were identifed   Posture    Posture Forward head position;Protracted shoulders   Range of Motion (ROM)   Range of Motion ROM is WNL   Strength (Manual Muscle Testing)   Strength (Manual Muscle Testing) Deficits observed during functional " mobility   Strength Comments Utilizes B UE support for transfers   Bed Mobility   Comment, (Bed Mobility) sit<>supine with Deepika   Transfers   Comment, (Transfers) sit<>stand with CGA   Gait/Stairs (Locomotion)   Comment, (Gait/Stairs) CGA with no AD   Balance   Balance Comments pt reports dynamic balance deficits at baseline and that his use of cane is primarily due to balance concerns   Sensory Examination   Sensory Perception Comments Pt has B peripheral neuropathy at baseline   Coordination   Coordination no deficits were identified   Muscle Tone   Muscle Tone no deficits were identified   Clinical Impression   Criteria for Skilled Therapeutic Intervention Yes, treatment indicated   PT Diagnosis (PT) Impaired functional mobility   Influenced by the following impairments Weakness, deconditioning, impaired balance   Functional limitations due to impairments Difficulty with bed mobility, transfers, ambulation, stairs   Clinical Presentation (PT Evaluation Complexity) Stable/Uncomplicated   Clinical Presentation Rationale medically progressing, clear POC   Clinical Decision Making (Complexity) low complexity   Planned Therapy Interventions (PT) balance training;bed mobility training;gait training;home exercise program;patient/family education;stair training;strengthening;stretching;transfer training;progressive activity/exercise;home program guidelines   Anticipated Equipment Needs at Discharge (PT) walker, rolling   Risk & Benefits of therapy have been explained evaluation/treatment results reviewed;care plan/treatment goals reviewed;risks/benefits reviewed;current/potential barriers reviewed;participants voiced agreement with care plan;participants included;patient   PT Discharge Planning   PT Discharge Recommendation (DC Rec) home with assist;home with home care physical therapy   PT Rationale for DC Rec Anticipate that with continued medical management and IP PT the patient will be able to safely discharge home  with continued assist for IADL management and HHPT to progress safety and indep with functional mobility. Pt hesitant re:HHPT and asked to think about it for a day before deciding if this is something he will need.   PT Brief overview of current status CGA w/FWW   Total Evaluation Time   Total Evaluation Time (Minutes) 10   Physical Therapy Goals   PT Frequency Daily   PT Predicted Duration/Target Date for Goal Attainment 08/15/22   PT Goals Bed Mobility;Transfers;Gait;Stairs   PT: Bed Mobility Supine to/from sit;Modified independent   PT: Transfers Modified independent;Sit to/from stand   PT: Gait Modified independent;Assistive device;100 feet   PT: Stairs Supervision/stand-by assist;6 stairs;Rail on both sides

## 2022-08-10 NOTE — CONSULTS
"CLINICAL NUTRITION SERVICES  -  ASSESSMENT NOTE      Recommendations:   - Continue diet per MD.  - Can offer supplements at follow-up, if warranted.  Denied decreased appetite/intake thus far.     MALNUTRITION:  % Weight Loss:  None noted  % Intake:  Decreased intake does not meet criteria for malnutrition   Subcutaneous Fat Loss:  Unable to observe in full  Muscle Loss:  Unable to observe in full  Fluid Retention: None documented    Malnutrition Diagnosis: Unable to determine d/t lack of NFPE          REASON FOR ASSESSMENT  Tucker Slater is a 89 year old male seen by Registered Dietitian for Admission Nutrition Risk Screen for positive.    PMH of: Peripheral neuropathy, CKD.    Admit 2/2: Weakness, COVID+, DANIEL.    NUTRITION HISTORY  - Information obtained from patient (via phone) and chart.  - Diet at home: Regular verbalized.  - Usual intakes: Meals TID.  - Barriers to PO intakes: Reports he had a period of decreased intakes d/t nausea for 1 day PTA, but was eating normally prior to this.  - Use of oral supplements: None.  - Chewing/swallowing issues: Denies.  - Allergies: NKFA.      CURRENT NUTRITION ORDERS  Diet Order:     Regular    Current Intake/Tolerance:  Limited timeframe since admission.  Notes he \"had a really good breakfast this morning\".  Reports appetite is baseline and denies nausea, or loss of taste/smell.      Obtained from Chart/Interdisciplinary Team:  - On room air  - No documentation of PI  - Stooling patterns reviewed    ANTHROPOMETRICS  Height: 6' 0\"[per pt[  Weight: 179 lbs 8 oz  Body mass index is 24.34 kg/m .  Weight Status:  Normal BMI  Weight History:  Wt Readings from Last 10 Encounters:   08/10/22 81.4 kg (179 lb 8 oz)   08/09/22 80.1 kg (176 lb 8 oz)   05/17/22 82 kg (180 lb 12.8 oz)   01/28/22 84.4 kg (186 lb)   11/03/21 82.6 kg (182 lb)   08/04/21 85.7 kg (189 lb)   04/05/21 86.6 kg (191 lb)   11/10/20 88.2 kg (194 lb 8 oz)   07/22/20 83.9 kg (185 lb)   07/02/20 85.3 kg (188 " lb)     - Patient reports UBW is around 180-185# and denies wt loss PTA.    LABS  Labs reviewed.      MEDICATIONS  Medications reviewed.      ASSESSED NUTRITION NEEDS PER APPROVED PRACTICE GUIDELINES:    Dosing Weight 81 kg   Estimated Energy Needs: 20-25+ Kcal/Kg  Justification: minimum maintenance, COVID+  Estimated Protein Needs: >/=1.2 g pro/Kg  Justification: preservation of lean body mass, advanced age, COVID+  Estimated Fluid Needs: per MD      NUTRITION DIAGNOSIS:  Predicted inadequate nutrient intake (energy/protein) related to potential for decline in PO intakes/appetite during admit pending clinical course for COVID and LOS.    NUTRITION INTERVENTIONS  Recommendations / Nutrition Prescription  See above.      Implementation  Nutrition education: Provided education on RD following peripherally.    Collaboration and Referral of Nutrition care: Discussed POC with team during rounds and briefly with RN.    Nutrition Goals  Patient to consume at least 75% of meals TID while acutely admitted.       MONITORING AND EVALUATION:  Progress towards goals will be monitored and evaluated per protocol and Practice Guidelines          Janette Amaya RDN, LD  Clinical Dietitian  3rd floor/ICU: [phone]  All other floors: [phone]  Weekend/holiday: [phone]  Office: [phone]

## 2022-08-10 NOTE — PHARMACY-ADMISSION MEDICATION HISTORY
Admission medication history interview status for this patient is complete. See [facility] admission navigator for allergy information, prior to admission medications and immunization status.     Medication history interview done, indicate source(s): Patient  Medication history resources (including written lists, pill bottles, clinic record):EPIC    Changes made to PTA medication list:  Added: multivitamins in addition to Ocuvite  Changed: Tylenol, Diclofenac to prn, Docusate to prn  Reported as Not Taking: Vitamin D as separate pill  Removed: none    Medication reconciliation/reorder completed by provider prior to medication history?  Y      Prior to Admission medications    Medication Sig Last Dose Taking? Auth Provider Long Term End Date   acetaminophen (TYLENOL) 500 MG tablet Take 1,000 mg by mouth 2 times daily  Yes Unknown, Entered By History     Ascorbic Acid (VITAMIN C PO) Take 500 mg by mouth daily  8/8/2022 at Unknown time Yes Reported, Patient     calcium carbonate-vitamin D (OSCAL W/D) 500-200 MG-UNIT tablet Take 1 tablet by mouth daily 8/8/2022 at Unknown time Yes Unknown, Entered By History     carvedilol (COREG) 25 MG tablet TAKE 1 & 1/2 (ONE & ONE-HALF) TABLETS BY MOUTH TWICE DAILY WITH MEALS  Patient taking differently: Take 37.5 mg by mouth 2 times daily (with meals) 8/8/2022 at Unknown time Yes Kwadwo Winslow MD Yes    diclofenac (VOLTAREN) 1 % topical gel Place 2 g onto the skin 4 times daily as needed  at prn Yes Reported, Patient     docusate sodium (COLACE) 100 MG capsule Take 100 mg by mouth 2 times daily as needed  at prn Yes Unknown, Entered By History     finasteride (PROSCAR) 5 MG tablet Take 1 tablet by mouth once daily  Patient taking differently: Take 5 mg by mouth daily 8/8/2022 at am Yes Kwadwo Winslow MD     losartan (COZAAR) 50 MG tablet Take 1 tablet by mouth once daily  Patient taking differently: Take 50 mg by mouth daily 8/8/2022 at am Yes Kwadwo Winslow MD Yes     Multiple Vitamins-Minerals (OCUVITE PO) Take 1 tablet by mouth daily  Yes Reported, Patient     multivitamin w/minerals (THERA-VIT-M) tablet Take 1 tablet by mouth daily  Yes Unknown, Entered By History     triamterene-HCTZ (MAXZIDE-25) 37.5-25 MG tablet Take 1 tablet by mouth once daily  Patient taking differently: Take 1 tablet by mouth daily 8/8/2022 at am Yes Kwadwo Winslow MD Yes    vitamin B complex with vitamin C (STRESS TAB) tablet Take 1 tablet by mouth daily 8/8/2022 at Unknown time Yes Reported, Patient     warfarin ANTICOAGULANT (COUMADIN) 2.5 MG tablet TAKE 1/2 (ONE-HALF) TABLET BY MOUTH ON SUN, TUE, WED, FRI AND 1 TABLET ON MON, THUR, SAT OR AS DIRECTED BY INR CLINIC 8/8/2022 Yes Kwadwo Winslow MD     order for DME Oxygen 2 Li/min  at night   Hari Ritter MD

## 2022-08-10 NOTE — PROGRESS NOTES
Pt does not have home CPAP in hospital. Refused hospital unit. He does use O2 with his home machine and agreed to wear NC overnight. Communicated this plan to RN.    Harika Mcdaniels, RT

## 2022-08-10 NOTE — UTILIZATION REVIEW
Admission Status; Secondary Review Determination    Under the authority of the Utilization Management Committee, the utilization review process indicated a secondary review on the above patient. The review outcome is based on review of the medical records, discussions with staff, and applying clinical experience noted on the date of the review.    (x) Inpatient Status Appropriate - This patient's medical care is consistent with medical management for inpatient care and reasonable inpatient medical practice.    RATIONALE FOR DETERMINATION:89-year-old male with history of chronic atrial fibrillation, peripheral neuropathy, chronic kidney disease who presents with significant cough, fever and profound weakness with a temperature of 102.2 degrees and relative hypotension.  Patient is positive COVID-19, has increased patchy opacities at left lung base with significant elevated procalcitonin level as well as .  Patient will require management for acute COVID-19 as well as presumed pneumonia and 89-year-old appropriate for inpatient care.    At the time of admission with the information available to the attending physician more than 2 nights Hospital complex care was anticipated, based on patient risk of adverse outcome if treated as outpatient and complex care required. Inpatient admission is appropriate based on the Medicare guidelines.    This document was produced using voice recognition software    The information on this document is developed by the utilization review team in order for the business office to ensure compliance. This only denotes the appropriateness of proper admission status and does not reflect the quality of care rendered.    The definitions of Inpatient Status and Observation Status used in making the determination above are those provided in the CMS Coverage Manual, Chapter 1 and Chapter 6, section 70.4.    Sincerely,    Ramiro Hunter MD  Utilization Review  Physician Advisor  [facility].

## 2022-08-10 NOTE — PLAN OF CARE
Pertinent assessments: Pt A&O x4, VSS on 2L O2. Infrequent dry cough. External catheter in place with good output.     Major Shift Events admitted to floor    Treatment Plan: IVF, doxycycline, Rocephin, respiratory

## 2022-08-10 NOTE — PLAN OF CARE
Pertinent assessments: Pt is A/O with VSS. Denied pain. No sob nor n/v noted. LS- diminished. No bm. IVF- NS @ 100 ml/hr. Non-productive cough. Pacemaker on left chest. Tele- V-paced HR 73 per tele tech. External cath in place.     Major Shift Events: None    Treatment Plan: pain management, symptom management, Rocephin, encourage activity as tolerated.    Bedside Nurse: Lewis Jauregui RN

## 2022-08-11 VITALS
DIASTOLIC BLOOD PRESSURE: 73 MMHG | BODY MASS INDEX: 24.69 KG/M2 | SYSTOLIC BLOOD PRESSURE: 141 MMHG | HEIGHT: 72 IN | WEIGHT: 182.3 LBS | RESPIRATION RATE: 20 BRPM | HEART RATE: 73 BPM | OXYGEN SATURATION: 94 % | TEMPERATURE: 97.7 F

## 2022-08-11 LAB
ANION GAP SERPL CALCULATED.3IONS-SCNC: 8 MMOL/L (ref 7–15)
BUN SERPL-MCNC: 31.2 MG/DL (ref 8–23)
CALCIUM SERPL-MCNC: 8.2 MG/DL (ref 8.2–9.6)
CHLORIDE SERPL-SCNC: 108 MMOL/L (ref 98–107)
CREAT SERPL-MCNC: 1.22 MG/DL (ref 0.67–1.17)
CRP SERPL-MCNC: 79.52 MG/L
D DIMER PPP FEU-MCNC: 0.79 UG/ML FEU (ref 0–0.5)
DEPRECATED HCO3 PLAS-SCNC: 19 MMOL/L (ref 22–29)
ERYTHROCYTE [DISTWIDTH] IN BLOOD BY AUTOMATED COUNT: 13.8 % (ref 10–15)
GFR SERPL CREATININE-BSD FRML MDRD: 56 ML/MIN/1.73M2
GLUCOSE SERPL-MCNC: 83 MG/DL (ref 70–99)
HCT VFR BLD AUTO: 36.3 % (ref 40–53)
HGB BLD-MCNC: 11.4 G/DL (ref 13.3–17.7)
INR PPP: 2.33 (ref 0.85–1.15)
MAGNESIUM SERPL-MCNC: 1.6 MG/DL (ref 1.7–2.3)
MCH RBC QN AUTO: 31 PG (ref 26.5–33)
MCHC RBC AUTO-ENTMCNC: 31.4 G/DL (ref 31.5–36.5)
MCV RBC AUTO: 99 FL (ref 78–100)
PLATELET # BLD AUTO: 131 10E3/UL (ref 150–450)
POTASSIUM SERPL-SCNC: 3.7 MMOL/L (ref 3.4–5.3)
RBC # BLD AUTO: 3.68 10E6/UL (ref 4.4–5.9)
SODIUM SERPL-SCNC: 135 MMOL/L (ref 136–145)
WBC # BLD AUTO: 3.5 10E3/UL (ref 4–11)

## 2022-08-11 PROCEDURE — 85379 FIBRIN DEGRADATION QUANT: CPT | Performed by: INTERNAL MEDICINE

## 2022-08-11 PROCEDURE — 250N000013 HC RX MED GY IP 250 OP 250 PS 637: Performed by: INTERNAL MEDICINE

## 2022-08-11 PROCEDURE — 36415 COLL VENOUS BLD VENIPUNCTURE: CPT | Performed by: INTERNAL MEDICINE

## 2022-08-11 PROCEDURE — 99239 HOSP IP/OBS DSCHRG MGMT >30: CPT | Performed by: INTERNAL MEDICINE

## 2022-08-11 PROCEDURE — 85610 PROTHROMBIN TIME: CPT | Performed by: INTERNAL MEDICINE

## 2022-08-11 PROCEDURE — 84132 ASSAY OF SERUM POTASSIUM: CPT | Performed by: INTERNAL MEDICINE

## 2022-08-11 PROCEDURE — 83735 ASSAY OF MAGNESIUM: CPT | Performed by: INTERNAL MEDICINE

## 2022-08-11 PROCEDURE — 85027 COMPLETE CBC AUTOMATED: CPT | Performed by: INTERNAL MEDICINE

## 2022-08-11 PROCEDURE — 250N000011 HC RX IP 250 OP 636: Performed by: INTERNAL MEDICINE

## 2022-08-11 PROCEDURE — 86140 C-REACTIVE PROTEIN: CPT | Performed by: INTERNAL MEDICINE

## 2022-08-11 PROCEDURE — 258N000003 HC RX IP 258 OP 636: Performed by: INTERNAL MEDICINE

## 2022-08-11 RX ORDER — DOXYCYCLINE HYCLATE 100 MG
100 TABLET ORAL 2 TIMES DAILY
Qty: 10 TABLET | Refills: 0 | Status: SHIPPED | OUTPATIENT
Start: 2022-08-11 | End: 2022-08-30

## 2022-08-11 RX ADMIN — SODIUM CHLORIDE: 9 INJECTION, SOLUTION INTRAVENOUS at 04:27

## 2022-08-11 RX ADMIN — CARVEDILOL 25 MG: 25 TABLET, FILM COATED ORAL at 08:07

## 2022-08-11 RX ADMIN — DOXYCYCLINE 100 MG: 100 INJECTION, POWDER, LYOPHILIZED, FOR SOLUTION INTRAVENOUS at 08:07

## 2022-08-11 RX ADMIN — ACETAMINOPHEN 650 MG: 325 TABLET, FILM COATED ORAL at 08:07

## 2022-08-11 RX ADMIN — FINASTERIDE 5 MG: 5 TABLET, FILM COATED ORAL at 08:07

## 2022-08-11 ASSESSMENT — ACTIVITIES OF DAILY LIVING (ADL)
ADLS_ACUITY_SCORE: 28

## 2022-08-11 NOTE — CONSULTS
Care Management Initial Consult    General Information  Assessment completed with: Tucker Painter  Type of CM/SW Visit: Initial Assessment    Primary Care Provider verified and updated as needed: Yes   Readmission within the last 30 days:        Reason for Consult: discharge planning  Advance Care Planning: Advance Care Planning Reviewed: present on chart          Communication Assessment  Patient's communication style: spoken language (English or Bilingual)    Hearing Difficulty or Deaf: no   Wear Glasses or Blind: yes    Cognitive  Cognitive/Neuro/Behavioral: WDL                      Living Environment:   People in home: spouse  Alba, adult dtr  Current living Arrangements: house; split-level, 7 stairs within the home  Able to return to prior arrangements: yes       Family/Social Support:  Care provided by: self  Provides care for: no one  Marital Status:   , Children  Alba       Description of Support System: Involved, Supportive    Support Assessment: Adequate family and caregiver support, Adequate social supports    Current Resources:   Patient receiving home care services: No     Community Resources: Home Care  Equipment currently used at home: cane, straight  Supplies currently used at home: None    Employment/Financial:  Employment Status: retired        Financial Concerns: No concerns identified   Referral to Financial Worker: No       Lifestyle & Psychosocial Needs:  Social Determinants of Health     Tobacco Use: Medium Risk     Smoking Tobacco Use: Former Smoker     Smokeless Tobacco Use: Never Used   Alcohol Use: Not on file   Financial Resource Strain: Not on file   Food Insecurity: Not on file   Transportation Needs: Not on file   Physical Activity: Not on file   Stress: Not on file   Social Connections: Not on file   Intimate Partner Violence: Not on file   Depression: Not at risk     PHQ-2 Score: 0   Housing Stability: Not on file       Functional Status:  Prior to admission  patient needed assistance:    Pt receives assistance from his dtr for most IADLs.       Mental Health Status:  Mental Health Status: No Current Concerns       Chemical Dependency Status:  Chemical Dependency Status: No Current Concerns             Values/Beliefs:  Spiritual, Cultural Beliefs, Gnosticist Practices, Values that affect care:            Values/Beliefs Comment: Undesignated      Care Management Discharge Note    Discharge Date: 08/11/2022       Discharge Disposition: Home Care    Discharge Services: None    Discharge DME: None    Discharge Transportation: family or friend will provide    Private pay costs discussed: Not applicable    PAS Confirmation Code:  (N/A)  Patient/family educated on Medicare website which has current facility and service quality ratings: yes    Education Provided on the Discharge Plan:  yes  Persons Notified of Discharge Plans: Pt, pt's wife, [facility], physician, pt's bedside nurse  Patient/Family in Agreement with the Plan: yes    Handoff Referral Completed: Yes    Additional Information:  Therapies recommended HC PT and RN for his Coumadin labs.  Liliane spoke with the pt who is agreeable to HC RN/PT/OT.  He said that his wife is also in the hospital and discharging today, so he asked Sw to call her.  The pt's wife Alba, said that they will both be discharging home today and there ride will be at [facility] at 1530.  She is agreeable to the pt having HC.  She requested that a referral be sent to [facility].  Neither the pt or Alba had any additional questions or concerns.   Liliane sent the email to [facility] via email for RN/PT/OT services.    Sw will continue to be available as needed until discharge.      PRAMOD Chavarria, [facility]  Inpatient Care Coordination  [facility]  [phone]

## 2022-08-11 NOTE — DISCHARGE SUMMARY
[facility]  Discharge Summary  Name: Tucker Slater    MRN: [id number]  YOB: 1931    Age: 89 year old  Date of Discharge:  8/11/2022  4:40 PM  Date of Admission: 8/9/2022  Primary Care Provider: Kwadwo Winslow  Discharge Physician:  Zia Gomez M.D  Discharging Service:  Hospitalist      Discharge Diagnosis:  Tucker Slater is a 89 year old male with a past medical history of chronic atrial fibrillation on Coumadin, peripheral neuropathy, chronic kidney disease, obstructive sleep apnea, on CPAP at home who presented with fever, cough and generalized weakness.and admitted on 8/9/2022  Problem List:       1.  Acute COVID-19  infection  -Patient with symptoms for acute COVID-19 infection.  -He has fatigue, fever decreased appetite.    -He was not hypoxic saturating mid 90s on room air.  -There was no evidence of COVID type infiltrate on his chest x-ray.  - Patient was vaccinated with Pfizer and Moderna, boosted.  -He stated his wife also has COVID and admitted to the hospital.  -He was on supportive care her.   -He is already on full anticoagulation.  -There was no indication for steroids or COVID-19 treatment as patient was not hypoxic.     2.  Generalized weakness  - Evaluated by PT and OT assessment for discharge needs      3.   Possible bacterial left lower lobe pneumonia.  -Patient has left lung base infiltrate on chest x-ray.,  -Procalcitonin 0.57.  -Started on IV antibiotics on admission, he was discharged on oral Doxycycline.  -Monitor cultures.        4.  Acute kidney injury on chronic kidney disease  -Creatinine has been at baseline of 1.4-1.6.  -It was 1.4. He was gently hydrated  -He will resume his Losartan, Maxide.     5.  Mild hyponatremia.  Continue to monitor.  Continue to hold diuretics        5.  Chronic  comorbid medical conditions:    Permanent A. fib: On warfarin for anticoagulant:.  INR therapeutic, pharmacy to assist with dosing.    Essential  hypertension: On losartan and Maxide at home, serum creatinine elevated, currently on hold will resume as appropriate.  Continue beta-blocker.         Discharge Disposition:  Discharged to home     Allergies:  Allergies   Allergen Reactions     Merbromin      Other reaction(s): Other, see comments  Severe reaction as child. Amalgam fillings OK.     Morphine Nausea and Vomiting     Amlodipine      Edema        Discharge Medications:   Current Discharge Medication List      START taking these medications    Details   doxycycline hyclate (VIBRA-TABS) 100 MG tablet Take 1 tablet (100 mg) by mouth 2 times daily  Qty: 10 tablet, Refills: 0    Associated Diagnoses: Pneumonia of left lower lobe due to infectious organism         CONTINUE these medications which have NOT CHANGED    Details   acetaminophen (TYLENOL) 500 MG tablet Take 1,000 mg by mouth 2 times daily      Ascorbic Acid (VITAMIN C PO) Take 500 mg by mouth daily       calcium carbonate-vitamin D (OSCAL W/D) 500-200 MG-UNIT tablet Take 1 tablet by mouth daily      carvedilol (COREG) 25 MG tablet TAKE 1 & 1/2 (ONE & ONE-HALF) TABLETS BY MOUTH TWICE DAILY WITH MEALS  Qty: 270 tablet, Refills: 3    Associated Diagnoses: Essential hypertension with goal blood pressure less than 140/90      diclofenac (VOLTAREN) 1 % topical gel Place 2 g onto the skin 4 times daily as needed      docusate sodium (COLACE) 100 MG capsule Take 100 mg by mouth 2 times daily as needed      finasteride (PROSCAR) 5 MG tablet Take 1 tablet by mouth once daily  Qty: 90 tablet, Refills: 2    Associated Diagnoses: Hypertrophy of prostate with urinary obstruction      losartan (COZAAR) 50 MG tablet Take 1 tablet by mouth once daily  Qty: 90 tablet, Refills: 0    Associated Diagnoses: Essential hypertension with goal blood pressure less than 140/90      !! Multiple Vitamins-Minerals (OCUVITE PO) Take 1 tablet by mouth daily      !! multivitamin w/minerals (THERA-VIT-M) tablet Take 1 tablet by  mouth daily      triamterene-HCTZ (MAXZIDE-25) 37.5-25 MG tablet Take 1 tablet by mouth once daily  Qty: 90 tablet, Refills: 0    Associated Diagnoses: Essential hypertension with goal blood pressure less than 140/90      vitamin B complex with vitamin C (STRESS TAB) tablet Take 1 tablet by mouth daily      warfarin ANTICOAGULANT (COUMADIN) 2.5 MG tablet TAKE 1/2 (ONE-HALF) TABLET BY MOUTH ON SUN, TUE, WED, FRI AND 1 TABLET ON MON, THUR, SAT OR AS DIRECTED BY INR CLINIC  Qty: 90 tablet, Refills: 1    Associated Diagnoses: Permanent atrial fibrillation (H); Long term current use of anticoagulant therapy      order for DME Oxygen 2 Li/min  at night  Qty: 1 Device, Refills: 0    Associated Diagnoses: Hypoxemia       !! - Potential duplicate medications found. Please discuss with provider.           Condition on Discharge:  Discharge condition: Fair   Discharge vitals: Blood pressure (!) 141/73, pulse 73, temperature 97.7  F (36.5  C), temperature source Axillary, resp. rate 20, height 1.829 m (6'), weight 82.7 kg (182 lb 4.8 oz), SpO2 94 %.   Code status on discharge: Full Code     History of Illness:  See detailed admission note for full details.    Significant Physical Exam Findings:    Constitutional: Awake, alert, cooperative, no apparent distress   Respiratory: Clear to auscultation bilaterally, no crackles or wheezing   Cardiovascular: Regular rate and rhythm, normal S1 and S2, and no murmur noted   Abdomen: Normal bowel sounds, soft, non-distended, non-tender   Skin: No rashes, no cyanosis, dry to touch   Neuro: Alert and oriented x3, no weakness, numbness, memory loss   Extremities: No edema, normal range of motion   Other(s):HEENT           All other systems: Negative     Procedures other than Imaging:  none     Imaging:  Results for orders placed or performed during the hospital encounter of 08/09/22   Chest XR,  PA & LAT    Narrative    CHEST TWO VIEWS     8/9/2022 11:13 AM     HISTORY: Cough,  fever.    COMPARISON: Chest x-ray 6/10/2019.      Impression    IMPRESSION: Stable left chest pacer. Stable cardiac silhouette.  Interval increased patchy opacities at the left lung base. Correlate  with evidence of developing pneumonia in this region.     MAINE NGUYEN MD         SYSTEM ID:  [id number]   Echocardiogram Limited     Value    LVEF  50-55%    Narrative    [id number]  [id number]  [id number]  [id number]^MARIA TERESA^MARGARET^LYLA     [facility]  Echocardiography Laboratory  [address] [place] [address]     Name: ROSS LORENZANA  MRN: [id number]  : 1931  Study Date: 2022 03:38 PM  Age: 89 yrs  Gender: Male  Patient Location: [facility]  Reason For Study: SOB  Ordering Physician: MARGARET MOREL  Referring Physician: Kwadwo Winslow  Performed By: Jo Mcdaniels RDCS     BSA: 2.0 m2  Height: 71 in  Weight: 185 lb  HR: 71  BP: 118/61 mmHg  ______________________________________________________________________________  Procedure  Limited Portable Echo Adult.  ______________________________________________________________________________  Interpretation Summary     The visual ejection fraction is 50-55%.  There are regional wall motion abnormalities as specified.  There is mild to moderate (1-2+) mitral regurgitation.  There is mild to moderate (1-2+) tricuspid regurgitation.  Right ventricular systolic pressure is elevated, consistent with mild to  moderate pulmonary hypertension.  There is trace aortic regurgitation.  The rhythm was atrial fibrillation with paced rhythm.  The study was technically difficult.  ______________________________________________________________________________  Left Ventricle  Diastolic function not assessed due to atrial fibrillation. The visual  ejection fraction is 50-55%. Septal wall motion abnormality may reflect  pacemaker activation. Relative to the other walls, the basal inferior wall  appears moderately hypokinetic. There are regional wall motion  abnormalities  as specified. A false chord is noted (normal variant).     Right Ventricle  There is a catheter/pacemaker lead seen in the right ventricle. The right  ventricle is normal in size and function.     Atria  The left atrium is moderately dilated. The right atrium is moderately dilated.  There is no color Doppler evidence of an atrial shunt.     Mitral Valve  There is mild to moderate (1-2+) mitral regurgitation.     Tricuspid Valve  There is mild to moderate (1-2+) tricuspid regurgitation. The right  ventricular systolic pressure is approximated at 30.3mmHg plus the right  atrial pressure. Right ventricular systolic pressure could be underestimated  due to incomplete tricuspid regurgitation velocity envelope. IVC diameter >2.1  cm collapsing <50% with sniff suggests a high RA pressure estimated at 15 mmHg  or greater. Right ventricular systolic pressure is elevated, consistent with  mild to moderate pulmonary hypertension.     Aortic Valve  The aortic valve is trileaflet. There is trace aortic regurgitation. No  hemodynamically significant valvular aortic stenosis.     Vessels  IVC diameter >2.1 cm collapsing <50% with sniff suggests a high RA pressure  estimated at 15 mmHg or greater. Dilation of the inferior vena cava is present  with abnormal respiratory variation in diameter.     Rhythm  The rhythm was atrial fibrillation with paced rhythm.     ______________________________________________________________________________  MMode/2D Measurements & Calculations  TAPSE: 2.5 cm     Doppler Measurements & Calculations  MV E max amilcar: 94.1 cm/sec  MV dec time: 0.22 sec  AI P1/2t: 574.1 msec  E/E' av.2     Lateral E/e': 8.7  Medial E/e': 9.7     ______________________________________________________________________________  Report approved by: César Monterroso 2022 04:36 PM                Consultations:  No consultations were requested during this admission.     Recent Lab Results:  Recent  Labs   Lab 08/11/22  0659 08/10/22  0732 08/09/22  1608   WBC 3.5* 5.9 8.0   HGB 11.4* 12.0* 12.0*   HCT 36.3* 37.7* 37.9*   MCV 99 99 98   * 125* 135*     Recent Labs   Lab 08/11/22  0659 08/10/22  0732 08/09/22  1608   * 133* 135*   POTASSIUM 3.7 4.2 4.3   CHLORIDE 108* 104 105   CO2 19* 18* 20*   ANIONGAP 8 11 10   GLC 83 84 119*   BUN 31.2* 40.7* 37.4*   CR 1.22* 1.42* 1.55*   GFRESTIMATED 56* 47* 42*   KARLA 8.2 8.7 8.9     Recent Labs   Lab 08/11/22  0659 08/10/22  0732 08/09/22  1608 08/09/22  1015   GLC 83 84 119* 89          Pending Results:    Unresulted Labs Ordered in the Past 30 Days of this Admission     Date and Time Order Name Status Description    8/9/2022 10:17 AM Blood Culture Hand, Left Preliminary     8/9/2022  9:50 AM Blood Culture Peripheral Blood Preliminary               Home Care Referral      Reason for your hospital stay    Generalized weakness, COVID-19 infection     Activity    Your activity upon discharge: activity as tolerated     Follow-up and recommended labs and tests     Follow up with primary care provider, Kwadwo Winslow, within 7 days for hospital follow- up.  The following labs/tests are recommended: BMP result to PCP.  Need to discuss with his PCP regarding diuretics  Follow up INR clinic on 8/15 to get INR check and adjust the warfarin dose as appropriate.     Diet    Follow this diet upon discharge: Orders Placed This Encounter      Combination Diet Regular Diet Adult       Hospital Course:  See above     Total time spent in face to face contact with the patient and coordinating discharge was:  >30 Minutes.

## 2022-08-11 NOTE — PLAN OF CARE
Goal Outcome Evaluation:        End of Shift Summary  For vital signs and complete assessments, please see documentation flowsheets.     Pertinent assessments: Up A-1 with walker and gait belt. O2 stable on RA while up in chair, O2 applied for naps. Denies pain. Congested productive cough noted. LS diminished. Good appetite for breakfast.      Major Shift Events: IVF discontinued. Pt to discharge home today.     Treatment Plan: pain management, encourage activity as tolerated, Rocephin, doxycycline, blood thinners, symptom management, isolation precaution maintained.    Bedside Nurse: Vanessa Salazar RN

## 2022-08-11 NOTE — PLAN OF CARE
Goal Outcome Evaluation:        End of Shift Summary  For vital signs and complete assessments, please see documentation flowsheets.     Pertinent assessments: Pt A&OX4. Vss and on 2l NC . Denied pain. LS dim and has some URBINA. Able to ambulate to the bathroom. Had a good appetite and tolerated diet and no nausea. Has some non-productive cough.    Major Shift Events : Had an incontinent bowel accident this shift    Treatment Plan: Symptom management, IV rocephin,  doxycycline,symptom  management, encourage activity as tolerated.

## 2022-08-11 NOTE — PLAN OF CARE
Goal Outcome Evaluation:        End of Shift Summary  For vital signs and complete assessments, please see documentation flowsheets.     Pertinent assessments: Up A-1 with walker and gait belt. O2 stable on RA while up in chair, O2 applied for naps. Denies pain. Congested productive cough noted. LS diminished. Good appetite for breakfast.      Major Shift Events: IVF discontinued. Pt to discharge home today. Coordinating discharge with pt's wife on observation unit.     Treatment Plan: Monitor O2, encourage activity as tolerated, doxycycline    Bedside Nurse: Vanessa Salazar RN

## 2022-08-11 NOTE — PLAN OF CARE
Physical Therapy Discharge Summary    Reason for therapy discharge:    Discharged to home with home therapy.    Progress towards therapy goal(s). See goals on Care Plan in [facility] electronic health record for goal details.  Goals not met.  Barriers to achieving goals:   discharge from facility.    Therapy recommendation(s):    Continued therapy is recommended.  Rationale/Recommendations:  Home PT and assist to maximize return to PLOF.

## 2022-08-11 NOTE — DISCHARGE SUMMARY
Pt discharged home in stable condition via daughter for transportation. AVS was reviewed and questions were answered. Pt verbalized understanding of discharge instructions and need to follow up to recheck his INR on 8/15/2022. Discharge medications sent home with patient. All personal belongings sent home with patient.

## 2022-08-11 NOTE — DISCHARGE INSTRUCTIONS
Your home care referral was sent to [facility] for Physical Therapy, Occupational Therapy, and Skilled Nursing for lab checks.  If you haven't heard from them within the next 24-48 hours, please call them at [phone].

## 2022-08-11 NOTE — PHARMACY-ANTICOAGULATION SERVICE
Clinical Pharmacy- Warfarin Discharge Note    Warfarin PTA Regimen: 2.5 mg M/Th/Sa, 1.25 mg all other days      Anticoagulation Dose History     Recent Dosing and Labs Latest Ref Rng & Units 6/28/2022 7/26/2022 8/9/2022 8/9/2022 8/9/2022 8/10/2022 8/11/2022    Warfarin 1.25 mg - - - - - 1.25 mg 1.25 mg -    INR 0.85 - 1.15 2.3(H) 2.2(H) 2.12(H) 2.09(H) - 2.15(H) 2.33(H)        Patient admitted with warfarin as pta med.. Agree with plan to continue home dose. - NOTE- patient is on doxycycline on discharge.  Will need to watch for potential interaction     The patient should have an INR checked 8/15.

## 2022-08-11 NOTE — PLAN OF CARE
"Pertinent assessments: Pt is A/O with O2 on 2L NC. Denies pain. Has a congested non productive cough. 1x episode of loose stools- pt reported \" I don't want any stool softeners.\" LS- diminished. Denied URBINA or sob when walking or at rest. No n/v. IVF- infusing @ 100ml/hr. Pacemaker present on left chest. Tele: V-paced with HR 73 per tele tech.      Major Shift Events: Uneventful    Treatment Plan: pain management, IVF, encourage activity as tolerated, Rocephin, doxycycline, blood thinners, symptom management, isolation precaution maintained.    Bedside Nurse: Lewis Jauregui RN                     "

## 2022-08-11 NOTE — PLAN OF CARE
OT: Orders received. Chart reviewed and discussed with care team.  Per nursing, patient discharging home within the hour. PT recommended home health PT. OT will defer to next level of care. Defer discharge recommendations to PT.  Will complete orders.

## 2022-08-12 ENCOUNTER — TELEPHONE (OUTPATIENT)
Dept: ANTICOAGULATION | Facility: CLINIC | Age: 87
End: 2022-08-12

## 2022-08-12 DIAGNOSIS — I48.21 PERMANENT ATRIAL FIBRILLATION (H): ICD-10-CM

## 2022-08-12 DIAGNOSIS — I48.91 ATRIAL FIBRILLATION (H): Primary | ICD-10-CM

## 2022-08-12 DIAGNOSIS — Z79.01 LONG TERM CURRENT USE OF ANTICOAGULANTS WITH INR GOAL OF 2.0-3.0: ICD-10-CM

## 2022-08-12 NOTE — TELEPHONE ENCOUNTER
ANTICOAGULATION  MANAGEMENT: Discharge Review    Tucker Slater chart reviewed for anticoagulation continuity of care    Hospital Admission on 8/9/22 for pneumonia/COVID.    Discharge disposition: Home    Results:    Recent labs: (last 7 days)     08/09/22  1129 08/09/22  1608 08/10/22  0732 08/11/22  0659   INR 2.12* 2.09* 2.15* 2.33*     Anticoagulation inpatient management:     home regimen continued    Anticoagulation discharge instructions:     Warfarin dosing: home regimen continued   Bridging: No   INR goal change: No      Medication changes affecting anticoagulation: Yes: doxycycline for 2 days.  Can increase INR.    Additional factors affecting anticoagulation: No     PLAN     No adjustment to anticoagulation plan needed    Spoke with patient and assisted to set up follow up INR    Anticoagulation Calendar updated    Ivory Machado RN

## 2022-08-13 ENCOUNTER — MEDICAL CORRESPONDENCE (OUTPATIENT)
Dept: HEALTH INFORMATION MANAGEMENT | Facility: CLINIC | Age: 87
End: 2022-08-13

## 2022-08-14 LAB
BACTERIA BLD CULT: NO GROWTH
BACTERIA BLD CULT: NO GROWTH

## 2022-08-15 ENCOUNTER — TELEPHONE (OUTPATIENT)
Dept: INTERNAL MEDICINE | Facility: CLINIC | Age: 87
End: 2022-08-15

## 2022-08-15 ENCOUNTER — PATIENT OUTREACH (OUTPATIENT)
Dept: CARE COORDINATION | Facility: CLINIC | Age: 87
End: 2022-08-15

## 2022-08-15 NOTE — TELEPHONE ENCOUNTER
[facility] with [facility]  calls for verbal order      Physical Therapy   1x2wk    Best number to call back [phone]

## 2022-08-15 NOTE — PROGRESS NOTES
[facility] Contact  [facility]/Voicemail     Clinical Data: Care Coordinator Outreach - TCM      Outreach attempted x 1.  Left message on patient's voicemail, providing [facility]'s 24/7 scheduling and nurse triage phone number [phone] ([phone]) for questions/concerns and/or to schedule an appt with an [facility] provider, if they do not have a PCP.      Plan:  [facility] will try to reach patient again in 1-2 business days.       Nicole Diaz RN  Connected [facility] Resource Center, [facility]    *Connected Care Resource Team does NOT follow patient ongoing. Referrals are identified based on internal discharge reports and the outreach is to ensure patient has an understanding of their discharge instructions.

## 2022-08-15 NOTE — TELEPHONE ENCOUNTER
The Home Care/Assisted Living/Nursing Facility is calling regarding an established patient.  Has the patient seen Home Care in the past or is currently residing in Assisted Living or Nursing Facility? Yes.     PT/OT/Speech Therapy    Any additional Orders:  Are there any orders requested, not stated above, that are outside of the standing order and must be routed to a licensed practitioner for approval?    No    Writer has verified [employer] will send fax to have orders signed.    Call to below. Verbal OK given.

## 2022-08-16 ENCOUNTER — ANTICOAGULATION THERAPY VISIT (OUTPATIENT)
Dept: ANTICOAGULATION | Facility: CLINIC | Age: 87
End: 2022-08-16

## 2022-08-16 DIAGNOSIS — I48.91 ATRIAL FIBRILLATION (H): Primary | ICD-10-CM

## 2022-08-16 DIAGNOSIS — I48.21 PERMANENT ATRIAL FIBRILLATION (H): ICD-10-CM

## 2022-08-16 DIAGNOSIS — Z79.01 LONG TERM CURRENT USE OF ANTICOAGULANTS WITH INR GOAL OF 2.0-3.0: ICD-10-CM

## 2022-08-16 LAB — INR (EXTERNAL): 2.7 (ref 0.9–1.1)

## 2022-08-16 NOTE — PROGRESS NOTES
Frances called back as she did not received the VM from [place].     Frances confirmed that patient is done with doxycycline. Pt is asymptomatic and feeling good following hospitalization 08/09-08/11 for COVID/possible pneumonia.    For dosing, agree with plan outlined below. Dosing relayed to Frances. INR recheck will be 08/19.

## 2022-08-16 NOTE — PROGRESS NOTES
Clinic Care Coordination Contact  [facility]: Post-Discharge Note  SITUATION                                                      Admission:    Admission Date: 08/09/22   Reason for Admission: fever, cough and generalized weakness  Discharge:   Discharge Date: 08/11/22  Discharge Diagnosis: generalized muscle weakness; COVID-19 infection    BACKGROUND                                                      Per hospital discharge summary and inpatient provider notes:    Tucker Slater is a 89 year old male with a past medical history of chronic atrial fibrillation on Coumadin, peripheral neuropathy, chronic kidney disease, obstructive sleep apnea, on CPAP at home who presented with fever, cough and generalized weakness.and admitted on 8/9/2022  Problem List:       1.  Acute COVID-19  infection  -Patient with symptoms for acute COVID-19 infection.  -He has fatigue, fever decreased appetite.    -He was not hypoxic saturating mid 90s on room air.  -There was no evidence of COVID type infiltrate on his chest x-ray.  - Patient was vaccinated with Pfizer and Moderna, boosted.  -He stated his wife also has COVID and admitted to the hospital.  -He was on supportive care her.   -He is already on full anticoagulation.  -There was no indication for steroids or COVID-19 treatment as patient was not hypoxic.     2.  Generalized weakness  - Evaluated by PT and OT assessment for discharge needs      3.   Possible bacterial left lower lobe pneumonia.  -Patient has left lung base infiltrate on chest x-ray.,  -Procalcitonin 0.57.  -Started on IV antibiotics on admission, he was discharged on oral Doxycycline.  -Monitor cultures.        4.  Acute kidney injury on chronic kidney disease  -Creatinine has been at baseline of 1.4-1.6.  -It was 1.4. He was gently hydrated  -He will resume his Losartan, Maxide.     5.  Mild hyponatremia.  Continue to monitor.  Continue to hold diuretics        5.  Chronic  comorbid medical  "conditions:    Permanent A. fib: On warfarin for anticoagulant:.  INR therapeutic, pharmacy to assist with dosing.    Essential hypertension: On losartan and Maxide at home, serum creatinine elevated, currently on hold will resume as appropriate.  Continue beta-blocker.     ASSESSMENT      Enrollment  Primary Care Care Coordination Status: Potential (not discussed due to patient's concern for wife)    Discharge Assessment  How are you doing now that you are home?: \"I'm fine, but I'm worried about my wife\"  How are your symptoms? (Red Flag symptoms escalate to triage hotline per guidelines): Improved  Do you feel your condition is stable enough to be safe at home until your provider visit?: Yes  Does the patient have their discharge instructions? : Yes  Does the patient have questions regarding their discharge instructions? : No  Were you started on any new medications or were there changes to any of your previous medications? : Yes  Does the patient have all of their medications?: Yes (one dose of antibiotic left to take)  Do you have questions regarding any of your medications? : No  Discharge follow-up appointment scheduled within 14 calendar days? : No  Is patient agreeable to assistance with scheduling? : No (patient will schedule)         Post-op (Clinicians Only)  Did the patient have surgery or a procedure: No    Home care lab coming at 11:30 to draw INR. Patient with concerns for his wife. States she has metastatic breast cancer and isn't seeming herself.  Daughter coming now to assess. Phone number for triage nurse [phone]) provided for patient. He declines to call 911. Patient is doing \"OK\". No difficulty breathing or shortness of breath noted during conversation.     PLAN                                                      Outpatient Plan:  Patient to schedule follow up appointment with PCP    Future Appointments   Date Time Provider Department Center   8/16/2022  1:15 PM RI LAB GRECIA PATHAK "   8/30/2022  1:30 PM RI LAB RILABR RI   9/14/2022  1:00 PM RU DCR2 RUCVCV [facility]   9/28/2022 12:15 AM KIDD TECH1 SUUMPC [facility]         For any urgent concerns, please contact our 24 hour nurse triage line: [phone] ([phone])         Guerda Diaz RN  [facility] [place]

## 2022-08-16 NOTE — PROGRESS NOTES
ANTICOAGULATION MANAGEMENT     Tucker Slater 89 year old male is on warfarin with supratherapeutic INR result. (Goal INR 2.0-2.5)    Recent labs: (last 7 days)     08/16/22  1149   INR 2.7*       ASSESSMENT       Source(s): Chart Review and Home Care/Facility Nurse       Warfarin doses taken: Warfarin taken as instructed    Diet: No new diet changes identified    New illness, injury, or hospitalization: Yes: Covid, pneumonia    Medication/supplement changes: 2 days of doxcycline noted 8/12/2022    Signs or symptoms of bleeding or clotting: No    Previous INR: Therapeutic last 2(+) visits    Additional findings: None       PLAN     Recommended plan for temporary change(s) affecting INR     Dosing Instructions: hold dose then continue your current warfarin dose with next INR in 3 days       Summary  As of 8/16/2022    Full warfarin instructions:  8/16: Hold; Otherwise 2.5 mg every Mon, Thu, Sat; 1.25 mg all other days   Next INR check:  8/19/2022             Detailed voice message left for [facility] home care, home care/facility nurse with dosing instructions and follow up date.  left message to call if unable to go back out on Friday.    Orders given to  Homecare nurse/facility to recheck    Education provided: None required    Plan made per [facility] anticoagulation protocol    Carmelita Howell, RN  Anticoagulation Clinic  8/16/2022    _______________________________________________________________________     Anticoagulation Episode Summary     Current INR goal:  2.0-2.5   TTR:  63.8 % (11.9 mo)   Target end date:  Indefinite   Send INR reminders to:  [facility] hospital    Indications    Atrial fibrillation (H) with mitral regurgitation and congestive heart failure [I48.91]  Long term current use of anticoagulants with INR goal of 2.0-3.0 [Z79.01]  Permanent atrial fibrillation (H) [I48.21]           Comments:           Anticoagulation Care Providers     Provider Role Specialty Phone number    Goldy  Kwadwo PADILLA MD [facility] Internal Medicine [phone]

## 2022-08-18 ENCOUNTER — TELEPHONE (OUTPATIENT)
Dept: INTERNAL MEDICINE | Facility: CLINIC | Age: 87
End: 2022-08-18

## 2022-08-18 NOTE — TELEPHONE ENCOUNTER
"Patient calls to let Dr know he has been discharged recently from having been admitted with covid.     Patient last saw  8-9-2022. He is not sure he needs an appointment he is \" just following instructions\"  by calling.      Patient Best number to call back [phone]  "

## 2022-08-19 ENCOUNTER — ANTICOAGULATION THERAPY VISIT (OUTPATIENT)
Dept: ANTICOAGULATION | Facility: CLINIC | Age: 87
End: 2022-08-19

## 2022-08-19 DIAGNOSIS — I48.21 PERMANENT ATRIAL FIBRILLATION (H): ICD-10-CM

## 2022-08-19 DIAGNOSIS — I48.91 ATRIAL FIBRILLATION (H): Primary | ICD-10-CM

## 2022-08-19 DIAGNOSIS — Z79.01 LONG TERM CURRENT USE OF ANTICOAGULANTS WITH INR GOAL OF 2.0-3.0: ICD-10-CM

## 2022-08-19 LAB — INR (EXTERNAL): 2.4 (ref 0.9–1.1)

## 2022-08-19 NOTE — PROGRESS NOTES
ANTICOAGULATION MANAGEMENT     Tucker Slater 89 year old male is on warfarin with therapeutic INR result. (Goal INR 2.0-2.5)    Recent labs: (last 7 days)     08/19/22  1145   INR 2.4*       ASSESSMENT       Source(s): Chart Review and Home Care/Facility Nurse       Warfarin doses taken: Warfarin taken as instructed    Diet: No new diet changes identified    New illness, injury, or hospitalization: Yes: Covid-19, pneumonia. Patient reports he is feeling back to baseline.    Medication/supplement changes: Finished doxycycline    Signs or symptoms of bleeding or clotting: No    Previous INR: Supratherapeutic    Additional findings: None       PLAN     Recommended plan for no diet, medication or health factor changes affecting INR     Dosing Instructions: Continue your current warfarin dose with next INR in 6 days       Summary  As of 8/19/2022    Full warfarin instructions:  2.5 mg every Mon, Thu, Sat; 1.25 mg all other days   Next INR check:  8/25/2022             Telephone call with Jim home care/facility nurse who verbalizes understanding and agrees to plan    Orders given to  Homecare nurse/facility to recheck    Education provided: Please call back if any changes to your diet, medications or how you've been taking warfarin, Monitoring for bleeding signs and symptoms, Monitoring for clotting signs and symptoms and Contact [phone]  with any changes, questions or concerns.     Plan made per [facility] anticoagulation protocol    Elen Hogna RN  Anticoagulation Clinic  8/19/2022    _______________________________________________________________________     Anticoagulation Episode Summary     Current INR goal:  2.0-2.5   TTR:  64.1 % (11.9 mo)   Target end date:  Indefinite   Send INR reminders to:  DEBORAH CHRISTOPHER    Indications    Atrial fibrillation (H) with mitral regurgitation and congestive heart failure [I48.91]  Long term current use of anticoagulants with INR goal of 2.0-3.0 [Z79.01]  Permanent  atrial fibrillation (H) [I48.21]           Comments:  Salvatore Ruggiero when discharged from home care         Anticoagulation Care Providers     Provider Role Specialty Phone number    Kwadwo Winslow MD Referring Internal Medicine [phone]

## 2022-08-19 NOTE — TELEPHONE ENCOUNTER
Called patient and informed him he does need appointment for follow-up, assisted with scheduling appointment on 8/30 and moving INR appointment to accommodate office visit.  Appointments in Next Year    Aug 30, 2022  1:30 PM  (Arrive by 1:15 PM)  ED/Hospital Follow Up with Kwadwo Winslow MD  [facility] ([facility] ) [phone]   Aug 30, 2022  2:15 PM  INR LAB with RI LAB  [facility] ([facility] ) [phone]   Sep 14, 2022  1:00 PM  CARDIAC DEVICE CHECK - IN CLINIC with RU DCR2  [facility] ([facility] ) [phone]   Sep 28, 2022 12:15 AM  CARDIAC DEVICE CHECK - REMOTE with KIDD TECH1  [facility] ([facility] ) [phone]         Carmelita Ferro RN  [facility]

## 2022-08-24 ENCOUNTER — TELEPHONE (OUTPATIENT)
Dept: INTERNAL MEDICINE | Facility: CLINIC | Age: 87
End: 2022-08-24

## 2022-08-24 NOTE — TELEPHONE ENCOUNTER
[facility] calling for orders.  They need two additional PHYSICAL THERAPY visits. 1/week for two weeks.    Julia [phone]

## 2022-08-25 ENCOUNTER — ANTICOAGULATION THERAPY VISIT (OUTPATIENT)
Dept: ANTICOAGULATION | Facility: CLINIC | Age: 87
End: 2022-08-25

## 2022-08-25 DIAGNOSIS — Z79.01 LONG TERM CURRENT USE OF ANTICOAGULANTS WITH INR GOAL OF 2.0-3.0: ICD-10-CM

## 2022-08-25 DIAGNOSIS — I48.21 PERMANENT ATRIAL FIBRILLATION (H): ICD-10-CM

## 2022-08-25 DIAGNOSIS — I48.91 ATRIAL FIBRILLATION (H): Primary | ICD-10-CM

## 2022-08-25 LAB — INR (EXTERNAL): 2.5 (ref 0.9–1.1)

## 2022-08-25 NOTE — PROGRESS NOTES
ANTICOAGULATION MANAGEMENT     Tucker Slater 89 year old male is on warfarin with therapeutic INR result. (Goal INR 2.0-2.5)    Recent labs: (last 7 days)     08/25/22  0914   INR 2.5*       ASSESSMENT       Source(s): Chart Review and Patient/Caregiver Call       Warfarin doses taken: Warfarin taken as instructed    Diet: No new diet changes identified    New illness, injury, or hospitalization: No    Medication/supplement changes: None noted    Signs or symptoms of bleeding or clotting: No    Previous INR: Therapeutic last visit; previously outside of goal range    Additional findings: None       PLAN     Recommended plan for no diet, medication or health factor changes affecting INR     Dosing Instructions: Continue your current warfarin dose with next INR in 5 days.    Summary  As of 8/25/2022    Full warfarin instructions:  2.5 mg every Mon, Thu, Sat; 1.25 mg all other days   Next INR check:  9/1/2022             Telephone call with Nadeen home care/facility nurse who verbalizes understanding and agrees to plan    Lab visit scheduled. Home care is going out on Monday next week. INR does not need to be checked that soon.    Education provided: Goal range and significance of current result    Plan made per [facility] anticoagulation protocol    Julian Mcconnell RN  Anticoagulation Clinic  8/25/2022    _______________________________________________________________________     Anticoagulation Episode Summary     Current INR goal:  2.0-2.5   TTR:  65.7 % (11.9 mo)   Target end date:  Indefinite   Send INR reminders to:  DEBORAH CHRISTOPHER    Indications    Atrial fibrillation (H) with mitral regurgitation and congestive heart failure [I48.91]  Long term current use of anticoagulants with INR goal of 2.0-3.0 [Z79.01]  Permanent atrial fibrillation (H) [I48.21]           Comments:  Salvatore Ruggiero when discharged from home care         Anticoagulation Care Providers     Provider Role Specialty Phone number    Goldy,  Kwadwo PADILLA MD [facility] Internal Medicine [phone]

## 2022-08-30 ENCOUNTER — ANTICOAGULATION THERAPY VISIT (OUTPATIENT)
Dept: ANTICOAGULATION | Facility: CLINIC | Age: 87
End: 2022-08-30

## 2022-08-30 ENCOUNTER — OFFICE VISIT (OUTPATIENT)
Dept: INTERNAL MEDICINE | Facility: CLINIC | Age: 87
End: 2022-08-30
Payer: COMMERCIAL

## 2022-08-30 VITALS
HEART RATE: 78 BPM | WEIGHT: 173.3 LBS | RESPIRATION RATE: 16 BRPM | DIASTOLIC BLOOD PRESSURE: 73 MMHG | OXYGEN SATURATION: 97 % | SYSTOLIC BLOOD PRESSURE: 130 MMHG | BODY MASS INDEX: 23.5 KG/M2 | TEMPERATURE: 97.8 F

## 2022-08-30 DIAGNOSIS — J12.82 PNEUMONIA DUE TO 2019 NOVEL CORONAVIRUS: ICD-10-CM

## 2022-08-30 DIAGNOSIS — I10 ESSENTIAL HYPERTENSION WITH GOAL BLOOD PRESSURE LESS THAN 140/90: ICD-10-CM

## 2022-08-30 DIAGNOSIS — I48.21 PERMANENT ATRIAL FIBRILLATION (H): ICD-10-CM

## 2022-08-30 DIAGNOSIS — Z09 HOSPITAL DISCHARGE FOLLOW-UP: Primary | ICD-10-CM

## 2022-08-30 DIAGNOSIS — N18.32 STAGE 3B CHRONIC KIDNEY DISEASE (H): ICD-10-CM

## 2022-08-30 DIAGNOSIS — N18.31 STAGE 3A CHRONIC KIDNEY DISEASE (H): ICD-10-CM

## 2022-08-30 DIAGNOSIS — Z79.01 LONG TERM CURRENT USE OF ANTICOAGULANTS WITH INR GOAL OF 2.0-3.0: Primary | ICD-10-CM

## 2022-08-30 DIAGNOSIS — U07.1 PNEUMONIA DUE TO 2019 NOVEL CORONAVIRUS: ICD-10-CM

## 2022-08-30 LAB
ERYTHROCYTE [DISTWIDTH] IN BLOOD BY AUTOMATED COUNT: 13.7 % (ref 10–15)
HCT VFR BLD AUTO: 39.7 % (ref 40–53)
HGB BLD-MCNC: 12.9 G/DL (ref 13.3–17.7)
INR BLD: 2.8 (ref 0.9–1.1)
MCH RBC QN AUTO: 31.2 PG (ref 26.5–33)
MCHC RBC AUTO-ENTMCNC: 32.5 G/DL (ref 31.5–36.5)
MCV RBC AUTO: 96 FL (ref 78–100)
PLATELET # BLD AUTO: 207 10E3/UL (ref 150–450)
RBC # BLD AUTO: 4.13 10E6/UL (ref 4.4–5.9)
WBC # BLD AUTO: 4.8 10E3/UL (ref 4–11)

## 2022-08-30 PROCEDURE — 85610 PROTHROMBIN TIME: CPT | Performed by: INTERNAL MEDICINE

## 2022-08-30 PROCEDURE — 85027 COMPLETE CBC AUTOMATED: CPT | Performed by: INTERNAL MEDICINE

## 2022-08-30 PROCEDURE — 99495 TRANSJ CARE MGMT MOD F2F 14D: CPT | Performed by: INTERNAL MEDICINE

## 2022-08-30 PROCEDURE — 82043 UR ALBUMIN QUANTITATIVE: CPT | Performed by: INTERNAL MEDICINE

## 2022-08-30 PROCEDURE — 80048 BASIC METABOLIC PNL TOTAL CA: CPT | Performed by: INTERNAL MEDICINE

## 2022-08-30 PROCEDURE — 36415 COLL VENOUS BLD VENIPUNCTURE: CPT | Performed by: INTERNAL MEDICINE

## 2022-08-30 RX ORDER — TRIAMTERENE/HYDROCHLOROTHIAZID 37.5-25 MG
1 TABLET ORAL DAILY
Qty: 90 TABLET | Refills: 3 | Status: SHIPPED | OUTPATIENT
Start: 2022-08-30 | End: 2023-08-28

## 2022-08-30 NOTE — LETTER
August 31, 2022      Tucker Slater  [address] [facility] [address]        Dear ,    We are writing to inform you of your test results.    Your labs show mild anemia and borderline decreased kidney function but it's stable.       Resulted Orders   CBC with platelets   Result Value Ref Range    WBC Count 4.8 4.0 - 11.0 10e3/uL    RBC Count 4.13 (L) 4.40 - 5.90 10e6/uL    Hemoglobin 12.9 (L) 13.3 - 17.7 g/dL    Hematocrit 39.7 (L) 40.0 - 53.0 %    MCV 96 78 - 100 fL    MCH 31.2 26.5 - 33.0 pg    MCHC 32.5 31.5 - 36.5 g/dL    RDW 13.7 10.0 - 15.0 %    Platelet Count 207 150 - 450 10e3/uL   Basic metabolic panel  (Ca, Cl, CO2, Creat, Gluc, K, Na, BUN)   Result Value Ref Range    Sodium 140 133 - 144 mmol/L    Potassium 4.6 3.4 - 5.3 mmol/L    Chloride 110 (H) 94 - 109 mmol/L    Carbon Dioxide (CO2) 23 20 - 32 mmol/L    Anion Gap 7 3 - 14 mmol/L    Urea Nitrogen 35 (H) 7 - 30 mg/dL    Creatinine 1.24 0.66 - 1.25 mg/dL    Calcium 9.0 8.5 - 10.1 mg/dL    Glucose 87 70 - 99 mg/dL    GFR Estimate 55 (L) >60 mL/min/1.73m2      Comment:      Effective December 21, 2021 eGFRcr in adults is calculated using the 2021 CKD-EPI creatinine equation which includes age and gender (Unique et al., NEJM, DOI: 10.1056/DBQSow8653447)   Albumin Random Urine Quantitative with Creat Ratio   Result Value Ref Range    Creatinine Urine mg/dL 91 mg/dL    Albumin Urine mg/L 239 mg/L    Albumin Urine mg/g Cr 262.64 (H) 0.00 - 17.00 mg/g Cr       If you have any questions or concerns, please call the clinic at the number listed above.       Sincerely,      Kwadwo Winslow MD        [facility]

## 2022-08-30 NOTE — PROGRESS NOTES
"  Assessment & Plan     Hospital discharge follow-up  Improved. Continue PT     Pneumonia due to 2019 novel coronavirus  No symptoms of SOB, cough , fevers     Essential hypertension with goal blood pressure less than 140/90  Cont treatment   - triamterene-HCTZ (MAXZIDE-25) 37.5-25 MG tablet; Take 1 tablet by mouth daily  - CBC with platelets    Permanent atrial fibrillation (H)  Continue AC, rate is controlled     Stage 3a chronic kidney disease (H)  Monitor renal function   - Basic metabolic panel  (Ca, Cl, CO2, Creat, Gluc, K, Na, BUN)             See Patient Instructions    Return in about 3 months (around 11/30/2022) for Routine Visit.    Kwadwo Winslow MD  [facility] MARCI Ceballos is a 89 year old, presenting for the following health issues:  Hospital F/U      HPI     Post Discharge Outreach 8/16/2022   Admission Date 8/9/2022   Reason for Admission fever, cough and generalized weakness   Discharge Date 8/11/2022   Discharge Diagnosis generalized muscle weakness; COVID-19 infection   How are you doing now that you are home? \"I'm fine, but I'm worried about my wife\"   How are your symptoms? (Red Flag symptoms escalate to triage hotline per guidelines) Improved   Do you feel your condition is stable enough to be safe at home until your provider visit? Yes   Does the patient have their discharge instructions?  Yes   Does the patient have questions regarding their discharge instructions?  No   Were you started on any new medications or were there changes to any of your previous medications?  Yes   Does the patient have all of their medications? Yes   Do you have questions regarding any of your medications?  No   Discharge follow-up appointment scheduled within 14 calendar days?  No     Hospital Follow-up Visit:    Hospital/Nursing Home/IP Rehab Facility: [facility]  Date of Admission: 08/09/2022  Date of Discharge: 08/11/2022  Reason(s) for Admission: " pneumonia due to Covid    Was your hospitalization related to COVID-19? YES   How are you feeling today? Better  In the past 24 hours have you had shortness of breath when speaking, walking, or climbing stairs? I don't have breathing problems  Do you have a cough? I don't have a cough  When is the last time you had a fever greater than 100? I didn't  Are you having any other symptoms? Fatigue   Do you have any other stressors you would like to discuss with your provider? OTHER: wife's health         Was the patient in the ICU or did the patient experience delirium during hospitalization?  No    Problems taking medications regularly:  None  Medication changes since discharge: None  Problems adhering to non-medication therapy:  None    Summary of hospitalization:  [facility] discharge summary reviewed  Diagnostic Tests/Treatments reviewed.  Follow up needed: clinic follow up   Other Healthcare Providers Involved in Patient s Care:         None  Update since discharge: improved. Patient is seen for a follow up visit.  Recently hospitalized for COVID pneumonia. Improved. Finished Doxycycline.   Feels weak, poor energy. No fever, chills. Eating better, but still has poor appetite, has lost weight.   Drinks fluids.   Has h/o HTN. on medical treatment. BP has been controlled. No side effects from medications. No CP, HA, dizziness. good compliance with medications and low salt diet.  Has history of atrial fibrillation. On anticoagulation with Coumadin and rate control medications. Asymptonatic - no chest pains , palpitations,  no side effects from medications.  Has h/o CRF. Monitoring BP, BG, medications, avoiding OTC NSAIDs. Needs periodic recheck of kidney function.      Post Medication Reconciliation Status:        Plan of care communicated with patient       Review of Systems   Constitutional, HEENT, cardiovascular, pulmonary, gi and gu systems are negative, except as otherwise noted.      Objective     /73 (BP Location: Left arm, Cuff Size: Adult Regular)   Pulse 78   Temp 97.8  F (36.6  C) (Oral)   Resp 16   Wt 78.6 kg (173 lb 4.8 oz)   SpO2 97%   BMI 23.50 kg/m    Body mass index is 23.5 kg/m .  Physical Exam   GENERAL: elderly, frail, alert and no distress  NECK: no adenopathy, no asymmetry, masses, or scars and thyroid normal to palpation  RESP: lungs clear to auscultation - no rales, rhonchi or wheezes, scattered crackles   CV: irregular rate and rhythm, normal S1 S2, no S3 or S4, no murmur, click or rub, no peripheral edema and peripheral pulses strong  ABDOMEN: soft, nontender, no hepatosplenomegaly, no masses and bowel sounds normal  MS: no gross musculoskeletal defects noted, no edema    Anticoagulation Therapy Visit on 08/25/2022   Component Date Value Ref Range Status     INR (External) 08/25/2022 2.5 (A) 0.9 - 1.1 Final                   .  ..

## 2022-08-30 NOTE — PROGRESS NOTES
ANTICOAGULATION MANAGEMENT     Tucker Slater 89 year old male is on warfarin with supratherapeutic INR result. (Goal INR 2.0-2.5)    Recent labs: (last 7 days)     08/30/22  1414   INR 2.8*       ASSESSMENT       Source(s): Chart Review, Patient/Caregiver Call and Home Care/Facility Nurse        Warfarin doses taken: Warfarin taken as instructed    Diet: Appetite not back to baseline following COVID and pneumonia may be affecting diet and INR    New illness, injury, or hospitalization: No    Medication/supplement changes: None noted    Signs or symptoms of bleeding or clotting: No    Previous INR: Therapeutic last 2(+) visits    Additional findings: None       PLAN     Recommended plan for temporary change(s) affecting INR     Dosing Instructions: 8/30/22: HOLD, then continue current maintenace dose   with next INR in 1 week       Summary  As of 8/30/2022    Full warfarin instructions:  8/30: Hold; Otherwise 2.5 mg every Mon, Thu, Sat; 1.25 mg all other days   Next INR check:  9/6/2022             Telephone call with [place] home care nurse who agrees to plan and repeated back plan correctly     Also spoke with patient. Updated on next home care visit.    Orders given to  Homecare nurse/facility to recheck    Education provided: Please call back if any changes to your diet, medications or how you've been taking warfarin and Goal range and significance of current result    Plan made per [facility] anticoagulation protocol    Nae Mcdaniels RN  Anticoagulation Clinic  8/30/2022    _______________________________________________________________________     Anticoagulation Episode Summary     Current INR goal:  2.0-2.5   TTR:  65.8 % (11.9 mo)   Target end date:  Indefinite   Send INR reminders to:  C[facility]    Indications    Atrial fibrillation (H) with mitral regurgitation and congestive heart failure [I48.91]  Long term current use of anticoagulants with INR goal of 2.0-3.0 [Z79.01]  Permanent atrial  fibrillation (H) [I48.21]           Comments:  Salvatore Ruggiero when discharged from home care         Anticoagulation Care Providers     Provider Role Specialty Phone number    Kwadwo Winslow MD Referring Internal Medicine [phone]

## 2022-08-31 ENCOUNTER — TELEPHONE (OUTPATIENT)
Dept: INTERNAL MEDICINE | Facility: CLINIC | Age: 87
End: 2022-08-31

## 2022-08-31 LAB
ANION GAP SERPL CALCULATED.3IONS-SCNC: 7 MMOL/L (ref 3–14)
BUN SERPL-MCNC: 35 MG/DL (ref 7–30)
CALCIUM SERPL-MCNC: 9 MG/DL (ref 8.5–10.1)
CHLORIDE BLD-SCNC: 110 MMOL/L (ref 94–109)
CO2 SERPL-SCNC: 23 MMOL/L (ref 20–32)
CREAT SERPL-MCNC: 1.24 MG/DL (ref 0.66–1.25)
CREAT UR-MCNC: 91 MG/DL
GFR SERPL CREATININE-BSD FRML MDRD: 55 ML/MIN/1.73M2
GLUCOSE BLD-MCNC: 87 MG/DL (ref 70–99)
MICROALBUMIN UR-MCNC: 239 MG/L
MICROALBUMIN/CREAT UR: 262.64 MG/G CR (ref 0–17)
POTASSIUM BLD-SCNC: 4.6 MMOL/L (ref 3.4–5.3)
SODIUM SERPL-SCNC: 140 MMOL/L (ref 133–144)

## 2022-08-31 NOTE — TELEPHONE ENCOUNTER
[facility] - 08/13-10/11 HOME HEALTH CERTIFICATION; received via fax. Orders/Forms in your mailbox to be signed.

## 2022-08-31 NOTE — TELEPHONE ENCOUNTER
[facility] -   Adding 1wk for 2wk to existing 1 remaining visit 08/26/22  INR & Hold Warfarin 08/16/22  INR & Warfarin 08/19/22  INR & Coumadin 08/25/22  INR & Hold Coumadin 08/30/22    Received via fax. Orders/Forms in your mailbox to be signed.

## 2022-09-01 ENCOUNTER — TELEPHONE (OUTPATIENT)
Dept: INTERNAL MEDICINE | Facility: CLINIC | Age: 87
End: 2022-09-01

## 2022-09-01 ENCOUNTER — MEDICAL CORRESPONDENCE (OUTPATIENT)
Dept: HEALTH INFORMATION MANAGEMENT | Facility: CLINIC | Age: 87
End: 2022-09-01

## 2022-09-01 NOTE — TELEPHONE ENCOUNTER
Reason for Call:  Other call back    Detailed comments: Pt would like call back from INR nurse to discuss INR concerns and getting home INR done.    Phone Number Patient can be reached at: Home number on file [phone] (home)    Best Time: na    Can we leave a detailed message on this number? Not Applicable    Call taken on 9/1/2022 at 2:55 PM by Alexandria Porras

## 2022-09-01 NOTE — TELEPHONE ENCOUNTER
Returned call to patient. Confirmed warfarin dosing and scheduled INR lab appointment for 9/6/22.  Gail Valdovinos RN, BSN  Anticoagulation Clinic

## 2022-09-02 ENCOUNTER — MEDICAL CORRESPONDENCE (OUTPATIENT)
Dept: HEALTH INFORMATION MANAGEMENT | Facility: CLINIC | Age: 87
End: 2022-09-02

## 2022-09-02 DIAGNOSIS — Z53.9 DIAGNOSIS NOT YET DEFINED: Primary | ICD-10-CM

## 2022-09-02 PROCEDURE — G0180 MD CERTIFICATION HHA PATIENT: HCPCS | Performed by: INTERNAL MEDICINE

## 2022-09-06 ENCOUNTER — ANTICOAGULATION THERAPY VISIT (OUTPATIENT)
Dept: ANTICOAGULATION | Facility: CLINIC | Age: 87
End: 2022-09-06

## 2022-09-06 ENCOUNTER — LAB (OUTPATIENT)
Dept: LAB | Facility: CLINIC | Age: 87
End: 2022-09-06
Payer: COMMERCIAL

## 2022-09-06 ENCOUNTER — TELEPHONE (OUTPATIENT)
Dept: INTERNAL MEDICINE | Facility: CLINIC | Age: 87
End: 2022-09-06

## 2022-09-06 DIAGNOSIS — I48.21 PERMANENT ATRIAL FIBRILLATION (H): ICD-10-CM

## 2022-09-06 DIAGNOSIS — Z79.01 LONG TERM CURRENT USE OF ANTICOAGULANTS WITH INR GOAL OF 2.0-3.0: ICD-10-CM

## 2022-09-06 DIAGNOSIS — Z79.01 LONG TERM CURRENT USE OF ANTICOAGULANTS WITH INR GOAL OF 2.0-3.0: Primary | ICD-10-CM

## 2022-09-06 DIAGNOSIS — I48.91 ATRIAL FIBRILLATION (H): Primary | ICD-10-CM

## 2022-09-06 LAB — INR BLD: 2.1 (ref 0.9–1.1)

## 2022-09-06 PROCEDURE — 85610 PROTHROMBIN TIME: CPT

## 2022-09-06 PROCEDURE — 36416 COLLJ CAPILLARY BLOOD SPEC: CPT

## 2022-09-06 NOTE — PROGRESS NOTES
ANTICOAGULATION MANAGEMENT     Tucker Slater 89 year old male is on warfarin with therapeutic INR result. (Goal INR 2.0-2.5)    Recent labs: (last 7 days)     09/06/22  1402   INR 2.1*       ASSESSMENT       Source(s): Chart Review and Patient/Caregiver Call       Warfarin doses taken: Warfarin taken as instructed    Diet: No new diet changes identified    New illness, injury, or hospitalization: Recently hospitalized for Covid-19 pneumonia. Patient reports he is feeling back to baseline.    Medication/supplement changes: None noted    Signs or symptoms of bleeding or clotting: No    Previous INR: Supratherapeutic    Additional findings: Upcoming surgery/procedure 9/22/22 patient will be having some squamous cells removed from his scalp and cheek. Patient reprots his PCP recommended holding his warfarin for 5 days. See TE from 9/6/22    Patient reports he no longer wants his INR checked by home care, he states it is easier for him to coordinate his lab  Appointments with his wife's appointments.       PLAN     Recommended plan for no diet, medication or health factor changes affecting INR     Dosing Instructions: Continue your current warfarin dose with next INR in 1-2 weeks       Summary  As of 9/6/2022    Full warfarin instructions:  2.5 mg every Mon, Thu, Sat; 1.25 mg all other days   Next INR check:  9/20/2022             Telephone call with Tucker who agrees to plan and repeated back plan correctly    Lab visit scheduled    Education provided: Please call back if any changes to your diet, medications or how you've been taking warfarin, Importance of notifying clinic for changes in medications; a sooner lab recheck maybe needed. and Contact [phone]  with any changes, questions or concerns.     Plan made per [facility] anticoagulation protocol    Elen Hogan, RN  Anticoagulation Clinic  9/6/2022    _______________________________________________________________________     Anticoagulation Episode Summary      Current INR goal:  2.0-2.5   TTR:  66.9 % (11.9 mo)   Target end date:  Indefinite   Send INR reminders to:  [facility]    Indications    Atrial fibrillation (H) with mitral regurgitation and congestive heart failure [I48.91]  Long term current use of anticoagulants with INR goal of 2.0-3.0 [Z79.01]  Permanent atrial fibrillation (H) [I48.21]           Comments:           Anticoagulation Care Providers     Provider Role Specialty Phone number    Kwadwo Winslow MD Referring Internal Medicine [phone]

## 2022-09-06 NOTE — TELEPHONE ENCOUNTER
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 Dean with [facility] on 9/22/22.    Patient reports at his office visit with his primary care provider on 8/30/22, his primary care provider recommended holding warfarin 5 days prior to procedure. This is not documented in the chart.    Left a detailed message with Dr. Dean's office ([phone]) to confirm the type of procedure

## 2022-09-07 NOTE — TELEPHONE ENCOUNTER
Received a VM from [facility] Dr. Dean's Office. VM states patient is have a MOHS procedure on 9/22. Any questions to call [phone]  Viky Blanco RN  Anticoagulation Nurse - [facility]    Left a detailed message for Dr. Dean's nurse asking if Coumadin will need to be held for upcoming MOHS procedure.    Will await a call back.  Viky Blanco RN  Anticoagulation Nurse - [facility]

## 2022-09-09 ENCOUNTER — TELEPHONE (OUTPATIENT)
Dept: INTERNAL MEDICINE | Facility: CLINIC | Age: 87
End: 2022-09-09

## 2022-09-09 ENCOUNTER — MEDICAL CORRESPONDENCE (OUTPATIENT)
Dept: HEALTH INFORMATION MANAGEMENT | Facility: CLINIC | Age: 87
End: 2022-09-09

## 2022-09-09 NOTE — TELEPHONE ENCOUNTER
8/13-10/11 HOME HEALTH CERTIFICATION; received via fax. Orders/Forms in your mailbox to be signed.

## 2022-09-09 NOTE — TELEPHONE ENCOUNTER
RN Approved Visit; received via fax.     INR & Warfarin 8/19/22; received via fax.     INR & Coumadin 8/25/22; received via fax.     INR & Hold Warfarin 8/16/22; received via fax.     INR & Hold Coumadin 8/30/22; received via fax.     Bundle of Orders/Forms in your mailbox to be signed.

## 2022-09-12 NOTE — TELEPHONE ENCOUNTER
Adeola from Dr. Dean's office called stating that Dr. Dean is OK with Tucker continuing warfarin uninterrupted for the procedure.

## 2022-09-12 NOTE — TELEPHONE ENCOUNTER
LM at [facility] requesting confirmation that NO warfarin hold is required for upcoming Mohs procedure on 9/22.   Isidra Calle, BlasD, BCPS

## 2022-09-14 ENCOUNTER — LAB (OUTPATIENT)
Dept: LAB | Facility: CLINIC | Age: 87
End: 2022-09-14
Payer: COMMERCIAL

## 2022-09-14 ENCOUNTER — ANTICOAGULATION THERAPY VISIT (OUTPATIENT)
Dept: ANTICOAGULATION | Facility: CLINIC | Age: 87
End: 2022-09-14

## 2022-09-14 ENCOUNTER — ANCILLARY PROCEDURE (OUTPATIENT)
Dept: CARDIOLOGY | Facility: CLINIC | Age: 87
End: 2022-09-14
Attending: INTERNAL MEDICINE
Payer: COMMERCIAL

## 2022-09-14 DIAGNOSIS — I49.5 SICK SINUS SYNDROME (H): ICD-10-CM

## 2022-09-14 DIAGNOSIS — I48.21 PERMANENT ATRIAL FIBRILLATION (H): ICD-10-CM

## 2022-09-14 DIAGNOSIS — Z79.01 LONG TERM CURRENT USE OF ANTICOAGULANTS WITH INR GOAL OF 2.0-3.0: Primary | ICD-10-CM

## 2022-09-14 DIAGNOSIS — Z95.0 CARDIAC PACEMAKER IN SITU: ICD-10-CM

## 2022-09-14 LAB — INR BLD: 2.1 (ref 0.9–1.1)

## 2022-09-14 PROCEDURE — 36415 COLL VENOUS BLD VENIPUNCTURE: CPT

## 2022-09-14 PROCEDURE — 93279 PRGRMG DEV EVAL PM/LDLS PM: CPT | Performed by: INTERNAL MEDICINE

## 2022-09-14 PROCEDURE — 85610 PROTHROMBIN TIME: CPT

## 2022-09-14 NOTE — TELEPHONE ENCOUNTER
Patient advised he did not need to hold his warfarin for MOHS procedure scheduled on 9/22/22.  Gail Valdovinos RN, BSN  Anticoagulation Clinic

## 2022-09-14 NOTE — PROGRESS NOTES
ANTICOAGULATION MANAGEMENT     Tucker Slater 89 year old male is on warfarin with therapeutic INR result. (Goal INR 2.0-2.5)    Recent labs: (last 7 days)     09/14/22  1225   INR 2.1*       ASSESSMENT       Source(s): Chart Review and Patient/Caregiver Call       Warfarin doses taken: Warfarin taken as instructed    Diet: No new diet changes identified    New illness, injury, or hospitalization: No    Medication/supplement changes: None noted    Signs or symptoms of bleeding or clotting: No    Previous INR: Therapeutic last visit; previously outside of goal range    Additional findings: Upcoming surgery/procedure MOHS procedure 9/22/22 on scalp and cheek, does not need to hold warfarin per 9/6/22 telephone encounter, patient advised of no warfarin hold       PLAN     Recommended plan for no diet, medication or health factor changes affecting INR     Dosing Instructions: Continue your current warfarin dose with next INR in 2 weeks       Summary  As of 9/14/2022    Full warfarin instructions:  2.5 mg every Mon, Thu, Sat; 1.25 mg all other days   Next INR check:  9/28/2022             Telephone call with Tucker who verbalizes understanding and agrees to plan    Lab visit scheduled    Education provided: Please call back if any changes to your diet, medications or how you've been taking warfarin and Contact [phone]  with any changes, questions or concerns.     Plan made per [facility] anticoagulation protocol    Gail Valdovinos RN  Anticoagulation Clinic  9/14/2022    _______________________________________________________________________     Anticoagulation Episode Summary     Current INR goal:  2.0-2.5   TTR:  67.8 % (11.9 mo)   Target end date:  Indefinite   Send INR reminders to:  [facility]    Indications    Atrial fibrillation (H) with mitral regurgitation and congestive heart failure [I48.91]  Long term current use of anticoagulants with INR goal of 2.0-3.0 [Z79.01]  Permanent atrial  fibrillation (H) [I48.21]           Comments:           Anticoagulation Care Providers     Provider Role Specialty Phone number    Kwadwo Winslow MD Referring Internal Medicine [phone]

## 2022-09-19 LAB
MDC_IDC_LEAD_IMPLANT_DT: NORMAL
MDC_IDC_LEAD_IMPLANT_DT: NORMAL
MDC_IDC_LEAD_LOCATION: NORMAL
MDC_IDC_LEAD_LOCATION: NORMAL
MDC_IDC_LEAD_LOCATION_DETAIL_1: NORMAL
MDC_IDC_LEAD_LOCATION_DETAIL_1: NORMAL
MDC_IDC_LEAD_MFG: NORMAL
MDC_IDC_LEAD_MFG: NORMAL
MDC_IDC_LEAD_MODEL: NORMAL
MDC_IDC_LEAD_MODEL: NORMAL
MDC_IDC_LEAD_POLARITY_TYPE: NORMAL
MDC_IDC_LEAD_POLARITY_TYPE: NORMAL
MDC_IDC_LEAD_SERIAL: NORMAL
MDC_IDC_LEAD_SERIAL: NORMAL
MDC_IDC_MSMT_BATTERY_REMAINING_LONGEVITY: 25 MO
MDC_IDC_MSMT_BATTERY_STATUS: NORMAL
MDC_IDC_MSMT_BATTERY_VOLTAGE: 2.9 V
MDC_IDC_MSMT_LEADCHNL_RV_IMPEDANCE_VALUE: 412.5 OHM
MDC_IDC_MSMT_LEADCHNL_RV_PACING_THRESHOLD_AMPLITUDE: 0.75 V
MDC_IDC_MSMT_LEADCHNL_RV_PACING_THRESHOLD_PULSEWIDTH: 0.5 MS
MDC_IDC_PG_IMPLANT_DTM: NORMAL
MDC_IDC_PG_MFG: NORMAL
MDC_IDC_PG_MODEL: NORMAL
MDC_IDC_PG_SERIAL: NORMAL
MDC_IDC_PG_TYPE: NORMAL
MDC_IDC_SESS_CLINIC_NAME: NORMAL
MDC_IDC_SESS_DTM: NORMAL
MDC_IDC_SESS_TYPE: NORMAL
MDC_IDC_SET_BRADY_HYSTRATE: 60 {BEATS}/MIN
MDC_IDC_SET_BRADY_LOWRATE: 70 {BEATS}/MIN
MDC_IDC_SET_BRADY_MAX_SENSOR_RATE: 120 {BEATS}/MIN
MDC_IDC_SET_BRADY_MODE: NORMAL
MDC_IDC_SET_BRADY_NIGHT_RATE: NORMAL
MDC_IDC_SET_LEADCHNL_RA_PACING_POLARITY: NORMAL
MDC_IDC_SET_LEADCHNL_RA_SENSING_POLARITY: NORMAL
MDC_IDC_SET_LEADCHNL_RV_PACING_AMPLITUDE: 1 V
MDC_IDC_SET_LEADCHNL_RV_PACING_CAPTURE_MODE: NORMAL
MDC_IDC_SET_LEADCHNL_RV_PACING_POLARITY: NORMAL
MDC_IDC_SET_LEADCHNL_RV_PACING_PULSEWIDTH: 0.5 MS
MDC_IDC_SET_LEADCHNL_RV_SENSING_POLARITY: NORMAL
MDC_IDC_SET_LEADCHNL_RV_SENSING_SENSITIVITY: 2 MV
MDC_IDC_STAT_AT_MODE_SW_COUNT: 0
MDC_IDC_STAT_BRADY_RA_PERCENT_PACED: 0 %
MDC_IDC_STAT_BRADY_RV_PERCENT_PACED: 99 %

## 2022-09-22 ENCOUNTER — APPOINTMENT (OUTPATIENT)
Dept: URBAN - METROPOLITAN AREA CLINIC 255 | Age: 87
Setting detail: DERMATOLOGY
End: 2022-09-25

## 2022-09-22 DIAGNOSIS — L57.0 ACTINIC KERATOSIS: ICD-10-CM

## 2022-09-22 PROBLEM — C44.42 SQUAMOUS CELL CARCINOMA OF SKIN OF SCALP AND NECK: Status: ACTIVE | Noted: 2022-09-22

## 2022-09-22 PROBLEM — D04.39 CARCINOMA IN SITU OF SKIN OF OTHER PARTS OF FACE: Status: ACTIVE | Noted: 2022-09-22

## 2022-09-22 PROCEDURE — OTHER MOHS SURGERY: OTHER

## 2022-09-22 PROCEDURE — 17311 MOHS 1 STAGE H/N/HF/G: CPT

## 2022-09-22 PROCEDURE — 99203 OFFICE O/P NEW LOW 30 MIN: CPT | Mod: 25

## 2022-09-22 PROCEDURE — OTHER MIPS QUALITY: OTHER

## 2022-09-22 PROCEDURE — OTHER COUNSELING: OTHER

## 2022-09-22 ASSESSMENT — LOCATION SIMPLE DESCRIPTION DERM
LOCATION SIMPLE: SCALP
LOCATION SIMPLE: RIGHT FOREHEAD

## 2022-09-22 ASSESSMENT — LOCATION ZONE DERM
LOCATION ZONE: SCALP
LOCATION ZONE: FACE

## 2022-09-22 ASSESSMENT — LOCATION DETAILED DESCRIPTION DERM
LOCATION DETAILED: LEFT CENTRAL PARIETAL SCALP
LOCATION DETAILED: RIGHT CENTRAL PARIETAL SCALP
LOCATION DETAILED: LEFT SUPERIOR PARIETAL SCALP
LOCATION DETAILED: RIGHT SUPERIOR MEDIAL FOREHEAD
LOCATION DETAILED: RIGHT SUPERIOR PARIETAL SCALP

## 2022-09-22 NOTE — PROCEDURE: MIPS QUALITY
Quality 130: Documentation Of Current Medications In The Medical Record: Current Medications Documented
Quality 226: Preventive Care And Screening: Tobacco Use: Screening And Cessation Intervention: Patient screened for tobacco use and is an ex/non-smoker
Quality 431: Preventive Care And Screening: Unhealthy Alcohol Use - Screening: Patient not identified as an unhealthy alcohol user when screened for unhealthy alcohol use using a systematic screening method
Quality 111:Pneumonia Vaccination Status For Older Adults: Pneumococcal vaccine (PPSV23) administered on or after patient’s 60th birthday and before the end of the measurement period
Detail Level: Detailed
Quality 110: Preventive Care And Screening: Influenza Immunization: Influenza Immunization previously received during influenza season
Quality 143: Oncology: Medical And Radiation- Pain Intensity Quantified: Pain severity quantified, no pain present

## 2022-09-22 NOTE — PROCEDURE: COUNSELING
Patient Specific Counseling (Will Not Stick From Patient To Patient): Schedule cryotherapy with Dr. Lubin.
Detail Level: Detailed
Patient Specific Counseling (Will Not Stick From Patient To Patient): Patient was prescribed 5FU by Dr. Lubin but patient has not yet filled the prescription. Discussed options for surgery vs. topical treatment.

## 2022-09-28 ENCOUNTER — ANTICOAGULATION THERAPY VISIT (OUTPATIENT)
Dept: ANTICOAGULATION | Facility: CLINIC | Age: 87
End: 2022-09-28

## 2022-09-28 ENCOUNTER — LAB (OUTPATIENT)
Dept: LAB | Facility: CLINIC | Age: 87
End: 2022-09-28
Payer: COMMERCIAL

## 2022-09-28 DIAGNOSIS — I48.91 ATRIAL FIBRILLATION (H): Primary | ICD-10-CM

## 2022-09-28 DIAGNOSIS — I48.21 PERMANENT ATRIAL FIBRILLATION (H): ICD-10-CM

## 2022-09-28 DIAGNOSIS — Z79.01 LONG TERM CURRENT USE OF ANTICOAGULANTS WITH INR GOAL OF 2.0-3.0: ICD-10-CM

## 2022-09-28 LAB — INR BLD: 1.9 (ref 0.9–1.1)

## 2022-09-28 PROCEDURE — 85610 PROTHROMBIN TIME: CPT

## 2022-09-28 PROCEDURE — 36416 COLLJ CAPILLARY BLOOD SPEC: CPT

## 2022-09-28 NOTE — PROGRESS NOTES
ANTICOAGULATION MANAGEMENT     Tucker Slater 89 year old male is on warfarin with subtherapeutic INR result. (Goal INR 2.0-2.5)    Recent labs: (last 7 days)     09/28/22  1334   INR 1.9*       ASSESSMENT       Source(s): Chart Review    Previous INR was Therapeutic last visit; previously outside of goal range    Medication, diet, health changes since last INR chart reviewed; none identified     MOHS procedure scheduled on 9/22/22.  No hold was recommended.           PLAN     Unable to reach Tucker today.    Left message to continue current dose of warfarin 1.25 mg tonight. Request call back for assessment.    Follow up required to confirm warfarin dose taken and assess for changes    Ivory Machado RN  Anticoagulation Clinic  9/28/2022

## 2022-09-29 ENCOUNTER — TELEPHONE (OUTPATIENT)
Dept: INTERNAL MEDICINE | Facility: CLINIC | Age: 87
End: 2022-09-29

## 2022-09-29 NOTE — TELEPHONE ENCOUNTER
Reason for Call:  Other call back    Detailed comments: Tried to return call, warm transfer no answer please call Tucker back      Phone Number Patient can be reached at: Home number on file [phone] (home)    Best Time: anytime    Can we leave a detailed message on this number? NO    Call taken on 9/29/2022 at 11:27 AM by Wendy Wu

## 2022-09-29 NOTE — PROGRESS NOTES
PLAN     Unable to reach Tucker this morning. Will try again later today if patient does not return call.    Left message for patient to call INR clinic to discuss results.    Follow up required to confirm warfarin dose taken and assess for changes    Ivory Machado RN  Anticoagulation Clinic  9/29/2022

## 2022-09-29 NOTE — PROGRESS NOTES
ANTICOAGULATION MANAGEMENT     Tucker Slater 89 year old male is on warfarin with subtherapeutic INR result. (Goal INR 2.0-2.5)    Recent labs: (last 7 days)     09/28/22  1334   INR 1.9*       ASSESSMENT       Source(s): Chart Review and Patient/Caregiver Call       Warfarin doses taken: Warfarin taken as instructed    Diet: No new diet changes identified    New illness, injury, or hospitalization: No    Medication/supplement changes: None noted    Signs or symptoms of bleeding or clotting: No    Previous INR: Therapeutic last 2(+) visits    Additional findings: None       PLAN     Recommended plan for no diet, medication or health factor changes affecting INR     Dosing Instructions: Continue your current warfarin dose with next INR in 2 weeks       Summary  As of 9/28/2022    Full warfarin instructions:  2.5 mg every Mon, Thu, Sat; 1.25 mg all other days   Next INR check:  10/13/2022             Telephone call with Tucker who agrees to plan and repeated back plan correctly    Lab visit scheduled    Education provided: Please call back if any changes to your diet, medications or how you've been taking warfarin    Plan made per [facility] anticoagulation protocol    Ivory Machado RN  Anticoagulation Clinic  9/29/2022    _______________________________________________________________________     Anticoagulation Episode Summary     Current INR goal:  2.0-2.5   TTR:  65.8 % (11.9 mo)   Target end date:  Indefinite   Send INR reminders to:  [facility]    Indications    Atrial fibrillation (H) with mitral regurgitation and congestive heart failure [I48.91]  Long term current use of anticoagulants with INR goal of 2.0-3.0 [Z79.01]  Permanent atrial fibrillation (H) [I48.21]           Comments:           Anticoagulation Care Providers     Provider Role Specialty Phone number    Kwadwo Winslow MD Referring Internal Medicine [phone]

## 2022-10-13 ENCOUNTER — ANTICOAGULATION THERAPY VISIT (OUTPATIENT)
Dept: ANTICOAGULATION | Facility: CLINIC | Age: 87
End: 2022-10-13

## 2022-10-13 ENCOUNTER — LAB (OUTPATIENT)
Dept: LAB | Facility: CLINIC | Age: 87
End: 2022-10-13
Payer: COMMERCIAL

## 2022-10-13 DIAGNOSIS — Z79.01 LONG TERM CURRENT USE OF ANTICOAGULANTS WITH INR GOAL OF 2.0-3.0: Primary | ICD-10-CM

## 2022-10-13 DIAGNOSIS — I48.21 PERMANENT ATRIAL FIBRILLATION (H): ICD-10-CM

## 2022-10-13 LAB — INR BLD: 2.1 (ref 0.9–1.1)

## 2022-10-13 PROCEDURE — 85610 PROTHROMBIN TIME: CPT

## 2022-10-13 PROCEDURE — 36416 COLLJ CAPILLARY BLOOD SPEC: CPT

## 2022-10-13 NOTE — PROGRESS NOTES
Message left with wife for patient to return call to call [phone].     Gail Valdovinos RN, BSN  Anticoagulation Clinic

## 2022-10-13 NOTE — PROGRESS NOTES
ANTICOAGULATION MANAGEMENT     Tucker Slater 89 year old male is on warfarin with therapeutic INR result. (Goal INR 2.0-2.5)    Recent labs: (last 7 days)     10/13/22  1319   INR 2.1*       ASSESSMENT       Source(s): Chart Review and Patient/Caregiver Call       Warfarin doses taken: Warfarin taken as instructed    Diet: No new diet changes identified    New illness, injury, or hospitalization: No    Medication/supplement changes: None noted    Signs or symptoms of bleeding or clotting: No    Previous INR: Subtherapeutic    Additional findings: None       PLAN     Recommended plan for no diet, medication or health factor changes affecting INR     Dosing Instructions: Continue your current warfarin dose with next INR in 3 weeks       Summary  As of 10/13/2022    Full warfarin instructions:  2.5 mg every Mon, Thu, Sat; 1.25 mg all other days   Next INR check:  11/3/2022             Telephone call with Tucker who verbalizes understanding and agrees to plan    Lab visit scheduled    Education provided: Please call back if any changes to your diet, medications or how you've been taking warfarin and Contact [phone]  with any changes, questions or concerns.     Plan made per [facility] anticoagulation protocol    Gail Valdovinos RN  Anticoagulation Clinic  10/13/2022    _______________________________________________________________________     Anticoagulation Episode Summary     Current INR goal:  2.0-2.5   TTR:  63.8 % (11.9 mo)   Target end date:  Indefinite   Send INR reminders to:  [facility]    Indications    Atrial fibrillation (H) with mitral regurgitation and congestive heart failure [I48.91]  Long term current use of anticoagulants with INR goal of 2.0-3.0 [Z79.01]  Permanent atrial fibrillation (H) [I48.21]           Comments:           Anticoagulation Care Providers     Provider Role Specialty Phone number    Kwadwo Winslow MD Referring Internal Medicine [phone]

## 2022-11-03 ENCOUNTER — ANTICOAGULATION THERAPY VISIT (OUTPATIENT)
Dept: ANTICOAGULATION | Facility: CLINIC | Age: 87
End: 2022-11-03

## 2022-11-03 ENCOUNTER — LAB (OUTPATIENT)
Dept: LAB | Facility: CLINIC | Age: 87
End: 2022-11-03
Payer: COMMERCIAL

## 2022-11-03 DIAGNOSIS — I48.21 PERMANENT ATRIAL FIBRILLATION (H): ICD-10-CM

## 2022-11-03 DIAGNOSIS — Z79.01 LONG TERM CURRENT USE OF ANTICOAGULANTS WITH INR GOAL OF 2.0-3.0: ICD-10-CM

## 2022-11-03 DIAGNOSIS — I48.21 PERMANENT ATRIAL FIBRILLATION (H): Primary | ICD-10-CM

## 2022-11-03 LAB — INR BLD: 1.7 (ref 0.9–1.1)

## 2022-11-03 PROCEDURE — 85610 PROTHROMBIN TIME: CPT

## 2022-11-03 PROCEDURE — 36416 COLLJ CAPILLARY BLOOD SPEC: CPT

## 2022-11-03 NOTE — PROGRESS NOTES
ANTICOAGULATION MANAGEMENT     Tucker Slater 89 year old male is on warfarin with subtherapeutic INR result. (Goal INR 2.0-2.5)    Recent labs: (last 7 days)     11/03/22  1323   INR 1.7*       ASSESSMENT       Source(s): Chart Review    Previous INR was Therapeutic last visit; previously outside of goal range    Medication, diet, health changes since last INR chart reviewed; none identified           PLAN     Unable to reach Tucker today.    Left message to continue current dose of warfarin 2.5 mg tonight. Request call back for assessment.    Follow up required to confirm warfarin dose taken and assess for changes    Ivory Machado RN  Anticoagulation Clinic  11/3/2022

## 2022-11-03 NOTE — PROGRESS NOTES
ANTICOAGULATION MANAGEMENT     Tucker Slater 89 year old male is on warfarin with subtherapeutic INR result. (Goal INR 2.0-2.5)    Recent labs: (last 7 days)     11/03/22  1323   INR 1.7*       ASSESSMENT       Source(s): Chart Review and Patient/Caregiver Call       Warfarin doses taken: Warfarin taken as instructed    Diet: Increased greens/vitamin K in diet; plans to resume previous intake    New illness, injury, or hospitalization: No    Medication/supplement changes: None noted    Signs or symptoms of bleeding or clotting: No    Previous INR: Therapeutic last visit; previously outside of goal range.  May need to consider a maintenance dose increase with next check if INR continues to be sub therapeutic.    Additional findings: None       PLAN     Recommended plan for temporary change(s) affecting INR     Dosing Instructions: Continue your current warfarin dose with next INR in 2 weeks       Summary  As of 11/3/2022    Full warfarin instructions:  2.5 mg every Mon, Thu, Sat; 1.25 mg all other days; Starting 11/3/2022   Next INR check:  11/17/2022             Telephone call with Tucker who agrees to plan and repeated back plan correctly    Lab visit scheduled    Education provided:     Please call back if any changes to your diet, medications or how you've been taking warfarin    Dietary considerations: importance of consistent vitamin K intake and impact of vitamin K foods on INR    Plan made per [facility] anticoagulation protocol    Ivory Machado RN  Anticoagulation Clinic  11/3/2022    _______________________________________________________________________     Anticoagulation Episode Summary     Current INR goal:  2.0-2.5   TTR:  59.4 % (11.9 mo)   Target end date:  Indefinite   Send INR reminders to:  [facility]    Indications    Atrial fibrillation (H) with mitral regurgitation and congestive heart failure [I48.91]  Long term current use of anticoagulants with INR goal of 2.0-3.0  [Z79.01]  Permanent atrial fibrillation (H) [I48.21]           Comments:           Anticoagulation Care Providers     Provider Role Specialty Phone number    Kwadwo Winslow MD Referring Internal Medicine [phone]

## 2022-11-17 ENCOUNTER — ANTICOAGULATION THERAPY VISIT (OUTPATIENT)
Dept: ANTICOAGULATION | Facility: CLINIC | Age: 87
End: 2022-11-17

## 2022-11-17 ENCOUNTER — LAB (OUTPATIENT)
Dept: LAB | Facility: CLINIC | Age: 87
End: 2022-11-17
Payer: COMMERCIAL

## 2022-11-17 DIAGNOSIS — I48.21 PERMANENT ATRIAL FIBRILLATION (H): Primary | ICD-10-CM

## 2022-11-17 DIAGNOSIS — I48.21 PERMANENT ATRIAL FIBRILLATION (H): ICD-10-CM

## 2022-11-17 DIAGNOSIS — Z79.01 LONG TERM CURRENT USE OF ANTICOAGULANTS WITH INR GOAL OF 2.0-3.0: ICD-10-CM

## 2022-11-17 LAB — INR BLD: 1.9 (ref 0.9–1.1)

## 2022-11-17 PROCEDURE — 36416 COLLJ CAPILLARY BLOOD SPEC: CPT

## 2022-11-17 PROCEDURE — 85610 PROTHROMBIN TIME: CPT

## 2022-11-17 NOTE — PROGRESS NOTES
ANTICOAGULATION MANAGEMENT     Tucker Slater 89 year old male is on warfarin with subtherapeutic INR result. (Goal INR 2.0-2.5)    Recent labs: (last 7 days)     11/17/22  0927   INR 1.9*       ASSESSMENT       Source(s): Chart Review and Patient/Caregiver Call       Warfarin doses taken: Warfarin taken as instructed    Diet: Increased greens/vitamin K in diet; ongoing change--eating 2 servings of greens per week    New illness, injury, or hospitalization: No    Medication/supplement changes: None noted    Signs or symptoms of bleeding or clotting: No    Previous INR: Subtherapeutic    Additional findings: None       PLAN     Recommended plan for ongoing change(s) affecting INR     Dosing Instructions: Increase your warfarin dose (10% change) with next INR in 10 days       Summary  As of 11/17/2022    Full warfarin instructions:  1.25 mg every Mon, Wed, Fri; 2.5 mg all other days; Starting 11/17/2022   Next INR check:  12/2/2022             Telephone call with Tucker who agrees to plan and repeated back plan correctly    Lab visit scheduled    Education provided:     Contact [phone]  with any changes, questions or concerns.     Plan made per [facility] anticoagulation protocol    Chichi Wang, RN  Anticoagulation Clinic  11/17/2022    _______________________________________________________________________     Anticoagulation Episode Summary     Current INR goal:  2.0-2.5   TTR:  55.5 % (11.9 mo)   Target end date:  Indefinite   Send INR reminders to:  [facility]    Indications    Atrial fibrillation (H) with mitral regurgitation and congestive heart failure [I48.91]  Long term current use of anticoagulants with INR goal of 2.0-3.0 [Z79.01]  Permanent atrial fibrillation (H) [I48.21]           Comments:           Anticoagulation Care Providers     Provider Role Specialty Phone number    Kwadwo Winslow MD Referring Internal Medicine [phone]

## 2022-12-02 ENCOUNTER — LAB (OUTPATIENT)
Dept: LAB | Facility: CLINIC | Age: 87
End: 2022-12-02
Payer: COMMERCIAL

## 2022-12-02 ENCOUNTER — ANTICOAGULATION THERAPY VISIT (OUTPATIENT)
Dept: ANTICOAGULATION | Facility: CLINIC | Age: 87
End: 2022-12-02

## 2022-12-02 DIAGNOSIS — Z79.01 LONG TERM CURRENT USE OF ANTICOAGULANTS WITH INR GOAL OF 2.0-3.0: Primary | ICD-10-CM

## 2022-12-02 DIAGNOSIS — I48.21 PERMANENT ATRIAL FIBRILLATION (H): ICD-10-CM

## 2022-12-02 LAB — INR BLD: 2.4 (ref 0.9–1.1)

## 2022-12-02 PROCEDURE — 36416 COLLJ CAPILLARY BLOOD SPEC: CPT

## 2022-12-02 PROCEDURE — 85610 PROTHROMBIN TIME: CPT

## 2022-12-02 NOTE — PROGRESS NOTES
ANTICOAGULATION MANAGEMENT     Tucker Slater 89 year old male is on warfarin with therapeutic INR result. (Goal INR 2.0-2.5)    Recent labs: (last 7 days)     12/02/22  1336   INR 2.4*       ASSESSMENT       Source(s): Chart Review and Patient/Caregiver Call       Warfarin doses taken: Warfarin taken as instructed - new maintenance dose from last visit confirmed.    Diet: No new diet changes identified    New illness, injury, or hospitalization: No    Medication/supplement changes: None noted    Signs or symptoms of bleeding or clotting: No    Previous INR: Subtherapeutic    Additional findings: None       PLAN     Recommended plan for no diet, medication or health factor changes affecting INR     Dosing Instructions: Continue your current warfarin dose with next INR in 3 weeks       Summary  As of 12/2/2022    Full warfarin instructions:  1.25 mg every Mon, Wed, Fri; 2.5 mg all other days; Starting 12/2/2022   Next INR check:  12/23/2022             Telephone call with Tucker who verbalizes understanding and agrees to plan    Lab visit scheduled    Education provided:     Please call back if any changes to your diet, medications or how you've been taking warfarin    Plan made per [facility] anticoagulation protocol    Nena Valdovinos RN  Anticoagulation Clinic  12/2/2022    _______________________________________________________________________     Anticoagulation Episode Summary     Current INR goal:  2.0-2.5   TTR:  54.7 % (11.9 mo)   Target end date:  Indefinite   Send INR reminders to:  [facility]    Indications    Atrial fibrillation (H) with mitral regurgitation and congestive heart failure [I48.91]  Long term current use of anticoagulants with INR goal of 2.0-3.0 [Z79.01]  Permanent atrial fibrillation (H) [I48.21]           Comments:           Anticoagulation Care Providers     Provider Role Specialty Phone number    Kwadwo Winslow MD Referring Internal Medicine [phone]

## 2022-12-13 ENCOUNTER — OFFICE VISIT (OUTPATIENT)
Dept: INTERNAL MEDICINE | Facility: CLINIC | Age: 87
End: 2022-12-13
Payer: COMMERCIAL

## 2022-12-13 VITALS
RESPIRATION RATE: 18 BRPM | OXYGEN SATURATION: 100 % | SYSTOLIC BLOOD PRESSURE: 128 MMHG | HEART RATE: 78 BPM | WEIGHT: 171.7 LBS | DIASTOLIC BLOOD PRESSURE: 76 MMHG | TEMPERATURE: 97.2 F | BODY MASS INDEX: 23.29 KG/M2

## 2022-12-13 DIAGNOSIS — I10 ESSENTIAL HYPERTENSION WITH GOAL BLOOD PRESSURE LESS THAN 140/90: Primary | ICD-10-CM

## 2022-12-13 DIAGNOSIS — N18.32 STAGE 3B CHRONIC KIDNEY DISEASE (H): ICD-10-CM

## 2022-12-13 DIAGNOSIS — D64.9 ANEMIA, UNSPECIFIED TYPE: ICD-10-CM

## 2022-12-13 DIAGNOSIS — M16.11 PRIMARY OSTEOARTHRITIS OF RIGHT HIP: ICD-10-CM

## 2022-12-13 DIAGNOSIS — Z23 NEED FOR PROPHYLACTIC VACCINATION AND INOCULATION AGAINST INFLUENZA: ICD-10-CM

## 2022-12-13 DIAGNOSIS — G60.3 IDIOPATHIC PROGRESSIVE NEUROPATHY: ICD-10-CM

## 2022-12-13 DIAGNOSIS — I48.21 PERMANENT ATRIAL FIBRILLATION (H): ICD-10-CM

## 2022-12-13 LAB
ERYTHROCYTE [DISTWIDTH] IN BLOOD BY AUTOMATED COUNT: 12.9 % (ref 10–15)
HCT VFR BLD AUTO: 42.1 % (ref 40–53)
HGB BLD-MCNC: 14.1 G/DL (ref 13.3–17.7)
MCH RBC QN AUTO: 32.1 PG (ref 26.5–33)
MCHC RBC AUTO-ENTMCNC: 33.5 G/DL (ref 31.5–36.5)
MCV RBC AUTO: 96 FL (ref 78–100)
PLATELET # BLD AUTO: 193 10E3/UL (ref 150–450)
RBC # BLD AUTO: 4.39 10E6/UL (ref 4.4–5.9)
WBC # BLD AUTO: 5.2 10E3/UL (ref 4–11)

## 2022-12-13 PROCEDURE — 83550 IRON BINDING TEST: CPT | Performed by: INTERNAL MEDICINE

## 2022-12-13 PROCEDURE — 84443 ASSAY THYROID STIM HORMONE: CPT | Performed by: INTERNAL MEDICINE

## 2022-12-13 PROCEDURE — 83540 ASSAY OF IRON: CPT | Performed by: INTERNAL MEDICINE

## 2022-12-13 PROCEDURE — 82746 ASSAY OF FOLIC ACID SERUM: CPT | Performed by: INTERNAL MEDICINE

## 2022-12-13 PROCEDURE — 85027 COMPLETE CBC AUTOMATED: CPT | Performed by: INTERNAL MEDICINE

## 2022-12-13 PROCEDURE — G0008 ADMIN INFLUENZA VIRUS VAC: HCPCS | Performed by: INTERNAL MEDICINE

## 2022-12-13 PROCEDURE — 80053 COMPREHEN METABOLIC PANEL: CPT | Performed by: INTERNAL MEDICINE

## 2022-12-13 PROCEDURE — 90662 IIV NO PRSV INCREASED AG IM: CPT | Performed by: INTERNAL MEDICINE

## 2022-12-13 PROCEDURE — 82728 ASSAY OF FERRITIN: CPT | Performed by: INTERNAL MEDICINE

## 2022-12-13 PROCEDURE — 82607 VITAMIN B-12: CPT | Performed by: INTERNAL MEDICINE

## 2022-12-13 PROCEDURE — 99214 OFFICE O/P EST MOD 30 MIN: CPT | Mod: 25 | Performed by: INTERNAL MEDICINE

## 2022-12-13 PROCEDURE — 36415 COLL VENOUS BLD VENIPUNCTURE: CPT | Performed by: INTERNAL MEDICINE

## 2022-12-13 ASSESSMENT — PAIN SCALES - GENERAL: PAINLEVEL: NO PAIN (0)

## 2022-12-13 NOTE — LETTER
December 16, 2022      Tucker Slater  [address] [facility] [address]        Dear ,    We are writing to inform you of your test results. Your labs show slightly decreased kidney function,but remains stable.   Recheck in 6 months.       Resulted Orders   CBC with platelets   Result Value Ref Range    WBC Count 5.2 4.0 - 11.0 10e3/uL    RBC Count 4.39 (L) 4.40 - 5.90 10e6/uL    Hemoglobin 14.1 13.3 - 17.7 g/dL    Hematocrit 42.1 40.0 - 53.0 %    MCV 96 78 - 100 fL    MCH 32.1 26.5 - 33.0 pg    MCHC 33.5 31.5 - 36.5 g/dL    RDW 12.9 10.0 - 15.0 %    Platelet Count 193 150 - 450 10e3/uL   Comprehensive metabolic panel (BMP + Alb, Alk Phos, ALT, AST, Total. Bili, TP)   Result Value Ref Range    Sodium 142 136 - 145 mmol/L    Potassium 4.4 3.4 - 5.3 mmol/L    Chloride 108 (H) 98 - 107 mmol/L    Carbon Dioxide (CO2) 23 22 - 29 mmol/L    Anion Gap 11 7 - 15 mmol/L    Urea Nitrogen 41.9 (H) 8.0 - 23.0 mg/dL    Creatinine 1.28 (H) 0.67 - 1.17 mg/dL    Calcium 9.8 (H) 8.2 - 9.6 mg/dL    Glucose 71 70 - 99 mg/dL    Alkaline Phosphatase 58 40 - 129 U/L    AST 35 10 - 50 U/L    ALT 25 10 - 50 U/L    Protein Total 7.4 6.4 - 8.3 g/dL    Albumin 4.2 3.5 - 5.2 g/dL    Bilirubin Total 0.6 <=1.2 mg/dL    GFR Estimate 53 (L) >60 mL/min/1.73m2      Comment:      Effective December 21, 2021 eGFRcr in adults is calculated using the 2021 CKD-EPI creatinine equation which includes age and gender ([employer] tanner al., [place], DOI: 10.1056/TREUbn6932823)   TSH with free T4 reflex   Result Value Ref Range    TSH 1.91 0.30 - 4.20 uIU/mL   Vitamin B12   Result Value Ref Range    Vitamin B12 683 232 - 1,245 pg/mL   Iron and iron binding capacity   Result Value Ref Range    Iron 85 61 - 157 ug/dL    Iron Sat Index 27 15 - 46 %    Iron Binding Capacity 310 240 - 430 ug/dL   Folate   Result Value Ref Range    Folic Acid >40.0 (H) 4.6 - 34.8 ng/mL   Ferritin   Result Value Ref Range    Ferritin 131 31 - 409 ng/mL       If you have any  questions or concerns, please call the clinic at the number listed above.       Sincerely,      Kwadwo Winslow MD        [facility]

## 2022-12-13 NOTE — PROGRESS NOTES
Assessment & Plan     Idiopathic progressive neuropathy  Assess lab work  Keep using a cane, falls precautions     Permanent atrial fibrillation (H)  Anticoagulated and on rate control , has a PM    Essential hypertension with goal blood pressure less than 140/90  Controlled on treatment   - CBC with platelets  - Comprehensive metabolic panel (BMP + Alb, Alk Phos, ALT, AST, Total. Bili, TP)  - TSH with free T4 reflex  - Vitamin B12  - Iron and iron binding capacity  - Folate  - Ferritin    Primary osteoarthritis of right hip  Use PRN Tylenol     Stage 3b chronic kidney disease (H)  Monitor renal function, avoid NSAID use     Anemia, unspecified type  Assess lab, likely CKD related anemia   - Vitamin B12  - Iron and iron binding capacity  - Folate  - Ferritin    Need for prophylactic vaccination and inoculation against influenza    - INFLUENZA, QUAD, HIGH DOSE, PF, 65YR + (FLUZONE HD)  - ADMIN INFLUENZA (For MEDICARE Patients ONLY) []             See Patient Instructions    Return in about 6 months (around 6/13/2023).    Kwadwo Winslow MD  [facility]    Juancho Ceballos is a 89 year old, presenting for the following health issues:  RECHECK      HPI       Patient is seen for a follow up visit.  Augustine has brought me a book from his wife Alba.  He is doing well, except for feeling low energy.     Hypertension Follow-up  Has h/o HTN. on medical treatment. BP has been controlled. No side effects from medications. No CP, HA, dizziness. good compliance with medications and low salt diet.      Do you check your blood pressure regularly outside of the clinic? No     Are you following a low salt diet? Yes    Are your blood pressures ever more than 140 on the top number (systolic) OR more   than 90 on the bottom number (diastolic), for example 140/90?       How many servings of fruits and vegetables do you eat daily?  0-1    On average, how many sweetened beverages do you drink each day  (Examples: soda, juice, sweet tea, etc.  Do NOT count diet or artificially sweetened beverages)?   1    How many days per week do you exercise enough to make your heart beat faster? 3 or less    How many minutes a day do you exercise enough to make your heart beat faster? 9 or less    How many days per week do you miss taking your medication? 0    Has history of atrial fibrillation. On anticoagulation with Coumadin and rate control medications. Asymptonatic - no chest pains , palpitations,  no side effects from medications.  Has h/o CRF. Monitoring BP, BG, medications, avoiding OTC NSAIDs. Needs periodic recheck of kidney function.  Patient has anemia of renal disease. No significant other symptoms of GI or  bleed.  Has h/o peripheral neuropathy. Uses a cane for support, also to help with right hip pain, post FRANK.     Review of Systems   Constitutional, HEENT, cardiovascular, pulmonary, gi and gu systems are negative, except as otherwise noted.      Objective    /76 (BP Location: Left arm, Cuff Size: Adult Regular)   Pulse 78   Temp 97.2  F (36.2  C) (Tympanic)   Resp 18   Wt 77.9 kg (171 lb 11.2 oz)   SpO2 100%   BMI 23.29 kg/m    Body mass index is 23.29 kg/m .  Physical Exam   GENERAL: weak appearing , alert and no distress  EYES: Eyes grossly normal to inspection, PERRL and conjunctivae and sclerae normal  HENT: ear canals and TM's normal, nose and mouth without ulcers or lesions  NECK: no adenopathy, no asymmetry, masses, or scars and thyroid normal to palpation  RESP: lungs clear to auscultation - no rales, rhonchi or wheezes  CV: regular rate and rhythm, normal S1 S2, no S3 or S4, no murmur, click or rub, no peripheral edema and peripheral pulses strong  ABDOMEN: soft, nontender, no hepatosplenomegaly, no masses and bowel sounds normal  MS: no gross musculoskeletal defects noted, no edema  SKIN: no suspicious lesions or rashes  NEURO: generalized weakness, mentation intact and speech  normal    Lab on 12/02/2022   Component Date Value Ref Range Status     INR 12/02/2022 2.4 (H)  0.9 - 1.1 Final

## 2022-12-14 ENCOUNTER — ANCILLARY PROCEDURE (OUTPATIENT)
Dept: CARDIOLOGY | Facility: CLINIC | Age: 87
End: 2022-12-14
Attending: INTERNAL MEDICINE
Payer: COMMERCIAL

## 2022-12-14 DIAGNOSIS — Z95.0 CARDIAC PACEMAKER IN SITU: ICD-10-CM

## 2022-12-14 DIAGNOSIS — I49.5 SICK SINUS SYNDROME (H): ICD-10-CM

## 2022-12-14 LAB
ALBUMIN SERPL BCG-MCNC: 4.2 G/DL (ref 3.5–5.2)
ALP SERPL-CCNC: 58 U/L (ref 40–129)
ALT SERPL W P-5'-P-CCNC: 25 U/L (ref 10–50)
ANION GAP SERPL CALCULATED.3IONS-SCNC: 11 MMOL/L (ref 7–15)
AST SERPL W P-5'-P-CCNC: 35 U/L (ref 10–50)
BILIRUB SERPL-MCNC: 0.6 MG/DL
BUN SERPL-MCNC: 41.9 MG/DL (ref 8–23)
CALCIUM SERPL-MCNC: 9.8 MG/DL (ref 8.2–9.6)
CHLORIDE SERPL-SCNC: 108 MMOL/L (ref 98–107)
CREAT SERPL-MCNC: 1.28 MG/DL (ref 0.67–1.17)
DEPRECATED HCO3 PLAS-SCNC: 23 MMOL/L (ref 22–29)
FERRITIN SERPL-MCNC: 131 NG/ML (ref 31–409)
FOLATE SERPL-MCNC: >40 NG/ML (ref 4.6–34.8)
GFR SERPL CREATININE-BSD FRML MDRD: 53 ML/MIN/1.73M2
GLUCOSE SERPL-MCNC: 71 MG/DL (ref 70–99)
IRON BINDING CAPACITY (ROCHE): 310 UG/DL (ref 240–430)
IRON SATN MFR SERPL: 27 % (ref 15–46)
IRON SERPL-MCNC: 85 UG/DL (ref 61–157)
POTASSIUM SERPL-SCNC: 4.4 MMOL/L (ref 3.4–5.3)
PROT SERPL-MCNC: 7.4 G/DL (ref 6.4–8.3)
SODIUM SERPL-SCNC: 142 MMOL/L (ref 136–145)
TSH SERPL DL<=0.005 MIU/L-ACNC: 1.91 UIU/ML (ref 0.3–4.2)
VIT B12 SERPL-MCNC: 683 PG/ML (ref 232–1245)

## 2022-12-14 PROCEDURE — 93296 REM INTERROG EVL PM/IDS: CPT | Performed by: INTERNAL MEDICINE

## 2022-12-14 PROCEDURE — 93294 REM INTERROG EVL PM/LDLS PM: CPT | Performed by: INTERNAL MEDICINE

## 2022-12-20 LAB
MDC_IDC_LEAD_IMPLANT_DT: NORMAL
MDC_IDC_LEAD_IMPLANT_DT: NORMAL
MDC_IDC_LEAD_LOCATION: NORMAL
MDC_IDC_LEAD_LOCATION: NORMAL
MDC_IDC_LEAD_LOCATION_DETAIL_1: NORMAL
MDC_IDC_LEAD_LOCATION_DETAIL_1: NORMAL
MDC_IDC_LEAD_MFG: NORMAL
MDC_IDC_LEAD_MFG: NORMAL
MDC_IDC_LEAD_MODEL: NORMAL
MDC_IDC_LEAD_MODEL: NORMAL
MDC_IDC_LEAD_POLARITY_TYPE: NORMAL
MDC_IDC_LEAD_POLARITY_TYPE: NORMAL
MDC_IDC_LEAD_SERIAL: NORMAL
MDC_IDC_LEAD_SERIAL: NORMAL
MDC_IDC_MSMT_BATTERY_DTM: NORMAL
MDC_IDC_MSMT_BATTERY_REMAINING_LONGEVITY: 28 MO
MDC_IDC_MSMT_BATTERY_REMAINING_PERCENTAGE: 22 %
MDC_IDC_MSMT_BATTERY_RRT_TRIGGER: NORMAL
MDC_IDC_MSMT_BATTERY_STATUS: NORMAL
MDC_IDC_MSMT_BATTERY_VOLTAGE: 2.89 V
MDC_IDC_MSMT_LEADCHNL_RV_IMPEDANCE_VALUE: 440 OHM
MDC_IDC_MSMT_LEADCHNL_RV_LEAD_CHANNEL_STATUS: NORMAL
MDC_IDC_MSMT_LEADCHNL_RV_PACING_THRESHOLD_AMPLITUDE: 0.62 V
MDC_IDC_MSMT_LEADCHNL_RV_PACING_THRESHOLD_PULSEWIDTH: 0.5 MS
MDC_IDC_MSMT_LEADCHNL_RV_SENSING_INTR_AMPL: 7.3 MV
MDC_IDC_PG_IMPLANT_DTM: NORMAL
MDC_IDC_PG_MFG: NORMAL
MDC_IDC_PG_MODEL: NORMAL
MDC_IDC_PG_SERIAL: NORMAL
MDC_IDC_PG_TYPE: NORMAL
MDC_IDC_SESS_CLINIC_NAME: NORMAL
MDC_IDC_SESS_DTM: NORMAL
MDC_IDC_SESS_REPROGRAMMED: NO
MDC_IDC_SESS_TYPE: NORMAL
MDC_IDC_SET_BRADY_HYSTRATE: 60 {BEATS}/MIN
MDC_IDC_SET_BRADY_LOWRATE: 70 {BEATS}/MIN
MDC_IDC_SET_BRADY_MAX_SENSOR_RATE: 120 {BEATS}/MIN
MDC_IDC_SET_BRADY_MODE: NORMAL
MDC_IDC_SET_LEADCHNL_RA_PACING_POLARITY: NORMAL
MDC_IDC_SET_LEADCHNL_RA_SENSING_POLARITY: NORMAL
MDC_IDC_SET_LEADCHNL_RV_PACING_AMPLITUDE: 0.88
MDC_IDC_SET_LEADCHNL_RV_PACING_ANODE_ELECTRODE_1: NORMAL
MDC_IDC_SET_LEADCHNL_RV_PACING_ANODE_LOCATION_1: NORMAL
MDC_IDC_SET_LEADCHNL_RV_PACING_CAPTURE_MODE: NORMAL
MDC_IDC_SET_LEADCHNL_RV_PACING_CATHODE_ELECTRODE_1: NORMAL
MDC_IDC_SET_LEADCHNL_RV_PACING_CATHODE_LOCATION_1: NORMAL
MDC_IDC_SET_LEADCHNL_RV_PACING_POLARITY: NORMAL
MDC_IDC_SET_LEADCHNL_RV_PACING_PULSEWIDTH: 0.5 MS
MDC_IDC_SET_LEADCHNL_RV_SENSING_ADAPTATION_MODE: NORMAL
MDC_IDC_SET_LEADCHNL_RV_SENSING_ANODE_ELECTRODE_1: NORMAL
MDC_IDC_SET_LEADCHNL_RV_SENSING_ANODE_LOCATION_1: NORMAL
MDC_IDC_SET_LEADCHNL_RV_SENSING_CATHODE_ELECTRODE_1: NORMAL
MDC_IDC_SET_LEADCHNL_RV_SENSING_CATHODE_LOCATION_1: NORMAL
MDC_IDC_SET_LEADCHNL_RV_SENSING_POLARITY: NORMAL
MDC_IDC_SET_LEADCHNL_RV_SENSING_SENSITIVITY: 2 MV
MDC_IDC_STAT_AT_DTM_END: NORMAL
MDC_IDC_STAT_AT_DTM_START: NORMAL
MDC_IDC_STAT_BRADY_DTM_END: NORMAL
MDC_IDC_STAT_BRADY_DTM_START: NORMAL
MDC_IDC_STAT_BRADY_RV_PERCENT_PACED: 99 %
MDC_IDC_STAT_CRT_DTM_END: NORMAL
MDC_IDC_STAT_CRT_DTM_START: NORMAL
MDC_IDC_STAT_HEART_RATE_DTM_END: NORMAL
MDC_IDC_STAT_HEART_RATE_DTM_START: NORMAL
MDC_IDC_STAT_HEART_RATE_VENTRICULAR_MAX: 210 {BEATS}/MIN
MDC_IDC_STAT_HEART_RATE_VENTRICULAR_MEAN: 76 {BEATS}/MIN
MDC_IDC_STAT_HEART_RATE_VENTRICULAR_MIN: 60 {BEATS}/MIN

## 2022-12-23 ENCOUNTER — ANTICOAGULATION THERAPY VISIT (OUTPATIENT)
Dept: ANTICOAGULATION | Facility: CLINIC | Age: 87
End: 2022-12-23

## 2022-12-23 ENCOUNTER — LAB (OUTPATIENT)
Dept: LAB | Facility: CLINIC | Age: 87
End: 2022-12-23
Payer: COMMERCIAL

## 2022-12-23 DIAGNOSIS — I48.21 PERMANENT ATRIAL FIBRILLATION (H): ICD-10-CM

## 2022-12-23 DIAGNOSIS — Z79.01 LONG TERM CURRENT USE OF ANTICOAGULANTS WITH INR GOAL OF 2.0-3.0: Primary | ICD-10-CM

## 2022-12-23 LAB — INR BLD: 2 (ref 0.9–1.1)

## 2022-12-23 PROCEDURE — 85610 PROTHROMBIN TIME: CPT

## 2022-12-23 PROCEDURE — 36416 COLLJ CAPILLARY BLOOD SPEC: CPT

## 2022-12-23 NOTE — PROGRESS NOTES
ANTICOAGULATION MANAGEMENT     Tucker Slater 89 year old male is on warfarin with therapeutic INR result. (Goal INR 2.0-2.5)    Recent labs: (last 7 days)     12/23/22  1601   INR 2.0*       ASSESSMENT       Source(s): Chart Review and Patient/Caregiver Call       Warfarin doses taken: Warfarin taken as instructed    Diet: No new diet changes identified    New illness, injury, or hospitalization: No    Medication/supplement changes: Patient reports he has decreased Tylenol from 1000 mg 2x daily to 500 mg 2x daily. Patient reports he has not any difference in his pain with the reduction so he he may stop taking Tylenol in the next few days.    Signs or symptoms of bleeding or clotting: No    Previous INR: Therapeutic last visit; previously outside of goal range    Additional findings: Patient had an with PCP 12/13/22, no changes to care plan       PLAN     Recommended plan for ongoing change(s) affecting INR     Dosing Instructions: Continue your current warfarin dose with next INR in 2 weeks       Summary  As of 12/23/2022    Full warfarin instructions:  1.25 mg every Mon, Wed, Fri; 2.5 mg all other days   Next INR check:  1/6/2023             Telephone call with Tucker who agrees to plan and repeated back plan correctly    Lab visit scheduled    Education provided:     Please call back if any changes to your diet, medications or how you've been taking warfarin    Contact [phone]  with any changes, questions or concerns.     Plan made per [facility] anticoagulation protocol    Gail Valdovinos RN  Anticoagulation Clinic  12/23/2022    _______________________________________________________________________     Anticoagulation Episode Summary     Current INR goal:  2.0-2.5   TTR:  54.7 % (11.9 mo)   Target end date:  Indefinite   Send INR reminders to:  [facility]    Indications    Atrial fibrillation (H) with mitral regurgitation and congestive heart failure [I48.91]  Long term current use of  anticoagulants with INR goal of 2.0-3.0 [Z79.01]  Permanent atrial fibrillation (H) [I48.21]           Comments:           Anticoagulation Care Providers     Provider Role Specialty Phone number    Kwadwo Winslow MD Referring Internal Medicine [phone]

## 2022-12-29 DIAGNOSIS — N13.8 HYPERTROPHY OF PROSTATE WITH URINARY OBSTRUCTION: ICD-10-CM

## 2022-12-29 DIAGNOSIS — N40.1 HYPERTROPHY OF PROSTATE WITH URINARY OBSTRUCTION: ICD-10-CM

## 2022-12-29 DIAGNOSIS — I10 ESSENTIAL HYPERTENSION WITH GOAL BLOOD PRESSURE LESS THAN 140/90: ICD-10-CM

## 2022-12-30 NOTE — TELEPHONE ENCOUNTER
Routing refill request to provider for review/approval because:  Creatinine   Date Value Ref Range Status   12/13/2022 1.28 (H) 0.67 - 1.17 mg/dL Final   04/21/2021 1.61 (H) 0.66 - 1.25 mg/dL Final      Nely Burkett RN

## 2023-01-05 RX ORDER — FINASTERIDE 5 MG/1
5 TABLET, FILM COATED ORAL DAILY
Qty: 90 TABLET | Refills: 3 | Status: SHIPPED | OUTPATIENT
Start: 2023-01-05 | End: 2024-01-02

## 2023-01-05 RX ORDER — LOSARTAN POTASSIUM 50 MG/1
TABLET ORAL
Qty: 90 TABLET | Refills: 0 | Status: SHIPPED | OUTPATIENT
Start: 2023-01-05 | End: 2023-04-05

## 2023-01-06 ENCOUNTER — ANTICOAGULATION THERAPY VISIT (OUTPATIENT)
Dept: ANTICOAGULATION | Facility: CLINIC | Age: 88
End: 2023-01-06

## 2023-01-06 ENCOUNTER — LAB (OUTPATIENT)
Dept: LAB | Facility: CLINIC | Age: 88
End: 2023-01-06
Payer: COMMERCIAL

## 2023-01-06 DIAGNOSIS — Z79.01 LONG TERM CURRENT USE OF ANTICOAGULANTS WITH INR GOAL OF 2.0-3.0: ICD-10-CM

## 2023-01-06 DIAGNOSIS — I48.21 PERMANENT ATRIAL FIBRILLATION (H): Primary | ICD-10-CM

## 2023-01-06 DIAGNOSIS — I48.21 PERMANENT ATRIAL FIBRILLATION (H): ICD-10-CM

## 2023-01-06 LAB — INR BLD: 1.7 (ref 0.9–1.1)

## 2023-01-06 PROCEDURE — 85610 PROTHROMBIN TIME: CPT

## 2023-01-06 PROCEDURE — 36416 COLLJ CAPILLARY BLOOD SPEC: CPT

## 2023-01-06 NOTE — PROGRESS NOTES
ANTICOAGULATION MANAGEMENT     Tucker Slater 89 year old male is on warfarin with subtherapeutic INR result. (Goal INR 2.0-2.5)    Recent labs: (last 7 days)     01/06/23  0841   INR 1.7*       ASSESSMENT       Source(s): Chart Review and Patient/Caregiver Call       Warfarin doses taken: Warfarin taken as instructed    Diet: No new diet changes identified    New illness, injury, or hospitalization: No    Medication/supplement changes: Stopped Tylenol    Signs or symptoms of bleeding or clotting: No    Previous INR: Therapeutic last visit; previously outside of goal range. INR has been progressively decreasing, so maintenance dose was adjusted today.    Additional findings: None       PLAN     Recommended plan for no diet, medication or health factor changes affecting INR     Dosing Instructions: Increase your current warfarin dose by 9% with next INR in 2 weeks       Summary  As of 1/6/2023    Full warfarin instructions:  1.25 mg every Mon, Thu; 2.5 mg all other days   Next INR check:  1/20/2023             Telephone call with Tucker who verbalizes understanding and agrees to plan    Lab visit scheduled    Education provided:     Please call back if any changes to your diet, medications or how you've been taking warfarin    Plan made per [facility] anticoagulation protocol    Ivory Machado RN  Anticoagulation Clinic  1/6/2023    _______________________________________________________________________     Anticoagulation Episode Summary     Current INR goal:  2.0-2.5   TTR:  54.1 % (11.9 mo)   Target end date:  Indefinite   Send INR reminders to:  [facility]    Indications    Atrial fibrillation (H) with mitral regurgitation and congestive heart failure [I48.91]  Long term current use of anticoagulants with INR goal of 2.0-3.0 [Z79.01]  Permanent atrial fibrillation (H) [I48.21]           Comments:           Anticoagulation Care Providers     Provider Role Specialty Phone number    Kwadwo Winslow  MD [facility] Internal Medicine [phone]

## 2023-01-23 ENCOUNTER — LAB (OUTPATIENT)
Dept: LAB | Facility: CLINIC | Age: 88
End: 2023-01-23
Payer: COMMERCIAL

## 2023-01-23 ENCOUNTER — ANTICOAGULATION THERAPY VISIT (OUTPATIENT)
Dept: ANTICOAGULATION | Facility: CLINIC | Age: 88
End: 2023-01-23

## 2023-01-23 DIAGNOSIS — I48.21 PERMANENT ATRIAL FIBRILLATION (H): ICD-10-CM

## 2023-01-23 DIAGNOSIS — Z79.01 LONG TERM CURRENT USE OF ANTICOAGULANTS WITH INR GOAL OF 2.0-3.0: ICD-10-CM

## 2023-01-23 DIAGNOSIS — Z79.01 LONG TERM CURRENT USE OF ANTICOAGULANT THERAPY: ICD-10-CM

## 2023-01-23 DIAGNOSIS — I48.21 PERMANENT ATRIAL FIBRILLATION (H): Primary | ICD-10-CM

## 2023-01-23 LAB — INR BLD: 2.1 (ref 0.9–1.1)

## 2023-01-23 PROCEDURE — 36416 COLLJ CAPILLARY BLOOD SPEC: CPT

## 2023-01-23 PROCEDURE — 85610 PROTHROMBIN TIME: CPT

## 2023-01-23 RX ORDER — WARFARIN SODIUM 2.5 MG/1
TABLET ORAL
Qty: 90 TABLET | Refills: 1 | Status: SHIPPED | OUTPATIENT
Start: 2023-01-23 | End: 2023-02-27

## 2023-01-23 NOTE — PROGRESS NOTES
ANTICOAGULATION MANAGEMENT     Tucker Slater 89 year old male is on warfarin with therapeutic INR result. (Goal INR 2.0-2.5)    Recent labs: (last 7 days)     01/23/23  1257   INR 2.1*       ASSESSMENT       Source(s): Chart Review and Patient/Caregiver Call       Warfarin doses taken: Warfarin taken as instructed    Diet: Increased greens/vitamin K in diet; plans to resume previous intake    New illness, injury, or hospitalization: No    Medication/supplement changes: None noted    Signs or symptoms of bleeding or clotting: No    Previous INR: Subtherapeutic  Additional findings: Refill needed today. Tucker meets all criteria for refill (current [facility] referral, office visit with referring provider/group in last year, lab monitoring up to date or not exceeding 2 weeks overdue). Rx instructions and quantity supplied updated to match patient's current dosing plan. Warfarin 90 day supply with 1 refill granted per [facility] protocol       warfarin maintenance dose was increased 9% on 1/6/23       PLAN     Recommended plan for no diet, medication or health factor changes affecting INR     Dosing Instructions: Continue your current warfarin dose with next INR in 3 weeks       Summary  As of 1/23/2023    Full warfarin instructions:  1.25 mg every Mon, Thu; 2.5 mg all other days   Next INR check:  2/13/2023             Telephone call with Tucker who agrees to plan and repeated back plan correctly    Lab visit scheduled    Education provided:     Contact [phone]  with any changes, questions or concerns.     Plan made per [facility] anticoagulation protocol    Chichi Wang, RN  Anticoagulation Clinic  1/23/2023    _______________________________________________________________________     Anticoagulation Episode Summary     Current INR goal:  2.0-2.5   TTR:  54.1 % (11.9 mo)   Target end date:  Indefinite   Send INR reminders to:  [facility]    Indications    Atrial fibrillation (H) with mitral regurgitation and  congestive heart failure [I48.91]  Long term current use of anticoagulants with INR goal of 2.0-3.0 [Z79.01]  Permanent atrial fibrillation (H) [I48.21]           Comments:           Anticoagulation Care Providers     Provider Role Specialty Phone number    Kwadwo Winslow MD Referring Internal Medicine [phone]

## 2023-02-13 ENCOUNTER — LAB (OUTPATIENT)
Dept: LAB | Facility: CLINIC | Age: 88
End: 2023-02-13
Payer: COMMERCIAL

## 2023-02-13 ENCOUNTER — ANTICOAGULATION THERAPY VISIT (OUTPATIENT)
Dept: ANTICOAGULATION | Facility: CLINIC | Age: 88
End: 2023-02-13

## 2023-02-13 DIAGNOSIS — I48.21 PERMANENT ATRIAL FIBRILLATION (H): Primary | ICD-10-CM

## 2023-02-13 DIAGNOSIS — Z79.01 LONG TERM CURRENT USE OF ANTICOAGULANTS WITH INR GOAL OF 2.0-3.0: ICD-10-CM

## 2023-02-13 DIAGNOSIS — I48.21 PERMANENT ATRIAL FIBRILLATION (H): ICD-10-CM

## 2023-02-13 LAB — INR BLD: 2.4 (ref 0.9–1.1)

## 2023-02-13 PROCEDURE — 36415 COLL VENOUS BLD VENIPUNCTURE: CPT

## 2023-02-13 PROCEDURE — 85610 PROTHROMBIN TIME: CPT

## 2023-02-13 NOTE — PROGRESS NOTES
ANTICOAGULATION MANAGEMENT     Tucker Slater 89 year old male is on warfarin with therapeutic INR result. (Goal INR 2.0-2.5)    Recent labs: (last 7 days)     02/13/23  1336   INR 2.4*       ASSESSMENT       Source(s): Chart Review and Patient/Caregiver Call       Warfarin doses taken: Warfarin taken as instructed    Diet: No new diet changes identified    New illness, injury, or hospitalization: No    Medication/supplement changes: None noted    Signs or symptoms of bleeding or clotting: No    Previous INR: Therapeutic last visit; previously outside of goal range    Additional findings: Next INR check on Tucker's birthday       PLAN     Recommended plan for no diet, medication or health factor changes affecting INR     Dosing Instructions: Continue your current warfarin dose with next INR in 4 weeks       Summary  As of 2/13/2023    Full warfarin instructions:  1.25 mg every Mon, Thu; 2.5 mg all other days   Next INR check:  3/13/2023             Telephone call with Tucker who verbalizes understanding and agrees to plan    Lab visit scheduled    Education provided:     Please call back if any changes to your diet, medications or how you've been taking warfarin    Importance of notifying anticoagulation clinic for: any concerns about bleeding    Plan made per [facility] anticoagulation protocol    Nena Valdovinos RN  Anticoagulation Clinic  2/13/2023    _______________________________________________________________________     Anticoagulation Episode Summary     Current INR goal:  2.0-2.5   TTR:  59.9 % (11.9 mo)   Target end date:  Indefinite   Send INR reminders to:  [facility]    Indications    Atrial fibrillation (H) with mitral regurgitation and congestive heart failure [I48.91]  Long term current use of anticoagulants with INR goal of 2.0-3.0 [Z79.01]  Permanent atrial fibrillation (H) [I48.21]           Comments:           Anticoagulation Care Providers     Provider Role Specialty  Phone number    Kwadwo Winslow MD Referring Internal Medicine [phone]

## 2023-02-25 DIAGNOSIS — I48.21 PERMANENT ATRIAL FIBRILLATION (H): ICD-10-CM

## 2023-02-27 RX ORDER — WARFARIN SODIUM 2.5 MG/1
TABLET ORAL
Qty: 90 TABLET | Refills: 1 | Status: SHIPPED | OUTPATIENT
Start: 2023-02-27 | End: 2023-08-21

## 2023-02-27 NOTE — TELEPHONE ENCOUNTER
ANTICOAGULATION MANAGEMENT:  Medication Refill    Anticoagulation Summary  As of 2/13/2023    Warfarin maintenance plan:  1.25 mg (2.5 mg x 0.5) every Mon, Thu; 2.5 mg (2.5 mg x 1) all other days   Next INR check:  3/13/2023   Target end date:  Indefinite    Indications    Atrial fibrillation (H) with mitral regurgitation and congestive heart failure [I48.91]  Long term current use of anticoagulants with INR goal of 2.0-3.0 [Z79.01]  Permanent atrial fibrillation (H) [I48.21]             Anticoagulation Care Providers     Provider Role Specialty Phone number    Kwadwo Winslow MD Referring Internal Medicine [phone]          Visit with referring provider/group within last year: Yes    [facility] referral signed within last year: Yes    Tucker meets all criteria for refill (current [facility] referral, office visit with referring provider/group in last year, lab monitoring up to date or not exceeding 2 weeks overdue). Rx instructions and quantity supplied updated to match patient's current dosing plan. Warfarin 90 day supply with 1 refill granted per [facility] protocol     Chichi Wang RN  Anticoagulation Clinic  \

## 2023-03-13 ENCOUNTER — ANTICOAGULATION THERAPY VISIT (OUTPATIENT)
Dept: ANTICOAGULATION | Facility: CLINIC | Age: 88
End: 2023-03-13

## 2023-03-13 ENCOUNTER — LAB (OUTPATIENT)
Dept: LAB | Facility: CLINIC | Age: 88
End: 2023-03-13
Payer: COMMERCIAL

## 2023-03-13 DIAGNOSIS — I48.21 PERMANENT ATRIAL FIBRILLATION (H): ICD-10-CM

## 2023-03-13 DIAGNOSIS — Z79.01 LONG TERM CURRENT USE OF ANTICOAGULANTS WITH INR GOAL OF 2.0-3.0: Primary | ICD-10-CM

## 2023-03-13 LAB — INR BLD: 2.9 (ref 0.9–1.1)

## 2023-03-13 PROCEDURE — 85610 PROTHROMBIN TIME: CPT

## 2023-03-13 PROCEDURE — 36416 COLLJ CAPILLARY BLOOD SPEC: CPT

## 2023-03-13 NOTE — PROGRESS NOTES
ANTICOAGULATION MANAGEMENT     Tucker Slater 89 year old male is on warfarin with supratherapeutic INR result. (Goal INR 2.0-2.5)    Recent labs: (last 7 days)     03/13/23  1339   INR 2.9*       ASSESSMENT       Source(s): Chart Review and Patient/Caregiver Call       Warfarin doses taken: Warfarin taken as instructed    Diet: No new diet changes identified    New illness, injury, or hospitalization: No    Medication/supplement changes: None noted    Signs or symptoms of bleeding or clotting: No    Previous INR: Therapeutic last 2(+) visits    Additional findings: Increased stress, spouse has metastatic breast cancer         PLAN     Recommended plan for no diet, medication or health factor changes affecting INR     Dosing Instructions: hold dose then continue your current warfarin dose with next INR in 1-2 weeks       Summary  As of 3/13/2023    Full warfarin instructions:  3/13: Hold; Otherwise 1.25 mg every Mon, Thu; 2.5 mg all other days   Next INR check:  3/27/2023             Telephone call with Tucker who agrees to plan and repeated back plan correctly    Lab visit scheduled    Education provided:     Please call back if any changes to your diet, medications or how you've been taking warfarin    Contact [phone]  with any changes, questions or concerns.     Plan made per [facility] anticoagulation protocol    Gail Valdovinos RN  Anticoagulation Clinic  3/13/2023    _______________________________________________________________________     Anticoagulation Episode Summary     Current INR goal:  2.0-2.5   TTR:  57.9 % (11.9 mo)   Target end date:  Indefinite   Send INR reminders to:  [facility]    Indications    Atrial fibrillation (H) with mitral regurgitation and congestive heart failure [I48.91]  Long term current use of anticoagulants with INR goal of 2.0-3.0 [Z79.01]  Permanent atrial fibrillation (H) [I48.21]           Comments:           Anticoagulation Care Providers     Provider  Role Specialty Phone number    Kwadwo Winslow MD Referring Internal Medicine [phone]

## 2023-03-22 ENCOUNTER — ANCILLARY PROCEDURE (OUTPATIENT)
Dept: CARDIOLOGY | Facility: CLINIC | Age: 88
End: 2023-03-22
Attending: INTERNAL MEDICINE
Payer: COMMERCIAL

## 2023-03-22 DIAGNOSIS — I49.5 SICK SINUS SYNDROME (H): ICD-10-CM

## 2023-03-22 DIAGNOSIS — Z95.0 CARDIAC PACEMAKER IN SITU: ICD-10-CM

## 2023-03-22 PROCEDURE — 93294 REM INTERROG EVL PM/LDLS PM: CPT | Performed by: INTERNAL MEDICINE

## 2023-03-22 PROCEDURE — 93296 REM INTERROG EVL PM/IDS: CPT | Performed by: INTERNAL MEDICINE

## 2023-03-24 LAB
MDC_IDC_LEAD_IMPLANT_DT: NORMAL
MDC_IDC_LEAD_IMPLANT_DT: NORMAL
MDC_IDC_LEAD_LOCATION: NORMAL
MDC_IDC_LEAD_LOCATION: NORMAL
MDC_IDC_LEAD_LOCATION_DETAIL_1: NORMAL
MDC_IDC_LEAD_LOCATION_DETAIL_1: NORMAL
MDC_IDC_LEAD_MFG: NORMAL
MDC_IDC_LEAD_MFG: NORMAL
MDC_IDC_LEAD_MODEL: NORMAL
MDC_IDC_LEAD_MODEL: NORMAL
MDC_IDC_LEAD_POLARITY_TYPE: NORMAL
MDC_IDC_LEAD_POLARITY_TYPE: NORMAL
MDC_IDC_LEAD_SERIAL: NORMAL
MDC_IDC_LEAD_SERIAL: NORMAL
MDC_IDC_MSMT_BATTERY_DTM: NORMAL
MDC_IDC_MSMT_BATTERY_REMAINING_LONGEVITY: 23 MO
MDC_IDC_MSMT_BATTERY_REMAINING_PERCENTAGE: 19 %
MDC_IDC_MSMT_BATTERY_RRT_TRIGGER: NORMAL
MDC_IDC_MSMT_BATTERY_STATUS: NORMAL
MDC_IDC_MSMT_BATTERY_VOLTAGE: 2.87 V
MDC_IDC_MSMT_LEADCHNL_RV_IMPEDANCE_VALUE: 410 OHM
MDC_IDC_MSMT_LEADCHNL_RV_LEAD_CHANNEL_STATUS: NORMAL
MDC_IDC_MSMT_LEADCHNL_RV_PACING_THRESHOLD_AMPLITUDE: 0.88 V
MDC_IDC_MSMT_LEADCHNL_RV_PACING_THRESHOLD_PULSEWIDTH: 0.5 MS
MDC_IDC_MSMT_LEADCHNL_RV_SENSING_INTR_AMPL: 12 MV
MDC_IDC_PG_IMPLANT_DTM: NORMAL
MDC_IDC_PG_MFG: NORMAL
MDC_IDC_PG_MODEL: NORMAL
MDC_IDC_PG_SERIAL: NORMAL
MDC_IDC_PG_TYPE: NORMAL
MDC_IDC_SESS_CLINIC_NAME: NORMAL
MDC_IDC_SESS_DTM: NORMAL
MDC_IDC_SESS_REPROGRAMMED: NO
MDC_IDC_SESS_TYPE: NORMAL
MDC_IDC_SET_BRADY_HYSTRATE: 60 {BEATS}/MIN
MDC_IDC_SET_BRADY_LOWRATE: 70 {BEATS}/MIN
MDC_IDC_SET_BRADY_MAX_SENSOR_RATE: 120 {BEATS}/MIN
MDC_IDC_SET_BRADY_MODE: NORMAL
MDC_IDC_SET_LEADCHNL_RA_PACING_POLARITY: NORMAL
MDC_IDC_SET_LEADCHNL_RA_SENSING_POLARITY: NORMAL
MDC_IDC_SET_LEADCHNL_RV_PACING_AMPLITUDE: 1.12
MDC_IDC_SET_LEADCHNL_RV_PACING_ANODE_ELECTRODE_1: NORMAL
MDC_IDC_SET_LEADCHNL_RV_PACING_ANODE_LOCATION_1: NORMAL
MDC_IDC_SET_LEADCHNL_RV_PACING_CAPTURE_MODE: NORMAL
MDC_IDC_SET_LEADCHNL_RV_PACING_CATHODE_ELECTRODE_1: NORMAL
MDC_IDC_SET_LEADCHNL_RV_PACING_CATHODE_LOCATION_1: NORMAL
MDC_IDC_SET_LEADCHNL_RV_PACING_POLARITY: NORMAL
MDC_IDC_SET_LEADCHNL_RV_PACING_PULSEWIDTH: 0.5 MS
MDC_IDC_SET_LEADCHNL_RV_SENSING_ADAPTATION_MODE: NORMAL
MDC_IDC_SET_LEADCHNL_RV_SENSING_ANODE_ELECTRODE_1: NORMAL
MDC_IDC_SET_LEADCHNL_RV_SENSING_ANODE_LOCATION_1: NORMAL
MDC_IDC_SET_LEADCHNL_RV_SENSING_CATHODE_ELECTRODE_1: NORMAL
MDC_IDC_SET_LEADCHNL_RV_SENSING_CATHODE_LOCATION_1: NORMAL
MDC_IDC_SET_LEADCHNL_RV_SENSING_POLARITY: NORMAL
MDC_IDC_SET_LEADCHNL_RV_SENSING_SENSITIVITY: 2 MV
MDC_IDC_STAT_AT_DTM_END: NORMAL
MDC_IDC_STAT_AT_DTM_START: NORMAL
MDC_IDC_STAT_BRADY_DTM_END: NORMAL
MDC_IDC_STAT_BRADY_DTM_START: NORMAL
MDC_IDC_STAT_BRADY_RV_PERCENT_PACED: 99 %
MDC_IDC_STAT_CRT_DTM_END: NORMAL
MDC_IDC_STAT_CRT_DTM_START: NORMAL
MDC_IDC_STAT_HEART_RATE_DTM_END: NORMAL
MDC_IDC_STAT_HEART_RATE_DTM_START: NORMAL
MDC_IDC_STAT_HEART_RATE_VENTRICULAR_MAX: 200 {BEATS}/MIN
MDC_IDC_STAT_HEART_RATE_VENTRICULAR_MEAN: 76 {BEATS}/MIN
MDC_IDC_STAT_HEART_RATE_VENTRICULAR_MIN: 60 {BEATS}/MIN

## 2023-03-24 NOTE — PROGRESS NOTES
Patient returned call.     Subjective measures:  Patient meeting subjective benchmarks.     Action plan:pt to have 6 month STM visit     2007

## 2023-03-27 ENCOUNTER — LAB (OUTPATIENT)
Dept: LAB | Facility: CLINIC | Age: 88
End: 2023-03-27
Payer: COMMERCIAL

## 2023-03-27 ENCOUNTER — DOCUMENTATION ONLY (OUTPATIENT)
Dept: ANTICOAGULATION | Facility: CLINIC | Age: 88
End: 2023-03-27

## 2023-03-27 ENCOUNTER — ANTICOAGULATION THERAPY VISIT (OUTPATIENT)
Dept: ANTICOAGULATION | Facility: CLINIC | Age: 88
End: 2023-03-27

## 2023-03-27 DIAGNOSIS — I48.21 PERMANENT ATRIAL FIBRILLATION (H): ICD-10-CM

## 2023-03-27 DIAGNOSIS — I48.21 PERMANENT ATRIAL FIBRILLATION (H): Primary | ICD-10-CM

## 2023-03-27 DIAGNOSIS — Z79.01 LONG TERM CURRENT USE OF ANTICOAGULANTS WITH INR GOAL OF 2.0-3.0: ICD-10-CM

## 2023-03-27 LAB — INR BLD: 2.4 (ref 0.9–1.1)

## 2023-03-27 PROCEDURE — 36416 COLLJ CAPILLARY BLOOD SPEC: CPT

## 2023-03-27 PROCEDURE — 85610 PROTHROMBIN TIME: CPT

## 2023-03-27 NOTE — PROGRESS NOTES
ANTICOAGULATION CLINIC REFERRAL RENEWAL REQUEST       An annual renewal order is required for all patients referred to [facility]ticoagulation [facility].?  Please review and sign the pended referral order for Tucker Slater.       ANTICOAGULATION SUMMARY      Warfarin indication(s)   Atrial Fibrillation    Mechanical heart valve present?  NO       Current goal range   INR: 2.0-3.0     Goal appropriate for indication? Goal INR 2-3, standard for indication(s) above     Time in Therapeutic Range (TTR)  (Goal > 60%) 58%       Office visit with referring provider's group within last year yes on 12/13/2022       Chichi Wang RN  [facility] Anticoagulation [facility]

## 2023-03-27 NOTE — PROGRESS NOTES
ANTICOAGULATION MANAGEMENT     Tucker Slater 89 year old male is on warfarin with therapeutic INR result. (Goal INR 2.0-2.5)    Recent labs: (last 7 days)     03/27/23  1353   INR 2.4*       ASSESSMENT     Warfarin Lab Questionnaire    Dose in Tablet or Mg 3/27/2023   TAB or MG? tablet (tab)     Pt Rptd Dose AMBIKA MONDAY TUESDAY WEDNESDAY THURSDAY FRIDAY SATURDAY   3/27/2023 1 0.5 1 1 0.5 1 1     Warfarin Lab Questionnaire 3/27/2023   Missed doses? No   Medication changes? No   Abnormal bleeding? No   Shortness of breath? No   Injuries or illness since last INR? No   Changes in diet or alcohol? No   Upcoming surgery, procedure? No   Best number to call with results? [phone]       Additional findings: None       PLAN     Recommended plan for no diet, medication or health factor changes affecting INR     Dosing Instructions: Continue your current warfarin dose with next INR in 3 weeks       Summary  As of 3/27/2023    Full warfarin instructions:  1.25 mg every Mon, Thu; 2.5 mg all other days   Next INR check:  4/17/2023             Telephone call with Tucker who verbalizes understanding and agrees to plan    Lab visit scheduled    Education provided:     Contact [phone]  with any changes, questions or concerns.     Plan made per [facility] anticoagulation protocol    Chichi Wang, RN  Anticoagulation Clinic  3/27/2023    _______________________________________________________________________     Anticoagulation Episode Summary     Current INR goal:  2.0-2.5   TTR:  58.0 % (11.9 mo)   Target end date:  Indefinite   Send INR reminders to:  [facility]    Indications    Atrial fibrillation (H) with mitral regurgitation and congestive heart failure [I48.91]  Long term current use of anticoagulants with INR goal of 2.0-3.0 [Z79.01]  Permanent atrial fibrillation (H) [I48.21]           Comments:           Anticoagulation Care Providers     Provider Role Specialty Phone number    Kwadwo Winslow MD  Referring Internal Medicine [phone]

## 2023-04-01 DIAGNOSIS — I10 ESSENTIAL HYPERTENSION WITH GOAL BLOOD PRESSURE LESS THAN 140/90: ICD-10-CM

## 2023-04-05 RX ORDER — LOSARTAN POTASSIUM 50 MG/1
TABLET ORAL
Qty: 90 TABLET | Refills: 0 | Status: SHIPPED | OUTPATIENT
Start: 2023-04-05 | End: 2023-06-05

## 2023-04-05 NOTE — TELEPHONE ENCOUNTER
Pending Prescriptions:                       Disp   Refills    losartan (COZAAR) 50 MG tablet [Pharmacy M*90 tab*0        Sig: Take 1 tablet by mouth once daily    Routing refill request to provider for review/approval because:  Needs provider review

## 2023-04-06 DIAGNOSIS — G47.33 OBSTRUCTIVE SLEEP APNEA (ADULT) (PEDIATRIC): Primary | ICD-10-CM

## 2023-04-17 ENCOUNTER — LAB (OUTPATIENT)
Dept: LAB | Facility: CLINIC | Age: 88
End: 2023-04-17
Payer: COMMERCIAL

## 2023-04-17 ENCOUNTER — ANTICOAGULATION THERAPY VISIT (OUTPATIENT)
Dept: ANTICOAGULATION | Facility: CLINIC | Age: 88
End: 2023-04-17

## 2023-04-17 DIAGNOSIS — Z79.01 LONG TERM CURRENT USE OF ANTICOAGULANTS WITH INR GOAL OF 2.0-3.0: ICD-10-CM

## 2023-04-17 DIAGNOSIS — I48.21 PERMANENT ATRIAL FIBRILLATION (H): Primary | ICD-10-CM

## 2023-04-17 DIAGNOSIS — I48.21 PERMANENT ATRIAL FIBRILLATION (H): ICD-10-CM

## 2023-04-17 LAB — INR BLD: 2.3 (ref 0.9–1.1)

## 2023-04-17 PROCEDURE — 36416 COLLJ CAPILLARY BLOOD SPEC: CPT

## 2023-04-17 PROCEDURE — 85610 PROTHROMBIN TIME: CPT

## 2023-04-17 NOTE — PROGRESS NOTES
ANTICOAGULATION MANAGEMENT     Tucker Slater 89 year old male is on warfarin with therapeutic INR result. (Goal INR 2.0-2.5)    Recent labs: (last 7 days)     04/17/23  1331   INR 2.3*       ASSESSMENT     Warfarin Lab Questionnaire    Warfarin Doses Last 7 Days      4/17/2023     1:28 PM   Dose in Tablet or Mg   TAB or MG? tablet (tab)     Pt Rptd Dose SUNDAY MONDAY TUESDAY WED THURS FRIDAY SATURDAY 4/17/2023   1:28 PM 1 0.5 1 1 0.5 1 1         4/17/2023   Warfarin Lab Questionnaire   Missed doses? No   Changes in diet or alcohol? No   Medication changes? No   Injuries or illness since last INR? No   Shortness of breath? No   Abnormal bleeding? No   Upcoming surgery, procedure? No   Best number to call with results? [phone]        Previous INR: Therapeutic last visit; previously outside of goal range  Additional findings: None       PLAN     Recommended plan for no diet, medication or health factor changes affecting INR     Dosing Instructions: Continue your current warfarin dose with next INR in 4 weeks       Summary  As of 4/17/2023    Full warfarin instructions:  1.25 mg every Mon, Thu; 2.5 mg all other days   Next INR check:  5/15/2023             Telephone call with Tucker who verbalizes understanding and agrees to plan    Lab visit scheduled    Education provided:     Contact [phone]  with any changes, questions or concerns.     Plan made per [facility] anticoagulation protocol    Lizette Poole RN  Anticoagulation Clinic  4/17/2023    _______________________________________________________________________     Anticoagulation Episode Summary     Current INR goal:  2.0-2.5   TTR:  60.9 % (11.9 mo)   Target end date:  Indefinite   Send INR reminders to:  [facility]    Indications    Atrial fibrillation (H) with mitral regurgitation and congestive heart failure [I48.91]  Long term current use of anticoagulants with INR goal of 2.0-3.0 [Z79.01]  Permanent atrial fibrillation (H)  [I48.21]           Comments:           Anticoagulation Care Providers     Provider Role Specialty Phone number    Kwadwo Winslow MD Referring Internal Medicine [phone]

## 2023-05-15 ENCOUNTER — LAB (OUTPATIENT)
Dept: LAB | Facility: CLINIC | Age: 88
End: 2023-05-15
Payer: COMMERCIAL

## 2023-05-15 ENCOUNTER — ANTICOAGULATION THERAPY VISIT (OUTPATIENT)
Dept: ANTICOAGULATION | Facility: CLINIC | Age: 88
End: 2023-05-15

## 2023-05-15 DIAGNOSIS — Z79.01 LONG TERM CURRENT USE OF ANTICOAGULANTS WITH INR GOAL OF 2.0-3.0: ICD-10-CM

## 2023-05-15 DIAGNOSIS — I48.21 PERMANENT ATRIAL FIBRILLATION (H): ICD-10-CM

## 2023-05-15 DIAGNOSIS — I48.21 PERMANENT ATRIAL FIBRILLATION (H): Primary | ICD-10-CM

## 2023-05-15 LAB — INR BLD: 2.3 (ref 0.9–1.1)

## 2023-05-15 PROCEDURE — 36416 COLLJ CAPILLARY BLOOD SPEC: CPT

## 2023-05-15 PROCEDURE — 85610 PROTHROMBIN TIME: CPT

## 2023-05-15 NOTE — PROGRESS NOTES
ANTICOAGULATION MANAGEMENT     Tucker Slater 89 year old male is on warfarin with therapeutic INR result. (Goal INR 2.0-2.5)    Recent labs: (last 7 days)     05/15/23  1403   INR 2.3*       ASSESSMENT     Warfarin Lab Questionnaire    Warfarin Doses Last 7 Days--Patient confirmed taking warfarin maintenance dose as listed and NOT as listed below.        5/15/2023     1:59 PM   Dose in Tablet or Mg   TAB or MG? tablet (tab)     Pt Rptd Dose AMBIKA MONDAY TUESDAY WED THURS FRIDAY SATURDAY   5/15/2023   1:59 PM 1 1.5 1 1 1.5 1 1         5/15/2023   Warfarin Lab Questionnaire   Missed doses within past 14 days? No   Changes in diet or alcohol within past 14 days? No   Medication changes since last result? No   Injuries or illness since last result? No   New shortness of breath, severe headaches or sudden changes in vision since last result? No   Abnormal bleeding since last result? No   Upcoming surgery, procedure? No   Best number to call with results? [phone]        Previous result: Therapeutic last 2(+) visits  Additional findings: None       PLAN     Recommended plan for no diet, medication or health factor changes affecting INR     Dosing Instructions: Continue your current warfarin dose with next INR in 5 weeks       Summary  As of 5/15/2023    Full warfarin instructions:  1.25 mg every Mon, Thu; 2.5 mg all other days   Next INR check:  6/19/2023             Telephone call with Tucker who agrees to plan and repeated back plan correctly    Lab visit scheduled    Education provided:     None required    Plan made per [facility] anticoagulation protocol    Chichi Wang, RN  Anticoagulation Clinic  5/15/2023    _______________________________________________________________________     Anticoagulation Episode Summary     Current INR goal:  2.0-2.5   TTR:  66.4 % (11.9 mo)   Target end date:  Indefinite   Send INR reminders to:  [facility]    Indications    Atrial fibrillation (H) with mitral  regurgitation and congestive heart failure [I48.91]  Long term current use of anticoagulants with INR goal of 2.0-3.0 [Z79.01]  Permanent atrial fibrillation (H) [I48.21]           Comments:           Anticoagulation Care Providers     Provider Role Specialty Phone number    Kwadwo Winslow MD Referring Internal Medicine [phone]

## 2023-06-02 DIAGNOSIS — I10 ESSENTIAL HYPERTENSION WITH GOAL BLOOD PRESSURE LESS THAN 140/90: ICD-10-CM

## 2023-06-05 RX ORDER — LOSARTAN POTASSIUM 50 MG/1
TABLET ORAL
Qty: 90 TABLET | Refills: 0 | Status: SHIPPED | OUTPATIENT
Start: 2023-06-05 | End: 2023-10-09

## 2023-06-09 DIAGNOSIS — I10 ESSENTIAL HYPERTENSION WITH GOAL BLOOD PRESSURE LESS THAN 140/90: ICD-10-CM

## 2023-06-11 NOTE — TELEPHONE ENCOUNTER
Pending Prescriptions:                       Disp   Refills    carvedilol (COREG) 25 MG tablet [Pharmacy *270 ta*0        Sig: TAKE 1 & 1/2 (ONE & ONE-HALF) TABLETS BY MOUTH TWICE           DAILY WITH MEALS    Routing refill request to provider for review/approval because:  Needs provider review

## 2023-06-12 RX ORDER — CARVEDILOL 25 MG/1
37.5 TABLET ORAL 2 TIMES DAILY WITH MEALS
Qty: 270 TABLET | Refills: 3 | Status: SHIPPED | OUTPATIENT
Start: 2023-06-12 | End: 2024-06-20

## 2023-06-19 ENCOUNTER — LAB (OUTPATIENT)
Dept: LAB | Facility: CLINIC | Age: 88
End: 2023-06-19
Payer: COMMERCIAL

## 2023-06-19 ENCOUNTER — ANTICOAGULATION THERAPY VISIT (OUTPATIENT)
Dept: ANTICOAGULATION | Facility: CLINIC | Age: 88
End: 2023-06-19

## 2023-06-19 DIAGNOSIS — I48.21 PERMANENT ATRIAL FIBRILLATION (H): ICD-10-CM

## 2023-06-19 DIAGNOSIS — Z79.01 LONG TERM CURRENT USE OF ANTICOAGULANTS WITH INR GOAL OF 2.0-3.0: Primary | ICD-10-CM

## 2023-06-19 LAB — INR BLD: 1.9 (ref 0.9–1.1)

## 2023-06-19 PROCEDURE — 85610 PROTHROMBIN TIME: CPT

## 2023-06-19 PROCEDURE — 36416 COLLJ CAPILLARY BLOOD SPEC: CPT

## 2023-06-19 NOTE — PROGRESS NOTES
ANTICOAGULATION MANAGEMENT     Tucker Slater 89 year old male is on warfarin with subtherapeutic INR result. (Goal INR 2.0-2.5)    Recent labs: (last 7 days)     06/19/23  1353   INR 1.9*       ASSESSMENT     Warfarin Lab Questionnaire    Warfarin Doses Last 7 Days   Verified warfarin dosing with patient, he reports current dosing of 1.25 mg every Mon, Thu; 2.5 mg all other days which is correct, patient denies any missed warfarin doses.          6/19/2023   Warfarin Lab Questionnaire   Missed doses within past 14 days? No   Changes in diet or alcohol within past 14 days? No   Medication changes since last result? No   Injuries or illness since last result? No   New shortness of breath, severe headaches or sudden changes in vision since last result? No   Abnormal bleeding since last result? No   Upcoming surgery, procedure? No   Best number to call with results? [phone]        Previous result: Therapeutic last 2(+) visits  Additional findings: None       PLAN     Recommended plan for no diet, medication or health factor changes affecting INR     Dosing Instructions: Continue your current warfarin dose with next INR in 2 weeks       Summary  As of 6/19/2023    Full warfarin instructions:  1.25 mg every Mon, Thu; 2.5 mg all other days   Next INR check:  7/3/2023             Telephone call with Tucker who agrees to plan and repeated back plan correctly    Lab visit scheduled    Education provided:     Please call back if any changes to your diet, medications or how you've been taking warfarin    Contact [phone]  with any changes, questions or concerns.     Plan made per [facility] anticoagulation protocol    Gail Valdovinos RN  Anticoagulation Clinic  6/19/2023    _______________________________________________________________________     Anticoagulation Episode Summary     Current INR goal:  2.0-2.5   TTR:  64.7 % (11.9 mo)   Target end date:  Indefinite   Send INR reminders to:  [facility]     Indications    Atrial fibrillation (H) with mitral regurgitation and congestive heart failure [I48.91]  Long term current use of anticoagulants with INR goal of 2.0-3.0 [Z79.01]  Permanent atrial fibrillation (H) [I48.21]           Comments:           Anticoagulation Care Providers     Provider Role Specialty Phone number    Kwadwo Winslow MD Referring Internal Medicine [phone]

## 2023-06-29 ENCOUNTER — DOCUMENTATION ONLY (OUTPATIENT)
Dept: SLEEP MEDICINE | Facility: CLINIC | Age: 88
End: 2023-06-29
Payer: COMMERCIAL

## 2023-06-29 DIAGNOSIS — I50.22 CHRONIC SYSTOLIC CONGESTIVE HEART FAILURE (H): Primary | ICD-10-CM

## 2023-07-03 ENCOUNTER — ANTICOAGULATION THERAPY VISIT (OUTPATIENT)
Dept: ANTICOAGULATION | Facility: CLINIC | Age: 88
End: 2023-07-03

## 2023-07-03 ENCOUNTER — LAB (OUTPATIENT)
Dept: LAB | Facility: CLINIC | Age: 88
End: 2023-07-03
Payer: COMMERCIAL

## 2023-07-03 DIAGNOSIS — I48.21 PERMANENT ATRIAL FIBRILLATION (H): Primary | ICD-10-CM

## 2023-07-03 DIAGNOSIS — I48.21 PERMANENT ATRIAL FIBRILLATION (H): ICD-10-CM

## 2023-07-03 DIAGNOSIS — Z79.01 LONG TERM CURRENT USE OF ANTICOAGULANTS WITH INR GOAL OF 2.0-3.0: ICD-10-CM

## 2023-07-03 LAB — INR BLD: 2 (ref 0.9–1.1)

## 2023-07-03 PROCEDURE — 36416 COLLJ CAPILLARY BLOOD SPEC: CPT

## 2023-07-03 PROCEDURE — 85610 PROTHROMBIN TIME: CPT

## 2023-07-03 NOTE — PROGRESS NOTES
ANTICOAGULATION MANAGEMENT     Tucker CECE Slater 89 year old male is on warfarin with therapeutic INR result. (Goal INR 2.0-2.5)    Recent labs: (last 7 days)     07/03/23  1344   INR 2.0*       ASSESSMENT     Warfarin Lab Questionnaire    Warfarin Doses Last 7 Days    Dosing confirmed by Juan       7/3/2023   Warfarin Lab Questionnaire   Missed doses within past 14 days? No   Changes in diet or alcohol within past 14 days? No   Medication changes since last result? No   Injuries or illness since last result? No   New shortness of breath, severe headaches or sudden changes in vision since last result? No   Abnormal bleeding since last result? No   Upcoming surgery, procedure? No   Best number to call with results? [phone]     Previous result: Subtherapeutic  Additional findings: None       PLAN     Recommended plan for no diet, medication or health factor changes affecting INR     Dosing Instructions: Continue your current warfarin dose with next INR in 3 weeks       Summary  As of 7/3/2023    Full warfarin instructions:  1.25 mg every Mon, Thu; 2.5 mg all other days   Next INR check:  7/24/2023             Telephone call with Tucker who verbalizes understanding and agrees to plan    Lab visit scheduled    Education provided:     Please call back if any changes to your diet, medications or how you've been taking warfarin    Plan made per [facility] anticoagulation protocol    Nena Valdovinos RN  Anticoagulation Clinic  7/3/2023    _______________________________________________________________________     Anticoagulation Episode Summary     Current INR goal:  2.0-2.5   TTR:  60.9 % (11.9 mo)   Target end date:  Indefinite   Send INR reminders to:  [facility]    Indications    Atrial fibrillation (H) with mitral regurgitation and congestive heart failure [I48.91]  Long term current use of anticoagulants with INR goal of 2.0-3.0 [Z79.01]  Permanent atrial fibrillation (H) [I48.21]            Comments:           Anticoagulation Care Providers     Provider Role Specialty Phone number    Kwadwo Winslow MD Referring Internal Medicine [phone]

## 2023-07-11 NOTE — PROGRESS NOTES
HISTORY OF PRESENT ILLNESS:    This is a 89 year old male who follows with Dr Perez at [facility]  His past medical history includes:  Atrial fibrillation, s/p PPM, hypertension, CKD, nonischemic cardiomyopathy, idiopathic progressive neuropathy,  and sleep apnea    Mr Slater has a long history of atrial fibrillation  He received a single-chamber PPm (2015) due to associated bradycardia.  He has remained on warfarin.  Previously he deferred Watchman device    ECHO (2018) showed LVEF 45-50% (possibly be due to RV pacing), mild valvular disease, mild ascending aorta dilatation  Historically, he has not had any heart failure symptoms    Limited ECHO (8/2022) showed LVEF 50-55% with basal inferior hypokinesis, 1-2+ MR/TR, mild-moderate pulmonary hypertension    Device interrogation today showed >99% V-paced  Underlying A-fib rates staying around 70 bpm    He returns today for reassessment    Mr Slater overall feels stable from a cardiovascular standpoint.  He denies any chest pain, shortness of breath, palpitations, orthopnea, or significant peripheral edema   He admits to being mostly sedentary and uses a cane when ambulating.  He has not been checking his blood pressure at home as his machine broke a couple of years ago.   He states that his blood pressure is usually well controlled when its checked at clinic visits      VITAL SIGNS:  BP:  140/80  Pulse: 70  Weight:  176 lbs (stable)  BMI: 25    IMPRESSION AND PLAN:    Permanent Atrial Fibrillation:  S/p Single-chamber PPM due to bradycardia (2015)  -99% V-paced  -continue device cares  -chronic Warfarin     Hypertension:  -on Coreg 37.5 mg BID, Losartan 50 mg, Maxzide 37.5-25 mg  -BP borderline  -encouraged him to check his BP at home and call with persistent BP > 140/90    Nonischemic Cardiomyopathy:  -LVEF 45-50%  -felt to be related to RV pacing  -no signs or symptoms of heart failure  -weight stable    Sleep Apnea  -uses CPAP    Stage III  CKD:  -creatinine 1.2-1.4 past year      The total time for the visit today was 27 minutes which includes patient visit, reviewing of records, discussion, and placing of orders of the outpatient coordination of cardiovascular care as described.  The level of medical decision making during this visit was of mild-moderate complexity.  Thank you for allowing me to participate in their care.    Orders Placed This Encounter   Procedures     Follow-Up with Cardiology       No orders of the defined types were placed in this encounter.      There are no discontinued medications.      Encounter Diagnoses   Name Primary?     Permanent atrial fibrillation (H)      Benign essential hypertension Yes       CURRENT MEDICATIONS:  Current Outpatient Medications   Medication Sig Dispense Refill     Ascorbic Acid (VITAMIN C PO) Take 500 mg by mouth daily        calcium carbonate-vitamin D (OSCAL W/D) 500-200 MG-UNIT tablet Take 1 tablet by mouth daily       carvedilol (COREG) 25 MG tablet Take 1.5 tablets (37.5 mg) by mouth 2 times daily (with meals) 270 tablet 3     diclofenac (VOLTAREN) 1 % topical gel Place 2 g onto the skin 4 times daily as needed       docusate sodium (COLACE) 100 MG capsule Take 100 mg by mouth 2 times daily as needed       finasteride (PROSCAR) 5 MG tablet Take 1 tablet (5 mg) by mouth daily 90 tablet 3     losartan (COZAAR) 50 MG tablet Take 1 tablet by mouth once daily 90 tablet 0     Multiple Vitamins-Minerals (OCUVITE PO) Take 1 tablet by mouth daily       multivitamin w/minerals (THERA-VIT-M) tablet Take 1 tablet by mouth daily       order for DME Oxygen 2 Li/min  at night 1 Device 0     triamterene-HCTZ (MAXZIDE-25) 37.5-25 MG tablet Take 1 tablet by mouth daily 90 tablet 3     vitamin B complex with vitamin C (STRESS TAB) tablet Take 1 tablet by mouth daily       warfarin ANTICOAGULANT (COUMADIN) 2.5 MG tablet Take 1.25mg every Mon & Thur / Take 2.5mg all other days OR per INR clinic 90 tablet 1      acetaminophen (TYLENOL) 500 MG tablet Take 1,000 mg by mouth 2 times daily (Patient not taking: Reported on 7/12/2023)         ALLERGIES     Allergies   Allergen Reactions     Merbromin      Other reaction(s): Other, see comments  Severe reaction as child. Amalgam fillings OK.     Morphine Nausea and Vomiting     Amlodipine      Edema       PAST MEDICAL HISTORY:  Past Medical History:   Diagnosis Date     Arrhythmia     A Fib     Balance problem     related to neuropathy in feet     Bradycardia      Carcinoma in situ of bladder 2000    bladder cancer ;; follow w Urology w periodic cysto      Essential hypertension, benign      Generalized osteoarthrosis, unspecified site knees    s/p R total knee 8/09 ; will do L 6/10      Hernia, abdominal      Numbness and tingling     toes and fingers     DAWSON (obstructive sleep apnea) 8/2/2019     DAWSON (obstructive sleep apnea)      Pacemaker     [employer]      Pain in joint, shoulder region     L shoulder rotater tear; no surgery      Palpitations      Persistent atrial fibrillation (H) 1/30/15     Prostate CA (H) 2008-9    radiation     Prostate cancer (H) 11/08    completed RT 3/09     Renal disease     elevated creatinine     Shoulder impingement     R shoulder impingement etc see MRI 4/09      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     toes both feet        PAST SURGICAL HISTORY:  Past Surgical History:   Procedure Laterality Date     ARTHROPLASTY HIP Right 3/27/2017    Procedure: ARTHROPLASTY HIP;  Surgeon: Jigar Wynn MD;  Location:  OR     CARDIOVERSION  3/26/15     COLONOSCOPY       CYSTOSCOPY       CYSTOSCOPY, FULGURATE BLEEDERS, EVACUATE CLOT(S), COMBINED N/A 4/23/2019    Procedure: Exam under anesthesia, video cystopanendoscopy, evacuation of clots, fulguration of prostatic veins.;  Surgeon: Ilir Ramirez MD;  Location:  OR     CYSTOSCOPY, RETROGRADES, COMBINED Bilateral 6/18/2019    Procedure: Video cystopanendoscopy,  evacuation of clots, cup biopsies of bladder wall, electrovaporization of prostate, transurethral resection of prostate, bilateral retrograde ureteropyelogram.;  Surgeon: Ilir Ramirez MD;  Location: [facility] OR     CYSTOSCOPY, TRANSURETHRAL RESECTION (TUR) PROSTATE, COMBINED  2019    Procedure: Cystoscopy, transurethral resection of prostate;  Surgeon: Ilir Ramirez MD;  Location: [facility] OR     HERNIA REPAIR       IMPLANT PACEMAKER  3/26/15     RELEASE CARPAL TUNNEL Right 2017    Procedure: RELEASE CARPAL TUNNEL;  Right carpal tunnel release;  Surgeon: Alonso Barboza MD;  Location: [facility] OR     ZZC NONSPECIFIC PROCEDURE      bilat inguinal hernia repairs ( 3total procedures)      ZZC NONSPECIFIC PROCEDURE      pilonidal cyst     ZZC NONSPECIFIC PROCEDURE      T + A     ZZC NONSPECIFIC PROCEDURE       R+L total knee       FAMILY HISTORY:  Family History   Problem Relation Age of Onset     Heart Disease Father          89yo     Coronary Artery Disease Father      Heart Disease Mother          76yo     Coronary Artery Disease Mother      Family History Negative Brother        SOCIAL HISTORY:  Social History     Socioeconomic History     Marital status:      Spouse name: Alba     Number of children: 3     Years of education: None     Highest education level: None   Occupational History     Employer: RETIRED     Comment:  w [employer]   Tobacco Use     Smoking status: Former     Types: Cigarettes     Quit date: 1977     Years since quittin.8     Smokeless tobacco: Never   Vaping Use     Vaping Use: Never used   Substance and Sexual Activity     Alcohol use: Yes     Alcohol/week: 0.0 standard drinks of alcohol     Comment: 1 beer daily     Drug use: No     Sexual activity: Never     Partners: Female   Other Topics Concern     Caffeine Concern No     Comment: 4-5 cups per week      Sleep Concern No     Stress Concern No     Weight Concern No     Special Diet No      "Exercise No     Comment: yard work and moves around a lot       Review of Systems:  Skin:          Eyes:         ENT:         Respiratory:  Negative       Cardiovascular:  Negative      Gastroenterology:        Genitourinary:         Musculoskeletal:         Neurologic:         Psychiatric:         Heme/Lymph/Imm:         Endocrine:           Physical Exam:  Vitals: BP (!) 140/80   Pulse 70   Ht 1.778 m (5' 10\")   Wt 79.8 kg (176 lb)   BMI 25.25 kg/m      Constitutional:  cooperative;in no acute distress        Skin:  warm and dry to the touch   pacemaker incision in the left infraclavicular area was well-healed      Head:  normocephalic        Eyes:           Lymph:      ENT:  no pallor or cyanosis        Neck:  JVP normal        Respiratory:  clear to auscultation;normal symmetry;normal respiratory excursion         Cardiac: regular rhythm;normal S1 and S2     no presence of murmur          pulses full and equal                                        GI:  abdomen soft        Extremities and Muscular Skeletal:  no edema              Neurological:  affect appropriate        Psych:  Alert and Oriented x 3          CC  Aleena Perez MD  [address]  [place] [id number]                  "

## 2023-07-12 ENCOUNTER — ANCILLARY PROCEDURE (OUTPATIENT)
Dept: CARDIOLOGY | Facility: CLINIC | Age: 88
End: 2023-07-12
Attending: INTERNAL MEDICINE
Payer: COMMERCIAL

## 2023-07-12 ENCOUNTER — OFFICE VISIT (OUTPATIENT)
Dept: CARDIOLOGY | Facility: CLINIC | Age: 88
End: 2023-07-12
Payer: COMMERCIAL

## 2023-07-12 VITALS
HEIGHT: 70 IN | HEART RATE: 70 BPM | BODY MASS INDEX: 25.2 KG/M2 | DIASTOLIC BLOOD PRESSURE: 80 MMHG | SYSTOLIC BLOOD PRESSURE: 140 MMHG | WEIGHT: 176 LBS

## 2023-07-12 DIAGNOSIS — I10 BENIGN ESSENTIAL HYPERTENSION: Primary | ICD-10-CM

## 2023-07-12 DIAGNOSIS — I48.21 PERMANENT ATRIAL FIBRILLATION (H): ICD-10-CM

## 2023-07-12 DIAGNOSIS — I49.5 SICK SINUS SYNDROME (H): ICD-10-CM

## 2023-07-12 DIAGNOSIS — Z95.0 CARDIAC PACEMAKER IN SITU: ICD-10-CM

## 2023-07-12 LAB
MDC_IDC_LEAD_IMPLANT_DT: NORMAL
MDC_IDC_LEAD_IMPLANT_DT: NORMAL
MDC_IDC_LEAD_LOCATION: NORMAL
MDC_IDC_LEAD_LOCATION: NORMAL
MDC_IDC_LEAD_LOCATION_DETAIL_1: NORMAL
MDC_IDC_LEAD_LOCATION_DETAIL_1: NORMAL
MDC_IDC_LEAD_MFG: NORMAL
MDC_IDC_LEAD_MFG: NORMAL
MDC_IDC_LEAD_MODEL: NORMAL
MDC_IDC_LEAD_MODEL: NORMAL
MDC_IDC_LEAD_POLARITY_TYPE: NORMAL
MDC_IDC_LEAD_POLARITY_TYPE: NORMAL
MDC_IDC_LEAD_SERIAL: NORMAL
MDC_IDC_LEAD_SERIAL: NORMAL
MDC_IDC_MSMT_BATTERY_REMAINING_LONGEVITY: 16 MO
MDC_IDC_MSMT_BATTERY_STATUS: NORMAL
MDC_IDC_MSMT_BATTERY_VOLTAGE: 2.86 V
MDC_IDC_MSMT_LEADCHNL_RA_PACING_THRESHOLD_AMPLITUDE: 0.75 V
MDC_IDC_MSMT_LEADCHNL_RA_PACING_THRESHOLD_PULSEWIDTH: 0.5 MS
MDC_IDC_MSMT_LEADCHNL_RA_SENSING_INTR_AMPL: 3.7 MV
MDC_IDC_MSMT_LEADCHNL_RV_IMPEDANCE_VALUE: 425 OHM
MDC_IDC_MSMT_LEADCHNL_RV_PACING_THRESHOLD_AMPLITUDE: 0.75 V
MDC_IDC_MSMT_LEADCHNL_RV_PACING_THRESHOLD_AMPLITUDE: 0.75 V
MDC_IDC_MSMT_LEADCHNL_RV_PACING_THRESHOLD_PULSEWIDTH: 0.5 MS
MDC_IDC_MSMT_LEADCHNL_RV_PACING_THRESHOLD_PULSEWIDTH: 0.5 MS
MDC_IDC_MSMT_LEADCHNL_RV_SENSING_INTR_AMPL: 4.1 MV
MDC_IDC_PG_IMPLANT_DTM: NORMAL
MDC_IDC_PG_MFG: NORMAL
MDC_IDC_PG_MODEL: NORMAL
MDC_IDC_PG_SERIAL: NORMAL
MDC_IDC_PG_TYPE: NORMAL
MDC_IDC_SESS_CLINIC_NAME: NORMAL
MDC_IDC_SESS_DTM: NORMAL
MDC_IDC_SESS_TYPE: NORMAL
MDC_IDC_SET_BRADY_HYSTRATE: 60 {BEATS}/MIN
MDC_IDC_SET_BRADY_LOWRATE: 70 {BEATS}/MIN
MDC_IDC_SET_BRADY_MAX_SENSOR_RATE: 120 {BEATS}/MIN
MDC_IDC_SET_BRADY_MODE: NORMAL
MDC_IDC_SET_BRADY_NIGHT_RATE: NORMAL
MDC_IDC_SET_LEADCHNL_RA_PACING_POLARITY: NORMAL
MDC_IDC_SET_LEADCHNL_RA_SENSING_POLARITY: NORMAL
MDC_IDC_SET_LEADCHNL_RV_PACING_AMPLITUDE: 1.62
MDC_IDC_SET_LEADCHNL_RV_PACING_CAPTURE_MODE: NORMAL
MDC_IDC_SET_LEADCHNL_RV_PACING_POLARITY: NORMAL
MDC_IDC_SET_LEADCHNL_RV_PACING_PULSEWIDTH: 0.5 MS
MDC_IDC_SET_LEADCHNL_RV_SENSING_POLARITY: NORMAL
MDC_IDC_SET_LEADCHNL_RV_SENSING_SENSITIVITY: 2 MV
MDC_IDC_STAT_AT_MODE_SW_COUNT: 0
MDC_IDC_STAT_BRADY_RA_PERCENT_PACED: 0 %
MDC_IDC_STAT_BRADY_RV_PERCENT_PACED: 99.88 %

## 2023-07-12 PROCEDURE — 99213 OFFICE O/P EST LOW 20 MIN: CPT | Mod: 25 | Performed by: NURSE PRACTITIONER

## 2023-07-12 PROCEDURE — 93280 PM DEVICE PROGR EVAL DUAL: CPT | Performed by: INTERNAL MEDICINE

## 2023-07-12 NOTE — LETTER
7/12/2023    Kwadwo Winslow MD  [address] [facility] [address]    RE: Tucker Slater       Dear Colleague,     I had the pleasure of seeing Tucker Slater in the [facility].  HISTORY OF PRESENT ILLNESS:    This is a 89 year old male who follows with Dr Perze at [facility]  His past medical history includes:  Atrial fibrillation, s/p PPM, hypertension, CKD, nonischemic cardiomyopathy, idiopathic progressive neuropathy,  and sleep apnea    Mr Slater has a long history of atrial fibrillation  He received a single-chamber PPm (2015) due to associated bradycardia.  He has remained on warfarin.  Previously he deferred Watchman device    ECHO (2018) showed LVEF 45-50% (possibly be due to RV pacing), mild valvular disease, mild ascending aorta dilatation  Historically, he has not had any heart failure symptoms    Limited ECHO (8/2022) showed LVEF 50-55% with basal inferior hypokinesis, 1-2+ MR/TR, mild-moderate pulmonary hypertension    Device interrogation today showed >99% V-paced  Underlying A-fib rates staying around 70 bpm    He returns today for reassessment    Mr Slater overall feels stable from a cardiovascular standpoint.  He denies any chest pain, shortness of breath, palpitations, orthopnea, or significant peripheral edema   He admits to being mostly sedentary and uses a cane when ambulating.  He has not been checking his blood pressure at home as his machine broke a couple of years ago.   He states that his blood pressure is usually well controlled when its checked at clinic visits      VITAL SIGNS:  BP:  140/80  Pulse: 70  Weight:  176 lbs (stable)  BMI: 25    IMPRESSION AND PLAN:    Permanent Atrial Fibrillation:  S/p Single-chamber PPM due to bradycardia (2015)  -99% V-paced  -continue device cares  -chronic Warfarin     Hypertension:  -on Coreg 37.5 mg BID, Losartan 50 mg, Maxzide 37.5-25 mg  -BP borderline  -encouraged him to check his BP at home  and call with persistent BP > 140/90    Nonischemic Cardiomyopathy:  -LVEF 45-50%  -felt to be related to RV pacing  -no signs or symptoms of heart failure  -weight stable    Sleep Apnea  -uses CPAP    Stage III CKD:  -creatinine 1.2-1.4 past year      The total time for the visit today was 27 minutes which includes patient visit, reviewing of records, discussion, and placing of orders of the outpatient coordination of cardiovascular care as described.  The level of medical decision making during this visit was of mild-moderate complexity.  Thank you for allowing me to participate in their care.    Orders Placed This Encounter   Procedures    Follow-Up with Cardiology       No orders of the defined types were placed in this encounter.      There are no discontinued medications.      Encounter Diagnoses   Name Primary?    Permanent atrial fibrillation (H)     Benign essential hypertension Yes       CURRENT MEDICATIONS:  Current Outpatient Medications   Medication Sig Dispense Refill    Ascorbic Acid (VITAMIN C PO) Take 500 mg by mouth daily       calcium carbonate-vitamin D (OSCAL W/D) 500-200 MG-UNIT tablet Take 1 tablet by mouth daily      carvedilol (COREG) 25 MG tablet Take 1.5 tablets (37.5 mg) by mouth 2 times daily (with meals) 270 tablet 3    diclofenac (VOLTAREN) 1 % topical gel Place 2 g onto the skin 4 times daily as needed      docusate sodium (COLACE) 100 MG capsule Take 100 mg by mouth 2 times daily as needed      finasteride (PROSCAR) 5 MG tablet Take 1 tablet (5 mg) by mouth daily 90 tablet 3    losartan (COZAAR) 50 MG tablet Take 1 tablet by mouth once daily 90 tablet 0    Multiple Vitamins-Minerals (OCUVITE PO) Take 1 tablet by mouth daily      multivitamin w/minerals (THERA-VIT-M) tablet Take 1 tablet by mouth daily      order for DME Oxygen 2 Li/min  at night 1 Device 0    triamterene-HCTZ (MAXZIDE-25) 37.5-25 MG tablet Take 1 tablet by mouth daily 90 tablet 3    vitamin B complex with vitamin C  (STRESS TAB) tablet Take 1 tablet by mouth daily      warfarin ANTICOAGULANT (COUMADIN) 2.5 MG tablet Take 1.25mg every Mon & Thur / Take 2.5mg all other days OR per INR clinic 90 tablet 1    acetaminophen (TYLENOL) 500 MG tablet Take 1,000 mg by mouth 2 times daily (Patient not taking: Reported on 7/12/2023)         ALLERGIES     Allergies   Allergen Reactions    Merbromin      Other reaction(s): Other, see comments  Severe reaction as child. Amalgam fillings OK.    Morphine Nausea and Vomiting    Amlodipine      Edema       PAST MEDICAL HISTORY:  Past Medical History:   Diagnosis Date    Arrhythmia     A Fib    Balance problem     related to neuropathy in feet    Bradycardia     Carcinoma in situ of bladder 2000    bladder cancer ;; follow w Urology w periodic cysto     Essential hypertension, benign     Generalized osteoarthrosis, unspecified site knees    s/p R total knee 8/09 ; will do L 6/10     Hernia, abdominal     Numbness and tingling     toes and fingers    DAWSON (obstructive sleep apnea) 8/2/2019    DAWSON (obstructive sleep apnea)     Pacemaker     [employer]     Pain in joint, shoulder region     L shoulder rotater tear; no surgery     Palpitations     Persistent atrial fibrillation (H) 1/30/15    Prostate CA (H) 2008-9    radiation    Prostate cancer (H) 11/08    completed RT 3/09    Renal disease     elevated creatinine    Shoulder impingement     R shoulder impingement etc see MRI 4/09     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     toes both feet        PAST SURGICAL HISTORY:  Past Surgical History:   Procedure Laterality Date    ARTHROPLASTY HIP Right 3/27/2017    Procedure: ARTHROPLASTY HIP;  Surgeon: Jigar Wynn MD;  Location: [facility] OR    CARDIOVERSION  3/26/15    COLONOSCOPY      CYSTOSCOPY      CYSTOSCOPY, FULGURATE BLEEDERS, EVACUATE CLOT(S), COMBINED N/A 4/23/2019    Procedure: Exam under anesthesia, video cystopanendoscopy, evacuation of clots, fulguration of  prostatic veins.;  Surgeon: Ilir Ramirez MD;  Location: [facility] OR    CYSTOSCOPY, RETROGRADES, COMBINED Bilateral 2019    Procedure: Video cystopanendoscopy, evacuation of clots, cup biopsies of bladder wall, electrovaporization of prostate, transurethral resection of prostate, bilateral retrograde ureteropyelogram.;  Surgeon: Ilir Ramirez MD;  Location: [facility] OR    CYSTOSCOPY, TRANSURETHRAL RESECTION (TUR) PROSTATE, COMBINED  2019    Procedure: Cystoscopy, transurethral resection of prostate;  Surgeon: Ilir Ramirez MD;  Location: [facility] OR    HERNIA REPAIR      IMPLANT PACEMAKER  3/26/15    RELEASE CARPAL TUNNEL Right 2017    Procedure: RELEASE CARPAL TUNNEL;  Right carpal tunnel release;  Surgeon: Alonso Barboza MD;  Location: [facility] OR    ZZC NONSPECIFIC PROCEDURE      bilat inguinal hernia repairs ( 3total procedures)     ZZC NONSPECIFIC PROCEDURE      pilonidal cyst    ZZC NONSPECIFIC PROCEDURE      T + A    ZZC NONSPECIFIC PROCEDURE       R+L total knee       FAMILY HISTORY:  Family History   Problem Relation Age of Onset    Heart Disease Father          89yo    Coronary Artery Disease Father     Heart Disease Mother          76yo    Coronary Artery Disease Mother     Family History Negative Brother        SOCIAL HISTORY:  Social History     Socioeconomic History    Marital status:      Spouse name: Alba    Number of children: 3    Years of education: None    Highest education level: None   Occupational History     Employer: RETIRED     Comment:  w [employer]   Tobacco Use    Smoking status: Former     Types: Cigarettes     Quit date: 1977     Years since quittin.8    Smokeless tobacco: Never   Vaping Use    Vaping Use: Never used   Substance and Sexual Activity    Alcohol use: Yes     Alcohol/week: 0.0 standard drinks of alcohol     Comment: 1 beer daily    Drug use: No    Sexual activity: Never     Partners: Female   Other Topics Concern     "Caffeine Concern No     Comment: 4-5 cups per week     Sleep Concern No    Stress Concern No    Weight Concern No    Special Diet No    Exercise No     Comment: yard work and moves around a lot       Review of Systems:  Skin:          Eyes:         ENT:         Respiratory:  Negative       Cardiovascular:  Negative      Gastroenterology:        Genitourinary:         Musculoskeletal:         Neurologic:         Psychiatric:         Heme/Lymph/Imm:         Endocrine:           Physical Exam:  Vitals: BP (!) 140/80   Pulse 70   Ht 1.778 m (5' 10\")   Wt 79.8 kg (176 lb)   BMI 25.25 kg/m      Constitutional:  cooperative;in no acute distress        Skin:  warm and dry to the touch   pacemaker incision in the left infraclavicular area was well-healed      Head:  normocephalic        Eyes:           Lymph:      ENT:  no pallor or cyanosis        Neck:  JVP normal        Respiratory:  clear to auscultation;normal symmetry;normal respiratory excursion         Cardiac: regular rhythm;normal S1 and S2     no presence of murmur          pulses full and equal                                        GI:  abdomen soft        Extremities and Muscular Skeletal:  no edema              Neurological:  affect appropriate        Psych:  Alert and Oriented x 3          CC  Aleena Perez MD  [address] [facility] [address]  [place] [address]      Thank you for allowing me to participate in the care of your patient.      Sincerely,     SELMA Santos CNP     [facility]  "

## 2023-07-24 ENCOUNTER — ANTICOAGULATION THERAPY VISIT (OUTPATIENT)
Dept: ANTICOAGULATION | Facility: CLINIC | Age: 88
End: 2023-07-24

## 2023-07-24 ENCOUNTER — LAB (OUTPATIENT)
Dept: LAB | Facility: CLINIC | Age: 88
End: 2023-07-24
Payer: COMMERCIAL

## 2023-07-24 DIAGNOSIS — Z79.01 LONG TERM CURRENT USE OF ANTICOAGULANTS WITH INR GOAL OF 2.0-3.0: ICD-10-CM

## 2023-07-24 DIAGNOSIS — I48.21 PERMANENT ATRIAL FIBRILLATION (H): ICD-10-CM

## 2023-07-24 DIAGNOSIS — I48.21 PERMANENT ATRIAL FIBRILLATION (H): Primary | ICD-10-CM

## 2023-07-24 LAB — INR BLD: 2 (ref 0.9–1.1)

## 2023-07-24 PROCEDURE — 85610 PROTHROMBIN TIME: CPT

## 2023-07-24 PROCEDURE — 36416 COLLJ CAPILLARY BLOOD SPEC: CPT

## 2023-07-24 NOTE — PROGRESS NOTES
ANTICOAGULATION MANAGEMENT     Tucker Slater 89 year old male is on warfarin with therapeutic INR result. (Goal INR 2.0-2.5)    Recent labs: (last 7 days)     07/24/23  1348   INR 2.0*       ASSESSMENT     Warfarin Lab Questionnaire    Warfarin Doses Last 7 Days--Patient confirmed taking warfarin maintenance dose as listed          7/24/2023     1:43 PM   Dose in Tablet or Mg   TAB or MG? tablet (tab)           7/24/2023   Warfarin Lab Questionnaire   Missed doses within past 14 days? No   Changes in diet or alcohol within past 14 days? No   Medication changes since last result? No   Injuries or illness since last result? No   New shortness of breath, severe headaches or sudden changes in vision since last result? No   Abnormal bleeding since last result? No   Upcoming surgery, procedure? No   Best number to call with results? [phone]     Previous result: Therapeutic last visit; previously outside of goal range  Additional findings: None       PLAN     Recommended plan for no diet, medication or health factor changes affecting INR     Dosing Instructions: Continue your current warfarin dose with next INR in 4 weeks       Summary  As of 7/24/2023      Full warfarin instructions:  1.25 mg every Mon, Thu; 2.5 mg all other days   Next INR check:  8/21/2023               Telephone call with Tucker who agrees to plan and repeated back plan correctly    Check INR at provider visit    Education provided:   Contact [phone]  with any changes, questions or concerns.     Plan made per [facility] anticoagulation protocol    Chichi Wang, RN  Anticoagulation Clinic  7/24/2023    _______________________________________________________________________     Anticoagulation Episode Summary       Current INR goal:  2.0-2.5   TTR:  60.9 % (11.9 mo)   Target end date:  Indefinite   Send INR reminders to:  Counts include 234 beds at the [facility]    Indications    Atrial fibrillation (H) with mitral regurgitation and congestive heart failure  [I48.91]  Long term current use of anticoagulants with INR goal of 2.0-3.0 [Z79.01]  Permanent atrial fibrillation (H) [I48.21]             Comments:               Anticoagulation Care Providers       Provider Role Specialty Phone number    Kwadwo Winslow MD Referring Internal Medicine [phone]

## 2023-08-21 ENCOUNTER — ANTICOAGULATION THERAPY VISIT (OUTPATIENT)
Dept: ANTICOAGULATION | Facility: CLINIC | Age: 88
End: 2023-08-21

## 2023-08-21 ENCOUNTER — OFFICE VISIT (OUTPATIENT)
Dept: INTERNAL MEDICINE | Facility: CLINIC | Age: 88
End: 2023-08-21
Payer: COMMERCIAL

## 2023-08-21 VITALS
BODY MASS INDEX: 24.2 KG/M2 | DIASTOLIC BLOOD PRESSURE: 75 MMHG | WEIGHT: 169 LBS | OXYGEN SATURATION: 99 % | RESPIRATION RATE: 20 BRPM | SYSTOLIC BLOOD PRESSURE: 137 MMHG | HEART RATE: 71 BPM | TEMPERATURE: 96.2 F | HEIGHT: 70 IN

## 2023-08-21 DIAGNOSIS — I48.21 PERMANENT ATRIAL FIBRILLATION (H): Primary | ICD-10-CM

## 2023-08-21 DIAGNOSIS — N13.8 HYPERTROPHY OF PROSTATE WITH URINARY OBSTRUCTION: ICD-10-CM

## 2023-08-21 DIAGNOSIS — Z79.01 LONG TERM CURRENT USE OF ANTICOAGULANTS WITH INR GOAL OF 2.0-3.0: ICD-10-CM

## 2023-08-21 DIAGNOSIS — I48.21 PERMANENT ATRIAL FIBRILLATION (H): ICD-10-CM

## 2023-08-21 DIAGNOSIS — Z12.5 SCREENING FOR PROSTATE CANCER: ICD-10-CM

## 2023-08-21 DIAGNOSIS — Z23 NEED FOR PROPHYLACTIC VACCINATION WITH COMBINED DIPHTHERIA-TETANUS-PERTUSSIS (DTP) VACCINE: ICD-10-CM

## 2023-08-21 DIAGNOSIS — Z23 NEED FOR SHINGLES VACCINE: ICD-10-CM

## 2023-08-21 DIAGNOSIS — Z00.00 ENCOUNTER FOR PREVENTATIVE ADULT HEALTH CARE EXAMINATION: Primary | ICD-10-CM

## 2023-08-21 DIAGNOSIS — N18.31 STAGE 3A CHRONIC KIDNEY DISEASE (H): ICD-10-CM

## 2023-08-21 DIAGNOSIS — R09.82 POSTNASAL DISCHARGE: ICD-10-CM

## 2023-08-21 DIAGNOSIS — G56.02 CARPAL TUNNEL SYNDROME OF LEFT WRIST: ICD-10-CM

## 2023-08-21 DIAGNOSIS — Z23 NEED FOR DIPHTHERIA-TETANUS-PERTUSSIS (TDAP) VACCINE: ICD-10-CM

## 2023-08-21 DIAGNOSIS — I10 ESSENTIAL HYPERTENSION WITH GOAL BLOOD PRESSURE LESS THAN 140/90: ICD-10-CM

## 2023-08-21 DIAGNOSIS — I50.22 CHRONIC SYSTOLIC CONGESTIVE HEART FAILURE (H): ICD-10-CM

## 2023-08-21 DIAGNOSIS — N40.1 HYPERTROPHY OF PROSTATE WITH URINARY OBSTRUCTION: ICD-10-CM

## 2023-08-21 LAB
ALBUMIN SERPL BCG-MCNC: 4 G/DL (ref 3.5–5.2)
ALP SERPL-CCNC: 83 U/L (ref 40–129)
ALT SERPL W P-5'-P-CCNC: 20 U/L (ref 0–70)
ANION GAP SERPL CALCULATED.3IONS-SCNC: 12 MMOL/L (ref 7–15)
AST SERPL W P-5'-P-CCNC: 32 U/L (ref 0–45)
BILIRUB SERPL-MCNC: 0.6 MG/DL
BUN SERPL-MCNC: 53.8 MG/DL (ref 8–23)
CALCIUM SERPL-MCNC: 9.8 MG/DL (ref 8.2–9.6)
CHLORIDE SERPL-SCNC: 110 MMOL/L (ref 98–107)
CHOLEST SERPL-MCNC: 167 MG/DL
CREAT SERPL-MCNC: 1.51 MG/DL (ref 0.67–1.17)
CREAT UR-MCNC: 95.4 MG/DL
DEPRECATED HCO3 PLAS-SCNC: 18 MMOL/L (ref 22–29)
ERYTHROCYTE [DISTWIDTH] IN BLOOD BY AUTOMATED COUNT: 13.8 % (ref 10–15)
GFR SERPL CREATININE-BSD FRML MDRD: 43 ML/MIN/1.73M2
GLUCOSE SERPL-MCNC: 88 MG/DL (ref 70–99)
HCT VFR BLD AUTO: 40.6 % (ref 40–53)
HDLC SERPL-MCNC: 39 MG/DL
HGB BLD-MCNC: 13.6 G/DL (ref 13.3–17.7)
INR BLD: 2.4 (ref 0.9–1.1)
LDLC SERPL CALC-MCNC: 107 MG/DL
MCH RBC QN AUTO: 31.2 PG (ref 26.5–33)
MCHC RBC AUTO-ENTMCNC: 33.5 G/DL (ref 31.5–36.5)
MCV RBC AUTO: 93 FL (ref 78–100)
MICROALBUMIN UR-MCNC: 92.5 MG/L
MICROALBUMIN/CREAT UR: 96.96 MG/G CR (ref 0–17)
NONHDLC SERPL-MCNC: 128 MG/DL
PLATELET # BLD AUTO: 224 10E3/UL (ref 150–450)
POTASSIUM SERPL-SCNC: 4.4 MMOL/L (ref 3.4–5.3)
PROT SERPL-MCNC: 7.6 G/DL (ref 6.4–8.3)
PSA SERPL DL<=0.01 NG/ML-MCNC: 0.27 NG/ML
RBC # BLD AUTO: 4.36 10E6/UL (ref 4.4–5.9)
SODIUM SERPL-SCNC: 140 MMOL/L (ref 136–145)
TRIGL SERPL-MCNC: 104 MG/DL
TSH SERPL DL<=0.005 MIU/L-ACNC: 1.97 UIU/ML (ref 0.3–4.2)
WBC # BLD AUTO: 6.6 10E3/UL (ref 4–11)

## 2023-08-21 PROCEDURE — G0439 PPPS, SUBSEQ VISIT: HCPCS | Performed by: INTERNAL MEDICINE

## 2023-08-21 PROCEDURE — 36415 COLL VENOUS BLD VENIPUNCTURE: CPT | Performed by: INTERNAL MEDICINE

## 2023-08-21 PROCEDURE — 99214 OFFICE O/P EST MOD 30 MIN: CPT | Mod: 25 | Performed by: INTERNAL MEDICINE

## 2023-08-21 PROCEDURE — G0103 PSA SCREENING: HCPCS | Performed by: INTERNAL MEDICINE

## 2023-08-21 PROCEDURE — 80053 COMPREHEN METABOLIC PANEL: CPT | Performed by: INTERNAL MEDICINE

## 2023-08-21 PROCEDURE — 82043 UR ALBUMIN QUANTITATIVE: CPT | Performed by: INTERNAL MEDICINE

## 2023-08-21 PROCEDURE — 85027 COMPLETE CBC AUTOMATED: CPT | Performed by: INTERNAL MEDICINE

## 2023-08-21 PROCEDURE — 84443 ASSAY THYROID STIM HORMONE: CPT | Performed by: INTERNAL MEDICINE

## 2023-08-21 PROCEDURE — 85610 PROTHROMBIN TIME: CPT | Performed by: INTERNAL MEDICINE

## 2023-08-21 PROCEDURE — 82570 ASSAY OF URINE CREATININE: CPT | Performed by: INTERNAL MEDICINE

## 2023-08-21 PROCEDURE — 80061 LIPID PANEL: CPT | Performed by: INTERNAL MEDICINE

## 2023-08-21 RX ORDER — WARFARIN SODIUM 2.5 MG/1
TABLET ORAL
Qty: 90 TABLET | Refills: 1 | Status: SHIPPED | OUTPATIENT
Start: 2023-08-21 | End: 2024-03-18

## 2023-08-21 RX ORDER — FLUTICASONE PROPIONATE 50 MCG
1 SPRAY, SUSPENSION (ML) NASAL 2 TIMES DAILY
Qty: 16 G | Refills: 3 | Status: SHIPPED | OUTPATIENT
Start: 2023-08-21

## 2023-08-21 ASSESSMENT — ENCOUNTER SYMPTOMS
HEMATURIA: 0
JOINT SWELLING: 0
FREQUENCY: 0
FEVER: 0
MYALGIAS: 0
SHORTNESS OF BREATH: 0
DIARRHEA: 0
HEARTBURN: 0
NERVOUS/ANXIOUS: 0
HEMATOCHEZIA: 0
WEAKNESS: 1
HEADACHES: 0
EYE PAIN: 0
COUGH: 1
DYSURIA: 0
ABDOMINAL PAIN: 1
DIZZINESS: 0
NAUSEA: 0
PALPITATIONS: 0
CHILLS: 0
ARTHRALGIAS: 1
SORE THROAT: 0
PARESTHESIAS: 1
CONSTIPATION: 0

## 2023-08-21 ASSESSMENT — PAIN SCALES - GENERAL: PAINLEVEL: NO PAIN (0)

## 2023-08-21 ASSESSMENT — ACTIVITIES OF DAILY LIVING (ADL)
CURRENT_FUNCTION: HOUSEWORK REQUIRES ASSISTANCE
CURRENT_FUNCTION: TRANSPORTATION REQUIRES ASSISTANCE
CURRENT_FUNCTION: SHOPPING REQUIRES ASSISTANCE
CURRENT_FUNCTION: PREPARING MEALS REQUIRES ASSISTANCE

## 2023-08-21 NOTE — PROGRESS NOTES
ANTICOAGULATION MANAGEMENT     Tucker Slater 89 year old male is on warfarin with therapeutic INR result. (Goal INR 2.0-2.5)    Recent labs: (last 7 days)     08/21/23  1110   INR 2.4*       ASSESSMENT     Source(s): Chart Review and Patient/Caregiver Call     Warfarin doses taken: Warfarin taken as instructed  Diet: No new diet changes identified  Medication/supplement changes: Will be starting fluticasone today due to a chronic cough and nasal congestion that he has had.  New illness, injury, or hospitalization: No  Signs or symptoms of bleeding or clotting: No  Previous result: Therapeutic last 2(+) visits  Additional findings: None       PLAN     Recommended plan for no diet, medication or health factor changes affecting INR     Dosing Instructions: Continue your current warfarin dose with next INR in 4 weeks       Summary  As of 8/21/2023      Full warfarin instructions:  1.25 mg every Mon, Thu; 2.5 mg all other days   Next INR check:  9/18/2023               Telephone call with Tucker who agrees to plan and repeated back plan correctly    Lab visit scheduled    Education provided:   Please call back if any changes to your diet, medications or how you've been taking warfarin    Plan made per [facility] anticoagulation protocol    Ivory Machado RN  Anticoagulation Clinic  8/21/2023    _______________________________________________________________________     Anticoagulation Episode Summary       Current INR goal:  2.0-2.5   TTR:  62.2 % (1 y)   Target end date:  Indefinite   Send INR reminders to:  [facility]    Indications    Atrial fibrillation (H) with mitral regurgitation and congestive heart failure [I48.91]  Long term current use of anticoagulants with INR goal of 2.0-3.0 [Z79.01]  Permanent atrial fibrillation (H) [I48.21]             Comments:               Anticoagulation Care Providers       Provider Role Specialty Phone number    Kwadwo Winslwo MD Referring Internal Medicine  [phone]

## 2023-08-21 NOTE — PROGRESS NOTES
"SUBJECTIVE:   Tucker is a 89 year old who presents for Preventive Visit.      8/21/2023    10:20 AM   Additional Questions   Roomed by Gena ADAMS   Accompanied by n/a       Are you in the first 12 months of your Medicare coverage?  No    Healthy Habits:     In general, how would you rate your overall health?  Good    Frequency of exercise:  None    Do you usually eat at least 4 servings of fruit and vegetables a day, include whole grains    & fiber and avoid regularly eating high fat or \"junk\" foods?  No    Taking medications regularly:  Yes    Medication side effects:  None    Ability to successfully perform activities of daily living:  Transportation requires assistance, shopping requires assistance, preparing meals requires assistance and housework requires assistance    Home Safety:  No safety concerns identified    Hearing Impairment:  No hearing concerns    In the past 6 months, have you been bothered by leaking of urine?  No    In general, how would you rate your overall mental or emotional health?  Good    Additional concerns today:  Yes        Have you ever done Advance Care Planning? (For example, a Health Directive, POLST, or a discussion with a medical provider or your loved ones about your wishes): Yes, advance care planning is on file.       Fall risk  Fallen 2 or more times in the past year?: No  Any fall with injury in the past year?: No    Cognitive Screening   1) Repeat 3 items (Leader, Season, Table)    2) Clock draw: NORMAL  3) 3 item recall: Recalls 3 objects  Results: 3 items recalled: COGNITIVE IMPAIRMENT LESS LIKELY    Mini-CogTM Copyright S Alex. Licensed by the author for use in [facility]; reprinted with permission ([email or web address].[facility]). All rights reserved.      Do you have sleep apnea, excessive snoring or daytime drowsiness? : yes    Reviewed and updated as needed this visit by clinical staff   Tobacco  Allergies  Meds  Problems  Med Hx  Surg Hx  Fam Hx          Reviewed " and updated as needed this visit by Provider                 Social History     Tobacco Use    Smoking status: Former     Types: Cigarettes     Quit date: 1977     Years since quittin.9    Smokeless tobacco: Never   Substance Use Topics    Alcohol use: Yes     Alcohol/week: 0.0 standard drinks of alcohol     Comment: 1 beer daily             2023    10:04 AM   Alcohol Use   Prescreen: >3 drinks/day or >7 drinks/week? No     Do you have a current opioid prescription? No  Do you use any other controlled substances or medications that are not prescribed by a provider? None          PROBLEMS TO ADD ON...    Concern for right inguinal bulging and pain with coughing.   Has mild chronic cough, postnasal drainage. During a meal seems to increase dripping form the nose.   Has h/o HTN. on medical treatment. BP has been controlled. No side effects from medications. No CP, HA, dizziness. good compliance with medications and low salt diet.  Has history of atrial fibrillation. On anticoagulation with Coumadin and rate control medications. Asymptonatic - no chest pains , palpitations,  no side effects from medications.  Has h/o CRF. Monitoring BP, BG, medications, avoiding OTC NSAIDs. Needs periodic recheck of kidney function.  Has H/O BPH. On treatment with Proscar . Has mild symptoms of frequency, decreased urinary stream. No side effects from medications.   Has DAWSON, uses a CPAP.        Current providers sharing in care for this patient include:   Patient Care Team:  Kwadwo Winslow MD as PCP - General (Internal Medicine)  Kwadwo Winslow MD as Assigned PCP  Ilir Ramirez MD as MD (Urology)  Janeth Mcdaniels APRN CNP as Nurse Practitioner (Cardiovascular Disease)  Janeth Mcdaniels APRN CNP as Assigned Heart and Vascular Provider    The following health maintenance items are reviewed in Epic and correct as of today:  Health Maintenance   Topic Date Due    HF ACTION PLAN  Never done    ZOSTER IMMUNIZATION  "(1 of 2) Never done    COVID-19 Vaccine (4 - Moderna series) 07/12/2022    DTAP/TDAP/TD IMMUNIZATION (2 - Td or Tdap) 04/24/2023    MEDICARE ANNUAL WELLNESS VISIT  05/17/2023    LIPID  05/17/2023    ANNUAL REVIEW OF HM ORDERS  05/17/2023    BMP  06/13/2023    MICROALBUMIN  08/30/2023    INFLUENZA VACCINE (1) 09/01/2023    ALT  12/13/2023    CBC  12/13/2023    HEMOGLOBIN  12/13/2023    FALL RISK ASSESSMENT  08/21/2024    ADVANCE CARE PLANNING  08/10/2027    TSH W/FREE T4 REFLEX  Completed    PHQ-2 (once per calendar year)  Completed    Pneumococcal Vaccine: 65+ Years  Completed    URINALYSIS  Completed    IPV IMMUNIZATION  Aged Out    MENINGITIS IMMUNIZATION  Aged Out     Lab work is in process  Labs reviewed in EPIC          Review of Systems   Constitutional:  Negative for chills and fever.   HENT:  Negative for congestion, ear pain, hearing loss and sore throat.    Eyes:  Positive for visual disturbance. Negative for pain.   Respiratory:  Positive for cough. Negative for shortness of breath.    Cardiovascular:  Negative for chest pain, palpitations and peripheral edema.   Gastrointestinal:  Positive for abdominal pain. Negative for constipation, diarrhea, heartburn, hematochezia and nausea.   Genitourinary:  Negative for dysuria, frequency, genital sores, hematuria, impotence, penile discharge and urgency.   Musculoskeletal:  Positive for arthralgias. Negative for joint swelling and myalgias.   Skin:  Positive for rash.   Neurological:  Positive for weakness and paresthesias. Negative for dizziness and headaches.   Psychiatric/Behavioral:  Negative for mood changes. The patient is not nervous/anxious.          OBJECTIVE:   /75 (BP Location: Left arm, Cuff Size: Adult Regular)   Pulse 71   Temp (!) 96.2  F (35.7  C) (Tympanic)   Resp 20   Ht 1.778 m (5' 10\")   Wt 76.7 kg (169 lb)   SpO2 99%   BMI 24.25 kg/m   Estimated body mass index is 24.25 kg/m  as calculated from the following:    Height as of " "this encounter: 1.778 m (5' 10\").    Weight as of this encounter: 76.7 kg (169 lb).  Physical Exam  GENERAL: elderly, frail, alert and no distress  EYES: Eyes grossly normal to inspection, PERRL and conjunctivae and sclerae normal  HENT: ear canals and TM's normal, nose and mouth without ulcers or lesions  NECK: no adenopathy, no asymmetry, masses, or scars and thyroid normal to palpation  RESP: lungs clear to auscultation - no rales, rhonchi or wheezes  CV: regular rate and rhythm, normal S1 S2, no S3 or S4, no murmur, click or rub, no peripheral edema and peripheral pulses strong  ABDOMEN: soft, nontender, no hepatosplenomegaly, no masses and bowel sounds normal  MS: no gross musculoskeletal defects noted, no edema  SKIN: no suspicious lesions or rashes  NEURO: Normal strength and tone, mentation intact and speech normal, unstable gait, uses a cane  PSYCH: mentation appears normal, affect normal/bright    Diagnostic Test Results:  Labs reviewed in Epic    ASSESSMENT / PLAN:       ICD-10-CM    1. Encounter for preventative adult health care examination  Z00.00 fluticasone (FLONASE) 50 MCG/ACT nasal spray     CBC with platelets     Comprehensive metabolic panel (BMP + Alb, Alk Phos, ALT, AST, Total. Bili, TP)     TSH with free T4 reflex     PSA, screen      2. Need for shingles vaccine  Z23       3. Need for diphtheria-tetanus-pertussis (Tdap) vaccine  Z23       4. Chronic systolic congestive heart failure (H)  I50.22 Lipid panel reflex to direct LDL Non-fasting     OFFICE/OUTPT VISIT,EST,LEVL III      5. Stage 3a chronic kidney disease (H)  N18.31 Albumin Random Urine Quantitative with Creat Ratio     CBC with platelets     Comprehensive metabolic panel (BMP + Alb, Alk Phos, ALT, AST, Total. Bili, TP)     OFFICE/OUTPT VISIT,EST,LEVL III      6. Need for prophylactic vaccination with combined diphtheria-tetanus-pertussis (DTP) vaccine  Z23       7. Permanent atrial fibrillation (H)  I48.21 warfarin ANTICOAGULANT " "(COUMADIN) 2.5 MG tablet     INR point of care     OFFICE/OUTPT VISIT,EST,LEVL III      8. Essential hypertension with goal blood pressure less than 140/90  I10 TSH with free T4 reflex     OFFICE/OUTPT VISIT,EST,LEVL III      9. Hypertrophy of prostate with urinary obstruction  N40.1 OFFICE/OUTPT VISIT,EST,LEVL III    N13.8       10. Postnasal discharge  R09.82 fluticasone (FLONASE) 50 MCG/ACT nasal spray     OFFICE/OUTPT VISIT,EST,LEVL III      11. Screening for prostate cancer  Z12.5 PSA, screen      12. Carpal tunnel syndrome of left wrist  G56.02 Orthopedic  Referral     OFFICE/OUTPT VISIT,EST,LEVL III      Assess lab work   Continue treatment  Start nasal steroid spray, consider atrovent spray if not improved in one month  Refer to ortho for CTS      Patient has been advised of split billing requirements and indicates understanding: Yes      COUNSELING:  Reviewed preventive health counseling, as reflected in patient instructions       Regular exercise       Healthy diet/nutrition       Vision screening       Hearing screening       Dental care       Colon cancer screening       Prostate cancer screening      BMI:   Estimated body mass index is 24.25 kg/m  as calculated from the following:    Height as of this encounter: 1.778 m (5' 10\").    Weight as of this encounter: 76.7 kg (169 lb).         He reports that he quit smoking about 45 years ago. His smoking use included cigarettes. He has never used smokeless tobacco.      Appropriate preventive services were discussed with this patient, including applicable screening as appropriate for cardiovascular disease, diabetes, osteopenia/osteoporosis, and glaucoma.  As appropriate for age/gender, discussed screening for colorectal cancer, prostate cancer, breast cancer, and cervical cancer. Checklist reviewing preventive services available has been given to the patient.    Reviewed patients plan of care and provided an AVS. The Intermediate Care Plan ( asthma " action plan, low back pain action plan, and migraine action plan) for Tucker meets the Care Plan requirement. This Care Plan has been established and reviewed with the Patient.          Kwadwo Winslow MD  [facility]    Identified Health Risks:  I have reviewed Opioid Use Disorder and Substance Use Disorder risk factors and made any needed referrals.

## 2023-08-21 NOTE — LETTER
August 22, 2023      Tucker Slater  [address] [facility] [address]        Dear ,    We are writing to inform you of your test results.    Slightly decreased kidney function and elevated calcium.   Recommend to assess ionized calcium and PTH.   Rest of the results are normal.     Resulted Orders   Lipid panel reflex to direct LDL Non-fasting   Result Value Ref Range    Cholesterol 167 <200 mg/dL    Triglycerides 104 <150 mg/dL    Direct Measure HDL 39 (L) >=40 mg/dL    LDL Cholesterol Calculated 107 (H) <=100 mg/dL    Non HDL Cholesterol 128 <130 mg/dL    Narrative    Cholesterol  Desirable:  <200 mg/dL    Triglycerides  Normal:  Less than 150 mg/dL  Borderline High:  150-199 mg/dL  High:  200-499 mg/dL  Very High:  Greater than or equal to 500 mg/dL    Direct Measure HDL  Female:  Greater than or equal to 50 mg/dL   Male:  Greater than or equal to 40 mg/dL    LDL Cholesterol  Desirable:  <100mg/dL  Above Desirable:  100-129 mg/dL   Borderline High:  130-159 mg/dL   High:  160-189 mg/dL   Very High:  >= 190 mg/dL    Non HDL Cholesterol  Desirable:  130 mg/dL  Above Desirable:  130-159 mg/dL  Borderline High:  160-189 mg/dL  High:  190-219 mg/dL  Very High:  Greater than or equal to 220 mg/dL   Albumin Random Urine Quantitative with Creat Ratio   Result Value Ref Range    Creatinine Urine mg/dL 95.4 mg/dL      Comment:      The reference ranges have not been established in urine creatinine. The results should be integrated into the clinical context for interpretation.    Albumin Urine mg/L 92.5 mg/L      Comment:      The reference ranges have not been established in urine albumin. The results should be integrated into the clinical context for interpretation.    Albumin Urine mg/g Cr 96.96 (H) 0.00 - 17.00 mg/g Cr      Comment:      Microalbuminuria is defined as an albumin:creatinine ratio of 17 to 299 for males and 25 to 299 for females. A ratio of albumin:creatinine of 300 or higher is  indicative of overt proteinuria.  Due to biologic variability, positive results should be confirmed by a second, first-morning random or 24-hour timed urine specimen. If there is discrepancy, a third specimen is recommended. When 2 out of 3 results are in the microalbuminuria range, this is evidence for incipient nephropathy and warrants increased efforts at glucose control, blood pressure control, and institution of therapy with an angiotensin-converting-enzyme (ACE) inhibitor (if the patient can tolerate it).     CBC with platelets   Result Value Ref Range    WBC Count 6.6 4.0 - 11.0 10e3/uL    RBC Count 4.36 (L) 4.40 - 5.90 10e6/uL    Hemoglobin 13.6 13.3 - 17.7 g/dL    Hematocrit 40.6 40.0 - 53.0 %    MCV 93 78 - 100 fL    MCH 31.2 26.5 - 33.0 pg    MCHC 33.5 31.5 - 36.5 g/dL    RDW 13.8 10.0 - 15.0 %    Platelet Count 224 150 - 450 10e3/uL   Comprehensive metabolic panel (BMP + Alb, Alk Phos, ALT, AST, Total. Bili, TP)   Result Value Ref Range    Sodium 140 136 - 145 mmol/L    Potassium 4.4 3.4 - 5.3 mmol/L    Chloride 110 (H) 98 - 107 mmol/L    Carbon Dioxide (CO2) 18 (L) 22 - 29 mmol/L    Anion Gap 12 7 - 15 mmol/L    Urea Nitrogen 53.8 (H) 8.0 - 23.0 mg/dL    Creatinine 1.51 (H) 0.67 - 1.17 mg/dL    Calcium 9.8 (H) 8.2 - 9.6 mg/dL    Glucose 88 70 - 99 mg/dL    Alkaline Phosphatase 83 40 - 129 U/L    AST 32 0 - 45 U/L      Comment:      Reference intervals for this test were updated on 6/12/2023 to more accurately reflect our healthy population. There may be differences in the flagging of prior results with similar values performed with this method. Interpretation of those prior results can be made in the context of the updated reference intervals.    ALT 20 0 - 70 U/L      Comment:      Reference intervals for this test were updated on 6/12/2023 to more accurately reflect our healthy population. There may be differences in the flagging of prior results with similar values performed with this method.  Interpretation of those prior results can be made in the context of the updated reference intervals.      Protein Total 7.6 6.4 - 8.3 g/dL    Albumin 4.0 3.5 - 5.2 g/dL    Bilirubin Total 0.6 <=1.2 mg/dL    GFR Estimate 43 (L) >60 mL/min/1.73m2   TSH with free T4 reflex   Result Value Ref Range    TSH 1.97 0.30 - 4.20 uIU/mL   PSA, screen   Result Value Ref Range    Prostate Specific Antigen Screen 0.27 ng/mL      Comment:      No reference ranges have been established for patients over 80 years.    Narrative    This result is obtained using the Roche Elecsys total PSA method on the samreen [id number] immunoassay analyzer. Results obtained with different assay methods or kits cannot be used interchangeably.       If you have any questions or concerns, please call the clinic at the number listed above.       Sincerely,      Kwadwo Winslow MD

## 2023-08-26 DIAGNOSIS — I10 ESSENTIAL HYPERTENSION WITH GOAL BLOOD PRESSURE LESS THAN 140/90: ICD-10-CM

## 2023-08-28 RX ORDER — TRIAMTERENE/HYDROCHLOROTHIAZID 37.5-25 MG
1 TABLET ORAL DAILY
Qty: 90 TABLET | Refills: 0 | Status: SHIPPED | OUTPATIENT
Start: 2023-08-28 | End: 2023-11-22

## 2023-09-18 ENCOUNTER — LAB (OUTPATIENT)
Dept: LAB | Facility: CLINIC | Age: 88
End: 2023-09-18
Payer: COMMERCIAL

## 2023-09-18 ENCOUNTER — ANTICOAGULATION THERAPY VISIT (OUTPATIENT)
Dept: ANTICOAGULATION | Facility: CLINIC | Age: 88
End: 2023-09-18

## 2023-09-18 DIAGNOSIS — I48.21 PERMANENT ATRIAL FIBRILLATION (H): ICD-10-CM

## 2023-09-18 DIAGNOSIS — I48.91 ATRIAL FIBRILLATION (H): Primary | ICD-10-CM

## 2023-09-18 DIAGNOSIS — Z79.01 LONG TERM CURRENT USE OF ANTICOAGULANTS WITH INR GOAL OF 2.0-3.0: ICD-10-CM

## 2023-09-18 LAB — INR BLD: 3.7 (ref 0.9–1.1)

## 2023-09-18 PROCEDURE — 36416 COLLJ CAPILLARY BLOOD SPEC: CPT

## 2023-09-18 PROCEDURE — 85610 PROTHROMBIN TIME: CPT

## 2023-09-18 NOTE — PROGRESS NOTES
ANTICOAGULATION MANAGEMENT     Tucker CECE Slater 89 year old male is on warfarin with supratherapeutic INR result. (Goal INR 2.0-2.5)    Recent labs: (last 7 days)     09/18/23  1421   INR 3.7*       ASSESSMENT     Warfarin Lab Questionnaire    Warfarin Doses Last 7 Days      9/18/2023     2:18 PM   Dose in Tablet or Mg   TAB or MG? tablet (tab)     Pt Rptd Dose SUNDAY MONDAY TUESDAY WED THURS FRIDAY SATURDAY 9/18/2023   2:18 PM 1 0.5 1 1 0.5 1 1         9/18/2023   Warfarin Lab Questionnaire   Missed doses within past 14 days? No   Changes in diet or alcohol within past 14 days? No   Medication changes since last result? No   Injuries or illness since last result? No   New shortness of breath, severe headaches or sudden changes in vision since last result? No   Abnormal bleeding since last result? No   Upcoming surgery, procedure? No   Best number to call with results? [phone]     Previous result: Therapeutic last 2(+) visits  Additional findings: None       PLAN     Recommended plan for no diet, medication or health factor changes affecting INR     Dosing Instructions: hold dose then decrease your warfarin dose (8.3% change) with next INR in 2 weeks       Summary  As of 9/18/2023      Full warfarin instructions:  9/18: Hold; Otherwise 1.25 mg every Mon, Wed, Fri; 2.5 mg all other days   Next INR check:  10/2/2023               Telephone call with Tucker who verbalizes understanding and agrees to plan    Lab visit scheduled    Education provided:   Please call back if any changes to your diet, medications or how you've been taking warfarin  Contact [phone]  with any changes, questions or concerns.     Plan made per [facility] anticoagulation protocol    Carmelita Georges RN  Anticoagulation Clinic  9/18/2023    _______________________________________________________________________     Anticoagulation Episode Summary       Current INR goal:  2.0-2.5   TTR:  57.3 % (1 y)   Target end date:  Indefinite   Send INR  reminders to:  [facility]    Indications    Atrial fibrillation (H) with mitral regurgitation and congestive heart failure [I48.91]  Long term current use of anticoagulants with INR goal of 2.0-3.0 [Z79.01]  Permanent atrial fibrillation (H) [I48.21]             Comments:               Anticoagulation Care Providers       Provider Role Specialty Phone number    Kwadwo Winslow MD Referring Internal Medicine [phone]

## 2023-10-02 ENCOUNTER — ANTICOAGULATION THERAPY VISIT (OUTPATIENT)
Dept: ANTICOAGULATION | Facility: CLINIC | Age: 88
End: 2023-10-02

## 2023-10-02 ENCOUNTER — LAB (OUTPATIENT)
Dept: LAB | Facility: CLINIC | Age: 88
End: 2023-10-02
Payer: COMMERCIAL

## 2023-10-02 DIAGNOSIS — Z79.01 LONG TERM CURRENT USE OF ANTICOAGULANTS WITH INR GOAL OF 2.0-3.0: Primary | ICD-10-CM

## 2023-10-02 DIAGNOSIS — I48.21 PERMANENT ATRIAL FIBRILLATION (H): ICD-10-CM

## 2023-10-02 LAB — INR BLD: 2.4 (ref 0.9–1.1)

## 2023-10-02 PROCEDURE — 36416 COLLJ CAPILLARY BLOOD SPEC: CPT

## 2023-10-02 PROCEDURE — 85610 PROTHROMBIN TIME: CPT

## 2023-10-02 NOTE — PROGRESS NOTES
ANTICOAGULATION MANAGEMENT     Tucker Slater 89 year old male is on warfarin with therapeutic INR result. (Goal INR 2.0-2.5)    Recent labs: (last 7 days)     10/02/23  1420   INR 2.4*       ASSESSMENT     Warfarin Lab Questionnaire    Warfarin Doses Last 7 Days      10/2/2023     2:16 PM   Dose in Tablet or Mg   TAB or MG? tablet (tab)     Pt Rptd Dose AMBIKA MONDAY TUESDAY WED THURS FRIDAY SATURDAY   10/2/2023   2:16 PM 1 0.5 1 0.5 1 0.5 1         10/2/2023   Warfarin Lab Questionnaire   Missed doses within past 14 days? No   Changes in diet or alcohol within past 14 days? No   Medication changes since last result? No   Injuries or illness since last result? No   New shortness of breath, severe headaches or sudden changes in vision since last result? No   Abnormal bleeding since last result? No   Upcoming surgery, procedure? No   Best number to call with results? [phone]     Previous result: Supratherapeutic, warfarin dose reduced at 9/18/23 INR check  Additional findings: None       PLAN     Recommended plan for no diet, medication or health factor changes affecting INR     Dosing Instructions: Continue your current warfarin dose with next INR in 2 weeks       Summary  As of 10/2/2023      Full warfarin instructions:  1.25 mg every Mon, Wed, Fri; 2.5 mg all other days   Next INR check:  10/16/2023               Telephone call with Tucker who verbalizes understanding and agrees to plan    Lab visit scheduled    Education provided:   Please call back if any changes to your diet, medications or how you've been taking warfarin  Contact [phone]  with any changes, questions or concerns.     Plan made per [facility] anticoagulation protocol    Gail Valdovinos RN  Anticoagulation Clinic  10/2/2023    _______________________________________________________________________     Anticoagulation Episode Summary       Current INR goal:  2.0-2.5   TTR:  56.6 % (1 y)   Target end date:  Indefinite   Send INR reminders to:   [facility]    Indications    Atrial fibrillation (H) with mitral regurgitation and congestive heart failure [I48.91]  Long term current use of anticoagulants with INR goal of 2.0-3.0 [Z79.01]  Permanent atrial fibrillation (H) [I48.21]             Comments:               Anticoagulation Care Providers       Provider Role Specialty Phone number    Kwadwo Winslow MD Referring Internal Medicine [phone]

## 2023-10-08 DIAGNOSIS — I10 ESSENTIAL HYPERTENSION WITH GOAL BLOOD PRESSURE LESS THAN 140/90: ICD-10-CM

## 2023-10-09 RX ORDER — LOSARTAN POTASSIUM 50 MG/1
TABLET ORAL
Qty: 90 TABLET | Refills: 0 | Status: SHIPPED | OUTPATIENT
Start: 2023-10-09 | End: 2024-01-02

## 2023-10-11 ENCOUNTER — TELEPHONE (OUTPATIENT)
Dept: INTERNAL MEDICINE | Facility: CLINIC | Age: 88
End: 2023-10-11
Payer: COMMERCIAL

## 2023-10-11 NOTE — TELEPHONE ENCOUNTER
Call to [place] and Left detailed message.     If cannot wait until Friday, Carla Isaacs has a few openings left tomorrow, but may fill quickly.     Otherwise may need UC. Also, see if she checked BP, COVID test?

## 2023-10-11 NOTE — TELEPHONE ENCOUNTER
Call to Alba. He has been coughing a lot and is very tired.   No fever.   He doesn't have Oximeter. She reports he is Not short of breath.   He is talking in the background and talking normal without having to take a breath in between words.   She states  when eating and drinking and then chokes.     Scheduled for Friday. Daughter went to buy the COVID test. Advised wife if positive to call back for possible PAXLOVID Rx. She agrees.     Advised to try thicker fluids, like Boost drinks, shakes, jello, applesauce, soft foods, like mashed potatoes.   Alba agrees with this.   Advised ED/911 if pt worsens, if cannot catch breath with coughing, or becomes short of breath. She agrees.

## 2023-10-11 NOTE — TELEPHONE ENCOUNTER
I am not in clinic on Thursdays, but I can see him Friday 11:30   If symptoms are severe to go to ER or UC.

## 2023-10-11 NOTE — TELEPHONE ENCOUNTER
Spoke with daughter. Third day now that has had a really bad cough.  Feels food is going down the wrong tube.  Weak and no energy. No fever.  Still urinating and trying to stay hydrated.  No chest pain, no SOB. No bleeding. Hasn't been taking blood pressure in a while.  She will check BP and check for covid.    Daughter would like him to be seen by PCP if possible.    Will route for possible work in.  (Covering RN, please route response(s) to your care team for any follow up that is needed. Thank you!)    Giovany Mclean, MSN, RN, PHN  [facility] ~ Registered Nurse  Clinic Triage ~ [facility]  October 11, 2023

## 2023-10-12 ENCOUNTER — ANCILLARY PROCEDURE (OUTPATIENT)
Dept: CARDIOLOGY | Facility: CLINIC | Age: 88
End: 2023-10-12
Attending: INTERNAL MEDICINE
Payer: COMMERCIAL

## 2023-10-12 PROCEDURE — 93294 REM INTERROG EVL PM/LDLS PM: CPT | Performed by: INTERNAL MEDICINE

## 2023-10-12 PROCEDURE — 93296 REM INTERROG EVL PM/IDS: CPT | Performed by: INTERNAL MEDICINE

## 2023-10-13 ENCOUNTER — OFFICE VISIT (OUTPATIENT)
Dept: INTERNAL MEDICINE | Facility: CLINIC | Age: 88
End: 2023-10-13
Payer: COMMERCIAL

## 2023-10-13 ENCOUNTER — ANCILLARY PROCEDURE (OUTPATIENT)
Dept: GENERAL RADIOLOGY | Facility: CLINIC | Age: 88
End: 2023-10-13
Attending: INTERNAL MEDICINE
Payer: COMMERCIAL

## 2023-10-13 VITALS
TEMPERATURE: 97.8 F | RESPIRATION RATE: 18 BRPM | BODY MASS INDEX: 24.26 KG/M2 | HEIGHT: 70 IN | SYSTOLIC BLOOD PRESSURE: 124 MMHG | WEIGHT: 169.5 LBS | DIASTOLIC BLOOD PRESSURE: 69 MMHG | HEART RATE: 71 BPM | OXYGEN SATURATION: 96 %

## 2023-10-13 DIAGNOSIS — R05.1 ACUTE COUGH: Primary | ICD-10-CM

## 2023-10-13 DIAGNOSIS — R13.12 OROPHARYNGEAL DYSPHAGIA: ICD-10-CM

## 2023-10-13 DIAGNOSIS — R05.1 ACUTE COUGH: ICD-10-CM

## 2023-10-13 PROCEDURE — 99214 OFFICE O/P EST MOD 30 MIN: CPT | Performed by: INTERNAL MEDICINE

## 2023-10-13 PROCEDURE — 71046 X-RAY EXAM CHEST 2 VIEWS: CPT | Mod: TC | Performed by: RADIOLOGY

## 2023-10-13 RX ORDER — CODEINE PHOSPHATE AND GUAIFENESIN 10; 100 MG/5ML; MG/5ML
1-2 SOLUTION ORAL EVERY 4 HOURS PRN
Qty: 180 ML | Refills: 0 | Status: SHIPPED | OUTPATIENT
Start: 2023-10-13

## 2023-10-13 RX ORDER — RESPIRATORY SYNCYTIAL VIRUS VACCINE 120MCG/0.5
0.5 KIT INTRAMUSCULAR ONCE
Qty: 1 EACH | Refills: 0 | Status: CANCELLED | OUTPATIENT
Start: 2023-10-13 | End: 2023-10-13

## 2023-10-13 ASSESSMENT — PAIN SCALES - GENERAL: PAINLEVEL: NO PAIN (0)

## 2023-10-13 ASSESSMENT — ENCOUNTER SYMPTOMS: COUGH: 1

## 2023-10-13 NOTE — PROGRESS NOTES
"  Assessment & Plan     Acute cough  Assess for aspiration pneumonia   - XR Chest 2 Views; Future  - Adult GI  Referral - Procedure Only; Future    Oropharyngeal dysphagia  Refer for EGD   - Adult GI  Referral - Procedure Only; Future             See Patient Instructions    MD BRYAN [facility] MARCI Ceballos is a 89 year old, presenting for the following health issues:  Cough (X3 days; pt states he thinks food is going down the wrong tube and causing him a lot of coughing; pt did state that he has had some reflux recently)        10/13/2023    11:30 AM   Additional Questions   Roomed by Gena ADAMS   Accompanied by n/a       Cough    History of Present Illness       Reason for visit:  Cough    He eats 0-1 servings of fruits and vegetables daily.He consumes 1 sweetened beverage(s) daily.He exercises with enough effort to increase his heart rate 9 or less minutes per day.  He exercises with enough effort to increase his heart rate 3 or less days per week.   He is taking medications regularly.           Presents with cough, started after eating hot chili.   Has been coughing with eating. Coughs up phlegm or sometimes food.   No fever, chills. No throwing up. No nausea.   No weight loss.         Review of Systems   Respiratory:  Positive for cough.       Constitutional, HEENT, cardiovascular, pulmonary, gi and gu systems are negative, except as otherwise noted.      Objective    /69 (BP Location: Left arm, Cuff Size: Adult Regular)   Pulse 71   Temp 97.8  F (36.6  C) (Tympanic)   Resp 18   Ht 1.778 m (5' 10\")   Wt 76.9 kg (169 lb 8 oz)   SpO2 96%   BMI 24.32 kg/m    Body mass index is 24.32 kg/m .  Physical Exam   GENERAL: healthy, alert and no distress  NECK: no adenopathy, no asymmetry, masses, or scars and thyroid normal to palpation  RESP: lungs clear to auscultation - no rales, rhonchi or wheezes  CV: regular rate and rhythm, normal S1 S2, " no S3 or S4, no murmur, click or rub, no peripheral edema and peripheral pulses strong  ABDOMEN: soft, nontender, no hepatosplenomegaly, no masses and bowel sounds normal  MS: no gross musculoskeletal defects noted, no edema    Lab on 10/02/2023   Component Date Value Ref Range Status    INR 10/02/2023 2.4 (H)  0.9 - 1.1 Final

## 2023-10-16 ENCOUNTER — ANTICOAGULATION THERAPY VISIT (OUTPATIENT)
Dept: ANTICOAGULATION | Facility: CLINIC | Age: 88
End: 2023-10-16

## 2023-10-16 ENCOUNTER — LAB (OUTPATIENT)
Dept: LAB | Facility: CLINIC | Age: 88
End: 2023-10-16
Payer: COMMERCIAL

## 2023-10-16 DIAGNOSIS — I48.21 PERMANENT ATRIAL FIBRILLATION (H): ICD-10-CM

## 2023-10-16 DIAGNOSIS — I48.21 PERMANENT ATRIAL FIBRILLATION (H): Primary | ICD-10-CM

## 2023-10-16 DIAGNOSIS — Z79.01 LONG TERM CURRENT USE OF ANTICOAGULANTS WITH INR GOAL OF 2.0-3.0: ICD-10-CM

## 2023-10-16 LAB — INR BLD: 3.7 (ref 0.9–1.1)

## 2023-10-16 PROCEDURE — 85610 PROTHROMBIN TIME: CPT

## 2023-10-16 PROCEDURE — 36416 COLLJ CAPILLARY BLOOD SPEC: CPT

## 2023-10-16 NOTE — PROGRESS NOTES
ANTICOAGULATION MANAGEMENT     Tucker Slater 89 year old male is on warfarin with supratherapeutic INR result. (Goal INR 2.0-2.5)    Recent labs: (last 7 days)     10/16/23  1424   INR 3.7*       ASSESSMENT     Warfarin Lab Questionnaire    Warfarin Doses Last 7 Days      10/16/2023     2:20 PM   Dose in Tablet or Mg   TAB or MG? tablet (tab)     Pt Rptd Dose AMBIKA MONDAY TUESDAY WED THURS FRIDAY SATURDAY   10/16/2023   2:20 PM 1 0.5 1 0.5 1 0.5 1         10/16/2023   Warfarin Lab Questionnaire   Missed doses within past 14 days? No   Changes in diet or alcohol within past 14 days? No   Medication changes since last result? No   Injuries or illness since last result? No   New shortness of breath, severe headaches or sudden changes in vision since last result? No   Abnormal bleeding since last result? No   Upcoming surgery, procedure? No   Best number to call with results? [phone]     Previous result: Therapeutic last visit; previously outside of goal range  Additional findings:  recent change in tablet shape and manufacture. Now getting oblong shape warfarin       PLAN     Recommended plan for no diet, medication or health factor changes affecting INR     Dosing Instructions: hold dose then decrease your warfarin dose (9% change) with next INR in 2 weeks   (suggested going out 1 week for recheck, Del will go out 2 weeks)    Summary  As of 10/16/2023      Full warfarin instructions:  10/16: Hold; Otherwise 2.5 mg every Sun, Tue, Thu; 1.25 mg all other days   Next INR check:  10/30/2023               Telephone call with Tucker who verbalizes understanding and agrees to plan    Lab visit scheduled    Education provided:   Please call back if any changes to your diet, medications or how you've been taking warfarin  Goal range and lab monitoring: goal range and significance of current result    Plan made per [facility] anticoagulation protocol    Shilpi England, R[facility]  Anticoagulation  Clinic  10/16/2023    _______________________________________________________________________     Anticoagulation Episode Summary       Current INR goal:  2.0-2.5   TTR:  54.2% (1 y)   Target end date:  Indefinite   [facility]ers [facility]  [facility]    Indications    Atrial fibrillation (H) with mitral regurgitation and congestive heart failure [I48.91]  Long term current use of anticoagulants with INR goal of 2.0-3.0 [Z79.01]  Permanent atrial fibrillation (H) [I48.21]             Comments:               Anticoagulation Care Providers       Provider Role Specialty Phone number    Kwadwo Winslow MD Referring Internal Medicine [phone]

## 2023-10-30 ENCOUNTER — LAB (OUTPATIENT)
Dept: LAB | Facility: CLINIC | Age: 88
End: 2023-10-30
Payer: COMMERCIAL

## 2023-10-30 ENCOUNTER — ANTICOAGULATION THERAPY VISIT (OUTPATIENT)
Dept: ANTICOAGULATION | Facility: CLINIC | Age: 88
End: 2023-10-30

## 2023-10-30 DIAGNOSIS — I48.21 PERMANENT ATRIAL FIBRILLATION (H): ICD-10-CM

## 2023-10-30 DIAGNOSIS — I48.21 PERMANENT ATRIAL FIBRILLATION (H): Primary | ICD-10-CM

## 2023-10-30 DIAGNOSIS — Z79.01 LONG TERM CURRENT USE OF ANTICOAGULANTS WITH INR GOAL OF 2.0-3.0: ICD-10-CM

## 2023-10-30 LAB — INR BLD: 2.5 (ref 0.9–1.1)

## 2023-10-30 PROCEDURE — 36416 COLLJ CAPILLARY BLOOD SPEC: CPT

## 2023-10-30 PROCEDURE — 85610 PROTHROMBIN TIME: CPT

## 2023-10-30 NOTE — PROGRESS NOTES
ANTICOAGULATION MANAGEMENT     Tucker Slater 89 year old male is on warfarin with therapeutic INR result. (Goal INR 2.0-2.5)    Recent labs: (last 7 days)     10/30/23  1419   INR 2.5*       ASSESSMENT     Warfarin Lab Questionnaire    Warfarin Doses Last 7 Days      10/30/2023     2:16 PM   Dose in Tablet or Mg   TAB or MG? tablet (tab)     Pt Rptd Dose AMBIKA MONDAY TUESDAY WED THURS FRIDAY SATURDAY   10/30/2023   2:16 PM 1 0.5 1 0.5 1 0.5 0.5         10/30/2023   Warfarin Lab Questionnaire   Missed doses within past 14 days? No   Changes in diet or alcohol within past 14 days? No   Medication changes since last result? No   Injuries or illness since last result? No   New shortness of breath, severe headaches or sudden changes in vision since last result? No   Abnormal bleeding since last result? No   Upcoming surgery, procedure? No   Best number to call with results? [phone]     Previous result: Supratherapeutic  Additional findings: Warfarin maintenance dose was reduced 9% at last visit         PLAN     Unable to reach Augustine pérez.    Left detailed message to inform patient to continue same weekly Warfarin dose and to call [facility] with any questions, concerns or changes.  Also to schedule next INR in 3 weeks    Chichi Wang RN  Anticoagulation Clinic  10/30/2023

## 2023-11-20 ENCOUNTER — ANTICOAGULATION THERAPY VISIT (OUTPATIENT)
Dept: ANTICOAGULATION | Facility: CLINIC | Age: 88
End: 2023-11-20

## 2023-11-20 ENCOUNTER — LAB (OUTPATIENT)
Dept: LAB | Facility: CLINIC | Age: 88
End: 2023-11-20
Payer: COMMERCIAL

## 2023-11-20 DIAGNOSIS — Z79.01 LONG TERM CURRENT USE OF ANTICOAGULANTS WITH INR GOAL OF 2.0-3.0: ICD-10-CM

## 2023-11-20 DIAGNOSIS — I48.21 PERMANENT ATRIAL FIBRILLATION (H): ICD-10-CM

## 2023-11-20 DIAGNOSIS — I48.21 PERMANENT ATRIAL FIBRILLATION (H): Primary | ICD-10-CM

## 2023-11-20 LAB — INR BLD: 3 (ref 0.9–1.1)

## 2023-11-20 PROCEDURE — 85610 PROTHROMBIN TIME: CPT

## 2023-11-20 PROCEDURE — 36415 COLL VENOUS BLD VENIPUNCTURE: CPT

## 2023-11-20 NOTE — PROGRESS NOTES
ANTICOAGULATION MANAGEMENT     Tucker CECE Slater 89 year old male is on warfarin with supratherapeutic INR result. (Goal INR 2.0-2.5)    Recent labs: (last 7 days)     11/20/23  1415   INR 3.0*       ASSESSMENT     Warfarin Lab Questionnaire    Warfarin Doses Last 7 Days      11/20/2023     2:13 PM   Dose in Tablet or Mg   TAB or MG? tablet (tab)     Pt Rptd Dose SUNDAY MONDAY TUESDAY WED THURS FRIDAY SATURDAY 11/20/2023   2:13 PM 1 0.5 1 0.5 1 0.5 0.5         11/20/2023   Warfarin Lab Questionnaire   Missed doses within past 14 days? No   Changes in diet or alcohol within past 14 days? No   Medication changes since last result? No   Injuries or illness since last result? No   New shortness of breath, severe headaches or sudden changes in vision since last result? No   Abnormal bleeding since last result? No   Upcoming surgery, procedure? No   Best number to call with results? [phone]     Previous result: Therapeutic last visit; previously outside of goal range  Additional findings: None         PLAN     Recommended plan for no diet, medication or health factor changes affecting INR     Dosing Instructions: hold dose then decrease your warfarin dose (10% change) with next INR in 1-2 weeks       Summary  As of 11/20/2023      Full warfarin instructions:  11/20: Hold; Otherwise 2.5 mg every Sun, Thu; 1.25 mg all other days   Next INR check:  12/4/2023               Telephone call with Tucker who verbalizes understanding and agrees to plan    Lab visit scheduled    Education provided:   Goal range and lab monitoring: goal range and significance of current result and Importance of therapeutic range    Plan made per [facility] anticoagulation protocol    Christopher Gonzalez RN  Anticoagulation Clinic  11/20/2023    _______________________________________________________________________     Anticoagulation Episode Summary       Current INR goal:  2.0-2.5   TTR:  53.5% (1 y)   Target end date:  Indefinite   Send INR reminders to:   [facility]    Indications    Atrial fibrillation (H) with mitral regurgitation and congestive heart failure [I48.91]  Long term current use of anticoagulants with INR goal of 2.0-3.0 [Z79.01]  Permanent atrial fibrillation (H) [I48.21]             Comments:               Anticoagulation Care Providers       Provider Role Specialty Phone number    Kwadwo Winslow MD Referring Internal Medicine [phone]

## 2023-11-22 DIAGNOSIS — I10 ESSENTIAL HYPERTENSION WITH GOAL BLOOD PRESSURE LESS THAN 140/90: ICD-10-CM

## 2023-11-22 RX ORDER — TRIAMTERENE/HYDROCHLOROTHIAZID 37.5-25 MG
1 TABLET ORAL DAILY
Qty: 90 TABLET | Refills: 0 | Status: SHIPPED | OUTPATIENT
Start: 2023-11-22 | End: 2024-02-26

## 2023-12-04 ENCOUNTER — LAB (OUTPATIENT)
Dept: LAB | Facility: CLINIC | Age: 88
End: 2023-12-04
Payer: COMMERCIAL

## 2023-12-04 ENCOUNTER — ANTICOAGULATION THERAPY VISIT (OUTPATIENT)
Dept: ANTICOAGULATION | Facility: CLINIC | Age: 88
End: 2023-12-04

## 2023-12-04 DIAGNOSIS — Z79.01 LONG TERM CURRENT USE OF ANTICOAGULANTS WITH INR GOAL OF 2.0-3.0: ICD-10-CM

## 2023-12-04 DIAGNOSIS — I48.21 PERMANENT ATRIAL FIBRILLATION (H): Primary | ICD-10-CM

## 2023-12-04 DIAGNOSIS — I48.21 PERMANENT ATRIAL FIBRILLATION (H): ICD-10-CM

## 2023-12-04 LAB — INR BLD: 2.1 (ref 0.9–1.1)

## 2023-12-04 PROCEDURE — 36416 COLLJ CAPILLARY BLOOD SPEC: CPT

## 2023-12-04 PROCEDURE — 85610 PROTHROMBIN TIME: CPT

## 2023-12-04 NOTE — PROGRESS NOTES
ANTICOAGULATION MANAGEMENT     Tucker Slater 89 year old male is on warfarin with therapeutic INR result. (Goal INR 2.0-2.5)    Recent labs: (last 7 days)     12/04/23  1421   INR 2.1*       ASSESSMENT     Warfarin Lab Questionnaire    Warfarin Doses Last 7 Days      12/4/2023     2:15 PM   Dose in Tablet or Mg   TAB or MG? tablet (tab)     Pt Rptd Dose SUNDAY MONDAY TUESDAY WED THURS FRIDAY SATURDAY 12/4/2023   2:15 PM 1 0.5 0.5 0.5 1 0.5 0.5         12/4/2023   Warfarin Lab Questionnaire   Missed doses within past 14 days? No   Changes in diet or alcohol within past 14 days? No   Medication changes since last result? No   Injuries or illness since last result? No   New shortness of breath, severe headaches or sudden changes in vision since last result? No   Abnormal bleeding since last result? No   Upcoming surgery, procedure? No   Best number to call with results? [phone]     Previous result: Therapeutic last 2(+) visits  Additional findings: None       PLAN     Recommended plan for no diet, medication or health factor changes affecting INR     Dosing Instructions: Continue your current warfarin dose with next INR in 3 weeks       Summary  As of 12/4/2023      Full warfarin instructions:  2.5 mg every Sun, Thu; 1.25 mg all other days   Next INR check:  12/25/2023               Detailed voice message left for Tucker with dosing instructions and follow up date.     Contact [phone]  to schedule and with any changes, questions or concerns.     Education provided:   Contact [phone]  with any changes, questions or concerns.     Plan made per [facility] anticoagulation protocol    Lizette Poole RN  Anticoagulation Clinic  12/4/2023    _______________________________________________________________________     Anticoagulation Episode Summary       Current INR goal:  2.0-2.5   TTR:  51.4% (1 y)   Target end date:  Indefinite   Send INR reminders to:  [facility]    Indications    Atrial  fibrillation (H) with mitral regurgitation and congestive heart failure [I48.91]  Long term current use of anticoagulants with INR goal of 2.0-3.0 [Z79.01]  Permanent atrial fibrillation (H) [I48.21]             Comments:               Anticoagulation Care Providers       Provider Role Specialty Phone number    Kwadwo Winslow MD Referring Internal Medicine [phone]

## 2023-12-08 ENCOUNTER — ANCILLARY PROCEDURE (OUTPATIENT)
Dept: CARDIOLOGY | Facility: CLINIC | Age: 88
End: 2023-12-08
Attending: INTERNAL MEDICINE
Payer: COMMERCIAL

## 2023-12-08 DIAGNOSIS — Z95.0 CARDIAC PACEMAKER IN SITU: ICD-10-CM

## 2023-12-08 DIAGNOSIS — I49.5 SICK SINUS SYNDROME (H): ICD-10-CM

## 2023-12-08 LAB
MDC_IDC_LEAD_CONNECTION_STATUS: NORMAL
MDC_IDC_LEAD_CONNECTION_STATUS: NORMAL
MDC_IDC_LEAD_IMPLANT_DT: NORMAL
MDC_IDC_LEAD_IMPLANT_DT: NORMAL
MDC_IDC_LEAD_LOCATION: NORMAL
MDC_IDC_LEAD_LOCATION: NORMAL
MDC_IDC_LEAD_LOCATION_DETAIL_1: NORMAL
MDC_IDC_LEAD_LOCATION_DETAIL_1: NORMAL
MDC_IDC_LEAD_MFG: NORMAL
MDC_IDC_LEAD_MFG: NORMAL
MDC_IDC_LEAD_MODEL: NORMAL
MDC_IDC_LEAD_MODEL: NORMAL
MDC_IDC_LEAD_POLARITY_TYPE: NORMAL
MDC_IDC_LEAD_POLARITY_TYPE: NORMAL
MDC_IDC_LEAD_SERIAL: NORMAL
MDC_IDC_LEAD_SERIAL: NORMAL
MDC_IDC_MSMT_BATTERY_DTM: NORMAL
MDC_IDC_MSMT_BATTERY_REMAINING_LONGEVITY: 6 MO
MDC_IDC_MSMT_BATTERY_REMAINING_PERCENTAGE: 10 %
MDC_IDC_MSMT_BATTERY_RRT_TRIGGER: NORMAL
MDC_IDC_MSMT_BATTERY_STATUS: NORMAL
MDC_IDC_MSMT_BATTERY_VOLTAGE: 2.83 V
MDC_IDC_MSMT_LEADCHNL_RV_IMPEDANCE_VALUE: 390 OHM
MDC_IDC_MSMT_LEADCHNL_RV_LEAD_CHANNEL_STATUS: NORMAL
MDC_IDC_MSMT_LEADCHNL_RV_PACING_THRESHOLD_AMPLITUDE: 0.75 V
MDC_IDC_MSMT_LEADCHNL_RV_PACING_THRESHOLD_PULSEWIDTH: 0.5 MS
MDC_IDC_MSMT_LEADCHNL_RV_SENSING_INTR_AMPL: 12 MV
MDC_IDC_PG_IMPLANT_DTM: NORMAL
MDC_IDC_PG_MFG: NORMAL
MDC_IDC_PG_MODEL: NORMAL
MDC_IDC_PG_SERIAL: NORMAL
MDC_IDC_PG_TYPE: NORMAL
MDC_IDC_SESS_CLINIC_NAME: NORMAL
MDC_IDC_SESS_DTM: NORMAL
MDC_IDC_SESS_REPROGRAMMED: NO
MDC_IDC_SESS_TYPE: NORMAL
MDC_IDC_SET_BRADY_HYSTRATE: 60 {BEATS}/MIN
MDC_IDC_SET_BRADY_LOWRATE: 70 {BEATS}/MIN
MDC_IDC_SET_BRADY_MAX_SENSOR_RATE: 120 {BEATS}/MIN
MDC_IDC_SET_BRADY_MODE: NORMAL
MDC_IDC_SET_LEADCHNL_RA_PACING_POLARITY: NORMAL
MDC_IDC_SET_LEADCHNL_RA_SENSING_POLARITY: NORMAL
MDC_IDC_SET_LEADCHNL_RV_PACING_AMPLITUDE: 5 V
MDC_IDC_SET_LEADCHNL_RV_PACING_ANODE_ELECTRODE_1: NORMAL
MDC_IDC_SET_LEADCHNL_RV_PACING_ANODE_LOCATION_1: NORMAL
MDC_IDC_SET_LEADCHNL_RV_PACING_CAPTURE_MODE: NORMAL
MDC_IDC_SET_LEADCHNL_RV_PACING_CATHODE_ELECTRODE_1: NORMAL
MDC_IDC_SET_LEADCHNL_RV_PACING_CATHODE_LOCATION_1: NORMAL
MDC_IDC_SET_LEADCHNL_RV_PACING_POLARITY: NORMAL
MDC_IDC_SET_LEADCHNL_RV_PACING_PULSEWIDTH: 0.5 MS
MDC_IDC_SET_LEADCHNL_RV_SENSING_ADAPTATION_MODE: NORMAL
MDC_IDC_SET_LEADCHNL_RV_SENSING_ANODE_ELECTRODE_1: NORMAL
MDC_IDC_SET_LEADCHNL_RV_SENSING_ANODE_LOCATION_1: NORMAL
MDC_IDC_SET_LEADCHNL_RV_SENSING_CATHODE_ELECTRODE_1: NORMAL
MDC_IDC_SET_LEADCHNL_RV_SENSING_CATHODE_LOCATION_1: NORMAL
MDC_IDC_SET_LEADCHNL_RV_SENSING_POLARITY: NORMAL
MDC_IDC_SET_LEADCHNL_RV_SENSING_SENSITIVITY: 2 MV
MDC_IDC_STAT_AT_DTM_END: NORMAL
MDC_IDC_STAT_AT_DTM_START: NORMAL
MDC_IDC_STAT_BRADY_DTM_END: NORMAL
MDC_IDC_STAT_BRADY_DTM_START: NORMAL
MDC_IDC_STAT_BRADY_RV_PERCENT_PACED: 99 %
MDC_IDC_STAT_CRT_DTM_END: NORMAL
MDC_IDC_STAT_CRT_DTM_START: NORMAL
MDC_IDC_STAT_HEART_RATE_DTM_END: NORMAL
MDC_IDC_STAT_HEART_RATE_DTM_START: NORMAL
MDC_IDC_STAT_HEART_RATE_VENTRICULAR_MAX: 190 {BEATS}/MIN
MDC_IDC_STAT_HEART_RATE_VENTRICULAR_MEAN: 76 {BEATS}/MIN
MDC_IDC_STAT_HEART_RATE_VENTRICULAR_MIN: 60 {BEATS}/MIN

## 2023-12-18 ENCOUNTER — TELEPHONE (OUTPATIENT)
Dept: INTERNAL MEDICINE | Facility: CLINIC | Age: 88
End: 2023-12-18
Payer: COMMERCIAL

## 2023-12-18 NOTE — TELEPHONE ENCOUNTER
Reason for Call:  Appointment Request    Patient requesting this type of appt:  ILLNESS.INJURY.PAIN    Requested provider: Kwadwo Winslow    Reason patient unable to be scheduled: Not within requested timeframe    When does patient want to be seen/preferred time: ASAP    Comments: PATIENT TOENAIL NEEDS TO BE LOOKED AT, TOE WAS STUBBED AND WAS BLEEDING AND THIS HAPPENED THREE TIMES WITHIN THIS WEEK. PATIENT NEEDS AN APPOINTMENT.     Okay to leave a detailed message?: Yes at Cell number on file:  BUT IF PATIENT DOESN'T  PLEASE CALL MICHAEL ERAZO [phone]  Telephone Information:   Mobile [phone]       Call taken on 12/18/2023 at 2:39 PM by Duyen Valadez

## 2023-12-20 ENCOUNTER — OFFICE VISIT (OUTPATIENT)
Dept: PODIATRY | Facility: CLINIC | Age: 88
End: 2023-12-20
Payer: COMMERCIAL

## 2023-12-20 VITALS — SYSTOLIC BLOOD PRESSURE: 140 MMHG | DIASTOLIC BLOOD PRESSURE: 80 MMHG | WEIGHT: 169 LBS | BODY MASS INDEX: 24.25 KG/M2

## 2023-12-20 DIAGNOSIS — L84 CALLUS OF TOE: ICD-10-CM

## 2023-12-20 DIAGNOSIS — L60.3 DYSTROPHIC NAIL: Primary | ICD-10-CM

## 2023-12-20 PROCEDURE — 99203 OFFICE O/P NEW LOW 30 MIN: CPT | Performed by: PODIATRIST

## 2023-12-20 NOTE — PROGRESS NOTES
PATIENT HISTORY:  Dr. Winslow requested to see patient.  Tucker Slater is a 89 year old male who presents to clinic for certain bleeding to the right second toe.  Here with his daughter.  Notes that he was walking barefoot a few times at home and all of a sudden noticed a lot of bleeding.  States that he does not want that to happen anymore and wondering why that is occurring.  No pain to the right foot.  He does note that he is on blood thinners.  Denies fever, nausea, vomiting.    Review of Systems:  Patient denies fever, chills, rash, wound, stiffness, limping, numbness, weakness, heart burn, blood in stool, chest pain with activity, calf pain when walking, shortness of breath with activity, chronic cough, easy bleeding/bruising, swelling of ankles, excessive thirst, fatigue, depression, anxiety. .     PAST MEDICAL HISTORY:   Past Medical History:   Diagnosis Date    Arrhythmia     A Fib    Balance problem     related to neuropathy in feet    Bradycardia     Carcinoma in situ of bladder 2000    bladder cancer ;; follow w Urology w periodic cysto     Essential hypertension, benign     Generalized osteoarthrosis, unspecified site knees    s/p R total knee 8/09 ; will do L 6/10     Hernia, abdominal     Numbness and tingling     toes and fingers    DAWSON (obstructive sleep apnea) 8/2/2019    DAWSON (obstructive sleep apnea)     Pacemaker     [employer]     Pain in joint, shoulder region     L shoulder rotater tear; no surgery     Palpitations     Persistent atrial fibrillation (H) 1/30/15    Prostate CA (H) 2008-9    radiation    Prostate cancer (H) 11/08    completed RT 3/09    Renal disease     elevated creatinine    Shoulder impingement     R shoulder impingement etc see MRI 4/09     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     toes both feet         PAST SURGICAL HISTORY:   Past Surgical History:   Procedure Laterality Date    ARTHROPLASTY HIP Right 3/27/2017    Procedure: ARTHROPLASTY  HIP;  Surgeon: Jigar Wynn MD;  Location: [facility] OR    CARDIOVERSION  3/26/15    COLONOSCOPY      CYSTOSCOPY      CYSTOSCOPY, FULGURATE BLEEDERS, EVACUATE CLOT(S), COMBINED N/A 4/23/2019    Procedure: Exam under anesthesia, video cystopanendoscopy, evacuation of clots, fulguration of prostatic veins.;  Surgeon: Ilir Ramirez MD;  Location: [facility] OR    CYSTOSCOPY, RETROGRADES, COMBINED Bilateral 6/18/2019    Procedure: Video cystopanendoscopy, evacuation of clots, cup biopsies of bladder wall, electrovaporization of prostate, transurethral resection of prostate, bilateral retrograde ureteropyelogram.;  Surgeon: Ilir Ramirez MD;  Location: [facility] OR    CYSTOSCOPY, TRANSURETHRAL RESECTION (TUR) PROSTATE, COMBINED  6/18/2019    Procedure: Cystoscopy, transurethral resection of prostate;  Surgeon: Ilir Ramirez MD;  Location: [facility] OR    HERNIA REPAIR      IMPLANT PACEMAKER  3/26/15    RELEASE CARPAL TUNNEL Right 4/19/2017    Procedure: RELEASE CARPAL TUNNEL;  Right carpal tunnel release;  Surgeon: Alonso Barboza MD;  Location:  OR    ZZC NONSPECIFIC PROCEDURE      bilat inguinal hernia repairs ( 3total procedures)     ZZC NONSPECIFIC PROCEDURE      pilonidal cyst    ZZC NONSPECIFIC PROCEDURE      T + A    ZZC NONSPECIFIC PROCEDURE  8/09     R+L total knee        MEDICATIONS:   Current Outpatient Medications:     acetaminophen (TYLENOL) 500 MG tablet, Take 1,000 mg by mouth 2 times daily (Patient not taking: Reported on 7/12/2023), Disp: , Rfl:     Ascorbic Acid (VITAMIN C PO), Take 500 mg by mouth daily , Disp: , Rfl:     calcium carbonate-vitamin D (OSCAL W/D) 500-200 MG-UNIT tablet, Take 1 tablet by mouth daily, Disp: , Rfl:     carvedilol (COREG) 25 MG tablet, Take 1.5 tablets (37.5 mg) by mouth 2 times daily (with meals), Disp: 270 tablet, Rfl: 3    diclofenac (VOLTAREN) 1 % topical gel, Place 2 g onto the skin 4 times daily as needed, Disp: , Rfl:     docusate sodium (COLACE) 100 MG capsule, Take  100 mg by mouth 2 times daily as needed (Patient not taking: Reported on 2023), Disp: , Rfl:     finasteride (PROSCAR) 5 MG tablet, Take 1 tablet (5 mg) by mouth daily, Disp: 90 tablet, Rfl: 3    fluticasone (FLONASE) 50 MCG/ACT nasal spray, Spray 1 spray into both nostrils 2 times daily, Disp: 16 g, Rfl: 3    guaiFENesin-codeine (ROBITUSSIN AC) 100-10 MG/5ML solution, Take 5-10 mLs by mouth every 4 hours as needed for cough, Disp: 180 mL, Rfl: 0    losartan (COZAAR) 50 MG tablet, Take 1 tablet by mouth once daily, Disp: 90 tablet, Rfl: 0    Multiple Vitamins-Minerals (OCUVITE PO), Take 1 tablet by mouth daily, Disp: , Rfl:     multivitamin w/minerals (THERA-VIT-M) tablet, Take 1 tablet by mouth daily, Disp: , Rfl:     order for DME, Oxygen 2 Li/min at night, Disp: 1 Device, Rfl: 0    triamterene-HCTZ (MAXZIDE-25) 37.5-25 MG tablet, Take 1 tablet by mouth once daily, Disp: 90 tablet, Rfl: 0    vitamin B complex with vitamin C (STRESS TAB) tablet, Take 1 tablet by mouth daily, Disp: , Rfl:     warfarin ANTICOAGULANT (COUMADIN) 2.5 MG tablet, Take 1.25mg every Mon & Thur / Take 2.5mg all other days OR per INR clinic, Disp: 90 tablet, Rfl: 1     ALLERGIES:    Allergies   Allergen Reactions    Merbromin      Other reaction(s): Other, see comments  Severe reaction as child. Amalgam fillings OK.    Morphine Nausea and Vomiting    Amlodipine      Edema        SOCIAL HISTORY:   Social History     Socioeconomic History    Marital status:      Spouse name: Alba    Number of children: 3    Years of education: Not on file    Highest education level: Not on file   Occupational History     Employer: RETIRED     Comment:  w [employer]   Tobacco Use    Smoking status: Former     Types: Cigarettes     Quit date: 1977     Years since quittin.3    Smokeless tobacco: Never   Vaping Use    Vaping Use: Never used   Substance and Sexual Activity    Alcohol use: Yes     Alcohol/week: 0.0 standard drinks of alcohol      Comment: 1 beer daily    Drug use: No    Sexual activity: Never     Partners: Female   Other Topics Concern    Parent/sibling w/ CABG, MI or angioplasty before 65F 55M? Not Asked     Service Not Asked    Blood Transfusions Not Asked    Caffeine Concern No     Comment: 4-5 cups per week     Occupational Exposure Not Asked    Hobby Hazards Not Asked    Sleep Concern No    Stress Concern No    Weight Concern No    Special Diet No    Back Care Not Asked    Exercise No     Comment: yard work and moves around a lot    Bike Helmet Not Asked    Seat Belt Not Asked    Self-Exams Not Asked   Social History Narrative    Not on file     Social Determinants of Health     Financial Resource Strain: Low Risk  (10/13/2023)    Financial Resource Strain     Within the past 12 months, have you or your family members you live with been unable to get utilities (heat, electricity) when it was really needed?: No   Food Insecurity: Low Risk  (10/13/2023)    Food Insecurity     Within the past 12 months, did you worry that your food would run out before you got money to buy more?: No     Within the past 12 months, did the food you bought just not last and you didn t have money to get more?: No   Transportation Needs: Low Risk  (10/13/2023)    Transportation Needs     Within the past 12 months, has lack of transportation kept you from medical appointments, getting your medicines, non-medical meetings or appointments, work, or from getting things that you need?: No   Physical Activity: Not on file   Stress: Not on file   Social Connections: Not on file   Interpersonal Safety: Low Risk  (10/13/2023)    Interpersonal Safety     Do you feel physically and emotionally safe where you currently live?: Yes     Within the past 12 months, have you been hit, slapped, kicked or otherwise physically hurt by someone?: No     Within the past 12 months, have you been humiliated or emotionally abused in other ways by your partner or ex-partner?:  No   Housing Stability: Low Risk  (10/13/2023)    Housing Stability     Do you have housing? : Yes     Are you worried about losing your housing?: No        FAMILY HISTORY:   Family History   Problem Relation Age of Onset    Heart Disease Father          87yo    Coronary Artery Disease Father     Heart Disease Mother          74yo    Coronary Artery Disease Mother     Family History Negative Brother         EXAM:Vitals: BP (!) 140/80   Wt 76.7 kg (169 lb)   BMI 24.25 kg/m      General appearance: Patient is alert and fully cooperative with history & exam.  No sign of distress is noted during the visit.     Psychiatric: Affect is pleasant & appropriate.  Patient appears motivated to improve health.     Respiratory: Breathing is regular & unlabored while sitting.     HEENT: Hearing is intact to spoken word.  Speech is clear.  No gross evidence of visual impairment that would impact ambulation.     Dermatologic: Right second toenail is thickened, dystrophic and 90% lysed from the nailbed.  No redness, dehiscence or signs of acute infection.  There is minimal bleeding with movements of the nail.  Also has a localized hyperkeratotic lesion to the distal aspect of the toe.  This also appears cracked.  No redness, dehiscence or signs of acute infection noted.     Vascular: DP & PT pulses are intact & regular bilaterally.  No significant edema or varicosities noted.  CFT and skin temperature is normal to both lower extremities.     Neurologic: Lower extremity sensation is intact to light touch.  No evidence of weakness or contracture in the lower extremities.  No evidence of neuropathy.     Musculoskeletal: Patient is ambulatory without assistive device or brace.  No gross ankle deformity noted.  No foot or ankle joint effusion is noted.     ASSESSMENT:    Dystrophic nail  Callus of toe     Medical Decision Making/Plan:  Reviewed patient's chart in epic. Discussed causes and treatments of nail fungus.   Explained that even if a culture comes back negative, a patient could still have nail fungus.  Discussed treatment options with patient and explained that there isn't one treatment that is 100% effective.  Discussed oral lamisil which is the most effective at about 70% but which can have liver effects.  Explained that if she wanted to try this that she would need serial blood draws to test her liver function.  Discussed over the counter antifungal creams.  Explained that these are about 50% effective and need to be applied once a day for about 6-8months.  Also talked about prescription penlac which is a nail laquer.  Again this is also only 50% effective.  Also discussed that if there was damage to the nail and the nail is now dystrophic that non of the above is going to change the nail.  If there was damage, there is note anything that can be done for the nail to correct it.  Discussed that if it becomes painful, we can remove the nail in clinic.        Discussed causes of keratomas.  They are due to areas of increase friction.  Hammertoes can create these as they put more pressure to the metatarsal head.  Discussed treatments such as using foot file, pumice stone, metatarsal pads, orthotics, and not walking barefoot.     We discussed the cost structure of callus care if they were to come back and have it treated in the clinic if insurance does not cover it and explained that they would be billed. They were also provided information on places to get the callus treatment.    Discussed with patient and patient's daughter that it could have been that because the nail was pulled off mostly when he got stubbed or the callus snagged any little crack in the skin can cause a lot of bleeding given that he is on a blood thinner.  The remaining nail was removed today and the callus was removed.  We talked about using a toe cover and not going barefoot to try to protect the end of the toe.  They will apply Neosporin and a  Band-Aid to the toe for the next week.  All questions were answered to patient satisfaction and they will call further questions or concerns.          Patient risk factor: Patient is at low risk for infection.        Kelly Andrews DPM, Podiatry/Foot and Ankle Surgery

## 2023-12-20 NOTE — LETTER
12/20/2023         RE: Tucker Slater  [address] [facility] [address]        Dear Colleague,    Thank you for referring your patient, Tucker Slater, to the [facility] PODIATRY. Please see a copy of my visit note below.    PATIENT HISTORY:  Dr. Winslow requested to see patient.  Tucker Slater is a 89 year old male who presents to clinic for certain bleeding to the right second toe.  Here with his daughter.  Notes that he was walking barefoot a few times at home and all of a sudden noticed a lot of bleeding.  States that he does not want that to happen anymore and wondering why that is occurring.  No pain to the right foot.  He does note that he is on blood thinners.  Denies fever, nausea, vomiting.    Review of Systems:  Patient denies fever, chills, rash, wound, stiffness, limping, numbness, weakness, heart burn, blood in stool, chest pain with activity, calf pain when walking, shortness of breath with activity, chronic cough, easy bleeding/bruising, swelling of ankles, excessive thirst, fatigue, depression, anxiety. .     PAST MEDICAL HISTORY:   Past Medical History:   Diagnosis Date     Arrhythmia     A Fib     Balance problem     related to neuropathy in feet     Bradycardia      Carcinoma in situ of bladder 2000    bladder cancer ;; follow w Urology w periodic cysto      Essential hypertension, benign      Generalized osteoarthrosis, unspecified site knees    s/p R total knee 8/09 ; will do L 6/10      Hernia, abdominal      Numbness and tingling     toes and fingers     DAWSON (obstructive sleep apnea) 8/2/2019     DAWSON (obstructive sleep apnea)      Pacemaker     [employer]      Pain in joint, shoulder region     L shoulder rotater tear; no surgery      Palpitations      Persistent atrial fibrillation (H) 1/30/15     Prostate CA (H) 2008-9    radiation     Prostate cancer (H) 11/08    completed RT 3/09     Renal disease     elevated creatinine     Shoulder  impingement     R shoulder impingement etc see MRI 4/09      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     toes both feet         PAST SURGICAL HISTORY:   Past Surgical History:   Procedure Laterality Date     ARTHROPLASTY HIP Right 3/27/2017    Procedure: ARTHROPLASTY HIP;  Surgeon: Jigar Wynn MD;  Location: [facility] OR     CARDIOVERSION  3/26/15     COLONOSCOPY       CYSTOSCOPY       CYSTOSCOPY, FULGURATE BLEEDERS, EVACUATE CLOT(S), COMBINED N/A 4/23/2019    Procedure: Exam under anesthesia, video cystopanendoscopy, evacuation of clots, fulguration of prostatic veins.;  Surgeon: Ilir Ramirez MD;  Location: [facility] OR     CYSTOSCOPY, RETROGRADES, COMBINED Bilateral 6/18/2019    Procedure: Video cystopanendoscopy, evacuation of clots, cup biopsies of bladder wall, electrovaporization of prostate, transurethral resection of prostate, bilateral retrograde ureteropyelogram.;  Surgeon: Ilir Ramirez MD;  Location: [facility] OR     CYSTOSCOPY, TRANSURETHRAL RESECTION (TUR) PROSTATE, COMBINED  6/18/2019    Procedure: Cystoscopy, transurethral resection of prostate;  Surgeon: Ilir Ramirez MD;  Location: [facility] OR     HERNIA REPAIR       IMPLANT PACEMAKER  3/26/15     RELEASE CARPAL TUNNEL Right 4/19/2017    Procedure: RELEASE CARPAL TUNNEL;  Right carpal tunnel release;  Surgeon: Alonso Barboza MD;  Location:  OR     [facility] NONSPECIFIC PROCEDURE      bilat inguinal hernia repairs ( 3total procedures)      ZZ NONSPECIFIC PROCEDURE      pilonidal cyst     ZZ NONSPECIFIC PROCEDURE      T + A     ZZC NONSPECIFIC PROCEDURE  8/09     R+L total knee        MEDICATIONS:   Current Outpatient Medications:      acetaminophen (TYLENOL) 500 MG tablet, Take 1,000 mg by mouth 2 times daily (Patient not taking: Reported on 7/12/2023), Disp: , Rfl:      Ascorbic Acid (VITAMIN C PO), Take 500 mg by mouth daily , Disp: , Rfl:      calcium carbonate-vitamin D (OSCAL W/D) 500-200 MG-UNIT tablet, Take 1  tablet by mouth daily, Disp: , Rfl:      carvedilol (COREG) 25 MG tablet, Take 1.5 tablets (37.5 mg) by mouth 2 times daily (with meals), Disp: 270 tablet, Rfl: 3     diclofenac (VOLTAREN) 1 % topical gel, Place 2 g onto the skin 4 times daily as needed, Disp: , Rfl:      docusate sodium (COLACE) 100 MG capsule, Take 100 mg by mouth 2 times daily as needed (Patient not taking: Reported on 8/21/2023), Disp: , Rfl:      finasteride (PROSCAR) 5 MG tablet, Take 1 tablet (5 mg) by mouth daily, Disp: 90 tablet, Rfl: 3     fluticasone (FLONASE) 50 MCG/ACT nasal spray, Spray 1 spray into both nostrils 2 times daily, Disp: 16 g, Rfl: 3     guaiFENesin-codeine (ROBITUSSIN AC) 100-10 MG/5ML solution, Take 5-10 mLs by mouth every 4 hours as needed for cough, Disp: 180 mL, Rfl: 0     losartan (COZAAR) 50 MG tablet, Take 1 tablet by mouth once daily, Disp: 90 tablet, Rfl: 0     Multiple Vitamins-Minerals (OCUVITE PO), Take 1 tablet by mouth daily, Disp: , Rfl:      multivitamin w/minerals (THERA-VIT-M) tablet, Take 1 tablet by mouth daily, Disp: , Rfl:      order for DME, Oxygen 2 Li/min at night, Disp: 1 Device, Rfl: 0     triamterene-HCTZ (MAXZIDE-25) 37.5-25 MG tablet, Take 1 tablet by mouth once daily, Disp: 90 tablet, Rfl: 0     vitamin B complex with vitamin C (STRESS TAB) tablet, Take 1 tablet by mouth daily, Disp: , Rfl:      warfarin ANTICOAGULANT (COUMADIN) 2.5 MG tablet, Take 1.25mg every Mon & Thur / Take 2.5mg all other days OR per INR clinic, Disp: 90 tablet, Rfl: 1     ALLERGIES:    Allergies   Allergen Reactions     Merbromin      Other reaction(s): Other, see comments  Severe reaction as child. Amalgam fillings OK.     Morphine Nausea and Vomiting     Amlodipine      Edema        SOCIAL HISTORY:   Social History     Socioeconomic History     Marital status:      Spouse name: Alba     Number of children: 3     Years of education: Not on file     Highest education level: Not on file   Occupational History      Employer: RETIRED     Comment:  w [employer]   Tobacco Use     Smoking status: Former     Types: Cigarettes     Quit date: 1977     Years since quittin.3     Smokeless tobacco: Never   Vaping Use     Vaping Use: Never used   Substance and Sexual Activity     Alcohol use: Yes     Alcohol/week: 0.0 standard drinks of alcohol     Comment: 1 beer daily     Drug use: No     Sexual activity: Never     Partners: Female   Other Topics Concern     Parent/sibling w/ CABG, MI or angioplasty before 65F 55M? Not Asked      Service Not Asked     Blood Transfusions Not Asked     Caffeine Concern No     Comment: 4-5 cups per week      Occupational Exposure Not Asked     Hobby Hazards Not Asked     Sleep Concern No     Stress Concern No     Weight Concern No     Special Diet No     Back Care Not Asked     Exercise No     Comment: yard work and moves around a lot     Bike Helmet Not Asked     Seat Belt Not Asked     Self-Exams Not Asked   Social History Narrative     Not on file     Social Determinants of Health     Financial Resource Strain: Low Risk  (10/13/2023)    Financial Resource Strain      Within the past 12 months, have you or your family members you live with been unable to get utilities (heat, electricity) when it was really needed?: No   Food Insecurity: Low Risk  (10/13/2023)    Food Insecurity      Within the past 12 months, did you worry that your food would run out before you got money to buy more?: No      Within the past 12 months, did the food you bought just not last and you didn t have money to get more?: No   Transportation Needs: Low Risk  (10/13/2023)    Transportation Needs      Within the past 12 months, has lack of transportation kept you from medical appointments, getting your medicines, non-medical meetings or appointments, work, or from getting things that you need?: No   Physical Activity: Not on file   Stress: Not on file   Social Connections: Not on file   Interpersonal  Safety: Low Risk  (10/13/2023)    Interpersonal Safety      Do you feel physically and emotionally safe where you currently live?: Yes      Within the past 12 months, have you been hit, slapped, kicked or otherwise physically hurt by someone?: No      Within the past 12 months, have you been humiliated or emotionally abused in other ways by your partner or ex-partner?: No   Housing Stability: Low Risk  (10/13/2023)    Housing Stability      Do you have housing? : Yes      Are you worried about losing your housing?: No        FAMILY HISTORY:   Family History   Problem Relation Age of Onset     Heart Disease Father          87yo     Coronary Artery Disease Father      Heart Disease Mother          74yo     Coronary Artery Disease Mother      Family History Negative Brother         EXAM:Vitals: BP (!) 140/80   Wt 76.7 kg (169 lb)   BMI 24.25 kg/m      General appearance: Patient is alert and fully cooperative with history & exam.  No sign of distress is noted during the visit.     Psychiatric: Affect is pleasant & appropriate.  Patient appears motivated to improve health.     Respiratory: Breathing is regular & unlabored while sitting.     HEENT: Hearing is intact to spoken word.  Speech is clear.  No gross evidence of visual impairment that would impact ambulation.     Dermatologic: Right second toenail is thickened, dystrophic and 90% lysed from the nailbed.  No redness, dehiscence or signs of acute infection.  There is minimal bleeding with movements of the nail.  Also has a localized hyperkeratotic lesion to the distal aspect of the toe.  This also appears cracked.  No redness, dehiscence or signs of acute infection noted.     Vascular: DP & PT pulses are intact & regular bilaterally.  No significant edema or varicosities noted.  CFT and skin temperature is normal to both lower extremities.     Neurologic: Lower extremity sensation is intact to light touch.  No evidence of weakness or contracture  in the lower extremities.  No evidence of neuropathy.     Musculoskeletal: Patient is ambulatory without assistive device or brace.  No gross ankle deformity noted.  No foot or ankle joint effusion is noted.     ASSESSMENT:    Dystrophic nail  Callus of toe     Medical Decision Making/Plan:  Reviewed patient's chart in epic. Discussed causes and treatments of nail fungus.  Explained that even if a culture comes back negative, a patient could still have nail fungus.  Discussed treatment options with patient and explained that there isn't one treatment that is 100% effective.  Discussed oral lamisil which is the most effective at about 70% but which can have liver effects.  Explained that if she wanted to try this that she would need serial blood draws to test her liver function.  Discussed over the counter antifungal creams.  Explained that these are about 50% effective and need to be applied once a day for about 6-8months.  Also talked about prescription penlac which is a nail laquer.  Again this is also only 50% effective.  Also discussed that if there was damage to the nail and the nail is now dystrophic that non of the above is going to change the nail.  If there was damage, there is note anything that can be done for the nail to correct it.  Discussed that if it becomes painful, we can remove the nail in clinic.        Discussed causes of keratomas.  They are due to areas of increase friction.  Hammertoes can create these as they put more pressure to the metatarsal head.  Discussed treatments such as using foot file, pumice stone, metatarsal pads, orthotics, and not walking barefoot.     We discussed the cost structure of callus care if they were to come back and have it treated in the clinic if insurance does not cover it and explained that they would be billed. They were also provided information on places to get the callus treatment.    Discussed with patient and patient's daughter that it could have been  that because the nail was pulled off mostly when he got stubbed or the callus snagged any little crack in the skin can cause a lot of bleeding given that he is on a blood thinner.  The remaining nail was removed today and the callus was removed.  We talked about using a toe cover and not going barefoot to try to protect the end of the toe.  They will apply Neosporin and a Band-Aid to the toe for the next week.  All questions were answered to patient satisfaction and they will call further questions or concerns.          Patient risk factor: Patient is at low risk for infection.        Kelly Andrews DPM, Podiatry/Foot and Ankle Surgery      Again, thank you for allowing me to participate in the care of your patient.        Sincerely,        Kelly Andrews DPM, Podiatry/Foot and Ankle Surgery

## 2023-12-20 NOTE — PATIENT INSTRUCTIONS
Thank you for choosing [facility] Podiatry / Foot & Ankle Surgery!    DR CHIN'S CLINIC:  [facility]   [address]   [place] [address]      TRIAGE LINE: [phone]  APPOINTMENTS: [phone]  RADIOLOGY: [phone]  SET UP SURGERY: [phone]  PHYSICAL THERAPY: [phone]   FAX NUMBER: [phone]  BILLING QUESTIONS: [phone]       Follow up: As needed      [email or web address] or call [phone]  TOE COVERS/CAPS

## 2023-12-26 ENCOUNTER — LAB (OUTPATIENT)
Dept: LAB | Facility: CLINIC | Age: 88
End: 2023-12-26
Payer: COMMERCIAL

## 2023-12-26 ENCOUNTER — ANTICOAGULATION THERAPY VISIT (OUTPATIENT)
Dept: ANTICOAGULATION | Facility: CLINIC | Age: 88
End: 2023-12-26

## 2023-12-26 DIAGNOSIS — I48.21 PERMANENT ATRIAL FIBRILLATION (H): Primary | ICD-10-CM

## 2023-12-26 DIAGNOSIS — I48.21 PERMANENT ATRIAL FIBRILLATION (H): ICD-10-CM

## 2023-12-26 DIAGNOSIS — Z79.01 LONG TERM CURRENT USE OF ANTICOAGULANTS WITH INR GOAL OF 2.0-3.0: ICD-10-CM

## 2023-12-26 LAB — INR BLD: 1.6 (ref 0.9–1.1)

## 2023-12-26 PROCEDURE — 85610 PROTHROMBIN TIME: CPT

## 2023-12-26 PROCEDURE — 36416 COLLJ CAPILLARY BLOOD SPEC: CPT

## 2023-12-26 NOTE — PROGRESS NOTES
ANTICOAGULATION MANAGEMENT     Tucker Slater 89 year old male is on warfarin with subtherapeutic INR result. (Goal INR 2.0-2.5)    Recent labs: (last 7 days)     12/26/23  1413   INR 1.6*       ASSESSMENT     Source(s): Chart Review and Patient/Caregiver Call     Warfarin doses taken: Warfarin taken as instructed  Diet: No new diet changes identified  Medication/supplement changes: None noted  New illness, injury, or hospitalization: Yes: callus removal last week.  Signs or symptoms of bleeding or clotting: denies any bleeding problems with his foot after the callus removal.  Previous result: Therapeutic last visit; previously outside of goal range  Additional findings: None       PLAN     Recommended plan for no diet, medication or health factor changes affecting INR     Dosing Instructions: Increase your warfarin dose (11% change) with next INR in 10 days       Summary  As of 12/26/2023      Full warfarin instructions:  2.5 mg every Sun, Tue, Thu; 1.25 mg all other days   Next INR check:  1/5/2024               Telephone call with Tucker who agrees to plan and repeated back plan correctly    Lab visit scheduled    Education provided:   Please call back if any changes to your diet, medications or how you've been taking warfarin    Plan made per [facility] anticoagulation protocol    Ivory Machado RN  Anticoagulation Clinic  12/26/2023    _______________________________________________________________________     Anticoagulation Episode Summary       Current INR goal:  2.0-2.5   TTR:  47.3% (1 y)   Target end date:  Indefinite   Send INR reminders to:  [facility]    Indications    Atrial fibrillation (H) with mitral regurgitation and congestive heart failure [I48.91]  Long term current use of anticoagulants with INR goal of 2.0-3.0 [Z79.01]  Permanent atrial fibrillation (H) [I48.21]             Comments:               Anticoagulation Care Providers       Provider Role Specialty Phone number     Kwadwo Winslow MD Referring Internal Medicine [phone]

## 2023-12-27 DIAGNOSIS — N13.8 HYPERTROPHY OF PROSTATE WITH URINARY OBSTRUCTION: ICD-10-CM

## 2023-12-27 DIAGNOSIS — I10 ESSENTIAL HYPERTENSION WITH GOAL BLOOD PRESSURE LESS THAN 140/90: ICD-10-CM

## 2023-12-27 DIAGNOSIS — N40.1 HYPERTROPHY OF PROSTATE WITH URINARY OBSTRUCTION: ICD-10-CM

## 2024-01-02 RX ORDER — FINASTERIDE 5 MG/1
1 TABLET, FILM COATED ORAL DAILY
Qty: 90 TABLET | Refills: 0 | Status: SHIPPED | OUTPATIENT
Start: 2024-01-02 | End: 2024-03-18

## 2024-01-02 RX ORDER — LOSARTAN POTASSIUM 50 MG/1
TABLET ORAL
Qty: 90 TABLET | Refills: 0 | Status: SHIPPED | OUTPATIENT
Start: 2024-01-02 | End: 2024-03-29

## 2024-01-05 ENCOUNTER — LAB (OUTPATIENT)
Dept: LAB | Facility: CLINIC | Age: 89
End: 2024-01-05
Payer: COMMERCIAL

## 2024-01-05 ENCOUNTER — ANTICOAGULATION THERAPY VISIT (OUTPATIENT)
Dept: ANTICOAGULATION | Facility: CLINIC | Age: 89
End: 2024-01-05

## 2024-01-05 DIAGNOSIS — I48.21 PERMANENT ATRIAL FIBRILLATION (H): ICD-10-CM

## 2024-01-05 DIAGNOSIS — Z79.01 LONG TERM CURRENT USE OF ANTICOAGULANTS WITH INR GOAL OF 2.0-3.0: Primary | ICD-10-CM

## 2024-01-05 LAB — INR BLD: 1.9 (ref 0.9–1.1)

## 2024-01-05 PROCEDURE — 85610 PROTHROMBIN TIME: CPT

## 2024-01-05 PROCEDURE — 36416 COLLJ CAPILLARY BLOOD SPEC: CPT

## 2024-01-05 NOTE — PROGRESS NOTES
ANTICOAGULATION MANAGEMENT     Tucker Slater 89 year old male is on warfarin with subtherapeutic INR result. (Goal INR 2.0-2.5)    Recent labs: (last 7 days)     01/05/24  1413   INR 1.9*       ASSESSMENT     Warfarin Lab Questionnaire    Warfarin Doses Last 7 Days      1/5/2024     2:09 PM   Dose in Tablet or Mg   TAB or MG? milligram (mg)     Pt Rptd Dose SUNDAY MONDAY TUESDAY WED THURS FRIDAY SATURDAY 1/5/2024   2:09 PM 2.5 1.25 2.5 1.25 2.5 1.25 1.25         1/5/2024   Warfarin Lab Questionnaire   Missed doses within past 14 days? No   Changes in diet or alcohol within past 14 days? No   Medication changes since last result? No   Injuries or illness since last result? No   New shortness of breath, severe headaches or sudden changes in vision since last result? No   Abnormal bleeding since last result? No   Upcoming surgery, procedure? No   Best number to call with results? [phone]     Previous result: Subtherapeutic  Additional findings: Need to increase Augustine's maintenance dose today with another subtherapeutic reading. He has a history of hematuria when INR is elevated. Instructed him to watch for this with dosing modification.       PLAN     Recommended plan for no diet, medication or health factor changes affecting INR     Dosing Instructions: Increase your warfarin dose (10% change) with next INR in 2 weeks       Summary  As of 1/5/2024      Full warfarin instructions:  1.25 mg every Mon, Wed, Fri; 2.5 mg all other days   Next INR check:  1/19/2024               Telephone call with Tucker who verbalizes understanding and agrees to plan    Lab visit scheduled    Education provided:   Please call back if any changes to your diet, medications or how you've been taking warfarin    Plan made per [facility] anticoagulation protocol    Nena Valdovinos RN  Anticoagulation Clinic  1/5/2024    _______________________________________________________________________     Anticoagulation Episode Summary        Current INR goal:  2.0-2.5   TTR:  47.3% (1 y)   Target end date:  Indefinite   Send INR reminders to:  [facility]    Indications    Atrial fibrillation (H) with mitral regurgitation and congestive heart failure [I48.91]  Long term current use of anticoagulants with INR goal of 2.0-3.0 [Z79.01]  Permanent atrial fibrillation (H) [I48.21]             Comments:               Anticoagulation Care Providers       Provider Role Specialty Phone number    Kwadwo Winslow MD Referring Internal Medicine [phone]

## 2024-01-19 ENCOUNTER — ANTICOAGULATION THERAPY VISIT (OUTPATIENT)
Dept: ANTICOAGULATION | Facility: CLINIC | Age: 89
End: 2024-01-19

## 2024-01-19 ENCOUNTER — LAB (OUTPATIENT)
Dept: LAB | Facility: CLINIC | Age: 89
End: 2024-01-19
Payer: COMMERCIAL

## 2024-01-19 DIAGNOSIS — I48.21 PERMANENT ATRIAL FIBRILLATION (H): ICD-10-CM

## 2024-01-19 DIAGNOSIS — Z79.01 LONG TERM CURRENT USE OF ANTICOAGULANTS WITH INR GOAL OF 2.0-3.0: ICD-10-CM

## 2024-01-19 DIAGNOSIS — I48.21 PERMANENT ATRIAL FIBRILLATION (H): Primary | ICD-10-CM

## 2024-01-19 LAB — INR BLD: 2.3 (ref 0.9–1.1)

## 2024-01-19 PROCEDURE — 36415 COLL VENOUS BLD VENIPUNCTURE: CPT

## 2024-01-19 PROCEDURE — 85610 PROTHROMBIN TIME: CPT

## 2024-01-19 NOTE — PROGRESS NOTES
ANTICOAGULATION MANAGEMENT     Tucker Slater 89 year old male is on warfarin with therapeutic INR result. (Goal INR 2.0-2.5)    Recent labs: (last 7 days)     01/19/24  1415   INR 2.3*       ASSESSMENT     Source(s): Chart Review and Patient/Caregiver Call     Warfarin doses taken: Warfarin taken as instructed  Diet: No new diet changes identified  Medication/supplement changes: None noted  New illness, injury, or hospitalization: No  Signs or symptoms of bleeding or clotting: No  Previous result: Subtherapeutic  Additional findings: None       PLAN     Recommended plan for no diet, medication or health factor changes affecting INR     Dosing Instructions: Continue your current warfarin dose with next INR in 3 weeks       Summary  As of 1/19/2024      Full warfarin instructions:  1.25 mg every Mon, Wed, Fri; 2.5 mg all other days   Next INR check:  2/9/2024               Telephone call with Tucker who agrees to plan and repeated back plan correctly    Lab visit scheduled    Education provided:   Please call back if any changes to your diet, medications or how you've been taking warfarin    Plan made per [facility] anticoagulation protocol    Ivory Machado RN  Anticoagulation Clinic  1/19/2024    _______________________________________________________________________     Anticoagulation Episode Summary       Current INR goal:  2.0-2.5   TTR:  50.1% (1 y)   Target end date:  Indefinite   Send INR reminders to:  [facility]    Indications    Atrial fibrillation (H) with mitral regurgitation and congestive heart failure [I48.91]  Long term current use of anticoagulants with INR goal of 2.0-3.0 [Z79.01]  Permanent atrial fibrillation (H) [I48.21]             Comments:               Anticoagulation Care Providers       Provider Role Specialty Phone number    Kwadwo Winslow MD Referring Internal Medicine [phone]

## 2024-01-24 ENCOUNTER — ANCILLARY PROCEDURE (OUTPATIENT)
Dept: CARDIOLOGY | Facility: CLINIC | Age: 89
End: 2024-01-24
Attending: INTERNAL MEDICINE
Payer: COMMERCIAL

## 2024-01-24 DIAGNOSIS — I49.5 SICK SINUS SYNDROME (H): ICD-10-CM

## 2024-01-24 DIAGNOSIS — Z95.0 CARDIAC PACEMAKER IN SITU: ICD-10-CM

## 2024-01-24 PROCEDURE — 93296 REM INTERROG EVL PM/IDS: CPT | Performed by: INTERNAL MEDICINE

## 2024-01-24 PROCEDURE — 93294 REM INTERROG EVL PM/LDLS PM: CPT | Performed by: INTERNAL MEDICINE

## 2024-02-05 ENCOUNTER — DOCUMENTATION ONLY (OUTPATIENT)
Dept: OTHER | Facility: CLINIC | Age: 89
End: 2024-02-05
Payer: COMMERCIAL

## 2024-02-05 LAB
MDC_IDC_LEAD_CONNECTION_STATUS: NORMAL
MDC_IDC_LEAD_CONNECTION_STATUS: NORMAL
MDC_IDC_LEAD_IMPLANT_DT: NORMAL
MDC_IDC_LEAD_IMPLANT_DT: NORMAL
MDC_IDC_LEAD_LOCATION: NORMAL
MDC_IDC_LEAD_LOCATION: NORMAL
MDC_IDC_LEAD_LOCATION_DETAIL_1: NORMAL
MDC_IDC_LEAD_LOCATION_DETAIL_1: NORMAL
MDC_IDC_LEAD_MFG: NORMAL
MDC_IDC_LEAD_MFG: NORMAL
MDC_IDC_LEAD_MODEL: NORMAL
MDC_IDC_LEAD_MODEL: NORMAL
MDC_IDC_LEAD_POLARITY_TYPE: NORMAL
MDC_IDC_LEAD_POLARITY_TYPE: NORMAL
MDC_IDC_LEAD_SERIAL: NORMAL
MDC_IDC_LEAD_SERIAL: NORMAL
MDC_IDC_MSMT_BATTERY_DTM: NORMAL
MDC_IDC_MSMT_BATTERY_REMAINING_LONGEVITY: 5 MO
MDC_IDC_MSMT_BATTERY_REMAINING_PERCENTAGE: 7 %
MDC_IDC_MSMT_BATTERY_RRT_TRIGGER: NORMAL
MDC_IDC_MSMT_BATTERY_STATUS: NORMAL
MDC_IDC_MSMT_BATTERY_VOLTAGE: 2.8 V
MDC_IDC_MSMT_LEADCHNL_RV_IMPEDANCE_VALUE: 390 OHM
MDC_IDC_MSMT_LEADCHNL_RV_LEAD_CHANNEL_STATUS: NORMAL
MDC_IDC_MSMT_LEADCHNL_RV_PACING_THRESHOLD_AMPLITUDE: 0.75 V
MDC_IDC_MSMT_LEADCHNL_RV_PACING_THRESHOLD_PULSEWIDTH: 0.5 MS
MDC_IDC_MSMT_LEADCHNL_RV_SENSING_INTR_AMPL: 11.5 MV
MDC_IDC_PG_IMPLANT_DTM: NORMAL
MDC_IDC_PG_MFG: NORMAL
MDC_IDC_PG_MODEL: NORMAL
MDC_IDC_PG_SERIAL: NORMAL
MDC_IDC_PG_TYPE: NORMAL
MDC_IDC_SESS_CLINIC_NAME: NORMAL
MDC_IDC_SESS_DTM: NORMAL
MDC_IDC_SESS_REPROGRAMMED: NO
MDC_IDC_SESS_TYPE: NORMAL
MDC_IDC_SET_BRADY_HYSTRATE: 60 {BEATS}/MIN
MDC_IDC_SET_BRADY_LOWRATE: 70 {BEATS}/MIN
MDC_IDC_SET_BRADY_MAX_SENSOR_RATE: 120 {BEATS}/MIN
MDC_IDC_SET_BRADY_MODE: NORMAL
MDC_IDC_SET_LEADCHNL_RA_PACING_POLARITY: NORMAL
MDC_IDC_SET_LEADCHNL_RA_SENSING_POLARITY: NORMAL
MDC_IDC_SET_LEADCHNL_RV_PACING_AMPLITUDE: 5 V
MDC_IDC_SET_LEADCHNL_RV_PACING_ANODE_ELECTRODE_1: NORMAL
MDC_IDC_SET_LEADCHNL_RV_PACING_ANODE_LOCATION_1: NORMAL
MDC_IDC_SET_LEADCHNL_RV_PACING_CAPTURE_MODE: NORMAL
MDC_IDC_SET_LEADCHNL_RV_PACING_CATHODE_ELECTRODE_1: NORMAL
MDC_IDC_SET_LEADCHNL_RV_PACING_CATHODE_LOCATION_1: NORMAL
MDC_IDC_SET_LEADCHNL_RV_PACING_POLARITY: NORMAL
MDC_IDC_SET_LEADCHNL_RV_PACING_PULSEWIDTH: 0.5 MS
MDC_IDC_SET_LEADCHNL_RV_SENSING_ADAPTATION_MODE: NORMAL
MDC_IDC_SET_LEADCHNL_RV_SENSING_ANODE_ELECTRODE_1: NORMAL
MDC_IDC_SET_LEADCHNL_RV_SENSING_ANODE_LOCATION_1: NORMAL
MDC_IDC_SET_LEADCHNL_RV_SENSING_CATHODE_ELECTRODE_1: NORMAL
MDC_IDC_SET_LEADCHNL_RV_SENSING_CATHODE_LOCATION_1: NORMAL
MDC_IDC_SET_LEADCHNL_RV_SENSING_POLARITY: NORMAL
MDC_IDC_SET_LEADCHNL_RV_SENSING_SENSITIVITY: 2 MV
MDC_IDC_STAT_AT_DTM_END: NORMAL
MDC_IDC_STAT_AT_DTM_START: NORMAL
MDC_IDC_STAT_BRADY_DTM_END: NORMAL
MDC_IDC_STAT_BRADY_DTM_START: NORMAL
MDC_IDC_STAT_BRADY_RV_PERCENT_PACED: 99 %
MDC_IDC_STAT_CRT_DTM_END: NORMAL
MDC_IDC_STAT_CRT_DTM_START: NORMAL
MDC_IDC_STAT_HEART_RATE_DTM_END: NORMAL
MDC_IDC_STAT_HEART_RATE_DTM_START: NORMAL
MDC_IDC_STAT_HEART_RATE_VENTRICULAR_MAX: 190 {BEATS}/MIN
MDC_IDC_STAT_HEART_RATE_VENTRICULAR_MEAN: 76 {BEATS}/MIN
MDC_IDC_STAT_HEART_RATE_VENTRICULAR_MIN: 60 {BEATS}/MIN

## 2024-02-09 ENCOUNTER — LAB (OUTPATIENT)
Dept: LAB | Facility: CLINIC | Age: 89
End: 2024-02-09
Payer: COMMERCIAL

## 2024-02-09 ENCOUNTER — ANTICOAGULATION THERAPY VISIT (OUTPATIENT)
Dept: ANTICOAGULATION | Facility: CLINIC | Age: 89
End: 2024-02-09

## 2024-02-09 DIAGNOSIS — I48.21 PERMANENT ATRIAL FIBRILLATION (H): ICD-10-CM

## 2024-02-09 DIAGNOSIS — I48.21 PERMANENT ATRIAL FIBRILLATION (H): Primary | ICD-10-CM

## 2024-02-09 DIAGNOSIS — Z79.01 LONG TERM CURRENT USE OF ANTICOAGULANTS WITH INR GOAL OF 2.0-3.0: ICD-10-CM

## 2024-02-09 LAB — INR BLD: 2.1 (ref 0.9–1.1)

## 2024-02-09 PROCEDURE — 36416 COLLJ CAPILLARY BLOOD SPEC: CPT

## 2024-02-09 PROCEDURE — 85610 PROTHROMBIN TIME: CPT

## 2024-02-09 NOTE — PROGRESS NOTES
ANTICOAGULATION MANAGEMENT     Tucker Slater 89 year old male is on warfarin with therapeutic INR result. (Goal INR 2.0-2.5)    Recent labs: (last 7 days)     02/09/24  1418   INR 2.1*       ASSESSMENT     Source(s): Chart Review and Patient/Caregiver Call     Warfarin doses taken: Warfarin taken as instructed  Diet: No new diet changes identified  Medication/supplement changes: None noted  New illness, injury, or hospitalization: No  Signs or symptoms of bleeding or clotting: No  Previous result: Therapeutic last 2(+) visits  Additional findings: None       PLAN     Recommended plan for no diet, medication or health factor changes affecting INR     Dosing Instructions: Continue your current warfarin dose with next INR in 4 weeks       Summary  As of 2/9/2024      Full warfarin instructions:  1.25 mg every Mon, Wed, Fri; 2.5 mg all other days   Next INR check:  3/8/2024               Telephone call with Tucker who agrees to plan and repeated back plan correctly    Lab visit scheduled    Education provided:   Please call back if any changes to your diet, medications or how you've been taking warfarin    Plan made per [facility] anticoagulation protocol    Ivory aMchado RN  Anticoagulation Clinic  2/9/2024    _______________________________________________________________________     Anticoagulation Episode Summary       Current INR goal:  2.0-2.5   TTR:  50.2% (1 y)   Target end date:  Indefinite   Send INR reminders to:  [facility]    Indications    Atrial fibrillation (H) with mitral regurgitation and congestive heart failure [I48.91]  Long term current use of anticoagulants with INR goal of 2.0-3.0 [Z79.01]  Permanent atrial fibrillation (H) [I48.21]             Comments:               Anticoagulation Care Providers       Provider Role Specialty Phone number    Kwadwo Winslow MD Referring Internal Medicine [phone]

## 2024-02-25 DIAGNOSIS — I10 ESSENTIAL HYPERTENSION WITH GOAL BLOOD PRESSURE LESS THAN 140/90: ICD-10-CM

## 2024-02-26 ENCOUNTER — OFFICE VISIT (OUTPATIENT)
Dept: INTERNAL MEDICINE | Facility: CLINIC | Age: 89
End: 2024-02-26
Payer: COMMERCIAL

## 2024-02-26 VITALS
DIASTOLIC BLOOD PRESSURE: 71 MMHG | SYSTOLIC BLOOD PRESSURE: 126 MMHG | OXYGEN SATURATION: 100 % | WEIGHT: 171.3 LBS | RESPIRATION RATE: 19 BRPM | HEART RATE: 76 BPM | BODY MASS INDEX: 24.52 KG/M2 | HEIGHT: 70 IN | TEMPERATURE: 98.2 F

## 2024-02-26 DIAGNOSIS — I10 ESSENTIAL HYPERTENSION WITH GOAL BLOOD PRESSURE LESS THAN 140/90: ICD-10-CM

## 2024-02-26 DIAGNOSIS — N18.31 STAGE 3A CHRONIC KIDNEY DISEASE (H): Primary | ICD-10-CM

## 2024-02-26 DIAGNOSIS — L30.9 ECZEMA, UNSPECIFIED TYPE: ICD-10-CM

## 2024-02-26 DIAGNOSIS — I48.21 PERMANENT ATRIAL FIBRILLATION (H): ICD-10-CM

## 2024-02-26 DIAGNOSIS — J30.0 VASOMOTOR RHINITIS: ICD-10-CM

## 2024-02-26 LAB
ANION GAP SERPL CALCULATED.3IONS-SCNC: 9 MMOL/L (ref 7–15)
BUN SERPL-MCNC: 38.8 MG/DL (ref 8–23)
CALCIUM SERPL-MCNC: 9.5 MG/DL (ref 8.2–9.6)
CHLORIDE SERPL-SCNC: 108 MMOL/L (ref 98–107)
CREAT SERPL-MCNC: 1.27 MG/DL (ref 0.67–1.17)
DEPRECATED HCO3 PLAS-SCNC: 22 MMOL/L (ref 22–29)
EGFRCR SERPLBLD CKD-EPI 2021: 53 ML/MIN/1.73M2
GLUCOSE SERPL-MCNC: 81 MG/DL (ref 70–99)
POTASSIUM SERPL-SCNC: 4.7 MMOL/L (ref 3.4–5.3)
SODIUM SERPL-SCNC: 139 MMOL/L (ref 135–145)

## 2024-02-26 PROCEDURE — 80048 BASIC METABOLIC PNL TOTAL CA: CPT | Performed by: INTERNAL MEDICINE

## 2024-02-26 PROCEDURE — 36415 COLL VENOUS BLD VENIPUNCTURE: CPT | Performed by: INTERNAL MEDICINE

## 2024-02-26 PROCEDURE — 99214 OFFICE O/P EST MOD 30 MIN: CPT | Performed by: INTERNAL MEDICINE

## 2024-02-26 RX ORDER — EMOLLIENT BASE
CREAM (GRAM) TOPICAL PRN
Qty: 113 G | Refills: 3 | Status: SHIPPED | OUTPATIENT
Start: 2024-02-26

## 2024-02-26 RX ORDER — TRIAMCINOLONE ACETONIDE 1 MG/G
CREAM TOPICAL 2 TIMES DAILY
Qty: 80 G | Refills: 0 | Status: SHIPPED | OUTPATIENT
Start: 2024-02-26

## 2024-02-26 RX ORDER — RESPIRATORY SYNCYTIAL VIRUS VACCINE 120MCG/0.5
0.5 KIT INTRAMUSCULAR ONCE
Qty: 1 EACH | Refills: 0 | Status: CANCELLED | OUTPATIENT
Start: 2024-02-26 | End: 2024-02-26

## 2024-02-26 RX ORDER — TRIAMTERENE/HYDROCHLOROTHIAZID 37.5-25 MG
1 TABLET ORAL DAILY
Qty: 90 TABLET | Refills: 0 | Status: SHIPPED | OUTPATIENT
Start: 2024-02-26 | End: 2024-05-28

## 2024-02-26 RX ORDER — IPRATROPIUM BROMIDE 21 UG/1
2 SPRAY, METERED NASAL EVERY 12 HOURS
Qty: 30 ML | Refills: 2 | Status: SHIPPED | OUTPATIENT
Start: 2024-02-26

## 2024-02-26 ASSESSMENT — PAIN SCALES - GENERAL: PAINLEVEL: NO PAIN (0)

## 2024-02-26 NOTE — PROGRESS NOTES
Assessment & Plan     Stage 3a chronic kidney disease (H)  Monitor renal function   Keep low salt diet, hydration, avoid NSAIDs   - Basic metabolic panel  (Ca, Cl, CO2, Creat, Gluc, K, Na, BUN)    Eczema, unspecified type  Start topical treatment with skin moisturizer and PRN steroid   - triamcinolone (KENALOG) 0.1 % external cream; Apply topically 2 times daily  - emollient (VANICREAM) external cream; Apply topically as needed for other (skin dryness)    Vasomotor rhinitis  Start on Atrovent nasal spray   - ipratropium (ATROVENT) 0.03 % nasal spray; Spray 2 sprays into both nostrils every 12 hours    Permanent atrial fibrillation (H)  On AC and rate control, has PM for bradycardia     Essential hypertension with goal blood pressure less than 140/90  Controlled on treatment               See Patient Instructions    Juancho Ceballos is a 89 year old, presenting for the following health issues:  Follow Up      2/26/2024     2:21 PM   Additional Questions   Roomed by Antoinette Rodriguez MA   Accompanied by self         2/26/2024     2:21 PM   Patient Reported Additional Medications   Patient reports taking the following new medications none     History of Present Illness       Reason for visit:  Follow up *cannot remember* runny nose sinus    He eats 2-3 servings of fruits and vegetables daily.He consumes 3 sweetened beverage(s) daily.He exercises with enough effort to increase his heart rate 9 or less minutes per day.  He exercises with enough effort to increase his heart rate 3 or less days per week.   He is taking medications regularly.       Patient is seen for a follow up visit.  Has h/o HTN. on medical treatment. BP has been controlled. No side effects from medications. No CP, HA, dizziness. good compliance with medications and low salt diet.  Has history of atrial fibrillation. On anticoagulation with Coumadin and rate control medications. Asymptonatic - no chest pains , palpitations,  no side effects from  "medications.  Has concerns for skin itching, no visible rash, on the arms. Causes pruritus and skin irritation , bleeding from scratching.   Has had runny nose when eating. Clear, watery discharge. Nasal steroid spray has not helped.     Wife has h/o cancer and pt is frustrated with her gradually fading away. Has 2 daughters, who help.         Review of Systems  Constitutional, HEENT, cardiovascular, pulmonary, gi and gu systems are negative, except as otherwise noted.      Objective    /71 (BP Location: Left arm, Patient Position: Sitting, Cuff Size: Adult Regular)   Pulse 76   Temp 98.2  F (36.8  C) (Oral)   Resp 19   Ht 1.778 m (5' 10\")   Wt 77.7 kg (171 lb 4.8 oz)   SpO2 100%   BMI 24.58 kg/m    Body mass index is 24.58 kg/m .  Physical Exam   GENERAL: alert and no distress, frail   NECK: no adenopathy, no asymmetry, masses, or scars  RESP: lungs clear to auscultation - no rales, rhonchi or wheezes  CV: regular rate and rhythm, normal S1 S2, no S3 or S4, no murmur, click or rub, no peripheral edema  ABDOMEN: soft, nontender, no hepatosplenomegaly, no masses and bowel sounds normal  MS: no gross musculoskeletal defects noted, no edema  SKIN: no suspicious lesions or rashes, skin xerosis, thinning of the skin, SK lesions on the face and arms     Lab on 02/09/2024   Component Date Value Ref Range Status    INR 02/09/2024 2.1 (H)  0.9 - 1.1 Final           Signed Electronically by: Kwadwo Winslow MD    "

## 2024-03-04 ENCOUNTER — DOCUMENTATION ONLY (OUTPATIENT)
Dept: ANTICOAGULATION | Facility: CLINIC | Age: 89
End: 2024-03-04
Payer: COMMERCIAL

## 2024-03-04 DIAGNOSIS — I48.21 PERMANENT ATRIAL FIBRILLATION (H): Primary | ICD-10-CM

## 2024-03-04 NOTE — PROGRESS NOTES
ANTICOAGULATION CLINIC REFERRAL RENEWAL REQUEST       An annual renewal order is required for all patients referred to [facility].?  Please review and sign the pended referral order for Tucker Slater.       ANTICOAGULATION SUMMARY      Warfarin indication(s)   Atrial Fibrillation    Mechanical heart valve present?  NO       Current goal range   INR: 2.0-2.5     Goal appropriate for indication? Per chart review, patient has history of hematuria with higher INR goal range     Time in Therapeutic Range (TTR)  (Goal > 60%) 50%       Office visit with referring provider's group within last year yes on 10/13/2023       Chichi Wang RN  [facility]

## 2024-03-05 ENCOUNTER — ANCILLARY PROCEDURE (OUTPATIENT)
Dept: GENERAL RADIOLOGY | Facility: CLINIC | Age: 89
End: 2024-03-05
Attending: PODIATRIST
Payer: COMMERCIAL

## 2024-03-05 ENCOUNTER — OFFICE VISIT (OUTPATIENT)
Dept: PODIATRY | Facility: CLINIC | Age: 89
End: 2024-03-05
Payer: COMMERCIAL

## 2024-03-05 VITALS — SYSTOLIC BLOOD PRESSURE: 142 MMHG | BODY MASS INDEX: 24.54 KG/M2 | WEIGHT: 171 LBS | DIASTOLIC BLOOD PRESSURE: 80 MMHG

## 2024-03-05 DIAGNOSIS — M21.42 BILATERAL PES PLANUS: ICD-10-CM

## 2024-03-05 DIAGNOSIS — I87.2 EDEMA OF BOTH LOWER EXTREMITIES DUE TO PERIPHERAL VENOUS INSUFFICIENCY: ICD-10-CM

## 2024-03-05 DIAGNOSIS — M79.671 FOOT PAIN, RIGHT: Primary | ICD-10-CM

## 2024-03-05 DIAGNOSIS — M21.41 BILATERAL PES PLANUS: ICD-10-CM

## 2024-03-05 DIAGNOSIS — M72.2 PLANTAR FASCIITIS, RIGHT: ICD-10-CM

## 2024-03-05 DIAGNOSIS — M79.671 FOOT PAIN, RIGHT: ICD-10-CM

## 2024-03-05 PROCEDURE — 99214 OFFICE O/P EST MOD 30 MIN: CPT | Performed by: PODIATRIST

## 2024-03-05 PROCEDURE — 73630 X-RAY EXAM OF FOOT: CPT | Mod: TC | Performed by: RADIOLOGY

## 2024-03-05 NOTE — PROGRESS NOTES
Podiatry / Foot and Ankle Surgery Progress Note    March 5, 2024    Subject: Patient was seen for pain to the right foot.  Here with family member.  Patient notes that a few days ago he woke up and could not put any pressure on his right foot.  Started in the ankle and then the next day was in the heel and then the day after that was in his arch.  Pain has subsided to where it is now only 2 out of 10 at its worst and he can walk on the foot.  Denies history of gout.  Denies specific injury.  Wondering what is causing the pain and what can be done for it.    Objective:  Vitals: Wt 77.6 kg (171 lb)   BMI 24.54 kg/m    BMI= Body mass index is 24.54 kg/m .    General: NAD    Dermatologic: No open lesions.  No redness, dehiscence or signs of acute infection.  T     Vascular: DP & PT pulses are intact & regular bilaterally.  edema and  varicosities noted.  CFT and skin temperature is normal to both lower extremities.     Neurologic: Lower extremity sensation is intact to light touch.  No evidence of weakness or contracture in the lower extremities.  No evidence of neuropathy.     Musculoskeletal: Patient is ambulatory with cane. Decrease arch height.  Minimal pain on palpation of the plantar medial arch fascia.  Radiographs: Right foot x-ray - I have looked at and reviewed xrays personally -degenerative changes noted to the midfoot.  Enthesopathy is noted to the plantar fascia.  ASSESSMENT:     Foot pain, right  Bilateral pes planus  Edema of both lower extremities due to peripheral venous insufficiency  Plantar fasciitis, right     Medical Decision Making/Plan:  Reviewed patient's chart in [facility]. The potential causes and nature of plantar fasciitis were discussed with the patient.  We reviewed the natural history/prognosis of the condition and risks if left untreated.  These include chronic pain, other sites of pain due to gait changes, and potential plantar fascial rupture.      We discussed possible causes of the  condition as it relates to the patients specific situation.      Conservative treatment options were reviewed:  appropriate shoes, avoidance of barefoot walking, inserts/orthoses, stretching, ice, massage, immobilization and NSAIDs.     We also reviewed the options of injection therapy and surgery.  However, it was made clear that surgery is only considered when conservative therapy fails.  The risks and benefits of injection therapy, and surgery were discussed.     Since pain is improving and recommend topical pain cream.  This was ordered.  Also recommend inserts in the shoes and not going barefoot or in socks and having supportive shoe or sandal on at all times.  He was given stretches to do.  We will get baseline x-rays today.  If pain does not continue to improve or gets worse would recommend an MRI of the foot and ankle to assess for further pathology.    All questions were answered to patient's satisfaction and he will call further questions or concerns.       Discussed causes of keratomas.  They are due to areas of increase friction.  Hammertoes can create these as they put more pressure to the metatarsal head.  Discussed treatments such as using foot file, pumice stone, metatarsal pads, orthotics, and not walking barefoot.      We discussed the cost structure of callus care if they were to come back and have it treated in the clinic if insurance does not cover it and explained that they would be billed. They were also provided information on places to get the callus treatment.     Discussed with patient and patient's daughter that it could have been that because the nail was pulled off mostly when he got stubbed or the callus snagged any little crack in the skin can cause a lot of bleeding given that he is on a blood thinner.  The remaining nail was removed today and the callus was removed.  We talked about using a toe cover and not going barefoot to try to protect the end of the toe.  They will apply  Neosporin and a Band-Aid to the toe for the next week.  All questions were answered to patient satisfaction and they will call further questions or concerns.      Patient risk factor: Patient is at low risk for infection.    Patient Risk Factor:  Patient is a low risk factor for infection.     Kelly Andrews DPM, Podiatry/Foot and Ankle Surgery

## 2024-03-05 NOTE — PATIENT INSTRUCTIONS
Thank you for choosing [facility] Podiatry / Foot & Ankle Surgery!    DR CHIN'S CLINIC:  [facility]   [address]   [place] [address]      TRIAGE LINE: [phone]  APPOINTMENTS: [phone]  RADIOLOGY: [phone]  SET UP SURGERY: [phone]  PHYSICAL THERAPY: [phone]   FAX NUMBER: [phone]  BILLING QUESTIONS: [phone]       Follow up: As needed      SUPER FEET-GREEN    VOLTAREN GEL    PLANTAR FASCIITIS  Plantar fasciitis is often referred to as heel spurs or heel pain. Plantar fasciitis is a very common problem that affects people of all foot shapes, age, weight and activity level. Pain may be in the arch or on the weight-bearing surface of the heel. The pain may come on without injury or identifiable cause. Pain is generally present when first getting out of bed in the morning or up from a seated break.     CAUSES  The plantar fascia is a dense fibrous band of tissue that stretches across the bottom surface of the foot. The fascia helps support the foot muscles and arch. Plantar fasciitis is thought to be caused by mechanical strain or overload. Frequent walking without shoes or wearing unsupportive shoes is thought to cause structural overload and ultimately inflammation of the plantar fascia. Some people have heel spurs that can be seen on x-ray. The heel spur is actually a minor component of plantar fascitis and is largely ignored.       SELF TREATMENT   The easiest solution is to stop walking around your home without shoes. Plantar fasciitis is largely a shoe problem. Shoes are either not being worn often enough or your current shoes are inadequate for your weight, foot structure or activity level. The majority of shoes on the market today are not sufficient to resist development of plantar fasciitis or to promote healing. Assume that your current shoes are inadequate and will need to be replaced. Even high quality shoes wear out with 6 months to one  year of frequent use. Weight loss is another option. Losing ten pounds in the next two months may be enough to resolve the problem. Ice applied to the area of pain two to three times per day for ten minutes each session can be very helpful. Warm foot soaks in epsom salts can also relieve pain. This should continue until the problem resolves. Achilles tendon stretching is essential. Stretch multiple times daily to promote healing and to prevent recurrence in the future. Over all stretching of the body is helpful as well such as the calves, thighs and lower back. Normally when one area of the body is tight, other areas are too. Gentle Yoga can be good for this.     Over the counter topical anti inflammatories can be helpful such as biofreeze, bengay, salon pas, ect...  Oral ibuprofen or aleve is recommended as well to try to calm down inflammation.     Night splints can be helpful to gradually stretch the foot at night as a lot of pain is when you get up in the morning. Taking a towel or thera band and stretching the foot back multiple times before you get ou of bed can be beneficial as well.     MEDICAL TREATMENT  Medical treatments often include custom arch supports, cortisone injections, physical therapy, splints to be worn in bed, prescription medications and surgery. The home treatments listed above will be necessary regardless of these advanced medical treatments. Surgery is rarely needed but is very helpful in selected cases.     PROGNOSIS  Plantar fasciitis can last from one day to a lifetime. Some people get intermittent fascitis that is very short-lived. Others suffer daily for years. Excessive body weight, frequent bare foot walking, long hours on the feet, inadequate shoes, predisposing foot structures and excessive activity such as running are all potential issues that lead to chronic and/or recurring plantar fascitis. Having plantar fasciitis means that you are forever prone to this problem and will  require modification of some of the above factors. Most people seek treatment within one to four months. Healing usually requires a similar one to four month time frame. Healing time is relative to the amount of effort spent treating the problem.   Plantar fasciitis is highly recurrent. Risk factors often continue, including return to bare foot walking, inadequate shoes, excessive body weight, excessive activities, etc. Your life style and foot structure may predispose you to recurrent plantar fasciitis. A daily prevention regimen can be very helpful. Ongoing use of shoe inserts, careful attention to appropriate shoes, daily Achilles stretching, etc. may prevent recurrence. Prompt attention at the earliest warning signs of heel pain can resolve the problem in as short as a few days.     EXERCISES  Stair Exercise: Step on the stairs with the ball of your foot and hold your position for at least 15 seconds, then slowly step down with the heels of your foot. You can do this daily and as often as you want.   Picking the Towel: Sit comfortably and then pick the towel up with your toes. You can use any object other than a towel as long as the material can be soft and you can pick it up with your toes.  Rolling the Bottle: Use a small ball or frozen water bottle and then roll it around with your foot.   Flex the Toes: Sit comfortably and then flex your toes by pointing it towards the floor or towards your body. This will relax and flex your foot and exercise your plantar fascia, the calf, and the Achilles tendon. The inability of the foot to stretch often causes the bunching up of the plantar fascia area leading to the pain.  Calf/Achilles Stretching: Lay on you back and raise one foot, then point your toes towards the floor. See photo below:               Hold each stretch for 10 seconds. Stretch 10 times per set, three sets per day. Morning, afternoon and evening. If your heel pain is very severe in the morning, consider  doing the first set of stretches before you get out of bed.      OVER THE COUNTER INSERT RECOMMENDATIONS  SuperFeet   Sofsole Fit Spenco   Power Step   Walk-Fit Arch Cradles     Most of these can be found at your local [employer], [employer], or online.  **A good high quality over the counter insert should cost around $40-$50      [employer] [facility]  [address] [place] [address]  [phone]   [address]  [phone] [place]  [address] [place] [address]  [phone]   [place]  [address]  [phone]   [place]  [address] [facility]  [phone] [place] [address]  [phone]   [place]  [address]  [phone]   [place]  [address] [place] [address]  [phone] [place]  [address]  [phone]

## 2024-03-05 NOTE — LETTER
3/5/2024         RE: Tucker Slater  [address] [facility] [address]        Dear Colleague,    Thank you for referring your patient, Tucker Slater, to the [facility]. Please see a copy of my visit note below.    Podiatry / Foot and Ankle Surgery Progress Note    March 5, 2024    Subject: Patient was seen for pain to the right foot.  Here with family member.  Patient notes that a few days ago he woke up and could not put any pressure on his right foot.  Started in the ankle and then the next day was in the heel and then the day after that was in his arch.  Pain has subsided to where it is now only 2 out of 10 at its worst and he can walk on the foot.  Denies history of gout.  Denies specific injury.  Wondering what is causing the pain and what can be done for it.    Objective:  Vitals: Wt 77.6 kg (171 lb)   BMI 24.54 kg/m    BMI= Body mass index is 24.54 kg/m .    General: NAD    Dermatologic: No open lesions.  No redness, dehiscence or signs of acute infection.  T     Vascular: DP & PT pulses are intact & regular bilaterally.  edema and  varicosities noted.  CFT and skin temperature is normal to both lower extremities.     Neurologic: Lower extremity sensation is intact to light touch.  No evidence of weakness or contracture in the lower extremities.  No evidence of neuropathy.     Musculoskeletal: Patient is ambulatory with cane. Decrease arch height.  Minimal pain on palpation of the plantar medial arch fascia.  Radiographs: Right foot x-ray - I have looked at and reviewed xrays personally -degenerative changes noted to the midfoot.  Enthesopathy is noted to the plantar fascia.  ASSESSMENT:     Foot pain, right  Bilateral pes planus  Edema of both lower extremities due to peripheral venous insufficiency  Plantar fasciitis, right     Medical Decision Making/Plan:  Reviewed patient's chart in [facility]. The potential causes and nature of plantar fasciitis were  discussed with the patient.  We reviewed the natural history/prognosis of the condition and risks if left untreated.  These include chronic pain, other sites of pain due to gait changes, and potential plantar fascial rupture.      We discussed possible causes of the condition as it relates to the patients specific situation.      Conservative treatment options were reviewed:  appropriate shoes, avoidance of barefoot walking, inserts/orthoses, stretching, ice, massage, immobilization and NSAIDs.     We also reviewed the options of injection therapy and surgery.  However, it was made clear that surgery is only considered when conservative therapy fails.  The risks and benefits of injection therapy, and surgery were discussed.     Since pain is improving and recommend topical pain cream.  This was ordered.  Also recommend inserts in the shoes and not going barefoot or in socks and having supportive shoe or sandal on at all times.  He was given stretches to do.  We will get baseline x-rays today.  If pain does not continue to improve or gets worse would recommend an MRI of the foot and ankle to assess for further pathology.    All questions were answered to patient's satisfaction and he will call further questions or concerns.       Discussed causes of keratomas.  They are due to areas of increase friction.  Hammertoes can create these as they put more pressure to the metatarsal head.  Discussed treatments such as using foot file, pumice stone, metatarsal pads, orthotics, and not walking barefoot.      We discussed the cost structure of callus care if they were to come back and have it treated in the clinic if insurance does not cover it and explained that they would be billed. They were also provided information on places to get the callus treatment.     Discussed with patient and patient's daughter that it could have been that because the nail was pulled off mostly when he got stubbed or the callus snagged any little  crack in the skin can cause a lot of bleeding given that he is on a blood thinner.  The remaining nail was removed today and the callus was removed.  We talked about using a toe cover and not going barefoot to try to protect the end of the toe.  They will apply Neosporin and a Band-Aid to the toe for the next week.  All questions were answered to patient satisfaction and they will call further questions or concerns.      Patient risk factor: Patient is at low risk for infection.    Patient Risk Factor:  Patient is a low risk factor for infection.     Kelly Andrews DPM, Podiatry/Foot and Ankle Surgery      Again, thank you for allowing me to participate in the care of your patient.        Sincerely,        Kelly Andrews DPM, Podiatry/Foot and Ankle Surgery

## 2024-03-08 ENCOUNTER — LAB (OUTPATIENT)
Dept: LAB | Facility: CLINIC | Age: 89
End: 2024-03-08
Payer: COMMERCIAL

## 2024-03-08 ENCOUNTER — ANTICOAGULATION THERAPY VISIT (OUTPATIENT)
Dept: ANTICOAGULATION | Facility: CLINIC | Age: 89
End: 2024-03-08

## 2024-03-08 DIAGNOSIS — I48.21 PERMANENT ATRIAL FIBRILLATION (H): ICD-10-CM

## 2024-03-08 DIAGNOSIS — Z79.01 LONG TERM CURRENT USE OF ANTICOAGULANTS WITH INR GOAL OF 2.0-3.0: ICD-10-CM

## 2024-03-08 DIAGNOSIS — I48.21 PERMANENT ATRIAL FIBRILLATION (H): Primary | ICD-10-CM

## 2024-03-08 LAB — INR BLD: 2.2 (ref 0.9–1.1)

## 2024-03-08 PROCEDURE — 85610 PROTHROMBIN TIME: CPT

## 2024-03-08 PROCEDURE — 36416 COLLJ CAPILLARY BLOOD SPEC: CPT

## 2024-03-08 NOTE — PROGRESS NOTES
ANTICOAGULATION MANAGEMENT     Tucker Slater 89 year old male is on warfarin with therapeutic INR result. (Goal INR 2.0-2.5)    Recent labs: (last 7 days)     03/08/24  1420   INR 2.2*       ASSESSMENT     Warfarin Lab Questionnaire    Warfarin Doses Last 7 Days      3/8/2024     2:15 PM   Dose in Tablet or Mg   TAB or MG? milligram (mg)     Pt Rptd Dose AMBIKA MONDAY TUESDAY WED THURS FRIDAY SATURDAY   3/8/2024   2:15 PM 1 0.5 1 0.5 1 0.5 1         3/8/2024   Warfarin Lab Questionnaire   Missed doses within past 14 days? No   Changes in diet or alcohol within past 14 days? No   Medication changes since last result? No   Injuries or illness since last result? No   New shortness of breath, severe headaches or sudden changes in vision since last result? No   Abnormal bleeding since last result? No   Upcoming surgery, procedure? No   Best number to call with results? [phone]     Previous result: Therapeutic last 2(+) visits  Additional findings: None       PLAN     Recommended plan for no diet, medication or health factor changes affecting INR     Dosing Instructions: Continue your current warfarin dose with next INR in 4 weeks       Summary  As of 3/8/2024      Full warfarin instructions:  1.25 mg every Mon, Wed, Fri; 2.5 mg all other days   Next INR check:  4/5/2024               Detailed voice message left for Tucker with dosing instructions and follow up date.     Contact [phone]  to schedule and with any changes, questions or concerns.     Education provided:   Please call back if any changes to your diet, medications or how you've been taking warfarin    Plan made per [facility] anticoagulation protocol    Ivory Machado, RN  Anticoagulation Clinic  3/8/2024    _______________________________________________________________________     Anticoagulation Episode Summary       Current INR goal:  2.0-2.5   TTR:  55.1% (1 y)   Target end date:  Indefinite   Send INR reminders to:  [facility]     Indications    Atrial fibrillation (H) with mitral regurgitation and congestive heart failure [I48.91]  Long term current use of anticoagulants with INR goal of 2.0-3.0 [Z79.01]  Permanent atrial fibrillation (H) [I48.21]             Comments:               Anticoagulation Care Providers       Provider Role Specialty Phone number    Kwadwo Winslow MD Referring Internal Medicine [phone]

## 2024-03-13 ENCOUNTER — ANCILLARY PROCEDURE (OUTPATIENT)
Dept: CARDIOLOGY | Facility: CLINIC | Age: 89
End: 2024-03-13
Attending: INTERNAL MEDICINE
Payer: COMMERCIAL

## 2024-03-13 DIAGNOSIS — Z95.0 CARDIAC PACEMAKER IN SITU: ICD-10-CM

## 2024-03-13 DIAGNOSIS — I49.5 SICK SINUS SYNDROME (H): ICD-10-CM

## 2024-03-14 LAB
MDC_IDC_LEAD_CONNECTION_STATUS: NORMAL
MDC_IDC_LEAD_CONNECTION_STATUS: NORMAL
MDC_IDC_LEAD_IMPLANT_DT: NORMAL
MDC_IDC_LEAD_IMPLANT_DT: NORMAL
MDC_IDC_LEAD_LOCATION: NORMAL
MDC_IDC_LEAD_LOCATION: NORMAL
MDC_IDC_LEAD_LOCATION_DETAIL_1: NORMAL
MDC_IDC_LEAD_LOCATION_DETAIL_1: NORMAL
MDC_IDC_LEAD_MFG: NORMAL
MDC_IDC_LEAD_MFG: NORMAL
MDC_IDC_LEAD_MODEL: NORMAL
MDC_IDC_LEAD_MODEL: NORMAL
MDC_IDC_LEAD_POLARITY_TYPE: NORMAL
MDC_IDC_LEAD_POLARITY_TYPE: NORMAL
MDC_IDC_LEAD_SERIAL: NORMAL
MDC_IDC_LEAD_SERIAL: NORMAL
MDC_IDC_MSMT_BATTERY_DTM: NORMAL
MDC_IDC_MSMT_BATTERY_REMAINING_LONGEVITY: 4 MO
MDC_IDC_MSMT_BATTERY_REMAINING_PERCENTAGE: 5 %
MDC_IDC_MSMT_BATTERY_RRT_TRIGGER: NORMAL
MDC_IDC_MSMT_BATTERY_STATUS: NORMAL
MDC_IDC_MSMT_BATTERY_VOLTAGE: 2.75 V
MDC_IDC_MSMT_LEADCHNL_RV_IMPEDANCE_VALUE: 400 OHM
MDC_IDC_MSMT_LEADCHNL_RV_LEAD_CHANNEL_STATUS: NORMAL
MDC_IDC_MSMT_LEADCHNL_RV_PACING_THRESHOLD_AMPLITUDE: 0.75 V
MDC_IDC_MSMT_LEADCHNL_RV_PACING_THRESHOLD_PULSEWIDTH: 0.5 MS
MDC_IDC_MSMT_LEADCHNL_RV_SENSING_INTR_AMPL: 12 MV
MDC_IDC_PG_IMPLANT_DTM: NORMAL
MDC_IDC_PG_MFG: NORMAL
MDC_IDC_PG_MODEL: NORMAL
MDC_IDC_PG_SERIAL: NORMAL
MDC_IDC_PG_TYPE: NORMAL
MDC_IDC_SESS_CLINIC_NAME: NORMAL
MDC_IDC_SESS_DTM: NORMAL
MDC_IDC_SESS_REPROGRAMMED: NO
MDC_IDC_SESS_TYPE: NORMAL
MDC_IDC_SET_BRADY_HYSTRATE: 60 {BEATS}/MIN
MDC_IDC_SET_BRADY_LOWRATE: 70 {BEATS}/MIN
MDC_IDC_SET_BRADY_MAX_SENSOR_RATE: 120 {BEATS}/MIN
MDC_IDC_SET_BRADY_MODE: NORMAL
MDC_IDC_SET_LEADCHNL_RA_PACING_POLARITY: NORMAL
MDC_IDC_SET_LEADCHNL_RA_SENSING_POLARITY: NORMAL
MDC_IDC_SET_LEADCHNL_RV_PACING_AMPLITUDE: 5 V
MDC_IDC_SET_LEADCHNL_RV_PACING_ANODE_ELECTRODE_1: NORMAL
MDC_IDC_SET_LEADCHNL_RV_PACING_ANODE_LOCATION_1: NORMAL
MDC_IDC_SET_LEADCHNL_RV_PACING_CAPTURE_MODE: NORMAL
MDC_IDC_SET_LEADCHNL_RV_PACING_CATHODE_ELECTRODE_1: NORMAL
MDC_IDC_SET_LEADCHNL_RV_PACING_CATHODE_LOCATION_1: NORMAL
MDC_IDC_SET_LEADCHNL_RV_PACING_POLARITY: NORMAL
MDC_IDC_SET_LEADCHNL_RV_PACING_PULSEWIDTH: 0.5 MS
MDC_IDC_SET_LEADCHNL_RV_SENSING_ADAPTATION_MODE: NORMAL
MDC_IDC_SET_LEADCHNL_RV_SENSING_ANODE_ELECTRODE_1: NORMAL
MDC_IDC_SET_LEADCHNL_RV_SENSING_ANODE_LOCATION_1: NORMAL
MDC_IDC_SET_LEADCHNL_RV_SENSING_CATHODE_ELECTRODE_1: NORMAL
MDC_IDC_SET_LEADCHNL_RV_SENSING_CATHODE_LOCATION_1: NORMAL
MDC_IDC_SET_LEADCHNL_RV_SENSING_POLARITY: NORMAL
MDC_IDC_SET_LEADCHNL_RV_SENSING_SENSITIVITY: 2 MV
MDC_IDC_STAT_AT_DTM_END: NORMAL
MDC_IDC_STAT_AT_DTM_START: NORMAL
MDC_IDC_STAT_BRADY_DTM_END: NORMAL
MDC_IDC_STAT_BRADY_DTM_START: NORMAL
MDC_IDC_STAT_BRADY_RV_PERCENT_PACED: 99 %
MDC_IDC_STAT_CRT_DTM_END: NORMAL
MDC_IDC_STAT_CRT_DTM_START: NORMAL
MDC_IDC_STAT_HEART_RATE_DTM_END: NORMAL
MDC_IDC_STAT_HEART_RATE_DTM_START: NORMAL
MDC_IDC_STAT_HEART_RATE_VENTRICULAR_MAX: 180 {BEATS}/MIN
MDC_IDC_STAT_HEART_RATE_VENTRICULAR_MEAN: 76 {BEATS}/MIN
MDC_IDC_STAT_HEART_RATE_VENTRICULAR_MIN: 60 {BEATS}/MIN

## 2024-03-16 DIAGNOSIS — N13.8 HYPERTROPHY OF PROSTATE WITH URINARY OBSTRUCTION: ICD-10-CM

## 2024-03-16 DIAGNOSIS — N40.1 HYPERTROPHY OF PROSTATE WITH URINARY OBSTRUCTION: ICD-10-CM

## 2024-03-18 DIAGNOSIS — I48.21 PERMANENT ATRIAL FIBRILLATION (H): ICD-10-CM

## 2024-03-18 RX ORDER — FINASTERIDE 5 MG/1
1 TABLET, FILM COATED ORAL DAILY
Qty: 90 TABLET | Refills: 0 | Status: SHIPPED | OUTPATIENT
Start: 2024-03-18 | End: 2024-06-20

## 2024-03-18 RX ORDER — WARFARIN SODIUM 2.5 MG/1
TABLET ORAL
Qty: 80 TABLET | Refills: 1 | Status: SHIPPED | OUTPATIENT
Start: 2024-03-18 | End: 2024-09-16

## 2024-03-18 NOTE — TELEPHONE ENCOUNTER
ANTICOAGULATION MANAGEMENT:  Medication Refill    Anticoagulation Summary  As of 3/8/2024      Warfarin maintenance plan:  1.25 mg (2.5 mg x 0.5) every Mon, Wed, Fri; 2.5 mg (2.5 mg x 1) all other days   Next INR check:  4/5/2024   Target end date:  Indefinite    Indications    Atrial fibrillation (H) with mitral regurgitation and congestive heart failure [I48.91]  Long term current use of anticoagulants with INR goal of 2.0-3.0 [Z79.01]  Permanent atrial fibrillation (H) [I48.21]                 Anticoagulation Care Providers       Provider Role Specialty Phone number    Kwadwo Winslow MD Referring Internal Medicine [phone]            Refill Criteria    Visit with referring provider/group: Meets criteria: office visit within referring provider group in the last 1 year on 2/26/24    [facility] referral last signed: 03/04/2024; within last year: Yes    Lab monitoring not exceeding 2 weeks overdue: Yes    Tucker meets all criteria for refill. Rx instructions and quantity supplied updated to match patient's current dosing plan. Warfarin 90 day supply with 1 refill granted per [facility] protocol     Gail Valdovinos RN  Anticoagulation Clinic

## 2024-03-29 DIAGNOSIS — I10 ESSENTIAL HYPERTENSION WITH GOAL BLOOD PRESSURE LESS THAN 140/90: ICD-10-CM

## 2024-03-29 RX ORDER — LOSARTAN POTASSIUM 50 MG/1
TABLET ORAL
Qty: 90 TABLET | Refills: 0 | Status: SHIPPED | OUTPATIENT
Start: 2024-03-29 | End: 2024-07-03

## 2024-04-05 ENCOUNTER — ANTICOAGULATION THERAPY VISIT (OUTPATIENT)
Dept: ANTICOAGULATION | Facility: CLINIC | Age: 89
End: 2024-04-05

## 2024-04-05 ENCOUNTER — LAB (OUTPATIENT)
Dept: LAB | Facility: CLINIC | Age: 89
End: 2024-04-05
Payer: COMMERCIAL

## 2024-04-05 DIAGNOSIS — I48.21 PERMANENT ATRIAL FIBRILLATION (H): ICD-10-CM

## 2024-04-05 DIAGNOSIS — Z79.01 LONG TERM CURRENT USE OF ANTICOAGULANTS WITH INR GOAL OF 2.0-3.0: ICD-10-CM

## 2024-04-05 DIAGNOSIS — I48.21 PERMANENT ATRIAL FIBRILLATION (H): Primary | ICD-10-CM

## 2024-04-05 LAB — INR BLD: 2.1 (ref 0.9–1.1)

## 2024-04-05 PROCEDURE — 36416 COLLJ CAPILLARY BLOOD SPEC: CPT

## 2024-04-05 PROCEDURE — 85610 PROTHROMBIN TIME: CPT

## 2024-04-05 NOTE — PROGRESS NOTES
ANTICOAGULATION MANAGEMENT     Tucker Slater 89 year old male is on warfarin with therapeutic INR result. (Goal INR 2.0-2.5)    Recent labs: (last 7 days)     04/05/24  1422   INR 2.1*       ASSESSMENT     Source(s): Chart Review and Patient/Caregiver Call     Warfarin doses taken: Warfarin taken as instructed  Diet: No new diet changes identified  Medication/supplement changes: None noted  New illness, injury, or hospitalization: No  Signs or symptoms of bleeding or clotting: No  Previous result: Therapeutic last 2(+) visits  Additional findings: None       PLAN     Recommended plan for no diet, medication or health factor changes affecting INR     Dosing Instructions: Continue your current warfarin dose with next INR in 5 weeks       Summary  As of 4/5/2024      Full warfarin instructions:  1.25 mg every Mon, Wed, Fri; 2.5 mg all other days   Next INR check:  5/10/2024               Telephone call with Tucker who verbalizes understanding and agrees to plan    Lab visit scheduled    Education provided:   None required    Plan made per [facility] anticoagulation protocol    Chichi Wang RN  Anticoagulation Clinic  4/5/2024    _______________________________________________________________________     Anticoagulation Episode Summary       Current INR goal:  2.0-2.5   TTR:  59.4% (1 y)   Target end date:  Indefinite   Send INR reminders to:  [facility]    Indications    Atrial fibrillation (H) with mitral regurgitation and congestive heart failure [I48.91]  Long term current use of anticoagulants with INR goal of 2.0-3.0 [Z79.01]  Permanent atrial fibrillation (H) [I48.21]             Comments:               Anticoagulation Care Providers       Provider Role Specialty Phone number    Kwadwo Winslow MD Referring Internal Medicine [phone]

## 2024-04-24 ENCOUNTER — ANCILLARY PROCEDURE (OUTPATIENT)
Dept: CARDIOLOGY | Facility: CLINIC | Age: 89
End: 2024-04-24
Attending: INTERNAL MEDICINE
Payer: COMMERCIAL

## 2024-04-24 DIAGNOSIS — Z95.0 CARDIAC PACEMAKER IN SITU: ICD-10-CM

## 2024-04-24 DIAGNOSIS — I49.5 SICK SINUS SYNDROME (H): ICD-10-CM

## 2024-04-26 LAB
MDC_IDC_LEAD_CONNECTION_STATUS: NORMAL
MDC_IDC_LEAD_CONNECTION_STATUS: NORMAL
MDC_IDC_LEAD_IMPLANT_DT: NORMAL
MDC_IDC_LEAD_IMPLANT_DT: NORMAL
MDC_IDC_LEAD_LOCATION: NORMAL
MDC_IDC_LEAD_LOCATION: NORMAL
MDC_IDC_LEAD_LOCATION_DETAIL_1: NORMAL
MDC_IDC_LEAD_LOCATION_DETAIL_1: NORMAL
MDC_IDC_LEAD_MFG: NORMAL
MDC_IDC_LEAD_MFG: NORMAL
MDC_IDC_LEAD_MODEL: NORMAL
MDC_IDC_LEAD_MODEL: NORMAL
MDC_IDC_LEAD_POLARITY_TYPE: NORMAL
MDC_IDC_LEAD_POLARITY_TYPE: NORMAL
MDC_IDC_LEAD_SERIAL: NORMAL
MDC_IDC_LEAD_SERIAL: NORMAL
MDC_IDC_MSMT_BATTERY_DTM: NORMAL
MDC_IDC_MSMT_BATTERY_REMAINING_LONGEVITY: 1 MO
MDC_IDC_MSMT_BATTERY_REMAINING_PERCENTAGE: 3 %
MDC_IDC_MSMT_BATTERY_RRT_TRIGGER: NORMAL
MDC_IDC_MSMT_BATTERY_STATUS: NORMAL
MDC_IDC_MSMT_BATTERY_VOLTAGE: 2.69 V
MDC_IDC_MSMT_LEADCHNL_RV_IMPEDANCE_VALUE: 390 OHM
MDC_IDC_MSMT_LEADCHNL_RV_LEAD_CHANNEL_STATUS: NORMAL
MDC_IDC_MSMT_LEADCHNL_RV_PACING_THRESHOLD_AMPLITUDE: 2.62 V
MDC_IDC_MSMT_LEADCHNL_RV_PACING_THRESHOLD_PULSEWIDTH: 0.5 MS
MDC_IDC_MSMT_LEADCHNL_RV_SENSING_INTR_AMPL: 8.4 MV
MDC_IDC_PG_IMPLANT_DTM: NORMAL
MDC_IDC_PG_MFG: NORMAL
MDC_IDC_PG_MODEL: NORMAL
MDC_IDC_PG_SERIAL: NORMAL
MDC_IDC_PG_TYPE: NORMAL
MDC_IDC_SESS_CLINIC_NAME: NORMAL
MDC_IDC_SESS_DTM: NORMAL
MDC_IDC_SESS_REPROGRAMMED: NO
MDC_IDC_SESS_TYPE: NORMAL
MDC_IDC_SET_BRADY_HYSTRATE: 60 {BEATS}/MIN
MDC_IDC_SET_BRADY_LOWRATE: 70 {BEATS}/MIN
MDC_IDC_SET_BRADY_MAX_SENSOR_RATE: 120 {BEATS}/MIN
MDC_IDC_SET_BRADY_MODE: NORMAL
MDC_IDC_SET_LEADCHNL_RA_PACING_POLARITY: NORMAL
MDC_IDC_SET_LEADCHNL_RA_SENSING_POLARITY: NORMAL
MDC_IDC_SET_LEADCHNL_RV_PACING_AMPLITUDE: 2.88
MDC_IDC_SET_LEADCHNL_RV_PACING_ANODE_ELECTRODE_1: NORMAL
MDC_IDC_SET_LEADCHNL_RV_PACING_ANODE_LOCATION_1: NORMAL
MDC_IDC_SET_LEADCHNL_RV_PACING_CAPTURE_MODE: NORMAL
MDC_IDC_SET_LEADCHNL_RV_PACING_CATHODE_ELECTRODE_1: NORMAL
MDC_IDC_SET_LEADCHNL_RV_PACING_CATHODE_LOCATION_1: NORMAL
MDC_IDC_SET_LEADCHNL_RV_PACING_POLARITY: NORMAL
MDC_IDC_SET_LEADCHNL_RV_PACING_PULSEWIDTH: 0.5 MS
MDC_IDC_SET_LEADCHNL_RV_SENSING_ADAPTATION_MODE: NORMAL
MDC_IDC_SET_LEADCHNL_RV_SENSING_ANODE_ELECTRODE_1: NORMAL
MDC_IDC_SET_LEADCHNL_RV_SENSING_ANODE_LOCATION_1: NORMAL
MDC_IDC_SET_LEADCHNL_RV_SENSING_CATHODE_ELECTRODE_1: NORMAL
MDC_IDC_SET_LEADCHNL_RV_SENSING_CATHODE_LOCATION_1: NORMAL
MDC_IDC_SET_LEADCHNL_RV_SENSING_POLARITY: NORMAL
MDC_IDC_SET_LEADCHNL_RV_SENSING_SENSITIVITY: 2 MV
MDC_IDC_STAT_AT_DTM_END: NORMAL
MDC_IDC_STAT_AT_DTM_START: NORMAL
MDC_IDC_STAT_BRADY_DTM_END: NORMAL
MDC_IDC_STAT_BRADY_DTM_START: NORMAL
MDC_IDC_STAT_BRADY_RV_PERCENT_PACED: 99 %
MDC_IDC_STAT_CRT_DTM_END: NORMAL
MDC_IDC_STAT_CRT_DTM_START: NORMAL
MDC_IDC_STAT_HEART_RATE_DTM_END: NORMAL
MDC_IDC_STAT_HEART_RATE_DTM_START: NORMAL
MDC_IDC_STAT_HEART_RATE_VENTRICULAR_MAX: 190 {BEATS}/MIN
MDC_IDC_STAT_HEART_RATE_VENTRICULAR_MEAN: 76 {BEATS}/MIN
MDC_IDC_STAT_HEART_RATE_VENTRICULAR_MIN: 60 {BEATS}/MIN

## 2024-05-10 ENCOUNTER — LAB (OUTPATIENT)
Dept: LAB | Facility: CLINIC | Age: 89
End: 2024-05-10
Payer: COMMERCIAL

## 2024-05-10 ENCOUNTER — ANTICOAGULATION THERAPY VISIT (OUTPATIENT)
Dept: ANTICOAGULATION | Facility: CLINIC | Age: 89
End: 2024-05-10

## 2024-05-10 DIAGNOSIS — I48.21 PERMANENT ATRIAL FIBRILLATION (H): ICD-10-CM

## 2024-05-10 DIAGNOSIS — Z79.01 LONG TERM CURRENT USE OF ANTICOAGULANTS WITH INR GOAL OF 2.0-3.0: Primary | ICD-10-CM

## 2024-05-10 LAB — INR BLD: 2.4 (ref 0.9–1.1)

## 2024-05-10 PROCEDURE — 85610 PROTHROMBIN TIME: CPT

## 2024-05-10 PROCEDURE — 36416 COLLJ CAPILLARY BLOOD SPEC: CPT

## 2024-05-10 NOTE — PROGRESS NOTES
ANTICOAGULATION MANAGEMENT     Tucker Slater 89 year old male is on warfarin with therapeutic INR result. (Goal INR 2.0-2.5)    Recent labs: (last 7 days)     05/10/24  1426   INR 2.4*       ASSESSMENT     Warfarin Lab Questionnaire    Warfarin Doses Last 7 Days      5/10/2024     2:23 PM   Dose in Tablet or Mg   TAB or MG? milligram (mg)     Pt Rptd Dose AMBIKA MONDAY TUESDAY WED THURS FRIDAY SATURDAY   5/10/2024   2:23 PM 2.5 1.25 2.5 1.25 2.5 1.25 2.5         5/10/2024   Warfarin Lab Questionnaire   Missed doses within past 14 days? No   Changes in diet or alcohol within past 14 days? No   Medication changes since last result? No   Injuries or illness since last result? No   New shortness of breath, severe headaches or sudden changes in vision since last result? No   Abnormal bleeding since last result? No   Upcoming surgery, procedure? No   Best number to call with results? [phone]     Previous result: Therapeutic last 2(+) visits  Additional findings: None       PLAN     Recommended plan for no diet, medication or health factor changes affecting INR     Dosing Instructions: Continue your current warfarin dose with next INR in 6 weeks       Summary  As of 5/10/2024      Full warfarin instructions:  1.25 mg every Mon, Wed, Fri; 2.5 mg all other days   Next INR check:  6/21/2024               Telephone call with Tucker who verbalizes understanding and agrees to plan    Lab visit scheduled    Education provided:   Please call back if any changes to your diet, medications or how you've been taking warfarin  Contact [phone]  with any changes, questions or concerns.     Plan made per [facility] anticoagulation protocol    Gail Valdovinos RN  Anticoagulation Clinic  5/10/2024    _______________________________________________________________________     Anticoagulation Episode Summary       Current INR goal:  2.0-2.5   TTR:  59.4% (1 y)   Target end date:  Indefinite   Send INR reminders to:  [facility]    Indications    Atrial fibrillation (H) with mitral regurgitation and congestive heart failure [I48.91]  Long term current use of anticoagulants with INR goal of 2.0-3.0 [Z79.01]  Permanent atrial fibrillation (H) [I48.21]             Comments:               Anticoagulation Care Providers       Provider Role Specialty Phone number    Kwadwo Winslow MD Referring Internal Medicine [phone]

## 2024-05-27 DIAGNOSIS — I10 ESSENTIAL HYPERTENSION WITH GOAL BLOOD PRESSURE LESS THAN 140/90: ICD-10-CM

## 2024-05-28 RX ORDER — TRIAMTERENE/HYDROCHLOROTHIAZID 37.5-25 MG
1 TABLET ORAL DAILY
Qty: 90 TABLET | Refills: 0 | Status: SHIPPED | OUTPATIENT
Start: 2024-05-28 | End: 2024-08-26

## 2024-06-05 ENCOUNTER — ANCILLARY PROCEDURE (OUTPATIENT)
Dept: CARDIOLOGY | Facility: CLINIC | Age: 89
End: 2024-06-05
Attending: INTERNAL MEDICINE
Payer: COMMERCIAL

## 2024-06-05 DIAGNOSIS — I49.5 SICK SINUS SYNDROME (H): ICD-10-CM

## 2024-06-05 DIAGNOSIS — Z95.0 CARDIAC PACEMAKER IN SITU: ICD-10-CM

## 2024-06-05 LAB
MDC_IDC_LEAD_CONNECTION_STATUS: NORMAL
MDC_IDC_LEAD_CONNECTION_STATUS: NORMAL
MDC_IDC_LEAD_IMPLANT_DT: NORMAL
MDC_IDC_LEAD_IMPLANT_DT: NORMAL
MDC_IDC_LEAD_LOCATION: NORMAL
MDC_IDC_LEAD_LOCATION: NORMAL
MDC_IDC_LEAD_LOCATION_DETAIL_1: NORMAL
MDC_IDC_LEAD_LOCATION_DETAIL_1: NORMAL
MDC_IDC_LEAD_MFG: NORMAL
MDC_IDC_LEAD_MFG: NORMAL
MDC_IDC_LEAD_MODEL: NORMAL
MDC_IDC_LEAD_MODEL: NORMAL
MDC_IDC_LEAD_POLARITY_TYPE: NORMAL
MDC_IDC_LEAD_POLARITY_TYPE: NORMAL
MDC_IDC_LEAD_SERIAL: NORMAL
MDC_IDC_LEAD_SERIAL: NORMAL
MDC_IDC_MSMT_BATTERY_DTM: NORMAL
MDC_IDC_MSMT_BATTERY_REMAINING_LONGEVITY: 1 MO
MDC_IDC_MSMT_BATTERY_REMAINING_PERCENTAGE: 1 %
MDC_IDC_MSMT_BATTERY_RRT_TRIGGER: NORMAL
MDC_IDC_MSMT_BATTERY_STATUS: NORMAL
MDC_IDC_MSMT_BATTERY_VOLTAGE: 2.63 V
MDC_IDC_MSMT_LEADCHNL_RV_IMPEDANCE_VALUE: 390 OHM
MDC_IDC_MSMT_LEADCHNL_RV_LEAD_CHANNEL_STATUS: NORMAL
MDC_IDC_MSMT_LEADCHNL_RV_PACING_THRESHOLD_AMPLITUDE: 1.62 V
MDC_IDC_MSMT_LEADCHNL_RV_PACING_THRESHOLD_PULSEWIDTH: 0.5 MS
MDC_IDC_MSMT_LEADCHNL_RV_SENSING_INTR_AMPL: 8.4 MV
MDC_IDC_PG_IMPLANT_DTM: NORMAL
MDC_IDC_PG_MFG: NORMAL
MDC_IDC_PG_MODEL: NORMAL
MDC_IDC_PG_SERIAL: NORMAL
MDC_IDC_PG_TYPE: NORMAL
MDC_IDC_SESS_CLINIC_NAME: NORMAL
MDC_IDC_SESS_DTM: NORMAL
MDC_IDC_SESS_REPROGRAMMED: NO
MDC_IDC_SESS_TYPE: NORMAL
MDC_IDC_SET_BRADY_HYSTRATE: 60 {BEATS}/MIN
MDC_IDC_SET_BRADY_LOWRATE: 70 {BEATS}/MIN
MDC_IDC_SET_BRADY_MAX_SENSOR_RATE: 120 {BEATS}/MIN
MDC_IDC_SET_BRADY_MODE: NORMAL
MDC_IDC_SET_LEADCHNL_RA_PACING_POLARITY: NORMAL
MDC_IDC_SET_LEADCHNL_RA_SENSING_POLARITY: NORMAL
MDC_IDC_SET_LEADCHNL_RV_PACING_AMPLITUDE: 1.88
MDC_IDC_SET_LEADCHNL_RV_PACING_ANODE_ELECTRODE_1: NORMAL
MDC_IDC_SET_LEADCHNL_RV_PACING_ANODE_LOCATION_1: NORMAL
MDC_IDC_SET_LEADCHNL_RV_PACING_CAPTURE_MODE: NORMAL
MDC_IDC_SET_LEADCHNL_RV_PACING_CATHODE_ELECTRODE_1: NORMAL
MDC_IDC_SET_LEADCHNL_RV_PACING_CATHODE_LOCATION_1: NORMAL
MDC_IDC_SET_LEADCHNL_RV_PACING_POLARITY: NORMAL
MDC_IDC_SET_LEADCHNL_RV_PACING_PULSEWIDTH: 0.5 MS
MDC_IDC_SET_LEADCHNL_RV_SENSING_ADAPTATION_MODE: NORMAL
MDC_IDC_SET_LEADCHNL_RV_SENSING_ANODE_ELECTRODE_1: NORMAL
MDC_IDC_SET_LEADCHNL_RV_SENSING_ANODE_LOCATION_1: NORMAL
MDC_IDC_SET_LEADCHNL_RV_SENSING_CATHODE_ELECTRODE_1: NORMAL
MDC_IDC_SET_LEADCHNL_RV_SENSING_CATHODE_LOCATION_1: NORMAL
MDC_IDC_SET_LEADCHNL_RV_SENSING_POLARITY: NORMAL
MDC_IDC_SET_LEADCHNL_RV_SENSING_SENSITIVITY: 2 MV
MDC_IDC_STAT_AT_DTM_END: NORMAL
MDC_IDC_STAT_AT_DTM_START: NORMAL
MDC_IDC_STAT_BRADY_DTM_END: NORMAL
MDC_IDC_STAT_BRADY_DTM_START: NORMAL
MDC_IDC_STAT_BRADY_RV_PERCENT_PACED: 99 %
MDC_IDC_STAT_CRT_DTM_END: NORMAL
MDC_IDC_STAT_CRT_DTM_START: NORMAL
MDC_IDC_STAT_HEART_RATE_DTM_END: NORMAL
MDC_IDC_STAT_HEART_RATE_DTM_START: NORMAL
MDC_IDC_STAT_HEART_RATE_VENTRICULAR_MAX: 200 {BEATS}/MIN
MDC_IDC_STAT_HEART_RATE_VENTRICULAR_MEAN: 76 {BEATS}/MIN
MDC_IDC_STAT_HEART_RATE_VENTRICULAR_MIN: 60 {BEATS}/MIN

## 2024-06-05 PROCEDURE — 93294 REM INTERROG EVL PM/LDLS PM: CPT | Performed by: INTERNAL MEDICINE

## 2024-06-05 PROCEDURE — 93296 REM INTERROG EVL PM/IDS: CPT | Performed by: INTERNAL MEDICINE

## 2024-06-10 ENCOUNTER — DOCUMENTATION ONLY (OUTPATIENT)
Dept: SLEEP MEDICINE | Facility: CLINIC | Age: 89
End: 2024-06-10
Payer: COMMERCIAL

## 2024-06-10 DIAGNOSIS — I50.22 CHRONIC SYSTOLIC CONGESTIVE HEART FAILURE (H): Primary | ICD-10-CM

## 2024-06-19 DIAGNOSIS — N13.8 HYPERTROPHY OF PROSTATE WITH URINARY OBSTRUCTION: ICD-10-CM

## 2024-06-19 DIAGNOSIS — N40.1 HYPERTROPHY OF PROSTATE WITH URINARY OBSTRUCTION: ICD-10-CM

## 2024-06-19 DIAGNOSIS — I10 ESSENTIAL HYPERTENSION WITH GOAL BLOOD PRESSURE LESS THAN 140/90: ICD-10-CM

## 2024-06-20 RX ORDER — CARVEDILOL 25 MG/1
TABLET ORAL
Qty: 270 TABLET | Refills: 0 | Status: SHIPPED | OUTPATIENT
Start: 2024-06-20 | End: 2024-10-03

## 2024-06-20 RX ORDER — FINASTERIDE 5 MG/1
1 TABLET, FILM COATED ORAL DAILY
Qty: 90 TABLET | Refills: 1 | Status: SHIPPED | OUTPATIENT
Start: 2024-06-20

## 2024-06-21 ENCOUNTER — ANTICOAGULATION THERAPY VISIT (OUTPATIENT)
Dept: ANTICOAGULATION | Facility: CLINIC | Age: 89
End: 2024-06-21

## 2024-06-21 ENCOUNTER — LAB (OUTPATIENT)
Dept: LAB | Facility: CLINIC | Age: 89
End: 2024-06-21
Payer: COMMERCIAL

## 2024-06-21 DIAGNOSIS — I48.21 PERMANENT ATRIAL FIBRILLATION (H): ICD-10-CM

## 2024-06-21 DIAGNOSIS — N18.30 CKD (CHRONIC KIDNEY DISEASE) STAGE 3, GFR 30-59 ML/MIN (H): Primary | ICD-10-CM

## 2024-06-21 DIAGNOSIS — Z79.01 LONG TERM CURRENT USE OF ANTICOAGULANTS WITH INR GOAL OF 2.0-3.0: ICD-10-CM

## 2024-06-21 DIAGNOSIS — I48.21 PERMANENT ATRIAL FIBRILLATION (H): Primary | ICD-10-CM

## 2024-06-21 LAB — INR BLD: 2.3 (ref 0.9–1.1)

## 2024-06-21 PROCEDURE — 85610 PROTHROMBIN TIME: CPT

## 2024-06-21 PROCEDURE — 36416 COLLJ CAPILLARY BLOOD SPEC: CPT

## 2024-06-21 NOTE — PROGRESS NOTES
ANTICOAGULATION MANAGEMENT     Tucker Slater 89 year old male is on warfarin with therapeutic INR result. (Goal INR 2.0-2.5)    Recent labs: (last 7 days)     06/21/24  1421   INR 2.3*       ASSESSMENT     Source(s): Chart Review and Patient/Caregiver Call     Warfarin doses taken: Warfarin taken as instructed  Diet: No new diet changes identified  Medication/supplement changes: None noted  New illness, injury, or hospitalization: No  Signs or symptoms of bleeding or clotting: No  Previous result: Therapeutic last 2(+) visits  Additional findings: None       PLAN     Recommended plan for no diet, medication or health factor changes affecting INR     Dosing Instructions: Continue your current warfarin dose with next INR in 6 weeks       Summary  As of 6/21/2024      Full warfarin instructions:  1.25 mg every Mon, Wed, Fri; 2.5 mg all other days   Next INR check:  8/2/2024               Telephone call with Tucker who agrees to plan and repeated back plan correctly    Lab visit scheduled    Education provided:   Please call back if any changes to your diet, medications or how you've been taking warfarin    Plan made per [facility] anticoagulation protocol    Ivory Machado RN  Anticoagulation Clinic  6/21/2024    _______________________________________________________________________     Anticoagulation Episode Summary       Current INR goal:  2.0-2.5   TTR:  62.5% (1 y)   Target end date:  Indefinite   Send INR reminders to:  [facility]    Indications    Atrial fibrillation (H) with mitral regurgitation and congestive heart failure [I48.91]  Long term current use of anticoagulants with INR goal of 2.0-3.0 [Z79.01]  Permanent atrial fibrillation (H) [I48.21]             Comments:               Anticoagulation Care Providers       Provider Role Specialty Phone number    Kwadwo Winslow MD Referring Internal Medicine [phone]

## 2024-07-01 ENCOUNTER — TELEPHONE (OUTPATIENT)
Dept: CARDIOLOGY | Facility: CLINIC | Age: 89
End: 2024-07-01
Payer: COMMERCIAL

## 2024-07-01 DIAGNOSIS — Z45.010 ELECTIVE REPLACEMENT INDICATED FOR CARDIAC PACEMAKER BATTERY AT END OF LIFESPAN: ICD-10-CM

## 2024-07-01 DIAGNOSIS — I44.2 COMPLETE ATRIOVENTRICULAR BLOCK (H): ICD-10-CM

## 2024-07-01 DIAGNOSIS — Z95.0 CARDIAC PACEMAKER IN SITU: Primary | ICD-10-CM

## 2024-07-01 DIAGNOSIS — I49.5 SICK SINUS SYNDROME (H): ICD-10-CM

## 2024-07-01 NOTE — TELEPHONE ENCOUNTER
Heart Care Device Change-Out Checklist (LAZARO Checklist)    Device Data  Pacemaker and Dual    :  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    Diagnostic Information  Intrinsic Rhythm per 7/12/23:  AF w/ CHB, no JE at VVI 30    Atrial Fibrillation:  Permanent Atrial Fibrillation  Takes Anticoagulant or LAAO? Yes,  Warfarin  CHADS-score: 5 (age++, CHF, HTN, prior MI)    Pacing Percentages  Atrial Pacing N/A% and Ventricle Pacing >99%  Pacemaker Dependent? Yes    History of VT/VF therapy    ATP: No  Appropriate Shocks:  N/A    Ejection Fraction  Last EF Date:  8/9/22    By Echocardiogram  Last EF Measurement:  50-55%      Special Instructions/Timeframe for change-out:  LAZARO triggered 6/29/24    Routed to Luma to assist with scheduling of H&P and procedure (orders are in).     Device RN: RAND Arce

## 2024-07-02 DIAGNOSIS — I10 ESSENTIAL HYPERTENSION WITH GOAL BLOOD PRESSURE LESS THAN 140/90: ICD-10-CM

## 2024-07-03 RX ORDER — LOSARTAN POTASSIUM 50 MG/1
TABLET ORAL
Qty: 90 TABLET | Refills: 0 | Status: SHIPPED | OUTPATIENT
Start: 2024-07-03 | End: 2024-10-07

## 2024-07-09 NOTE — PROCEDURE: MOHS SURGERY
Patient with hx of seizures, reportedly on lamotrigine 100mg bid, unable to verify recent prescriptions on [employer].     PLAN:  - C/w home lamotrigine 100mg  - F/u lamotrigine levels  - Seizure precautions  - F/[facility] med rec from [facility] Staging Info: By selecting yes to the question above you will include information on AJCC 8 tumor staging in your Mohs note. Information on tumor staging will be automatically added for SCCs on the head and neck. AJCC 8 includes tumor size, tumor depth, perineural involvement and bone invasion. Pt has not had a bowel movement since 7/4.    PLAN:  - Start senna 2 tablets qhs

## 2024-08-02 ENCOUNTER — LAB (OUTPATIENT)
Dept: LAB | Facility: CLINIC | Age: 89
End: 2024-08-02
Payer: COMMERCIAL

## 2024-08-02 ENCOUNTER — ANTICOAGULATION THERAPY VISIT (OUTPATIENT)
Dept: ANTICOAGULATION | Facility: CLINIC | Age: 89
End: 2024-08-02

## 2024-08-02 ENCOUNTER — TELEPHONE (OUTPATIENT)
Dept: ANTICOAGULATION | Facility: CLINIC | Age: 89
End: 2024-08-02

## 2024-08-02 DIAGNOSIS — N18.30 CKD (CHRONIC KIDNEY DISEASE) STAGE 3, GFR 30-59 ML/MIN (H): ICD-10-CM

## 2024-08-02 DIAGNOSIS — I48.21 PERMANENT ATRIAL FIBRILLATION (H): ICD-10-CM

## 2024-08-02 DIAGNOSIS — I48.21 PERMANENT ATRIAL FIBRILLATION (H): Primary | ICD-10-CM

## 2024-08-02 DIAGNOSIS — I50.22 CHRONIC SYSTOLIC CONGESTIVE HEART FAILURE (H): Primary | ICD-10-CM

## 2024-08-02 DIAGNOSIS — Z79.01 LONG TERM CURRENT USE OF ANTICOAGULANTS WITH INR GOAL OF 2.0-3.0: ICD-10-CM

## 2024-08-02 DIAGNOSIS — D62 ANEMIA DUE TO BLOOD LOSS, ACUTE: ICD-10-CM

## 2024-08-02 LAB
ANION GAP SERPL CALCULATED.3IONS-SCNC: 12 MMOL/L (ref 7–15)
BUN SERPL-MCNC: 57.1 MG/DL (ref 8–23)
CALCIUM SERPL-MCNC: 9.3 MG/DL (ref 8.8–10.4)
CHLORIDE SERPL-SCNC: 112 MMOL/L (ref 98–107)
CHOLEST SERPL-MCNC: 135 MG/DL
CREAT SERPL-MCNC: 1.5 MG/DL (ref 0.67–1.17)
CREAT UR-MCNC: 81.6 MG/DL
EGFRCR SERPLBLD CKD-EPI 2021: 43 ML/MIN/1.73M2
ERYTHROCYTE [DISTWIDTH] IN BLOOD BY AUTOMATED COUNT: 14.9 % (ref 10–15)
FASTING STATUS PATIENT QL REPORTED: NO
FASTING STATUS PATIENT QL REPORTED: NO
GLUCOSE SERPL-MCNC: 79 MG/DL (ref 70–99)
HCO3 SERPL-SCNC: 17 MMOL/L (ref 22–29)
HCT VFR BLD AUTO: 36.3 % (ref 40–53)
HDLC SERPL-MCNC: 36 MG/DL
HGB BLD-MCNC: 12 G/DL (ref 13.3–17.7)
INR BLD: 2.3 (ref 0.9–1.1)
LDLC SERPL CALC-MCNC: 77 MG/DL
MCH RBC QN AUTO: 31.2 PG (ref 26.5–33)
MCHC RBC AUTO-ENTMCNC: 33.1 G/DL (ref 31.5–36.5)
MCV RBC AUTO: 94 FL (ref 78–100)
MICROALBUMIN UR-MCNC: 40.6 MG/L
MICROALBUMIN/CREAT UR: 49.75 MG/G CR (ref 0–17)
NONHDLC SERPL-MCNC: 99 MG/DL
PLATELET # BLD AUTO: 209 10E3/UL (ref 150–450)
POTASSIUM SERPL-SCNC: 4.2 MMOL/L (ref 3.4–5.3)
RBC # BLD AUTO: 3.85 10E6/UL (ref 4.4–5.9)
SODIUM SERPL-SCNC: 141 MMOL/L (ref 135–145)
TRIGL SERPL-MCNC: 108 MG/DL
WBC # BLD AUTO: 5.8 10E3/UL (ref 4–11)

## 2024-08-02 PROCEDURE — 80061 LIPID PANEL: CPT

## 2024-08-02 PROCEDURE — 36415 COLL VENOUS BLD VENIPUNCTURE: CPT

## 2024-08-02 PROCEDURE — 85610 PROTHROMBIN TIME: CPT

## 2024-08-02 PROCEDURE — 80048 BASIC METABOLIC PNL TOTAL CA: CPT

## 2024-08-02 PROCEDURE — 82043 UR ALBUMIN QUANTITATIVE: CPT

## 2024-08-02 PROCEDURE — 82570 ASSAY OF URINE CREATININE: CPT

## 2024-08-02 PROCEDURE — 85027 COMPLETE CBC AUTOMATED: CPT

## 2024-08-02 NOTE — PROGRESS NOTES
ANTICOAGULATION MANAGEMENT     Tucker Slater 89 year old male is on warfarin with therapeutic INR result. (Goal INR 2.0-2.5)    Recent labs: (last 7 days)     08/02/24  1428   INR 2.3*       ASSESSMENT     Source(s): Chart Review and Patient/Caregiver Call     Warfarin doses taken: Warfarin taken as instructed  Diet: No new diet changes identified  Medication/supplement changes: None noted  New illness, injury, or hospitalization: No  Signs or symptoms of bleeding or clotting: No  Previous result: Therapeutic last 2(+) visits  Additional findings: Upcoming surgery/procedure pacemaker generator change  on 8/23/24       PLAN     Recommended plan for no diet, medication or health factor changes affecting INR     Dosing Instructions: Continue your current warfarin dose with next INR in 6 weeks or TBD based on if he needs to hold for upcoming procedure. Advised Del that we will call him either way to update and get him scheduled.        Summary  As of 8/2/2024      Full warfarin instructions:  1.25 mg every Mon, Wed, Fri; 2.5 mg all other days   Next INR check:  9/13/2024               Telephone call with Tucker who verbalizes understanding and agrees to plan    Lab visit scheduled    Education provided: Contact [phone] with any changes, questions or concerns.     Plan made per [facility] anticoagulation protocol    Nevaeh Kulkarni RN  Anticoagulation Clinic  8/2/2024    _______________________________________________________________________     Anticoagulation Episode Summary       Current INR goal:  2.0-2.5   TTR:  65.6% (1 y)   Target end date:  Indefinite   Send INR reminders to:  [facility]    Indications    Atrial fibrillation (H) with mitral regurgitation and congestive heart failure [I48.91]  Long term current use of anticoagulants with INR goal of 2.0-3.0 [Z79.01]  Permanent atrial fibrillation (H) [I48.21]             Comments:               Anticoagulation Care Providers       Provider  Role Specialty Phone number    Kwadwo Winslow MD Referring Internal Medicine [phone]

## 2024-08-02 NOTE — TELEPHONE ENCOUNTER
SAÚL-PROCEDURAL ANTICOAGULATION  MANAGEMENT    Tucker requesting pre-procedure hold orders for warfarin and review for bridging      Procedure date: 8/23/24       Procedure:  pacemaker generator replacement      Procedure location and phone number (if external): [place]     Number of warfarin hold days requested and/or target INR: unknown. Per cardiology notes:    Anticoagulation: warfarin for permanent AF   [id number] Score: 5. Hold per protocol.  -INR check needed?: yes, day of procedure    Pre-op date:  8/22/24      Routing to Anticoagulation Pharmacist for review.     [facility] pool: zain CABRERA [facility]     Nevaeh Kulkarni RN

## 2024-08-07 NOTE — TELEPHONE ENCOUNTER
"SAÚL-PROCEDURAL ANTICOAGULATION  MANAGEMENT    ASSESSMENT     Warfarin interruption plan for Pacemaker Generator Replacement Dual on 8/23/24.    Indication for Anticoagulation: Atrial Fibrillation    [id number] = 4 (CHF, Hypertension, and Age >= 75)    Saúl-Procedure Risk stratification for thromboembolism: low (2022 Chest guidelines)    AFIB: 2022 CHEST Perioperative Management guidelines recommends against bridging for patients with atrial fibrillation except in high risk stratification patients.  CARDIAC DEVICE: 2022 CHEST Perioperative Management guidelines suggest patients who require pacemaker or ICD implantation procedures to continue warfarin around the time of the procedure    RECOMMENDATION     Pre-Procedure:  No warfarin interruption per CHEST guidelines  Potential to be advised to hold x 3 days by cardiology given information in 7/1/24 TE    Post-Procedure:  IF warfarin hold advised, resume warfarin dose if okay with provider doing procedure on night of procedure, Friday, August 23 PM: take 2.5 mg x 1, then resume home dose  Recheck INR ~ 7 days after resuming warfarin     Isidra [facility]    SUBJECTIVE/OBJECTIVE     Tucker Slater, a 89 year old male    Goal INR Range: 2.0-2.5     Patient bridged in past: No    Wt Readings from Last 3 Encounters:   03/05/24 77.6 kg (171 lb)   02/26/24 77.7 kg (171 lb 4.8 oz)   12/20/23 76.7 kg (169 lb)      Ideal body weight: 73 kg (160 lb 15 oz)  Adjusted ideal body weight: 74.8 kg (164 lb 15.4 oz)     Estimated body mass index is 24.54 kg/m  as calculated from the following:    Height as of 2/26/24: 1.778 m (5' 10\").    Weight as of 3/5/24: 77.6 kg (171 lb).    Lab Results   Component Value Date    INR 2.3 (H) 08/02/2024    INR 2.3 (H) 06/21/2024    INR 2.4 (H) 05/10/2024     Lab Results   Component Value Date    HGB 12.0 (L) 08/02/2024    HCT 36.3 (L) 08/02/2024     08/02/2024     Lab Results   Component Value Date    CR 1.50 (H) 08/02/2024    CR 1.27 " (H) 02/26/2024    CR 1.51 (H) 08/21/2023     Estimated Creatinine Clearance: 33.8 mL/min (A) (based on SCr of 1.5 mg/dL (H)).

## 2024-08-16 ENCOUNTER — DOCUMENTATION ONLY (OUTPATIENT)
Dept: LAB | Facility: CLINIC | Age: 89
End: 2024-08-16
Payer: COMMERCIAL

## 2024-08-16 DIAGNOSIS — I10 ESSENTIAL HYPERTENSION WITH GOAL BLOOD PRESSURE LESS THAN 140/90: Primary | ICD-10-CM

## 2024-08-16 DIAGNOSIS — I44.2 COMPLETE ATRIOVENTRICULAR BLOCK (H): ICD-10-CM

## 2024-08-16 DIAGNOSIS — Z95.0 PACEMAKER: ICD-10-CM

## 2024-08-16 RX ORDER — LIDOCAINE 40 MG/G
CREAM TOPICAL
Status: CANCELLED | OUTPATIENT
Start: 2024-08-16

## 2024-08-16 RX ORDER — CEFAZOLIN SODIUM 2 G/100ML
2 INJECTION, SOLUTION INTRAVENOUS
Status: CANCELLED | OUTPATIENT
Start: 2024-08-16

## 2024-08-16 RX ORDER — SODIUM CHLORIDE 450 MG/100ML
INJECTION, SOLUTION INTRAVENOUS CONTINUOUS
Status: CANCELLED | OUTPATIENT
Start: 2024-08-16

## 2024-08-16 NOTE — PROGRESS NOTES
Called and left message for patient requesting call back to review the below instructions prior to upcoming PPM generator change.  Device clinic phone number provided for call back.  Pre-procedure orders entered.      RAND Wells     -------------------------------------------------------------------------------------------------------------------------------    Pre-procedure instructions for [employer] PPM Generator Change on 8/23/24 at 1300 with Dr. Perez:     -Review arrival time of 1030 and location.     Anticoagulation: Warfarin - [id number] 3 (Age++, HTN).  Hold 3 days prior to procedure (hold starting Tuesday, 8/20/24).    Oral diabetes meds: N/A  Insulin: N/A  SGLT2 Inhibitors: N/A  -GLP-1 Agonists: N/A  -Diuretic: Triamterene-hydrochlorothiazide - Hold morning of procedure.     NPO 8 hours prior to arrival time (starting at 0230)  May have clear liquids 2 hours prior to arrival time (up until 0830)    -Shower the morning of the procedure, and then put on a clean shirt in order to help prevent infection.     -Pt to call [facility] at [phone] if pt has any new COVID like symptoms or if feeling unwell the evening prior or AM of procedure.      -Post-procedure transportation and 24 hours monitoring set up. Please call before coming in if plans change.  With limited bed availability due to COVID, overnight hospital stays will be allowed for clinical reasons only.    -No driving for 24 hours post procedure due to sedation.     MRSA history?: No  Allergy to PCN or ancef?: No  Contrast allergy?: No    RAND Wells         Staff message from Device :     ----- Message -----   From: Luma Lance   Sent: 7/3/2024   4:21 PM CDT   To: Kamryn Pricebeth; *   Subject: PPM GEN CHANGE - 8/23/24                         Electrophysiology (EP) Orders     Location: [facility]     Procedure: PPM Gen Change     Diagnosis: LAZARO     Procedure Date: 8/23/24     Procedure Time: 1:00     Patient Arrival Time:  10:30     Ordering Cardiologist: Dr. Yu     Performing Cardiologist: Dr. Perez     Device Company: [employer]     Rep Notified: Yes     H&P Completed (within 30 days): Yes   Date: 8/22/24   Provider: Lorna Leija CNP     Pre-Procedure INR ordered: No     Patient Diabetic on Meds/Insulin:  No   Patient on Coumadin/Warfarin:  Yes   Patient on Pradaxa/Xarelto/Eliquis:  No   Patient on Invokana/Farxiga/Jardiance/Steglatro:  No   Patient on Adlyxin/Bydureon/Byetta/Mounjaro/Ozempic/Rybelsus/Soliquo/Trulicity/Victoza/Wegovy/Zepbound: No     Medication Holds:   RN to review     Appointment was scheduled: Over the phone     Special instructions:  Call [place] to go over prep @ [phone]

## 2024-08-16 NOTE — PROGRESS NOTES
Called pt. LM to return call for pre-procedural instructions.     Called daughter Mel MURRELL At number listed per scheduling, no answer. LM to return call for pt's pre procedural instructions. RAND Nick

## 2024-08-16 NOTE — PROGRESS NOTES
Received VM message from [place] calling for pre procedural instructions she requests to be called back at [phone]. Message received at 1620, called back at 1624 and no answer. LM that we are calling back to review pre procedural instructions. Also tried pt's home number, he answered this number. Reviewed pre procedural instructions with pt and daughter.     She asks if they really need to see both Lorna in aug and Dr. Perez in November. Pt has not seen Dr. Perez since 2021. Pt is seeing Lorna for H&P. Will review with Dr. Perez on his preference on Monday when he returns and update pt. RAND Nick

## 2024-08-16 NOTE — PROGRESS NOTES
Tucker Slater has an upcoming lab appointment and is eligible to have due Health Maintenance labs drawn per Health Maintenance Protocol Orders (HMPO) process.    Before it can be drawn, a diagnosis is needed for the following lab: ALT    Please review and enter diagnosis as appropriate.     Vika Harrington

## 2024-08-22 ENCOUNTER — OFFICE VISIT (OUTPATIENT)
Dept: CARDIOLOGY | Facility: CLINIC | Age: 89
End: 2024-08-22
Payer: COMMERCIAL

## 2024-08-22 ENCOUNTER — TELEPHONE (OUTPATIENT)
Dept: MEDSURG UNIT | Facility: CLINIC | Age: 89
End: 2024-08-22

## 2024-08-22 VITALS
HEIGHT: 70 IN | OXYGEN SATURATION: 98 % | DIASTOLIC BLOOD PRESSURE: 77 MMHG | WEIGHT: 175 LBS | BODY MASS INDEX: 25.05 KG/M2 | HEART RATE: 58 BPM | SYSTOLIC BLOOD PRESSURE: 135 MMHG

## 2024-08-22 DIAGNOSIS — Z45.010 ELECTIVE REPLACEMENT INDICATED FOR CARDIAC PACEMAKER BATTERY AT END OF LIFESPAN: ICD-10-CM

## 2024-08-22 DIAGNOSIS — I44.2 COMPLETE ATRIOVENTRICULAR BLOCK (H): ICD-10-CM

## 2024-08-22 DIAGNOSIS — I49.5 SICK SINUS SYNDROME (H): ICD-10-CM

## 2024-08-22 PROCEDURE — 99213 OFFICE O/P EST LOW 20 MIN: CPT | Performed by: NURSE PRACTITIONER

## 2024-08-22 NOTE — PATIENT INSTRUCTIONS
Pre-procedure instructions for [employer] PPM Generator Change on 8/23/24 at 1300 with Dr. Perez:      -Review arrival time of 1030 on 8/23 at [facility]     Anticoagulation: Warfarin - [id number] 3 (Age++, HTN).  Hold 3 days prior to procedure (hold starting Tuesday, 8/20/24).    Triamterene-hydrochlorothiazide - Hold morning of procedure.      No food 8 hours prior to arrival time (starting at 0230 am)  May have clear liquids 2 hours prior to arrival time (up until 0830 am)     -Shower the morning of the procedure, and then put on a clean shirt in order to help prevent infection.      -Pt to call [facility] at [phone] if pt has any new COVID like symptoms or if feeling unwell the evening prior or AM of procedure.       -Post-procedure transportation and 24 hours monitoring set up. Please call before coming in if plans change.  With limited bed availability due to COVID, overnight hospital stays will be allowed for clinical reasons only.     -No driving for 24 hours post procedure due to sedation.

## 2024-08-22 NOTE — PROGRESS NOTES
Electrophysiology Clinic Progress Note  Tucker Slater MRN# [id number]   YOB: 1931 Age: 89 year old     Primary cardiology team: Dr. Perez and SELMA Adan, CNP    Reason for visit: Annual follow up for PPM     History of presenting illness:    Tucker Slater is a pleasant 89 year old patient with past medical history significant for:    Permanent atrial fibrillation with associated bradycardia: Status post single-chamber [employer]ukhjinder PPM in 3/2015   [id number] score 3 (age++, hypertension) anticoagulated on warfarin and previously declined watchman  Nonischemic cardiomyopathy, resolved   Hypertension  Idiopathic progressive neuropathy  Sleep apnea on CPAP  Stage III, CKD  Prostate and bladder cancer with history of hematuria.    Today patient returns for follow-up to discuss replacement of his PPM generator.  He is overall feeling well aside from ongoing peripheral neuropathy.  Blood pressure is well-controlled.  He confirmed that he has stopped warfarin as of 8/20 and reiterated all his preprocedure instructions.  He will be accompanied by his daughter who lives with them.    Diagnotic studies:  Device check 6/2024:  >99%, no ventricular arrhythmias  Echocardiogram 2022: LVEF of 50 to 55% with mild to moderate MR and TR.          Assessment and Plan:     ASSESSMENT:    Permanent atrial fibrillation with associated bradycardia  Status post single-chamber [employer]ukhjinder PPM in 2015  and device reached LAZARO on 6/29  [id number] score 3 (age++, hypertension) anticoagulated on warfarin and previously declined watchman    Hypertension  Well controlled     PLAN:     Proceed with generator change as scheduled  Routine device cares    The risks/benefits of the procedure were discussed in detail with the patient.  I explained there is an approximately 2-3% risk of serious complication.  Potential complication include but not limited to infection, pneumothorax, lead dislodgment requiring revision,  "DVT, cardiac perforation, bleeding and other unforeseen issues.     Orders this Visit:  No orders of the defined types were placed in this encounter.    No orders of the defined types were placed in this encounter.    There are no discontinued medications.    Today's clinic visit entailed:  Review of the result(s) of each unique test - Echo, device checks    By the ostomy for me before I had a patioI spent a total of 24 minutes on the day of the visit.   Time spent by me doing chart review, history and exam, documentation and further activities per the note  Provider  Link to [facility] Help Grid     The level of medical decision making during this visit was of moderate complexity.           Review of Systems:     Review of Systems:  Skin:        Eyes:       ENT:       Respiratory:       Cardiovascular:       Gastroenterology:      Genitourinary:       Musculoskeletal:       Neurologic:       Psychiatric:       Heme/Lymph/Imm:       Endocrine:                 Physical Exam:     Vitals: /77   Pulse 58   Ht 1.778 m (5' 10\")   Wt 79.4 kg (175 lb)   SpO2 98%   BMI 25.11 kg/m    Constitutional: Well nourished and in no apparent distress.  Eyes: Pupils equal, round.   HEENT: Normocephalic, atraumatic.   Neck: Supple.   Respiratory: Breathing non-labored. Lungs clear to auscultation bilaterally.  Cardiovascular:  IRR rate and rhythm, normal S1 and S2. No murmur   Skin: Warm, dry.   Extremities: No edema.  Neurologic: No gross motor deficits. Alert, awake, and oriented to person, regina enjoy her week off ce and time.  Psychiatric: Affect appropriate.        CURRENT MEDICATIONS:  Current Outpatient Medications   Medication Sig Dispense Refill    acetaminophen (TYLENOL) 500 MG tablet Take 1,000 mg by mouth 2 times daily as needed.      Ascorbic Acid (VITAMIN C PO) Take 500 mg by mouth daily       calcium carbonate-vitamin D (OSCAL W/D) 500-200 MG-UNIT tablet Take 1 tablet by mouth daily      carvedilol (COREG) 25 MG tablet " TAKE 1 & 1/2 (ONE & ONE-HALF) TABLETS BY MOUTH TWICE DAILY WITH MEALS 270 tablet 0    diclofenac (VOLTAREN) 1 % topical gel Apply 2 g topically 4 times daily as needed for moderate pain 100 g 3    emollient (VANICREAM) external cream Apply topically as needed for other (skin dryness) 113 g 3    finasteride (PROSCAR) 5 MG tablet Take 1 tablet by mouth once daily 90 tablet 1    ipratropium (ATROVENT) 0.03 % nasal spray Spray 2 sprays into both nostrils every 12 hours 30 mL 2    losartan (COZAAR) 50 MG tablet Take 1 tablet by mouth once daily 90 tablet 0    Multiple Vitamins-Minerals (OCUVITE PO) Take 1 tablet by mouth daily      multivitamin w/minerals (THERA-VIT-M) tablet Take 1 tablet by mouth daily      order for DME Oxygen 2 Li/min  at night 1 Device 0    triamcinolone (KENALOG) 0.1 % external cream Apply topically 2 times daily 80 g 0    triamterene-HCTZ (MAXZIDE-25) 37.5-25 MG tablet Take 1 tablet by mouth once daily 90 tablet 0    vitamin B complex with vitamin C (STRESS TAB) tablet Take 1 tablet by mouth daily      fluticasone (FLONASE) 50 MCG/ACT nasal spray Spray 1 spray into both nostrils 2 times daily (Patient not taking: Reported on 8/22/2024) 16 g 3    guaiFENesin-codeine (ROBITUSSIN AC) 100-10 MG/5ML solution Take 5-10 mLs by mouth every 4 hours as needed for cough (Patient not taking: Reported on 3/5/2024) 180 mL 0    warfarin ANTICOAGULANT (COUMADIN) 2.5 MG tablet Take 1.25mg every Mon, Wed, Fri / Take 2.5mg all other days of the week OR per INR clinic (Patient not taking: Reported on 8/22/2024) 80 tablet 1       ALLERGIES  Allergies   Allergen Reactions    Merbromin      Other reaction(s): Other, see comments  Severe reaction as child. Amalgam fillings OK.    Morphine Nausea and Vomiting    Amlodipine      Edema         PAST MEDICAL HISTORY:  Past Medical History:   Diagnosis Date    Arrhythmia     A Fib    Balance problem     related to neuropathy in feet    Bradycardia     Carcinoma in situ of  bladder 2000    bladder cancer ;; follow w Urology w periodic cysto     Essential hypertension, benign     Generalized osteoarthrosis, unspecified site knees    s/p R total knee 8/09 ; will do L 6/10     Hernia, abdominal     Numbness and tingling     toes and fingers    DAWSON (obstructive sleep apnea) 8/2/2019    DAWSON (obstructive sleep apnea)     Pacemaker     [employer]inder     Pain in joint, shoulder region     L shoulder rotater tear; no surgery     Palpitations     Persistent atrial fibrillation (H) 1/30/15    Prostate CA (H) 2008-9    radiation    Prostate cancer (H) 11/08    completed RT 3/09    Renal disease     elevated creatinine    Shoulder impingement     R shoulder impingement etc see MRI 4/09     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     toes both feet        PAST SURGICAL HISTORY:  Past Surgical History:   Procedure Laterality Date    ARTHROPLASTY HIP Right 3/27/2017    Procedure: ARTHROPLASTY HIP;  Surgeon: Jigar Wynn MD;  Location: [facility] OR    CARDIOVERSION  3/26/15    COLONOSCOPY      CYSTOSCOPY      CYSTOSCOPY, FULGURATE BLEEDERS, EVACUATE CLOT(S), COMBINED N/A 4/23/2019    Procedure: Exam under anesthesia, video cystopanendoscopy, evacuation of clots, fulguration of prostatic veins.;  Surgeon: Ilir Ramirez MD;  Location: [facility] OR    CYSTOSCOPY, RETROGRADES, COMBINED Bilateral 6/18/2019    Procedure: Video cystopanendoscopy, evacuation of clots, cup biopsies of bladder wall, electrovaporization of prostate, transurethral resection of prostate, bilateral retrograde ureteropyelogram.;  Surgeon: Ilir Ramirez MD;  Location: [facility] OR    CYSTOSCOPY, TRANSURETHRAL RESECTION (TUR) PROSTATE, COMBINED  6/18/2019    Procedure: Cystoscopy, transurethral resection of prostate;  Surgeon: Ilir Ramirez MD;  Location: [facility] OR    HERNIA REPAIR      IMPLANT PACEMAKER  3/26/15    RELEASE CARPAL TUNNEL Right 4/19/2017    Procedure: RELEASE CARPAL TUNNEL;  Right carpal tunnel  release;  Surgeon: Alonso Barboza MD;  Location: [facility] OR    ZZC NONSPECIFIC PROCEDURE      bilat inguinal hernia repairs ( 3total procedures)     ZZC NONSPECIFIC PROCEDURE      pilonidal cyst    ZZC NONSPECIFIC PROCEDURE      T + A    ZZC NONSPECIFIC PROCEDURE       R+L total knee       FAMILY HISTORY:  Family History   Problem Relation Age of Onset    Heart Disease Father          87yo    Coronary Artery Disease Father     Heart Disease Mother          74yo    Coronary Artery Disease Mother     Family History Negative Brother        SOCIAL HISTORY:  Social History     Socioeconomic History    Marital status:      Spouse name: Alba    Number of children: 3    Years of education: None    Highest education level: None   Occupational History     Employer: RETIRED     Comment:  w [employer]   Tobacco Use    Smoking status: Former     Current packs/day: 0.00     Types: Cigarettes     Quit date: 1977     Years since quittin.9    Smokeless tobacco: Never   Vaping Use    Vaping status: Never Used   Substance and Sexual Activity    Alcohol use: Yes     Comment: 2 beers per week    Drug use: No    Sexual activity: Never     Partners: Female   Other Topics Concern    Caffeine Concern No     Comment: 4-5 cups per week     Sleep Concern No    Stress Concern No    Weight Concern No    Special Diet No    Exercise No     Comment: yard work and moves around a lot     Social Determinants of Health     Financial Resource Strain: Low Risk  (10/13/2023)    Financial Resource Strain     Within the past 12 months, have you or your family members you live with been unable to get utilities (heat, electricity) when it was really needed?: No   Food Insecurity: Low Risk  (10/13/2023)    Food Insecurity     Within the past 12 months, did you worry that your food would run out before you got money to buy more?: No     Within the past 12 months, did the food you bought just not last and you didn t  have money to get more?: No   Transportation Needs: Low Risk  (10/13/2023)    Transportation Needs     Within the past 12 months, has lack of transportation kept you from medical appointments, getting your medicines, non-medical meetings or appointments, work, or from getting things that you need?: No   Interpersonal Safety: Low Risk  (10/13/2023)    Interpersonal Safety     Do you feel physically and emotionally safe where you currently live?: Yes     Within the past 12 months, have you been hit, slapped, kicked or otherwise physically hurt by someone?: No     Within the past 12 months, have you been humiliated or emotionally abused in other ways by your partner or ex-partner?: No   Housing Stability: Low Risk  (10/13/2023)    Housing Stability     Do you have housing? : Yes     Are you worried about losing your housing?: No

## 2024-08-22 NOTE — LETTER
8/22/2024    Kwadwo Winslow MD  [address] [facility] [address]    RE: Tucker Slater       Dear Colleague,     I had the pleasure of seeing Tucker Slater in the [facility].    Electrophysiology Clinic Progress Note  Tucker Slater MRN# [id number]   YOB: 1931 Age: 89 year old     Primary cardiology team: Dr. Perez and SELMA Adan, CNP    Reason for visit: Annual follow up for PPM     History of presenting illness:    Tucker Slater is a pleasant 89 year old patient with past medical history significant for:    Permanent atrial fibrillation with associated bradycardia: Status post single-chamber [employer]ukhjinder PPM in 3/2015   [id number] score 3 (age++, hypertension) anticoagulated on warfarin and previously declined watchman  Nonischemic cardiomyopathy, resolved   Hypertension  Idiopathic progressive neuropathy  Sleep apnea on CPAP  Stage III, CKD  Prostate and bladder cancer with history of hematuria.    Today patient returns for follow-up to discuss replacement of his PPM generator.  He is overall feeling well aside from ongoing peripheral neuropathy.  Blood pressure is well-controlled.  He confirmed that he has stopped warfarin as of 8/20 and reiterated all his preprocedure instructions.  He will be accompanied by his daughter who lives with them.    Diagnotic studies:  Device check 6/2024:  >99%, no ventricular arrhythmias  Echocardiogram 2022: LVEF of 50 to 55% with mild to moderate MR and TR.          Assessment and Plan:     ASSESSMENT:    Permanent atrial fibrillation with associated bradycardia  Status post single-chamber [employer] Sukhjinder PPM in 2015  and device reached LAZARO on 6/29  [id number] score 3 (age++, hypertension) anticoagulated on warfarin and previously declined watchman    Hypertension  Well controlled     PLAN:     Proceed with generator change as scheduled  Routine device cares    The risks/benefits of the procedure were  "discussed in detail with the patient.  I explained there is an approximately 2-3% risk of serious complication.  Potential complication include but not limited to infection, pneumothorax, lead dislodgment requiring revision, DVT, cardiac perforation, bleeding and other unforeseen issues.     Orders this Visit:  No orders of the defined types were placed in this encounter.    No orders of the defined types were placed in this encounter.    There are no discontinued medications.    Today's clinic visit entailed:  Review of the result(s) of each unique test - Echo, device checks    By the ostomy for me before I had a patioI spent a total of 24 minutes on the day of the visit.   Time spent by me doing chart review, history and exam, documentation and further activities per the note  Provider  Link to [facility] Help Grid     The level of medical decision making during this visit was of moderate complexity.           Review of Systems:     Review of Systems:  Skin:        Eyes:       ENT:       Respiratory:       Cardiovascular:       Gastroenterology:      Genitourinary:       Musculoskeletal:       Neurologic:       Psychiatric:       Heme/Lymph/Imm:       Endocrine:                 Physical Exam:     Vitals: /77   Pulse 58   Ht 1.778 m (5' 10\")   Wt 79.4 kg (175 lb)   SpO2 98%   BMI 25.11 kg/m    Constitutional: Well nourished and in no apparent distress.  Eyes: Pupils equal, round.   HEENT: Normocephalic, atraumatic.   Neck: Supple.   Respiratory: Breathing non-labored. Lungs clear to auscultation bilaterally.  Cardiovascular:  IRR rate and rhythm, normal S1 and S2. No murmur   Skin: Warm, dry.   Extremities: No edema.  Neurologic: No gross motor deficits. Alert, awake, and oriented to person, regina enjoy her week off ce and time.  Psychiatric: Affect appropriate.        CURRENT MEDICATIONS:  Current Outpatient Medications   Medication Sig Dispense Refill     acetaminophen (TYLENOL) 500 MG tablet Take 1,000 mg by " mouth 2 times daily as needed.       Ascorbic Acid (VITAMIN C PO) Take 500 mg by mouth daily        calcium carbonate-vitamin D (OSCAL W/D) 500-200 MG-UNIT tablet Take 1 tablet by mouth daily       carvedilol (COREG) 25 MG tablet TAKE 1 & 1/2 (ONE & ONE-HALF) TABLETS BY MOUTH TWICE DAILY WITH MEALS 270 tablet 0     diclofenac (VOLTAREN) 1 % topical gel Apply 2 g topically 4 times daily as needed for moderate pain 100 g 3     emollient (VANICREAM) external cream Apply topically as needed for other (skin dryness) 113 g 3     finasteride (PROSCAR) 5 MG tablet Take 1 tablet by mouth once daily 90 tablet 1     ipratropium (ATROVENT) 0.03 % nasal spray Spray 2 sprays into both nostrils every 12 hours 30 mL 2     losartan (COZAAR) 50 MG tablet Take 1 tablet by mouth once daily 90 tablet 0     Multiple Vitamins-Minerals (OCUVITE PO) Take 1 tablet by mouth daily       multivitamin w/minerals (THERA-VIT-M) tablet Take 1 tablet by mouth daily       order for DME Oxygen 2 Li/min  at night 1 Device 0     triamcinolone (KENALOG) 0.1 % external cream Apply topically 2 times daily 80 g 0     triamterene-HCTZ (MAXZIDE-25) 37.5-25 MG tablet Take 1 tablet by mouth once daily 90 tablet 0     vitamin B complex with vitamin C (STRESS TAB) tablet Take 1 tablet by mouth daily       fluticasone (FLONASE) 50 MCG/ACT nasal spray Spray 1 spray into both nostrils 2 times daily (Patient not taking: Reported on 8/22/2024) 16 g 3     guaiFENesin-codeine (ROBITUSSIN AC) 100-10 MG/5ML solution Take 5-10 mLs by mouth every 4 hours as needed for cough (Patient not taking: Reported on 3/5/2024) 180 mL 0     warfarin ANTICOAGULANT (COUMADIN) 2.5 MG tablet Take 1.25mg every Mon, Wed, Fri / Take 2.5mg all other days of the week OR per INR clinic (Patient not taking: Reported on 8/22/2024) 80 tablet 1       ALLERGIES  Allergies   Allergen Reactions     Merbromin      Other reaction(s): Other, see comments  Severe reaction as child. Amalgam fillings OK.      Morphine Nausea and Vomiting     Amlodipine      Edema         PAST MEDICAL HISTORY:  Past Medical History:   Diagnosis Date     Arrhythmia     A Fib     Balance problem     related to neuropathy in feet     Bradycardia      Carcinoma in situ of bladder 2000    bladder cancer ;; follow w Urology w periodic cysto      Essential hypertension, benign      Generalized osteoarthrosis, unspecified site knees    s/p R total knee 8/09 ; will do L 6/10      Hernia, abdominal      Numbness and tingling     toes and fingers     DAWSON (obstructive sleep apnea) 8/2/2019     DAWSON (obstructive sleep apnea)      Pacemaker     [employer] Sukhjinder      Pain in joint, shoulder region     L shoulder rotater tear; no surgery      Palpitations      Persistent atrial fibrillation (H) 1/30/15     Prostate CA (H) 2008-9    radiation     Prostate cancer (H) 11/08    completed RT 3/09     Renal disease     elevated creatinine     Shoulder impingement     R shoulder impingement etc see MRI 4/09      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     toes both feet        PAST SURGICAL HISTORY:  Past Surgical History:   Procedure Laterality Date     ARTHROPLASTY HIP Right 3/27/2017    Procedure: ARTHROPLASTY HIP;  Surgeon: Jigar Wynn MD;  Location: [facility] OR     CARDIOVERSION  3/26/15     COLONOSCOPY       CYSTOSCOPY       CYSTOSCOPY, FULGURATE BLEEDERS, EVACUATE CLOT(S), COMBINED N/A 4/23/2019    Procedure: Exam under anesthesia, video cystopanendoscopy, evacuation of clots, fulguration of prostatic veins.;  Surgeon: Ilir Ramirez MD;  Location: [facility] OR     CYSTOSCOPY, RETROGRADES, COMBINED Bilateral 6/18/2019    Procedure: Video cystopanendoscopy, evacuation of clots, cup biopsies of bladder wall, electrovaporization of prostate, transurethral resection of prostate, bilateral retrograde ureteropyelogram.;  Surgeon: Ilir Ramirez MD;  Location: [facility] OR     CYSTOSCOPY, TRANSURETHRAL RESECTION (TUR) PROSTATE, COMBINED  6/18/2019     Procedure: Cystoscopy, transurethral resection of prostate;  Surgeon: Ilir Ramirez MD;  Location: [facility]     HERNIA REPAIR       IMPLANT PACEMAKER  3/26/15     RELEASE CARPAL TUNNEL Right 2017    Procedure: RELEASE CARPAL TUNNEL;  Right carpal tunnel release;  Surgeon: Alonso Barboza MD;  Location: [facility]     ZZC NONSPECIFIC PROCEDURE      bilat inguinal hernia repairs ( 3total procedures)      ZZC NONSPECIFIC PROCEDURE      pilonidal cyst     ZZC NONSPECIFIC PROCEDURE      T + A     ZZC NONSPECIFIC PROCEDURE       R+L total knee       FAMILY HISTORY:  Family History   Problem Relation Age of Onset     Heart Disease Father          89yo     Coronary Artery Disease Father      Heart Disease Mother          74yo     Coronary Artery Disease Mother      Family History Negative Brother        SOCIAL HISTORY:  Social History     Socioeconomic History     Marital status:      Spouse name: Alba     Number of children: 3     Years of education: None     Highest education level: None   Occupational History     Employer: RETIRED     Comment:  w [employer]   Tobacco Use     Smoking status: Former     Current packs/day: 0.00     Types: Cigarettes     Quit date: 1977     Years since quittin.9     Smokeless tobacco: Never   Vaping Use     Vaping status: Never Used   Substance and Sexual Activity     Alcohol use: Yes     Comment: 2 beers per week     Drug use: No     Sexual activity: Never     Partners: Female   Other Topics Concern     Caffeine Concern No     Comment: 4-5 cups per week      Sleep Concern No     Stress Concern No     Weight Concern No     Special Diet No     Exercise No     Comment: yard work and moves around a lot     Social Determinants of Health     Financial Resource Strain: Low Risk  (10/13/2023)    Financial Resource Strain      Within the past 12 months, have you or your family members you live with been unable to get utilities (heat, electricity)  when it was really needed?: No   Food Insecurity: Low Risk  (10/13/2023)    Food Insecurity      Within the past 12 months, did you worry that your food would run out before you got money to buy more?: No      Within the past 12 months, did the food you bought just not last and you didn t have money to get more?: No   Transportation Needs: Low Risk  (10/13/2023)    Transportation Needs      Within the past 12 months, has lack of transportation kept you from medical appointments, getting your medicines, non-medical meetings or appointments, work, or from getting things that you need?: No   Interpersonal Safety: Low Risk  (10/13/2023)    Interpersonal Safety      Do you feel physically and emotionally safe where you currently live?: Yes      Within the past 12 months, have you been hit, slapped, kicked or otherwise physically hurt by someone?: No      Within the past 12 months, have you been humiliated or emotionally abused in other ways by your partner or ex-partner?: No   Housing Stability: Low Risk  (10/13/2023)    Housing Stability      Do you have housing? : Yes      Are you worried about losing your housing?: No               Thank you for allowing me to participate in the care of your patient.      Sincerely,     SELMA [facility]  cc:   Gallito Curtis MD  [address]  [place] [address]       Home

## 2024-08-23 ENCOUNTER — DOCUMENTATION ONLY (OUTPATIENT)
Dept: ANTICOAGULATION | Facility: CLINIC | Age: 89
End: 2024-08-23

## 2024-08-23 ENCOUNTER — HOSPITAL ENCOUNTER (OUTPATIENT)
Facility: CLINIC | Age: 89
Discharge: HOME | End: 2024-08-23
Attending: INTERNAL MEDICINE | Admitting: INTERNAL MEDICINE
Payer: COMMERCIAL

## 2024-08-23 VITALS
OXYGEN SATURATION: 98 % | TEMPERATURE: 97.7 F | BODY MASS INDEX: 23.84 KG/M2 | DIASTOLIC BLOOD PRESSURE: 79 MMHG | SYSTOLIC BLOOD PRESSURE: 121 MMHG | HEIGHT: 70 IN | HEART RATE: 78 BPM | RESPIRATION RATE: 16 BRPM | WEIGHT: 166.5 LBS

## 2024-08-23 DIAGNOSIS — Z45.010 ELECTIVE REPLACEMENT INDICATED FOR CARDIAC PACEMAKER BATTERY AT END OF LIFESPAN: Primary | ICD-10-CM

## 2024-08-23 DIAGNOSIS — I44.2 COMPLETE ATRIOVENTRICULAR BLOCK (H): ICD-10-CM

## 2024-08-23 DIAGNOSIS — I49.5 SICK SINUS SYNDROME (H): ICD-10-CM

## 2024-08-23 LAB
ANION GAP SERPL CALCULATED.3IONS-SCNC: 11 MMOL/L (ref 7–15)
BUN SERPL-MCNC: 48.9 MG/DL (ref 8–23)
CALCIUM SERPL-MCNC: 9.4 MG/DL (ref 8.8–10.4)
CHLORIDE SERPL-SCNC: 110 MMOL/L (ref 98–107)
CREAT SERPL-MCNC: 1.34 MG/DL (ref 0.67–1.17)
EGFRCR SERPLBLD CKD-EPI 2021: 49 ML/MIN/1.73M2
ERYTHROCYTE [DISTWIDTH] IN BLOOD BY AUTOMATED COUNT: 14.3 % (ref 10–15)
GLUCOSE SERPL-MCNC: 86 MG/DL (ref 70–99)
HCO3 SERPL-SCNC: 19 MMOL/L (ref 22–29)
HCT VFR BLD AUTO: 34.8 % (ref 40–53)
HGB BLD-MCNC: 11.8 G/DL (ref 13.3–17.7)
INR BLD: 1.7 (ref 0.9–1.1)
MCH RBC QN AUTO: 32.4 PG (ref 26.5–33)
MCHC RBC AUTO-ENTMCNC: 33.9 G/DL (ref 31.5–36.5)
MCV RBC AUTO: 96 FL (ref 78–100)
PLATELET # BLD AUTO: 172 10E3/UL (ref 150–450)
POTASSIUM SERPL-SCNC: 4.1 MMOL/L (ref 3.4–5.3)
RBC # BLD AUTO: 3.64 10E6/UL (ref 4.4–5.9)
SODIUM SERPL-SCNC: 140 MMOL/L (ref 135–145)
WBC # BLD AUTO: 5 10E3/UL (ref 4–11)

## 2024-08-23 PROCEDURE — 99152 MOD SED SAME PHYS/QHP 5/>YRS: CPT | Performed by: INTERNAL MEDICINE

## 2024-08-23 PROCEDURE — 999N000071 HC STATISTIC HEART CATH LAB OR EP LAB

## 2024-08-23 PROCEDURE — 272N000001 HC OR GENERAL SUPPLY STERILE: Performed by: INTERNAL MEDICINE

## 2024-08-23 PROCEDURE — 85027 COMPLETE CBC AUTOMATED: CPT | Performed by: INTERNAL MEDICINE

## 2024-08-23 PROCEDURE — 250N000011 HC RX IP 250 OP 636: Performed by: INTERNAL MEDICINE

## 2024-08-23 PROCEDURE — 33228 REMV&REPLC PM GEN DUAL LEAD: CPT | Performed by: INTERNAL MEDICINE

## 2024-08-23 PROCEDURE — 85610 PROTHROMBIN TIME: CPT | Performed by: INTERNAL MEDICINE

## 2024-08-23 PROCEDURE — 80048 BASIC METABOLIC PNL TOTAL CA: CPT | Performed by: INTERNAL MEDICINE

## 2024-08-23 PROCEDURE — C1785 PMKR, DUAL, RATE-RESP: HCPCS | Performed by: INTERNAL MEDICINE

## 2024-08-23 PROCEDURE — 258N000002 HC RX IP 258 OP 250: Performed by: INTERNAL MEDICINE

## 2024-08-23 PROCEDURE — 36415 COLL VENOUS BLD VENIPUNCTURE: CPT | Performed by: INTERNAL MEDICINE

## 2024-08-23 PROCEDURE — 999N000184 HC STATISTIC TELEMETRY

## 2024-08-23 PROCEDURE — 250N000009 HC RX 250: Performed by: INTERNAL MEDICINE

## 2024-08-23 DEVICE — PACEMAKER ASSURITY MRI DR RF: Type: IMPLANTABLE DEVICE | Status: FUNCTIONAL

## 2024-08-23 RX ORDER — NALOXONE HYDROCHLORIDE 0.4 MG/ML
0.4 INJECTION, SOLUTION INTRAMUSCULAR; INTRAVENOUS; SUBCUTANEOUS
Status: DISCONTINUED | OUTPATIENT
Start: 2024-08-23 | End: 2024-08-23 | Stop reason: HOSPADM

## 2024-08-23 RX ORDER — CEFAZOLIN SODIUM 2 G/100ML
2 INJECTION, SOLUTION INTRAVENOUS
Status: COMPLETED | OUTPATIENT
Start: 2024-08-23 | End: 2024-08-23

## 2024-08-23 RX ORDER — NALOXONE HYDROCHLORIDE 0.4 MG/ML
0.2 INJECTION, SOLUTION INTRAMUSCULAR; INTRAVENOUS; SUBCUTANEOUS
Status: DISCONTINUED | OUTPATIENT
Start: 2024-08-23 | End: 2024-08-23 | Stop reason: HOSPADM

## 2024-08-23 RX ORDER — LIDOCAINE 40 MG/G
CREAM TOPICAL
Status: DISCONTINUED | OUTPATIENT
Start: 2024-08-23 | End: 2024-08-23 | Stop reason: HOSPADM

## 2024-08-23 RX ORDER — FENTANYL CITRATE 50 UG/ML
INJECTION, SOLUTION INTRAMUSCULAR; INTRAVENOUS
Status: DISCONTINUED | OUTPATIENT
Start: 2024-08-23 | End: 2024-08-23 | Stop reason: HOSPADM

## 2024-08-23 RX ORDER — CEFAZOLIN SODIUM 1 G/3ML
1 INJECTION, POWDER, FOR SOLUTION INTRAMUSCULAR; INTRAVENOUS
Status: DISCONTINUED | OUTPATIENT
Start: 2024-08-23 | End: 2024-08-23 | Stop reason: HOSPADM

## 2024-08-23 RX ORDER — MIDAZOLAM HCL IN 0.9 % NACL/PF 1 MG/ML
.5-6 PLASTIC BAG, INJECTION (ML) INTRAVENOUS CONTINUOUS PRN
Status: DISCONTINUED | OUTPATIENT
Start: 2024-08-23 | End: 2024-08-23 | Stop reason: HOSPADM

## 2024-08-23 RX ORDER — ACETAMINOPHEN 325 MG/1
650 TABLET ORAL EVERY 4 HOURS PRN
Status: DISCONTINUED | OUTPATIENT
Start: 2024-08-23 | End: 2024-08-23 | Stop reason: HOSPADM

## 2024-08-23 RX ORDER — SODIUM CHLORIDE 450 MG/100ML
INJECTION, SOLUTION INTRAVENOUS CONTINUOUS
Status: DISCONTINUED | OUTPATIENT
Start: 2024-08-23 | End: 2024-08-23 | Stop reason: HOSPADM

## 2024-08-23 RX ADMIN — CEFAZOLIN SODIUM 2 G: 2 INJECTION, SOLUTION INTRAVENOUS at 11:40

## 2024-08-23 RX ADMIN — SODIUM CHLORIDE: 4.5 INJECTION, SOLUTION INTRAVENOUS at 11:40

## 2024-08-23 ASSESSMENT — ACTIVITIES OF DAILY LIVING (ADL)
ADLS_ACUITY_SCORE: 38

## 2024-08-23 NOTE — PROGRESS NOTES
ANTICOAGULATION  MANAGEMENT: Discharge Review    Tucker Slater chart reviewed for anticoagulation continuity of care    Outpatient surgery/procedure on 8/23/24 for pacemaker generator replacement.    Discharge disposition: Home    Results:    Recent labs: (last 7 days)     08/23/24  1103   INR 1.7*     Anticoagulation inpatient management:     not applicable     Anticoagulation discharge instructions:     Warfarin dosing: home regimen continued with booster dose tonight (8/23/24) if OK by cardiologist   Bridging: No   INR goal change: No      Medication changes affecting anticoagulation: No    Additional factors affecting anticoagulation: Yes: possible pain and inflammation     PLAN     No adjustment to anticoagulation plan needed    Recommended follow up is scheduled  Patient not contacted    No adjustment to Anticoagulation Calendar was required    Chichi Wang RN

## 2024-08-23 NOTE — PRE-PROCEDURE
GENERAL PRE-PROCEDURE:   Date/Time:  8/23/2024 11:52 AM    Written consent obtained?: Yes    Risks and benefits: Risks, benefits and alternatives were discussed    Consent given by:  Patient  Patient states understanding of procedure being performed: Yes    Patient's understanding of procedure matches consent: Yes    Procedure consent matches procedure scheduled: Yes    Expected level of sedation:  Moderate  Appropriately NPO:  Yes  ASA Class:  2  Mallampati  :  Grade 2- soft palate, base of uvula, tonsillar pillars, and portion of posterior pharyngeal wall visible  Lungs:  Lungs clear with good breath sounds bilaterally  Heart:  Normal heart sounds and rate, a-fib and other (comment)  Heart exam comment:  Paced  History & Physical reviewed:  History and physical reviewed and no updates needed  Statement of review:  I have reviewed the lab findings, diagnostic data, medications, and the plan for sedation

## 2024-08-23 NOTE — DISCHARGE INSTRUCTIONS
Pacemaker/ICD Generator Change Discharge Instructions    After you go home:    Have an adult stay with you until tomorrow.  You may resume your normal diet.       For 24 hours - due to the sedation you received:  Relax and take it easy.  Do NOT make any important or legal decisions.  Do NOT drive or operate machines at home or at work.  Do NOT drink alcohol.    Care of Chest Incision:    Keep the dressing on at least 1 week. The dressing will be removed by the device nurse at your return appointment.   If there is a pressure dressing (gauze & tape) - 24 hours after your procedure you may remove ONLY the top dressing. Leave the bottom dressing on.  Leave the strips of tape on. They will fall off on their own.  Check your wound daily for signs of infection, such as increased redness, severe swelling or draining. Fever may also be a sign of infection. Call us if you see any of these signs.  You may shower with the Aquacel dressing in place as it is waterproof.  Do not scrub on top of the dressing & avoid peeling the dressing off. If you take a tub bath, keep the dressing dry.  No soaking the incision (swimming pool, bathtub, hot tub) for 2 weeks.  You may have mild to medium pain for 3 to 5 days. Take Acetaminophen (Tylenol) or Ibuprofen (Advil) for the pain. If the pain persists or is severe, call us.    Activity:    You can begin to use your arm as it feels comfortable to you.  No driving for one day & limit to necessary driving for one week.    Bleeding:    If you start bleeding from the incision site, sit down and press firmly on the site for 10 minutes.   Once bleeding stops, call [facility] as soon as you can.       Call 911 right away if you have heavy bleeding or bleeding that does not stop.      Medicines:    Take your medications, including blood thinners, unless your provider tells you not to.  If you have stopped any other medicines, check with your provider about when to restart them.    Follow Up  Appointments:    Follow up with Device Clinic at [facility] of patient preference in 7-10 days.    Call the clinic if:    You have a large or growing hard lump around the site.  The site is red, swollen, hot or tender.  Blood or fluid is draining from the site.  You have chills or a fever greater than 101 F (38 C).  You feel dizzy or light-headed.  Questions or concerns.      Telling others about your device:    Before you leave the hospital, you will receive a temporary ID card. A permanent card will be mailed to you about 6 to 8 weeks later. Always carry the ID card with you. It has important details about your device.  You may also get a Medical Alert bracelet or tag that says you have a pacemaker or a defibrillator (ICD).  Go to www.[email or web address].   Always tell doctors, dentists and other care providers that you have a device implanted in you.  Let us know before you plan any surgeries. Your care team must take special steps to keep you safe during certain procedures. These steps will depend on the type of device you have. Your provider will need to see your ID card. They may need to call us for instructions.    Device Safety:    Please refer to device  s booklet for further information.        [facility] [place]    [phone]  Device Clinic    Or you may contact your provider via My Chart

## 2024-08-23 NOTE — PROGRESS NOTES
Care Suites Post Procedure Note    Patient Information  Name: Tucker Slater  Age: 89 year old    Post Procedure- PPM Gen Change  Time patient returned to Care Suites: 1235  Concerns/abnormal assessment:   Sedation Versed 1.5mg and Fentanyl 75 mcgs  Left chest PPM site with pressure dressing intact. Site soft and no bleeding  If abnormal assessment, provider notified: N/A  Plan/Other:   Post sedation recovery   Monitor PPM site and VS   Pt. Given [employer] PPM booklet and card     Tracey Moran RN

## 2024-08-23 NOTE — PROGRESS NOTES
Dictated.    Successful dual-chamber pacemaker generator replacement. He is in permanent AF; a dual chamber generator was selected to avoid abandoning the atrial lead which would preclude future MRIs.    Minimal blood loss.    Plan:  Home later this afternoon. He can resume warfarin tonight.  Device clinic follow-up next week.

## 2024-08-23 NOTE — PROGRESS NOTES
Care Suites Discharge Nursing Note    Patient Information  Name: Tucker Slater  Age: 89 year old    Discharge Education:  Discharge instructions reviewed: Yes  Additional education/resources provided: all questions answered  Patient/patient representative verbalizes understanding: Yes  Patient discharging on new medications: No  Medication education completed: N/A    Discharge Plans:   Discharge location: home  Discharge ride contacted: Yes  Approximate discharge time: 1530    Discharge Criteria:  Discharge criteria met and vital signs stable: Yes    Patient Belongs:  Patient belongings returned to patient: Yes    Guerda Hawk RN

## 2024-08-26 ENCOUNTER — TELEPHONE (OUTPATIENT)
Dept: CARDIOLOGY | Facility: CLINIC | Age: 89
End: 2024-08-26
Payer: COMMERCIAL

## 2024-08-26 DIAGNOSIS — I49.5 SICK SINUS SYNDROME (H): Primary | ICD-10-CM

## 2024-08-26 DIAGNOSIS — I44.2 COMPLETE ATRIOVENTRICULAR BLOCK (H): ICD-10-CM

## 2024-08-26 DIAGNOSIS — I48.91 ATRIAL FIBRILLATION (H): ICD-10-CM

## 2024-08-26 DIAGNOSIS — I10 ESSENTIAL HYPERTENSION WITH GOAL BLOOD PRESSURE LESS THAN 140/90: ICD-10-CM

## 2024-08-26 DIAGNOSIS — Z95.0 CARDIAC PACEMAKER IN SITU: ICD-10-CM

## 2024-08-26 RX ORDER — TRIAMTERENE/HYDROCHLOROTHIAZID 37.5-25 MG
1 TABLET ORAL DAILY
Qty: 90 TABLET | Refills: 0 | Status: SHIPPED | OUTPATIENT
Start: 2024-08-26

## 2024-08-26 NOTE — TELEPHONE ENCOUNTER
Please let Daughter Georgia that Tucker does not need to see Dr. Perez on 11/6/24. Since he saw Lorna for his pre-op, he no longer is out of the 2 year window protocol of needing to establish with MD (which was what previous appt was for). RAND Nick

## 2024-08-26 NOTE — TELEPHONE ENCOUNTER
Received call back from patient's daughter and reviewed post-op calls as outlined in previous note.  Follow-up scheduled for 9/5/24 in [place].      Notified patient's daughter, Porsha, that the appointment with Dr. Perez in 11/2024 was no longer needed as patient just had follow-up with Lorna Leija CNP.  Patient would be due for his next annual in 8/2025.  Porsha verbalized understanding and agreement with plan.  Appointment with Dr. Perez cancelled.     Porsha and pt will call clinic if they have any questions or concerns prior to upcoming device check.      RAND Wells

## 2024-08-26 NOTE — TELEPHONE ENCOUNTER
Called and left message for patient requesting a call back to review the below instructions and schedule 1-2 week follow-up.  Device clinic phone number provided.    RAND Wells     ----------------------------------------------------------------------------------    Post [employer] PPM Generator Change on 8/23/24 with Dr. Perez discharge phone call.    Reviewed the following:  - If there is a pressure dressing in place, this can be removed after 24 hours.   - Leave Aquacel dressing in place.  Okay to shower the day after the procedure.  If this begins to peel up, contact the device clinic.    - Aquacel dressing and steri-strips will be removed at 1 week device check, as appropriate  - Watch for redness, drainage, warmth, or fever. Call device clinic if any signs of infection.   - No soaking in bath tub, swimming pool or hot tub until you are cleared at your 6-8 week check    Patient lives in [place].  1-2 week device check to be scheduled.     Device Clinic Phone number: [phone]

## 2024-08-30 ENCOUNTER — ANTICOAGULATION THERAPY VISIT (OUTPATIENT)
Dept: ANTICOAGULATION | Facility: CLINIC | Age: 89
End: 2024-08-30

## 2024-08-30 ENCOUNTER — LAB (OUTPATIENT)
Dept: LAB | Facility: CLINIC | Age: 89
End: 2024-08-30
Payer: COMMERCIAL

## 2024-08-30 DIAGNOSIS — I48.21 PERMANENT ATRIAL FIBRILLATION (H): ICD-10-CM

## 2024-08-30 DIAGNOSIS — I10 ESSENTIAL HYPERTENSION WITH GOAL BLOOD PRESSURE LESS THAN 140/90: ICD-10-CM

## 2024-08-30 DIAGNOSIS — I48.21 PERMANENT ATRIAL FIBRILLATION (H): Primary | ICD-10-CM

## 2024-08-30 DIAGNOSIS — Z79.01 LONG TERM CURRENT USE OF ANTICOAGULANTS WITH INR GOAL OF 2.0-3.0: ICD-10-CM

## 2024-08-30 LAB
ALT SERPL W P-5'-P-CCNC: 26 U/L (ref 0–70)
AST SERPL W P-5'-P-CCNC: 35 U/L (ref 0–45)
INR BLD: 1.6 (ref 0.9–1.1)
TSH SERPL DL<=0.005 MIU/L-ACNC: 1.52 UIU/ML (ref 0.3–4.2)

## 2024-08-30 PROCEDURE — 36415 COLL VENOUS BLD VENIPUNCTURE: CPT

## 2024-08-30 PROCEDURE — 84443 ASSAY THYROID STIM HORMONE: CPT

## 2024-08-30 PROCEDURE — 84460 ALANINE AMINO (ALT) (SGPT): CPT

## 2024-08-30 PROCEDURE — 84450 TRANSFERASE (AST) (SGOT): CPT

## 2024-08-30 PROCEDURE — 85610 PROTHROMBIN TIME: CPT

## 2024-08-30 NOTE — PROGRESS NOTES
"ANTICOAGULATION MANAGEMENT     Tucker Slater 89 year old male is on warfarin with subtherapeutic INR result. (Goal INR 2.0-2.5)    Recent labs: (last 7 days)     08/30/24  1434   INR 1.6*       ASSESSMENT     Source(s): Chart Review and Patient/Caregiver Call     Warfarin doses taken: 3 day intentional warfarin hold last week. However, INR was 1.7 on 8/23 and has dropped to 1.6 today, 8/30. Augustine likely needs more warfarin to remain therapeutic.  Note: discharged with instruction to take 2.5 mg booster dose on 8/23 but Augustine reports returning to maintenance dose and took 1.25 mg this day. Calendar updated to reflect.  Diet: Reports slight increase in greens the last week, but a \"little amount more\"  Medication/supplement changes: None noted  New illness, injury, or hospitalization: 8/23 hospitalized for pacemaker generator replacement  Signs or symptoms of bleeding or clotting: No  Previous result: Subtherapeutic  Additional findings: Advising smallest possible dosing increase today due to involvement of temp factors as well.        PLAN     Recommended plan for temporary change(s) and ongoing change(s) affecting INR     Dosing Instructions: Increase your warfarin dose (9.1% change) with next INR in 10 days       Summary  As of 8/30/2024      Full warfarin instructions:  1.25 mg every Mon, Thu; 2.5 mg all other days   Next INR check:  9/9/2024               Telephone call with Tucker who verbalizes understanding and agrees to plan    Lab visit scheduled    Education provided: Please call back if any changes to your diet, medications or how you've been taking warfarin  Dietary considerations: importance of consistent vitamin K intake and importance of notifying [facility] to changes in diet    Plan made per [facility] anticoagulation protocol    Nena Valdovinos RN  Anticoagulation Clinic  8/30/2024    _______________________________________________________________________     Anticoagulation Episode Summary       " Last visit - 1/8/18  F/U - 4/9/2018    Current Cymbalta Rx:  DULoxetine (CYMBALTA) 60 MG capsule Take 1 capsule by mouth daily.      ** Previous Rx:   - Cymbalta 30mg - QD - (most recent) -    - Zoloft 50mg - QD (HS) - (2017)   - Lexapro 10mg - QD (1297-0395)   - Celexa 20mg - QD (0232-6325)    Printed for Dr. Miguel's review      Current INR goal:  2.0-2.5   TTR:  62.8% (1 y)   Target end date:  Indefinite   Send INR reminders to:  [facility]    Indications    Atrial fibrillation (H) with mitral regurgitation and congestive heart failure [I48.91]  Long term current use of anticoagulants with INR goal of 2.0-3.0 [Z79.01]  Permanent atrial fibrillation (H) [I48.21]             Comments:               Anticoagulation Care Providers       Provider Role Specialty Phone number    Kwadwo Winslow MD Referring Internal Medicine [phone]

## 2024-08-30 NOTE — LETTER
September 3, 2024      Tucker Slater  [address] [facility] [facility] [address]        Dear ,    We are writing to inform you of your test results.    Your results are within normal limits.    Resulted Orders   AST   Result Value Ref Range    AST 35 0 - 45 U/L   ALT   Result Value Ref Range    ALT 26 0 - 70 U/L   TSH with free T4 reflex   Result Value Ref Range    TSH 1.52 0.30 - 4.20 uIU/mL       If you have any questions or concerns, please call the clinic at the number listed above.       Sincerely,      Kwadwo Winslow MD

## 2024-09-04 ENCOUNTER — TELEPHONE (OUTPATIENT)
Dept: INTERNAL MEDICINE | Facility: CLINIC | Age: 89
End: 2024-09-04
Payer: COMMERCIAL

## 2024-09-04 ENCOUNTER — TELEPHONE (OUTPATIENT)
Dept: SLEEP MEDICINE | Facility: CLINIC | Age: 89
End: 2024-09-04
Payer: COMMERCIAL

## 2024-09-04 NOTE — TELEPHONE ENCOUNTER
Order/Referral Request    Who is requesting: Patient     Orders being requested: CPAP supplies    Reason service is needed/diagnosis: NA    When are orders needed by: asap    Has this been discussed with Provider: No    Does patient have a preference on a Group/Provider/Facility? NA    Does patient have an appointment scheduled?: No    Where to send orders: Fax    Okay to leave a detailed message?: Yes at Home number on file [phone] ([place])

## 2024-09-04 NOTE — TELEPHONE ENCOUNTER
Writer spoke to patient, he reports that he has been drinking 1 bottle per day of Premier Protein x 2 months. Writer informed Del that this drink contains vitamin K; however, to continue with the same amount since warfarin maintenance dose was increased on 8/30/24. Patient verbalized understanding and repeated current warfarin dose to writer correctly.    Next INR: 9/9/24    Chichi Wang RN  Anticoagulation Clinic

## 2024-09-04 NOTE — TELEPHONE ENCOUNTER
General Call    Contacts       Contact Date/Time Type Contact Phone/Fax    09/04/2024 02:28 PM CDT Phone (Incoming) Tucker Slater (Self) [phone] ()          Reason for Call:  cpap     What are your questions or concerns:  PT needs new cpap prescription for supplies     Date of last appointment with provider: 09/20/2021    Okay to leave a detailed message?: Yes at Home number on file [phone] ([place])

## 2024-09-04 NOTE — TELEPHONE ENCOUNTER
Reason for Call:  Other call back    Detailed comments: PATIENT CALL BACK ARBEN NO NUMBER FOUND TO CONNECT    PATIENT COULD NOT UNDERSTAND MESSAGE    Phone Number Patient can be reached at: [phone]    Best Time: ASAP    Can we leave a detailed message on this number? YES    Call taken on 9/4/2024 at 9:30 AM by Janette Bell

## 2024-09-05 ENCOUNTER — ANCILLARY PROCEDURE (OUTPATIENT)
Dept: CARDIOLOGY | Facility: CLINIC | Age: 89
End: 2024-09-05
Attending: INTERNAL MEDICINE
Payer: COMMERCIAL

## 2024-09-05 DIAGNOSIS — I44.2 COMPLETE ATRIOVENTRICULAR BLOCK (H): ICD-10-CM

## 2024-09-05 DIAGNOSIS — Z95.0 CARDIAC PACEMAKER IN SITU: Primary | ICD-10-CM

## 2024-09-05 DIAGNOSIS — Z95.0 PACEMAKER: ICD-10-CM

## 2024-09-05 PROCEDURE — 93279 PRGRMG DEV EVAL PM/LDLS PM: CPT | Performed by: INTERNAL MEDICINE

## 2024-09-09 ENCOUNTER — ANTICOAGULATION THERAPY VISIT (OUTPATIENT)
Dept: ANTICOAGULATION | Facility: CLINIC | Age: 89
End: 2024-09-09

## 2024-09-09 ENCOUNTER — OFFICE VISIT (OUTPATIENT)
Dept: INTERNAL MEDICINE | Facility: CLINIC | Age: 89
End: 2024-09-09
Payer: COMMERCIAL

## 2024-09-09 VITALS
TEMPERATURE: 98.2 F | HEART RATE: 72 BPM | OXYGEN SATURATION: 98 % | SYSTOLIC BLOOD PRESSURE: 133 MMHG | BODY MASS INDEX: 24.01 KG/M2 | DIASTOLIC BLOOD PRESSURE: 72 MMHG | WEIGHT: 167.7 LBS | RESPIRATION RATE: 20 BRPM | HEIGHT: 70 IN

## 2024-09-09 DIAGNOSIS — Z00.00 ENCOUNTER FOR PREVENTATIVE ADULT HEALTH CARE EXAMINATION: Primary | ICD-10-CM

## 2024-09-09 DIAGNOSIS — I48.21 PERMANENT ATRIAL FIBRILLATION (H): ICD-10-CM

## 2024-09-09 DIAGNOSIS — N40.1 HYPERTROPHY OF PROSTATE WITH URINARY OBSTRUCTION: ICD-10-CM

## 2024-09-09 DIAGNOSIS — I50.22 CHRONIC SYSTOLIC CONGESTIVE HEART FAILURE (H): ICD-10-CM

## 2024-09-09 DIAGNOSIS — I48.21 PERMANENT ATRIAL FIBRILLATION (H): Primary | ICD-10-CM

## 2024-09-09 DIAGNOSIS — N18.32 STAGE 3B CHRONIC KIDNEY DISEASE (H): ICD-10-CM

## 2024-09-09 DIAGNOSIS — N13.8 HYPERTROPHY OF PROSTATE WITH URINARY OBSTRUCTION: ICD-10-CM

## 2024-09-09 DIAGNOSIS — I10 ESSENTIAL HYPERTENSION WITH GOAL BLOOD PRESSURE LESS THAN 140/90: ICD-10-CM

## 2024-09-09 DIAGNOSIS — M62.81 GENERALIZED MUSCLE WEAKNESS: ICD-10-CM

## 2024-09-09 DIAGNOSIS — Z79.01 LONG TERM CURRENT USE OF ANTICOAGULANTS WITH INR GOAL OF 2.0-3.0: ICD-10-CM

## 2024-09-09 LAB — INR BLD: 2.1 (ref 0.9–1.1)

## 2024-09-09 PROCEDURE — 36416 COLLJ CAPILLARY BLOOD SPEC: CPT | Performed by: INTERNAL MEDICINE

## 2024-09-09 PROCEDURE — G0008 ADMIN INFLUENZA VIRUS VAC: HCPCS | Performed by: INTERNAL MEDICINE

## 2024-09-09 PROCEDURE — G0439 PPPS, SUBSEQ VISIT: HCPCS | Mod: 25 | Performed by: INTERNAL MEDICINE

## 2024-09-09 PROCEDURE — 85610 PROTHROMBIN TIME: CPT | Performed by: INTERNAL MEDICINE

## 2024-09-09 PROCEDURE — 90662 IIV NO PRSV INCREASED AG IM: CPT | Performed by: INTERNAL MEDICINE

## 2024-09-09 ASSESSMENT — ACTIVITIES OF DAILY LIVING (ADL): CURRENT_FUNCTION: NEEDS ASSISTANCE

## 2024-09-09 ASSESSMENT — PAIN SCALES - GENERAL: PAINLEVEL: NO PAIN (0)

## 2024-09-09 NOTE — PROGRESS NOTES
Assessment & Plan     Chronic systolic congestive heart failure (H)  Compensated , rate controlled, no fluid overload  Keep low salt diet, BP treatment     Stage 3b chronic kidney disease (H)  Monitor renal function, keep hydrated, avoid NSAID use     Permanent atrial fibrillation (H)  On AC with warfarin, rate controlled     Essential hypertension with goal blood pressure less than 140/90  Controlled on treatment     Generalized muscle weakness  Advised to keep physical activity as tolerated     Hypertrophy of prostate with urinary obstruction  On Proscar, uses depends for occasional incontinence     Encounter for preventative adult health care examination  advised regular aerobic activity, low cholesterol, low salt diet, wearing seat belt,  self examinations, sunscreen protection.Obtain screening cholesterol, immunizations reviewed.              See Patient Instructions    Juancho Ceballos is a 89 year old, presenting for the following health issues:  RECHECK        9/9/2024     2:38 PM   Additional Questions   Roomed by Gena ADAMS   Accompanied by n/a     LAZARA       Hypertension Follow-up  Has h/o HTN. on medical treatment. BP has been controlled. No side effects from medications. No CP, HA, dizziness. good compliance with medications and low salt diet.    Do you check your blood pressure regularly outside of the clinic? No   Are you following a low salt diet? No  Are your blood pressures ever more than 140 on the top number (systolic) OR more   than 90 on the bottom number (diastolic), for example 140/90?     Has h/o CRF. Monitoring BP, BG, medications, avoiding OTC NSAIDs. Needs periodic recheck of kidney function.  Has H/O BPH. On treatment with Finasteride . Has  symptoms of frequency, decreased urinary stream and occasional incontinence. No side effects from medications.    Atrial Fibrillation Follow-up  Has history of atrial fibrillation. On anticoagulation with Coumadin and rate control medications.  Asymptonatic - no chest pains , palpitations,  no side effects from medications.  Has h/o diastolic CHF. No increased edema, SOB, CP.   Symptoms: no recent chest pain, significant palpitations, dizziness/lightheadedness, dyspnea, or increased peripheral edema.  Stroke prevention: Coumadin (Warfarin)        8/23/2024     1:15 PM 8/23/2024     1:30 PM 8/23/2024     2:00 PM 8/23/2024     2:45 PM 9/9/2024     2:42 PM   Date   Pulse 71 72 70 78 72     Current [id number] Score: 4 points, which represents a 4.8% annual risk of major embolic event, without anti-coagulation or an LAAO device.   How many servings of fruits and vegetables do you eat daily?  0-1  On average, how many sweetened beverages do you drink each day (Examples: soda, juice, sweet tea, etc.  Do NOT count diet or artificially sweetened beverages)?   0  How many days per week do you exercise enough to make your heart beat faster? 3 or less  How many minutes a day do you exercise enough to make your heart beat faster? 30 - 60  How many days per week do you miss taking your medication? 0  Annual Wellness Visit     Patient has been advised of split billing requirements and indicates understanding: Yes         Health Care Directive  Patient has a Health Care Directive on file  Advance care planning document is on file and is current.  In general, how would you rate your overall physical health? good  Do you have a special diet?  Low salt         No data to display              Do you see a dentist two times every year?  Yes  Have you been more tired than usual lately?  No  If you drink alcohol do you typically have >3 drinks per day or >7 drinks per week? No  Do you have a current opioid prescription? No  Do you use any other controlled substances or medications that are not prescribed by a provider? None  Social History     Tobacco Use    Smoking status: Former     Current packs/day: 0.00     Types: Cigarettes     Quit date: 9/12/1977     Years since  quittin.0    Smokeless tobacco: Never   Vaping Use    Vaping status: Never Used   Substance Use Topics    Alcohol use: Yes     Comment: 2 beers per week    Drug use: No       Needs assistance for the following daily activities: (!) PREPARING MEALS, (!) HOUSEWORK, and (!) MONEY MANAGEMENT  Which of the following safety concerns are present in your home?  none identified   Do you (or your family members) have any concerns about your safety while driving?  (!) YES   Addressed any concerns about safety while driving.    Do you have any of the following hearing concerns?: No hearing concerns  In the past 6 months, have you been bothered by leaking of urine? (!) YES   Information on urinary incontinence and treatment options given to patient.        10/13/2023   Social Factors   Worry food won't last until get money to buy more No   Food not last or not have enough money for food? No   Do you have housing? (Housing is defined as stable permanent housing and does not include staying ouside in a car, in a tent, in an abandoned building, in an overnight shelter, or couch-surfing.) Yes   Are you worried about losing your housing? No   Lack of transportation? No   Unable to get utilities (heat,electricity)? No             2024   Fall Risk   Fallen 2 or more times in the past year? No    No   Trouble with walking or balance? Yes    Yes   Gait Speed Test (Document in seconds) 6.63   Gait Speed Test Interpretation Greater than 5.01 seconds - ABNORMAL       Multiple values from one day are sorted in reverse-chronological order            Today's PHQ-2 Score:       2024     2:27 PM   PHQ-2 (  [employer])   Q1: Little interest or pleasure in doing things 0   Q2: Feeling down, depressed or hopeless 0   PHQ-2 Score 0           Reviewed and updated as needed this visit by Provider                    Lab work is in process  Labs reviewed in EPIC    Current providers sharing in care for this patient include:  Patient Care  Team:  Kwadwo Winslow MD as PCP - General (Internal Medicine)  Kwadwo Winslow MD as Assigned PCP  Ilir Ramirez MD as MD (Urology)  Janeth Mcdaniels APRN CNP as Nurse Practitioner (Cardiovascular Disease)  Kelly Andrews DPM, Podiatry/Foot and Ankle Surgery as Assigned Musculoskeletal Provider  Lorna Leija APRN CNP as Assigned Heart and Vascular Provider    The following health maintenance items are reviewed in Epic and correct as of today:  Health Maintenance   Topic Date Due    HF ACTION PLAN  Never done    RSV VACCINE (1 - 1-dose 60+ series) Never done    DTAP/TDAP/TD IMMUNIZATION (2 - Td or Tdap) 04/24/2023    COVID-19 Vaccine (4 - 2023-24 season) 09/01/2024    BMP  02/23/2025    LIPID  08/02/2025    MICROALBUMIN  08/02/2025    CBC  08/23/2025    HEMOGLOBIN  08/23/2025    ALT  08/30/2025    MEDICARE ANNUAL WELLNESS VISIT  09/09/2025    ANNUAL REVIEW OF HM ORDERS  09/09/2025    FALL RISK ASSESSMENT  09/09/2025    ADVANCE CARE PLANNING  02/05/2029    TSH W/FREE T4 REFLEX  Completed    PHQ-2 (once per calendar year)  Completed    INFLUENZA VACCINE  Completed    Pneumococcal Vaccine: 65+ Years  Completed    URINALYSIS  Completed    ZOSTER IMMUNIZATION  Completed    HPV IMMUNIZATION  Aged Out    MENINGITIS IMMUNIZATION  Aged Out    RSV MONOCLONAL ANTIBODY  Aged Out       Appropriate preventive services were discussed with this patient, including applicable screening as appropriate for fall prevention, nutrition, physical activity, Tobacco-use cessation, weight loss and cognition.  Checklist reviewing preventive services available has been given to the patient.          9/9/2024   Mini Cog   Clock Draw Score 2 Normal   3 Item Recall 3 objects recalled   Mini Cog Total Score 5                   Review of Systems  Constitutional, HEENT, cardiovascular, pulmonary, GI, , musculoskeletal, neuro, skin, endocrine and psych systems are negative, except as otherwise noted.      Objective    /72 (BP  "Location: Right arm, Cuff Size: Adult Regular)   Pulse 72   Temp 98.2  F (36.8  C) (Tympanic)   Resp 20   Ht 1.778 m (5' 10\")   Wt 76.1 kg (167 lb 11.2 oz)   SpO2 98%   BMI 24.06 kg/m    Body mass index is 24.06 kg/m .  Physical Exam   GENERAL: frail elderly, alert and no distress  EYES: Eyes grossly normal to inspection, PERRL and conjunctivae and sclerae normal  HENT: ear canals and TM's normal, nose and mouth without ulcers or lesions  NECK: no adenopathy, no asymmetry, masses, or scars  RESP: lungs clear to auscultation - no rales, rhonchi or wheezes  CV: regular rate and rhythm, normal S1 S2, no S3 or S4, no murmur, click or rub, no peripheral edema  ABDOMEN: soft, nontender, no hepatosplenomegaly, no masses and bowel sounds normal  MS: no gross musculoskeletal defects noted, trace LE edema  SKIN: no suspicious lesions or rashes, 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   NEURO: generalized weakness, unstable gait, mentation intact and speech normal  PSYCH: mentation appears normal, affect normal/bright    Ancillary Procedure on 09/05/2024   Component Date Value Ref Range Status    Date Time Interrogation Session 09/05/2024 [id number]   Final    Implantable Pulse Generator Manufa* 09/05/2024 [employer]   Final    Implantable Pulse Generator Model 09/05/2024 [employer]   Final    Implantable Pulse Generator Serial* 09/05/2024 [id number]   Final    Type Interrogation Session 09/05/2024 In Clinic   Final    Clinic Name 09/05/2024 [facility]   Final    Implantable Pulse Generator Type 09/05/2024 Pacemaker   Final    Implantable Pulse Generator Implan* 09/05/2024 [id number]   Final    Implantable Lead  09/05/2024 [facility]   Final    Implantable Lead Model 09/05/2024 2088TC Tendril STS   Final    Implantable Lead Serial Number 09/05/2024 [id number]   Final    Implantable Lead Implant Date 09/05/2024 [id number]   Final    Implantable Lead Polarity Type 09/05/2024 Bipolar Lead   Final    " Implantable Lead Location Detail 1 09/05/2024 APPENDAGE   Final    Implantable Lead Location 09/05/2024 Right Atrium   Final    Implantable Lead Connection Status 09/05/2024 Connected   Final    Implantable Lead  09/05/2024 [facility] [facility]   Final    Implantable Lead Model 09/05/2024 [id number] Tendril STS   Final    Implantable Lead Serial Number 09/05/2024 [id number]   Final    Implantable Lead Implant Date 09/05/2024 [id number]   Final    Implantable Lead Polarity Type 09/05/2024 Bipolar Lead   Final    Implantable Lead Location Detail 1 09/05/2024 APEX   Final    Implantable Lead Location 09/05/2024 Right Ventricle   Final    Implantable Lead Connection Status 09/05/2024 Connected   Final    Simón Setting Mode (NBG Code) 09/05/2024 VVIR   Final    Simón Setting Lower Rate Limit 09/05/2024 70  [beats]/min Final    Simón Setting Maximum Sensor Rate 09/05/2024 120  [beats]/min Final    Simón Setting Hysterisis Rate 09/05/2024 60  [beats]/min Final    Simón Setting Night Rate 09/05/2024 Off   Final    Lead Channel Setting Sensing Polar* 09/05/2024 Bipolar   Final    Lead Channel Setting Sensing Sensi* 09/05/2024 2.0  mV Final    Lead Channel Setting Sensing Polar* 09/05/2024 Bipolar   Final    Lead Channel Setting Pacing Polari* 09/05/2024 Bipolar   Final    Lead Channel Setting Pacing Pulse * 09/05/2024 0.5  ms Final    Lead Channel Setting Pacing Amplit* 09/05/2024 0.875   Final    Lead Channel Setting Pacing Captur* 09/05/2024 Adaptive   Final    Lead Channel Setting Pacing Polari* 09/05/2024 Bipolar   Final    Lead Channel Impedance Value 09/05/2024 412.5  ohm Final    Lead Channel Sensing Intrinsic Amp* 09/05/2024 10.2  mV Final    Lead Channel Pacing Threshold Ampl* 09/05/2024 0.75  V Final    Lead Channel Pacing Threshold Puls* 09/05/2024 0.5  ms Final    Lead Channel Pacing Threshold Ampl* 09/05/2024 0.625  V Final    Lead Channel Pacing Threshold Puls* 09/05/2024 0.5  ms Final    Battery Status  09/05/2024 Unknown   Final    Battery Remaining Longevity 09/05/2024 123  mo Final    Battery Voltage 09/05/2024 2.98  V Final    Simón Statistic RA Percent Paced 09/05/2024 0  % Final    Simón Statistic RV Percent Paced 09/05/2024 98.0  % Final    Atrial Tachy Statistic Number Of M* 09/05/2024 0.0   Final           Signed Electronically by: Kwadwo Winslow MD

## 2024-09-09 NOTE — PROGRESS NOTES
ANTICOAGULATION MANAGEMENT     Tucker Slater 89 year old male is on warfarin with therapeutic INR result. (Goal INR 2.0-2.5)    Recent labs: (last 7 days)     09/09/24  1524   INR 2.1*         ASSESSMENT     Source(s): Chart Review  Previous INR was Subtherapeutic  Medication, diet, health changes since last INR chart reviewed; none identified      Warfarin maintenance dose was 9% at last visit   PLAN     Unable to reach Augustine pérez.    Left message to continue current dose of warfarin 1.25 mg tonight. Request call back for assessment.    Follow up required to confirm warfarin dose taken and assess for changes    Chichi Wang RN  Anticoagulation Clinic  9/9/2024

## 2024-09-09 NOTE — TELEPHONE ENCOUNTER
Detailed message left requesting patient schedule appointment. Once appointment is scheduled, we can request supply orders.     Adeola PLAZA RN  [facility]

## 2024-09-10 NOTE — PROGRESS NOTES
Left VM to call [phone] with transfer to [facility] OR [facility]  Chichi Wang RN  Anticoagulation Clinic

## 2024-09-10 NOTE — PROGRESS NOTES
ANTICOAGULATION MANAGEMENT     Tucker Slater 89 year old male is on warfarin with therapeutic INR result. (Goal INR 2.0-2.5)    Recent labs: (last 7 days)     09/09/24  1524   INR 2.1*       ASSESSMENT     Source(s): Chart Review and Patient/Caregiver Call     Warfarin doses taken: Warfarin taken as instructed  Diet: No new diet changes identified  Medication/supplement changes: None noted  New illness, injury, or hospitalization: No  Signs or symptoms of bleeding or clotting: No  Previous result: Subtherapeutic  Additional findings: Warfarin maintenance dose was 9% at last visit         PLAN     Recommended plan for no diet, medication or health factor changes affecting INR     Dosing Instructions: Continue your current warfarin dose with next INR in 2 weeks       Summary  As of 9/9/2024      Full warfarin instructions:  1.25 mg every Mon, Thu; 2.5 mg all other days   Next INR check:  9/23/2024               Telephone call with Tucker who verbalizes understanding and agrees to plan and who agrees to plan and repeated back plan correctly    Lab visit scheduled    Education provided: Taking warfarin: importance of following [facility] instructions vs instructions on the prescription bottle    Plan made per [facility] anticoagulation protocol    Chichi Wang RN  9/10/2024  Anticoagulation Clinic  [employer] for routing messages: p [facility] phone line: [phone]        _______________________________________________________________________     Anticoagulation Episode Summary       Current INR goal:  2.0-2.5   TTR:  63.5% (1 y)   Target end date:  Indefinite   [facility]  [facility]    Indications    Atrial fibrillation (H) with mitral regurgitation and congestive heart failure [I48.91]  Long term current use of anticoagulants with INR goal of 2.0-3.0 [Z79.01]  Permanent atrial fibrillation (H) [I48.21]             Comments:               Anticoagulation Care  Providers       Provider Role Specialty Phone number    Kwadwo Winslow MD Referring Internal Medicine [phone]

## 2024-09-16 DIAGNOSIS — I48.21 PERMANENT ATRIAL FIBRILLATION (H): Primary | ICD-10-CM

## 2024-09-16 DIAGNOSIS — Z79.01 LONG TERM CURRENT USE OF ANTICOAGULANTS WITH INR GOAL OF 2.0-3.0: ICD-10-CM

## 2024-09-16 DIAGNOSIS — I48.21 PERMANENT ATRIAL FIBRILLATION (H): ICD-10-CM

## 2024-09-16 RX ORDER — WARFARIN SODIUM 2.5 MG/1
TABLET ORAL
Qty: 80 TABLET | Refills: 1 | Status: SHIPPED | OUTPATIENT
Start: 2024-09-16

## 2024-09-16 NOTE — TELEPHONE ENCOUNTER
ANTICOAGULATION MANAGEMENT:  Medication Refill    Anticoagulation Summary  As of 9/16/2024      Warfarin maintenance plan:  1.25 mg (2.5 mg x 0.5) every Mon, Thu; 2.5 mg (2.5 mg x 1) all other days   Next INR check:     Target end date:  Indefinite    Indications    Atrial fibrillation (H) with mitral regurgitation and congestive heart failure [I48.91]  Long term current use of anticoagulants with INR goal of 2.0-3.0 [Z79.01]  Permanent atrial fibrillation (H) [I48.21]                 Anticoagulation Care Providers       Provider Role Specialty Phone number    Kwadwo Winslow MD Referring Internal Medicine [phone]            Refill Criteria    Visit with referring provider/group: Meets criteria: visit within referring provider group in the last 15 months on 9/9//24    [facility] referral last signed: 03/04/2024; within last year: Yes    Lab monitoring not exceeding 2 weeks overdue: Yes    Tucker meets all criteria for refill. Rx instructions and quantity supplied updated to match patient's current dosing plan. Warfarin 90 day supply with 1 refill granted per [facility] protocol     Nena Valdovinos RN  Anticoagulation Clinic

## 2024-09-17 LAB
MDC_IDC_LEAD_CONNECTION_STATUS: NORMAL
MDC_IDC_LEAD_CONNECTION_STATUS: NORMAL
MDC_IDC_LEAD_IMPLANT_DT: NORMAL
MDC_IDC_LEAD_IMPLANT_DT: NORMAL
MDC_IDC_LEAD_LOCATION: NORMAL
MDC_IDC_LEAD_LOCATION: NORMAL
MDC_IDC_LEAD_LOCATION_DETAIL_1: NORMAL
MDC_IDC_LEAD_LOCATION_DETAIL_1: NORMAL
MDC_IDC_LEAD_MFG: NORMAL
MDC_IDC_LEAD_MFG: NORMAL
MDC_IDC_LEAD_MODEL: NORMAL
MDC_IDC_LEAD_MODEL: NORMAL
MDC_IDC_LEAD_POLARITY_TYPE: NORMAL
MDC_IDC_LEAD_POLARITY_TYPE: NORMAL
MDC_IDC_LEAD_SERIAL: NORMAL
MDC_IDC_LEAD_SERIAL: NORMAL
MDC_IDC_MSMT_BATTERY_REMAINING_LONGEVITY: 123 MO
MDC_IDC_MSMT_BATTERY_STATUS: NORMAL
MDC_IDC_MSMT_BATTERY_VOLTAGE: 2.98 V
MDC_IDC_MSMT_LEADCHNL_RV_IMPEDANCE_VALUE: 412.5 OHM
MDC_IDC_MSMT_LEADCHNL_RV_PACING_THRESHOLD_AMPLITUDE: 0.62 V
MDC_IDC_MSMT_LEADCHNL_RV_PACING_THRESHOLD_AMPLITUDE: 0.75 V
MDC_IDC_MSMT_LEADCHNL_RV_PACING_THRESHOLD_PULSEWIDTH: 0.5 MS
MDC_IDC_MSMT_LEADCHNL_RV_PACING_THRESHOLD_PULSEWIDTH: 0.5 MS
MDC_IDC_MSMT_LEADCHNL_RV_SENSING_INTR_AMPL: 10.2 MV
MDC_IDC_PG_IMPLANT_DTM: NORMAL
MDC_IDC_PG_MFG: NORMAL
MDC_IDC_PG_MODEL: NORMAL
MDC_IDC_PG_SERIAL: NORMAL
MDC_IDC_PG_TYPE: NORMAL
MDC_IDC_SESS_CLINIC_NAME: NORMAL
MDC_IDC_SESS_DTM: NORMAL
MDC_IDC_SESS_TYPE: NORMAL
MDC_IDC_SET_BRADY_HYSTRATE: 60 {BEATS}/MIN
MDC_IDC_SET_BRADY_LOWRATE: 70 {BEATS}/MIN
MDC_IDC_SET_BRADY_MAX_SENSOR_RATE: 120 {BEATS}/MIN
MDC_IDC_SET_BRADY_MODE: NORMAL
MDC_IDC_SET_BRADY_NIGHT_RATE: NORMAL
MDC_IDC_SET_LEADCHNL_RA_PACING_POLARITY: NORMAL
MDC_IDC_SET_LEADCHNL_RA_SENSING_POLARITY: NORMAL
MDC_IDC_SET_LEADCHNL_RV_PACING_AMPLITUDE: 0.88
MDC_IDC_SET_LEADCHNL_RV_PACING_CAPTURE_MODE: NORMAL
MDC_IDC_SET_LEADCHNL_RV_PACING_POLARITY: NORMAL
MDC_IDC_SET_LEADCHNL_RV_PACING_PULSEWIDTH: 0.5 MS
MDC_IDC_SET_LEADCHNL_RV_SENSING_POLARITY: NORMAL
MDC_IDC_SET_LEADCHNL_RV_SENSING_SENSITIVITY: 2 MV
MDC_IDC_STAT_AT_MODE_SW_COUNT: 0
MDC_IDC_STAT_BRADY_RA_PERCENT_PACED: 0 %
MDC_IDC_STAT_BRADY_RV_PERCENT_PACED: 98 %

## 2024-09-23 ENCOUNTER — LAB (OUTPATIENT)
Dept: LAB | Facility: CLINIC | Age: 89
End: 2024-09-23
Payer: COMMERCIAL

## 2024-09-23 ENCOUNTER — ANTICOAGULATION THERAPY VISIT (OUTPATIENT)
Dept: ANTICOAGULATION | Facility: CLINIC | Age: 89
End: 2024-09-23

## 2024-09-23 DIAGNOSIS — I48.21 PERMANENT ATRIAL FIBRILLATION (H): Primary | ICD-10-CM

## 2024-09-23 DIAGNOSIS — I48.21 PERMANENT ATRIAL FIBRILLATION (H): ICD-10-CM

## 2024-09-23 DIAGNOSIS — Z79.01 LONG TERM CURRENT USE OF ANTICOAGULANTS WITH INR GOAL OF 2.0-3.0: ICD-10-CM

## 2024-09-23 LAB — INR BLD: 1.8 (ref 0.9–1.1)

## 2024-09-23 PROCEDURE — 85610 PROTHROMBIN TIME: CPT

## 2024-09-23 PROCEDURE — 36416 COLLJ CAPILLARY BLOOD SPEC: CPT

## 2024-09-23 NOTE — PROGRESS NOTES
ANTICOAGULATION MANAGEMENT     Tucker Fariason 89 year old male is on warfarin with subtherapeutic INR result. (Goal INR 2.0-2.5)    Recent labs: (last 7 days)     09/23/24  1444   INR 1.8*       ASSESSMENT     Source(s): Chart Review and Patient/Caregiver Call     Warfarin doses taken: Warfarin taken as instructed  Diet: No new diet changes identified - continues Premier protein drink almost daily  Medication/supplement changes: None noted  New illness, injury, or hospitalization: No  Signs or symptoms of bleeding or clotting: No  Previous result: Therapeutic last visit; previously outside of goal range  Additional findings: None       PLAN     Recommended plan for no diet, medication or health factor changes affecting INR     Dosing Instructions: Increase your warfarin dose (8.3% change) with next INR in 2 weeks       Summary  As of 9/23/2024      Full warfarin instructions:  1.25 mg every Thu; 2.5 mg all other days   Next INR check:  10/7/2024               Telephone call with Tucker who verbalizes understanding and agrees to plan    Lab visit scheduled    Education provided: Please call back if any changes to your diet, medications or how you've been taking warfarin    Plan made per [facility] anticoagulation protocol    Nena Valdovinos RN  9/23/2024  Anticoagulation Clinic  [employer] for routing messages: zain CABRERA [facility] patient phone line: [phone]        _______________________________________________________________________     Anticoagulation Episode Summary       Current INR goal:  2.0-2.5   TTR:  64.7% (1 y)   Target end date:  Indefinite   Send INR reminders to:  [facility]    Indications    Atrial fibrillation (H) with mitral regurgitation and congestive heart failure [I48.91]  Long term current use of anticoagulants with INR goal of 2.0-3.0 [Z79.01]  Permanent atrial fibrillation (H) [I48.21]             Comments:               Anticoagulation Care Providers        Provider Role Specialty Phone number    Kwadwo Winslow MD Referring Internal Medicine [phone]

## 2024-10-02 DIAGNOSIS — I10 ESSENTIAL HYPERTENSION WITH GOAL BLOOD PRESSURE LESS THAN 140/90: ICD-10-CM

## 2024-10-03 RX ORDER — CARVEDILOL 25 MG/1
TABLET ORAL
Qty: 270 TABLET | Refills: 2 | Status: SHIPPED | OUTPATIENT
Start: 2024-10-03

## 2024-10-05 DIAGNOSIS — I10 ESSENTIAL HYPERTENSION WITH GOAL BLOOD PRESSURE LESS THAN 140/90: ICD-10-CM

## 2024-10-07 ENCOUNTER — LAB (OUTPATIENT)
Dept: LAB | Facility: CLINIC | Age: 89
End: 2024-10-07
Payer: COMMERCIAL

## 2024-10-07 ENCOUNTER — ANTICOAGULATION THERAPY VISIT (OUTPATIENT)
Dept: ANTICOAGULATION | Facility: CLINIC | Age: 89
End: 2024-10-07

## 2024-10-07 DIAGNOSIS — I48.21 PERMANENT ATRIAL FIBRILLATION (H): ICD-10-CM

## 2024-10-07 DIAGNOSIS — Z79.01 LONG TERM CURRENT USE OF ANTICOAGULANTS WITH INR GOAL OF 2.0-3.0: Primary | ICD-10-CM

## 2024-10-07 LAB — INR BLD: 2.7 (ref 0.9–1.1)

## 2024-10-07 PROCEDURE — 85610 PROTHROMBIN TIME: CPT

## 2024-10-07 PROCEDURE — 36416 COLLJ CAPILLARY BLOOD SPEC: CPT

## 2024-10-07 RX ORDER — LOSARTAN POTASSIUM 50 MG/1
TABLET ORAL
Qty: 90 TABLET | Refills: 0 | Status: SHIPPED | OUTPATIENT
Start: 2024-10-07

## 2024-10-07 NOTE — PROGRESS NOTES
ANTICOAGULATION MANAGEMENT     Tucker Slater 89 year old male is on warfarin with subtherapeutic INR result. (Goal INR 2.0-2.5)    Recent labs: (last 7 days)     10/07/24  1416   INR 2.7*       ASSESSMENT     Source(s): Chart Review and Patient/Caregiver Call     Warfarin doses taken: Warfarin taken as instructed  Diet: No new diet changes identified, possibly less greens, also will try to have another serving of greens a week  Medication/supplement changes: None noted  New illness, injury, or hospitalization: No  Signs or symptoms of bleeding or clotting: No  Previous result: Subtherapeutic on 9/23/24, warfarin maintenance dose increased 8.3%  Additional findings: None       PLAN     Recommended plan for temporary change(s) affecting INR    Dosing Instructions: Continue your current warfarin dose with next INR in 2 weeks       Summary  As of 10/7/2024      Full warfarin instructions:  1.25 mg every Thu; 2.5 mg all other days   Next INR check:  10/21/2024               Telephone call with Tucker who agrees to plan and repeated back plan correctly    Lab visit scheduled    Education provided: Please call back if any changes to your diet, medications or how you've been taking warfarin  Dietary considerations: importance of consistent vitamin K intake  Contact [phone] with any changes, questions or concerns.     Plan made per [facility] anticoagulation protocol    Gail Valdovinos RN  10/7/2024  Anticoagulation Clinic  [facility] for routing messages: zain ACUÑA  [facility] patient phone line: [phone]        _______________________________________________________________________     Anticoagulation Episode Summary       Current INR goal:  2.0-2.5   TTR:  66.2% (1 y)   Target end date:  Indefinite   Send INR reminders to:  DEBORAH ACUÑA    Indications    Atrial fibrillation (H) with mitral regurgitation and congestive heart failure [I48.91]  Long term current use of anticoagulants with INR  goal of 2.0-3.0 [Z79.01]  Permanent atrial fibrillation (H) [I48.21]             Comments:               Anticoagulation Care Providers       Provider Role Specialty Phone number    Kwadwo Winslow MD Referring Internal Medicine [phone]

## 2024-10-21 ENCOUNTER — LAB (OUTPATIENT)
Dept: LAB | Facility: CLINIC | Age: 89
End: 2024-10-21
Payer: COMMERCIAL

## 2024-10-21 ENCOUNTER — ANTICOAGULATION THERAPY VISIT (OUTPATIENT)
Dept: ANTICOAGULATION | Facility: CLINIC | Age: 89
End: 2024-10-21

## 2024-10-21 DIAGNOSIS — I48.21 PERMANENT ATRIAL FIBRILLATION (H): ICD-10-CM

## 2024-10-21 DIAGNOSIS — Z79.01 LONG TERM CURRENT USE OF ANTICOAGULANTS WITH INR GOAL OF 2.0-3.0: Primary | ICD-10-CM

## 2024-10-21 LAB — INR BLD: 3.7 (ref 0.9–1.1)

## 2024-10-21 PROCEDURE — 36416 COLLJ CAPILLARY BLOOD SPEC: CPT

## 2024-10-21 PROCEDURE — 85610 PROTHROMBIN TIME: CPT

## 2024-10-21 NOTE — PROGRESS NOTES
ANTICOAGULATION MANAGEMENT     Tucker Slater 89 year old male is on warfarin with supratherapeutic INR result. (Goal INR 2.0-2.5)    Recent labs: (last 7 days)     10/21/24  1412   INR 3.7*       ASSESSMENT     Source(s): Chart Review and Patient/Caregiver Call     Warfarin doses taken: Warfarin taken as instructed  Diet: No new diet changes identified  Medication/supplement changes: None noted  New illness, injury, or hospitalization: No  Signs or symptoms of bleeding or clotting: No  Previous result: Supratherapeutic, warfarin maintenance dose was increased 8.3% 9/23/24.  Additional findings: Upcoming surgery/procedure Patient reports he will have a biopsy on a couple spots on his scalp 11/20/24, patient will call to ask if he needs to hold warfarin.       PLAN     Recommended plan for no diet, medication or health factor changes affecting INR     Dosing Instructions: hold dose then decrease your warfarin dose (7.7% change) with next INR in 1-2 weeks       Summary  As of 10/21/2024      Full warfarin instructions:  10/21: Hold; Otherwise 1.25 mg every Mon, Thu; 2.5 mg all other days   Next INR check:  11/4/2024               Telephone call with Tucker who agrees to plan and repeated back plan correctly    Lab visit scheduled    Education provided: Please call back if any changes to your diet, medications or how you've been taking warfarin  Contact [phone] with any changes, questions or concerns.     Plan made per [facility] anticoagulation protocol    Gail Valdovinos RN  10/21/2024  Anticoagulation Clinic  [facility] for routing messages: zain ACUÑA  [facility] patient phone line: [phone]        _______________________________________________________________________     Anticoagulation Episode Summary       Current INR goal:  2.0-2.5   TTR:  66.2% (1 y)   Target end date:  Indefinite   Send INR reminders to:  DEBORAH ACUÑA    Indications    Atrial fibrillation (H) with mitral  regurgitation and congestive heart failure [I48.91]  Long term current use of anticoagulants with INR goal of 2.0-3.0 [Z79.01]  Permanent atrial fibrillation (H) [I48.21]             Comments:               Anticoagulation Care Providers       Provider Role Specialty Phone number    Kwadwo Winslow MD Referring Internal Medicine [phone]

## 2024-11-04 ENCOUNTER — ANTICOAGULATION THERAPY VISIT (OUTPATIENT)
Dept: ANTICOAGULATION | Facility: CLINIC | Age: 89
End: 2024-11-04

## 2024-11-04 ENCOUNTER — LAB (OUTPATIENT)
Dept: LAB | Facility: CLINIC | Age: 89
End: 2024-11-04
Payer: COMMERCIAL

## 2024-11-04 DIAGNOSIS — I48.21 PERMANENT ATRIAL FIBRILLATION (H): ICD-10-CM

## 2024-11-04 DIAGNOSIS — Z79.01 LONG TERM CURRENT USE OF ANTICOAGULANTS WITH INR GOAL OF 2.0-3.0: ICD-10-CM

## 2024-11-04 DIAGNOSIS — I48.21 PERMANENT ATRIAL FIBRILLATION (H): Primary | ICD-10-CM

## 2024-11-04 LAB — INR BLD: 3.3 (ref 0.9–1.1)

## 2024-11-04 PROCEDURE — 85610 PROTHROMBIN TIME: CPT

## 2024-11-04 PROCEDURE — 36416 COLLJ CAPILLARY BLOOD SPEC: CPT

## 2024-11-04 NOTE — PROGRESS NOTES
ANTICOAGULATION MANAGEMENT     Tucker Slater 89 year old male is on warfarin with supratherapeutic INR result. (Goal INR 2.0-2.5)    Recent labs: (last 7 days)     11/04/24  1422   INR 3.3*       ASSESSMENT     Source(s): Chart Review and Patient/Caregiver Call     Warfarin doses taken: Warfarin taken as instructed  Diet: No new diet changes identified  Medication/supplement changes: None noted  New illness, injury, or hospitalization: No  Signs or symptoms of bleeding or clotting: No  Previous result: Supratherapeutic  Additional findings: Reports scalp procedure on 11/20 is not a biopsy but a MOHS procedure now. He confirmed no warfarin hold was needed with dermatology and [facility] would agree per protocol.        PLAN     Recommended plan for no diet, medication or health factor changes affecting INR     Dosing Instructions: hold dose then decrease your warfarin dose (8.3% change) with next INR in 2 weeks       Summary  As of 11/4/2024      Full warfarin instructions:  11/4: Hold; Otherwise 1.25 mg every Mon, Thu, Sat; 2.5 mg all other days   Next INR check:  11/18/2024               Telephone call with Tucker who verbalizes understanding and agrees to plan    Lab visit scheduled    Education provided: Please call back if any changes to your diet, medications or how you've been taking warfarin    Plan made per [facility] anticoagulation protocol    Nena Valdovinos RN  11/4/2024  Anticoagulation Clinic  [employer] for routing messages: zain ACUÑA  [facility] patient phone line: [phone]        _______________________________________________________________________     Anticoagulation Episode Summary       Current INR goal:  2.0-2.5   TTR:  66.2% (1 y)   Target end date:  Indefinite   Send INR reminders to:  DEBORAH ACUÑA    Indications    Atrial fibrillation (H) with mitral regurgitation and congestive heart failure [I48.91]  Long term current use of anticoagulants with INR goal of  2.0-3.0 [Z79.01]  Permanent atrial fibrillation (H) [I48.21]             Comments:  --             Anticoagulation Care Providers       Provider Role Specialty Phone number    Kwadwo Winslow MD Referring Internal Medicine [phone]

## 2024-11-18 ENCOUNTER — ANTICOAGULATION THERAPY VISIT (OUTPATIENT)
Dept: ANTICOAGULATION | Facility: CLINIC | Age: 89
End: 2024-11-18

## 2024-11-18 ENCOUNTER — LAB (OUTPATIENT)
Dept: LAB | Facility: CLINIC | Age: 89
End: 2024-11-18
Payer: COMMERCIAL

## 2024-11-18 DIAGNOSIS — I48.21 PERMANENT ATRIAL FIBRILLATION (H): Primary | ICD-10-CM

## 2024-11-18 DIAGNOSIS — I48.21 PERMANENT ATRIAL FIBRILLATION (H): ICD-10-CM

## 2024-11-18 DIAGNOSIS — Z79.01 LONG TERM CURRENT USE OF ANTICOAGULANTS WITH INR GOAL OF 2.0-3.0: ICD-10-CM

## 2024-11-18 LAB — INR BLD: 3.4 (ref 0.9–1.1)

## 2024-11-18 PROCEDURE — 85610 PROTHROMBIN TIME: CPT

## 2024-11-18 PROCEDURE — 36415 COLL VENOUS BLD VENIPUNCTURE: CPT

## 2024-11-18 NOTE — PROGRESS NOTES
ANTICOAGULATION MANAGEMENT     Tucker Slater 89 year old male is on warfarin with supratherapeutic INR result. (Goal INR 2.0-2.5)    Recent labs: (last 7 days)     11/18/24  1421   INR 3.4*       ASSESSMENT     Source(s): Chart Review and Patient/Caregiver Call     Warfarin doses taken: Warfarin taken as instructed - confirmed 11/4 hold and new dose as advised last visit (yet INR has increased from 3.3 to 3.4)  Diet: No new diet changes identified - continues daily protein drink, brand has not changed.  Medication/supplement changes: None noted  New illness, injury, or hospitalization: No  Signs or symptoms of bleeding or clotting: No  Previous result: Supratherapeutic  Additional findings: Upcoming surgery/procedure - MOHS 11/20. No warfarin hold needed per protocol but with hold today and dosing decrease (changed Tuesdays) should see drop in INR by that time.   Toward conclusion of call, Augustine retrieved his derm paperwork and reported 11/20 procedure was actually an [facility]. Confirmed with [facility] that this procedure is similar to MOHS and no warfarin hold is needed.        PLAN     Recommended plan for no diet, medication or health factor changes affecting INR     Dosing Instructions: hold dose then decrease your warfarin dose (9.1% change) with next INR in 1 week       Summary  As of 11/18/2024      Full warfarin instructions:  11/18: Hold; Otherwise 2.5 mg every Sun, Wed, Fri; 1.25 mg all other days   Next INR check:  11/25/2024               Telephone call with Tucker who verbalizes understanding and agrees to plan    Lab visit scheduled    Education provided: Please call back if any changes to your diet, medications or how you've been taking warfarin  Dietary considerations: Impact of protein intake on INR  and importance of notifying [facility] to changes in diet    Plan made per [facility] anticoagulation protocol    Nena Valdovinos RN  11/18/2024  Anticoagulation Clinic  [employer] for routing messages: zain CABRERA  MARCI ACUÑA  [facility] patient phone line: [phone]        _______________________________________________________________________     Anticoagulation Episode Summary       Current INR goal:  2.0-2.5   TTR:  66.2% (1 y)   Target end date:  Indefinite   Send INR reminders to:  DEBORAH MARCI ACUÑA    Indications    Atrial fibrillation (H) with mitral regurgitation and congestive heart failure [I48.91]  Long term current use of anticoagulants with INR goal of 2.0-3.0 [Z79.01]  Permanent atrial fibrillation (H) [I48.21]             Comments:  --             Anticoagulation Care Providers       Provider Role Specialty Phone number    Kwadwo Winslow MD Referring Internal Medicine [phone]

## 2024-11-26 DIAGNOSIS — I10 ESSENTIAL HYPERTENSION WITH GOAL BLOOD PRESSURE LESS THAN 140/90: ICD-10-CM

## 2024-11-26 RX ORDER — TRIAMTERENE AND HYDROCHLOROTHIAZIDE 37.5; 25 MG/1; MG/1
1 TABLET ORAL DAILY
Qty: 90 TABLET | Refills: 0 | Status: SHIPPED | OUTPATIENT
Start: 2024-11-26

## 2024-12-02 ENCOUNTER — LAB (OUTPATIENT)
Dept: LAB | Facility: CLINIC | Age: 89
End: 2024-12-02
Payer: COMMERCIAL

## 2024-12-02 ENCOUNTER — ANTICOAGULATION THERAPY VISIT (OUTPATIENT)
Dept: ANTICOAGULATION | Facility: CLINIC | Age: 89
End: 2024-12-02

## 2024-12-02 DIAGNOSIS — I48.21 PERMANENT ATRIAL FIBRILLATION (H): ICD-10-CM

## 2024-12-02 DIAGNOSIS — Z79.01 LONG TERM CURRENT USE OF ANTICOAGULANTS WITH INR GOAL OF 2.0-3.0: Primary | ICD-10-CM

## 2024-12-02 LAB — INR BLD: 3.1 (ref 0.9–1.1)

## 2024-12-02 PROCEDURE — 36416 COLLJ CAPILLARY BLOOD SPEC: CPT

## 2024-12-02 PROCEDURE — 85610 PROTHROMBIN TIME: CPT

## 2024-12-02 NOTE — PROGRESS NOTES
ANTICOAGULATION MANAGEMENT     Tucker Slater 89 year old male is on warfarin with supratherapeutic INR result. (Goal INR 2.0-2.5)    Recent labs: (last 7 days)     12/02/24  1427   INR 3.1*       ASSESSMENT     Source(s): Chart Review and Patient/Caregiver Call     Warfarin doses taken: Warfarin taken as instructed, confirmed with patient he is following dosing instruction as discussed and not the sig that is on the warfarin bottle   Diet: No new diet changes identified  Medication/supplement changes: None noted  New illness, injury, or hospitalization: No  Signs or symptoms of bleeding or clotting: No  Previous result: Supratherapeutic, Warfarin maintenance dose was decreased 7.7% on 10/21/24, decreased again by 8.3% on 11/4/24, decreased on 11/18/24 by 9.1%  Additional findings: None       PLAN     Recommended plan for no diet, medication or health factor changes affecting INR     Dosing Instructions: hold dose then decrease your warfarin dose (10% change) with next INR in 2 weeks       Summary  As of 12/2/2024      Full warfarin instructions:  12/2: Hold; Otherwise 2.5 mg every Sun, Thu; 1.25 mg all other days   Next INR check:  12/16/2024               Telephone call with Tucker who agrees to plan and repeated back plan correctly and wrote plan down while on the phone with this writer.     Lab visit scheduled    Education provided: Please call back if any changes to your diet, medications or how you've been taking warfarin  Contact [phone] with any changes, questions or concerns.     Plan made per [facility] anticoagulation protocol    Gail Valdovinos RN  12/2/2024  Anticoagulation Clinic  [employer] for routing messages: zain CABRERA [facility] patient phone line: [phone]        _______________________________________________________________________     Anticoagulation Episode Summary       Current INR goal:  2.0-2.5   TTR:  64.9% (1 y)   Target end date:  Indefinite   [facility]    Indications    Atrial fibrillation (H) with mitral regurgitation and congestive heart failure [I48.91]  Long term current use of anticoagulants with INR goal of 2.0-3.0 [Z79.01]  Permanent atrial fibrillation (H) [I48.21]             Comments:  --             Anticoagulation Care Providers       Provider Role Specialty Phone number    Kwadwo Winslow MD Referring Internal Medicine [phone]

## 2024-12-05 ENCOUNTER — ANCILLARY PROCEDURE (OUTPATIENT)
Dept: CARDIOLOGY | Facility: CLINIC | Age: 89
End: 2024-12-05
Attending: INTERNAL MEDICINE
Payer: COMMERCIAL

## 2024-12-05 DIAGNOSIS — Z95.0 CARDIAC PACEMAKER IN SITU: ICD-10-CM

## 2024-12-05 DIAGNOSIS — I44.2 COMPLETE ATRIOVENTRICULAR BLOCK (H): ICD-10-CM

## 2024-12-05 LAB
MDC_IDC_LEAD_CONNECTION_STATUS: NORMAL
MDC_IDC_LEAD_CONNECTION_STATUS: NORMAL
MDC_IDC_LEAD_IMPLANT_DT: NORMAL
MDC_IDC_LEAD_IMPLANT_DT: NORMAL
MDC_IDC_LEAD_LOCATION: NORMAL
MDC_IDC_LEAD_LOCATION: NORMAL
MDC_IDC_LEAD_LOCATION_DETAIL_1: NORMAL
MDC_IDC_LEAD_LOCATION_DETAIL_1: NORMAL
MDC_IDC_LEAD_MFG: NORMAL
MDC_IDC_LEAD_MFG: NORMAL
MDC_IDC_LEAD_MODEL: NORMAL
MDC_IDC_LEAD_MODEL: NORMAL
MDC_IDC_LEAD_POLARITY_TYPE: NORMAL
MDC_IDC_LEAD_POLARITY_TYPE: NORMAL
MDC_IDC_LEAD_SERIAL: NORMAL
MDC_IDC_LEAD_SERIAL: NORMAL
MDC_IDC_MSMT_BATTERY_DTM: NORMAL
MDC_IDC_MSMT_BATTERY_REMAINING_LONGEVITY: 123 MO
MDC_IDC_MSMT_BATTERY_REMAINING_PERCENTAGE: 95.5 %
MDC_IDC_MSMT_BATTERY_RRT_TRIGGER: NORMAL
MDC_IDC_MSMT_BATTERY_STATUS: NORMAL
MDC_IDC_MSMT_BATTERY_VOLTAGE: 3.02 V
MDC_IDC_MSMT_LEADCHNL_RV_IMPEDANCE_VALUE: 400 OHM
MDC_IDC_MSMT_LEADCHNL_RV_LEAD_CHANNEL_STATUS: NORMAL
MDC_IDC_MSMT_LEADCHNL_RV_PACING_THRESHOLD_AMPLITUDE: 0.75 V
MDC_IDC_MSMT_LEADCHNL_RV_PACING_THRESHOLD_PULSEWIDTH: 0.5 MS
MDC_IDC_MSMT_LEADCHNL_RV_SENSING_INTR_AMPL: 8.5 MV
MDC_IDC_PG_IMPLANT_DTM: NORMAL
MDC_IDC_PG_MFG: NORMAL
MDC_IDC_PG_MODEL: NORMAL
MDC_IDC_PG_SERIAL: NORMAL
MDC_IDC_PG_TYPE: NORMAL
MDC_IDC_SESS_CLINIC_NAME: NORMAL
MDC_IDC_SESS_DTM: NORMAL
MDC_IDC_SESS_REPROGRAMMED: NO
MDC_IDC_SESS_TYPE: NORMAL
MDC_IDC_SET_BRADY_HYSTRATE: 60 {BEATS}/MIN
MDC_IDC_SET_BRADY_LOWRATE: 70 {BEATS}/MIN
MDC_IDC_SET_BRADY_MAX_SENSOR_RATE: 120 {BEATS}/MIN
MDC_IDC_SET_BRADY_MODE: NORMAL
MDC_IDC_SET_LEADCHNL_RA_PACING_POLARITY: NORMAL
MDC_IDC_SET_LEADCHNL_RA_SENSING_POLARITY: NORMAL
MDC_IDC_SET_LEADCHNL_RV_PACING_AMPLITUDE: 1 V
MDC_IDC_SET_LEADCHNL_RV_PACING_ANODE_ELECTRODE_1: NORMAL
MDC_IDC_SET_LEADCHNL_RV_PACING_ANODE_LOCATION_1: NORMAL
MDC_IDC_SET_LEADCHNL_RV_PACING_CAPTURE_MODE: NORMAL
MDC_IDC_SET_LEADCHNL_RV_PACING_CATHODE_ELECTRODE_1: NORMAL
MDC_IDC_SET_LEADCHNL_RV_PACING_CATHODE_LOCATION_1: NORMAL
MDC_IDC_SET_LEADCHNL_RV_PACING_POLARITY: NORMAL
MDC_IDC_SET_LEADCHNL_RV_PACING_PULSEWIDTH: 0.5 MS
MDC_IDC_SET_LEADCHNL_RV_SENSING_ADAPTATION_MODE: NORMAL
MDC_IDC_SET_LEADCHNL_RV_SENSING_ANODE_ELECTRODE_1: NORMAL
MDC_IDC_SET_LEADCHNL_RV_SENSING_ANODE_LOCATION_1: NORMAL
MDC_IDC_SET_LEADCHNL_RV_SENSING_CATHODE_ELECTRODE_1: NORMAL
MDC_IDC_SET_LEADCHNL_RV_SENSING_CATHODE_LOCATION_1: NORMAL
MDC_IDC_SET_LEADCHNL_RV_SENSING_POLARITY: NORMAL
MDC_IDC_SET_LEADCHNL_RV_SENSING_SENSITIVITY: 2 MV
MDC_IDC_STAT_AT_DTM_END: NORMAL
MDC_IDC_STAT_AT_DTM_START: NORMAL
MDC_IDC_STAT_BRADY_DTM_END: NORMAL
MDC_IDC_STAT_BRADY_DTM_START: NORMAL
MDC_IDC_STAT_BRADY_RV_PERCENT_PACED: 99 %
MDC_IDC_STAT_CRT_DTM_END: NORMAL
MDC_IDC_STAT_CRT_DTM_START: NORMAL
MDC_IDC_STAT_HEART_RATE_DTM_END: NORMAL
MDC_IDC_STAT_HEART_RATE_DTM_START: NORMAL
MDC_IDC_STAT_HEART_RATE_VENTRICULAR_MAX: 190 {BEATS}/MIN
MDC_IDC_STAT_HEART_RATE_VENTRICULAR_MEAN: 77 {BEATS}/MIN
MDC_IDC_STAT_HEART_RATE_VENTRICULAR_MIN: 60 {BEATS}/MIN

## 2024-12-05 PROCEDURE — 93296 REM INTERROG EVL PM/IDS: CPT | Performed by: INTERNAL MEDICINE

## 2024-12-05 PROCEDURE — 93294 REM INTERROG EVL PM/LDLS PM: CPT | Performed by: INTERNAL MEDICINE

## 2024-12-16 ENCOUNTER — LAB (OUTPATIENT)
Dept: LAB | Facility: CLINIC | Age: 89
End: 2024-12-16
Payer: COMMERCIAL

## 2024-12-16 ENCOUNTER — ANTICOAGULATION THERAPY VISIT (OUTPATIENT)
Dept: ANTICOAGULATION | Facility: CLINIC | Age: 89
End: 2024-12-16

## 2024-12-16 DIAGNOSIS — I48.21 PERMANENT ATRIAL FIBRILLATION (H): ICD-10-CM

## 2024-12-16 DIAGNOSIS — Z79.01 LONG TERM CURRENT USE OF ANTICOAGULANTS WITH INR GOAL OF 2.0-3.0: Primary | ICD-10-CM

## 2024-12-16 LAB — INR BLD: 2.1 (ref 0.9–1.1)

## 2024-12-16 PROCEDURE — 85610 PROTHROMBIN TIME: CPT

## 2024-12-16 PROCEDURE — 36416 COLLJ CAPILLARY BLOOD SPEC: CPT

## 2024-12-16 NOTE — PROGRESS NOTES
ANTICOAGULATION MANAGEMENT     Tucker Slater 89 year old male is on warfarin with therapeutic INR result. (Goal INR 2.0-2.5)    Recent labs: (last 7 days)     12/16/24  1416   INR 2.1*       ASSESSMENT     Source(s): Chart Review and Patient/Caregiver Call     Warfarin doses taken: Warfarin taken as instructed  Diet: No new diet changes identified  Medication/supplement changes: None noted  New illness, injury, or hospitalization: No  Signs or symptoms of bleeding or clotting: No  Previous result: Supratherapeutic  Additional findings: None       PLAN     Recommended plan for no diet, medication or health factor changes affecting INR     Dosing Instructions: Continue your current warfarin dose with next INR in 2 weeks       Summary  As of 12/16/2024      Full warfarin instructions:  2.5 mg every Sun, Thu; 1.25 mg all other days   Next INR check:  12/30/2024               Telephone call with Tcuker who agrees to plan and repeated back plan correctly    Lab visit scheduled    Education provided: Please call back if any changes to your diet, medications or how you've been taking warfarin  Contact [phone] with any changes, questions or concerns.     Plan made per [facility] anticoagulation protocol    Gail Valdovinos RN  12/16/2024  Anticoagulation Clinic  [employer] for routing messages: p [facility] phone line: [phone]        _______________________________________________________________________     Anticoagulation Episode Summary       Current INR goal:  2.0-2.5   TTR:  64.8% (1 y)   Target end date:  Indefinite   Send INR reminders to:  DEBORAH [facility]    Indications    Atrial fibrillation (H) with mitral regurgitation and congestive heart failure [I48.91]  Long term current use of anticoagulants with INR goal of 2.0-3.0 [Z79.01]  Permanent atrial fibrillation (H) [I48.21]             Comments:  --             Anticoagulation Care Providers       Provider Role  Specialty Phone number    Kwadwo Winslow MD Referring Internal Medicine [phone]

## 2024-12-30 ENCOUNTER — ANTICOAGULATION THERAPY VISIT (OUTPATIENT)
Dept: ANTICOAGULATION | Facility: CLINIC | Age: 89
End: 2024-12-30

## 2024-12-30 ENCOUNTER — LAB (OUTPATIENT)
Dept: LAB | Facility: CLINIC | Age: 89
End: 2024-12-30
Payer: COMMERCIAL

## 2024-12-30 DIAGNOSIS — Z79.01 LONG TERM CURRENT USE OF ANTICOAGULANTS WITH INR GOAL OF 2.0-3.0: ICD-10-CM

## 2024-12-30 DIAGNOSIS — I48.21 PERMANENT ATRIAL FIBRILLATION (H): ICD-10-CM

## 2024-12-30 DIAGNOSIS — I48.21 PERMANENT ATRIAL FIBRILLATION (H): Primary | ICD-10-CM

## 2024-12-30 LAB — INR BLD: 2.4 (ref 0.9–1.1)

## 2024-12-30 PROCEDURE — 36416 COLLJ CAPILLARY BLOOD SPEC: CPT

## 2024-12-30 PROCEDURE — 85610 PROTHROMBIN TIME: CPT

## 2024-12-30 NOTE — PROGRESS NOTES
ANTICOAGULATION MANAGEMENT     Tucker Slater 89 year old male is on warfarin with therapeutic INR result. (Goal INR 2.0-2.5)    Recent labs: (last 7 days)     12/30/24  1426   INR 2.4*         ASSESSMENT     Source(s): Chart Review and Patient/Caregiver Call     Warfarin doses taken: Warfarin taken as instructed  Diet: No new diet changes identified  Medication/supplement changes: None noted  New illness, injury, or hospitalization: No  Signs or symptoms of bleeding or clotting: No  Previous result: Therapeutic last visit; previously outside of goal range  Additional findings: None       PLAN     Recommended plan for no diet, medication or health factor changes affecting INR     Dosing Instructions: Continue your current warfarin dose with next INR in 3 weeks       Summary  As of 12/30/2024      Full warfarin instructions:  2.5 mg every Sun, Thu; 1.25 mg all other days   Next INR check:  1/20/2025               Telephone call with Tucker who verbalizes understanding and agrees to plan    Lab visit scheduled    Education provided: Taking warfarin: Importance of taking warfarin as instructed    Plan made per [facility] anticoagulation protocol    Chichi Wang RN  12/30/2024  Anticoagulation Clinic  [employer] for routing messages: zain [facility] patient phone line: [phone]        _______________________________________________________________________     Anticoagulation Episode Summary       Current INR goal:  2.0-2.5   TTR:  68.6% (1 y)   Target end date:  Indefinite   Send INR reminders to:  [facility]    Indications    Atrial fibrillation (H) with mitral regurgitation and congestive heart failure [I48.91]  Long term current use of anticoagulants with INR goal of 2.0-3.0 [Z79.01]  Permanent atrial fibrillation (H) [I48.21]             Comments:  --             Anticoagulation Care Providers       Provider Role Specialty Phone number    Kwadwo Winslow MD Referring Internal  Medicine [phone]

## 2025-01-20 ENCOUNTER — LAB (OUTPATIENT)
Dept: LAB | Facility: CLINIC | Age: OVER 89
End: 2025-01-20
Payer: COMMERCIAL

## 2025-01-20 ENCOUNTER — ANTICOAGULATION THERAPY VISIT (OUTPATIENT)
Dept: ANTICOAGULATION | Facility: CLINIC | Age: OVER 89
End: 2025-01-20

## 2025-01-20 DIAGNOSIS — I48.21 PERMANENT ATRIAL FIBRILLATION (H): ICD-10-CM

## 2025-01-20 DIAGNOSIS — Z79.01 LONG TERM CURRENT USE OF ANTICOAGULANTS WITH INR GOAL OF 2.0-3.0: Primary | ICD-10-CM

## 2025-01-20 LAB — INR BLD: 1.4 (ref 0.9–1.1)

## 2025-01-20 PROCEDURE — 85610 PROTHROMBIN TIME: CPT

## 2025-01-20 PROCEDURE — 36416 COLLJ CAPILLARY BLOOD SPEC: CPT

## 2025-01-20 NOTE — PROGRESS NOTES
ANTICOAGULATION MANAGEMENT     Tucker Slater 89 year old male is on warfarin with subtherapeutic INR result. (Goal INR 2.0-2.5)    Recent labs: (last 7 days)     01/20/25  1414   INR 1.4*       ASSESSMENT     Source(s): Chart Review and Patient/Caregiver Call     Warfarin doses taken: Warfarin taken as instructed  Diet: No new diet changes identified  Medication/supplement changes: None noted  New illness, injury, or hospitalization: No  Signs or symptoms of bleeding or clotting: No  Previous result: Therapeutic last 2(+) visits  Additional findings:  Patient had multiple warfarin dose decreases at the end of October, in November and the beginning of December, may need to start increasing warfarin maintenance dose        PLAN     Recommended plan for no diet, medication or health factor changes affecting INR     Dosing Instructions: Increase your warfarin dose (11.1% change) with next INR in 1 week       Summary  As of 1/20/2025      Full warfarin instructions:  2.5 mg every Mon, Wed, Fri; 1.25 mg all other days   Next INR check:  1/27/2025               Telephone call with Tucker who agrees to plan and repeated back plan correctly    Lab visit scheduled    Education provided: Please call back if any changes to your diet, medications or how you've been taking warfarin  Contact [phone] with any changes, questions or concerns.     Plan made per [facility] anticoagulation protocol    Gail Valdovinos RN  1/20/2025  Anticoagulation Clinic  [employer] for routing messages: zain ACUÑA  [facility] patient phone line: [phone]        _______________________________________________________________________     Anticoagulation Episode Summary       Current INR goal:  2.0-2.5   TTR:  67.7% (1 y)   Target end date:  Indefinite   Send INR reminders to:  DEBORAH ACUÑA    Indications    Atrial fibrillation (H) with mitral regurgitation and congestive heart failure [I48.91]  Long term current use of  anticoagulants with INR goal of 2.0-3.0 [Z79.01]  Permanent atrial fibrillation (H) [I48.21]             Comments:  --             Anticoagulation Care Providers       Provider Role Specialty Phone number    Kwadwo Winslow MD Referring Internal Medicine [phone]

## 2025-01-27 ENCOUNTER — DOCUMENTATION ONLY (OUTPATIENT)
Dept: ANTICOAGULATION | Facility: CLINIC | Age: OVER 89
End: 2025-01-27

## 2025-01-27 ENCOUNTER — ANTICOAGULATION THERAPY VISIT (OUTPATIENT)
Dept: ANTICOAGULATION | Facility: CLINIC | Age: OVER 89
End: 2025-01-27

## 2025-01-27 ENCOUNTER — LAB (OUTPATIENT)
Dept: LAB | Facility: CLINIC | Age: OVER 89
End: 2025-01-27
Payer: COMMERCIAL

## 2025-01-27 DIAGNOSIS — Z79.01 LONG TERM CURRENT USE OF ANTICOAGULANTS WITH INR GOAL OF 2.0-3.0: ICD-10-CM

## 2025-01-27 DIAGNOSIS — N18.30 CKD (CHRONIC KIDNEY DISEASE) STAGE 3, GFR 30-59 ML/MIN (H): Primary | ICD-10-CM

## 2025-01-27 DIAGNOSIS — I48.21 PERMANENT ATRIAL FIBRILLATION (H): ICD-10-CM

## 2025-01-27 DIAGNOSIS — I48.21 PERMANENT ATRIAL FIBRILLATION (H): Primary | ICD-10-CM

## 2025-01-27 LAB — INR BLD: 1.8 (ref 0.9–1.1)

## 2025-01-27 PROCEDURE — 85610 PROTHROMBIN TIME: CPT

## 2025-01-27 PROCEDURE — 80048 BASIC METABOLIC PNL TOTAL CA: CPT

## 2025-01-27 PROCEDURE — 36415 COLL VENOUS BLD VENIPUNCTURE: CPT

## 2025-01-27 NOTE — PROGRESS NOTES
ANTICOAGULATION CLINIC REFERRAL RENEWAL REQUEST       An annual renewal order is required for all patients referred to [facility] Anticoagulation Clinic.?  Please review and sign the pended referral order for Tucker Slater.       ANTICOAGULATION SUMMARY      Warfarin indication(s)   Atrial Fibrillation    Mechanical heart valve present?  NO       Current goal range   INR: 2.0-2.5     Goal appropriate for indication? Per chart review, patient has history of hematuria at higher goal range     Time in Therapeutic Range (TTR)  (Goal > 60%) 66%       Office visit with referring provider's group within last year yes on 9/9/2024       Chichi Wang RN  [facility] Anticoagulation Clinic

## 2025-01-27 NOTE — PROGRESS NOTES
ANTICOAGULATION MANAGEMENT     Tucker Slater 89 year old male is on warfarin with subtherapeutic INR result. (Goal INR 2.0-2.5)    Recent labs: (last 7 days)     01/27/25  1417   INR 1.8*       ASSESSMENT     Source(s): Chart Review and Patient/Caregiver Call     Warfarin doses taken: Warfarin taken as instructed  Diet: No new diet changes identified  Medication/supplement changes: None noted  New illness, injury, or hospitalization: No  Signs or symptoms of bleeding or clotting: No  Previous result: Subtherapeutic  Additional findings: Warfarin maintenance dose was increased 11% at last visit, increased again today given maintenance dose was decreased several times in the recent past.  Patient declined a warfarin refill at this time.       PLAN     Recommended plan for no diet, medication or health factor changes affecting INR     Dosing Instructions: Increase your warfarin dose (10% change) with next INR in 10-14 days, patient prefers 14 days.      Summary  As of 1/27/2025      Full warfarin instructions:  1.25 mg every Tue, Thu, Sat; 2.5 mg all other days   Next INR check:  2/10/2025               Telephone call with Tucker who verbalizes understanding and agrees to plan and who agrees to plan and repeated back plan correctly    Lab visit scheduled    Education provided: Taking warfarin: Importance of taking warfarin as instructed    Plan made per [facility] anticoagulation protocol    Chichi Wang RN  1/27/2025  Anticoagulation Clinic  [employer] for routing messages: zain ACUÑA  [facility] patient phone line: [phone]        _______________________________________________________________________     Anticoagulation Episode Summary       Current INR goal:  2.0-2.5   TTR:  65.8% (1 y)   Target end date:  Indefinite   Send INR reminders to:  DEBORAH ACUÑA    Indications    Atrial fibrillation (H) with mitral regurgitation and congestive heart failure [I48.91]  Long term current use of  anticoagulants with INR goal of 2.0-3.0 [Z79.01]  Permanent atrial fibrillation (H) [I48.21]             Comments:  --             Anticoagulation Care Providers       Provider Role Specialty Phone number    Kwadwo Winslow MD Referring Internal Medicine [phone]

## 2025-01-27 NOTE — LETTER
January 28, 2025      Tucker Slater  [address] [place] [facility] [address]        Dear ,    We are writing to inform you of your test results.    Slightly decreased kidney function, but remain stable. Follow up in 6 months.     Resulted Orders   BASIC METABOLIC PANEL   Result Value Ref Range    Sodium 141 135 - 145 mmol/L    Potassium 4.3 3.4 - 5.3 mmol/L    Chloride 111 (H) 98 - 107 mmol/L    Carbon Dioxide (CO2) 22 22 - 29 mmol/L    Anion Gap 8 7 - 15 mmol/L    Urea Nitrogen 49.0 (H) 8.0 - 23.0 mg/dL    Creatinine 1.24 (H) 0.67 - 1.17 mg/dL    GFR Estimate 54 (L) >60 mL/min/1.73m2      Comment:      eGFR calculated using 2021 CKD-EPI equation.    Calcium 9.3 8.8 - 10.4 mg/dL      Comment:      Reference intervals for this test were updated on 7/16/2024 to reflect our healthy population more accurately. There may be differences in the flagging of prior results with similar values performed with this method. Those prior results can be interpreted in the context of the updated reference intervals.    Glucose 85 70 - 99 mg/dL       If you have any questions or concerns, please call the clinic at the number listed above.       Sincerely,      Kwadwo Winslow MD    Electronically signed

## 2025-01-28 LAB
ANION GAP SERPL CALCULATED.3IONS-SCNC: 8 MMOL/L (ref 7–15)
BUN SERPL-MCNC: 49 MG/DL (ref 8–23)
CALCIUM SERPL-MCNC: 9.3 MG/DL (ref 8.8–10.4)
CHLORIDE SERPL-SCNC: 111 MMOL/L (ref 98–107)
CREAT SERPL-MCNC: 1.24 MG/DL (ref 0.67–1.17)
EGFRCR SERPLBLD CKD-EPI 2021: 54 ML/MIN/1.73M2
GLUCOSE SERPL-MCNC: 85 MG/DL (ref 70–99)
HCO3 SERPL-SCNC: 22 MMOL/L (ref 22–29)
POTASSIUM SERPL-SCNC: 4.3 MMOL/L (ref 3.4–5.3)
SODIUM SERPL-SCNC: 141 MMOL/L (ref 135–145)

## 2025-02-10 ENCOUNTER — ANTICOAGULATION THERAPY VISIT (OUTPATIENT)
Dept: ANTICOAGULATION | Facility: CLINIC | Age: OVER 89
End: 2025-02-10

## 2025-02-10 ENCOUNTER — LAB (OUTPATIENT)
Dept: LAB | Facility: CLINIC | Age: OVER 89
End: 2025-02-10
Payer: COMMERCIAL

## 2025-02-10 DIAGNOSIS — I48.21 PERMANENT ATRIAL FIBRILLATION (H): Primary | ICD-10-CM

## 2025-02-10 DIAGNOSIS — I48.21 PERMANENT ATRIAL FIBRILLATION (H): ICD-10-CM

## 2025-02-10 DIAGNOSIS — Z79.01 LONG TERM CURRENT USE OF ANTICOAGULANTS WITH INR GOAL OF 2.0-3.0: ICD-10-CM

## 2025-02-10 LAB — INR BLD: 3.8 (ref 0.9–1.1)

## 2025-02-10 PROCEDURE — 85610 PROTHROMBIN TIME: CPT

## 2025-02-10 PROCEDURE — 36416 COLLJ CAPILLARY BLOOD SPEC: CPT

## 2025-02-10 NOTE — PROGRESS NOTES
ANTICOAGULATION MANAGEMENT     Tucker Slater 89 year old male is on warfarin with supratherapeutic INR result. (Goal INR 2.0-2.5)    Recent labs: (last 7 days)     02/10/25  1424   INR 3.8*       ASSESSMENT     Source(s): Chart Review and Patient/Caregiver Call     Warfarin doses taken: Warfarin taken as instructed--verbally repeated back correctly  Diet: Increased greens/vitamin K in diet; plans to resume previous intake  Medication/supplement changes: None noted  New illness, injury, or hospitalization: No  Signs or symptoms of bleeding or clotting: No  Previous result: Subtherapeutic  Additional findings: Warfarin maintenance dose was increased at last 2 visits       PLAN     Recommended plan for temporary change(s) affecting INR     Dosing Instructions: decrease your warfarin dose (9% change) with next INR in 2 weeks       Summary  As of 2/10/2025      Full warfarin instructions:  2.5 mg every Sun, Wed, Fri; 1.25 mg all other days   Next INR check:  2/24/2025               Telephone call with Tucker who verbalizes understanding and agrees to plan and who agrees to plan and repeated back plan correctly    Lab visit scheduled    Education provided: Taking warfarin: Importance of taking warfarin as instructed  Dietary considerations: importance of consistent vitamin K intake and impact of vitamin K foods on INR    Plan made per [facility] anticoagulation protocol    Chichi Wang, RN  2/10/2025  Anticoagulation Clinic  [employer] for routing messages: zain ACUÑA  [facility] patient phone line: [phone]        _______________________________________________________________________     Anticoagulation Episode Summary       Current INR goal:  2.0-2.5   TTR:  62.9% (1 y)   Target end date:  Indefinite   Send INR reminders to:  DEBORAH ACUÑA    Indications    Atrial fibrillation (H) with mitral regurgitation and congestive heart failure [I48.91]  Long term current use of anticoagulants with INR  goal of 2.0-3.0 [Z79.01]  Permanent atrial fibrillation (H) [I48.21]             Comments:  --             Anticoagulation Care Providers       Provider Role Specialty Phone number    Kwadwo Winslow MD Referring Internal Medicine [phone]

## 2025-02-18 DIAGNOSIS — I10 ESSENTIAL HYPERTENSION WITH GOAL BLOOD PRESSURE LESS THAN 140/90: ICD-10-CM

## 2025-02-18 RX ORDER — TRIAMTERENE AND HYDROCHLOROTHIAZIDE 37.5; 25 MG/1; MG/1
1 TABLET ORAL DAILY
Qty: 90 TABLET | Refills: 0 | Status: SHIPPED | OUTPATIENT
Start: 2025-02-18

## 2025-02-22 ENCOUNTER — TRANSFERRED RECORDS (OUTPATIENT)
Dept: HEALTH INFORMATION MANAGEMENT | Facility: CLINIC | Age: OVER 89
End: 2025-02-22
Payer: COMMERCIAL

## 2025-02-24 ENCOUNTER — ANTICOAGULATION THERAPY VISIT (OUTPATIENT)
Dept: ANTICOAGULATION | Facility: CLINIC | Age: OVER 89
End: 2025-02-24

## 2025-02-24 ENCOUNTER — TELEPHONE (OUTPATIENT)
Dept: INTERNAL MEDICINE | Facility: CLINIC | Age: OVER 89
End: 2025-02-24

## 2025-02-24 ENCOUNTER — LAB (OUTPATIENT)
Dept: LAB | Facility: CLINIC | Age: OVER 89
End: 2025-02-24
Payer: COMMERCIAL

## 2025-02-24 DIAGNOSIS — I48.21 PERMANENT ATRIAL FIBRILLATION (H): ICD-10-CM

## 2025-02-24 DIAGNOSIS — Z79.01 LONG TERM CURRENT USE OF ANTICOAGULANTS WITH INR GOAL OF 2.0-3.0: ICD-10-CM

## 2025-02-24 DIAGNOSIS — I48.21 PERMANENT ATRIAL FIBRILLATION (H): Primary | ICD-10-CM

## 2025-02-24 LAB — INR BLD: 4.6 (ref 0.9–1.1)

## 2025-02-24 PROCEDURE — 85610 PROTHROMBIN TIME: CPT

## 2025-02-24 PROCEDURE — 36416 COLLJ CAPILLARY BLOOD SPEC: CPT

## 2025-02-24 NOTE — PROGRESS NOTES
ANTICOAGULATION MANAGEMENT     Tucker Slater 89 year old male is on warfarin with supratherapeutic INR result. (Goal INR 2.0-2.5)    Recent labs: (last 7 days)     02/24/25  1421   INR 4.6*       ASSESSMENT     Source(s): Chart Review  Previous INR was Supratherapeutic.  Warfarin maintenance dose was decreased 10% at last visit.  Medication and diet changes since last INR chart reviewed; none identified  Hip pain noted 2/21.  Pain rated 8-10.  Taking Tylenol for pain.         PLAN     Unable to reach Tucker today.    Left message to hold warfarin tonight. Request call back for assessment.    Follow up required to confirm warfarin dose taken and assess for changes    Would consider holding warfarin for 2 doses and decreasing warfarin maintenance dose to 1.25 mg daily except 2.5 mg Sun, Th (~10% decrease) if no further changes noted.    Ivory Machado, RN  2/24/2025  Anticoagulation Clinic  [facility] for routing messages: zain CABRERA [facility] patient phone line: [phone]

## 2025-02-24 NOTE — TELEPHONE ENCOUNTER
Attempted to call patient, left message to hold warfarin 2/24/25 and INR nurse would call him 2/25/25.  Gail Valdovinos, RN, BSN  Anticoagulation Clinic

## 2025-02-24 NOTE — TELEPHONE ENCOUNTER
General Call    Contacts       Contact Date/Time Type Contact Phone/Fax    02/24/2025 04:45 PM CST Phone (Incoming) Tucker Slater (Self) [phone] (H)          Reason for Call:  Patient is returning the call from INR nurse     What are your questions or concerns:  please call patient back    Date of last appointment with provider: na    Okay to leave a detailed message?: Yes at [phone]

## 2025-02-24 NOTE — PROGRESS NOTES
Message received that patient returned call. Attempted to call patient, left message to hold warfarin 2/24/25 and INR nurse would call him 2/25/25.  Gail Valdovinos, RN, BSN  Anticoagulation Clinic

## 2025-02-25 ENCOUNTER — TELEPHONE (OUTPATIENT)
Dept: INTERNAL MEDICINE | Facility: CLINIC | Age: OVER 89
End: 2025-02-25
Payer: COMMERCIAL

## 2025-02-25 NOTE — PROGRESS NOTES
ANTICOAGULATION MANAGEMENT     Tucker Slater 89 year old male is on warfarin with supratherapeutic INR result. (Goal INR 2.0-2.5)    Recent labs: (last 7 days)     02/24/25  1421   INR 4.6*       ASSESSMENT     Source(s): Chart Review and Patient/Caregiver Call     Warfarin doses taken: Warfarin taken as instructed  Diet: No new diet changes identified  Medication/supplement changes: Currently on prednisone taper dose started yesterday. Has 3 days left.    New illness, injury, or hospitalization: right leg pain.  Saw provider at [facility].  Concerning area in his spine that may be causing his pain.  Using walker for mobility.    Signs or symptoms of bleeding or clotting: No  Previous result: Supratherapeutic  Additional findings: None       PLAN     Recommended plan for ongoing and temporary change(s) affecting INR     Dosing Instructions: hold 2 doses then decrease your warfarin dose (10% change) with next INR in 1 week       Summary  As of 2/24/2025      Full warfarin instructions:  2/24: Hold; 2/25: Hold; Otherwise 2.5 mg every Sun, Thu; 1.25 mg all other days   Next INR check:  3/3/2025               Telephone call with Tucker who agrees to plan and repeated back plan correctly    Lab visit scheduled    Education provided: Please call back if any changes to your diet, medications or how you've been taking warfarin  Goal range and lab monitoring: goal range and significance of current result  Symptom monitoring: monitoring for bleeding signs and symptoms    Plan made per [facility] anticoagulation protocol    Ivory Machado, RN  2/25/2025  Anticoagulation Clinic  [facility] for routing messages: zain ACUÑA  [facility] patient phone line: [phone]        _______________________________________________________________________     Anticoagulation Episode Summary       Current INR goal:  2.0-2.5   TTR:  59.0% (1 y)   Target end date:  Indefinite   Send INR reminders to:  DEBORAH ACUÑA     Indications    Atrial fibrillation (H) with mitral regurgitation and congestive heart failure [I48.91]  Long term current use of anticoagulants with INR goal of 2.0-3.0 [Z79.01]  Permanent atrial fibrillation (H) [I48.21]             Comments:  --             Anticoagulation Care Providers       Provider Role Specialty Phone number    Kwadwo Winslow MD Referring Internal Medicine [phone]

## 2025-02-25 NOTE — TELEPHONE ENCOUNTER
General Call      Reason for Call:  speak to ACN    What are your questions or concerns:  Patient wants to let ACN know that he had Xrays for his lower back- they took Xrays 02/25/25 and there is an area at the end of the spine that is a concern.   Patient insisted that he wanted ACN to know this but also states he wants to know what ACN will want him to do    Date of last appointment with provider:     Okay to leave a detailed message?: Yes at Home number on file [phone] (home)

## 2025-03-03 ENCOUNTER — LAB (OUTPATIENT)
Dept: LAB | Facility: CLINIC | Age: OVER 89
End: 2025-03-03
Payer: COMMERCIAL

## 2025-03-03 ENCOUNTER — TRANSFERRED RECORDS (OUTPATIENT)
Dept: HEALTH INFORMATION MANAGEMENT | Facility: CLINIC | Age: OVER 89
End: 2025-03-03

## 2025-03-03 ENCOUNTER — ANTICOAGULATION THERAPY VISIT (OUTPATIENT)
Dept: ANTICOAGULATION | Facility: CLINIC | Age: OVER 89
End: 2025-03-03

## 2025-03-03 DIAGNOSIS — I48.21 PERMANENT ATRIAL FIBRILLATION (H): ICD-10-CM

## 2025-03-03 DIAGNOSIS — Z79.01 LONG TERM CURRENT USE OF ANTICOAGULANTS WITH INR GOAL OF 2.0-3.0: ICD-10-CM

## 2025-03-03 DIAGNOSIS — I48.21 PERMANENT ATRIAL FIBRILLATION (H): Primary | ICD-10-CM

## 2025-03-03 LAB — INR BLD: 4.3 (ref 0.9–1.1)

## 2025-03-03 PROCEDURE — 85610 PROTHROMBIN TIME: CPT

## 2025-03-03 PROCEDURE — 36416 COLLJ CAPILLARY BLOOD SPEC: CPT

## 2025-03-03 NOTE — PROGRESS NOTES
ANTICOAGULATION MANAGEMENT     Tucker Slater 89 year old male is on warfarin with supratherapeutic INR result. (Goal INR 2.0-2.5)    Recent labs: (last 7 days)     03/03/25  1419   INR 4.3*     Warfarin maintenance dose was decreased at last 2 visits      ASSESSMENT     Source(s): Chart Review  Previous INR was Supratherapeutic  Medication, diet, health changes since last INR   Prednisone completed  Per [facility] consult, hold warfarin x 2 days then decrease maintenance dose 11%         PLAN     Unable to reach Del x 2 today.    Left message to hold warfarin tonight. Request call back for assessment.  Also left on voicemail not to take warfarin on 3/4/25    Follow up required to discuss out of range result  and discuss dosing instructions and confirm understanding of instructions    Chichi Wang RN  3/3/2025  Anticoagulation Clinic  [facility] for routing messages: zain COVARRUBIAS [facility] patient phone line: [phone]

## 2025-03-04 NOTE — PROGRESS NOTES
ANTICOAGULATION MANAGEMENT     Tucker Slater 89 year old male is on warfarin with supratherapeutic INR result. (Goal INR 2.0-2.5)    Recent labs: (last 7 days)     03/03/25  1419   INR 4.3*       ASSESSMENT     Source(s): Chart Review and Patient/Caregiver Call     Warfarin doses taken: Warfarin taken as instructed.  Took warfarin yesterday.  Diet: No new diet changes identified  Medication/supplement changes: None noted  New illness, injury, or hospitalization: No.  Shakiness that patient was having is slowly improving after prednisone dose was completed.  Continues to use walker for mobility.  Signs or symptoms of bleeding or clotting: No  Previous result: Supratherapeutic  Additional findings: Advised patient that an rx for warfarin 1 mg tablets may need to be sent to the pharmacy so further dose adjustments can be made.  May need to send this rx in with next check if INR is still supra therapeutic.       PLAN     Recommended plan for no diet, medication or health factor changes affecting INR     Dosing Instructions: hold 2 doses then decrease your warfarin dose (11% change) with next INR in 1 week       Summary  As of 3/3/2025      Full warfarin instructions:  3/3: 1.25 mg; 3/4: Hold; 3/5: Hold; Otherwise 2.5 mg every Sun; 1.25 mg all other days   Next INR check:  3/10/2025               Telephone call with Tucker who agrees to plan and repeated back plan correctly    Lab visit scheduled    Education provided: Please call back if any changes to your diet, medications or how you've been taking warfarin    Plan made per [facility] anticoagulation protocol    Ivory Machado, RN  3/4/2025  Anticoagulation Clinic  [employer] for routing messages: zain CABRERA [facility] patient phone line: [phone]        _______________________________________________________________________     Anticoagulation Episode Summary       Current INR goal:  2.0-2.5   TTR:  57.0% (1 y)   Target end date:  Indefinite   Send INR  reminders to:  [facility]    Indications    Atrial fibrillation (H) with mitral regurgitation and congestive heart failure [I48.91]  Long term current use of anticoagulants with INR goal of 2.0-3.0 [Z79.01]  Permanent atrial fibrillation (H) [I48.21]             Comments:  --             Anticoagulation Care Providers       Provider Role Specialty Phone number    Kwadwo Winslow MD Referring Internal Medicine [phone]

## 2025-03-10 ENCOUNTER — LAB (OUTPATIENT)
Dept: LAB | Facility: CLINIC | Age: OVER 89
End: 2025-03-10
Payer: COMMERCIAL

## 2025-03-10 ENCOUNTER — ANTICOAGULATION THERAPY VISIT (OUTPATIENT)
Dept: ANTICOAGULATION | Facility: CLINIC | Age: OVER 89
End: 2025-03-10

## 2025-03-10 DIAGNOSIS — I48.21 PERMANENT ATRIAL FIBRILLATION (H): ICD-10-CM

## 2025-03-10 DIAGNOSIS — Z79.01 LONG TERM CURRENT USE OF ANTICOAGULANTS WITH INR GOAL OF 2.0-3.0: Primary | ICD-10-CM

## 2025-03-10 LAB — INR BLD: 2.1 (ref 0.9–1.1)

## 2025-03-10 PROCEDURE — 36416 COLLJ CAPILLARY BLOOD SPEC: CPT

## 2025-03-10 PROCEDURE — 85610 PROTHROMBIN TIME: CPT

## 2025-03-10 NOTE — PROGRESS NOTES
ANTICOAGULATION MANAGEMENT     Tucker Slater 89 year old male is on warfarin with therapeutic INR result. (Goal INR 2.0-2.5)    Recent labs: (last 7 days)     03/10/25  1413   INR 2.1*       ASSESSMENT     Source(s): Chart Review and Patient/Caregiver Call     Warfarin doses taken: Warfarin taken as instructed, patient confirmed holding warfarin dose 3/4 and 3/5/25  Diet: No new diet changes identified for the amount of green veggies, has a decreased appetite  Medication/supplement changes: None noted  New illness, injury, or hospitalization: No, patient reports back has improved, holding off on PT at this point  Signs or symptoms of bleeding or clotting: No  Previous result: Supratherapeutic  Additional findings:  Patient reports return of spouse's breast cancer about 1 year ago, has had more stress the last couple months, wife is in hospice  Patient is interested for [facility], has [facility]       PLAN     Recommended plan for temporary change(s) affecting INR     Dosing Instructions: Continue your current warfarin dose with next INR in 1 week       Summary  As of 3/10/2025      Full warfarin instructions:  2.5 mg every Sun; 1.25 mg all other days   Next INR check:  3/17/2025               Telephone call with Tucker who verbalizes understanding and agrees to plan    Lab visit scheduled    Education provided: Please call back if any changes to your diet, medications or how you've been taking warfarin  Contact [phone] with any changes, questions or concerns.     Plan made per [facility] anticoagulation protocol    Gail Valdovinos RN  3/10/2025  Anticoagulation Clinic  [facility] for routing messages: zain ACUÑA  [facility] patient phone line: [phone]        _______________________________________________________________________     Anticoagulation Episode Summary       Current INR goal:  2.0-2.5   TTR:  55.6% (1 y)   Target end date:  Indefinite   Send INR reminders to:  DEBORAH ACUÑA     Indications    Atrial fibrillation (H) with mitral regurgitation and congestive heart failure [I48.91]  Long term current use of anticoagulants with INR goal of 2.0-3.0 [Z79.01]  Permanent atrial fibrillation (H) [I48.21]             Comments:  --             Anticoagulation Care Providers       Provider Role Specialty Phone number    Kwadwo Winslow MD Referring Internal Medicine [phone]

## 2025-03-11 ENCOUNTER — TELEPHONE (OUTPATIENT)
Dept: INTERNAL MEDICINE | Facility: CLINIC | Age: OVER 89
End: 2025-03-11
Payer: COMMERCIAL

## 2025-03-11 NOTE — TELEPHONE ENCOUNTER
Discussed [facility] with patient during Anticoagulation encounter 3/10/25. Patient was interested in this option. Paperwork was faxed to [facility] 3/11/25. There will not be a cost to the patient with his current insurance.      Home Care will not see the patient for INR's only.    Called Genna, patient's daughter, and explained the process to her. Left a message for patient advising paperwork was faxed to [facility] and they would call him either 3/16/25 or the morning of 3/17/25 to set up the time they would be at his house to check his INR.     Gail Valdovinos, RN, BSN  Anticoagulation Clinic

## 2025-03-13 ENCOUNTER — ANCILLARY PROCEDURE (OUTPATIENT)
Dept: CARDIOLOGY | Facility: CLINIC | Age: OVER 89
End: 2025-03-13
Attending: INTERNAL MEDICINE
Payer: COMMERCIAL

## 2025-03-13 DIAGNOSIS — Z95.0 CARDIAC PACEMAKER IN SITU: ICD-10-CM

## 2025-03-13 DIAGNOSIS — I44.2 COMPLETE ATRIOVENTRICULAR BLOCK (H): ICD-10-CM

## 2025-03-13 LAB
MDC_IDC_LEAD_CONNECTION_STATUS: NORMAL
MDC_IDC_LEAD_CONNECTION_STATUS: NORMAL
MDC_IDC_LEAD_IMPLANT_DT: NORMAL
MDC_IDC_LEAD_IMPLANT_DT: NORMAL
MDC_IDC_LEAD_LOCATION: NORMAL
MDC_IDC_LEAD_LOCATION: NORMAL
MDC_IDC_LEAD_LOCATION_DETAIL_1: NORMAL
MDC_IDC_LEAD_LOCATION_DETAIL_1: NORMAL
MDC_IDC_LEAD_MFG: NORMAL
MDC_IDC_LEAD_MFG: NORMAL
MDC_IDC_LEAD_MODEL: NORMAL
MDC_IDC_LEAD_MODEL: NORMAL
MDC_IDC_LEAD_POLARITY_TYPE: NORMAL
MDC_IDC_LEAD_POLARITY_TYPE: NORMAL
MDC_IDC_LEAD_SERIAL: NORMAL
MDC_IDC_LEAD_SERIAL: NORMAL
MDC_IDC_MSMT_BATTERY_DTM: NORMAL
MDC_IDC_MSMT_BATTERY_REMAINING_LONGEVITY: 121 MO
MDC_IDC_MSMT_BATTERY_REMAINING_PERCENTAGE: 94 %
MDC_IDC_MSMT_BATTERY_RRT_TRIGGER: NORMAL
MDC_IDC_MSMT_BATTERY_STATUS: NORMAL
MDC_IDC_MSMT_BATTERY_VOLTAGE: 3.01 V
MDC_IDC_MSMT_LEADCHNL_RV_IMPEDANCE_VALUE: 400 OHM
MDC_IDC_MSMT_LEADCHNL_RV_LEAD_CHANNEL_STATUS: NORMAL
MDC_IDC_MSMT_LEADCHNL_RV_PACING_THRESHOLD_AMPLITUDE: 0.62 V
MDC_IDC_MSMT_LEADCHNL_RV_PACING_THRESHOLD_PULSEWIDTH: 0.5 MS
MDC_IDC_MSMT_LEADCHNL_RV_SENSING_INTR_AMPL: 12 MV
MDC_IDC_PG_IMPLANT_DTM: NORMAL
MDC_IDC_PG_MFG: NORMAL
MDC_IDC_PG_MODEL: NORMAL
MDC_IDC_PG_SERIAL: NORMAL
MDC_IDC_PG_TYPE: NORMAL
MDC_IDC_SESS_CLINIC_NAME: NORMAL
MDC_IDC_SESS_DTM: NORMAL
MDC_IDC_SESS_REPROGRAMMED: NO
MDC_IDC_SESS_TYPE: NORMAL
MDC_IDC_SET_BRADY_HYSTRATE: 60 {BEATS}/MIN
MDC_IDC_SET_BRADY_LOWRATE: 70 {BEATS}/MIN
MDC_IDC_SET_BRADY_MAX_SENSOR_RATE: 120 {BEATS}/MIN
MDC_IDC_SET_BRADY_MODE: NORMAL
MDC_IDC_SET_LEADCHNL_RA_PACING_POLARITY: NORMAL
MDC_IDC_SET_LEADCHNL_RA_SENSING_POLARITY: NORMAL
MDC_IDC_SET_LEADCHNL_RV_PACING_AMPLITUDE: 0.88
MDC_IDC_SET_LEADCHNL_RV_PACING_ANODE_ELECTRODE_1: NORMAL
MDC_IDC_SET_LEADCHNL_RV_PACING_ANODE_LOCATION_1: NORMAL
MDC_IDC_SET_LEADCHNL_RV_PACING_CAPTURE_MODE: NORMAL
MDC_IDC_SET_LEADCHNL_RV_PACING_CATHODE_ELECTRODE_1: NORMAL
MDC_IDC_SET_LEADCHNL_RV_PACING_CATHODE_LOCATION_1: NORMAL
MDC_IDC_SET_LEADCHNL_RV_PACING_POLARITY: NORMAL
MDC_IDC_SET_LEADCHNL_RV_PACING_PULSEWIDTH: 0.5 MS
MDC_IDC_SET_LEADCHNL_RV_SENSING_ADAPTATION_MODE: NORMAL
MDC_IDC_SET_LEADCHNL_RV_SENSING_ANODE_ELECTRODE_1: NORMAL
MDC_IDC_SET_LEADCHNL_RV_SENSING_ANODE_LOCATION_1: NORMAL
MDC_IDC_SET_LEADCHNL_RV_SENSING_CATHODE_ELECTRODE_1: NORMAL
MDC_IDC_SET_LEADCHNL_RV_SENSING_CATHODE_LOCATION_1: NORMAL
MDC_IDC_SET_LEADCHNL_RV_SENSING_POLARITY: NORMAL
MDC_IDC_SET_LEADCHNL_RV_SENSING_SENSITIVITY: 2 MV
MDC_IDC_STAT_AT_DTM_END: NORMAL
MDC_IDC_STAT_AT_DTM_START: NORMAL
MDC_IDC_STAT_BRADY_DTM_END: NORMAL
MDC_IDC_STAT_BRADY_DTM_START: NORMAL
MDC_IDC_STAT_BRADY_RV_PERCENT_PACED: 98 %
MDC_IDC_STAT_CRT_DTM_END: NORMAL
MDC_IDC_STAT_CRT_DTM_START: NORMAL
MDC_IDC_STAT_HEART_RATE_DTM_END: NORMAL
MDC_IDC_STAT_HEART_RATE_DTM_START: NORMAL
MDC_IDC_STAT_HEART_RATE_VENTRICULAR_MAX: 210 {BEATS}/MIN
MDC_IDC_STAT_HEART_RATE_VENTRICULAR_MEAN: 77 {BEATS}/MIN
MDC_IDC_STAT_HEART_RATE_VENTRICULAR_MIN: 60 {BEATS}/MIN

## 2025-03-13 PROCEDURE — 93296 REM INTERROG EVL PM/IDS: CPT | Performed by: INTERNAL MEDICINE

## 2025-03-13 PROCEDURE — 93294 REM INTERROG EVL PM/LDLS PM: CPT | Performed by: INTERNAL MEDICINE

## 2025-03-17 ENCOUNTER — ANTICOAGULATION THERAPY VISIT (OUTPATIENT)
Dept: ANTICOAGULATION | Facility: CLINIC | Age: OVER 89
End: 2025-03-17

## 2025-03-17 ENCOUNTER — TRANSFERRED RECORDS (OUTPATIENT)
Dept: HEALTH INFORMATION MANAGEMENT | Facility: CLINIC | Age: OVER 89
End: 2025-03-17

## 2025-03-17 DIAGNOSIS — I48.21 PERMANENT ATRIAL FIBRILLATION (H): Primary | ICD-10-CM

## 2025-03-17 DIAGNOSIS — I48.21 PERMANENT ATRIAL FIBRILLATION (H): ICD-10-CM

## 2025-03-17 DIAGNOSIS — Z79.01 LONG TERM CURRENT USE OF ANTICOAGULANTS WITH INR GOAL OF 2.0-3.0: ICD-10-CM

## 2025-03-17 LAB
INR (EXTERNAL): 2.2 (ref 0.9–1.1)
MDC_IDC_LEAD_CONNECTION_STATUS: NORMAL
MDC_IDC_LEAD_CONNECTION_STATUS: NORMAL
MDC_IDC_LEAD_IMPLANT_DT: NORMAL
MDC_IDC_LEAD_IMPLANT_DT: NORMAL
MDC_IDC_LEAD_LOCATION: NORMAL
MDC_IDC_LEAD_LOCATION: NORMAL
MDC_IDC_LEAD_LOCATION_DETAIL_1: NORMAL
MDC_IDC_LEAD_LOCATION_DETAIL_1: NORMAL
MDC_IDC_LEAD_MFG: NORMAL
MDC_IDC_LEAD_MFG: NORMAL
MDC_IDC_LEAD_MODEL: NORMAL
MDC_IDC_LEAD_MODEL: NORMAL
MDC_IDC_LEAD_POLARITY_TYPE: NORMAL
MDC_IDC_LEAD_POLARITY_TYPE: NORMAL
MDC_IDC_LEAD_SERIAL: NORMAL
MDC_IDC_LEAD_SERIAL: NORMAL
MDC_IDC_MSMT_BATTERY_DTM: NORMAL
MDC_IDC_MSMT_BATTERY_REMAINING_LONGEVITY: 121 MO
MDC_IDC_MSMT_BATTERY_REMAINING_PERCENTAGE: 94 %
MDC_IDC_MSMT_BATTERY_RRT_TRIGGER: NORMAL
MDC_IDC_MSMT_BATTERY_STATUS: NORMAL
MDC_IDC_MSMT_BATTERY_VOLTAGE: 3.01 V
MDC_IDC_MSMT_LEADCHNL_RV_IMPEDANCE_VALUE: 400 OHM
MDC_IDC_MSMT_LEADCHNL_RV_LEAD_CHANNEL_STATUS: NORMAL
MDC_IDC_MSMT_LEADCHNL_RV_PACING_THRESHOLD_AMPLITUDE: 0.62 V
MDC_IDC_MSMT_LEADCHNL_RV_PACING_THRESHOLD_PULSEWIDTH: 0.5 MS
MDC_IDC_MSMT_LEADCHNL_RV_SENSING_INTR_AMPL: 12 MV
MDC_IDC_PG_IMPLANT_DTM: NORMAL
MDC_IDC_PG_MFG: NORMAL
MDC_IDC_PG_MODEL: NORMAL
MDC_IDC_PG_SERIAL: NORMAL
MDC_IDC_PG_TYPE: NORMAL
MDC_IDC_SESS_CLINIC_NAME: NORMAL
MDC_IDC_SESS_DTM: NORMAL
MDC_IDC_SESS_REPROGRAMMED: NO
MDC_IDC_SESS_TYPE: NORMAL
MDC_IDC_SET_BRADY_HYSTRATE: 60 {BEATS}/MIN
MDC_IDC_SET_BRADY_LOWRATE: 70 {BEATS}/MIN
MDC_IDC_SET_BRADY_MAX_SENSOR_RATE: 120 {BEATS}/MIN
MDC_IDC_SET_BRADY_MODE: NORMAL
MDC_IDC_SET_LEADCHNL_RA_PACING_POLARITY: NORMAL
MDC_IDC_SET_LEADCHNL_RA_SENSING_POLARITY: NORMAL
MDC_IDC_SET_LEADCHNL_RV_PACING_AMPLITUDE: 0.88
MDC_IDC_SET_LEADCHNL_RV_PACING_ANODE_ELECTRODE_1: NORMAL
MDC_IDC_SET_LEADCHNL_RV_PACING_ANODE_LOCATION_1: NORMAL
MDC_IDC_SET_LEADCHNL_RV_PACING_CAPTURE_MODE: NORMAL
MDC_IDC_SET_LEADCHNL_RV_PACING_CATHODE_ELECTRODE_1: NORMAL
MDC_IDC_SET_LEADCHNL_RV_PACING_CATHODE_LOCATION_1: NORMAL
MDC_IDC_SET_LEADCHNL_RV_PACING_POLARITY: NORMAL
MDC_IDC_SET_LEADCHNL_RV_PACING_PULSEWIDTH: 0.5 MS
MDC_IDC_SET_LEADCHNL_RV_SENSING_ADAPTATION_MODE: NORMAL
MDC_IDC_SET_LEADCHNL_RV_SENSING_ANODE_ELECTRODE_1: NORMAL
MDC_IDC_SET_LEADCHNL_RV_SENSING_ANODE_LOCATION_1: NORMAL
MDC_IDC_SET_LEADCHNL_RV_SENSING_CATHODE_ELECTRODE_1: NORMAL
MDC_IDC_SET_LEADCHNL_RV_SENSING_CATHODE_LOCATION_1: NORMAL
MDC_IDC_SET_LEADCHNL_RV_SENSING_POLARITY: NORMAL
MDC_IDC_SET_LEADCHNL_RV_SENSING_SENSITIVITY: 2 MV
MDC_IDC_STAT_AT_DTM_END: NORMAL
MDC_IDC_STAT_AT_DTM_START: NORMAL
MDC_IDC_STAT_BRADY_DTM_END: NORMAL
MDC_IDC_STAT_BRADY_DTM_START: NORMAL
MDC_IDC_STAT_BRADY_RV_PERCENT_PACED: 98 %
MDC_IDC_STAT_CRT_DTM_END: NORMAL
MDC_IDC_STAT_CRT_DTM_START: NORMAL
MDC_IDC_STAT_HEART_RATE_DTM_END: NORMAL
MDC_IDC_STAT_HEART_RATE_DTM_START: NORMAL
MDC_IDC_STAT_HEART_RATE_VENTRICULAR_MAX: 210 {BEATS}/MIN
MDC_IDC_STAT_HEART_RATE_VENTRICULAR_MEAN: 77 {BEATS}/MIN
MDC_IDC_STAT_HEART_RATE_VENTRICULAR_MIN: 60 {BEATS}/MIN

## 2025-03-17 NOTE — PROGRESS NOTES
ANTICOAGULATION MANAGEMENT     Tucker Slater 89 year old male is on warfarin with therapeutic INR result. (Goal INR 2.0-2.5)    Recent labs: (last 7 days)     03/17/25  0000   INR 2.2*       ASSESSMENT     Source(s): Chart Review and Patient/Caregiver Call     Warfarin doses taken: Warfarin taken as instructed  Diet: Decreased greens/vitamin K in diet; plans to resume previous intake--patient reports he missed 1 day of premier protein last week, has resumed his usual daily consumption.  Medication/supplement changes: None noted  New illness, injury, or hospitalization: No  Signs or symptoms of bleeding or clotting: No  Previous result: Therapeutic last visit; previously outside of goal range  Additional findings:  today is first day of [facility] INR, patient would like to continue with their process. Writer spoke to Janette at [facility], next INR scheduled       PLAN     Recommended plan for temporary change(s) affecting INR     Dosing Instructions: Continue your current warfarin dose with next INR in 10-14 days      Summary  As of 3/17/2025      Full warfarin instructions:  2.5 mg every Sun; 1.25 mg all other days   Next INR check:  3/31/2025               Telephone call with Tucker who verbalizes understanding and agrees to plan and who agrees to plan and repeated back plan correctly    Patient using outside facility for labs    Education provided: Taking warfarin: Importance of taking warfarin as instructed  Informed patient that [facility] will call him on 3/31/25 to inform him what time to expect them, patient verbalized understanding.    Plan made per [facility] anticoagulation protocol    Chichi Wang RN  3/17/2025  Anticoagulation Clinic  [employer] for routing messages: zain CABRERA [place] [facility] patient phone line: [phone]        _______________________________________________________________________     Anticoagulation Episode Summary       Current INR goal:  2.0-2.5   TTR:  55.5% (1 y)   Target end date:   Indefinite   Send INR reminders to:  [facility]    Indications    Atrial fibrillation (H) with mitral regurgitation and congestive heart failure [I48.91]  Long term current use of anticoagulants with INR goal of 2.0-3.0 [Z79.01]  Permanent atrial fibrillation (H) [I48.21]             Comments:  --             Anticoagulation Care Providers       Provider Role Specialty Phone number    Kwadwo Winslow MD Referring Internal Medicine [phone]

## 2025-03-17 NOTE — PROGRESS NOTES
Incoming fax from [facility]    Date of result 3/17/25    INR result 2.2    Route result to: zain CABRERA [facility]

## 2025-03-22 DIAGNOSIS — I10 ESSENTIAL HYPERTENSION WITH GOAL BLOOD PRESSURE LESS THAN 140/90: ICD-10-CM

## 2025-03-24 RX ORDER — LOSARTAN POTASSIUM 50 MG/1
TABLET ORAL
Qty: 90 TABLET | Refills: 0 | Status: SHIPPED | OUTPATIENT
Start: 2025-03-24

## 2025-03-27 NOTE — NURSING NOTE
"BP (!) 146/79 (BP Location: Left arm, Patient Position: Sitting, Cuff Size: Adult Regular)   Pulse 70   Temp 97.6  F (36.4  C) (Oral)   Resp 14   Ht 1.778 m (5' 10\")   Wt 82 kg (180 lb 12.8 oz)   SpO2 95%   BMI 25.94 kg/m      " Good nutrition is important when healing from an illness, injury, or surgery.  Follow any nutrition recommendations given to you during your hospital stay.   If you were given an oral nutrition supplement while in the hospital, continue to take this supplement at home.  You can take it with meals, in-between meals, and/or before bedtime. These supplements can be purchased at most local grocery stores, pharmacies, and chain [employer]-stores.   If you have any questions about your diet or nutrition, call the hospital and ask for the dietitian.

## 2025-03-31 ENCOUNTER — TELEPHONE (OUTPATIENT)
Dept: INTERNAL MEDICINE | Facility: CLINIC | Age: OVER 89
End: 2025-03-31
Payer: COMMERCIAL

## 2025-03-31 DIAGNOSIS — I48.21 PERMANENT ATRIAL FIBRILLATION (H): ICD-10-CM

## 2025-03-31 DIAGNOSIS — I48.21 PERMANENT ATRIAL FIBRILLATION (H): Primary | ICD-10-CM

## 2025-03-31 DIAGNOSIS — Z79.01 LONG TERM CURRENT USE OF ANTICOAGULANTS WITH INR GOAL OF 2.0-3.0: ICD-10-CM

## 2025-03-31 NOTE — TELEPHONE ENCOUNTER
General Call    Contacts       Contact Date/Time Type Contact Phone/Fax    03/31/2025 02:59 PM CDT Phone (Incoming) Tucker Slater (Self) [phone] (H)          Reason for Call:  calling to speak to ACN re:missed home care INR lab draw.     What are your questions or concerns:  pt calling states he has not heard from or had anyone come to his home today to do his INR home lab draw, as of this message. Pt is wondering what he should do or who to call     Date of last appointment with provider:     Okay to leave a detailed message?: Yes at Home number on file [phone] (home)

## 2025-03-31 NOTE — TELEPHONE ENCOUNTER
Spoke with Augustine. He still has not heard anything from [facility] about coming out today and was wondering what he should take for today's warfarin dose. Advised his usual 1.25 mg tonight, 3/31.    Spoke with Earl at [facility] who said no recheck date had been noted for Augustine. He will attempt to get someone out to see him tomorrow, 4/1.

## 2025-04-01 ENCOUNTER — TRANSFERRED RECORDS (OUTPATIENT)
Dept: HEALTH INFORMATION MANAGEMENT | Facility: CLINIC | Age: OVER 89
End: 2025-04-01
Payer: COMMERCIAL

## 2025-04-01 ENCOUNTER — ANTICOAGULATION THERAPY VISIT (OUTPATIENT)
Dept: ANTICOAGULATION | Facility: CLINIC | Age: OVER 89
End: 2025-04-01
Payer: COMMERCIAL

## 2025-04-01 DIAGNOSIS — Z79.01 LONG TERM CURRENT USE OF ANTICOAGULANTS WITH INR GOAL OF 2.0-3.0: ICD-10-CM

## 2025-04-01 DIAGNOSIS — I48.21 PERMANENT ATRIAL FIBRILLATION (H): ICD-10-CM

## 2025-04-01 DIAGNOSIS — I48.21 PERMANENT ATRIAL FIBRILLATION (H): Primary | ICD-10-CM

## 2025-04-01 LAB — INR (EXTERNAL): 2 (ref 0.9–1.1)

## 2025-04-01 NOTE — PROGRESS NOTES
ANTICOAGULATION MANAGEMENT     Tucker Slater 89 year old male is on warfarin with therapeutic INR result. (Goal INR 2.0-2.5)    Recent labs: (last 7 days)     04/01/25  1042   INR 2.0*       ASSESSMENT     Source(s): Chart Review and Patient/Caregiver Call     Warfarin doses taken: Warfarin taken as instructed  Diet: No new diet changes identified  Medication/supplement changes: None noted  New illness, injury, or hospitalization: No  Signs or symptoms of bleeding or clotting: No  Previous result: Therapeutic last 2(+) visits  Additional findings: None       PLAN     Recommended plan for no diet, medication or health factor changes affecting INR     Dosing Instructions: Continue your current warfarin dose with next INR in 3 weeks       Summary  As of 4/1/2025      Full warfarin instructions:  2.5 mg every Sun; 1.25 mg all other days   Next INR check:  4/22/2025               Telephone call with Tucker who verbalizes understanding and agrees to plan and who agrees to plan and repeated back plan correctly    Orders given to [facility] ([phone]) - [facility]     Education provided: Contact [phone] with any changes, questions or concerns.     Plan made per [facility] anticoagulation protocol    Christina Lee, RN  4/1/2025  Anticoagulation Clinic  [employer] for routing messages: zain CHRISTOPHER  [facility] patient phone line: [phone]        _______________________________________________________________________     Anticoagulation Episode Summary       Current INR goal:  2.0-2.5   TTR:  55.6% (1 y)   Target end date:  Indefinite   Send INR reminders to:  DEBORAH CHRISTOPHER    Indications    Atrial fibrillation (H) with mitral regurgitation and congestive heart failure [I48.91]  Long term current use of anticoagulants with INR goal of 2.0-3.0 [Z79.01]  Permanent atrial fibrillation (H) [I48.21]             Comments:  [facility]             Anticoagulation Care Providers       Provider Role Specialty Phone number     Kwadwo Winslow MD Referring Internal Medicine [phone]

## 2025-04-01 NOTE — PROGRESS NOTES
Incoming fax from [facility]    Date of result 4/1/25    INR result 2.0    Route result to: zain CABRERA [facility]

## 2025-04-21 ENCOUNTER — TELEPHONE (OUTPATIENT)
Dept: INTERNAL MEDICINE | Facility: CLINIC | Age: OVER 89
End: 2025-04-21
Payer: COMMERCIAL

## 2025-04-21 NOTE — TELEPHONE ENCOUNTER
FYI - Status Update    Who is Calling: family member, Georgia    Update: patient had set up home monitoring for INR to be done, they did not show up today 4/21/25    Does caller want a call/response back: Yes     Okay to leave a detailed message?: Yes at Cell number on file:  [phone] Georgia-Daughter  No relevant phone numbers on file.

## 2025-04-21 NOTE — TELEPHONE ENCOUNTER
Call returned to Georgia, patient's daughter. Advised Georgia that there was a message (see below) in patient's chart that [facility] had to change the day from 4/21 to 4/23/25 for INR check, patient should receive a call 4/22/25 to set up the time.     Gail Valdovinos RN, BSN  Anticoagulation Clinic      Received a VM message from Earl at [facility]. They need to change patient's next INR appt from 4/21-4/23 due to staffing.  Last 2 INRs are in range for patient.     Viky Blanco, RN  Anticoagulation Nurse - [facility], [place]

## 2025-04-23 ENCOUNTER — ANTICOAGULATION THERAPY VISIT (OUTPATIENT)
Dept: ANTICOAGULATION | Facility: CLINIC | Age: OVER 89
End: 2025-04-23
Payer: COMMERCIAL

## 2025-04-23 DIAGNOSIS — Z79.01 LONG TERM CURRENT USE OF ANTICOAGULANTS WITH INR GOAL OF 2.0-3.0: ICD-10-CM

## 2025-04-23 DIAGNOSIS — I48.91 ATRIAL FIBRILLATION (H): Primary | ICD-10-CM

## 2025-04-23 DIAGNOSIS — I48.21 PERMANENT ATRIAL FIBRILLATION (H): ICD-10-CM

## 2025-04-23 LAB — INR (EXTERNAL): 2.1

## 2025-04-23 NOTE — PROGRESS NOTES
ANTICOAGULATION MANAGEMENT     Tucker Slater 89 year old male is on warfarin with therapeutic INR result. (Goal INR 2.0-2.5)    Recent labs: (last 7 days)     04/23/25  0000   INR 2.1       ASSESSMENT     Source(s): Chart Review and Patient/Caregiver Call     Warfarin doses taken: Warfarin taken as instructed  Diet: No new diet changes identified  Medication/supplement changes: None noted  New illness, injury, or hospitalization: No  Signs or symptoms of bleeding or clotting: No  Previous result: Therapeutic last 2(+) visits  Additional findings: None       PLAN     Recommended plan for no diet, medication or health factor changes affecting INR     Dosing Instructions: Continue your current warfarin dose with next INR in 4 weeks       Summary  As of 4/23/2025      Full warfarin instructions:  2.5 mg every Sun; 1.25 mg all other days   Next INR check:  5/21/2025               Telephone call with Tucker who agrees to plan and repeated back plan correctly    Orders given to [facility] ([phone]) - next check date given to Janette    Education provided: Please call back if any changes to your diet, medications or how you've been taking warfarin  Contact [phone] with any changes, questions or concerns.     Plan made per [facility] anticoagulation protocol    Leonor Paniagua RN  4/23/2025  Anticoagulation Clinic  [facility] for routing messages: zain CHRISTOPHER  [facility] patient phone line: [phone]        _______________________________________________________________________     Anticoagulation Episode Summary       Current INR goal:  2.0-2.5   TTR:  55.5% (1 y)   Target end date:  Indefinite   Send INR reminders to:  DEBORAH CHRISTOPHER    Indications    Atrial fibrillation (H) with mitral regurgitation and congestive heart failure [I48.91]  Long term current use of anticoagulants with INR goal of 2.0-3.0 [Z79.01]  Permanent atrial fibrillation (H) [I48.21]             Comments:  [facility]              Anticoagulation Care Providers       Provider Role Specialty Phone number    Kwadwo Winslow MD Referring Internal Medicine [phone]

## 2025-04-23 NOTE — PROGRESS NOTES
Incoming fax from [facility] [employer]    Date of result 4/23/2025    INR result 2.1    Route result to: Stone Ballard Rn  [facility]

## 2025-05-07 DIAGNOSIS — I48.21 PERMANENT ATRIAL FIBRILLATION (H): ICD-10-CM

## 2025-05-07 RX ORDER — WARFARIN SODIUM 2.5 MG/1
TABLET ORAL
Qty: 55 TABLET | Refills: 1 | Status: SHIPPED | OUTPATIENT
Start: 2025-05-07

## 2025-05-07 NOTE — TELEPHONE ENCOUNTER
ANTICOAGULATION MANAGEMENT:  Medication Refill    Anticoagulation Summary  As of 4/23/2025      Warfarin maintenance plan:  2.5 mg (2.5 mg x 1) every Sun; 1.25 mg (2.5 mg x 0.5) all other days   Next INR check:  5/21/2025   Target end date:  Indefinite    Indications    Atrial fibrillation (H) with mitral regurgitation and congestive heart failure [I48.91]  Long term current use of anticoagulants with INR goal of 2.0-3.0 [Z79.01]  Permanent atrial fibrillation (H) [I48.21]                 Anticoagulation Care Providers       Provider Role Specialty Phone number    Kwadwo Winslow MD Referring Internal Medicine [phone]            Refill Criteria    Visit with referring provider/group: Meets criteria: visit within referring provider group in the last 15 months on 9/9/24    [facility] referral last signed: 01/27/2025; within last year:  Yes    Lab monitoring is up to date (not exceeding 2 weeks overdue): Yes    Tucker meets all criteria for refill. Rx instructions and quantity supplied updated to match patient's current dosing plan. Warfarin 90 day supply with 1 refill granted per [facility] protocol     Ivory Machado, RN  Anticoagulation Clinic

## 2025-05-21 ENCOUNTER — TELEPHONE (OUTPATIENT)
Dept: ANTICOAGULATION | Facility: CLINIC | Age: OVER 89
End: 2025-05-21
Payer: COMMERCIAL

## 2025-05-21 NOTE — TELEPHONE ENCOUNTER
Patient called because he was supposed to have his INR checked today by [facility] but they haven't arrived and he is worried because it is getting late.    Called [facility] and was informed the patient's apponitment was moved to tomorrow 5/22/25 due to staffing.  The patient was not notified nor [facility] of this change.  Informed Janette, [facility] usually calls [facility] when there is a schedule change.  She stated she will make it known.    Called patient and gave update about schedule change and confirmed he was not contacted and he also does not recall being contacted prior to other appointments.    KRYSTAL JefferyN, RN  Anticoagulation Clinic

## 2025-05-22 ENCOUNTER — TELEPHONE (OUTPATIENT)
Dept: INTERNAL MEDICINE | Facility: CLINIC | Age: OVER 89
End: 2025-05-22

## 2025-05-22 NOTE — TELEPHONE ENCOUNTER
Patient Returning Call    Reason for call:    Home INR not showing up, they were supposed to go to patient's house on Monday and this is a frequent situation, need a phone number to reach home nurse for the INR draw-     Information relayed to patient:        Patient has additional questions:  no      Who does the patient want to speak with:      Is an  needed?:  No      Okay to leave a detailed message?: Yes at Home number on file [phone] (home) or Cell number on file:

## 2025-05-22 NOTE — TELEPHONE ENCOUNTER
Left detailed message for [facility] asking if this patient has been seen or is being seen today and to let us know.    Viky Blanco, RN  Anticoagulation Nurse - [facility], [place]

## 2025-05-22 NOTE — TELEPHONE ENCOUNTER
Left detailed message for Genna daughter with information received from Michael at [facility].  Asked that she call back with any questions or concerns.    Viky Blanco RN  Anticoagulation Nurse - [facility] [place]        Received a VM message from Bandar with [facility].  They are scheduled to see patient today between 3-4.     Viky Blanco RN  Anticoagulation Nurse - [facility] [place]

## 2025-05-23 ENCOUNTER — RESULTS FOLLOW-UP (OUTPATIENT)
Dept: ANTICOAGULATION | Facility: CLINIC | Age: OVER 89
End: 2025-05-23

## 2025-05-23 ENCOUNTER — LAB (OUTPATIENT)
Dept: LAB | Facility: CLINIC | Age: OVER 89
End: 2025-05-23
Payer: COMMERCIAL

## 2025-05-23 DIAGNOSIS — I48.21 PERMANENT ATRIAL FIBRILLATION (H): ICD-10-CM

## 2025-05-23 LAB — INR BLD: 2.2 (ref 0.9–1.1)

## 2025-05-23 PROCEDURE — 85610 PROTHROMBIN TIME: CPT

## 2025-05-23 PROCEDURE — 36416 COLLJ CAPILLARY BLOOD SPEC: CPT

## 2025-06-16 ENCOUNTER — NURSE TRIAGE (OUTPATIENT)
Dept: INTERNAL MEDICINE | Facility: CLINIC | Age: OVER 89
End: 2025-06-16
Payer: COMMERCIAL

## 2025-06-16 NOTE — TELEPHONE ENCOUNTER
Reason for Call:  Appointment Request    Patient requesting this type of appt:  Office Visit     Requested provider: Kwadwo Winslow    Reason patient unable to be scheduled: Not within requested timeframe    When does patient want to be seen/preferred time: ASAP     Comments: Want to be seen for swallowing issues, jaw pain and form and want to be seen ASAP. Would like 06/20/2025 or any other day not mornings. Can call anny Hardin to leave a detailed message?: Yes at Other phone number:  [phone]*    Call taken on 6/16/2025 at 10:17 AM by Ninfa Stauffer

## 2025-06-16 NOTE — TELEPHONE ENCOUNTER
"Call to daughter. States lately patient has been experiencing some jaw pain in his lower left jaw. This started a few days ago. Patient thinks it may be a tooth. Patient has also had some \"swallowing\" issues for \"quite awhile\". They have noticed that he coughs or clears his throat more for the past couple of months. They would like to complete a POLST form. They are also wondering about switching from Warfarin to another medication because it is getting harder to bring him in for the lab appointments.    Patient's other daughter is the care giver for patient. Requested additional information about symptoms, specifically the jaw pain. Genna will have her sister call us with patient present for further triage.    Scheduled upcoming appointment  6/24/25 with Dr. Winslow but advised would like to speak with daughter/patient for further information to make sure it is safe to wait for this appointment.   "

## 2025-06-17 NOTE — TELEPHONE ENCOUNTER
Attempted to call patient's other daughter, Georgia (consent to communicate on file). Per note below, Georgia is patient's caregiver. Left message to call clinic back to discuss patient's symptoms with triage RN.    Thank you,  Christopher, Triage RN [facility]    10:26 AM 6/17/2025

## 2025-06-17 NOTE — TELEPHONE ENCOUNTER
Nurse Triage SBAR    Is this a 2nd Level Triage? YES, LICENSED PRACTITIONER REVIEW IS REQUIRED    Situation: Daughter, Georgia and patient reports patient's jaw pain started a few days ago.    Background: Hx of HTN, afib, cardiac pacemaker    Assessment: Patient reports pain as severe at times, but currently mild. Patient notes pain is worse with chewing. Patient notes he has full ability to chew okay. Patient notes his difficulty swallowing with larger pills has been going on for a long time. Patient notes he doesn't have an issue with swallowing after he chews food.     Patient notes it is possible one of his upper teeth on left side needs dental work and possible extraction.Patient notes sinus drainage and issues with post nasal drip. Patient notes the flow of mucus is worse when he is chewing.     No clicking of the jaw, redness/swelling of jaw, chest pain, fevers, palpitations/tachycardia.     Protocol Recommended Disposition:   GO TO ED/UCC NOW (OR TO OFFICE WITH PCP APPROVAL):     Recommendation: Patient doesn't think he needs to go to emergency room. Will huddle with primary care provider.  Patient has appointment on 6/24/2025. Does patient need to be seen sooner? Okay to wait until appointment?     Routed to provider    Does the patient meet one of the following criteria for ADS visit consideration? 16+ years old, with an [facility] PCP     TIP  Providers, please consider if this condition is appropriate for management at one of our Acute and Diagnostic Services sites.     If patient is a good candidate, please use dotphrase <dot>triageresponse and select Refer to ADS to document.      Reason for Disposition   Jaw pain   New-onset jaw pain of unknown cause AND at least one cardiac risk factor (e.g., 45 years or older, diabetes, high cholesterol, hypertension, obesity, smoker or strong family history of heart disease)    Additional Information   Negative: Shock suspected (e.g., cold/pale/clammy skin, too weak to  "stand, low BP, rapid pulse)   Negative: Similar pain previously and it was from 'heart attack'   Negative: Similar pain previously and it was from 'angina' and not relieved by nitroglycerin   Negative: Sounds like a life-threatening emergency to the triager   Negative: Chest Pain   Negative: Sinus pain and congestion   Negative: Headache or pain in upper forehead   Negative: Toothache is main symptom   Negative: Followed a face injury   Negative: Difficulty breathing or unusual sweating (e.g., sweating without exertion)   Negative: Can't close the mouth fully (i.e., mouth is 'locked' open; patient will have difficulty talking)   Negative: Fever and localized red rash   Negative: Fever and area of face is swollen   Negative: SEVERE difficulty breathing (e.g., struggling for each breath, speaks in single words, stridor)   Negative: Sounds like a life-threatening emergency to the triager   Negative: Tooth pain (toothache) or swelling around a tooth   Negative: Followed a mouth injury   Negative: Throat pain (sore throat)   Negative: Cold sore suspected (i.e., fever blister sore on the outer lip)   Negative: Followed a tooth (or teeth) injury    Answer Assessment - Initial Assessment Questions  1. ONSET: \"When did the pain start?\" (e.g., minutes, hours, days)      A few days ago.    2. ONSET: \"Does the pain come and go, or has it been constant since it started?\" (e.g., constant, intermittent, fleeting)      Constant.    3. SEVERITY: \"How bad is the pain?\" (Scale 1-10; mild, moderate or severe)      Mild to severe, depends.    4. LOCATION: \"Where does it hurt?\"       Entire lower jaw.    5. RASH: \"Is there any redness, rash, or swelling of your face?\"      No.    6. FEVER: \"Do you have a fever?\" If Yes, ask: \"What is it, how was it measured, and when did it start?\"       No.    7. OTHER SYMPTOMS: \"Do you have any other symptoms?\" (e.g., fever, toothache, nasal discharge, nasal congestion, clicking sensation in jaw " "joint)      Difficulty swallowing larger pills (not a new issue).    8. PREGNANCY: \"Is there any chance you are pregnant?\" \"When was your last menstrual period?\"      N/A    Answer Assessment - Initial Assessment Questions  1. ONSET: \"When did the mouth start hurting?\" (e.g., hours or days ago)       A few days.    2. SEVERITY: \"How bad is the pain?\" (Scale 1-10; mild, moderate or severe)      Ranges from mild to severe.     3. SORES: \"Are there any sores or ulcers in the mouth?\" If Yes, ask: \"What part of the mouth are the sores in?\"      No.    4. FEVER: \"Do you have a fever?\" If Yes, ask: \"What is your temperature, how was it measured, and when did it start?\"      No.    5. CAUSE: \"What do you think is causing the mouth pain?\"      Not sure.    6. OTHER SYMPTOMS: \"Do you have any other symptoms?\" (e.g., difficulty breathing)      Trouble swallowing larger pills (not new).    Protocols used: Mouth Pain-A-OH, Face Pain-A-OH    "

## 2025-06-17 NOTE — TELEPHONE ENCOUNTER
Huddled with primary care provider who states symptoms sound like true jaw related pain, not cardiac and patient okay to keep appointment as scheduled.    Call to patient's daughter, Georgia to relay above info. Reviewed red flag symptoms with daughter and when to bring patient right to emergency room. Georgia agrees with plan.    Thank you,  Christopher, Triage RN [facility]    4:40 PM 6/17/2025

## 2025-06-19 ENCOUNTER — ANCILLARY PROCEDURE (OUTPATIENT)
Dept: CARDIOLOGY | Facility: CLINIC | Age: OVER 89
End: 2025-06-19
Attending: INTERNAL MEDICINE
Payer: COMMERCIAL

## 2025-06-19 DIAGNOSIS — I44.2 COMPLETE ATRIOVENTRICULAR BLOCK (H): ICD-10-CM

## 2025-06-19 DIAGNOSIS — Z95.0 CARDIAC PACEMAKER IN SITU: ICD-10-CM

## 2025-06-19 LAB
MDC_IDC_LEAD_CONNECTION_STATUS: NORMAL
MDC_IDC_LEAD_CONNECTION_STATUS: NORMAL
MDC_IDC_LEAD_IMPLANT_DT: NORMAL
MDC_IDC_LEAD_IMPLANT_DT: NORMAL
MDC_IDC_LEAD_LOCATION: NORMAL
MDC_IDC_LEAD_LOCATION: NORMAL
MDC_IDC_LEAD_LOCATION_DETAIL_1: NORMAL
MDC_IDC_LEAD_LOCATION_DETAIL_1: NORMAL
MDC_IDC_LEAD_MFG: NORMAL
MDC_IDC_LEAD_MFG: NORMAL
MDC_IDC_LEAD_MODEL: NORMAL
MDC_IDC_LEAD_MODEL: NORMAL
MDC_IDC_LEAD_POLARITY_TYPE: NORMAL
MDC_IDC_LEAD_POLARITY_TYPE: NORMAL
MDC_IDC_LEAD_SERIAL: NORMAL
MDC_IDC_LEAD_SERIAL: NORMAL
MDC_IDC_MSMT_BATTERY_DTM: NORMAL
MDC_IDC_MSMT_BATTERY_REMAINING_LONGEVITY: 117 MO
MDC_IDC_MSMT_BATTERY_REMAINING_PERCENTAGE: 91 %
MDC_IDC_MSMT_BATTERY_RRT_TRIGGER: NORMAL
MDC_IDC_MSMT_BATTERY_STATUS: NORMAL
MDC_IDC_MSMT_BATTERY_VOLTAGE: 3.01 V
MDC_IDC_MSMT_LEADCHNL_RV_IMPEDANCE_VALUE: 400 OHM
MDC_IDC_MSMT_LEADCHNL_RV_LEAD_CHANNEL_STATUS: NORMAL
MDC_IDC_MSMT_LEADCHNL_RV_PACING_THRESHOLD_AMPLITUDE: 0.62 V
MDC_IDC_MSMT_LEADCHNL_RV_PACING_THRESHOLD_PULSEWIDTH: 0.5 MS
MDC_IDC_MSMT_LEADCHNL_RV_SENSING_INTR_AMPL: 12 MV
MDC_IDC_PG_IMPLANT_DTM: NORMAL
MDC_IDC_PG_MFG: NORMAL
MDC_IDC_PG_MODEL: NORMAL
MDC_IDC_PG_SERIAL: NORMAL
MDC_IDC_PG_TYPE: NORMAL
MDC_IDC_SESS_CLINIC_NAME: NORMAL
MDC_IDC_SESS_DTM: NORMAL
MDC_IDC_SESS_REPROGRAMMED: NO
MDC_IDC_SESS_TYPE: NORMAL
MDC_IDC_SET_BRADY_HYSTRATE: 60 {BEATS}/MIN
MDC_IDC_SET_BRADY_LOWRATE: 70 {BEATS}/MIN
MDC_IDC_SET_BRADY_MAX_SENSOR_RATE: 120 {BEATS}/MIN
MDC_IDC_SET_BRADY_MODE: NORMAL
MDC_IDC_SET_LEADCHNL_RA_PACING_POLARITY: NORMAL
MDC_IDC_SET_LEADCHNL_RA_SENSING_POLARITY: NORMAL
MDC_IDC_SET_LEADCHNL_RV_PACING_AMPLITUDE: 0.88
MDC_IDC_SET_LEADCHNL_RV_PACING_ANODE_ELECTRODE_1: NORMAL
MDC_IDC_SET_LEADCHNL_RV_PACING_ANODE_LOCATION_1: NORMAL
MDC_IDC_SET_LEADCHNL_RV_PACING_CAPTURE_MODE: NORMAL
MDC_IDC_SET_LEADCHNL_RV_PACING_CATHODE_ELECTRODE_1: NORMAL
MDC_IDC_SET_LEADCHNL_RV_PACING_CATHODE_LOCATION_1: NORMAL
MDC_IDC_SET_LEADCHNL_RV_PACING_POLARITY: NORMAL
MDC_IDC_SET_LEADCHNL_RV_PACING_PULSEWIDTH: 0.5 MS
MDC_IDC_SET_LEADCHNL_RV_SENSING_ADAPTATION_MODE: NORMAL
MDC_IDC_SET_LEADCHNL_RV_SENSING_ANODE_ELECTRODE_1: NORMAL
MDC_IDC_SET_LEADCHNL_RV_SENSING_ANODE_LOCATION_1: NORMAL
MDC_IDC_SET_LEADCHNL_RV_SENSING_CATHODE_ELECTRODE_1: NORMAL
MDC_IDC_SET_LEADCHNL_RV_SENSING_CATHODE_LOCATION_1: NORMAL
MDC_IDC_SET_LEADCHNL_RV_SENSING_POLARITY: NORMAL
MDC_IDC_SET_LEADCHNL_RV_SENSING_SENSITIVITY: 2 MV
MDC_IDC_STAT_AT_DTM_END: NORMAL
MDC_IDC_STAT_AT_DTM_START: NORMAL
MDC_IDC_STAT_BRADY_DTM_END: NORMAL
MDC_IDC_STAT_BRADY_DTM_START: NORMAL
MDC_IDC_STAT_BRADY_RV_PERCENT_PACED: 97 %
MDC_IDC_STAT_CRT_DTM_END: NORMAL
MDC_IDC_STAT_CRT_DTM_START: NORMAL
MDC_IDC_STAT_HEART_RATE_DTM_END: NORMAL
MDC_IDC_STAT_HEART_RATE_DTM_START: NORMAL
MDC_IDC_STAT_HEART_RATE_VENTRICULAR_MAX: 200 {BEATS}/MIN
MDC_IDC_STAT_HEART_RATE_VENTRICULAR_MEAN: 77 {BEATS}/MIN
MDC_IDC_STAT_HEART_RATE_VENTRICULAR_MIN: 60 {BEATS}/MIN

## 2025-06-19 PROCEDURE — 93296 REM INTERROG EVL PM/IDS: CPT | Performed by: INTERNAL MEDICINE

## 2025-06-19 PROCEDURE — 93294 REM INTERROG EVL PM/LDLS PM: CPT | Performed by: INTERNAL MEDICINE

## 2025-06-20 ENCOUNTER — RESULTS FOLLOW-UP (OUTPATIENT)
Dept: ANTICOAGULATION | Facility: CLINIC | Age: OVER 89
End: 2025-06-20

## 2025-06-21 DIAGNOSIS — N13.8 HYPERTROPHY OF PROSTATE WITH URINARY OBSTRUCTION: ICD-10-CM

## 2025-06-21 DIAGNOSIS — N40.1 HYPERTROPHY OF PROSTATE WITH URINARY OBSTRUCTION: ICD-10-CM

## 2025-06-23 RX ORDER — FINASTERIDE 5 MG/1
1 TABLET, FILM COATED ORAL DAILY
Qty: 90 TABLET | Refills: 0 | Status: SHIPPED | OUTPATIENT
Start: 2025-06-23

## 2025-06-24 ENCOUNTER — OFFICE VISIT (OUTPATIENT)
Dept: INTERNAL MEDICINE | Facility: CLINIC | Age: OVER 89
End: 2025-06-24
Payer: COMMERCIAL

## 2025-06-24 VITALS
HEIGHT: 70 IN | WEIGHT: 156.5 LBS | TEMPERATURE: 97 F | BODY MASS INDEX: 22.41 KG/M2 | DIASTOLIC BLOOD PRESSURE: 60 MMHG | RESPIRATION RATE: 18 BRPM | HEART RATE: 76 BPM | OXYGEN SATURATION: 99 % | SYSTOLIC BLOOD PRESSURE: 95 MMHG

## 2025-06-24 DIAGNOSIS — I48.21 PERMANENT ATRIAL FIBRILLATION (H): ICD-10-CM

## 2025-06-24 DIAGNOSIS — D64.9 ANEMIA, UNSPECIFIED TYPE: ICD-10-CM

## 2025-06-24 DIAGNOSIS — I10 ESSENTIAL HYPERTENSION WITH GOAL BLOOD PRESSURE LESS THAN 140/90: Primary | ICD-10-CM

## 2025-06-24 DIAGNOSIS — N18.31 STAGE 3A CHRONIC KIDNEY DISEASE (H): ICD-10-CM

## 2025-06-24 DIAGNOSIS — M16.11 PRIMARY OSTEOARTHRITIS OF RIGHT HIP: ICD-10-CM

## 2025-06-24 DIAGNOSIS — G60.3 IDIOPATHIC PROGRESSIVE NEUROPATHY: ICD-10-CM

## 2025-06-24 DIAGNOSIS — R62.7 FAILURE TO THRIVE IN ADULT: ICD-10-CM

## 2025-06-24 DIAGNOSIS — Z78.9 POLST (PHYSICIAN ORDERS FOR LIFE-SUSTAINING TREATMENT): ICD-10-CM

## 2025-06-24 LAB
ERYTHROCYTE [DISTWIDTH] IN BLOOD BY AUTOMATED COUNT: 13.6 % (ref 10–15)
HCT VFR BLD AUTO: 35.1 % (ref 40–53)
HGB BLD-MCNC: 11.7 G/DL (ref 13.3–17.7)
MCH RBC QN AUTO: 31.4 PG (ref 26.5–33)
MCHC RBC AUTO-ENTMCNC: 33.3 G/DL (ref 31.5–36.5)
MCV RBC AUTO: 94 FL (ref 78–100)
PLATELET # BLD AUTO: 212 10E3/UL (ref 150–450)
RBC # BLD AUTO: 3.73 10E6/UL (ref 4.4–5.9)
WBC # BLD AUTO: 5.5 10E3/UL (ref 4–11)

## 2025-06-24 PROCEDURE — 36415 COLL VENOUS BLD VENIPUNCTURE: CPT | Performed by: INTERNAL MEDICINE

## 2025-06-24 PROCEDURE — 80053 COMPREHEN METABOLIC PANEL: CPT | Performed by: INTERNAL MEDICINE

## 2025-06-24 PROCEDURE — 85027 COMPLETE CBC AUTOMATED: CPT | Performed by: INTERNAL MEDICINE

## 2025-06-24 ASSESSMENT — PAIN SCALES - GENERAL: PAINLEVEL_OUTOF10: MILD PAIN (1)

## 2025-06-25 DIAGNOSIS — I10 ESSENTIAL HYPERTENSION WITH GOAL BLOOD PRESSURE LESS THAN 140/90: ICD-10-CM

## 2025-06-25 LAB
ALBUMIN SERPL BCG-MCNC: 3.6 G/DL (ref 3.5–5.2)
ALP SERPL-CCNC: 90 U/L (ref 40–150)
ALT SERPL W P-5'-P-CCNC: 25 U/L (ref 0–70)
ANION GAP SERPL CALCULATED.3IONS-SCNC: 12 MMOL/L (ref 7–15)
AST SERPL W P-5'-P-CCNC: 35 U/L (ref 0–45)
BILIRUB SERPL-MCNC: 0.5 MG/DL
BUN SERPL-MCNC: 56 MG/DL (ref 8–23)
CALCIUM SERPL-MCNC: 9.6 MG/DL (ref 8.8–10.4)
CHLORIDE SERPL-SCNC: 108 MMOL/L (ref 98–107)
CREAT SERPL-MCNC: 1.38 MG/DL (ref 0.67–1.17)
EGFRCR SERPLBLD CKD-EPI 2021: 47 ML/MIN/1.73M2
GLUCOSE SERPL-MCNC: 88 MG/DL (ref 70–99)
HCO3 SERPL-SCNC: 18 MMOL/L (ref 22–29)
POTASSIUM SERPL-SCNC: 4.5 MMOL/L (ref 3.4–5.3)
PROT SERPL-MCNC: 6.9 G/DL (ref 6.4–8.3)
SODIUM SERPL-SCNC: 138 MMOL/L (ref 135–145)

## 2025-06-25 RX ORDER — CARVEDILOL 25 MG/1
TABLET ORAL
Qty: 270 TABLET | Refills: 0 | Status: SHIPPED | OUTPATIENT
Start: 2025-06-25

## 2025-06-26 ENCOUNTER — RESULTS FOLLOW-UP (OUTPATIENT)
Dept: INTERNAL MEDICINE | Facility: CLINIC | Age: OVER 89
End: 2025-06-26

## 2025-06-26 RX ORDER — LOSARTAN POTASSIUM 50 MG/1
50 TABLET ORAL DAILY
Qty: 90 TABLET | Refills: 0 | Status: SHIPPED | OUTPATIENT
Start: 2025-06-26

## 2025-06-29 ENCOUNTER — NURSE TRIAGE (OUTPATIENT)
Dept: NURSING | Facility: CLINIC | Age: OVER 89
End: 2025-06-29
Payer: COMMERCIAL

## 2025-06-29 NOTE — TELEPHONE ENCOUNTER
"Daughter Genna calling. Hx of afib. Blood clot in leg 30 yrs ago.   Father had 2 teeth extracted (molars) last Friday 6/20. He had been on Warfarin: it was stopped 2 days ago, and changed to Xarelto 20mg @ night--has only had 2 doses so far. He has bleeding--oral surgeon saw him Monday for pain. They were told it was healing OK and one suture was removed. In the last 2 days he awakens with blood he is spitting out. He uses guaze and tea bags during the day which helps, but then has bleeding in the night.   Oral surgeon thought they should discuss with PCP anticoagulant question--should they hold any of the Xarelto ? He soaked through a few guazes yesterday and today in the morning. \"Maybe a little bit less today.\"   Triaged to a disposition of call PCP now.  [facility] Provider consult indicated.    Reason for page: medication question: anticoagulant, dental extraction, bleeding post procedure.     Specialty Group number: [id number]   Specialty Group: IM- Internal Medicine    Initial call made to Dr. Lola Devine by Answering Service at 1:09 pm. No answer, LMTCB to ELOISE RN @ 1:12pm.  1:35 pm No response. Answering service called: Dr Lola Devine on cell phone.  : no answer. Back up Dr Geovanny Mcdaniels: paged @ 1:50pm.   2:00pm  Dr Geovanny Mcdaniels returned call to [facility] nurse. Order given : Hold Xarelto until bleeding stops--OK to hold for a few days.   2:13 pm-Contacted anny Ocasio with this information. She verbalized understanding, and agrees with this plan. (She will folllow up with PCP if bleeding does not stop, or if any additional questions or problems develop.)  (2:17pm Dr LYLA Devine returned call-above info given, no further orders given).    Mariela Montero RN Triage Nurse Advisor 2:15 PM 6/29/2025    Reason for Disposition   [1] Bleeding present > 30 minutes AND [2] using correct technique of direct pressure   [1] Caller has URGENT medicine question about med that PCP or specialist " prescribed AND [2] triager unable to answer question    Additional Information   Negative: Sounds like a life-threatening emergency to the triager   Negative: Tooth knocked out   Negative: Drug overdose and triager unable to answer question   Negative: Caller requesting a renewal or refill of a medicine patient is currently taking   Negative: Caller requesting information unrelated to medicine   Negative: Caller requesting information about COVID-19 Vaccine   Negative: Caller requesting information about Emergency Contraception   Negative: Caller requesting information about Combined Birth Control Pills   Negative: Caller requesting information about Progestin Birth Control Pills   Negative: Caller requesting information about Post-Op pain or medicines   Negative: Caller requesting a prescription antibiotic (such as Penicillin) for Strep throat and has a positive culture result   Negative: Caller requesting a prescription anti-viral med (such as Tamiflu) and has influenza (flu) symptoms   Negative: Immunization reaction suspected   Negative: Rash while taking a medicine or within 3 days of stopping it   Negative: [1] Asthma and [2] having symptoms of asthma (cough, wheezing, etc.)   Negative: [1] Symptom of illness (e.g., headache, abdominal pain, earache, vomiting) AND [2] more than mild   Negative: Breastfeeding questions about mother's medicines and diet   Negative: MORE THAN A DOUBLE DOSE of a prescription or over-the-counter (OTC) drug   Negative: [1] DOUBLE DOSE (an extra dose or lesser amount) of prescription drug AND [2] any symptoms (e.g., dizziness, nausea, pain, sleepiness)   Negative: [1] DOUBLE DOSE (an extra dose or lesser amount) of over-the-counter (OTC) drug AND [2] any symptoms (e.g., dizziness, nausea, pain, sleepiness)   Negative: Took another person's prescription drug   Negative: [1] DOUBLE DOSE (an extra dose or lesser amount) of prescription drug AND [2] NO symptoms  (Exception: A double dose  of antibiotics.)   Negative: Diabetes drug error or overdose (e.g., took wrong type of insulin or took extra dose)   Negative: [1] Pharmacy calling with prescription question AND [2] triager unable to answer question   Negative: [1] Prescription not at pharmacy AND [2] was prescribed by PCP recently (Exception: Triager has access to EMR and prescription is recorded there. Go to Home Care and confirm for pharmacy.)    Protocols used: Tooth [id number]-GX, Medication Question Call-A-AH

## 2025-06-30 NOTE — TELEPHONE ENCOUNTER
Left message with patient's home phone to call clinic back.    Call to daughterGenna who states she is not with patient and to try to call other sister, Georgia because Georgia is patient's caregiver and takes care of him.    Call to daughter, Georgia. Left message to call clinic back for update on patient's bleeding.    Thank you,  Christopher, Triage [facility]    8:55 AM 6/30/2025

## 2025-07-01 NOTE — TELEPHONE ENCOUNTER
Attempted to contact Georgia. Phone rang, then unable to hear anything, then disconnected.     Call to Tucker. He said it stopped bleeding last night.   Georgia just got to the home. She confirmed it is not bleeding any more.     Advised they can try a Nose bleed stopper, OTC on his gums if starts bleeding again. Can get at pharmacy, or Afrin nasal spray on cotton ball to use on gum for 10 minutes may help.

## 2025-07-09 ENCOUNTER — DOCUMENTATION ONLY (OUTPATIENT)
Dept: ANTICOAGULATION | Facility: CLINIC | Age: OVER 89
End: 2025-07-09
Payer: COMMERCIAL

## 2025-07-09 NOTE — PROGRESS NOTES
ANTICOAGULATION  MANAGEMENT    Tucker Slater is being discharged from the [facility] Anticoagulation [facility] ([facility]).    Reason for discharge: warfarin replaced by alternate therapy, xarelto    Anticoagulation episode resolved, ACC referral closed, and Standing order discontinued    If patient needs warfarin management in the future, please send a new referral    Chichi Wang RN

## 2025-07-21 ENCOUNTER — NURSE TRIAGE (OUTPATIENT)
Dept: INTERNAL MEDICINE | Facility: CLINIC | Age: OVER 89
End: 2025-07-21
Payer: COMMERCIAL

## 2025-07-21 NOTE — TELEPHONE ENCOUNTER
Daughter calls back. No back pain, no abdominal pain. No fevers.     Late evening is when he had the blood.   THIS AM, Barely any red at all. More yellow this AM.     They are going to check BP.     She states he is not lightheaded or dizzy.   Tj Vailr is OK waiting until tomorrow. Offered VV with another clinic but she requested BV. Scheduled VV with Dr Hinojosa.   Advised to call back sooner if needed.     Additional Information   Negative: Shock suspected (e.g., cold/pale/clammy skin, too weak to stand, low BP, rapid pulse)   Negative: Sounds like a life-threatening emergency to the triager   Negative: Urinary catheter, questions about   Negative: Recent back or abdominal injury   Negative: Recent genital injury   Negative: Unable to urinate (or only a few drops) > 4 hours and bladder feels very full (e.g., palpable bladder or strong urge to urinate)   Negative: Diffuse (all over) muscle pains in the shoulders, arms, legs, and back and dark (cola or tea-colored) or red-colored urine   Negative: Passing pure blood or large blood clots (i.e., larger than a dime or grape)   Negative: Fever > 100.4 F (38.0 C)   Negative: Patient sounds very sick or weak to the triager   Negative: Known sickle cell disease    Protocols used: Urine - Blood In-A-OH

## 2025-07-21 NOTE — TELEPHONE ENCOUNTER
2nd level triage    Situation:  Daughter called regarding patient. She states he has had blood in his urine since yesterday 7/20.    She is not with the patient.     Background:   - ctc on file for daughter   Xarelto- switched rom coumadin     Assessment:    Blood in urine as of yesterday 7/20 . Other sister noticed blood in his urine  2 days ago- urine was cloudy and the next day urine was gray and now bloody     Daughter is not sure of other symptoms the patient is experiencing.       Recommendation:  Explained he needs to be see today for sure but depending on the severity of symptoms  would determine what level of care he needs. clinic, ADS versus ER   She will call back and ask to speak to a triage nurse when she goes to see him within the next 1-2 hours to determine what level of care is needed.     Routed- [facility] as a high priority.     Patient's daughter denied any other questions or concerns and agrees with plan.     Sabine ULLOA RN   [facility] Triage       Reason for Disposition   Taking Coumadin (warfarin) or other strong blood thinner, or known bleeding disorder (e.g., thrombocytopenia)    Protocols used: Urine - Blood In-A-OH

## 2025-07-22 ENCOUNTER — VIRTUAL VISIT (OUTPATIENT)
Dept: INTERNAL MEDICINE | Facility: CLINIC | Age: OVER 89
End: 2025-07-22
Payer: COMMERCIAL

## 2025-07-22 ENCOUNTER — RESULTS FOLLOW-UP (OUTPATIENT)
Dept: INTERNAL MEDICINE | Facility: CLINIC | Age: OVER 89
End: 2025-07-22

## 2025-07-22 DIAGNOSIS — N30.91 HEMORRHAGIC CYSTITIS: ICD-10-CM

## 2025-07-22 DIAGNOSIS — R31.0 GROSS HEMATURIA: Primary | ICD-10-CM

## 2025-07-22 LAB
ALBUMIN UR-MCNC: 30 MG/DL
APPEARANCE UR: ABNORMAL
BACTERIA #/AREA URNS HPF: ABNORMAL /HPF
BILIRUB UR QL STRIP: NEGATIVE
COLOR UR AUTO: YELLOW
GLUCOSE UR STRIP-MCNC: NEGATIVE MG/DL
HGB UR QL STRIP: ABNORMAL
KETONES UR STRIP-MCNC: NEGATIVE MG/DL
LEUKOCYTE ESTERASE UR QL STRIP: ABNORMAL
MUCOUS THREADS #/AREA URNS LPF: PRESENT /LPF
NITRATE UR QL: NEGATIVE
PH UR STRIP: 5.5 [PH] (ref 5–7)
RBC #/AREA URNS AUTO: ABNORMAL /HPF
SP GR UR STRIP: 1.02 (ref 1–1.03)
SQUAMOUS #/AREA URNS AUTO: ABNORMAL /LPF
UROBILINOGEN UR STRIP-ACNC: 0.2 E.U./DL
WBC #/AREA URNS AUTO: ABNORMAL /HPF
WBC CLUMPS #/AREA URNS HPF: PRESENT /HPF

## 2025-07-22 PROCEDURE — 81001 URINALYSIS AUTO W/SCOPE: CPT | Performed by: INTERNAL MEDICINE

## 2025-07-22 PROCEDURE — 98006 SYNCH AUDIO-VIDEO EST MOD 30: CPT | Performed by: INTERNAL MEDICINE

## 2025-07-22 PROCEDURE — 87086 URINE CULTURE/COLONY COUNT: CPT | Performed by: INTERNAL MEDICINE

## 2025-07-22 RX ORDER — CEPHALEXIN 500 MG/1
500 CAPSULE ORAL 2 TIMES DAILY
Qty: 20 CAPSULE | Refills: 0 | Status: SHIPPED | OUTPATIENT
Start: 2025-07-22

## 2025-07-22 NOTE — PROGRESS NOTES
Tucker is a 89 year old who is being evaluated via a billable video visit.    How would you like to obtain your AVS? MyChart  If the video visit is dropped, the invitation should be resent by: Text to cell phone:   Will anyone else be joining your video visit? Yes: daughterGenna. How would they like to receive their invitation? Text to cell phone: [phone]      Assessment & Plan   (R31.0) Gross hematuria  (primary encounter diagnosis)  Comment: - Possible side effect of Xarelto. Differential diagnosis includes bladder infection, although no burning sensation during urination. Less likely to be blood loss from the kidney or bladder causing anemia.  - Urinalysis ordered to check for infection. If infection is present, it can be treated with antibiotics. No need to stop Xarelto as bleeding has ceased. Further evaluation with a urologist or CAT scan is optional if urinalysis is inconclusive.  Plan: UA with Microscopic reflex to Culture - Clinic         Collect, UA with Microscopic reflex to Culture         - Clinic Collect, UA Microscopic with Reflex to        Culture, Urine Culture    (N30.91) Hemorrhagic cystitis  Comment: UA shows WBC c lumps and 25-50 WBC's/HPF. Likely contributing to hematuria.   E-prescribed Rx for cephalexin.   Plan: cephALEXin (KEFLEX) 500 MG capsule             Patient Instructions   The urine test confirms that a bladder infection is present, which was likely the main factor leading to blood in the urine.   I've sent a prescription to your pharmacy for an antibiotic.        Subjective   Tucker is a 89 year old, presenting for the following health issues:  Urinary Problem      7/22/2025     1:17 PM   Additional Questions   Roomed by Joellen MONTIEL CMA   Accompanied by daughterGenna     Video Start Time: 1344    History of Present Illness       Reason for visit:  Hematuria  Symptom onset:  3-7 days ago  Symptoms include:  Blood in the urine  Symptom intensity:  Moderate  Symptom progression:   Improving  Had these symptoms before:  No  What makes it worse:  No  What makes it better:  No   He is taking medications regularly.        - Blood in urine began 4 days ago, lasted 2 days, resolved prior to visit  - No pain with urination during episode  - No prior episodes of blood in urine reported  - No fever, chills, vomiting, stomach pain, kidney pain, or flank pain during or after episode  - Increased fatigue noted recently  - On Xarelto, switched from warfarin in June 2025  - Renal ultrasounds in 2021 showed a cyst, followed by imaging as recommended      Past medical, family and social histories as well as medications reviewed and updated as needed.    REVIEW OF SYSTEMS: The following systems have been completely reviewed and are negative except as noted above:   Constitutional, HEENT, respiratory, cardiovascular, gastrointestinal, genitourinary, musculoskeletal, dermatologic, hematologic, endocrine, psychiatric, and neurologic systems.        Objective    Vitals - Patient Reported  Systolic (Patient Reported): 136  Diastolic (Patient Reported): 83  Weight (Patient Reported): 73 kg (161 lb)        Physical Exam   GENERAL: alert and no distress  EYES: Eyes grossly normal to inspection.  No discharge or erythema, or obvious scleral/conjunctival abnormalities.  RESP: No audible wheeze, cough, or visible cyanosis.    SKIN: Visible skin clear. No significant rash, abnormal pigmentation or lesions.  NEURO: Cranial nerves grossly intact.  Mentation and speech appropriate for age.  PSYCH: Appropriate affect, tone, and pace of words    Recent Results (from the past 24 hours)   UA with Microscopic reflex to Culture - Clinic Collect    Specimen: Urine, Clean Catch   Result Value Ref Range    Color Urine Yellow Colorless, Straw, Light Yellow, Yellow    Appearance Urine Slightly Cloudy (A) Clear    Glucose Urine Negative Negative mg/dL    Bilirubin Urine Negative Negative    Ketones Urine Negative Negative mg/dL     Specific Gravity Urine 1.020 1.003 - 1.035    Blood Urine Moderate (A) Negative    pH Urine 5.5 5.0 - 7.0    Protein Albumin Urine 30 (A) Negative mg/dL    Urobilinogen Urine 0.2 0.2, 1.0 E.U./dL    Nitrite Urine Negative Negative    Leukocyte Esterase Urine Large (A) Negative   UA Microscopic with Reflex to Culture   Result Value Ref Range    Bacteria Urine Moderate (A) None Seen /HPF    RBC Urine 10-25 (A) 0-2 /HPF /HPF    WBC Urine 25-50 (A) 0-5 /HPF /HPF    Squamous Epithelials Urine Many (A) None Seen /LPF    WBC Clumps Urine Present (A) None Seen /HPF    Mucus Urine Present (A) None Seen /LPF         Video-Visit Details    Type of service:  Video Visit   Video End Time: 1358  Originating Location (pt. Location): Home    Distant Location (provider location):  On-site  Platform used for Video Visit: Camryn  Signed Electronically by: Richard Hinojosa MD,

## 2025-07-23 LAB — BACTERIA UR CULT: NORMAL

## 2025-07-23 NOTE — PATIENT INSTRUCTIONS
The urine test confirms that a bladder infection is present, which was likely the main factor leading to blood in the urine.   I've sent a prescription to your pharmacy for an antibiotic.

## 2025-08-01 ENCOUNTER — TELEPHONE (OUTPATIENT)
Dept: INTERNAL MEDICINE | Facility: CLINIC | Age: OVER 89
End: 2025-08-01

## 2025-08-01 DIAGNOSIS — R31.0 GROSS HEMATURIA: ICD-10-CM

## 2025-08-01 DIAGNOSIS — R39.9 URINARY SYMPTOM OR SIGN: Primary | ICD-10-CM

## 2025-08-25 ENCOUNTER — NURSE TRIAGE (OUTPATIENT)
Dept: INTERNAL MEDICINE | Facility: CLINIC | Age: OVER 89
End: 2025-08-25
Payer: COMMERCIAL

## 2025-08-26 ENCOUNTER — OFFICE VISIT (OUTPATIENT)
Dept: INTERNAL MEDICINE | Facility: CLINIC | Age: OVER 89
End: 2025-08-26
Payer: COMMERCIAL

## 2025-08-26 ENCOUNTER — TELEPHONE (OUTPATIENT)
Dept: INTERNAL MEDICINE | Facility: CLINIC | Age: OVER 89
End: 2025-08-26

## 2025-08-26 VITALS
WEIGHT: 173.7 LBS | HEART RATE: 71 BPM | BODY MASS INDEX: 24.87 KG/M2 | RESPIRATION RATE: 18 BRPM | HEIGHT: 70 IN | OXYGEN SATURATION: 98 % | SYSTOLIC BLOOD PRESSURE: 153 MMHG | DIASTOLIC BLOOD PRESSURE: 86 MMHG | TEMPERATURE: 97 F

## 2025-08-26 DIAGNOSIS — Z23 NEED FOR VACCINATION: ICD-10-CM

## 2025-08-26 DIAGNOSIS — I50.22 CHRONIC SYSTOLIC CONGESTIVE HEART FAILURE (H): ICD-10-CM

## 2025-08-26 DIAGNOSIS — R60.0 BILATERAL LEG EDEMA: ICD-10-CM

## 2025-08-26 DIAGNOSIS — Z13.6 SCREENING FOR CARDIOVASCULAR CONDITION: Primary | ICD-10-CM

## 2025-08-26 DIAGNOSIS — N18.31 STAGE 3A CHRONIC KIDNEY DISEASE (H): ICD-10-CM

## 2025-08-26 LAB
ERYTHROCYTE [DISTWIDTH] IN BLOOD BY AUTOMATED COUNT: 13.7 % (ref 10–15)
HCT VFR BLD AUTO: 34.1 % (ref 40–53)
HGB BLD-MCNC: 11.4 G/DL (ref 13.3–17.7)
MCH RBC QN AUTO: 31.4 PG (ref 26.5–33)
MCHC RBC AUTO-ENTMCNC: 33.4 G/DL (ref 31.5–36.5)
MCV RBC AUTO: 93.9 FL (ref 78–100)
PLATELET # BLD AUTO: 204 10E3/UL (ref 150–450)
RBC # BLD AUTO: 3.63 10E6/UL (ref 4.4–5.9)
WBC # BLD AUTO: 4.61 10E3/UL (ref 4–11)

## 2025-08-26 PROCEDURE — 80053 COMPREHEN METABOLIC PANEL: CPT | Performed by: INTERNAL MEDICINE

## 2025-08-26 PROCEDURE — 36415 COLL VENOUS BLD VENIPUNCTURE: CPT | Performed by: INTERNAL MEDICINE

## 2025-08-26 PROCEDURE — 84484 ASSAY OF TROPONIN QUANT: CPT | Performed by: INTERNAL MEDICINE

## 2025-08-26 PROCEDURE — 99214 OFFICE O/P EST MOD 30 MIN: CPT | Performed by: INTERNAL MEDICINE

## 2025-08-26 PROCEDURE — 1126F AMNT PAIN NOTED NONE PRSNT: CPT | Performed by: INTERNAL MEDICINE

## 2025-08-26 PROCEDURE — 3077F SYST BP >= 140 MM HG: CPT | Performed by: INTERNAL MEDICINE

## 2025-08-26 PROCEDURE — 3079F DIAST BP 80-89 MM HG: CPT | Performed by: INTERNAL MEDICINE

## 2025-08-26 PROCEDURE — 85027 COMPLETE CBC AUTOMATED: CPT | Performed by: INTERNAL MEDICINE

## 2025-08-26 PROCEDURE — 93000 ELECTROCARDIOGRAM COMPLETE: CPT | Performed by: INTERNAL MEDICINE

## 2025-08-26 PROCEDURE — 84443 ASSAY THYROID STIM HORMONE: CPT | Performed by: INTERNAL MEDICINE

## 2025-08-26 ASSESSMENT — PAIN SCALES - GENERAL: PAINLEVEL_OUTOF10: NO PAIN (0)

## 2025-08-27 ENCOUNTER — RESULTS FOLLOW-UP (OUTPATIENT)
Dept: INTERNAL MEDICINE | Facility: CLINIC | Age: OVER 89
End: 2025-08-27
Payer: COMMERCIAL

## 2025-08-27 DIAGNOSIS — R60.0 EDEMA OF BOTH LEGS: Primary | ICD-10-CM

## 2025-08-27 LAB
ALBUMIN SERPL BCG-MCNC: 3.3 G/DL (ref 3.5–5.2)
ALP SERPL-CCNC: 88 U/L (ref 40–150)
ALT SERPL W P-5'-P-CCNC: 23 U/L (ref 0–70)
ANION GAP SERPL CALCULATED.3IONS-SCNC: 10 MMOL/L (ref 7–15)
AST SERPL W P-5'-P-CCNC: 35 U/L (ref 0–45)
BILIRUB SERPL-MCNC: 0.6 MG/DL
BUN SERPL-MCNC: 34.1 MG/DL (ref 8–23)
CALCIUM SERPL-MCNC: 9.1 MG/DL (ref 8.8–10.4)
CHLORIDE SERPL-SCNC: 110 MMOL/L (ref 98–107)
CREAT SERPL-MCNC: 1.1 MG/DL (ref 0.67–1.17)
EGFRCR SERPLBLD CKD-EPI 2021: 62 ML/MIN/1.73M2
GLUCOSE SERPL-MCNC: 87 MG/DL (ref 70–99)
HCO3 SERPL-SCNC: 21 MMOL/L (ref 22–29)
POTASSIUM SERPL-SCNC: 4.1 MMOL/L (ref 3.4–5.3)
PROT SERPL-MCNC: 6.4 G/DL (ref 6.4–8.3)
SODIUM SERPL-SCNC: 141 MMOL/L (ref 135–145)
TROPONIN T SERPL HS-MCNC: 57 NG/L
TSH SERPL DL<=0.005 MIU/L-ACNC: 2.06 UIU/ML (ref 0.3–4.2)

## 2025-08-27 RX ORDER — FUROSEMIDE 20 MG/1
20 TABLET ORAL DAILY
Qty: 30 TABLET | Refills: 0 | Status: SHIPPED | OUTPATIENT
Start: 2025-08-27

## 2025-08-28 ENCOUNTER — LAB (OUTPATIENT)
Dept: LAB | Facility: CLINIC | Age: OVER 89
End: 2025-08-28
Payer: COMMERCIAL

## 2025-08-28 DIAGNOSIS — R60.0 EDEMA OF BOTH LEGS: ICD-10-CM

## 2025-08-28 LAB — TROPONIN T SERPL HS-MCNC: 58 NG/L

## (undated) DEVICE — PREP TECHNI-CARE CHLOROXYLENOL 3% 4OZ BOTTLE C222-4ZWO

## (undated) DEVICE — SU VICRYL 2-0 CT-1 36" UND J945H

## (undated) DEVICE — PACK HAND SOP32HARMO

## (undated) DEVICE — SOL SORBITOL 3% IRRIG 3000ML BAG 2B7357

## (undated) DEVICE — SYR 70ML TOOMEY 041170

## (undated) DEVICE — SOL WATER IRRIG 3000ML BAG 2B7117

## (undated) DEVICE — GLOVE PROTEXIS POWDER FREE 8.0 ORTHOPEDIC 2D73ET80

## (undated) DEVICE — SOL WATER IRRIG 1000ML BOTTLE 2F7114

## (undated) DEVICE — BASIN SET MINOR DISP

## (undated) DEVICE — BAG CLEAR TRASH 1.3M 39X33" P4040C

## (undated) DEVICE — LINEN HALF SHEET 5512

## (undated) DEVICE — CATH HOLDER STRAP 36600

## (undated) DEVICE — DRSG TELFA 2X3"

## (undated) DEVICE — LINEN FULL SHEET 5511

## (undated) DEVICE — GLOVE PROTEXIS POWDER FREE 7.5 ORTHOPEDIC 2D73ET75

## (undated) DEVICE — SOL NACL 0.9% IRRIG 3000ML BAG 2B7477

## (undated) DEVICE — GLOVE PROTEXIS POWDER FREE SMT 7.5  2D72PT75X

## (undated) DEVICE — LINEN TOWEL PACK X5 5464

## (undated) DEVICE — BAG URINARY DRAIN 4000ML LF 153509

## (undated) DEVICE — GLOVE PROTEXIS BLUE W/NEU-THERA 8.0  2D73EB80

## (undated) DEVICE — PACK CYSTO CUSTOM RIDGES

## (undated) DEVICE — ESU GROUND PAD ADULT W/CORD E7507

## (undated) DEVICE — TUBING IRRIG TUR Y TYPE 96" LF 6543-01

## (undated) DEVICE — CATH FOLEY 3WAY 20FR 30ML LUBRICATH LATEX 0167L20

## (undated) DEVICE — RAD RX ISOVUE 300 (50ML) 61% IOPAMIDOL CHARGE PER ML

## (undated) DEVICE — NDL BLUNT 18GA 1" W/O FILTER 305181

## (undated) DEVICE — SU ETHILON 3-0 PS-2 18" 1669H

## (undated) DEVICE — ESU ELEC BLADE 6" COATED E1450-6

## (undated) DEVICE — IMM PILLOW ABDUCT HIP MED M60-025-M

## (undated) DEVICE — CABLE PACING ALLIGATOR CLIP 12FT 5833SL

## (undated) DEVICE — SU TICRON #2 HGS-21 30" 3113-81

## (undated) DEVICE — SYR ELLIK EVACUATOR W/ADAPT LATEX

## (undated) DEVICE — DEVICE RETRIEVER HEWSON 71111579

## (undated) DEVICE — SPECIMEN CONTAINER 4OZ

## (undated) DEVICE — DRSG AQUACEL AG 3.5X9.75" HYDROFIBER 412011

## (undated) DEVICE — SYR 05ML LL W/O NDL

## (undated) DEVICE — PACK TOTAL HIP RIDGES LATEX PO15HIFSG

## (undated) DEVICE — LINEN DRAPE 54X72" 5467

## (undated) DEVICE — ESU ELEC LOOP 24FR 20750G

## (undated) DEVICE — ESU ELEC ROLLERBALL 24FR 3MM  27050N

## (undated) DEVICE — DEFIB PRO-PADZ LVP LQD GEL ADULT 8900-2105-01

## (undated) DEVICE — SU VICRYL 1 CTX 36" J371H

## (undated) DEVICE — PACK PCMKR PERM SRG PROC LF SAN32PC573

## (undated) DEVICE — PREP CHLORAPREP 26ML TINTED ORANGE  260815

## (undated) DEVICE — BNDG ELASTIC 2"X5YDS UNSTERILE 6611-20

## (undated) DEVICE — CATH URETERAL CONE 08FR 70CM 138008LT / 138008RT

## (undated) DEVICE — BLADE SAW SAGITTAL STRK 25X90X1.27MM HD SYS 6 6125-127-090

## (undated) DEVICE — SYR 30ML LL W/O NDL 302832

## (undated) DEVICE — DRAIN HEMOVAC RESERVOIR KIT 10FR 1/8" MED 00-2550-002-10

## (undated) DEVICE — SUCTION MANIFOLD NEPTUNE 2 SYS 4 PORT 0702-020-000

## (undated) DEVICE — NDL SPINAL 18GA 3.5" 405184

## (undated) DEVICE — LINEN ORTHO PACK 5446

## (undated) DEVICE — 9CM X 15CM, AQUACEL AG ADVANTAGE SURGICAL COVER DRESSING

## (undated) RX ORDER — DEXAMETHASONE SODIUM PHOSPHATE 4 MG/ML
INJECTION, SOLUTION INTRA-ARTICULAR; INTRALESIONAL; INTRAMUSCULAR; INTRAVENOUS; SOFT TISSUE
Status: DISPENSED
Start: 2017-03-27

## (undated) RX ORDER — DEXAMETHASONE SODIUM PHOSPHATE 10 MG/ML
INJECTION, SOLUTION INTRAMUSCULAR; INTRAVENOUS
Status: DISPENSED
Start: 2017-03-27

## (undated) RX ORDER — BUPIVACAINE HYDROCHLORIDE 2.5 MG/ML
INJECTION, SOLUTION EPIDURAL; INFILTRATION; INTRACAUDAL
Status: DISPENSED
Start: 2017-04-19

## (undated) RX ORDER — PROPOFOL 10 MG/ML
INJECTION, EMULSION INTRAVENOUS
Status: DISPENSED
Start: 2017-04-19

## (undated) RX ORDER — FENTANYL CITRATE 50 UG/ML
INJECTION, SOLUTION INTRAMUSCULAR; INTRAVENOUS
Status: DISPENSED
Start: 2019-06-18

## (undated) RX ORDER — ATROPA BELLADONNA AND OPIUM 16.2; 3 MG/1; MG/1
SUPPOSITORY RECTAL
Status: DISPENSED
Start: 2019-04-23

## (undated) RX ORDER — ATROPA BELLADONNA AND OPIUM 16.2; 3 MG/1; MG/1
SUPPOSITORY RECTAL
Status: DISPENSED
Start: 2019-06-18

## (undated) RX ORDER — CEFAZOLIN SODIUM 2 G/100ML
INJECTION, SOLUTION INTRAVENOUS
Status: DISPENSED
Start: 2017-03-27

## (undated) RX ORDER — FENTANYL CITRATE 50 UG/ML
INJECTION, SOLUTION INTRAMUSCULAR; INTRAVENOUS
Status: DISPENSED
Start: 2024-08-23

## (undated) RX ORDER — LIDOCAINE HYDROCHLORIDE 10 MG/ML
INJECTION, SOLUTION EPIDURAL; INFILTRATION; INTRACAUDAL; PERINEURAL
Status: DISPENSED
Start: 2017-04-19

## (undated) RX ORDER — LIDOCAINE HYDROCHLORIDE 10 MG/ML
INJECTION, SOLUTION EPIDURAL; INFILTRATION; INTRACAUDAL; PERINEURAL
Status: DISPENSED
Start: 2019-04-23

## (undated) RX ORDER — ACETAMINOPHEN 10 MG/ML
INJECTION, SOLUTION INTRAVENOUS
Status: DISPENSED
Start: 2017-03-27

## (undated) RX ORDER — PROPOFOL 10 MG/ML
INJECTION, EMULSION INTRAVENOUS
Status: DISPENSED
Start: 2017-03-27

## (undated) RX ORDER — EPHEDRINE SULFATE 50 MG/ML
INJECTION, SOLUTION INTRAMUSCULAR; INTRAVENOUS; SUBCUTANEOUS
Status: DISPENSED
Start: 2019-06-18

## (undated) RX ORDER — BUPIVACAINE HYDROCHLORIDE AND EPINEPHRINE 5; 5 MG/ML; UG/ML
INJECTION, SOLUTION EPIDURAL; INTRACAUDAL; PERINEURAL
Status: DISPENSED
Start: 2017-03-27

## (undated) RX ORDER — NEOSTIGMINE METHYLSULFATE 5 MG/5 ML
SYRINGE (ML) INTRAVENOUS
Status: DISPENSED
Start: 2017-03-27

## (undated) RX ORDER — ONDANSETRON 2 MG/ML
INJECTION INTRAMUSCULAR; INTRAVENOUS
Status: DISPENSED
Start: 2019-06-18

## (undated) RX ORDER — CEFAZOLIN SODIUM 2 G/100ML
INJECTION, SOLUTION INTRAVENOUS
Status: DISPENSED
Start: 2019-04-23

## (undated) RX ORDER — CEFAZOLIN SODIUM 2 G/100ML
INJECTION, SOLUTION INTRAVENOUS
Status: DISPENSED
Start: 2017-04-19

## (undated) RX ORDER — FENTANYL CITRATE 50 UG/ML
INJECTION, SOLUTION INTRAMUSCULAR; INTRAVENOUS
Status: DISPENSED
Start: 2017-04-19

## (undated) RX ORDER — GLYCOPYRROLATE 0.2 MG/ML
INJECTION INTRAMUSCULAR; INTRAVENOUS
Status: DISPENSED
Start: 2019-04-23

## (undated) RX ORDER — PROPOFOL 10 MG/ML
INJECTION, EMULSION INTRAVENOUS
Status: DISPENSED
Start: 2019-04-23

## (undated) RX ORDER — LIDOCAINE HYDROCHLORIDE 10 MG/ML
INJECTION, SOLUTION INFILTRATION; PERINEURAL
Status: DISPENSED
Start: 2017-04-19

## (undated) RX ORDER — ONDANSETRON 2 MG/ML
INJECTION INTRAMUSCULAR; INTRAVENOUS
Status: DISPENSED
Start: 2017-03-27

## (undated) RX ORDER — ONDANSETRON 2 MG/ML
INJECTION INTRAMUSCULAR; INTRAVENOUS
Status: DISPENSED
Start: 2017-04-19

## (undated) RX ORDER — LIDOCAINE HYDROCHLORIDE 5 MG/ML
INJECTION, SOLUTION INFILTRATION; INTRAVENOUS
Status: DISPENSED
Start: 2017-04-19

## (undated) RX ORDER — LIDOCAINE HYDROCHLORIDE 10 MG/ML
INJECTION, SOLUTION EPIDURAL; INFILTRATION; INTRACAUDAL; PERINEURAL
Status: DISPENSED
Start: 2024-08-23

## (undated) RX ORDER — DEXAMETHASONE SODIUM PHOSPHATE 4 MG/ML
INJECTION, SOLUTION INTRA-ARTICULAR; INTRALESIONAL; INTRAMUSCULAR; INTRAVENOUS; SOFT TISSUE
Status: DISPENSED
Start: 2019-04-23

## (undated) RX ORDER — LIDOCAINE HYDROCHLORIDE 10 MG/ML
INJECTION, SOLUTION EPIDURAL; INFILTRATION; INTRACAUDAL; PERINEURAL
Status: DISPENSED
Start: 2017-03-27

## (undated) RX ORDER — HYDRALAZINE HYDROCHLORIDE 20 MG/ML
INJECTION INTRAMUSCULAR; INTRAVENOUS
Status: DISPENSED
Start: 2017-03-27

## (undated) RX ORDER — LIDOCAINE HYDROCHLORIDE 5 MG/ML
INJECTION, SOLUTION INFILTRATION; INTRAVENOUS
Status: DISPENSED
Start: 2017-04-17

## (undated) RX ORDER — CEFAZOLIN SODIUM 2 G/100ML
INJECTION, SOLUTION INTRAVENOUS
Status: DISPENSED
Start: 2019-06-18

## (undated) RX ORDER — FENTANYL CITRATE 50 UG/ML
INJECTION, SOLUTION INTRAMUSCULAR; INTRAVENOUS
Status: DISPENSED
Start: 2019-04-23

## (undated) RX ORDER — FENTANYL CITRATE 50 UG/ML
INJECTION, SOLUTION INTRAMUSCULAR; INTRAVENOUS
Status: DISPENSED
Start: 2017-03-27

## (undated) RX ORDER — PHENYLEPHRINE HCL IN 0.9% NACL 1 MG/10 ML
SYRINGE (ML) INTRAVENOUS
Status: DISPENSED
Start: 2019-04-23

## (undated) RX ORDER — GLYCOPYRROLATE 0.2 MG/ML
INJECTION INTRAMUSCULAR; INTRAVENOUS
Status: DISPENSED
Start: 2017-03-27

## (undated) RX ORDER — ONDANSETRON 2 MG/ML
INJECTION INTRAMUSCULAR; INTRAVENOUS
Status: DISPENSED
Start: 2019-04-23